# Patient Record
Sex: MALE | Race: WHITE | NOT HISPANIC OR LATINO | URBAN - METROPOLITAN AREA
[De-identification: names, ages, dates, MRNs, and addresses within clinical notes are randomized per-mention and may not be internally consistent; named-entity substitution may affect disease eponyms.]

---

## 2018-01-11 ENCOUNTER — INPATIENT (INPATIENT)
Facility: HOSPITAL | Age: 83
LOS: 0 days | Discharge: HOME | DRG: 247 | End: 2018-01-12
Attending: INTERNAL MEDICINE | Admitting: INTERNAL MEDICINE
Payer: MEDICARE

## 2018-01-11 ENCOUNTER — TRANSCRIPTION ENCOUNTER (OUTPATIENT)
Age: 83
End: 2018-01-11

## 2018-01-11 VITALS
TEMPERATURE: 98 F | OXYGEN SATURATION: 96 % | RESPIRATION RATE: 17 BRPM | SYSTOLIC BLOOD PRESSURE: 192 MMHG | HEART RATE: 88 BPM | DIASTOLIC BLOOD PRESSURE: 85 MMHG

## 2018-01-11 DIAGNOSIS — Z98.89 OTHER SPECIFIED POSTPROCEDURAL STATES: Chronic | ICD-10-CM

## 2018-01-11 DIAGNOSIS — R07.9 CHEST PAIN, UNSPECIFIED: ICD-10-CM

## 2018-01-11 LAB
ALBUMIN SERPL ELPH-MCNC: 3.8 G/DL — SIGNIFICANT CHANGE UP (ref 3.3–5)
ALBUMIN SERPL ELPH-MCNC: 4 G/DL — SIGNIFICANT CHANGE UP (ref 3.3–5)
ALP SERPL-CCNC: 66 U/L — SIGNIFICANT CHANGE UP (ref 40–120)
ALP SERPL-CCNC: 73 U/L — SIGNIFICANT CHANGE UP (ref 40–120)
ALT FLD-CCNC: 12 U/L RC — SIGNIFICANT CHANGE UP (ref 10–45)
ALT FLD-CCNC: 14 U/L RC — SIGNIFICANT CHANGE UP (ref 10–45)
ANION GAP SERPL CALC-SCNC: 10 MMOL/L — SIGNIFICANT CHANGE UP (ref 5–17)
ANION GAP SERPL CALC-SCNC: 10 MMOL/L — SIGNIFICANT CHANGE UP (ref 5–17)
APTT BLD: 19.8 SEC — LOW (ref 27.5–37.4)
AST SERPL-CCNC: 20 U/L — SIGNIFICANT CHANGE UP (ref 10–40)
AST SERPL-CCNC: 28 U/L — SIGNIFICANT CHANGE UP (ref 10–40)
BASOPHILS # BLD AUTO: 0 K/UL — SIGNIFICANT CHANGE UP (ref 0–0.2)
BASOPHILS # BLD AUTO: SIGNIFICANT CHANGE UP (ref 0–0.2)
BASOPHILS NFR BLD AUTO: 0.8 % — SIGNIFICANT CHANGE UP (ref 0–2)
BASOPHILS NFR BLD AUTO: SIGNIFICANT CHANGE UP % (ref 0–2)
BILIRUB SERPL-MCNC: 0.3 MG/DL — SIGNIFICANT CHANGE UP (ref 0.2–1.2)
BILIRUB SERPL-MCNC: 0.3 MG/DL — SIGNIFICANT CHANGE UP (ref 0.2–1.2)
BUN SERPL-MCNC: 23 MG/DL — SIGNIFICANT CHANGE UP (ref 7–23)
BUN SERPL-MCNC: 23 MG/DL — SIGNIFICANT CHANGE UP (ref 7–23)
CALCIUM SERPL-MCNC: 9.2 MG/DL — SIGNIFICANT CHANGE UP (ref 8.4–10.5)
CALCIUM SERPL-MCNC: 9.2 MG/DL — SIGNIFICANT CHANGE UP (ref 8.4–10.5)
CHLORIDE SERPL-SCNC: 105 MMOL/L — SIGNIFICANT CHANGE UP (ref 96–108)
CHLORIDE SERPL-SCNC: 106 MMOL/L — SIGNIFICANT CHANGE UP (ref 96–108)
CK MB BLD-MCNC: 5 % — HIGH (ref 0–3.5)
CK MB CFR SERPL CALC: 9.9 NG/ML — HIGH (ref 0–6.7)
CK SERPL-CCNC: 199 U/L — SIGNIFICANT CHANGE UP (ref 30–200)
CO2 SERPL-SCNC: 26 MMOL/L — SIGNIFICANT CHANGE UP (ref 22–31)
CO2 SERPL-SCNC: 26 MMOL/L — SIGNIFICANT CHANGE UP (ref 22–31)
CREAT SERPL-MCNC: 1.36 MG/DL — HIGH (ref 0.5–1.3)
CREAT SERPL-MCNC: 1.38 MG/DL — HIGH (ref 0.5–1.3)
EOSINOPHIL # BLD AUTO: 0.2 K/UL — SIGNIFICANT CHANGE UP (ref 0–0.5)
EOSINOPHIL # BLD AUTO: SIGNIFICANT CHANGE UP (ref 0–0.5)
EOSINOPHIL NFR BLD AUTO: 3.1 % — SIGNIFICANT CHANGE UP (ref 0–6)
EOSINOPHIL NFR BLD AUTO: SIGNIFICANT CHANGE UP % (ref 0–6)
GLUCOSE SERPL-MCNC: 88 MG/DL — SIGNIFICANT CHANGE UP (ref 70–99)
GLUCOSE SERPL-MCNC: 97 MG/DL — SIGNIFICANT CHANGE UP (ref 70–99)
HCT VFR BLD CALC: 39.8 % — SIGNIFICANT CHANGE UP (ref 39–50)
HCT VFR BLD CALC: 40.1 % — SIGNIFICANT CHANGE UP (ref 39–50)
HGB BLD-MCNC: 13.4 G/DL — SIGNIFICANT CHANGE UP (ref 13–17)
HGB BLD-MCNC: 13.8 G/DL — SIGNIFICANT CHANGE UP (ref 13–17)
INR BLD: 0.94 RATIO — SIGNIFICANT CHANGE UP (ref 0.88–1.16)
LYMPHOCYTES # BLD AUTO: 1.3 K/UL — SIGNIFICANT CHANGE UP (ref 1–3.3)
LYMPHOCYTES # BLD AUTO: 23.6 % — SIGNIFICANT CHANGE UP (ref 13–44)
LYMPHOCYTES # BLD AUTO: SIGNIFICANT CHANGE UP % (ref 13–44)
LYMPHOCYTES # BLD AUTO: SIGNIFICANT CHANGE UP (ref 1–3.3)
MCHC RBC-ENTMCNC: 31.2 PG — SIGNIFICANT CHANGE UP (ref 27–34)
MCHC RBC-ENTMCNC: 31.8 PG — SIGNIFICANT CHANGE UP (ref 27–34)
MCHC RBC-ENTMCNC: 33.8 GM/DL — SIGNIFICANT CHANGE UP (ref 32–36)
MCHC RBC-ENTMCNC: 34.5 GM/DL — SIGNIFICANT CHANGE UP (ref 32–36)
MCV RBC AUTO: 92.2 FL — SIGNIFICANT CHANGE UP (ref 80–100)
MCV RBC AUTO: 92.4 FL — SIGNIFICANT CHANGE UP (ref 80–100)
MONOCYTES # BLD AUTO: 0.5 K/UL — SIGNIFICANT CHANGE UP (ref 0–0.9)
MONOCYTES # BLD AUTO: SIGNIFICANT CHANGE UP (ref 0–0.9)
MONOCYTES NFR BLD AUTO: 8.2 % — SIGNIFICANT CHANGE UP (ref 2–14)
MONOCYTES NFR BLD AUTO: SIGNIFICANT CHANGE UP % (ref 2–14)
NEUTROPHILS # BLD AUTO: 3.6 K/UL — SIGNIFICANT CHANGE UP (ref 1.8–7.4)
NEUTROPHILS # BLD AUTO: SIGNIFICANT CHANGE UP (ref 1.8–7.4)
NEUTROPHILS NFR BLD AUTO: 64.3 % — SIGNIFICANT CHANGE UP (ref 43–77)
NEUTROPHILS NFR BLD AUTO: SIGNIFICANT CHANGE UP % (ref 43–77)
PLATELET # BLD AUTO: 154 K/UL — SIGNIFICANT CHANGE UP (ref 150–400)
PLATELET # BLD AUTO: 172 K/UL — SIGNIFICANT CHANGE UP (ref 150–400)
POTASSIUM SERPL-MCNC: 4.7 MMOL/L — SIGNIFICANT CHANGE UP (ref 3.5–5.3)
POTASSIUM SERPL-MCNC: 4.9 MMOL/L — SIGNIFICANT CHANGE UP (ref 3.5–5.3)
POTASSIUM SERPL-SCNC: 4.7 MMOL/L — SIGNIFICANT CHANGE UP (ref 3.5–5.3)
POTASSIUM SERPL-SCNC: 4.9 MMOL/L — SIGNIFICANT CHANGE UP (ref 3.5–5.3)
PROT SERPL-MCNC: 6.8 G/DL — SIGNIFICANT CHANGE UP (ref 6–8.3)
PROT SERPL-MCNC: 7.6 G/DL — SIGNIFICANT CHANGE UP (ref 6–8.3)
PROTHROM AB SERPL-ACNC: 10.2 SEC — SIGNIFICANT CHANGE UP (ref 9.8–12.7)
RBC # BLD: 4.3 M/UL — SIGNIFICANT CHANGE UP (ref 4.2–5.8)
RBC # BLD: 4.35 M/UL — SIGNIFICANT CHANGE UP (ref 4.2–5.8)
RBC # FLD: 13.7 % — SIGNIFICANT CHANGE UP (ref 10.3–14.5)
RBC # FLD: 13.8 % — SIGNIFICANT CHANGE UP (ref 10.3–14.5)
SODIUM SERPL-SCNC: 141 MMOL/L — SIGNIFICANT CHANGE UP (ref 135–145)
SODIUM SERPL-SCNC: 142 MMOL/L — SIGNIFICANT CHANGE UP (ref 135–145)
TROPONIN T SERPL-MCNC: 0.02 NG/ML — SIGNIFICANT CHANGE UP (ref 0–0.06)
WBC # BLD: 5.6 K/UL — SIGNIFICANT CHANGE UP (ref 3.8–10.5)
WBC # BLD: 6.6 K/UL — SIGNIFICANT CHANGE UP (ref 3.8–10.5)
WBC # FLD AUTO: 5.6 K/UL — SIGNIFICANT CHANGE UP (ref 3.8–10.5)
WBC # FLD AUTO: 6.6 K/UL — SIGNIFICANT CHANGE UP (ref 3.8–10.5)

## 2018-01-11 PROCEDURE — 93010 ELECTROCARDIOGRAM REPORT: CPT

## 2018-01-11 PROCEDURE — 93454 CORONARY ARTERY ANGIO S&I: CPT | Mod: 26,59

## 2018-01-11 PROCEDURE — 99152 MOD SED SAME PHYS/QHP 5/>YRS: CPT

## 2018-01-11 PROCEDURE — 92928 PRQ TCAT PLMT NTRAC ST 1 LES: CPT | Mod: LD

## 2018-01-11 PROCEDURE — 71046 X-RAY EXAM CHEST 2 VIEWS: CPT | Mod: 26

## 2018-01-11 PROCEDURE — 99285 EMERGENCY DEPT VISIT HI MDM: CPT | Mod: 25,GC

## 2018-01-11 RX ORDER — CLOPIDOGREL BISULFATE 75 MG/1
1 TABLET, FILM COATED ORAL
Qty: 30 | Refills: 11 | OUTPATIENT
Start: 2018-01-11 | End: 2019-01-05

## 2018-01-11 RX ORDER — ATORVASTATIN CALCIUM 80 MG/1
20 TABLET, FILM COATED ORAL AT BEDTIME
Qty: 0 | Refills: 0 | Status: DISCONTINUED | OUTPATIENT
Start: 2018-01-11 | End: 2018-01-12

## 2018-01-11 RX ORDER — ASPIRIN/CALCIUM CARB/MAGNESIUM 324 MG
324 TABLET ORAL DAILY
Qty: 0 | Refills: 0 | Status: DISCONTINUED | OUTPATIENT
Start: 2018-01-11 | End: 2018-01-11

## 2018-01-11 RX ORDER — CLOPIDOGREL BISULFATE 75 MG/1
75 TABLET, FILM COATED ORAL DAILY
Qty: 0 | Refills: 0 | Status: DISCONTINUED | OUTPATIENT
Start: 2018-01-11 | End: 2018-01-12

## 2018-01-11 RX ORDER — METOPROLOL TARTRATE 50 MG
50 TABLET ORAL DAILY
Qty: 0 | Refills: 0 | Status: DISCONTINUED | OUTPATIENT
Start: 2018-01-11 | End: 2018-01-12

## 2018-01-11 RX ORDER — ACETYLCYSTEINE 200 MG/ML
1200 VIAL (ML) MISCELLANEOUS EVERY 6 HOURS
Qty: 0 | Refills: 0 | Status: DISCONTINUED | OUTPATIENT
Start: 2018-01-11 | End: 2018-01-12

## 2018-01-11 RX ORDER — LOSARTAN POTASSIUM 100 MG/1
50 TABLET, FILM COATED ORAL DAILY
Qty: 0 | Refills: 0 | Status: DISCONTINUED | OUTPATIENT
Start: 2018-01-11 | End: 2018-01-12

## 2018-01-11 RX ORDER — SODIUM CHLORIDE 9 MG/ML
3 INJECTION INTRAMUSCULAR; INTRAVENOUS; SUBCUTANEOUS ONCE
Qty: 0 | Refills: 0 | Status: COMPLETED | OUTPATIENT
Start: 2018-01-11 | End: 2018-01-11

## 2018-01-11 RX ORDER — ATORVASTATIN CALCIUM 80 MG/1
1 TABLET, FILM COATED ORAL
Qty: 30 | Refills: 0 | OUTPATIENT
Start: 2018-01-11 | End: 2018-02-09

## 2018-01-11 RX ORDER — ASPIRIN/CALCIUM CARB/MAGNESIUM 324 MG
1 TABLET ORAL
Qty: 0 | Refills: 0 | COMMUNITY
Start: 2018-01-11

## 2018-01-11 RX ORDER — ASPIRIN/CALCIUM CARB/MAGNESIUM 324 MG
81 TABLET ORAL DAILY
Qty: 0 | Refills: 0 | Status: DISCONTINUED | OUTPATIENT
Start: 2018-01-12 | End: 2018-01-12

## 2018-01-11 RX ORDER — ASPIRIN/CALCIUM CARB/MAGNESIUM 324 MG
324 TABLET ORAL ONCE
Qty: 0 | Refills: 0 | Status: COMPLETED | OUTPATIENT
Start: 2018-01-11 | End: 2018-01-11

## 2018-01-11 RX ADMIN — ATORVASTATIN CALCIUM 20 MILLIGRAM(S): 80 TABLET, FILM COATED ORAL at 21:12

## 2018-01-11 RX ADMIN — Medication 50 MILLIGRAM(S): at 17:48

## 2018-01-11 RX ADMIN — Medication 324 MILLIGRAM(S): at 09:13

## 2018-01-11 RX ADMIN — LOSARTAN POTASSIUM 50 MILLIGRAM(S): 100 TABLET, FILM COATED ORAL at 17:48

## 2018-01-11 RX ADMIN — SODIUM CHLORIDE 3 MILLILITER(S): 9 INJECTION INTRAMUSCULAR; INTRAVENOUS; SUBCUTANEOUS at 08:09

## 2018-01-11 NOTE — DISCHARGE NOTE ADULT - PLAN OF CARE
Pt remains chest pain free and understands post cath discharge instructions Do not MISS ANY DOSEs or STOP you Aspirin/Plavix, because these drugs helps to keep your sten open, unless instructed to do so by your cardiologist.   No heavy lifting or pushing/pulling with procedure arm for 2 weeks. No driving for 2 days. You may shower 24 hours following the procedure but avoid baths/swimming for 1 week. Check your wrist site for bleeding and/or swelling daily following procedure and call your doctor immediately if it occurs or if you experience increased pain at the site. Follow up with your cardiologist in 1-2 weeks. You may call [facility] if you have any questions/concerns regarding your procedure [phone].   Lifestyle modification: include healthy habits to prevent stroke and future CAD  Low salt, low fat diet.   Weight management.   Take medications as prescribed.    No smoking.  Follow up with your cardiologist or primary doctor in 1- 2 weeks. Your LDL cholesterol will be less than 70mg/dL Continue with your cholesterol medications. Eat a heart healthy diet that is low in saturated fats and salt, and includes whole grains, fruits, vegetables and lean protein; exercise regularly (consult with your physician or cardiologist first); maintain a heart healthy weight; if you smoke - quit (A resource to help you stop smoking is the [facility] Tobacco Control – phone number [phone].). Continue to follow with your primary physician or cardiologist. Your blood pressure will be controlled. Continue with your blood pressure medications; eat a heart healthy diet with low salt diet; exercise regularly (consult with your physician or cardiologist first); maintain a heart healthy weight; if you smoke - quit (A resource to help you stop smoking is the [facility] for Tobacco Control – phone number [phone].); include healthy ways to manage stress. Continue to follow with your primary care physician or cardiologist.

## 2018-01-11 NOTE — ED ADULT NURSE REASSESSMENT NOTE - NS ED NURSE REASSESS COMMENT FT1
Patient received from [facility]. Patient is resting in bed, in no apparent distress. Patient has no complaints at this time. VSS.

## 2018-01-11 NOTE — ED PROVIDER NOTE - PROGRESS NOTE DETAILS
Danny PGY3: discussed case with max tubbs, patient will be cathed by Jeremiah mobley today and will be admitted to cardiology. I discussed the case with the cardiology fellow

## 2018-01-11 NOTE — ED PROVIDER NOTE - MEDICAL DECISION MAKING DETAILS
Attending MD Rios: 87M with months of exertional chest pressure, ekg rbbb nondiagnostic for ischemia, story is concerning for ACS, will obtain serial cardiac biomarkers, admit to tele for further work-up given high risk chest pain

## 2018-01-11 NOTE — H&P CARDIOLOGY - HISTORY OF PRESENT ILLNESS
87 year old, presenting to [facility] ER c/o chest pressure for ~3 months on left side of chest. Symptoms are associated with shortness of breath and dyspnea on exertion. Denies diaphoresis with no symptoms of near syncope. Recent echocardiogram ~5 weeks ago. Patient was seen and evaluated by his cardiologist yesterday (Dr. Zavaleta) and was told to come to ER for angiogram. Patient presents to cath lab today for angiogram, denies chest pain, palpitations, SOB, PND 87 year old PMH CKD Stage III ( baseline Cr 1.25-1.4) presents to [facility] ER c/o chest pressure for ~3 months on left side of chest. Symptoms are associated with shortness of breath and dyspnea on exertion. Denies diaphoresis with no symptoms of near syncope. Recent echocardiogram ~5 weeks ago. Patient was seen and evaluated by his cardiologist yesterday (Dr. Zavaleta) and was told to come to ER for angiogram. Patient presents to cath lab today for angiogram, denies chest pain, palpitations, SOB, PND 87 year old [place] CKD Stage III ( baseline Cr 1.25-1.4) presents to [facility] ER c/o chest discomfort/ "fluttering" for ~3 months on left side of chest. Symptoms are associated with shortness of breath and dyspnea on exertion. Denies diaphoresis with no symptoms of near syncope. Recent echocardiogram ~5 weeks ago. Patient was seen and evaluated by his cardiologist yesterday (Dr. Zavaleta) and was told to come to ER for angiogram. Patient states he is not a "edicine /hospital nina", denies use of home medication, does not like to use medication. Patient presents to cath lab today for angiogram, denies chest pain, palpitations, SOB, PND 87 year old poor historian [facility] CKD Stage III ( baseline Cr 1.25-1.4) presents to [facility] ER c/o chest discomfort/ "fluttering" for ~3 months on left side of chest. Symptoms are associated with shortness of breath and dyspnea on exertion. Denies diaphoresis with no symptoms of near syncope. Recent echocardiogram ~5 weeks ago. Patient was seen and evaluated by his cardiologist yesterday (Dr. Zavaleta) and was told to come to ER for angiogram. Patient states he is not a "medicine /hospital nina", denies use of home medication, does not like to use medication. Patient presents to cath lab today for angiogram, denies chest pain, palpitations, SOB, PND 87 year old poor historian [facility] CKD Stage III ( baseline Cr 1.25-1.4) presents to [facility] ER c/o chest discomfort/ "fluttering" for ~3 months on left side of chest. Symptoms are associated with shortness of breath and dyspnea on exertion. Denies diaphoresis with no symptoms of near syncope. Patient was seen and evaluated by his cardiologist yesterday (Dr. Zavaleta) and was told to come to ER for angiogram. Patient states he is not a "medicine /hospital nina", denies use of home medication, does not like to use medication. Patient presents to cath lab today for angiogram, denies chest pain, palpitations, SOB, PND

## 2018-01-11 NOTE — H&P CARDIOLOGY - PMH
Leg swelling  related to cellulitis of LLE CKD (chronic kidney disease) stage 3, GFR 30-59 ml/min    Leg swelling  related to cellulitis of LLE BPH (benign prostatic hyperplasia)    Cellulitis  BL  CKD (chronic kidney disease) stage 3, GFR 30-59 ml/min    Colon polyp    Leg swelling  related to cellulitis of LLE

## 2018-01-11 NOTE — DISCHARGE NOTE ADULT - MEDICATION SUMMARY - MEDICATIONS TO TAKE
I will START or STAY ON the medications listed below when I get home from the hospital:    aspirin 81 mg oral delayed release tablet  -- 1 tab(s) by mouth once a day  -- Indication: For heart disease    Benicar 20 mg oral tablet  -- 1 tab(s) by mouth once a day  -- Indication: For hypertension    atorvastatin 20 mg oral tablet  -- 1 tab(s) by mouth once a day (at bedtime) MDD:1  -- Indication: For high lipids    clopidogrel 75 mg oral tablet  -- 1 tab(s) by mouth once a day MDD:1  -- Indication: For heart disease    Metoprolol Succinate ER 50 mg oral tablet, extended release  -- 1 tab(s) by mouth once a day  -- Indication: For hypertension

## 2018-01-11 NOTE — ED PROVIDER NOTE - NS ED ROS FT
CONST: no fevers, no chills  EYES: no pain  ENT: no sore throat   CV: lefy chest pain  RESP: no shortness of breath  ABD: no abdominal pain   : no dysuria  MSK: no back pain  NEURO: no headache or additional neurologic complaints  HEME: no easy bleeding, not on blood thinners  SKIN:  no rash

## 2018-01-11 NOTE — ED PROVIDER NOTE - OBJECTIVE STATEMENT
87 year old, presenting after chest pressure for ~3 months on left side of chest. Associated with shortness of breath and dyspnea on exertion. Denies diaphoresis with no symptoms of near syncope. recent echocardiogram ~5 weeks ago. Patient presenting from cardiologist office yesterday when he told his MD the symptoms and was directed to come to ER for angiography. Hasn't taken any medications for symptoms so far. Denies max pain in chest, described as pressure and discomfort and is constant.     PMD: max Roe 87 year old, presenting after chest pressure for ~3 months on left side of chest. Associated with shortness of breath and dyspnea on exertion. Denies diaphoresis with no symptoms of near syncope. recent echocardiogram ~5 weeks ago. Patient presenting from cardiologist office yesterday when he told his MD the symptoms and was directed to come to ER for angiography. Hasn't taken any medications for symptoms so far. Denies max pain in chest, described as pressure and discomfort and is constant.       PMD: max Roe

## 2018-01-11 NOTE — PROGRESS NOTE ADULT - SUBJECTIVE AND OBJECTIVE BOX
Patient underwent a PCI to Mid LAD (90%), on ASA, Plavix and statinprocedure and is being admitted as they are at increased risk for major adverse cardiac and vascular events if discharged due to the following high risk characteristics:  New onset Angina, Age>65, HTN Patient underwent a PCI to Mid LAD (90%), on ASA, Plavix and statin, and is being admitted as they are at increased risk for major adverse cardiac and vascular events if discharged due to the following high risk characteristics:  New onset Angina, Age>65, HTN

## 2018-01-11 NOTE — ED ADULT NURSE NOTE - OBJECTIVE STATEMENT
[id number] pt states saw dr cam yesterday and wants and angiogram from last night but pt states unable to come/gc Received patient awake and alert x 4, presenting with left side chest discomfort x 2 months, pressure while walking, no SOB, fever/chills. Denies any nausea/vomiting/diarrhea. Sent here from PMD for angiogram. Breathing unlabored with no distress noted, safety maintained. MD to further evaluate, will continue to monitor.

## 2018-01-11 NOTE — ED PROVIDER NOTE - PHYSICAL EXAMINATION
Danny: A & O x 3, NAD, HEENT WNL and no facial asymmetry; lungs CTAB, heart with reg rhythm; abdomen soft NTND; extremities with bilateral nonpitting edema for at least 4 years; skin with no rashes, neuro exam non focal with no motor or sensory deficits

## 2018-01-11 NOTE — ED PROVIDER NOTE - ATTENDING CONTRIBUTION TO CARE
Attending MD Rios:  I personally have seen and examined this patient.  Resident note reviewed and agree on plan of care and except where noted.  See HPI, PE, and MDM for details.

## 2018-01-12 ENCOUNTER — TRANSCRIPTION ENCOUNTER (OUTPATIENT)
Age: 83
End: 2018-01-12

## 2018-01-12 VITALS — TEMPERATURE: 98 F | RESPIRATION RATE: 18 BRPM | HEART RATE: 60 BPM | OXYGEN SATURATION: 100 %

## 2018-01-12 LAB
ANION GAP SERPL CALC-SCNC: 7 MMOL/L — SIGNIFICANT CHANGE UP (ref 5–17)
ANION GAP SERPL CALC-SCNC: 8 MMOL/L — SIGNIFICANT CHANGE UP (ref 5–17)
BUN SERPL-MCNC: 17 MG/DL — SIGNIFICANT CHANGE UP (ref 7–23)
BUN SERPL-MCNC: 22 MG/DL — SIGNIFICANT CHANGE UP (ref 7–23)
CALCIUM SERPL-MCNC: 8.6 MG/DL — SIGNIFICANT CHANGE UP (ref 8.4–10.5)
CALCIUM SERPL-MCNC: 8.8 MG/DL — SIGNIFICANT CHANGE UP (ref 8.4–10.5)
CHLORIDE SERPL-SCNC: 104 MMOL/L — SIGNIFICANT CHANGE UP (ref 96–108)
CHLORIDE SERPL-SCNC: 104 MMOL/L — SIGNIFICANT CHANGE UP (ref 96–108)
CHOLEST SERPL-MCNC: 197 MG/DL — SIGNIFICANT CHANGE UP (ref 10–199)
CO2 SERPL-SCNC: 27 MMOL/L — SIGNIFICANT CHANGE UP (ref 22–31)
CO2 SERPL-SCNC: 28 MMOL/L — SIGNIFICANT CHANGE UP (ref 22–31)
CREAT SERPL-MCNC: 1.4 MG/DL — HIGH (ref 0.5–1.3)
CREAT SERPL-MCNC: 1.6 MG/DL — HIGH (ref 0.5–1.3)
GLUCOSE SERPL-MCNC: 101 MG/DL — HIGH (ref 70–99)
GLUCOSE SERPL-MCNC: 97 MG/DL — SIGNIFICANT CHANGE UP (ref 70–99)
HCT VFR BLD CALC: 37.7 % — LOW (ref 39–50)
HDLC SERPL-MCNC: 47 MG/DL — SIGNIFICANT CHANGE UP (ref 40–125)
HGB BLD-MCNC: 12.6 G/DL — LOW (ref 13–17)
LIPID PNL WITH DIRECT LDL SERPL: 122 MG/DL — SIGNIFICANT CHANGE UP
MCHC RBC-ENTMCNC: 30.8 PG — SIGNIFICANT CHANGE UP (ref 27–34)
MCHC RBC-ENTMCNC: 33.5 GM/DL — SIGNIFICANT CHANGE UP (ref 32–36)
MCV RBC AUTO: 91.9 FL — SIGNIFICANT CHANGE UP (ref 80–100)
PLATELET # BLD AUTO: 197 K/UL — SIGNIFICANT CHANGE UP (ref 150–400)
POTASSIUM SERPL-MCNC: 4.5 MMOL/L — SIGNIFICANT CHANGE UP (ref 3.5–5.3)
POTASSIUM SERPL-MCNC: 4.6 MMOL/L — SIGNIFICANT CHANGE UP (ref 3.5–5.3)
POTASSIUM SERPL-SCNC: 4.5 MMOL/L — SIGNIFICANT CHANGE UP (ref 3.5–5.3)
POTASSIUM SERPL-SCNC: 4.6 MMOL/L — SIGNIFICANT CHANGE UP (ref 3.5–5.3)
RBC # BLD: 4.1 M/UL — LOW (ref 4.2–5.8)
RBC # FLD: 13.6 % — SIGNIFICANT CHANGE UP (ref 10.3–14.5)
SODIUM SERPL-SCNC: 139 MMOL/L — SIGNIFICANT CHANGE UP (ref 135–145)
SODIUM SERPL-SCNC: 139 MMOL/L — SIGNIFICANT CHANGE UP (ref 135–145)
TOTAL CHOLESTEROL/HDL RATIO MEASUREMENT: 4.2 RATIO — SIGNIFICANT CHANGE UP (ref 3.4–9.6)
TRIGL SERPL-MCNC: 141 MG/DL — SIGNIFICANT CHANGE UP (ref 10–149)
WBC # BLD: 7.4 K/UL — SIGNIFICANT CHANGE UP (ref 3.8–10.5)
WBC # FLD AUTO: 7.4 K/UL — SIGNIFICANT CHANGE UP (ref 3.8–10.5)

## 2018-01-12 PROCEDURE — C1887: CPT

## 2018-01-12 PROCEDURE — 99153 MOD SED SAME PHYS/QHP EA: CPT

## 2018-01-12 PROCEDURE — 80061 LIPID PANEL: CPT

## 2018-01-12 PROCEDURE — 84484 ASSAY OF TROPONIN QUANT: CPT

## 2018-01-12 PROCEDURE — C9600: CPT | Mod: LD

## 2018-01-12 PROCEDURE — 82550 ASSAY OF CK (CPK): CPT

## 2018-01-12 PROCEDURE — C1894: CPT

## 2018-01-12 PROCEDURE — 80048 BASIC METABOLIC PNL TOTAL CA: CPT

## 2018-01-12 PROCEDURE — C1725: CPT

## 2018-01-12 PROCEDURE — C1769: CPT

## 2018-01-12 PROCEDURE — 99285 EMERGENCY DEPT VISIT HI MDM: CPT | Mod: 25

## 2018-01-12 PROCEDURE — 85610 PROTHROMBIN TIME: CPT

## 2018-01-12 PROCEDURE — 93005 ELECTROCARDIOGRAM TRACING: CPT

## 2018-01-12 PROCEDURE — C1876: CPT

## 2018-01-12 PROCEDURE — 71046 X-RAY EXAM CHEST 2 VIEWS: CPT

## 2018-01-12 PROCEDURE — 82553 CREATINE MB FRACTION: CPT

## 2018-01-12 PROCEDURE — 80053 COMPREHEN METABOLIC PANEL: CPT

## 2018-01-12 PROCEDURE — 99152 MOD SED SAME PHYS/QHP 5/>YRS: CPT

## 2018-01-12 PROCEDURE — 85730 THROMBOPLASTIN TIME PARTIAL: CPT

## 2018-01-12 PROCEDURE — 93454 CORONARY ARTERY ANGIO S&I: CPT | Mod: 59

## 2018-01-12 PROCEDURE — 85027 COMPLETE CBC AUTOMATED: CPT

## 2018-01-12 RX ORDER — CLOPIDOGREL BISULFATE 75 MG/1
1 TABLET, FILM COATED ORAL
Qty: 0 | Refills: 0 | COMMUNITY
Start: 2018-01-12

## 2018-01-12 RX ORDER — ATORVASTATIN CALCIUM 80 MG/1
1 TABLET, FILM COATED ORAL
Qty: 30 | Refills: 0 | OUTPATIENT
Start: 2018-01-12 | End: 2018-02-10

## 2018-01-12 RX ORDER — CLOPIDOGREL BISULFATE 75 MG/1
1 TABLET, FILM COATED ORAL
Qty: 90 | Refills: 3 | OUTPATIENT
Start: 2018-01-12 | End: 2019-01-06

## 2018-01-12 RX ORDER — SODIUM CHLORIDE 9 MG/ML
500 INJECTION INTRAMUSCULAR; INTRAVENOUS; SUBCUTANEOUS
Qty: 0 | Refills: 0 | Status: DISCONTINUED | OUTPATIENT
Start: 2018-01-12 | End: 2018-01-12

## 2018-01-12 RX ORDER — ATORVASTATIN CALCIUM 80 MG/1
1 TABLET, FILM COATED ORAL
Qty: 0 | Refills: 0 | COMMUNITY
Start: 2018-01-12

## 2018-01-12 RX ADMIN — SODIUM CHLORIDE 100 MILLILITER(S): 9 INJECTION INTRAMUSCULAR; INTRAVENOUS; SUBCUTANEOUS at 08:00

## 2018-01-12 RX ADMIN — Medication 81 MILLIGRAM(S): at 06:11

## 2018-01-12 RX ADMIN — CLOPIDOGREL BISULFATE 75 MILLIGRAM(S): 75 TABLET, FILM COATED ORAL at 06:11

## 2018-01-12 RX ADMIN — Medication 50 MILLIGRAM(S): at 06:11

## 2018-01-12 RX ADMIN — Medication 1200 MILLIGRAM(S): at 02:00

## 2018-01-12 RX ADMIN — LOSARTAN POTASSIUM 50 MILLIGRAM(S): 100 TABLET, FILM COATED ORAL at 06:11

## 2018-01-12 NOTE — DISCHARGE NOTE ADULT - MEDICATION SUMMARY - MEDICATIONS TO TAKE
I will START or STAY ON the medications listed below when I get home from the hospital:    aspirin 81 mg oral delayed release tablet  -- 1 tab(s) by mouth once a day  -- Indication: For keep stent open    Benicar 20 mg oral tablet  -- 1 tab(s) by mouth once a day  -- Indication: For high blood pressure    atorvastatin 20 mg oral tablet  -- 1 tab(s) by mouth once a day (at bedtime) MDD:1  -- Indication: For High cholesterol    atorvastatin 20 mg oral tablet  -- 1 tab(s) by mouth once a day (at bedtime)  -- Indication: For High cholesterol    clopidogrel 75 mg oral tablet  -- 1 tab(s) by mouth once a day  -- Indication: For keep stent open    clopidogrel 75 mg oral tablet  -- 1 tab(s) by mouth once a day MDD:1  -- Indication: For keep stent open    Metoprolol Succinate ER 50 mg oral tablet, extended release  -- 1 tab(s) by mouth once a day  -- Indication: For High blood pressure

## 2018-01-12 NOTE — DISCHARGE NOTE ADULT - NS MD DC FALL RISK RISK
For information on Fall & Injury Prevention, visit [email or web address]
For information on Fall & Injury Prevention, visit [email or web address]

## 2018-01-12 NOTE — PROVIDER CONTACT NOTE (OTHER) - ACTION/TREATMENT ORDERED:
notified NP christiane. NP at bedside evaluate. will cont to monitor. no further intervention is needed at this moment.

## 2018-01-12 NOTE — PROVIDER CONTACT NOTE (OTHER) - SITUATION
pt right radial site ecchymosis. no hematoma. positive radial pulses bilaterally. non-tender , soft to touch.

## 2018-01-12 NOTE — DISCHARGE NOTE ADULT - CARE PROVIDERS DIRECT ADDRESSES
,DirectAddress_Unknown,[email or web address]
[email or web address]

## 2018-01-12 NOTE — DISCHARGE NOTE ADULT - PATIENT PORTAL LINK FT
“You can access the FollowHealth Patient Portal, offered by [facility], by registering with the following website: [email or web address]”
“You can access the FollowHealth Patient Portal, offered by [facility], by registering with the following website: [email or web address]”

## 2018-01-12 NOTE — DISCHARGE NOTE ADULT - PLAN OF CARE
Low salt, low fat diet.   Weight management.   Take medications as prescribed.    No smoking.  Follow up appointments with your doctor(s)  as instructed. No heavy lifting for 2 weeks, no strenuous activity  ( pushing/ pulling) no driving for x 2 days,  you may shower 24 hours following procedure but no bathing or swimming for x1  week, no strenuous sex for x 1 week & follow up with your cardiologist in 1-2 week Your blood pressure will be controlled. Continue with your blood pressure medications; eat a heart healthy diet with low salt diet; exercise regularly (consult with your physician or cardiologist first); maintain a heart healthy weight; if you smoke - quit (A resource to help you stop smoking is the [facility] for Tobacco Control – phone number [phone].); include healthy ways to manage stress. Continue to follow with your primary care physician or cardiologist. Your LDL cholesterol will be less than 70mg/dL Continue with your cholesterol medications. Eat a heart healthy diet that is low in saturated fats and salt, and includes whole grains, fruits, vegetables and lean protein; exercise regularly (consult with your physician or cardiologist first); maintain a heart healthy weight; if you smoke - quit (A resource to help you stop smoking is the [facility] for Tobacco Control – phone number [phone].). Continue to follow with your primary physician or cardiologist.

## 2018-01-12 NOTE — DISCHARGE NOTE ADULT - CARE PROVIDER_API CALL
Waqar,   cardiologist  Phone: (   )    -  Fax: (   )    -    Iggy Snow), Cardiovascular Disease; Internal Medicine  [address] [place] [id number]  Phone: [phone]  Fax: [phone]
Iggy Snow), Cardiovascular Disease; Internal Medicine  [address] [place] [address]  Phone: [phone]  Fax: [phone]

## 2018-01-12 NOTE — DISCHARGE NOTE ADULT - HOSPITAL COURSE
87 year old poor historian [facility] CKD Stage III ( baseline Cr 1.25-1.4) presents to [facility] ER c/o chest discomfort/ "fluttering" for ~3 months on left side of chest. Symptoms are associated with shortness of breath and dyspnea on exertion. Denies diaphoresis with no symptoms of near syncope. Patient was seen and evaluated by his cardiologist yesterday (Dr. Zavaleta) and was told to come to ER for angiogram. Patient states he is not a "medicine /hospital nina", denies use of home medication, does not like to use medication. Patient presents to cath lab today for angiogram, denies chest pain, palpitations, SOB, PND  Pt s/p PCI: VINITA x1 to LAD via R radial. Pt tolerated procedure well and overnight remained uneventful. No c/o chest pain or SOB. Pt is hemodynamically stable, EKG and all lab results reviewed. Insertion/incision site benign, no bleeding or hematoma, and cath site dressing changed. Discharge teaching provided to Pt/caretaker and verbalized understanding the instruction. Pt is stable for discharge home as per attending.
87 year old poor historian [facility] CKD Stage III ( baseline Cr 1.25-1.4) presents to [facility] ER c/o chest discomfort/ "fluttering" for ~3 months on left side of chest. Symptoms are associated with shortness of breath and dyspnea on exertion. Denies diaphoresis with no symptoms of near syncope. Patient was seen and evaluated by his cardiologist yesterday (Dr. Zavaleta) and was told to come to ER for angiogram. Patient states he is not a "medicine /hospital nina", denies use of home medication, does not like to use medication. Patient presents to cath lab today for angiogram, denies chest pain, palpitations, SOB, PND.  Patient is now s/p PCI DESx1 mLAD 90% via RRA access.  Patient tolerated the procedure well, post PCI EKG w/o acute changes.  RRA site slightly ecchymotic without presence of bleeding/hematoma, patient w/o complaints, radial pulse palpable.  Patient remained hemodynamically stable throughout the course of hospitalization. Creatinine elevated in am lab, patient received IV hydration, repeat serum creatinine back to baseline, hospital course was otherwise uneventful. Case discussed with Dr Lanza.  Patient is now medically stable to be discharged home today.

## 2018-01-12 NOTE — DISCHARGE NOTE ADULT - CARE PLAN
Principal Discharge DX:	Coronary artery disease due to lipid rich plaque  Goal:	Pt remains chest pain free and understands post cath discharge instructions  Instructions for follow-up, activity and diet:	Do not MISS ANY DOSEs or STOP you Aspirin/Plavix, because these drugs helps to keep your sten open, unless instructed to do so by your cardiologist.   No heavy lifting or pushing/pulling with procedure arm for 2 weeks. No driving for 2 days. You may shower 24 hours following the procedure but avoid baths/swimming for 1 week. Check your wrist site for bleeding and/or swelling daily following procedure and call your doctor immediately if it occurs or if you experience increased pain at the site. Follow up with your cardiologist in 1-2 weeks. You may call [facility] if you have any questions/concerns regarding your procedure [phone].   Lifestyle modification: include healthy habits to prevent stroke and future CAD  Low salt, low fat diet.   Weight management.   Take medications as prescribed.    No smoking.  Follow up with your cardiologist or primary doctor in 1- 2 weeks.  Secondary Diagnosis:	Mixed hyperlipidemia  Goal:	Your LDL cholesterol will be less than 70mg/dL  Instructions for follow-up, activity and diet:	Continue with your cholesterol medications. Eat a heart healthy diet that is low in saturated fats and salt, and includes whole grains, fruits, vegetables and lean protein; exercise regularly (consult with your physician or cardiologist first); maintain a heart healthy weight; if you smoke - quit (A resource to help you stop smoking is the [facility] for Genticel Control – phone number [phone].). Continue to follow with your primary physician or cardiologist.  Secondary Diagnosis:	HTN (hypertension), benign  Goal:	Your blood pressure will be controlled.  Instructions for follow-up, activity and diet:	Continue with your blood pressure medications; eat a heart healthy diet with low salt diet; exercise regularly (consult with your physician or cardiologist first); maintain a heart healthy weight; if you smoke - quit (A resource to help you stop smoking is the [facility] for Tobacco Control – phone number [phone].); include healthy ways to manage stress. Continue to follow with your primary care physician or cardiologist.
Principal Discharge DX:	Coronary artery disease due to lipid rich plaque  Goal:	Low salt, low fat diet.   Weight management.   Take medications as prescribed.    No smoking.  Follow up appointments with your doctor(s)  as instructed.  Instructions for follow-up, activity and diet:	No heavy lifting for 2 weeks, no strenuous activity  ( pushing/ pulling) no driving for x 2 days,  you may shower 24 hours following procedure but no bathing or swimming for x1  week, no strenuous sex for x 1 week & follow up with your cardiologist in 1-2 week  Secondary Diagnosis:	HTN (hypertension), benign  Goal:	Your blood pressure will be controlled.  Instructions for follow-up, activity and diet:	Continue with your blood pressure medications; eat a heart healthy diet with low salt diet; exercise regularly (consult with your physician or cardiologist first); maintain a heart healthy weight; if you smoke - quit (A resource to help you stop smoking is the [facility] – phone number [phone].); include healthy ways to manage stress. Continue to follow with your primary care physician or cardiologist.  Secondary Diagnosis:	Mixed hyperlipidemia  Goal:	Your LDL cholesterol will be less than 70mg/dL  Instructions for follow-up, activity and diet:	Continue with your cholesterol medications. Eat a heart healthy diet that is low in saturated fats and salt, and includes whole grains, fruits, vegetables and lean protein; exercise regularly (consult with your physician or cardiologist first); maintain a heart healthy weight; if you smoke - quit (A resource to help you stop smoking is the [facility] – phone number [phone].). Continue to follow with your primary physician or cardiologist.

## 2018-01-12 NOTE — DISCHARGE NOTE ADULT - PROVIDER TOKENS
FREE:[LAST:[Waqar],PHONE:[(   )    -],FAX:[(   )    -],ADDRESS:[cardiologist]],TOKEN:'[id number]'
DRE:'[id number]'

## 2018-01-12 NOTE — PROGRESS NOTE ADULT - SUBJECTIVE AND OBJECTIVE BOX
Subjective/Objective:  Patient is a 87y old  Male who presents with a chief complaint of cardiac cath (2018 20:24)  S/P PCI: VINITA x1 to LAD via R radial.     Allergies    penicillin (Angioedema)    Intolerances      Medications:  acetylcysteine  Oral Solution 1200 milliGRAM(s) Oral every 6 hours  aspirin enteric coated 81 milliGRAM(s) Oral daily  atorvastatin 20 milliGRAM(s) Oral at bedtime  clopidogrel Tablet 75 milliGRAM(s) Oral daily  losartan 50 milliGRAM(s) Oral daily  metoprolol succinate ER 50 milliGRAM(s) Oral daily      Review of Systems:   No c/o chest pain or SOB, and all others negative.    Vitals:  T(C): 36.9 (18 @ 04:21), Max: 36.9 (18 @ 06:33)  HR: 68 (18 @ 04:21) (60 - 88)  BP: 148/71 (18 @ 04:21) (148/71 - 192/85)  BP(mean): 103 (18 @ 12:25) (103 - 103)  RR: 17 (18 @ 04:21) (14 - 20)  SpO2: 100% (18 @ 04:21) (89% - 100%)  Wt(kg): --  Daily Height in cm: 177.8 (2018 12:25)    Daily Weight in k.2 (2018 12:25)  I&O's Summary    2018 07:01  -  2018 06:07  --------------------------------------------------------  IN: 260 mL / OUT: 0 mL / NET: 260 mL      Physical Exam:  Appearance: Pt in NAD, non-toxic  Cardiovascular: S1 S2  Cath Site: No evidence of bleeding or hematoma, Non-tender to palpation, 2+ distal pulses  Respiratory: Clear to auscultation bilaterally  Gastrointestinal: Soft, NT/ND, Bowel Sounds +  Neurologic: Non-focal                          12.6   7.4   )-----------( 197      ( 2018 23:57 )             37.7         139  |  104  |  22  ----------------------------<  101<H>  4.6   |  27  |  1.60<H>    Ca    8.8      2018 23:57    TPro  6.8  /  Alb  3.8  /  TBili  0.3  /  DBili  x   /  AST  20  /  ALT  12  /  AlkPhos  66      PT/INR - ( 2018 07:14 )   PT: 10.2 sec;   INR: 0.94 ratio         PTT - ( 2018 07:14 )  PTT:19.8 sec  CARDIAC MARKERS ( 2018 07:14 )  x     / 0.02 ng/mL / 199 U/L / x     / 9.9 ng/mL        Lipid panel   Hgb A1c       Interpretation of Telemetry: SR at HR 60-80's.  No special event over night.    Assessment/Plan:   S/P PCI: VINITA x1 to LAD via R radial. Pt tolerated procedure well and overnight remained uneventful. No c/o chest pain or SOB. Pt is hemodynamically stable, EKG and all lab results reviewed. Noted Cr up to 1.6 from 1.4, start 1st dose Mucomyst po. Insertion/incision site benign, no bleeding or hematoma, and cath site dressing changed. Discharge teaching provided to Pt/caretaker and verbalized understanding the instruction. Pt is stable for discharge home as per attending.   F/U with cardiologist in 1-2 weeks.  Plan to finish 2nd dose of Mucomyst, then may d/c home in Am if stable.  cont assess and monitor.

## 2018-02-15 ENCOUNTER — INPATIENT (INPATIENT)
Facility: HOSPITAL | Age: 83
LOS: 0 days | Discharge: HOME | DRG: 247 | End: 2018-02-16
Attending: INTERNAL MEDICINE | Admitting: INTERNAL MEDICINE
Payer: MEDICARE

## 2018-02-15 ENCOUNTER — TRANSCRIPTION ENCOUNTER (OUTPATIENT)
Age: 83
End: 2018-02-15

## 2018-02-15 VITALS
SYSTOLIC BLOOD PRESSURE: 176 MMHG | WEIGHT: 199.96 LBS | TEMPERATURE: 98 F | OXYGEN SATURATION: 99 % | HEART RATE: 61 BPM | RESPIRATION RATE: 20 BRPM | DIASTOLIC BLOOD PRESSURE: 75 MMHG | HEIGHT: 70 IN

## 2018-02-15 DIAGNOSIS — Z98.89 OTHER SPECIFIED POSTPROCEDURAL STATES: Chronic | ICD-10-CM

## 2018-02-15 DIAGNOSIS — I25.10 ATHEROSCLEROTIC HEART DISEASE OF NATIVE CORONARY ARTERY WITHOUT ANGINA PECTORIS: ICD-10-CM

## 2018-02-15 LAB
ALBUMIN SERPL ELPH-MCNC: 3.9 G/DL — SIGNIFICANT CHANGE UP (ref 3.3–5)
ALP SERPL-CCNC: 73 U/L — SIGNIFICANT CHANGE UP (ref 40–120)
ALT FLD-CCNC: 14 U/L RC — SIGNIFICANT CHANGE UP (ref 10–45)
ANION GAP SERPL CALC-SCNC: 12 MMOL/L — SIGNIFICANT CHANGE UP (ref 5–17)
AST SERPL-CCNC: 27 U/L — SIGNIFICANT CHANGE UP (ref 10–40)
BILIRUB SERPL-MCNC: 0.4 MG/DL — SIGNIFICANT CHANGE UP (ref 0.2–1.2)
BUN SERPL-MCNC: 20 MG/DL — SIGNIFICANT CHANGE UP (ref 7–23)
CALCIUM SERPL-MCNC: 8.7 MG/DL — SIGNIFICANT CHANGE UP (ref 8.4–10.5)
CHLORIDE SERPL-SCNC: 106 MMOL/L — SIGNIFICANT CHANGE UP (ref 96–108)
CO2 SERPL-SCNC: 25 MMOL/L — SIGNIFICANT CHANGE UP (ref 22–31)
CREAT SERPL-MCNC: 1.35 MG/DL — HIGH (ref 0.5–1.3)
GLUCOSE SERPL-MCNC: 97 MG/DL — SIGNIFICANT CHANGE UP (ref 70–99)
HCT VFR BLD CALC: 34.9 % — LOW (ref 39–50)
HGB BLD-MCNC: 11.6 G/DL — LOW (ref 13–17)
MCHC RBC-ENTMCNC: 30.9 PG — SIGNIFICANT CHANGE UP (ref 27–34)
MCHC RBC-ENTMCNC: 33.2 GM/DL — SIGNIFICANT CHANGE UP (ref 32–36)
MCV RBC AUTO: 93.2 FL — SIGNIFICANT CHANGE UP (ref 80–100)
PLATELET # BLD AUTO: 191 K/UL — SIGNIFICANT CHANGE UP (ref 150–400)
POTASSIUM SERPL-MCNC: 4.9 MMOL/L — SIGNIFICANT CHANGE UP (ref 3.5–5.3)
POTASSIUM SERPL-SCNC: 4.9 MMOL/L — SIGNIFICANT CHANGE UP (ref 3.5–5.3)
PROT SERPL-MCNC: 7 G/DL — SIGNIFICANT CHANGE UP (ref 6–8.3)
RBC # BLD: 3.75 M/UL — LOW (ref 4.2–5.8)
RBC # FLD: 12.7 % — SIGNIFICANT CHANGE UP (ref 10.3–14.5)
SODIUM SERPL-SCNC: 143 MMOL/L — SIGNIFICANT CHANGE UP (ref 135–145)
WBC # BLD: 5.8 K/UL — SIGNIFICANT CHANGE UP (ref 3.8–10.5)
WBC # FLD AUTO: 5.8 K/UL — SIGNIFICANT CHANGE UP (ref 3.8–10.5)

## 2018-02-15 PROCEDURE — 99152 MOD SED SAME PHYS/QHP 5/>YRS: CPT

## 2018-02-15 PROCEDURE — 93571 IV DOP VEL&/PRESS C FLO 1ST: CPT | Mod: 26,LC

## 2018-02-15 PROCEDURE — 93458 L HRT ARTERY/VENTRICLE ANGIO: CPT | Mod: 26,59

## 2018-02-15 PROCEDURE — 93010 ELECTROCARDIOGRAM REPORT: CPT | Mod: 76

## 2018-02-15 PROCEDURE — 92928 PRQ TCAT PLMT NTRAC ST 1 LES: CPT | Mod: LC

## 2018-02-15 RX ORDER — METOPROLOL TARTRATE 50 MG
50 TABLET ORAL DAILY
Qty: 0 | Refills: 0 | Status: DISCONTINUED | OUTPATIENT
Start: 2018-02-15 | End: 2018-02-16

## 2018-02-15 RX ORDER — ASPIRIN/CALCIUM CARB/MAGNESIUM 324 MG
1 TABLET ORAL
Qty: 30 | Refills: 11
Start: 2018-02-15 | End: 2019-02-09

## 2018-02-15 RX ORDER — CLOPIDOGREL BISULFATE 75 MG/1
75 TABLET, FILM COATED ORAL DAILY
Qty: 0 | Refills: 0 | Status: DISCONTINUED | OUTPATIENT
Start: 2018-02-15 | End: 2018-02-16

## 2018-02-15 RX ORDER — ATORVASTATIN CALCIUM 80 MG/1
20 TABLET, FILM COATED ORAL AT BEDTIME
Qty: 0 | Refills: 0 | Status: DISCONTINUED | OUTPATIENT
Start: 2018-02-15 | End: 2018-02-16

## 2018-02-15 RX ORDER — CLOPIDOGREL BISULFATE 75 MG/1
1 TABLET, FILM COATED ORAL
Qty: 30 | Refills: 11
Start: 2018-02-15 | End: 2019-02-09

## 2018-02-15 RX ORDER — SODIUM CHLORIDE 9 MG/ML
3 INJECTION INTRAMUSCULAR; INTRAVENOUS; SUBCUTANEOUS EVERY 8 HOURS
Qty: 0 | Refills: 0 | Status: DISCONTINUED | OUTPATIENT
Start: 2018-02-15 | End: 2018-02-16

## 2018-02-15 RX ORDER — ASPIRIN/CALCIUM CARB/MAGNESIUM 324 MG
81 TABLET ORAL DAILY
Qty: 0 | Refills: 0 | Status: DISCONTINUED | OUTPATIENT
Start: 2018-02-15 | End: 2018-02-16

## 2018-02-15 RX ORDER — LOSARTAN POTASSIUM 100 MG/1
50 TABLET, FILM COATED ORAL DAILY
Qty: 0 | Refills: 0 | Status: DISCONTINUED | OUTPATIENT
Start: 2018-02-15 | End: 2018-02-16

## 2018-02-15 RX ADMIN — SODIUM CHLORIDE 3 MILLILITER(S): 9 INJECTION INTRAMUSCULAR; INTRAVENOUS; SUBCUTANEOUS at 13:42

## 2018-02-15 RX ADMIN — ATORVASTATIN CALCIUM 20 MILLIGRAM(S): 80 TABLET, FILM COATED ORAL at 21:17

## 2018-02-15 RX ADMIN — SODIUM CHLORIDE 3 MILLILITER(S): 9 INJECTION INTRAMUSCULAR; INTRAVENOUS; SUBCUTANEOUS at 21:16

## 2018-02-15 NOTE — DISCHARGE NOTE ADULT - PLAN OF CARE
Pt remains chest pain free and understands post cath discharge instructions No heavy lifting or pushing/pulling with procedure arm for 2 weeks. No driving for 2 days. You may shower 24 hours following the procedure but avoid baths/swimming for 1 week. Check your wrist site for bleeding and/or swelling daily following procedure and call your doctor immediately if it occurs or if you experience increased pain at the site. Follow up with your cardiologist in 1-2 weeks. You may call [facility] if you have any questions/concerns regarding your procedure [phone]. Your LDL cholesterol will be less than 70mg/dL Continue with your cholesterol medications. Eat a heart healthy diet that is low in saturated fats and salt, and includes whole grains, fruits, vegetables and lean protein; exercise regularly (consult with your physician or cardiologist first); maintain a heart healthy weight. Continue to follow with your primary physician or cardiologist for treatment goals, continue medication, have liver function testing every 3 months as anti lipid medications can cause liver irritation. If you smoke - quit (A resource to help you stop smoking is the [facility] – phone number [phone].).

## 2018-02-15 NOTE — DISCHARGE NOTE ADULT - CARE PROVIDERS DIRECT ADDRESSES
[email or web address]

## 2018-02-15 NOTE — DISCHARGE NOTE ADULT - CARE PROVIDER_API CALL
Deon Perez), Internal Medicine  [address] [place] [address]  Phone: [phone]  Fax: [phone] Iggy Snow), Cardiovascular Disease; Internal Medicine  [address] [place] [address]  Phone: [phone]  Fax: [phone]

## 2018-02-15 NOTE — DISCHARGE NOTE ADULT - MEDICATION SUMMARY - MEDICATIONS TO TAKE
I will START or STAY ON the medications listed below when I get home from the hospital:    aspirin 81 mg oral delayed release tablet  -- 1 tab(s) by mouth once a day  -- Indication: For To keep stent open     Benicar 20 mg oral tablet  -- 1 tab(s) by mouth once a day  -- Indication: For High blood pressure     atorvastatin 20 mg oral tablet  -- 1 tab(s) by mouth once a day (at bedtime)  -- Indication: For High cholesterol     clopidogrel 75 mg oral tablet  -- 1 tab(s) by mouth once a day  -- Indication: For To keep stent open     Metoprolol Succinate ER 50 mg oral tablet, extended release  -- 1 tab(s) by mouth once a day  -- Indication: For High blood pressure

## 2018-02-15 NOTE — H&P CARDIOLOGY - HISTORY OF PRESENT ILLNESS
87 year old poor historian PMH CKD Stage III ( baseline Cr 1.25-1.4), CAD - prior stents to LAD x 1 x 1 month ago, HLD, HTN, presents to c/o chest discomfort. Symptoms are associated with shortness of breath and dyspnea on exertion. Denies diaphoresis with no symptoms of near syncope. Patient was seen and evaluated by his cardiologist yesterday (Dr. Zavaleta) &  Patient presents to cath lab today for angiogram,

## 2018-02-15 NOTE — CHART NOTE - NSCHARTNOTEFT_GEN_A_CORE
Patient underwent a PCI procedure and is being admitted as they are at increased risk for major adverse cardiac and vascular events if discharged due to the following high risk characteristics:  unstable redd Peterson, ARAM  [phone]

## 2018-02-15 NOTE — DISCHARGE NOTE ADULT - ADDITIONAL INSTRUCTIONS
Follow-up with your cardiologist in 1-2 weeks  ***********Do not stop you Aspirin or Plavix unless instructed to do so by your cardiologist.********

## 2018-02-15 NOTE — DISCHARGE NOTE ADULT - CARE PLAN
Principal Discharge DX:	CAD (coronary artery disease)  Goal:	Pt remains chest pain free and understands post cath discharge instructions  Assessment and plan of treatment:	No heavy lifting or pushing/pulling with procedure arm for 2 weeks. No driving for 2 days. You may shower 24 hours following the procedure but avoid baths/swimming for 1 week. Check your wrist site for bleeding and/or swelling daily following procedure and call your doctor immediately if it occurs or if you experience increased pain at the site. Follow up with your cardiologist in 1-2 weeks. You may call [facility] if you have any questions/concerns regarding your procedure [phone]. Principal Discharge DX:	CAD (coronary artery disease)  Goal:	Pt remains chest pain free and understands post cath discharge instructions  Assessment and plan of treatment:	No heavy lifting or pushing/pulling with procedure arm for 2 weeks. No driving for 2 days. You may shower 24 hours following the procedure but avoid baths/swimming for 1 week. Check your wrist site for bleeding and/or swelling daily following procedure and call your doctor immediately if it occurs or if you experience increased pain at the site. Follow up with your cardiologist in 1-2 weeks. You may call [facility] if you have any questions/concerns regarding your procedure [phone].  Secondary Diagnosis:	HLD (hyperlipidemia)  Goal:	Your LDL cholesterol will be less than 70mg/dL  Assessment and plan of treatment:	Continue with your cholesterol medications. Eat a heart healthy diet that is low in saturated fats and salt, and includes whole grains, fruits, vegetables and lean protein; exercise regularly (consult with your physician or cardiologist first); maintain a heart healthy weight. Continue to follow with your primary physician or cardiologist for treatment goals, continue medication, have liver function testing every 3 months as anti lipid medications can cause liver irritation. If you smoke - quit (A resource to help you stop smoking is the [facility] – phone number [phone].).

## 2018-02-15 NOTE — DISCHARGE NOTE ADULT - HOSPITAL COURSE
87 year old poor historian PMH CKD Stage III ( baseline Cr 1.25-1.4), CAD - prior stents to LAD x 1 x 1 month ago, HLD, HTN, presents to c/o chest discomfort. Symptoms are associated with shortness of breath and dyspnea on exertion. Denies diaphoresis with no symptoms of near syncope. Patient was seen and evaluated by his cardiologist yesterday (Dr. Zavaleta) &  Patient presents to cath lab today for angiogram,  Pt is now s/p cardiac cath VINITA x 1 OM1 via right radial artery access.

## 2018-02-15 NOTE — DISCHARGE NOTE ADULT - PATIENT PORTAL LINK FT
You can access the [facility], offered by [facility], by registering with the following website: [email or web address]

## 2018-02-15 NOTE — H&P CARDIOLOGY - PMH
BPH (benign prostatic hyperplasia)    Cellulitis  BL  CKD (chronic kidney disease) stage 3, GFR 30-59 ml/min    Colon polyp    Leg swelling  related to cellulitis of LLE

## 2018-02-16 VITALS
SYSTOLIC BLOOD PRESSURE: 141 MMHG | DIASTOLIC BLOOD PRESSURE: 77 MMHG | RESPIRATION RATE: 16 BRPM | OXYGEN SATURATION: 98 % | TEMPERATURE: 98 F | HEART RATE: 62 BPM

## 2018-02-16 LAB
ANION GAP SERPL CALC-SCNC: 12 MMOL/L — SIGNIFICANT CHANGE UP (ref 5–17)
BUN SERPL-MCNC: 18 MG/DL — SIGNIFICANT CHANGE UP (ref 7–23)
CALCIUM SERPL-MCNC: 8.7 MG/DL — SIGNIFICANT CHANGE UP (ref 8.4–10.5)
CHLORIDE SERPL-SCNC: 104 MMOL/L — SIGNIFICANT CHANGE UP (ref 96–108)
CO2 SERPL-SCNC: 25 MMOL/L — SIGNIFICANT CHANGE UP (ref 22–31)
CREAT SERPL-MCNC: 1.39 MG/DL — HIGH (ref 0.5–1.3)
GLUCOSE SERPL-MCNC: 95 MG/DL — SIGNIFICANT CHANGE UP (ref 70–99)
HCT VFR BLD CALC: 34.1 % — LOW (ref 39–50)
HGB BLD-MCNC: 11.2 G/DL — LOW (ref 13–17)
MCHC RBC-ENTMCNC: 30.3 PG — SIGNIFICANT CHANGE UP (ref 27–34)
MCHC RBC-ENTMCNC: 32.8 GM/DL — SIGNIFICANT CHANGE UP (ref 32–36)
MCV RBC AUTO: 92.3 FL — SIGNIFICANT CHANGE UP (ref 80–100)
PLATELET # BLD AUTO: 191 K/UL — SIGNIFICANT CHANGE UP (ref 150–400)
POTASSIUM SERPL-MCNC: 4.3 MMOL/L — SIGNIFICANT CHANGE UP (ref 3.5–5.3)
POTASSIUM SERPL-SCNC: 4.3 MMOL/L — SIGNIFICANT CHANGE UP (ref 3.5–5.3)
RBC # BLD: 3.69 M/UL — LOW (ref 4.2–5.8)
RBC # FLD: 12.5 % — SIGNIFICANT CHANGE UP (ref 10.3–14.5)
SODIUM SERPL-SCNC: 141 MMOL/L — SIGNIFICANT CHANGE UP (ref 135–145)
WBC # BLD: 5.7 K/UL — SIGNIFICANT CHANGE UP (ref 3.8–10.5)
WBC # FLD AUTO: 5.7 K/UL — SIGNIFICANT CHANGE UP (ref 3.8–10.5)

## 2018-02-16 PROCEDURE — 99153 MOD SED SAME PHYS/QHP EA: CPT

## 2018-02-16 PROCEDURE — 85027 COMPLETE CBC AUTOMATED: CPT

## 2018-02-16 PROCEDURE — C1894: CPT

## 2018-02-16 PROCEDURE — 93005 ELECTROCARDIOGRAM TRACING: CPT

## 2018-02-16 PROCEDURE — 80048 BASIC METABOLIC PNL TOTAL CA: CPT

## 2018-02-16 PROCEDURE — C9600: CPT | Mod: LC

## 2018-02-16 PROCEDURE — C1887: CPT

## 2018-02-16 PROCEDURE — C1769: CPT

## 2018-02-16 PROCEDURE — 93571 IV DOP VEL&/PRESS C FLO 1ST: CPT | Mod: LC

## 2018-02-16 PROCEDURE — 93458 L HRT ARTERY/VENTRICLE ANGIO: CPT | Mod: 59

## 2018-02-16 PROCEDURE — 80053 COMPREHEN METABOLIC PANEL: CPT

## 2018-02-16 PROCEDURE — 99152 MOD SED SAME PHYS/QHP 5/>YRS: CPT

## 2018-02-16 PROCEDURE — C1874: CPT

## 2018-02-16 RX ADMIN — CLOPIDOGREL BISULFATE 75 MILLIGRAM(S): 75 TABLET, FILM COATED ORAL at 05:07

## 2018-02-16 RX ADMIN — SODIUM CHLORIDE 3 MILLILITER(S): 9 INJECTION INTRAMUSCULAR; INTRAVENOUS; SUBCUTANEOUS at 05:09

## 2018-02-16 RX ADMIN — ATORVASTATIN CALCIUM 20 MILLIGRAM(S): 80 TABLET, FILM COATED ORAL at 05:07

## 2018-02-16 RX ADMIN — LOSARTAN POTASSIUM 50 MILLIGRAM(S): 100 TABLET, FILM COATED ORAL at 05:07

## 2018-02-16 RX ADMIN — Medication 50 MILLIGRAM(S): at 05:07

## 2018-02-16 RX ADMIN — Medication 81 MILLIGRAM(S): at 05:07

## 2018-02-16 NOTE — PROGRESS NOTE ADULT - SUBJECTIVE AND OBJECTIVE BOX
Patient is a 88y old  Male who presents with a chief complaint of Chest pain (15 Feb 2018 18:44). Pt is now s/p cardiac cath VINITA 1 OM1 via right radial artery access            Allergies    penicillin (Angioedema)    Intolerances        Medications:  aspirin enteric coated 81 milliGRAM(s) Oral daily  atorvastatin 20 milliGRAM(s) Oral at bedtime  clopidogrel Tablet 75 milliGRAM(s) Oral daily  losartan 50 milliGRAM(s) Oral daily  metoprolol succinate ER 50 milliGRAM(s) Oral daily  sodium chloride 0.9% lock flush 3 milliLiter(s) IV Push every 8 hours      Vitals:  T(C): 37.3 (18 @ 05:00), Max: 37.3 (18 @ 05:00)  HR: 63 (18 @ 05:00) (53 - 68)  BP: 151/69 (18 @ 05:00) (124/57 - 176/75)  BP(mean): 108 (02-15-18 @ 07:52) (108 - 108)  RR: 17 (18 @ 05:00) (16 - 20)  SpO2: 98% (18 @ 05:00) (96% - 100%)  Wt(kg): --  Daily Height in cm: 177.8 (15 Feb 2018 11:10)    Daily Weight in k.7 (15 Feb 2018 07:52)  I&O's Summary    15 Feb 2018 07:01  -  2018 06:20  --------------------------------------------------------  IN: 920 mL / OUT: 600 mL / NET: 320 mL          Physical Exam:  Appearance: Normal  Eyes: PERRL, EOMI  Cardiovascular: S1S2, RRR, No M/R/G, no JVD, No Lower extremity edema  Procedural Access Site: Right radial artery. No hematoma, Non-tender to palpation, 2+ pulse, No bruit, No Ecchymosis  Respiratory: Clear to auscultation bilaterally  Gastrointestinal: Soft, Non tender, Normal Bowel Sounds  Musculoskeletal: No clubbing, No joint deformity   Neurologic: Non-focal  Psychiatry: AAOx3, Mood & affect appropriate  Skin: No rashes, No ecchymoses, No cyanosis        141  |  104  |  18  ----------------------------<  95  4.3   |  25  |  1.39<H>    Ca    8.7      2018 03:37    TPro  7.0  /  Alb  3.9  /  TBili  0.4  /  DBili  x   /  AST  27  /  ALT  14  /  AlkPhos  73  02-15            Lipid panel   Hgb A1c         ECG:    Echo:    Stress Testing:     Cath:    Imaging:    Interpretation of Telemetry:

## 2018-02-16 NOTE — PROGRESS NOTE ADULT - SUBJECTIVE AND OBJECTIVE BOX
Patient is a 88y old  Male who presents with a chief complaint of Chest pain (15 Feb 2018 18:44) now s/p cardiac cath C VINITA x1           Allergies    penicillin (Angioedema)    Intolerances        Medications:  aspirin enteric coated 81 milliGRAM(s) Oral daily  atorvastatin 20 milliGRAM(s) Oral at bedtime  clopidogrel Tablet 75 milliGRAM(s) Oral daily  losartan 50 milliGRAM(s) Oral daily  metoprolol succinate ER 50 milliGRAM(s) Oral daily  sodium chloride 0.9% lock flush 3 milliLiter(s) IV Push every 8 hours      Vitals:  T(C): 37.3 (18 @ 05:00), Max: 37.3 (18 @ 05:00)  HR: 63 (18 @ 05:00) (53 - 68)  BP: 151/69 (18 @ 05:00) (124/57 - 176/75)  BP(mean): 108 (02-15-18 @ 07:52) (108 - 108)  RR: 17 (18 @ 05:00) (16 - 20)  SpO2: 98% (18 @ 05:00) (96% - 100%)  Wt(kg): --  Daily Height in cm: 177.8 (15 Feb 2018 11:10)    Daily Weight in k.7 (15 Feb 2018 07:52)  I&O's Summary    15 Feb 2018 07:01  -  2018 06:11  --------------------------------------------------------  IN: 920 mL / OUT: 600 mL / NET: 320 mL          Physical Exam:  Appearance: Normal  Eyes: PERRL, EOMI  HENT: Normal oral mus[place], NC/AT  Cardiovascular: S1S2, RRR, No M/R/G, no JVD, No Lower extremity edema  Procedural Access Site: No hematoma, Non-tender to palpation, 2+ pulse, No bruit, No Ecchymosis  Respiratory: Clear to auscultation bilaterally  Gastrointestinal: Soft, Non tender, Normal Bowel Sounds  Musculoskeletal: No clubbing, No joint deformity   Neurologic: Non-focal  Lymphatic: No lymphadenopathy  Psychiatry: AAOx3, Mood & affect appropriate  Skin: No rashes, No ecchymoses, No cyanosis        141  |  104  |  18  ----------------------------<  95  4.3   |  25  |  1.39<H>    Ca    8.7      2018 03:37    TPro  7.0  /  Alb  3.9  /  TBili  0.4  /  DBili  x   /  AST  27  /  ALT  14  /  AlkPhos  73  02-15            Lipid panel   Hgb A1c         ECG:    Echo:    Stress Testing:     Cath:    Imaging:    Interpretation of Telemetry: Patient is a 88y old  Male who presents with a chief complaint of Chest pain (15 Feb 2018 18:44) now s/p cardiac cath C VINITA x1 right radial access.           Allergies    penicillin (Angioedema)    Intolerances        Medications:  aspirin enteric coated 81 milliGRAM(s) Oral daily  atorvastatin 20 milliGRAM(s) Oral at bedtime  clopidogrel Tablet 75 milliGRAM(s) Oral daily  losartan 50 milliGRAM(s) Oral daily  metoprolol succinate ER 50 milliGRAM(s) Oral daily  sodium chloride 0.9% lock flush 3 milliLiter(s) IV Push every 8 hours      Vitals:  T(C): 37.3 (18 @ 05:00), Max: 37.3 (18 @ 05:00)  HR: 63 (18 @ 05:00) (53 - 68)  BP: 151/69 (18 @ 05:00) (124/57 - 176/75)  BP(mean): 108 (02-15-18 @ 07:52) (108 - 108)  RR: 17 (18 @ 05:00) (16 - 20)  SpO2: 98% (18 @ 05:00) (96% - 100%)  Wt(kg): --  Daily Height in cm: 177.8 (15 Feb 2018 11:10)    Daily Weight in k.7 (15 Feb 2018 07:52)  I&O's Summary    15 Feb 2018 07:01  -  2018 06:11  --------------------------------------------------------  IN: 920 mL / OUT: 600 mL / NET: 320 mL          Physical Exam:  Appearance: Normal  Eyes: PERRL, EOMI  Cardiovascular: S1S2, RRR, No M/R/G, no JVD, No Lower extremity edema  Procedural Access Site: Right radial. No hematoma, Non-tender to palpation, 2+ pulse, No bruit, No Ecchymosis  Respiratory: Clear to auscultation bilaterally  Gastrointestinal: Soft, Non tender, Normal Bowel Sounds  Musculoskeletal: No clubbing, No joint deformity   Neurologic: Non-focal  Psychiatry: AAOx3, Mood & affect appropriate  Skin: No rashes, No ecchymoses, No cyanosis        141  |  104  |  18  ----------------------------<  95  4.3   |  25  |  1.39<H>    Ca    8.7      2018 03:37    TPro  7.0  /  Alb  3.9  /  TBili  0.4  /  DBili  x   /  AST  27  /  ALT  14  /  AlkPhos  73  02-15        Interpretation of Telemetry:  SB/SR 50-70bpm     ASSESSMENT/PLAN   Patient is a 88y old  Male who presents with a chief complaint of Chest pain (15 Feb 2018 18:44) now s/p cardiac cath [facility] VINITA x1 right radial access. Pt tolerated the procedure well. Cardiac cath site benign. Post-procedure discharge instructions discussed and questions addressed

## 2018-03-12 ENCOUNTER — APPOINTMENT (OUTPATIENT)
Dept: VASCULAR SURGERY | Facility: CLINIC | Age: 83
End: 2018-03-12
Payer: MEDICARE

## 2018-03-12 VITALS
HEIGHT: 69 IN | HEART RATE: 63 BPM | WEIGHT: 190 LBS | DIASTOLIC BLOOD PRESSURE: 81 MMHG | BODY MASS INDEX: 28.14 KG/M2 | SYSTOLIC BLOOD PRESSURE: 156 MMHG | TEMPERATURE: 97.3 F

## 2018-03-12 DIAGNOSIS — I70.90 UNSPECIFIED ATHEROSCLEROSIS: ICD-10-CM

## 2018-03-12 PROCEDURE — 93923 UPR/LXTR ART STDY 3+ LVLS: CPT

## 2018-03-12 PROCEDURE — 99203 OFFICE O/P NEW LOW 30 MIN: CPT

## 2018-03-16 PROBLEM — I70.90 ARTERIAL VASCULAR DISEASE: Status: ACTIVE | Noted: 2018-03-16

## 2018-09-04 ENCOUNTER — INPATIENT (INPATIENT)
Facility: HOSPITAL | Age: 83
LOS: 1 days | Discharge: HOME | DRG: 291 | End: 2018-09-06
Attending: INTERNAL MEDICINE | Admitting: INTERNAL MEDICINE
Payer: MEDICARE

## 2018-09-04 VITALS
RESPIRATION RATE: 18 BRPM | WEIGHT: 169.98 LBS | SYSTOLIC BLOOD PRESSURE: 111 MMHG | DIASTOLIC BLOOD PRESSURE: 48 MMHG | OXYGEN SATURATION: 96 % | HEART RATE: 72 BPM

## 2018-09-04 DIAGNOSIS — Z98.89 OTHER SPECIFIED POSTPROCEDURAL STATES: Chronic | ICD-10-CM

## 2018-09-04 DIAGNOSIS — R07.9 CHEST PAIN, UNSPECIFIED: ICD-10-CM

## 2018-09-04 PROBLEM — N18.3 CHRONIC KIDNEY DISEASE, STAGE 3 (MODERATE): Chronic | Status: ACTIVE | Noted: 2018-01-11

## 2018-09-04 PROBLEM — L03.90 CELLULITIS, UNSPECIFIED: Chronic | Status: ACTIVE | Noted: 2018-01-11

## 2018-09-04 PROBLEM — K63.5 POLYP OF COLON: Chronic | Status: ACTIVE | Noted: 2018-01-11

## 2018-09-04 PROBLEM — N40.0 BENIGN PROSTATIC HYPERPLASIA WITHOUT LOWER URINARY TRACT SYMPTOMS: Chronic | Status: ACTIVE | Noted: 2018-01-11

## 2018-09-04 LAB
ALBUMIN SERPL ELPH-MCNC: 3.9 G/DL — SIGNIFICANT CHANGE UP (ref 3.3–5)
ALP SERPL-CCNC: 88 U/L — SIGNIFICANT CHANGE UP (ref 40–120)
ALT FLD-CCNC: 9 U/L — LOW (ref 10–45)
ANION GAP SERPL CALC-SCNC: 10 MMOL/L — SIGNIFICANT CHANGE UP (ref 5–17)
ANION GAP SERPL CALC-SCNC: 14 MMOL/L — SIGNIFICANT CHANGE UP (ref 5–17)
APTT BLD: 27.5 SEC — SIGNIFICANT CHANGE UP (ref 27.5–37.4)
AST SERPL-CCNC: 20 U/L — SIGNIFICANT CHANGE UP (ref 10–40)
BASOPHILS # BLD AUTO: 0 K/UL — SIGNIFICANT CHANGE UP (ref 0–0.2)
BASOPHILS NFR BLD AUTO: 0.4 % — SIGNIFICANT CHANGE UP (ref 0–2)
BILIRUB SERPL-MCNC: 0.4 MG/DL — SIGNIFICANT CHANGE UP (ref 0.2–1.2)
BUN SERPL-MCNC: 41 MG/DL — HIGH (ref 7–23)
BUN SERPL-MCNC: 42 MG/DL — HIGH (ref 7–23)
CALCIUM SERPL-MCNC: 8.7 MG/DL — SIGNIFICANT CHANGE UP (ref 8.4–10.5)
CALCIUM SERPL-MCNC: 8.9 MG/DL — SIGNIFICANT CHANGE UP (ref 8.4–10.5)
CHLORIDE SERPL-SCNC: 103 MMOL/L — SIGNIFICANT CHANGE UP (ref 96–108)
CHLORIDE SERPL-SCNC: 106 MMOL/L — SIGNIFICANT CHANGE UP (ref 96–108)
CO2 SERPL-SCNC: 23 MMOL/L — SIGNIFICANT CHANGE UP (ref 22–31)
CO2 SERPL-SCNC: 23 MMOL/L — SIGNIFICANT CHANGE UP (ref 22–31)
CREAT SERPL-MCNC: 1.93 MG/DL — HIGH (ref 0.5–1.3)
CREAT SERPL-MCNC: 2.14 MG/DL — HIGH (ref 0.5–1.3)
EOSINOPHIL # BLD AUTO: 0.1 K/UL — SIGNIFICANT CHANGE UP (ref 0–0.5)
EOSINOPHIL NFR BLD AUTO: 2.3 % — SIGNIFICANT CHANGE UP (ref 0–6)
GLUCOSE SERPL-MCNC: 87 MG/DL — SIGNIFICANT CHANGE UP (ref 70–99)
GLUCOSE SERPL-MCNC: 96 MG/DL — SIGNIFICANT CHANGE UP (ref 70–99)
HCT VFR BLD CALC: 35 % — LOW (ref 39–50)
HGB BLD-MCNC: 11.2 G/DL — LOW (ref 13–17)
INR BLD: 1.08 RATIO — SIGNIFICANT CHANGE UP (ref 0.88–1.16)
LYMPHOCYTES # BLD AUTO: 1.2 K/UL — SIGNIFICANT CHANGE UP (ref 1–3.3)
LYMPHOCYTES # BLD AUTO: 19.9 % — SIGNIFICANT CHANGE UP (ref 13–44)
MCHC RBC-ENTMCNC: 27.9 PG — SIGNIFICANT CHANGE UP (ref 27–34)
MCHC RBC-ENTMCNC: 32.1 GM/DL — SIGNIFICANT CHANGE UP (ref 32–36)
MCV RBC AUTO: 86.9 FL — SIGNIFICANT CHANGE UP (ref 80–100)
MONOCYTES # BLD AUTO: 0.6 K/UL — SIGNIFICANT CHANGE UP (ref 0–0.9)
MONOCYTES NFR BLD AUTO: 10.4 % — SIGNIFICANT CHANGE UP (ref 2–14)
NEUTROPHILS # BLD AUTO: 4.2 K/UL — SIGNIFICANT CHANGE UP (ref 1.8–7.4)
NEUTROPHILS NFR BLD AUTO: 67 % — SIGNIFICANT CHANGE UP (ref 43–77)
PLATELET # BLD AUTO: 239 K/UL — SIGNIFICANT CHANGE UP (ref 150–400)
POTASSIUM SERPL-MCNC: 4.4 MMOL/L — SIGNIFICANT CHANGE UP (ref 3.5–5.3)
POTASSIUM SERPL-MCNC: 5.4 MMOL/L — HIGH (ref 3.5–5.3)
POTASSIUM SERPL-SCNC: 4.4 MMOL/L — SIGNIFICANT CHANGE UP (ref 3.5–5.3)
POTASSIUM SERPL-SCNC: 5.4 MMOL/L — HIGH (ref 3.5–5.3)
PROT SERPL-MCNC: 7.2 G/DL — SIGNIFICANT CHANGE UP (ref 6–8.3)
PROTHROM AB SERPL-ACNC: 11.8 SEC — SIGNIFICANT CHANGE UP (ref 9.8–12.7)
RBC # BLD: 4.03 M/UL — LOW (ref 4.2–5.8)
RBC # FLD: 14.9 % — HIGH (ref 10.3–14.5)
SODIUM SERPL-SCNC: 139 MMOL/L — SIGNIFICANT CHANGE UP (ref 135–145)
SODIUM SERPL-SCNC: 140 MMOL/L — SIGNIFICANT CHANGE UP (ref 135–145)
TROPONIN T, HIGH SENSITIVITY RESULT: 49 NG/L — SIGNIFICANT CHANGE UP (ref 0–51)
TROPONIN T, HIGH SENSITIVITY RESULT: 57 NG/L — HIGH (ref 0–51)
WBC # BLD: 6.3 K/UL — SIGNIFICANT CHANGE UP (ref 3.8–10.5)
WBC # FLD AUTO: 6.3 K/UL — SIGNIFICANT CHANGE UP (ref 3.8–10.5)

## 2018-09-04 PROCEDURE — 99223 1ST HOSP IP/OBS HIGH 75: CPT | Mod: GC

## 2018-09-04 PROCEDURE — 93010 ELECTROCARDIOGRAM REPORT: CPT

## 2018-09-04 PROCEDURE — 99285 EMERGENCY DEPT VISIT HI MDM: CPT | Mod: 25

## 2018-09-04 PROCEDURE — 71045 X-RAY EXAM CHEST 1 VIEW: CPT | Mod: 26

## 2018-09-04 RX ORDER — FUROSEMIDE 40 MG
40 TABLET ORAL DAILY
Qty: 0 | Refills: 0 | Status: DISCONTINUED | OUTPATIENT
Start: 2018-09-04 | End: 2018-09-04

## 2018-09-04 RX ORDER — ATORVASTATIN CALCIUM 80 MG/1
20 TABLET, FILM COATED ORAL AT BEDTIME
Qty: 0 | Refills: 0 | Status: DISCONTINUED | OUTPATIENT
Start: 2018-09-04 | End: 2018-09-04

## 2018-09-04 RX ORDER — ATORVASTATIN CALCIUM 80 MG/1
40 TABLET, FILM COATED ORAL AT BEDTIME
Qty: 0 | Refills: 0 | Status: DISCONTINUED | OUTPATIENT
Start: 2018-09-04 | End: 2018-09-06

## 2018-09-04 RX ORDER — HEPARIN SODIUM 5000 [USP'U]/ML
5000 INJECTION INTRAVENOUS; SUBCUTANEOUS EVERY 12 HOURS
Qty: 0 | Refills: 0 | Status: DISCONTINUED | OUTPATIENT
Start: 2018-09-04 | End: 2018-09-06

## 2018-09-04 RX ORDER — METOPROLOL TARTRATE 50 MG
50 TABLET ORAL DAILY
Qty: 0 | Refills: 0 | Status: DISCONTINUED | OUTPATIENT
Start: 2018-09-04 | End: 2018-09-06

## 2018-09-04 RX ORDER — ASPIRIN/CALCIUM CARB/MAGNESIUM 324 MG
81 TABLET ORAL DAILY
Qty: 0 | Refills: 0 | Status: DISCONTINUED | OUTPATIENT
Start: 2018-09-04 | End: 2018-09-06

## 2018-09-04 RX ORDER — SODIUM CHLORIDE 9 MG/ML
1000 INJECTION INTRAMUSCULAR; INTRAVENOUS; SUBCUTANEOUS
Qty: 0 | Refills: 0 | Status: DISCONTINUED | OUTPATIENT
Start: 2018-09-04 | End: 2018-09-05

## 2018-09-04 RX ORDER — CLOPIDOGREL BISULFATE 75 MG/1
75 TABLET, FILM COATED ORAL DAILY
Qty: 0 | Refills: 0 | Status: DISCONTINUED | OUTPATIENT
Start: 2018-09-04 | End: 2018-09-06

## 2018-09-04 RX ADMIN — SODIUM CHLORIDE 50 MILLILITER(S): 9 INJECTION INTRAMUSCULAR; INTRAVENOUS; SUBCUTANEOUS at 22:29

## 2018-09-04 RX ADMIN — ATORVASTATIN CALCIUM 40 MILLIGRAM(S): 80 TABLET, FILM COATED ORAL at 22:29

## 2018-09-04 NOTE — H&P ADULT - ASSESSMENT
pt  with cad/   cx  and  LAD / west  stents,  1/18  and  2.18,  htn,  obesity,  c.c pedal  edema, obesity,  bph, PAD   admitted with  chest pressure this  am,  since  resolved   labs pending   follow  troponin  tele   card  eval/ cath  consult, dR SHANE mobley   asa/ plavix/ statin/ BB/  hold  benicar for  now,  given sbp  of  106   Dvt ppx   defer  timing of  cardiac cath, to  card team      < from: Cardiac Cath Lab - Adult (02.15.18 @ 10:19) >   Intervention on mid circumflex: drug-eluting stent.  < end of copied text >    < from: Cardiac Cath Lab - Adult (01.11.18 @ 13:26) >  -  Intervention on mid LAD: percutaneous intervention.  < end of copied text > pt  with cad/   cx  and  LAD / west  stents,  1/18  and  2.18,  htn,  obesity, ckd 1.     c.c pedal  edema, obesity,  bph, PAD   admitted with  chest pressure this  am,  since  resolved   labs pending/  cxr  pending   follow  troponin  tele   card  eval/ cath  consult, dR SHANE mobley   asa/ plavix/ statin/ BB/lasix  hold  benicar for  now   Dvt ppx   defer  timing of  cardiac cath, to  card team      < from: Cardiac Cath Lab - Adult (02.15.18 @ 10:19) >   Intervention on mid circumflex: drug-eluting stent.  < end of copied text >    < from: Cardiac Cath Lab - Adult (01.11.18 @ 13:26) >  -  Intervention on mid LAD: percutaneous intervention.  < end of copied text >

## 2018-09-04 NOTE — ED PROVIDER NOTE - MEDICAL DECISION MAKING DETAILS
87 yo male CAD with multiple stents, cards Dr Lanza p/w CP and dyspnea. Labs with trop, CXR. EKG unchanged from baseline. Appears well. Admit to Dr Monique and cards consulted.

## 2018-09-04 NOTE — H&P ADULT - HISTORY OF PRESENT ILLNESS
pt  with  h/o  cad,    stents   to  Cx  and  lad,  1/18,    and  on  2/18,    htn,   obesity,    c/.c  venous  stasis,/ pedal  edema,   PAD,  bph,   schatzkis  ring,     admitted  with  chest  pressure this am,  while  getting ready to  go to  's  office, also had  sob, chest pressure  lasted   for  half  an  hour,   pt  had  no  symptoms  last  week,  while  working   admits  to taking lasix, whenever  he  feels  like it   pt  was  then  sent  to e r  via  ems,  ekg in  er, unchamged  seen in  er,  has  no  cp/sob/a bd pain now   awaiting cath consult

## 2018-09-04 NOTE — H&P ADULT - NSHPPHYSICALEXAM_GEN_ALL_CORE
PHYSICAL EXAMINATION:  Vital Signs Last 24 Hrs  T(C): --  T(F): --  HR: 72 (04 Sep 2018 14:42) (72 - 72)  BP: 111/48 (04 Sep 2018 14:42) (111/48 - 111/48)  BP(mean): --  RR: 18 (04 Sep 2018 14:42) (18 - 18)  SpO2: 96% (04 Sep 2018 14:42) (96% - 96%)  CAPILLARY BLOOD GLUCOSE            GENERAL: NAD, well-groomed,  HEAD:  atraumatic, normocephalic  EYES: sclera anicteric  ENMT: mucous membranes moist  NECK: supple, No JVD  CHEST/LUNG: clear to auscultation bilaterally;    no      rales   ,   no rhonchi,   HEART: normal S1, S2  ABDOMEN: BS+, soft, ND, NT   EXTREMITIES:     4  plus   edema    b/l LEs  NEURO: awake, ,     moves all extremities  SKIN: no     rash

## 2018-09-04 NOTE — CONSULT NOTE ADULT - ATTENDING COMMENTS
Patient seen and examined. Agree with assessment and plan as outlined above. 88 M CAD with recent LAD and LCX PCI with chronic LE edema and dislike of diuretics presents with several days of worsening LE edema, dyspnea and one day of chest discomfort. PAtient notes at rest he has no pain. He has Pain and SOB only with exertion. Patient notes he does not like the diuretics because he is peeing all the time. ECG is non-dynamic. hs-trop was positive but second trended down. Agree with fellow that this is likely HF decomp rather than ACS. IV diuretics. Will determine based on progress timing for ischemic evaluation if required.

## 2018-09-04 NOTE — CONSULT NOTE ADULT - ASSESSMENT
88 year old M with a PMH of CAD (w/ PCI to LAD and Cx last in 2/2018), CKDIII, and HTN that presents to the ED with 2 weeks of worsening LE edema and 1 day of chest pain.     #Chest Pain  - Check Cardiac enzymes, Trend x 3 including CK and CKMB  - Restart Pts DAPT (ASA and plavix)  - Hold heparin for now.   - Start atorvastatin 40 mg  - Hold ACEi in the setting of elevated Cr and low BP  - Once more euvolemic may need ischemic eval.     #Edema 2/2 HFpEF  - Check TTE (last EF was normal)  - Start lasix 40 IV BID  - Monitor ins and outs  - start metoprolol 25 BID    Aquilino Hendrix MD  Cardiology Fellow - PGY-4  [facility]: [id number]  NS: [phone]  [id number]

## 2018-09-04 NOTE — ED ADULT NURSE NOTE - OBJECTIVE STATEMENT
87 y/o male with PMH CKD, cellulitis presenting to ED for chest pain/ shortness of breath x this AM. Pt states "I was fine this morning but when I bent down to put my shoes on to go to the cardiologist, I felt like it was really hard to breathe. It feels like there was a pressure on my chest right over my heart. I took 3 baby aspirins at the doctors office and now I feel perfectly fine." Upon exam, pt A&Ox3 gross neuro intact, lungs cta bilaterally, no difficulty speaking in complete sentences, s1s2 heart sounds heard, pulses x 4, +2 pitting edema on lower extremities, pt on lasix at home, pt is unsure of dosage and not always compliant d/t "it keeps me up at night so I only take it under pressure." EKG done & given to MD, placed on cardiac monitor NSR 60s, labs drawn & sent. Pt denies chest pain, sob, ha, n/v/d, abdominal pain, f/c, urinary symptoms, hematuria. 89 y/o male with PMH CKD, cellulitis, RBBB s/p stents, presenting to ED for chest pain/ shortness of breath x this AM. Pt states "I was fine this morning but when I bent down to put my shoes on to go to the cardiologist, I felt like it was really hard to breathe. It feels like there was a pressure on my chest right over my heart. I took 3 baby aspirins at the doctors office and now I feel perfectly fine." Upon exam, pt A&Ox3 gross neuro intact, lungs cta bilaterally, no difficulty speaking in complete sentences, s1s2 heart sounds heard, pulses x 4, +2 pitting edema on lower extremities, pt on lasix at home, pt is unsure of dosage and not always compliant d/t "it keeps me up at night so I only take it under pressure." EKG done & given to MD, placed on cardiac monitor NSR 60s, labs drawn & sent. Pt denies chest pain, sob, ha, n/v/d, abdominal pain, f/c, urinary symptoms, hematuria. 89 y/o male with PMH CKD, cellulitis, RBBB s/p stents, presenting to ED for chest pain/ shortness of breath x this AM. Pt states "I was fine this morning but when I bent down to put my shoes on to go to the cardiologist, I felt like it was really hard to breathe. It feels like there was a pressure on my chest right over my heart. I took 3 baby aspirins at the doctors office and now I feel perfectly fine." Upon exam, pt A&Ox3 gross neuro intact, lungs cta bilaterally, no difficulty speaking in complete sentences, s1s2 heart sounds heard, pulses x 4, +2 pitting edema on lower extremities, pt on lasix at home, pt is unsure of dosage and not always compliant d/t "it keeps me up at night so I only take it under pressure." Pt bilateral lower extremities are red, not warm, right leg has skin flaking off, no drainage noted. EKG done & given to MD, placed on cardiac monitor NSR 60s, labs drawn & sent. Pt denies chest pain, sob, ha, n/v/d, abdominal pain, f/c, urinary symptoms, hematuria.

## 2018-09-04 NOTE — CONSULT NOTE ADULT - SUBJECTIVE AND OBJECTIVE BOX
Patient seen and evaluated at bedside    Chief Complaint: SOB and CP    HPI: Pt is a 88 year old M with a PMH of CAD (w/ PCI to LAD and Cx last in 2/2018), CKDIII, and HTN that presents to the ED with 2 weeks of worsening LE edema and 1 day of chest pain. He states that he was doing well till two weeks ago, but the diuretic his cardiologist put home on made him urinate too much, so he stopped taking it. During this time he noted worsening SOB and has been sleeping in a recliner. He states that normally he is able to ambulate freely, but has not been able to do so for the past few days because of SOB. He also states that he has chest pressure that started this morning. It is on and off, but he cannot think of what brings it on.     PMHx:   Colon polyp  BPH (benign prostatic hyperplasia)  Cellulitis  CKD (chronic kidney disease) stage 3, GFR 30-59 ml/min  Leg swelling  No pertinent past medical history  No significant past medical history      PSHx:   S/P hemorrhoidectomy  No significant past surgical history      Allergies:  penicillin (Angioedema)      Home Meds:  · 	clopidogrel 75 mg oral tablet: 1 tab(s) orally once a day  · 	aspirin 81 mg oral delayed release tablet: 1 tab(s) orally once a day  · 	atorvastatin 20 mg oral tablet: 1 tab(s) orally once a day (at bedtime)  · 	Benicar 20 mg oral tablet: 1 tab(s) orally once a day  · 	Metoprolol Succinate ER 50 mg oral tablet, extended release: 1 tab(s)   orally once a day      Current Medications:   aspirin enteric coated 81 milliGRAM(s) Oral daily  atorvastatin 40 milliGRAM(s) Oral at bedtime  clopidogrel Tablet 75 milliGRAM(s) Oral daily  heparin  Injectable 5000 Unit(s) SubCutaneous every 12 hours  metoprolol succinate ER 50 milliGRAM(s) Oral daily      FAMILY HISTORY:  No pertinent family history in first degree relatives      Social History: Worked as a [employer]   Smoking History: None   Alcohol Use: None  Drug Use: None    REVIEW OF SYSTEMS:  Constitutional:     [ ] negative [ ] fevers [ ] chills [ ] weight loss [ x] weight gain  HEENT:                  [x ] negative [ ] dry eyes [ ] eye irritation [ ] postnasal drip [ ] nasal congestion  CV:                         [ ] negative  [x ] chest pain [x ] orthopnea [ ] palpitations [ ] murmur  Resp:                     [ ] negative [ ] cough [x ] shortness of breath [x ] dyspnea [x ] wheezing [ ] sputum [ ]hemoptysis  GI:                          [ x] negative [ ] nausea [ ] vomiting [ ] diarrhea [ ] constipation [ ] abd pain [ ] dysphagia   :                        [x ] negative [ ] dysuria [ ] nocturia [ ] hematuria [ ] increased urinary frequency  Musculoskeletal: [x ] negative [ ] back pain [ ] myalgias [ ] arthralgias [ ] fracture  Skin:                       [x ] negative [ ] rash [ ] itch  Neurological:        [x ] negative [ ] headache [ ] dizziness [ ] syncope [ ] weakness [ ] numbness  Psychiatric:           [x ] negative [ ] anxiety [ ] depression  Endocrine:            [x ] negative [ ] diabetes [ ] thyroid problem  Heme/Lymph:      [x ] negative [ ] anemia [ ] bleeding problem  Allergic/Immune: [x ] negative [ ] itchy eyes [ ] nasal discharge [ ] hives [ ] angioedema    [ ] All other systems negative  [ ] Unable to assess ROS because sedated with anoxic brain injury.      Physical Exam:  T(F): --  HR: 69 (09-04) (64 - 72)  BP: 100/45 (09-04) (100/45 - 111/48)  RR: 18 (09-04)  SpO2: 100% (09-04)  GENERAL: No acute distress, well-developed  HEAD:  Atraumatic, Normocephalic  ENT: EOMI, PERRLA, conjunctiva and sclera clear, Neck supple, No JVD, moist mucosa  CHEST/LUNG: Clear to auscultation bilaterally; No wheeze, equal breath sounds bilaterally   BACK: No spinal tenderness  HEART: Regular rate and rhythm; No murmurs. S3 audible.   ABDOMEN: Soft, Nontender, Nondistended; Bowel sounds present  EXTREMITIES:  No clubbing, cyanosis. 3+ pitting edema.   PSYCH: Nl behavior, nl affect  NEUROLOGY: AAOx3, non-focal, cranial nerves intact  SKIN: Normal color, No rashes or lesions  LINES:    Cardiovascular Diagnostic Testing:    ECG: Personally reviewed:  RBBB  Echo: Personally reviewed:  < from: Transthoracic Echocardiogram (11.15.13 @ 09:08) >  Conclusions:  1. Mitral annular calcification, otherwise normal mitral  valve. Minimal mitral regurgitation.  2. Mild concentric left ventricular hypertrophy.  3. Endocardium not well visualized; grossly normal left  ventricular systolic function.  4. The right ventricle is not well visualized; grossly  normal right ventricular systolic function.    < end of copied text >    Stress Testing:    Cath:  < from: Cardiac Cath Lab - Adult (02.15.18 @ 10:19) >  CORONARY VESSELS: The coronary circulation is right dominant.  LM:   --  LM: Angiography showed minor luminal irregularities with no flow  limiting lesions.  LAD:   --  Ostial LAD: There was a 30 % stenosis.  --  Mid LAD: There was a 10 % stenosis at the site of a prior stent.  CX:   --  Mid circumflex: There was a 90 % stenosis.  RCA:   --  RCA: Angiography showed moderate atherosclerosis.  COMPLICATIONS: There were no complications.  DIAGNOSTIC RECOMMENDATIONS: IFR was 0.89  ASA and Plavix for 1 year  INTERVENTIONAL RECOMMENDATIONS: IFR was 0.89  ASA and Plavix for 1 year  Prepared and signed by  Jeremiah Lanza M.D.    < end of copied text >    Imaging:    CXR: Personally reviewed    Labs: Personally reviewed                        11.2   6.3   )-----------( 239      ( 04 Sep 2018 15:13 )             35.0     09-04    140  |  103  |  42<H>  ----------------------------<  87  4.4   |  23  |  2.14<H>    Ca    8.9      04 Sep 2018 15:13    TPro  7.2  /  Alb  3.9  /  TBili  0.4  /  DBili  x   /  AST  20  /  ALT  9<L>  /  AlkPhos  88  09-04    PT/INR - ( 04 Sep 2018 15:13 )   PT: 11.8 sec;   INR: 1.08 ratio         PTT - ( 04 Sep 2018 15:13 )  PTT:27.5 sec

## 2018-09-04 NOTE — ED PROVIDER NOTE - ATTENDING CONTRIBUTION TO CARE
Attending MD Hills.  Agree with above.  PT is a 88 yr old male with pmhx of HTN, HLD, CAD s/p stenting on ASA, plavix who now presents to Ed with LOPEZ and chest pressure since 11 am.  Pt states that he could not put his socks on this morning because he felt so SOB.  Denies assoc sxs including fevers/chills/palpitations/dizziness/recent cough/nausea/vomiting/diarrhea/abdominal pain/dysuria/urinary or fecal incontinence/BRBPR/melena/back pain/leg swelling/sick contacts.  Pt is alert and oriented x 3.  He has been seen by Dr. Monique who is accepting him for ACS admission.  Pending labs, CXR.

## 2018-09-04 NOTE — ED PROVIDER NOTE - OBJECTIVE STATEMENT
89yo male pmh HTN, HLD, CAD s/p stents on aspirin, plavix p/w dyspnea and chest pressure since 11am, states he could not put on his socks this morning due to excessive dyspnea. Denies fever, chills, palpitations, dizziness, recent cough, nausea, vomiting, diarrhea, abdominal pain, dysuria, urinary or fecal incontinence, BRBPR, melena, back pain, leg swelling, sick contact, recent long travel.    Significant past medical hx/surgical hx/social hx and review of systems can be found above in the history of present illness.

## 2018-09-04 NOTE — H&P ADULT - NSHPREVIEWOFSYSTEMS_GEN_ALL_CORE
REVIEW OF SYSTEMS:  GEN: no fever,    no chills  RESP:  SOB,   no cough  CVS:  chest pain,   no palpitations  GI: no abdominal pain,   no nausea,   no vomiting,   no constipation,   no diarrhea  : no dysuria,   no frequency  NEURO: no headache,   no dizziness  PSYCH: no depression,   not anxious  Derm : no rash

## 2018-09-05 LAB
ANION GAP SERPL CALC-SCNC: 11 MMOL/L — SIGNIFICANT CHANGE UP (ref 5–17)
BUN SERPL-MCNC: 35 MG/DL — HIGH (ref 7–23)
CALCIUM SERPL-MCNC: 8.8 MG/DL — SIGNIFICANT CHANGE UP (ref 8.4–10.5)
CHLORIDE SERPL-SCNC: 107 MMOL/L — SIGNIFICANT CHANGE UP (ref 96–108)
CO2 SERPL-SCNC: 24 MMOL/L — SIGNIFICANT CHANGE UP (ref 22–31)
CREAT SERPL-MCNC: 1.76 MG/DL — HIGH (ref 0.5–1.3)
GLUCOSE SERPL-MCNC: 95 MG/DL — SIGNIFICANT CHANGE UP (ref 70–99)
HCT VFR BLD CALC: 33.2 % — LOW (ref 39–50)
HGB BLD-MCNC: 10.6 G/DL — LOW (ref 13–17)
MCHC RBC-ENTMCNC: 27.7 PG — SIGNIFICANT CHANGE UP (ref 27–34)
MCHC RBC-ENTMCNC: 31.9 GM/DL — LOW (ref 32–36)
MCV RBC AUTO: 86.9 FL — SIGNIFICANT CHANGE UP (ref 80–100)
PLATELET # BLD AUTO: 212 K/UL — SIGNIFICANT CHANGE UP (ref 150–400)
POTASSIUM SERPL-MCNC: 4.2 MMOL/L — SIGNIFICANT CHANGE UP (ref 3.5–5.3)
POTASSIUM SERPL-SCNC: 4.2 MMOL/L — SIGNIFICANT CHANGE UP (ref 3.5–5.3)
RBC # BLD: 3.82 M/UL — LOW (ref 4.2–5.8)
RBC # FLD: 16 % — HIGH (ref 10.3–14.5)
SODIUM SERPL-SCNC: 142 MMOL/L — SIGNIFICANT CHANGE UP (ref 135–145)
WBC # BLD: 5.7 K/UL — SIGNIFICANT CHANGE UP (ref 3.8–10.5)
WBC # FLD AUTO: 5.7 K/UL — SIGNIFICANT CHANGE UP (ref 3.8–10.5)

## 2018-09-05 PROCEDURE — 99232 SBSQ HOSP IP/OBS MODERATE 35: CPT | Mod: GC

## 2018-09-05 RX ORDER — FUROSEMIDE 40 MG
40 TABLET ORAL
Qty: 0 | Refills: 0 | Status: DISCONTINUED | OUTPATIENT
Start: 2018-09-05 | End: 2018-09-06

## 2018-09-05 RX ADMIN — Medication 81 MILLIGRAM(S): at 11:31

## 2018-09-05 RX ADMIN — ATORVASTATIN CALCIUM 40 MILLIGRAM(S): 80 TABLET, FILM COATED ORAL at 21:02

## 2018-09-05 RX ADMIN — HEPARIN SODIUM 5000 UNIT(S): 5000 INJECTION INTRAVENOUS; SUBCUTANEOUS at 05:56

## 2018-09-05 RX ADMIN — HEPARIN SODIUM 5000 UNIT(S): 5000 INJECTION INTRAVENOUS; SUBCUTANEOUS at 17:28

## 2018-09-05 RX ADMIN — CLOPIDOGREL BISULFATE 75 MILLIGRAM(S): 75 TABLET, FILM COATED ORAL at 11:31

## 2018-09-05 RX ADMIN — Medication 50 MILLIGRAM(S): at 05:56

## 2018-09-05 RX ADMIN — Medication 40 MILLIGRAM(S): at 17:28

## 2018-09-05 NOTE — PROGRESS NOTE ADULT - SUBJECTIVE AND OBJECTIVE BOX
88M h/o CAD, EF 55%, stent to LAD/ LCx 1-2/2018.  Came to office with substernal pressure, worsening SOB, LOPEZ.  Tying shoes caused SOB.  worsening leg edema  Referred via EMS to [facility] ER  Appreciate cardiology f/u  Telemetry SR , no events  first Troponin elevated, second normal but with elevated serum creatinine.  feels better this AM- on lasix 40mg IV bid.  Had not been taking prescribed lasix at home due to excessive urination.  No SOB, no chest pain currently      	  Vital Signs Last 24 Hrs  T(C): 37.1 (05 Sep 2018 04:42), Max: 37.1 (05 Sep 2018 04:42)  T(F): 98.7 (05 Sep 2018 04:42), Max: 98.7 (05 Sep 2018 04:42)  HR: 77 (05 Sep 2018 04:42) (64 - 77)  BP: 132/75 (05 Sep 2018 04:42) (100/45 - 136/76)  BP(mean): --  RR: 18 (05 Sep 2018 04:42) (17 - 18)  SpO2: 99% (05 Sep 2018 04:42) (96% - 100%)  Daily     Daily     09-04 @ 07:01  -  09-05 @ 07:00  --------------------------------------------------------  IN: 800 mL / OUT: 200 mL / NET: 600 mL        PHYSICAL EXAM:      Constitutional: Resting comfortably    Eyes: anicteric    Neck: No increased JVP    Respiratory: lungs clear    Cardiovascular: RRR nl S1S2, II/VI systolic M    Gastrointestinal:  soft, NT    Extremities: Edema, stasis change    Neurological: No global or focal deficits    Skin:  warm and dry                                  11.2   6.3   )-----------( 239      ( 04 Sep 2018 15:13 )             35.0     09-05    142  |  107  |  35<H>  ----------------------------<  95  4.2   |  24  |  1.76<H>    Ca    8.8      05 Sep 2018 06:29    TPro  7.2  /  Alb  3.9  /  TBili  0.4  /  DBili  x   /  AST  20  /  ALT  9<L>  /  AlkPhos  88  09-04      PT/INR - ( 04 Sep 2018 15:13 )   PT: 11.8 sec;   INR: 1.08 ratio         PTT - ( 04 Sep 2018 15:13 )  PTT:27.5 sec    MEDICATIONS  (STANDING):  aspirin enteric coated 81 milliGRAM(s) Oral daily  atorvastatin 40 milliGRAM(s) Oral at bedtime  clopidogrel Tablet 75 milliGRAM(s) Oral daily  heparin  Injectable 5000 Unit(s) SubCutaneous every 12 hours  metoprolol succinate ER 50 milliGRAM(s) Oral daily  sodium chloride 0.9%. 1000 milliLiter(s) (50 mL/Hr) IV Continuous <Continuous>    MEDICATIONS  (PRN):      Impression  88M admitted with c/p, SOB. LOPEZ, worsening leg edema, s/p PCI/ stents LCx, LAD with other non obstructive CAD at cath.  currently comfortable  CKD, elevated creatinine    Plan  Continue ASA, plavix, beta blocker, lasix 40mg IV bid.  Hold ACE-I due to CKD, increased creatinine  Continue telemetry monitor  Complete CE's  Echocardiogram  Ischemic work up per cardiology.  VTE prophylaxis- heparin SQ    Deon Perez MD  [phone]

## 2018-09-05 NOTE — PROGRESS NOTE ADULT - SUBJECTIVE AND OBJECTIVE BOX
Patient seen and examined at bedside.    Overnight Events:       REVIEW OF SYSTEMS:  Constitutional:     [ ] negative [ ] fevers [ ] chills [ ] weight loss [ ] weight gain  HEENT:                  [ ] negative [ ] dry eyes [ ] eye irritation [ ] postnasal drip [ ] nasal congestion  CV:                         [ ] negative  [ ] chest pain [ ] orthopnea [ ] palpitations [ ] murmur  Resp:                     [ ] negative [ ] cough [ ] shortness of breath [ ] dyspnea [ ] wheezing [ ] sputum [ ]hemoptysis  GI:                          [ ] negative [ ] nausea [ ] vomiting [ ] diarrhea [ ] constipation [ ] abd pain [ ] dysphagia   :                        [ ] negative [ ] dysuria [ ] nocturia [ ] hematuria [ ] increased urinary frequency  Musculoskeletal: [ ] negative [ ] back pain [ ] myalgias [ ] arthralgias [ ] fracture  Skin:                       [ ] negative [ ] rash [ ] itch  Neurological:        [ ] negative [ ] headache [ ] dizziness [ ] syncope [ ] weakness [ ] numbness  Psychiatric:           [ ] negative [ ] anxiety [ ] depression  Endocrine:            [ ] negative [ ] diabetes [ ] thyroid problem  Heme/Lymph:      [ ] negative [ ] anemia [ ] bleeding problem  Allergic/Immune: [ ] negative [ ] itchy eyes [ ] nasal discharge [ ] hives [ ] angioedema    [ ] All other systems negative  [ ] Unable to assess ROS because sedated with anoxic brain injury.    Current Meds:  aspirin enteric coated 81 milliGRAM(s) Oral daily  atorvastatin 40 milliGRAM(s) Oral at bedtime  clopidogrel Tablet 75 milliGRAM(s) Oral daily  furosemide   Injectable 40 milliGRAM(s) IV Push two times a day  heparin  Injectable 5000 Unit(s) SubCutaneous every 12 hours  metoprolol succinate ER 50 milliGRAM(s) Oral daily      PAST MEDICAL & SURGICAL HISTORY:  Colon polyp  BPH (benign prostatic hyperplasia)  Cellulitis: BL  CKD (chronic kidney disease) stage 3, GFR 30-59 ml/min  Leg swelling: related to cellulitis of LLE  No significant past medical history  S/P hemorrhoidectomy      Vitals:  T(F): 98.7 (09-05), Max: 98.7 (09-05)  HR: 77 (09-05) (64 - 77)  BP: 132/75 (09-05) (100/45 - 136/76)  RR: 18 (09-05)  SpO2: 99% (09-05)  I&O's Summary    04 Sep 2018 07:01  -  05 Sep 2018 07:00  --------------------------------------------------------  IN: 800 mL / OUT: 200 mL / NET: 600 mL        Physical Exam:  Appearance: No acute distress; well appearing  Eyes: PERRL, EOMI, pink conjunctiva  HENT: Normal oral mucosa  Cardiovascular: RRR, S1, S2, no murmurs, rubs, or gallops; no edema; no JVD  Respiratory: Clear to auscultation bilaterally  Gastrointestinal: soft, non-tender, non-distended with normal bowel sounds  Musculoskeletal: No clubbing; no joint deformity   Neurologic: Non-focal  Lymphatic: No lymphadenopathy  Psychiatry: AAOx3, mood & affect appropriate  Skin: No rashes, ecchymoses, or cyanosis                          10.6   5.70  )-----------( 212      ( 05 Sep 2018 08:00 )             33.2     09-05    142  |  107  |  35<H>  ----------------------------<  95  4.2   |  24  |  1.76<H>    Ca    8.8      05 Sep 2018 06:29    TPro  7.2  /  Alb  3.9  /  TBili  0.4  /  DBili  x   /  AST  20  /  ALT  9<L>  /  AlkPhos  88  09-04    PT/INR - ( 04 Sep 2018 15:13 )   PT: 11.8 sec;   INR: 1.08 ratio         PTT - ( 04 Sep 2018 15:13 )  PTT:27.5 sec              New ECG(s): Personally reviewed    Echo:  < from: Transthoracic Echocardiogram (11.15.13 @ 09:08) >  Conclusions:  1. Mitral annular calcification, otherwise normal mitral  valve. Minimal mitral regurgitation.  2. Mild concentric left ventricular hypertrophy.  3. Endocardium not well visualized; grossly normal left  ventricular systolic function.  4. The right ventricle is not well visualized; grossly  normal right ventricular systolic function.    < end of copied text >    Stress Testing:     Cath:  < from: Cardiac Cath Lab - Adult (02.15.18 @ 10:19) >  CORONARY VESSELS: The coronary circulation is right dominant.  LM:   --  LM: Angiography showed minor luminal irregularities with no flow  limiting lesions.  LAD:   --  Ostial LAD: There was a 30 % stenosis.  --  Mid LAD: There was a 10 % stenosis at the site of a prior stent.  CX:   --  Mid circumflex: There was a 90 % stenosis.  RCA:   --  RCA: Angiography showed moderate atherosclerosis.  COMPLICATIONS: There were no complications.  DIAGNOSTIC RECOMMENDATIONS: IFR was 0.89  ASA and Plavix for 1 year  INTERVENTIONAL RECOMMENDATIONS: IFR was 0.89  ASA and Plavix for 1 year  Prepared and signed by  Jeremiah Lanza M.D.    < end of copied text >    Imaging:    Interpretation of Telemetry: Patient seen and examined at bedside.    Overnight Events: Pt feels well. Pt knows he has to be in the hospital but would like to leave my Friday for daughters wedding.       REVIEW OF SYSTEMS:  Constitutional:     [x ] negative [ ] fevers [ ] chills [ ] weight loss [ ] weight gain  HEENT:                  [x ] negative [ ] dry eyes [ ] eye irritation [ ] postnasal drip [ ] nasal congestion  CV:                         [x ] negative  [ ] chest pain [ ] orthopnea [ ] palpitations [ ] murmur  Resp:                     [ ] negative [ ] cough [ ] shortness of breath [ x] dyspnea [ ] wheezing [ ] sputum [ ]hemoptysis  GI:                          [x ] negative [ ] nausea [ ] vomiting [ ] diarrhea [ ] constipation [ ] abd pain [ ] dysphagia   :                        [x ] negative [ ] dysuria [ ] nocturia [ ] hematuria [ ] increased urinary frequency  Musculoskeletal: [x ] negative [ ] back pain [ ] myalgias [ ] arthralgias [ ] fracture  Skin:                       [x ] negative [ ] rash [ ] itch  Neurological:        [ x] negative [ ] headache [ ] dizziness [ ] syncope [ ] weakness [ ] numbness  Psychiatric:           [x ] negative [ ] anxiety [ ] depression  Endocrine:            [x ] negative [ ] diabetes [ ] thyroid problem  Heme/Lymph:      [x ] negative [ ] anemia [ ] bleeding problem  Allergic/Immune: [x ] negative [ ] itchy eyes [ ] nasal discharge [ ] hives [ ] angioedema    [ ] All other systems negative  [ ] Unable to assess ROS because sedated with anoxic brain injury.    Current Meds:  aspirin enteric coated 81 milliGRAM(s) Oral daily  atorvastatin 40 milliGRAM(s) Oral at bedtime  clopidogrel Tablet 75 milliGRAM(s) Oral daily  furosemide   Injectable 40 milliGRAM(s) IV Push two times a day  heparin  Injectable 5000 Unit(s) SubCutaneous every 12 hours  metoprolol succinate ER 50 milliGRAM(s) Oral daily      PAST MEDICAL & SURGICAL HISTORY:  Colon polyp  BPH (benign prostatic hyperplasia)  Cellulitis: BL  CKD (chronic kidney disease) stage 3, GFR 30-59 ml/min  Leg swelling: related to cellulitis of LLE  No significant past medical history  S/P hemorrhoidectomy      Vitals:  T(F): 98.7 (09-05), Max: 98.7 (09-05)  HR: 77 (09-05) (64 - 77)  BP: 132/75 (09-05) (100/45 - 136/76)  RR: 18 (09-05)  SpO2: 99% (09-05)  I&O's Summary    04 Sep 2018 07:01  -  05 Sep 2018 07:00  --------------------------------------------------------  IN: 800 mL / OUT: 200 mL / NET: 600 mL        Physical Exam:  Appearance: No acute distress; well appearing  Eyes: PERRL, EOMI, pink conjunctiva  HENT: Normal oral mucosa  Cardiovascular: RRR, S1, S2, no murmurs, rubs, or gallops; no edema; no JVD  Respiratory: Clear to auscultation bilaterally  Gastrointestinal: soft, non-tender, non-distended with normal bowel sounds  Musculoskeletal: No clubbing; no joint deformity   Neurologic: Non-focal  Lymphatic: No lymphadenopathy  Psychiatry: AAOx3, mood & affect appropriate  Skin: No rashes, ecchymoses, or cyanosis                          10.6   5.70  )-----------( 212      ( 05 Sep 2018 08:00 )             33.2     09-05    142  |  107  |  35<H>  ----------------------------<  95  4.2   |  24  |  1.76<H>    Ca    8.8      05 Sep 2018 06:29    TPro  7.2  /  Alb  3.9  /  TBili  0.4  /  DBili  x   /  AST  20  /  ALT  9<L>  /  AlkPhos  88  09-04    PT/INR - ( 04 Sep 2018 15:13 )   PT: 11.8 sec;   INR: 1.08 ratio         PTT - ( 04 Sep 2018 15:13 )  PTT:27.5 sec              New ECG(s): Personally reviewed    Echo:  < from: Transthoracic Echocardiogram (11.15.13 @ 09:08) >  Conclusions:  1. Mitral annular calcification, otherwise normal mitral  valve. Minimal mitral regurgitation.  2. Mild concentric left ventricular hypertrophy.  3. Endocardium not well visualized; grossly normal left  ventricular systolic function.  4. The right ventricle is not well visualized; grossly  normal right ventricular systolic function.    < end of copied text >    Stress Testing:     Cath:  < from: Cardiac Cath Lab - Adult (02.15.18 @ 10:19) >  CORONARY VESSELS: The coronary circulation is right dominant.  LM:   --  LM: Angiography showed minor luminal irregularities with no flow  limiting lesions.  LAD:   --  Ostial LAD: There was a 30 % stenosis.  --  Mid LAD: There was a 10 % stenosis at the site of a prior stent.  CX:   --  Mid circumflex: There was a 90 % stenosis.  RCA:   --  RCA: Angiography showed moderate atherosclerosis.  COMPLICATIONS: There were no complications.  DIAGNOSTIC RECOMMENDATIONS: IFR was 0.89  ASA and Plavix for 1 year  INTERVENTIONAL RECOMMENDATIONS: IFR was 0.89  ASA and Plavix for 1 year  Prepared and signed by  Jeremiah Lanza M.D.    < end of copied text >    Imaging:    Interpretation of Telemetry: Patient seen and examined at bedside.    Overnight Events: Pt feels well. Pt knows he has to be in the hospital but would like to leave my Friday for daughters wedding.       REVIEW OF SYSTEMS:  Constitutional:     [x ] negative [ ] fevers [ ] chills [ ] weight loss [ ] weight gain  HEENT:                  [x ] negative [ ] dry eyes [ ] eye irritation [ ] postnasal drip [ ] nasal congestion  CV:                         [x ] negative  [ ] chest pain [ ] orthopnea [ ] palpitations [ ] murmur  Resp:                     [ ] negative [ ] cough [ ] shortness of breath [ x] dyspnea [ ] wheezing [ ] sputum [ ]hemoptysis  GI:                          [x ] negative [ ] nausea [ ] vomiting [ ] diarrhea [ ] constipation [ ] abd pain [ ] dysphagia   :                        [x ] negative [ ] dysuria [ ] nocturia [ ] hematuria [ ] increased urinary frequency  Musculoskeletal: [x ] negative [ ] back pain [ ] myalgias [ ] arthralgias [ ] fracture  Skin:                       [x ] negative [ ] rash [ ] itch  Neurological:        [ x] negative [ ] headache [ ] dizziness [ ] syncope [ ] weakness [ ] numbness  Psychiatric:           [x ] negative [ ] anxiety [ ] depression  Endocrine:            [x ] negative [ ] diabetes [ ] thyroid problem  Heme/Lymph:      [x ] negative [ ] anemia [ ] bleeding problem  Allergic/Immune: [x ] negative [ ] itchy eyes [ ] nasal discharge [ ] hives [ ] angioedema    [ ] All other systems negative  [ ] Unable to assess ROS because sedated with anoxic brain injury.    Current Meds:  aspirin enteric coated 81 milliGRAM(s) Oral daily  atorvastatin 40 milliGRAM(s) Oral at bedtime  clopidogrel Tablet 75 milliGRAM(s) Oral daily  furosemide   Injectable 40 milliGRAM(s) IV Push two times a day  heparin  Injectable 5000 Unit(s) SubCutaneous every 12 hours  metoprolol succinate ER 50 milliGRAM(s) Oral daily      PAST MEDICAL & SURGICAL HISTORY:  Colon polyp  BPH (benign prostatic hyperplasia)  Cellulitis: BL  CKD (chronic kidney disease) stage 3, GFR 30-59 ml/min  Leg swelling: related to cellulitis of LLE  No significant past medical history  S/P hemorrhoidectomy      Vitals:  T(F): 98.7 (09-05), Max: 98.7 (09-05)  HR: 77 (09-05) (64 - 77)  BP: 132/75 (09-05) (100/45 - 136/76)  RR: 18 (09-05)  SpO2: 99% (09-05)  I&O's Summary    04 Sep 2018 07:01  -  05 Sep 2018 07:00  --------------------------------------------------------  IN: 800 mL / OUT: 200 mL / NET: 600 mL        Physical Exam:  Appearance: No acute distress; well appearing  Eyes: PERRL, EOMI, pink conjunctiva  HENT: Normal oral mucosa  Cardiovascular: RRR, S1, S2, no murmurs, rubs, or gallops; 2+ edema; + JVD  Respiratory: Clear to auscultation bilaterally  Gastrointestinal: soft, non-tender, non-distended with normal bowel sounds  Musculoskeletal: No clubbing; no joint deformity   Neurologic: Non-focal  Lymphatic: No lymphadenopathy  Psychiatry: AAOx3, mood & affect appropriate  Skin: No rashes, ecchymoses, or cyanosis                          10.6   5.70  )-----------( 212      ( 05 Sep 2018 08:00 )             33.2     09-05    142  |  107  |  35<H>  ----------------------------<  95  4.2   |  24  |  1.76<H>    Ca    8.8      05 Sep 2018 06:29    TPro  7.2  /  Alb  3.9  /  TBili  0.4  /  DBili  x   /  AST  20  /  ALT  9<L>  /  AlkPhos  88  09-04    PT/INR - ( 04 Sep 2018 15:13 )   PT: 11.8 sec;   INR: 1.08 ratio         PTT - ( 04 Sep 2018 15:13 )  PTT:27.5 sec              New ECG(s): Personally reviewed    Echo:  < from: Transthoracic Echocardiogram (11.15.13 @ 09:08) >  Conclusions:  1. Mitral annular calcification, otherwise normal mitral  valve. Minimal mitral regurgitation.  2. Mild concentric left ventricular hypertrophy.  3. Endocardium not well visualized; grossly normal left  ventricular systolic function.  4. The right ventricle is not well visualized; grossly  normal right ventricular systolic function.    < end of copied text >    Stress Testing:     Cath:  < from: Cardiac Cath Lab - Adult (02.15.18 @ 10:19) >  CORONARY VESSELS: The coronary circulation is right dominant.  LM:   --  LM: Angiography showed minor luminal irregularities with no flow  limiting lesions.  LAD:   --  Ostial LAD: There was a 30 % stenosis.  --  Mid LAD: There was a 10 % stenosis at the site of a prior stent.  CX:   --  Mid circumflex: There was a 90 % stenosis.  RCA:   --  RCA: Angiography showed moderate atherosclerosis.  COMPLICATIONS: There were no complications.  DIAGNOSTIC RECOMMENDATIONS: IFR was 0.89  ASA and Plavix for 1 year  INTERVENTIONAL RECOMMENDATIONS: IFR was 0.89  ASA and Plavix for 1 year  Prepared and signed by  Jeremiah Lanza M.D.    < end of copied text >    Imaging:    Interpretation of Telemetry:

## 2018-09-05 NOTE — PROGRESS NOTE ADULT - ASSESSMENT
88 year old M with a PMH of CAD (w/ PCI to LAD and Cx last in 2/2018), CKDIII, and HTN that presents to the ED with 2 weeks of worsening LE edema and 1 day of chest pain.     #Chest Pain  - Check Cardiac enzymes negative.   - Likely CP 2/2 ADHF  - C/w ASA and plavix  - increase atorvastatin to 80 mg   - Hold ACEi in the setting of elevated Cr, If Cr down trending tomorrow can restart ACEi  - Once more euvolemic may need ischemic eval.     #Edema 2/2 HFpEF  - Check TTE (last EF was normal)  - c/w lasix 40 IV BID  - Monitor ins and outs  - c/w metoprolol 25 BID    Aquilino Hendrix MD  Cardiology Fellow - PGY-4  [facility]: [id number]  NS: [phone]  [id number] 88 year old M with a PMH of CAD (w/ PCI to LAD and Cx last in 2/2018), CKDIII, and HTN that presents to the ED with 2 weeks of worsening LE edema and 1 day of chest pain.     #Chest Pain  - Check Cardiac enzymes negative.   - Likely CP 2/2 ADHF, and increased LV pressure causing decreased endocardial blood flow.   - C/w ASA and plavix  - increase atorvastatin to 80 mg   - Hold ACEi in the setting of elevated Cr, If Cr down trending tomorrow can restart ACEi  - Once more euvolemic may need ischemic eval.     #Edema 2/2 HFpEF  - Check TTE (last EF was normal)  - increased lasix to 60 BID  - Monitor ins and outs  - c/w metoprolol 25 BID    Aquilino Hendrix MD  Cardiology Fellow - PGY-4  [facility]: [id number]  NS: [phone]  [id number]

## 2018-09-06 ENCOUNTER — TRANSCRIPTION ENCOUNTER (OUTPATIENT)
Age: 83
End: 2018-09-06

## 2018-09-06 VITALS — WEIGHT: 177.03 LBS

## 2018-09-06 LAB
ANION GAP SERPL CALC-SCNC: 12 MMOL/L — SIGNIFICANT CHANGE UP (ref 5–17)
BUN SERPL-MCNC: 34 MG/DL — HIGH (ref 7–23)
CALCIUM SERPL-MCNC: 9.3 MG/DL — SIGNIFICANT CHANGE UP (ref 8.4–10.5)
CHLORIDE SERPL-SCNC: 101 MMOL/L — SIGNIFICANT CHANGE UP (ref 96–108)
CO2 SERPL-SCNC: 25 MMOL/L — SIGNIFICANT CHANGE UP (ref 22–31)
CREAT SERPL-MCNC: 1.76 MG/DL — HIGH (ref 0.5–1.3)
GLUCOSE SERPL-MCNC: 102 MG/DL — HIGH (ref 70–99)
HCT VFR BLD CALC: 34 % — LOW (ref 39–50)
HGB BLD-MCNC: 10.9 G/DL — LOW (ref 13–17)
MCHC RBC-ENTMCNC: 27.1 PG — SIGNIFICANT CHANGE UP (ref 27–34)
MCHC RBC-ENTMCNC: 32.1 GM/DL — SIGNIFICANT CHANGE UP (ref 32–36)
MCV RBC AUTO: 84.6 FL — SIGNIFICANT CHANGE UP (ref 80–100)
PLATELET # BLD AUTO: 212 K/UL — SIGNIFICANT CHANGE UP (ref 150–400)
POTASSIUM SERPL-MCNC: 4 MMOL/L — SIGNIFICANT CHANGE UP (ref 3.5–5.3)
POTASSIUM SERPL-SCNC: 4 MMOL/L — SIGNIFICANT CHANGE UP (ref 3.5–5.3)
RBC # BLD: 4.02 M/UL — LOW (ref 4.2–5.8)
RBC # FLD: 15.6 % — HIGH (ref 10.3–14.5)
SODIUM SERPL-SCNC: 138 MMOL/L — SIGNIFICANT CHANGE UP (ref 135–145)
WBC # BLD: 5.2 K/UL — SIGNIFICANT CHANGE UP (ref 3.8–10.5)
WBC # FLD AUTO: 5.2 K/UL — SIGNIFICANT CHANGE UP (ref 3.8–10.5)

## 2018-09-06 PROCEDURE — 99232 SBSQ HOSP IP/OBS MODERATE 35: CPT | Mod: GC

## 2018-09-06 RX ORDER — FUROSEMIDE 40 MG
3 TABLET ORAL
Qty: 180 | Refills: 0 | OUTPATIENT
Start: 2018-09-06 | End: 2018-10-05

## 2018-09-06 RX ORDER — OLMESARTAN MEDOXOMIL 5 MG/1
1 TABLET, FILM COATED ORAL
Qty: 0 | Refills: 0 | COMMUNITY

## 2018-09-06 RX ORDER — FUROSEMIDE 40 MG
1 TABLET ORAL
Qty: 60 | Refills: 0 | OUTPATIENT
Start: 2018-09-06 | End: 2018-10-05

## 2018-09-06 RX ORDER — ATORVASTATIN CALCIUM 80 MG/1
1 TABLET, FILM COATED ORAL
Qty: 30 | Refills: 0
Start: 2018-09-06 | End: 2018-10-05

## 2018-09-06 RX ADMIN — Medication 50 MILLIGRAM(S): at 05:40

## 2018-09-06 RX ADMIN — Medication 81 MILLIGRAM(S): at 08:00

## 2018-09-06 RX ADMIN — HEPARIN SODIUM 5000 UNIT(S): 5000 INJECTION INTRAVENOUS; SUBCUTANEOUS at 05:40

## 2018-09-06 RX ADMIN — CLOPIDOGREL BISULFATE 75 MILLIGRAM(S): 75 TABLET, FILM COATED ORAL at 08:00

## 2018-09-06 NOTE — PROGRESS NOTE ADULT - SUBJECTIVE AND OBJECTIVE BOX
Feels better.  Less edema  Plan cardiac cath but waiting improved renal function  Tele SR, no ectopy  No c/p, SOB        Vital Signs Last 24 Hrs  T(C): 36.6 (06 Sep 2018 05:02), Max: 36.9 (05 Sep 2018 12:41)  T(F): 97.8 (06 Sep 2018 05:02), Max: 98.4 (05 Sep 2018 12:41)  HR: 64 (06 Sep 2018 05:02) (62 - 71)  BP: 128/68 (06 Sep 2018 05:02) (128/68 - 152/82)  BP(mean): --  RR: 18 (06 Sep 2018 05:02) (18 - 18)  SpO2: 100% (06 Sep 2018 05:02) (97% - 100%)  Daily     Daily     09-05 @ 07:01  -  09-06 @ 07:00  --------------------------------------------------------  IN: 600 mL / OUT: 1600 mL / NET: -1000 mL      PHYSICAL EXAM:      Constitutional: Resting comfortably    Eyes: anicteric    Neck: No increased JVP    Respiratory: lungs clear    Cardiovascular: RRR nl S1S2, II/VI systolic M    Gastrointestinal:  soft, NT    Extremities: Edema, stasis change    Neurological: No global or focal deficits    Skin:  warm and dry                                  10.6   5.70  )-----------( 212      ( 05 Sep 2018 08:00 )             33.2     09-06    138  |  101  |  34<H>  ----------------------------<  102<H>  4.0   |  25  |  1.76<H>    Ca    9.3      06 Sep 2018 06:40    TPro  7.2  /  Alb  3.9  /  TBili  0.4  /  DBili  x   /  AST  20  /  ALT  9<L>  /  AlkPhos  88  09-04      PT/INR - ( 04 Sep 2018 15:13 )   PT: 11.8 sec;   INR: 1.08 ratio         PTT - ( 04 Sep 2018 15:13 )  PTT:27.5 sec    MEDICATIONS  (STANDING):  aspirin enteric coated 81 milliGRAM(s) Oral daily  atorvastatin 40 milliGRAM(s) Oral at bedtime  clopidogrel Tablet 75 milliGRAM(s) Oral daily  furosemide   Injectable 40 milliGRAM(s) IV Push two times a day  heparin  Injectable 5000 Unit(s) SubCutaneous every 12 hours  metoprolol succinate ER 50 milliGRAM(s) Oral daily    MEDICATIONS  (PRN):    Impression  88M admitted with c/p, SOB. LOPEZ, worsening leg edema, s/p PCI/ stents LCx, LAD with other non obstructive CAD at cath.  currently comfortable  CKD, elevated creatinine    Plan  Continue ASA, plavix, beta blocker, lasix 60mg IV bid.  Hold ACE-I due to CKD, increased creatinine- improving slowly  Continue telemetry monitor  Complete CE's  Echocardiogram  Ischemic work up per cardiology.  VTE prophylaxis- heparin SQ      Deon Perez MD  [phone]

## 2018-09-06 NOTE — DISCHARGE NOTE ADULT - MEDICATION SUMMARY - MEDICATIONS TO STOP TAKING
I will STOP taking the medications listed below when I get home from the hospital:    Benicar 20 mg oral tablet  -- 1 tab(s) by mouth once a day

## 2018-09-06 NOTE — DISCHARGE NOTE ADULT - HOSPITAL COURSE
88 year old Male with PMH of HTN, HLD, CAD/stents jan & Feb 2018 with CP for CATH in am                          DX: Chest pain, likely atypical  2/2 ADHF, and increased LV pressure causing decreased endocardial blood flow.                    CHPEF on IV lasix                    RASHAUN     Pt evaluated by house cardiology-  symptoms most likely related to CHF and not ACS, will diurese and 	see if symptoms improve and then decide on ischemic workup.  Pt refusing lasix as he can not tolerated increased need to urinate. Pt would like to return home and leave hospital against medical advice as he has a wedding to attend. Risks of leaving the hospital explained including worsened CHF/CP and death. Pt states full understanding . Lasix BID prescribed, follow up with Dr. Iggy Snow ASAP. 88 year old Male with PMH of HTN, HLD, CAD/stents jan & Feb 2018 with CP for CATH in am                          DX: Chest pain, likely atypical  2/2 ADHF, and increased LV pressure causing decreased endocardial blood flow.                    CHPEF on IV lasix                    RASHAUN     Pt evaluated by house cardiology-  symptoms most likely related to CHF and not ACS, patient diuresed. Symptoms improved. Requested to leave hospital and will obtain outpatient ischemic evaluation. Lasix 60 BID prescribed, follow up with Dr. Iggy Snow tomorrow 9/7/18

## 2018-09-06 NOTE — DISCHARGE NOTE ADULT - SECONDARY DIAGNOSIS.
Acute on chronic diastolic congestive heart failure CKD (chronic kidney disease) stage 3, GFR 30-59 ml/min

## 2018-09-06 NOTE — DISCHARGE NOTE ADULT - MEDICATION SUMMARY - MEDICATIONS TO TAKE
I will START or STAY ON the medications listed below when I get home from the hospital:    aspirin 81 mg oral delayed release tablet  -- 1 tab(s) by mouth once a day  -- Indication: For CAD    atorvastatin 40 mg oral tablet  -- 1 tab(s) by mouth once a day (at bedtime)  -- Indication: For HLD    clopidogrel 75 mg oral tablet  -- 1 tab(s) by mouth once a day  -- Indication: For CAD    Metoprolol Succinate ER 50 mg oral tablet, extended release  -- 1 tab(s) by mouth once a day  -- Indication: For HTN    furosemide 80 mg oral tablet  -- 1 tab(s) by mouth 2 times a day   -- Avoid prolonged or excessive exposure to direct and/or artificial sunlight while taking this medication.  It is very important that you take or use this exactly as directed.  Do not skip doses or discontinue unless directed by your doctor.  It may be advisable to drink a full glass orange juice or eat a banana daily while taking this medication.    -- Indication: For Congestive heart failure I will START or STAY ON the medications listed below when I get home from the hospital:    aspirin 81 mg oral delayed release tablet  -- 1 tab(s) by mouth once a day  -- Indication: For CAD    atorvastatin 40 mg oral tablet  -- 1 tab(s) by mouth once a day (at bedtime)  -- Indication: For HLD    clopidogrel 75 mg oral tablet  -- 1 tab(s) by mouth once a day  -- Indication: For CAD    Metoprolol Succinate ER 50 mg oral tablet, extended release  -- 1 tab(s) by mouth once a day  -- Indication: For HTN    furosemide 20 mg oral tablet  -- 3 tab(s) by mouth 2 times a day   -- Avoid prolonged or excessive exposure to direct and/or artificial sunlight while taking this medication.  It is very important that you take or use this exactly as directed.  Do not skip doses or discontinue unless directed by your doctor.  It may be advisable to drink a full glass orange juice or eat a banana daily while taking this medication.    -- Indication: For Acute on chronic diastolic congestive heart failure

## 2018-09-06 NOTE — DISCHARGE NOTE ADULT - PLAN OF CARE
You need an outpatient stress test. Follow up with Dr. Iggy Snow Immediately. You have left the hospital against medical advice Weigh yourself daily.  If you gain 3lbs in 3 days, or 5lbs in a week call your Health Care Provider.  Do not eat or drink foods containing more than 2000mg of salt (sodium) in your diet every day.  Call your Health Care Provider if you have any swelling or increased swelling in your feet, ankles, and/or stomach.  Take all of your medication as directed.  If you become dizzy call your Health Care Provider. Avoid taking (NSAIDs) - (ex: Ibuprofen, Advil, Celebrex, Naprosyn)  Avoid taking any nephrotoxic agents (can harm kidneys) - Intravenous contrast for diagnostic testing, combination cold medications.  Have all medications adjusted for your renal function by your Health Care Provider.  Blood pressure control is important.  Take all medication as prescribed.  Do not take benicar as it can further hurt your kidneys. Follow up with Dr. Snow to remain without chest pain You need an outpatient stress test. Follow up with Dr. Iggy Snow* , please call to be seen tomorrow 9/7/18 Avoid taking (NSAIDs) - (ex: Ibuprofen, Advil, Celebrex, Naprosyn)  Avoid taking any nephrotoxic agents (can harm kidneys) - Intravenous contrast for diagnostic testing, combination cold medications.  Have all medications adjusted for your renal function by your Health Care Provider.  Blood pressure control is important.  Take all medication as prescribed.  Do not take benicar as it can further hurt your kidneys. Follow up with Dr. Snow  BUN/Creatinine Level 34/1.76, at time of discharge 9/6/18

## 2018-09-06 NOTE — CHART NOTE - NSCHARTNOTEFT_GEN_A_CORE
Pt requesting to go home today. States symptoms improved, Denies chest pain, legs much " less swollen" . Discussed with Dr. Perez who agrees to discharge patient home on oral lasix and for outpatient stress testing. Lasix 60mg BID , continue current meds. No ARB. Pt understands importance of lasix and states he will take as prescribed. Pt instructed to follow up with Dr. Iggy Snow tomorrow. Discharge home.

## 2018-09-06 NOTE — DISCHARGE NOTE ADULT - CARE PROVIDER_API CALL
Deon Perez), Internal Medicine  [address] [place] [address]  Phone: [phone]  Fax: [phone]    Iggy Snow), Cardiovascular Disease; Internal Medicine  [address] [place] [address]  Phone: [phone]  Fax: [phone]

## 2018-09-06 NOTE — DISCHARGE NOTE ADULT - CARE PROVIDERS DIRECT ADDRESSES
,[email or web address]

## 2018-09-06 NOTE — DISCHARGE NOTE ADULT - CARE PLAN
Principal Discharge DX:	Atypical chest pain  Assessment and plan of treatment:	You need an outpatient stress test. Follow up with Dr. Iggy Snow Immediately. You have left the hospital against medical advice  Secondary Diagnosis:	Acute on chronic diastolic congestive heart failure  Assessment and plan of treatment:	Weigh yourself daily.  If you gain 3lbs in 3 days, or 5lbs in a week call your Health Care Provider.  Do not eat or drink foods containing more than 2000mg of salt (sodium) in your diet every day.  Call your Health Care Provider if you have any swelling or increased swelling in your feet, ankles, and/or stomach.  Take all of your medication as directed.  If you become dizzy call your Health Care Provider.  Secondary Diagnosis:	CKD (chronic kidney disease) stage 3, GFR 30-59 ml/min  Assessment and plan of treatment:	Avoid taking (NSAIDs) - (ex: Ibuprofen, Advil, Celebrex, Naprosyn)  Avoid taking any nephrotoxic agents (can harm kidneys) - Intravenous contrast for diagnostic testing, combination cold medications.  Have all medications adjusted for your renal function by your Health Care Provider.  Blood pressure control is important.  Take all medication as prescribed.  Do not take benicar as it can further hurt your kidneys. Follow up with Dr. Snow Principal Discharge DX:	Atypical chest pain  Goal:	to remain without chest pain  Assessment and plan of treatment:	You need an outpatient stress test. Follow up with Dr. Iggy Snow* , please call to be seen tomorrow 9/7/18  Secondary Diagnosis:	Acute on chronic diastolic congestive heart failure  Assessment and plan of treatment:	Weigh yourself daily.  If you gain 3lbs in 3 days, or 5lbs in a week call your Health Care Provider.  Do not eat or drink foods containing more than 2000mg of salt (sodium) in your diet every day.  Call your Health Care Provider if you have any swelling or increased swelling in your feet, ankles, and/or stomach.  Take all of your medication as directed.  If you become dizzy call your Health Care Provider.  Secondary Diagnosis:	CKD (chronic kidney disease) stage 3, GFR 30-59 ml/min  Assessment and plan of treatment:	Avoid taking (NSAIDs) - (ex: Ibuprofen, Advil, Celebrex, Naprosyn)  Avoid taking any nephrotoxic agents (can harm kidneys) - Intravenous contrast for diagnostic testing, combination cold medications.  Have all medications adjusted for your renal function by your Health Care Provider.  Blood pressure control is important.  Take all medication as prescribed.  Do not take benicar as it can further hurt your kidneys. Follow up with Dr. Snow  BUN/Creatinine Level 34/1.76, at time of discharge 9/6/18

## 2018-09-06 NOTE — DISCHARGE NOTE ADULT - PATIENT PORTAL LINK FT
You can access the [facility], offered by [facility], by registering with the following website: [email or web address]

## 2018-09-06 NOTE — PROGRESS NOTE ADULT - SUBJECTIVE AND OBJECTIVE BOX
Patient seen and examined at bedside.    Overnight Events:       REVIEW OF SYSTEMS:  Constitutional:     [ ] negative [ ] fevers [ ] chills [ ] weight loss [ ] weight gain  HEENT:                  [ ] negative [ ] dry eyes [ ] eye irritation [ ] postnasal drip [ ] nasal congestion  CV:                         [ ] negative  [ ] chest pain [ ] orthopnea [ ] palpitations [ ] murmur  Resp:                     [ ] negative [ ] cough [ ] shortness of breath [ ] dyspnea [ ] wheezing [ ] sputum [ ]hemoptysis  GI:                          [ ] negative [ ] nausea [ ] vomiting [ ] diarrhea [ ] constipation [ ] abd pain [ ] dysphagia   :                        [ ] negative [ ] dysuria [ ] nocturia [ ] hematuria [ ] increased urinary frequency  Musculoskeletal: [ ] negative [ ] back pain [ ] myalgias [ ] arthralgias [ ] fracture  Skin:                       [ ] negative [ ] rash [ ] itch  Neurological:        [ ] negative [ ] headache [ ] dizziness [ ] syncope [ ] weakness [ ] numbness  Psychiatric:           [ ] negative [ ] anxiety [ ] depression  Endocrine:            [ ] negative [ ] diabetes [ ] thyroid problem  Heme/Lymph:      [ ] negative [ ] anemia [ ] bleeding problem  Allergic/Immune: [ ] negative [ ] itchy eyes [ ] nasal discharge [ ] hives [ ] angioedema    [ ] All other systems negative  [ ] Unable to assess ROS because sedated with anoxic brain injury.    Current Meds:  aspirin enteric coated 81 milliGRAM(s) Oral daily  atorvastatin 40 milliGRAM(s) Oral at bedtime  clopidogrel Tablet 75 milliGRAM(s) Oral daily  furosemide   Injectable 40 milliGRAM(s) IV Push two times a day  heparin  Injectable 5000 Unit(s) SubCutaneous every 12 hours  metoprolol succinate ER 50 milliGRAM(s) Oral daily      PAST MEDICAL & SURGICAL HISTORY:  Colon polyp  BPH (benign prostatic hyperplasia)  Cellulitis: BL  CKD (chronic kidney disease) stage 3, GFR 30-59 ml/min  Leg swelling: related to cellulitis of LLE  No significant past medical history  S/P hemorrhoidectomy      Vitals:  T(F): 97.9 (09-06), Max: 98.4 (09-05)  HR: 65 (09-06) (62 - 71)  BP: 149/84 (09-06) (128/68 - 152/82)  RR: 18 (09-06)  SpO2: 100% (09-06)  I&O's Summary    05 Sep 2018 07:01  -  06 Sep 2018 07:00  --------------------------------------------------------  IN: 600 mL / OUT: 1600 mL / NET: -1000 mL    06 Sep 2018 07:01  -  06 Sep 2018 12:10  --------------------------------------------------------  IN: 300 mL / OUT: 200 mL / NET: 100 mL        Physical Exam:  Appearance: No acute distress; well appearing  Eyes: PERRL, EOMI, pink conjunctiva  HENT: Normal oral mucosa  Cardiovascular: RRR, S1, S2, no murmurs, rubs, or gallops; no edema; no JVD  Respiratory: Clear to auscultation bilaterally  Gastrointestinal: soft, non-tender, non-distended with normal bowel sounds  Musculoskeletal: No clubbing; no joint deformity   Neurologic: Non-focal  Lymphatic: No lymphadenopathy  Psychiatry: AAOx3, mood & affect appropriate  Skin: No rashes, ecchymoses, or cyanosis                          10.9   5.20  )-----------( 212      ( 06 Sep 2018 07:43 )             34.0     09-06    138  |  101  |  34<H>  ----------------------------<  102<H>  4.0   |  25  |  1.76<H>    Ca    9.3      06 Sep 2018 06:40    TPro  7.2  /  Alb  3.9  /  TBili  0.4  /  DBili  x   /  AST  20  /  ALT  9<L>  /  AlkPhos  88  09-04    PT/INR - ( 04 Sep 2018 15:13 )   PT: 11.8 sec;   INR: 1.08 ratio         PTT - ( 04 Sep 2018 15:13 )  PTT:27.5 sec              New ECG(s): Personally reviewed    Echo:  < from: Transthoracic Echocardiogram (11.15.13 @ 09:08) >  ------------------------------------------------------------------------  Conclusions:  1. Mitral annular calcification, otherwise normal mitral  valve. Minimal mitral regurgitation.  2. Mild concentric left ventricular hypertrophy.  3. Endocardium not well visualized; grossly normal left  ventricular systolic function.  4. The right ventricle is not well visualized; grossly  normal right ventricular systolic function.  ------------------------------------------------------------------------  Confirmed on  11/15/2013 - 11:18:45 by Kenan Smith M.D.    < end of copied text >    Stress Testing:     Cath:  < from: Cardiac Cath Lab - Adult (02.15.18 @ 10:19) >  CONTRAST GIVEN: Omnipaque 66 ml.  MEDICATIONS GIVEN: Midazolam, 1 mg, IV. Fentanyl, 25 mcg, IV. Fentanyl, 25  mcg, IV. Verapamil (Isoptin, Calan, Covera), 2.4 mg, IA. Nitroglycerin,  200 mcg, intracoronary. Heparin, 2000 units, IA. Heparin, 6000 units, IV.  Normal Saline .9%, infusion rate of 50, IV.  CORONARY VESSELS: The coronary circulation is right dominant.  LM:   --  LM: Angiography showed minor luminal irregularities with no flow  limiting lesions.  LAD:   --  Ostial LAD: There was a 30 % stenosis.  --  Mid LAD: There was a 10 % stenosis at the site of a prior stent.  CX:   --  Mid circumflex: There was a 90 % stenosis.  RCA:   --  RCA: Angiography showed moderate atherosclerosis.  COMPLICATIONS: There were no complications.  DIAGNOSTIC RECOMMENDATIONS: IFR was 0.89  ASA and Plavix for 1 year  INTERVENTIONAL RECOMMENDATIONS: IFR was 0.89  ASA and Plavix for 1 year  Prepared and signed by  Jeremiah Lanza M.D.    < end of copied text >    Imaging:    Interpretation of Telemetry: Patient seen and examined at bedside.    Overnight Events: Pt states that he feels better and would like to go home.       REVIEW OF SYSTEMS:  Constitutional:     [ ] negative [ ] fevers [ ] chills [ ] weight loss [ ] weight gain  HEENT:                  [ ] negative [ ] dry eyes [ ] eye irritation [ ] postnasal drip [ ] nasal congestion  CV:                         [ x] negative  [ ] chest pain [ ] orthopnea [ ] palpitations [ ] murmur  Resp:                     [x ] negative [ ] cough [ ] shortness of breath [ ] dyspnea [ ] wheezing [ ] sputum [ ]hemoptysis  GI:                          [ ] negative [ ] nausea [ ] vomiting [ ] diarrhea [ ] constipation [ ] abd pain [ ] dysphagia   :                        [ ] negative [ ] dysuria [ ] nocturia [ ] hematuria [ ] increased urinary frequency  Musculoskeletal: [ ] negative [ ] back pain [ ] myalgias [ ] arthralgias [ ] fracture  Skin:                       [ ] negative [ ] rash [ ] itch  Neurological:        [ ] negative [ ] headache [ ] dizziness [ ] syncope [ ] weakness [ ] numbness  Psychiatric:           [ ] negative [ ] anxiety [ ] depression  Endocrine:            [ ] negative [ ] diabetes [ ] thyroid problem  Heme/Lymph:      [ ] negative [ ] anemia [ ] bleeding problem  Allergic/Immune: [ ] negative [ ] itchy eyes [ ] nasal discharge [ ] hives [ ] angioedema    [ x] All other systems negative  [ ] Unable to assess ROS because sedated with anoxic brain injury.    Current Meds:  aspirin enteric coated 81 milliGRAM(s) Oral daily  atorvastatin 40 milliGRAM(s) Oral at bedtime  clopidogrel Tablet 75 milliGRAM(s) Oral daily  furosemide   Injectable 40 milliGRAM(s) IV Push two times a day  heparin  Injectable 5000 Unit(s) SubCutaneous every 12 hours  metoprolol succinate ER 50 milliGRAM(s) Oral daily      PAST MEDICAL & SURGICAL HISTORY:  Colon polyp  BPH (benign prostatic hyperplasia)  Cellulitis: BL  CKD (chronic kidney disease) stage 3, GFR 30-59 ml/min  Leg swelling: related to cellulitis of LLE  No significant past medical history  S/P hemorrhoidectomy      Vitals:  T(F): 97.9 (09-06), Max: 98.4 (09-05)  HR: 65 (09-06) (62 - 71)  BP: 149/84 (09-06) (128/68 - 152/82)  RR: 18 (09-06)  SpO2: 100% (09-06)  I&O's Summary    05 Sep 2018 07:01  -  06 Sep 2018 07:00  --------------------------------------------------------  IN: 600 mL / OUT: 1600 mL / NET: -1000 mL    06 Sep 2018 07:01  -  06 Sep 2018 12:10  --------------------------------------------------------  IN: 300 mL / OUT: 200 mL / NET: 100 mL        Physical Exam:  Appearance: No acute distress; well appearing  Eyes: PERRL, EOMI, pink conjunctiva  HENT: Normal oral mucosa  Cardiovascular: RRR, S1, S2, no murmurs, rubs, or gallops; 2+ edema; no JVD  Respiratory: bibasilar rales.   Gastrointestinal: soft, non-tender, non-distended with normal bowel sounds  Musculoskeletal: No clubbing; no joint deformity   Neurologic: Non-focal  Lymphatic: No lymphadenopathy  Psychiatry: AAOx3, mood & affect appropriate  Skin: No rashes, ecchymoses, or cyanosis                          10.9   5.20  )-----------( 212      ( 06 Sep 2018 07:43 )             34.0     09-06    138  |  101  |  34<H>  ----------------------------<  102<H>  4.0   |  25  |  1.76<H>    Ca    9.3      06 Sep 2018 06:40    TPro  7.2  /  Alb  3.9  /  TBili  0.4  /  DBili  x   /  AST  20  /  ALT  9<L>  /  AlkPhos  88  09-04    PT/INR - ( 04 Sep 2018 15:13 )   PT: 11.8 sec;   INR: 1.08 ratio         PTT - ( 04 Sep 2018 15:13 )  PTT:27.5 sec              New ECG(s): Personally reviewed    Echo:  < from: Transthoracic Echocardiogram (11.15.13 @ 09:08) >  ------------------------------------------------------------------------  Conclusions:  1. Mitral annular calcification, otherwise normal mitral  valve. Minimal mitral regurgitation.  2. Mild concentric left ventricular hypertrophy.  3. Endocardium not well visualized; grossly normal left  ventricular systolic function.  4. The right ventricle is not well visualized; grossly  normal right ventricular systolic function.  ------------------------------------------------------------------------  Confirmed on  11/15/2013 - 11:18:45 by Kenan Smith M.D.    < end of copied text >    Stress Testing:     Cath:  < from: Cardiac Cath Lab - Adult (02.15.18 @ 10:19) >  CONTRAST GIVEN: Omnipaque 66 ml.  MEDICATIONS GIVEN: Midazolam, 1 mg, IV. Fentanyl, 25 mcg, IV. Fentanyl, 25  mcg, IV. Verapamil (Isoptin, Calan, Covera), 2.4 mg, IA. Nitroglycerin,  200 mcg, intracoronary. Heparin, 2000 units, IA. Heparin, 6000 units, IV.  Normal Saline .9%, infusion rate of 50, IV.  CORONARY VESSELS: The coronary circulation is right dominant.  LM:   --  LM: Angiography showed minor luminal irregularities with no flow  limiting lesions.  LAD:   --  Ostial LAD: There was a 30 % stenosis.  --  Mid LAD: There was a 10 % stenosis at the site of a prior stent.  CX:   --  Mid circumflex: There was a 90 % stenosis.  RCA:   --  RCA: Angiography showed moderate atherosclerosis.  COMPLICATIONS: There were no complications.  DIAGNOSTIC RECOMMENDATIONS: IFR was 0.89  ASA and Plavix for 1 year  INTERVENTIONAL RECOMMENDATIONS: IFR was 0.89  ASA and Plavix for 1 year  Prepared and signed by  Jeremiah Lanza M.D.    < end of copied text >    Imaging:    Interpretation of Telemetry: Sinus 60 - 70

## 2018-09-06 NOTE — PROGRESS NOTE ADULT - ASSESSMENT
88 year old M with a PMH of CAD (w/ PCI to LAD and Cx last in 2/2018), CKDIII, and HTN that presents to the ED with 2 weeks of worsening LE edema and 1 day of chest pain.     #Chest Pain  - Likely CP 2/2 ADHF, and increased LV pressure causing decreased endocardial blood flow.   - Likely also the reason for pts + trop  - C/w ASA and plavix  - increase atorvastatin to 80 mg   - Hold ACEi in the setting of elevated Cr.  - Pt does not need cardiac cath at this time.   - He can get a ischemic eval w/ NST after he is more euvolemic, and given that he would like to leave it can be done as out pt.     #Edema 2/2 HFpEF  - Check TTE (last EF was normal)  - c/w lasix 40 IV BID  - Monitor ins and outs  - c/w metoprolol 25 BID    Aquilino Hendrix MD  Cardiology Fellow - PGY-4  LINAKUL: [id number]  NS: [phone]  [id number] 88 year old M with a PMH of CAD (w/ PCI to LAD and Cx last in 2/2018), CKDIII, and HTN that presents to the ED with 2 weeks of worsening LE edema and 1 day of chest pain.     #Chest Pain 2/2 volume overload.   - Likely CP 2/2 ADHF, and increased LV pressure causing decreased endocardial blood flow.   - Likely also the reason for pts + trop  - C/w ASA and plavix  - increase atorvastatin to 80 mg   - Hold ACEi in the setting of elevated Cr.  - Pt does not need cardiac cath at this time.   - He can get a ischemic eval w/ NST after he is more euvolemic, and given that he would like to leave it can be done as out pt.     #Edema 2/2 HFpEF  - Check TTE (last EF was normal)  - c/w lasix 40 IV BID  - Monitor ins and outs  - c/w metoprolol 25 BID    If pt would like to leave AMA, he should be given 80 of PO lasix 2x a day for 5 days. He also needs to see his cardiologist ASAP.     Aquilino Hendrix MD  Cardiology Fellow - PGY-4  MARIANGEL: [id number]  NS: [phone]  [id number]

## 2018-10-25 PROCEDURE — 85730 THROMBOPLASTIN TIME PARTIAL: CPT

## 2018-10-25 PROCEDURE — 80048 BASIC METABOLIC PNL TOTAL CA: CPT

## 2018-10-25 PROCEDURE — 80053 COMPREHEN METABOLIC PANEL: CPT

## 2018-10-25 PROCEDURE — 85027 COMPLETE CBC AUTOMATED: CPT

## 2018-10-25 PROCEDURE — 93005 ELECTROCARDIOGRAM TRACING: CPT

## 2018-10-25 PROCEDURE — 84484 ASSAY OF TROPONIN QUANT: CPT

## 2018-10-25 PROCEDURE — 85610 PROTHROMBIN TIME: CPT

## 2018-10-25 PROCEDURE — 99285 EMERGENCY DEPT VISIT HI MDM: CPT | Mod: 25

## 2018-10-25 PROCEDURE — 71045 X-RAY EXAM CHEST 1 VIEW: CPT

## 2018-11-28 ENCOUNTER — INPATIENT (INPATIENT)
Facility: HOSPITAL | Age: 83
LOS: 4 days | Discharge: HOME | DRG: 292 | End: 2018-12-03
Attending: INTERNAL MEDICINE | Admitting: INTERNAL MEDICINE
Payer: MEDICARE

## 2018-11-28 VITALS
HEART RATE: 68 BPM | DIASTOLIC BLOOD PRESSURE: 77 MMHG | RESPIRATION RATE: 18 BRPM | OXYGEN SATURATION: 98 % | HEIGHT: 69 IN | TEMPERATURE: 98 F | WEIGHT: 195.99 LBS | SYSTOLIC BLOOD PRESSURE: 158 MMHG

## 2018-11-28 DIAGNOSIS — Z98.89 OTHER SPECIFIED POSTPROCEDURAL STATES: Chronic | ICD-10-CM

## 2018-11-28 DIAGNOSIS — M54.5 LOW BACK PAIN: ICD-10-CM

## 2018-11-28 LAB
ALBUMIN SERPL ELPH-MCNC: 4 G/DL — SIGNIFICANT CHANGE UP (ref 3.3–5)
ALP SERPL-CCNC: 88 U/L — SIGNIFICANT CHANGE UP (ref 40–120)
ALT FLD-CCNC: 12 U/L — SIGNIFICANT CHANGE UP (ref 10–45)
ANION GAP SERPL CALC-SCNC: 13 MMOL/L — SIGNIFICANT CHANGE UP (ref 5–17)
AST SERPL-CCNC: 21 U/L — SIGNIFICANT CHANGE UP (ref 10–40)
BASOPHILS # BLD AUTO: 0 K/UL — SIGNIFICANT CHANGE UP (ref 0–0.2)
BASOPHILS NFR BLD AUTO: 0.7 % — SIGNIFICANT CHANGE UP (ref 0–2)
BILIRUB SERPL-MCNC: 0.3 MG/DL — SIGNIFICANT CHANGE UP (ref 0.2–1.2)
BUN SERPL-MCNC: 34 MG/DL — HIGH (ref 7–23)
CALCIUM SERPL-MCNC: 9.3 MG/DL — SIGNIFICANT CHANGE UP (ref 8.4–10.5)
CHLORIDE SERPL-SCNC: 106 MMOL/L — SIGNIFICANT CHANGE UP (ref 96–108)
CO2 SERPL-SCNC: 25 MMOL/L — SIGNIFICANT CHANGE UP (ref 22–31)
CREAT SERPL-MCNC: 1.86 MG/DL — HIGH (ref 0.5–1.3)
EOSINOPHIL # BLD AUTO: 0.2 K/UL — SIGNIFICANT CHANGE UP (ref 0–0.5)
EOSINOPHIL NFR BLD AUTO: 3 % — SIGNIFICANT CHANGE UP (ref 0–6)
GAS PNL BLDV: SIGNIFICANT CHANGE UP
GLUCOSE SERPL-MCNC: 102 MG/DL — HIGH (ref 70–99)
HCT VFR BLD CALC: 29 % — LOW (ref 39–50)
HGB BLD-MCNC: 9.9 G/DL — LOW (ref 13–17)
LYMPHOCYTES # BLD AUTO: 1.1 K/UL — SIGNIFICANT CHANGE UP (ref 1–3.3)
LYMPHOCYTES # BLD AUTO: 18.4 % — SIGNIFICANT CHANGE UP (ref 13–44)
MCHC RBC-ENTMCNC: 29 PG — SIGNIFICANT CHANGE UP (ref 27–34)
MCHC RBC-ENTMCNC: 34 GM/DL — SIGNIFICANT CHANGE UP (ref 32–36)
MCV RBC AUTO: 85.1 FL — SIGNIFICANT CHANGE UP (ref 80–100)
MONOCYTES # BLD AUTO: 0.5 K/UL — SIGNIFICANT CHANGE UP (ref 0–0.9)
MONOCYTES NFR BLD AUTO: 7.8 % — SIGNIFICANT CHANGE UP (ref 2–14)
NEUTROPHILS # BLD AUTO: 4.3 K/UL — SIGNIFICANT CHANGE UP (ref 1.8–7.4)
NEUTROPHILS NFR BLD AUTO: 70.1 % — SIGNIFICANT CHANGE UP (ref 43–77)
NT-PROBNP SERPL-SCNC: 745 PG/ML — HIGH (ref 0–300)
OB PNL STL: NEGATIVE — SIGNIFICANT CHANGE UP
PLATELET # BLD AUTO: 221 K/UL — SIGNIFICANT CHANGE UP (ref 150–400)
POTASSIUM SERPL-MCNC: 4.8 MMOL/L — SIGNIFICANT CHANGE UP (ref 3.5–5.3)
POTASSIUM SERPL-SCNC: 4.8 MMOL/L — SIGNIFICANT CHANGE UP (ref 3.5–5.3)
PROT SERPL-MCNC: 6.8 G/DL — SIGNIFICANT CHANGE UP (ref 6–8.3)
RBC # BLD: 3.4 M/UL — LOW (ref 4.2–5.8)
RBC # FLD: 15.2 % — HIGH (ref 10.3–14.5)
SODIUM SERPL-SCNC: 144 MMOL/L — SIGNIFICANT CHANGE UP (ref 135–145)
WBC # BLD: 6.1 K/UL — SIGNIFICANT CHANGE UP (ref 3.8–10.5)
WBC # FLD AUTO: 6.1 K/UL — SIGNIFICANT CHANGE UP (ref 3.8–10.5)

## 2018-11-28 PROCEDURE — 72100 X-RAY EXAM L-S SPINE 2/3 VWS: CPT | Mod: 26

## 2018-11-28 PROCEDURE — 99284 EMERGENCY DEPT VISIT MOD MDM: CPT

## 2018-11-28 RX ORDER — ATORVASTATIN CALCIUM 80 MG/1
40 TABLET, FILM COATED ORAL AT BEDTIME
Qty: 0 | Refills: 0 | Status: DISCONTINUED | OUTPATIENT
Start: 2018-11-28 | End: 2018-12-03

## 2018-11-28 RX ORDER — CYCLOBENZAPRINE HYDROCHLORIDE 10 MG/1
5 TABLET, FILM COATED ORAL ONCE
Qty: 0 | Refills: 0 | Status: COMPLETED | OUTPATIENT
Start: 2018-11-28 | End: 2018-11-28

## 2018-11-28 RX ORDER — CLOPIDOGREL BISULFATE 75 MG/1
75 TABLET, FILM COATED ORAL DAILY
Qty: 0 | Refills: 0 | Status: DISCONTINUED | OUTPATIENT
Start: 2018-11-28 | End: 2018-12-03

## 2018-11-28 RX ORDER — HEPARIN SODIUM 5000 [USP'U]/ML
5000 INJECTION INTRAVENOUS; SUBCUTANEOUS EVERY 12 HOURS
Qty: 0 | Refills: 0 | Status: DISCONTINUED | OUTPATIENT
Start: 2018-11-28 | End: 2018-12-03

## 2018-11-28 RX ORDER — METOPROLOL TARTRATE 50 MG
50 TABLET ORAL DAILY
Qty: 0 | Refills: 0 | Status: DISCONTINUED | OUTPATIENT
Start: 2018-11-28 | End: 2018-12-03

## 2018-11-28 RX ORDER — POTASSIUM CHLORIDE 20 MEQ
20 PACKET (EA) ORAL DAILY
Qty: 0 | Refills: 0 | Status: DISCONTINUED | OUTPATIENT
Start: 2018-11-28 | End: 2018-12-03

## 2018-11-28 RX ORDER — FUROSEMIDE 40 MG
60 TABLET ORAL DAILY
Qty: 0 | Refills: 0 | Status: DISCONTINUED | OUTPATIENT
Start: 2018-11-28 | End: 2018-12-01

## 2018-11-28 RX ORDER — ACETAMINOPHEN 500 MG
650 TABLET ORAL ONCE
Qty: 0 | Refills: 0 | Status: COMPLETED | OUTPATIENT
Start: 2018-11-28 | End: 2018-11-28

## 2018-11-28 RX ORDER — KETOROLAC TROMETHAMINE 30 MG/ML
15 SYRINGE (ML) INJECTION ONCE
Qty: 0 | Refills: 0 | Status: DISCONTINUED | OUTPATIENT
Start: 2018-11-28 | End: 2018-11-28

## 2018-11-28 RX ORDER — MORPHINE SULFATE 50 MG/1
2 CAPSULE, EXTENDED RELEASE ORAL ONCE
Qty: 0 | Refills: 0 | Status: DISCONTINUED | OUTPATIENT
Start: 2018-11-28 | End: 2018-11-28

## 2018-11-28 RX ORDER — TAMSULOSIN HYDROCHLORIDE 0.4 MG/1
0.4 CAPSULE ORAL AT BEDTIME
Qty: 0 | Refills: 0 | Status: DISCONTINUED | OUTPATIENT
Start: 2018-11-28 | End: 2018-12-03

## 2018-11-28 RX ORDER — ISOSORBIDE MONONITRATE 60 MG/1
30 TABLET, EXTENDED RELEASE ORAL DAILY
Qty: 0 | Refills: 0 | Status: DISCONTINUED | OUTPATIENT
Start: 2018-11-28 | End: 2018-11-29

## 2018-11-28 RX ORDER — ASPIRIN/CALCIUM CARB/MAGNESIUM 324 MG
81 TABLET ORAL DAILY
Qty: 0 | Refills: 0 | Status: DISCONTINUED | OUTPATIENT
Start: 2018-11-28 | End: 2018-12-03

## 2018-11-28 RX ADMIN — MORPHINE SULFATE 2 MILLIGRAM(S): 50 CAPSULE, EXTENDED RELEASE ORAL at 18:43

## 2018-11-28 RX ADMIN — CYCLOBENZAPRINE HYDROCHLORIDE 5 MILLIGRAM(S): 10 TABLET, FILM COATED ORAL at 18:07

## 2018-11-28 RX ADMIN — Medication 650 MILLIGRAM(S): at 18:07

## 2018-11-28 RX ADMIN — TAMSULOSIN HYDROCHLORIDE 0.4 MILLIGRAM(S): 0.4 CAPSULE ORAL at 21:22

## 2018-11-28 RX ADMIN — Medication 650 MILLIGRAM(S): at 18:41

## 2018-11-28 RX ADMIN — ATORVASTATIN CALCIUM 40 MILLIGRAM(S): 80 TABLET, FILM COATED ORAL at 21:22

## 2018-11-28 NOTE — ED PROVIDER NOTE - ATTENDING CONTRIBUTION TO CARE
Attending MD Benoit:  I personally have seen and examined this patient.  Resident note reviewed and agree on plan of care and except where noted.  See MDM for details.

## 2018-11-28 NOTE — H&P ADULT - NSHPLABSRESULTS_GEN_ALL_CORE
LABS:                        9.9    6.1   )-----------( 221      ( 28 Nov 2018 17:40 )             29.0     11-28    144  |  106  |  34<H>  ----------------------------<  102<H>  4.8   |  25  |  1.86<H>    Ca    9.3      28 Nov 2018 17:40    TPro  6.8  /  Alb  4.0  /  TBili  0.3  /  DBili  x   /  AST  21  /  ALT  12  /  AlkPhos  88  11-28 11-28 @ 17:40  4.7  21

## 2018-11-28 NOTE — H&P ADULT - NSHPREVIEWOFSYSTEMS_GEN_ALL_CORE
REVIEW OF SYSTEMS:  GEN: no fever,    no chills  RESP: no SOB,   no cough  CVS: no chest pain,   no palpitations  GI: no abdominal pain,   no nausea,   no vomiting,   no constipation,   no diarrhea  : no dysuria,   no frequency  NEURO: no headache,   no dizziness  PSYCH: no depression,   not anxious  Derm : no rash REVIEW OF SYSTEMS:  GEN: no fever,    no chills  RESP: SOB,   no cough  CVS: no chest pain,   no palpitations  GI: no abdominal pain,   no nausea,   no vomiting,   no constipation,   no diarrhea  : no dysuria,   no frequency  NEURO: no headache,   no dizziness  PSYCH: no depression,   not anxious  Derm : no rash

## 2018-11-28 NOTE — H&P ADULT - HISTORY OF PRESENT ILLNESS
High Risk Travel:  International Travel? No(1)     88y Male complaining of back pain general.	    88M PMH  CKD,  CHF  , CAD s/p stents presents with 2 day history of back pain.    Patient reports he went to a store to purchase a reclining chair on Saturday and was flexing and extending his back quite often testing each recliner.     Patient reports pain is located to the left lower back and radiates to his anterior thigh.   Patient denies trauma to his back.  he stopped taking his furosemide medication as he could not get up to urinate.   Patient reports lower extremity edema bilaterally, but patient denies dyspnea, chest pain, fevers, chills, numbness, tingling. High Risk Travel:  International Travel? No(1)     88y Male complaining of back pain general.	    88M PMH  CKD,  CHF  , CAD s/p stents presents with 2 day history of back pain. a nd sob on exertion/  pt  stoppe d his  lasix    Patient reports he went to a store to purchase a reclining chair on Saturday and was flexing and extending his back quite often testing each recliner.     Patient reports pain is located to the left lower back and radiates to his anterior thigh.   Patient denies trauma to his back.  he stopped taking his furosemide medication as he could not get up to urinate.   Patient reports lower extremity edema bilaterally, but patient denies  chest pain, fevers, chills, numbness, tingling.

## 2018-11-28 NOTE — ED PROVIDER NOTE - OBJECTIVE STATEMENT
88M PMH CKD, CHF, CAD s/p stents presents with 2 day history of back pain.  Patient reports he went to a store to purchase a reclining chair on Saturday and was flexing and extending his back quite often testing each recliner.  Patient reports pain in his low back presented 2 days later and has progressively been worse throughout the past couple of days.  Patient reports pain is located to the left lower back and radiates to his anterior thigh.  Patient denies trauma to his back.  Patient reports he has been unable to walk over the past two days secondary to pain.  Patient states he stopped taking his furosemide medication as he could not get up to urinate.  Patient reports lower extremity edema bilaterally, but patient denies dyspnea, chest pain, fevers, chills, numbness, tingling.

## 2018-11-28 NOTE — H&P ADULT - ASSESSMENT
88 year old M with a PMH of CAD  ,   (w/ PCI to LAD and Cx    in 2/2018),  on asa/ plavix/ statin    CKDIII, and HTN ,  hld.  c/c  anemia     worsening LE edema .  pt  stopped   his  lasix  acute  diastolic  chf  iv lasix    daily weights   son at bedside   dvt ppx    PT eval

## 2018-11-28 NOTE — CONSULT NOTE ADULT - SUBJECTIVE AND OBJECTIVE BOX
CHIEF COMPLAINT:Patient is a 88y old  Male who presents with a chief complaint of sob/ back pain.      HPI:	    88M PMH  CKD,  CHF  , CAD s/p stents presents with 2 day history of back pain.    Patient reports he went to a store to purchase a reclining chair on Saturday and was flexing and extending his back quite often testing each recliner.     Patient reports pain is located to the left lower back and radiates to his anterior thigh.   Patient denies trauma to his back.  he stopped taking his furosemide medication as he could not get up to urinate.   Patient reports lower extremity edema bilaterally, but patient denies dyspnea, chest pain, fevers, chills, numbness, tingling. (28 Nov 2018 20:09)      PAST MEDICAL & SURGICAL HISTORY:  Colon polyp  BPH (benign prostatic hyperplasia)  Cellulitis: BL  CKD (chronic kidney disease) stage 3, GFR 30-59 ml/min  Leg swelling: related to cellulitis of LLE  No significant past medical history  S/P hemorrhoidectomy      MEDICATIONS  (STANDING):  aspirin enteric coated 81 milliGRAM(s) Oral daily  atorvastatin 40 milliGRAM(s) Oral at bedtime  clopidogrel Tablet 75 milliGRAM(s) Oral daily  furosemide   Injectable 60 milliGRAM(s) IV Push daily  heparin  Injectable 5000 Unit(s) SubCutaneous every 12 hours  metoprolol succinate ER 50 milliGRAM(s) Oral daily    MEDICATIONS  (PRN):      FAMILY HISTORY:  No pertinent family history in first degree relatives      SOCIAL HISTORY:    [ ] Non-smoker  [ ] Smoker  [ ] Alcohol    Allergies    penicillin (Angioedema)    Intolerances    	    REVIEW OF SYSTEMS:  CONSTITUTIONAL: No fever, weight loss, or fatigue  EYES: No eye pain, visual disturbances, or discharge  ENT:  No difficulty hearing, tinnitus, vertigo; No sinus or throat pain  NECK: No pain or stiffness  RESPIRATORY: No cough, wheezing, chills or hemoptysis; + Shortness of Breath  CARDIOVASCULAR: No chest pain, palpitations, passing out, dizziness, or leg swelling  GASTROINTESTINAL: No abdominal or epigastric pain. No nausea, vomiting, or hematemesis; No diarrhea or constipation. No melena or hematochezia.  GENITOURINARY: No dysuria, frequency, hematuria, or incontinence  NEUROLOGICAL: No headaches, memory loss, loss of strength, numbness, or tremors  SKIN: No itching, burning, rashes, or lesions   LYMPH Nodes: No enlarged glands  ENDOCRINE: No heat or cold intolerance; No hair loss  MUSCULOSKELETAL: No joint pain or swelling; No muscle, back, or extremity pain  PSYCHIATRIC: No depression, anxiety, mood swings, or difficulty sleeping  HEME/LYMPH: No easy bruising, or bleeding gums  ALLERGY AND IMMUNOLOGIC: No hives or eczema	    [ ] All others negative	  [ ] Unable to obtain    PHYSICAL EXAM:  T(C): 36.6 (11-28-18 @ 17:25), Max: 36.7 (11-28-18 @ 17:17)  HR: 70 (11-28-18 @ 18:10) (68 - 70)  BP: 150/69 (11-28-18 @ 18:10) (150/66 - 163/84)  RR: 18 (11-28-18 @ 17:25) (18 - 18)  SpO2: 98% (11-28-18 @ 17:25) (98% - 98%)  Wt(kg): --  I&O's Summary      Appearance: Normal	  HEENT:   Normal oral mucosa, PERRL, EOMI	  Lymphatic: No lymphadenopathy  Cardiovascular: Normal S1 S2, No JVD, No murmurs, + edema  Respiratory: Lungs clear to auscultation	  Psychiatry: A & O x 3, Mood & affect appropriate  Gastrointestinal:  Soft, Non-tender, + BS	  Skin: No rashes, No ecchymoses, No cyanosis	  Neurologic: Non-focal  Extremities: Normal range of motion, No clubbing, cyanosis or edema  Vascular: Peripheral pulses palpable 2+ bilaterally    TELEMETRY: 	    ECG:  	  RADIOLOGY:  OTHER: 	  	  LABS:	 	    CARDIAC MARKERS:                              9.9    6.1   )-----------( 221      ( 28 Nov 2018 17:40 )             29.0     11-28    144  |  106  |  34<H>  ----------------------------<  102<H>  4.8   |  25  |  1.86<H>    Ca    9.3      28 Nov 2018 17:40    TPro  6.8  /  Alb  4.0  /  TBili  0.3  /  DBili  x   /  AST  21  /  ALT  12  /  AlkPhos  88  11-28    proBNP: Serum Pro-Brain Natriuretic Peptide: 745 pg/mL (11-28 @ 17:40)    Lipid Profile:   HgA1c:   TSH:       PREVIOUS DIAGNOSTIC TESTING:    < from: Transthoracic Echocardiogram (11.15.13 @ 09:08) >  1. Mitral annular calcification, otherwise normal mitral  valve. Minimal mitral regurgitation.  2. Mild concentric left ventricular hypertrophy.  3. Endocardium not well visualized; grossly normal left  ventricular systolic function.  4. The right ventricle is not well visualized; grossly  normal right ventricular systolic function.    < from: Cardiac Cath Lab - Adult (02.15.18 @ 10:19) >  CORONARY VESSELS: The coronary circulation is right dominant.  LM:   --  LM: Angiography showed minor luminal irregularities with no flow  limiting lesions.  LAD:   --  Ostial LAD: There was a 30 % stenosis.  --  Mid LAD: There was a 10 % stenosis at the site of a prior stent.  CX:   --  Mid circumflex: There was a 90 % stenosis.  RCA:   --  RCA: Angiography showed moderate atherosclerosis.  COMPLICATIONS: There were no complications.    < from: Xray Chest 1 View- PORTABLE-Urgent (09.04.18 @ 15:32) >  A single portable view of the chest was obtained.     Comparison: 1/11/2018.     The mediastinal cardiac silhouette is unremarkable.    The lungs are clear.     No acute osseous finding.     IMPRESSION:    No acute process.

## 2018-11-28 NOTE — H&P ADULT - NSHPPHYSICALEXAM_GEN_ALL_CORE
PHYSICAL EXAMINATION:  Vital Signs Last 24 Hrs  T(C): 36.6 (28 Nov 2018 17:25), Max: 36.7 (28 Nov 2018 17:17)  T(F): 97.9 (28 Nov 2018 17:25), Max: 98.1 (28 Nov 2018 17:17)  HR: 70 (28 Nov 2018 18:10) (68 - 70)  BP: 150/69 (28 Nov 2018 18:10) (150/66 - 163/84)  BP(mean): --  RR: 18 (28 Nov 2018 17:25) (18 - 18)  SpO2: 98% (28 Nov 2018 17:25) (98% - 98%)  CAPILLARY BLOOD GLUCOSE            GENERAL: NAD, well-groomed,  HEAD:  atraumatic, normocephalic  EYES: sclera anicteric  ENMT: mucous membranes moist  NECK: supple, No JVD  CHEST/LUNG: clear to auscultation bilaterally;    no      rales   ,   no rhonchi,   HEART: normal S1, S2  ABDOMEN: BS+, soft, ND, NT   EXTREMITIES:     3 +  edema    b/l LEs  NEURO: awake, ,     moves all extremities  SKIN: no     rash

## 2018-11-28 NOTE — ED ADULT NURSE REASSESSMENT NOTE - NS ED NURSE REASSESS COMMENT FT1
Pt a&o x3, resting on stretcher, in no acute distress, given nighttime meds as per order. Pt admitted, received bed assignment, awaiting transport upstairs. Pt reports feeling chilly, provided with blanket. Stretcher locked in low position. Aware of plan of care.

## 2018-11-28 NOTE — ED ADULT NURSE REASSESSMENT NOTE - NS ED NURSE REASSESS COMMENT FT1
pt states no relief with tylenol and flexaril pt given morphine as ordered pending reevaluation of pain

## 2018-11-28 NOTE — ED PROVIDER NOTE - MEDICAL DECISION MAKING DETAILS
Attending MD Benoit: 89 yo male CKD, CHF, CAD, PTCA with Stents.  Pain started after testing recliners.  No other trauma.  Pain severe and now with difficulty walking due to pain.  No bowel or bladder symptoms.  No lower extremity weakness or numbness or saddle anesthesia.  On exam there is no midline TTP, abd is soft NTND, pain left para-lumbar, neuro exam of lower and upper extremities normal.  Plan;  labs, UA, pain control, bilateral BPs but doubt dissection.

## 2018-11-28 NOTE — ED ADULT NURSE NOTE - OBJECTIVE STATEMENT
pt 89yo male via ems for back pain onset few days ago after trying out recliners pt cardiac hx denies any trauma has not taken any pain meds at home sensory intact to lower extremities skin warm dry pt denies any chest pain or sob pt states he is supposed to have hip replacemnt but has recemntly had cardiac stents placed no cough no fever skin warm dry pt with b/l pedal edema which pt states is normal for him

## 2018-11-28 NOTE — CONSULT NOTE ADULT - ASSESSMENT
pt with hx of cad/chf /s/p multiple stent with chf, ckd 2 with increasing sob and le edema and ?non compliant meds.  agree with iv Lasix  will add hydralazine and nitrate if increase bp  f/u renal function  back xray  u/a  asa daily  tsh  lipid panel pt with hx of cad/chf /s/p multiple stent with chf, ckd 2 with increasing sob and le edema and ?non compliant meds.  agree with iv Lasix  will add hydralazine and nitrate if increase bp  f/u renal function   ls xray noted  u/a  asa daily  tsh  lipid panel  sig abnormal ecg will ask dr monroe about prior cardiac work up ?echo

## 2018-11-29 RX ORDER — DOCUSATE SODIUM 100 MG
100 CAPSULE ORAL
Qty: 0 | Refills: 0 | Status: DISCONTINUED | OUTPATIENT
Start: 2018-11-29 | End: 2018-12-01

## 2018-11-29 RX ORDER — ISOSORBIDE MONONITRATE 60 MG/1
60 TABLET, EXTENDED RELEASE ORAL DAILY
Qty: 0 | Refills: 0 | Status: DISCONTINUED | OUTPATIENT
Start: 2018-11-29 | End: 2018-12-01

## 2018-11-29 RX ORDER — FAMOTIDINE 10 MG/ML
20 INJECTION INTRAVENOUS DAILY
Qty: 0 | Refills: 0 | Status: DISCONTINUED | OUTPATIENT
Start: 2018-11-29 | End: 2018-12-03

## 2018-11-29 RX ORDER — POLYETHYLENE GLYCOL 3350 17 G/17G
17 POWDER, FOR SOLUTION ORAL DAILY
Qty: 0 | Refills: 0 | Status: DISCONTINUED | OUTPATIENT
Start: 2018-11-29 | End: 2018-12-01

## 2018-11-29 RX ORDER — LIDOCAINE 4 G/100G
1 CREAM TOPICAL EVERY 24 HOURS
Qty: 0 | Refills: 0 | Status: DISCONTINUED | OUTPATIENT
Start: 2018-11-29 | End: 2018-12-03

## 2018-11-29 RX ORDER — SENNA PLUS 8.6 MG/1
2 TABLET ORAL AT BEDTIME
Qty: 0 | Refills: 0 | Status: DISCONTINUED | OUTPATIENT
Start: 2018-11-29 | End: 2018-12-03

## 2018-11-29 RX ORDER — ACETAMINOPHEN 500 MG
650 TABLET ORAL ONCE
Qty: 0 | Refills: 0 | Status: COMPLETED | OUTPATIENT
Start: 2018-11-29 | End: 2018-11-29

## 2018-11-29 RX ADMIN — ISOSORBIDE MONONITRATE 60 MILLIGRAM(S): 60 TABLET, EXTENDED RELEASE ORAL at 12:50

## 2018-11-29 RX ADMIN — Medication 650 MILLIGRAM(S): at 19:37

## 2018-11-29 RX ADMIN — SENNA PLUS 2 TABLET(S): 8.6 TABLET ORAL at 21:24

## 2018-11-29 RX ADMIN — POLYETHYLENE GLYCOL 3350 17 GRAM(S): 17 POWDER, FOR SOLUTION ORAL at 12:45

## 2018-11-29 RX ADMIN — Medication 650 MILLIGRAM(S): at 18:48

## 2018-11-29 RX ADMIN — TAMSULOSIN HYDROCHLORIDE 0.4 MILLIGRAM(S): 0.4 CAPSULE ORAL at 21:24

## 2018-11-29 RX ADMIN — HEPARIN SODIUM 5000 UNIT(S): 5000 INJECTION INTRAVENOUS; SUBCUTANEOUS at 05:50

## 2018-11-29 RX ADMIN — Medication 81 MILLIGRAM(S): at 12:45

## 2018-11-29 RX ADMIN — CLOPIDOGREL BISULFATE 75 MILLIGRAM(S): 75 TABLET, FILM COATED ORAL at 12:45

## 2018-11-29 RX ADMIN — HEPARIN SODIUM 5000 UNIT(S): 5000 INJECTION INTRAVENOUS; SUBCUTANEOUS at 18:36

## 2018-11-29 RX ADMIN — FAMOTIDINE 20 MILLIGRAM(S): 10 INJECTION INTRAVENOUS at 12:45

## 2018-11-29 RX ADMIN — Medication 100 MILLIGRAM(S): at 18:36

## 2018-11-29 RX ADMIN — ATORVASTATIN CALCIUM 40 MILLIGRAM(S): 80 TABLET, FILM COATED ORAL at 21:24

## 2018-11-29 RX ADMIN — Medication 60 MILLIGRAM(S): at 05:49

## 2018-11-29 RX ADMIN — Medication 20 MILLIEQUIVALENT(S): at 12:52

## 2018-11-29 RX ADMIN — LIDOCAINE 1 PATCH: 4 CREAM TOPICAL at 19:50

## 2018-11-29 RX ADMIN — Medication 50 MILLIGRAM(S): at 05:50

## 2018-11-29 NOTE — CONSULT NOTE ADULT - SUBJECTIVE AND OBJECTIVE BOX
Admitting Diagnosis:  Low back pain (M54.5): LOW BACK PAIN      HPI:  This is a 88y year old Male with the below past medical history who presents with the chief complaint of    ****CHART HPI:  HPI:  High Risk Travel:  International Travel? No(1)     88y Male complaining of back pain general.	    88M PMH  CKD,  CHF  , CAD s/p stents presents with 2 day history of back pain. a nd sob on exertion/  pt  stoppe d his  lasix    Patient reports he went to a store to purchase a reclining chair on Saturday and was flexing and extending his back quite often testing each recliner.     Patient reports pain is located to the left lower back and radiates to his anterior thigh.   Patient denies trauma to his back.  he stopped taking his furosemide medication as he could not get up to urinate.   Patient reports lower extremity edema bilaterally, but patient denies  chest pain, fevers, chills, numbness, tingling. (28 Nov 2018 20:09)  ******    Past Medical History:  Colon polyp (K63.5)  BPH (benign prostatic hyperplasia) (N40.0)  Cellulitis (L03.90): BL  CKD (chronic kidney disease) stage 3, GFR 30-59 ml/min (N18.3)  Leg swelling (M79.89): related to cellulitis of LLE  No pertinent past medical history ([id number])  No significant past medical history ([id number])      Past Surgical History:  S/P hemorrhoidectomy (V45.89)  No significant past surgical history (784.99)      Social History:  No toxic habits    Family History:  FAMILY HISTORY:  No pertinent family history in first degree relatives      Allergies:  penicillin (Angioedema)      ROS:  Constitutional: Patient offers no complaints of fevers or significant weight loss  Ears, Nose, Mouth and Throat: The patient presents with no abnormalities of the head, ears, eyes, nose or throat  Skin: Patient offers no concerns of new rashes or lesions  Respiratory: The patient presents with no abnormalities of the respiratory tract  Cardiovascular: The patient presents with no cardiac abnormalities  Gastrointestinal: The patient presents with no abnormalities of the GI system  Genitourinary: The patient presents with no dysuria, hematuria or frequent urination  Neurological: See HPI  Endocrine: Patient offers no complaints of excessive thirst, urination, or heat/cold intolerance    Advanced care planning reviewed and noted in the chart.    Medications:  aspirin enteric coated 81 milliGRAM(s) Oral daily  atorvastatin 40 milliGRAM(s) Oral at bedtime  clopidogrel Tablet 75 milliGRAM(s) Oral daily  docusate sodium 100 milliGRAM(s) Oral two times a day  famotidine    Tablet 20 milliGRAM(s) Oral daily  furosemide   Injectable 60 milliGRAM(s) IV Push daily  heparin  Injectable 5000 Unit(s) SubCutaneous every 12 hours  isosorbide   mononitrate ER Tablet (IMDUR) 60 milliGRAM(s) Oral daily  metoprolol succinate ER 50 milliGRAM(s) Oral daily  polyethylene glycol 3350 17 Gram(s) Oral daily  potassium chloride    Tablet ER 20 milliEquivalent(s) Oral daily  senna 2 Tablet(s) Oral at bedtime  tamsulosin 0.4 milliGRAM(s) Oral at bedtime      Labs:  CBC Full  -  ( 28 Nov 2018 17:40 )  WBC Count : 6.1 K/uL  Hemoglobin : 9.9 g/dL  Hematocrit : 29.0 %  Platelet Count - Automated : 221 K/uL  Mean Cell Volume : 85.1 fl  Mean Cell Hemoglobin : 29.0 pg  Mean Cell Hemoglobin Concentration : 34.0 gm/dL  Auto Neutrophil # : 4.3 K/uL  Auto Lymphocyte # : 1.1 K/uL  Auto Monocyte # : 0.5 K/uL  Auto Eosinophil # : 0.2 K/uL  Auto Basophil # : 0.0 K/uL  Auto Neutrophil % : 70.1 %  Auto Lymphocyte % : 18.4 %  Auto Monocyte % : 7.8 %  Auto Eosinophil % : 3.0 %  Auto Basophil % : 0.7 %    11-28    144  |  106  |  34<H>  ----------------------------<  102<H>  4.8   |  25  |  1.86<H>    Ca    9.3      28 Nov 2018 17:40    TPro  6.8  /  Alb  4.0  /  TBili  0.3  /  DBili  x   /  AST  21  /  ALT  12  /  AlkPhos  88  11-28    CAPILLARY BLOOD GLUCOSE        LIVER FUNCTIONS - ( 28 Nov 2018 17:40 )  Alb: 4.0 g/dL / Pro: 6.8 g/dL / ALK PHOS: 88 U/L / ALT: 12 U/L / AST: 21 U/L / GGT: x                 Vitals:  Vital Signs Last 24 Hrs  T(C): 36.4 (29 Nov 2018 12:16), Max: 36.9 (28 Nov 2018 21:30)  T(F): 97.5 (29 Nov 2018 12:16), Max: 98.4 (28 Nov 2018 21:30)  HR: 73 (29 Nov 2018 12:16) (62 - 73)  BP: 137/80 (29 Nov 2018 12:16) (137/80 - 163/84)  BP(mean): --  RR: 18 (29 Nov 2018 12:16) (18 - 18)  SpO2: 100% (29 Nov 2018 12:16) (97% - 100%)    NEUROLOGICAL EXAM:    Mental status: Awake, alert, and in no apparent distress. Oriented to person, place and time. Language function is normal. Recent memory, digit span and concentration were normal.     Cranial Nerves: Pupils were equal, round, reactive to light. Extraocular movements were intact. Visual field were full. Fundoscopic exam was deferred. Facial sensation was intact to light touch. There was no facial asymmetry. The palate was upgoing symmetrically and tongue was midline. Hearing acuity was intact to finger rub AU. Shoulder shrug was full bilaterally    Motor exam: Bulk and tone were normal. Strength was 5/5 in all four extremities. Fine finger movements were symmetric and normal. There was no pronator drift    Reflexes: 2+ in the bilateral upper extremities. 2+ in the bilateral lower extremities. Toes were downgoing bilaterally.     Sensation: Intact to light touch, temperature, vibration and proprioception.     Coordination: Finger-nose-finger and heel-to-shin was without dysmetria.     Gait: Narrow base and steady. Romberg was negative.     Imaging: Admitting Diagnosis:  Low back pain (M54.5): LOW BACK PAIN      HPI:  This is a 88y year old Male with the below past medical history who presents with the chief complaint of    ****CHART HPI:  HPI:  High Risk Travel:  International Travel? No(1)     88y Male complaining of back pain general.	    88M PMH  CKD,  CHF  , CAD s/p stents presents with 2 day history of back pain. a nd sob on exertion/  pt  stoppe d his  lasix    Patient reports he went to a store to purchase a reclining chair on Saturday and was flexing and extending his back quite often testing each recliner.     Patient reports pain is located to the left lower back and radiates to his anterior thigh.   Patient denies trauma to his back.  he stopped taking his furosemide medication as he could not get up to urinate.   Patient reports lower extremity edema bilaterally, but patient denies  chest pain, fevers, chills, numbness, tingling. (28 Nov 2018 20:09)  ******      The patient states the pain started on Saturday, while shopping for chairs. The pain became progressively worse over the weekend, with pain and difficulty on ambulation. The pain is located at the low back above the hips, and is band like strip across the back with radiation down both legs. Denies bowel/bladder incontinence and saddle anesthesia at this time. Worse with movement, relieved with rest, and the patient took a medication at home similar to Tylenol. Planned for left hip replacement next year February as well     Past Medical History:  Colon polyp (K63.5)  BPH (benign prostatic hyperplasia) (N40.0)  Cellulitis (L03.90): BL  CKD (chronic kidney disease) stage 3, GFR 30-59 ml/min (N18.3)  Leg swelling (M79.89): related to cellulitis of LLE  No pertinent past medical history ([id number])  No significant past medical history ([id number])      Past Surgical History:  S/P hemorrhoidectomy (V45.89)  No significant past surgical history (784.99)      Social History:  No toxic habits    Family History:  FAMILY HISTORY:  No pertinent family history in first degree relatives      Allergies:  penicillin (Angioedema)      ROS:  Constitutional: Patient offers no complaints of fevers or significant weight loss  Ears, Nose, Mouth and Throat: The patient presents with no abnormalities of the head, ears, eyes, nose or throat  Skin: Patient offers no concerns of new rashes or lesions  Respiratory: The patient presents with no abnormalities of the respiratory tract  Cardiovascular: The patient presents with no cardiac abnormalities  Gastrointestinal: The patient presents with no abnormalities of the GI system  Genitourinary: The patient presents with no dysuria, hematuria or frequent urination  Neurological: See HPI  Endocrine: Patient offers no complaints of excessive thirst, urination, or heat/cold intolerance    Advanced care planning reviewed and noted in the chart.    Medications:  aspirin enteric coated 81 milliGRAM(s) Oral daily  atorvastatin 40 milliGRAM(s) Oral at bedtime  clopidogrel Tablet 75 milliGRAM(s) Oral daily  docusate sodium 100 milliGRAM(s) Oral two times a day  famotidine    Tablet 20 milliGRAM(s) Oral daily  furosemide   Injectable 60 milliGRAM(s) IV Push daily  heparin  Injectable 5000 Unit(s) SubCutaneous every 12 hours  isosorbide   mononitrate ER Tablet (IMDUR) 60 milliGRAM(s) Oral daily  metoprolol succinate ER 50 milliGRAM(s) Oral daily  polyethylene glycol 3350 17 Gram(s) Oral daily  potassium chloride    Tablet ER 20 milliEquivalent(s) Oral daily  senna 2 Tablet(s) Oral at bedtime  tamsulosin 0.4 milliGRAM(s) Oral at bedtime      Labs:  CBC Full  -  ( 28 Nov 2018 17:40 )  WBC Count : 6.1 K/uL  Hemoglobin : 9.9 g/dL  Hematocrit : 29.0 %  Platelet Count - Automated : 221 K/uL  Mean Cell Volume : 85.1 fl  Mean Cell Hemoglobin : 29.0 pg  Mean Cell Hemoglobin Concentration : 34.0 gm/dL  Auto Neutrophil # : 4.3 K/uL  Auto Lymphocyte # : 1.1 K/uL  Auto Monocyte # : 0.5 K/uL  Auto Eosinophil # : 0.2 K/uL  Auto Basophil # : 0.0 K/uL  Auto Neutrophil % : 70.1 %  Auto Lymphocyte % : 18.4 %  Auto Monocyte % : 7.8 %  Auto Eosinophil % : 3.0 %  Auto Basophil % : 0.7 %    11-28    144  |  106  |  34<H>  ----------------------------<  102<H>  4.8   |  25  |  1.86<H>    Ca    9.3      28 Nov 2018 17:40    TPro  6.8  /  Alb  4.0  /  TBili  0.3  /  DBili  x   /  AST  21  /  ALT  12  /  AlkPhos  88  11-28    CAPILLARY BLOOD GLUCOSE        LIVER FUNCTIONS - ( 28 Nov 2018 17:40 )  Alb: 4.0 g/dL / Pro: 6.8 g/dL / ALK PHOS: 88 U/L / ALT: 12 U/L / AST: 21 U/L / GGT: x                 Vitals:  Vital Signs Last 24 Hrs  T(C): 36.4 (29 Nov 2018 12:16), Max: 36.9 (28 Nov 2018 21:30)  T(F): 97.5 (29 Nov 2018 12:16), Max: 98.4 (28 Nov 2018 21:30)  HR: 73 (29 Nov 2018 12:16) (62 - 73)  BP: 137/80 (29 Nov 2018 12:16) (137/80 - 163/84)  BP(mean): --  RR: 18 (29 Nov 2018 12:16) (18 - 18)  SpO2: 100% (29 Nov 2018 12:16) (97% - 100%)    NEUROLOGICAL EXAM:    Mental status: Awake, alert, and in no apparent distress. Oriented to person, place and time. Language function is normal. Recent memory, digit span and concentration were normal.     Cranial Nerves: Pupils were equal, round, reactive to light. Extraocular movements were intact. Visual field were full. Fundoscopic exam was deferred. Facial sensation was intact to light touch. There was no facial asymmetry. The palate was upgoing symmetrically and tongue was midline. Hearing acuity was intact to finger rub AU. Shoulder shrug was full bilaterally    Motor exam: Bulk and tone were normal. Strength was 5/5 in upper extremities. 4/5 in the lower extremities but effected by pain. Fine finger movements were symmetric and normal. There was no pronator drift. Positive Straight leg raise on right side     Reflexes: 2+ in the bilateral upper extremities. 2+ in the bilateral lower extremities. Toes were downgoing bilaterally.     Sensation: Intact to light touch, temperature, vibration and proprioception.     Coordination: Finger-nose-finger and heel-to-shin was without dysmetria.       Imaging:    < from: Xray Lumbar Spine AP + Lateral (11.28.18 @ 18:08) >  FINDINGS/  IMPRESSION: No acute fracture or traumatic subluxation. Vertebral body   heights are maintained. No acute malalignments of vertebral bodies.   Multilevel degenerative changes. Partially imaged severe left hip   arthrosis. Narrowing of the right hip is partially imaged. Narrowing of   both sacroiliac joints appreciated.    < end of copied text >

## 2018-11-29 NOTE — CONSULT NOTE ADULT - ASSESSMENT
88 y.o. male with CHF, CKD, CAD s/p stents (last stent in Feb 2018) admitted for CHF exacerbation. Neurology consulted for low back pain most likely due to sciatica vs spinal stenosis vs lumbar radiculopathy     Plan   - 88 y.o. male with CHF, CKD, CAD s/p stents (last stent in Feb 2018) admitted for CHF exacerbation. Neurology consulted for low back pain most likely due to sciatica vs spinal stenosis vs lumbar radiculopathy     Plan   - concern for low back pain most likely due to sciatica vs spinal stenosis vs lumbar radiculopathy   - the patient had positive straight leg raise on the right side   - Recommend CT spine vs MR spine if patient can tolerated being on his back for prolonged period of time and no the contraindications   - can use lidocaine patch for pain control at this time    ALL RECOMMENDATIONS ONLY FINAL UNTIL ATTENDING CO-SIGNS THE NOTE.

## 2018-11-29 NOTE — CONSULT NOTE ADULT - ASSESSMENT
88 Year old male with PMH CAD s/p PCI (last 2/2018), CKD, CHF, anemia admitted with LOPEZ, low back pain and increased LE edema    Known history of colon polyps, currently guiac negative    PLAN  -diurese as per Cardiology  -Monitor CBC and BMs  -likely outpt colonoscopy (given history of colon polyps in situ, if pt agrees) once cardiac status optimized  -PO diet as tolerated  -Pepcid for GI prophylaxis  -bowel regimen   -iron studies    Thank you for the courtesy of this consult.  Discussed with Medicine attending    Judson Gilmore PA-C    [facility]  [phone]

## 2018-11-29 NOTE — PROGRESS NOTE ADULT - SUBJECTIVE AND OBJECTIVE BOX
CARDIOLOGY     PROGRESS  NOTE   ________________________________________________    CHIEF COMPLAINT:Patient is a 88y old  Male who presents with a chief complaint of back pain/ sob (29 Nov 2018 08:22)    	  REVIEW OF SYSTEMS:  CONSTITUTIONAL: No fever, weight loss, or fatigue  EYES: No eye pain, visual disturbances, or discharge  ENT:  No difficulty hearing, tinnitus, vertigo; No sinus or throat pain  NECK: No pain or stiffness  RESPIRATORY: No cough, wheezing, chills or hemoptysis; + decrease  Shortness of Breath  CARDIOVASCULAR: No chest pain, palpitations, passing out, dizziness, or leg swelling  GASTROINTESTINAL: No abdominal or epigastric pain. No nausea, vomiting, or hematemesis; No diarrhea or constipation. No melena or hematochezia.  GENITOURINARY: No dysuria, frequency, hematuria, or incontinence  NEUROLOGICAL: No headaches, memory loss, loss of strength, numbness, or tremors  SKIN: No itching, burning, rashes, or lesions   LYMPH Nodes: No enlarged glands  ENDOCRINE: No heat or cold intolerance; No hair loss  MUSCULOSKELETAL: No joint pain or swelling; No muscle, back, or extremity pain  PSYCHIATRIC: No depression, anxiety, mood swings, or difficulty sleeping  HEME/LYMPH: No easy bruising, or bleeding gums  ALLERGY AND IMMUNOLOGIC: No hives or eczema	    [ ] All others negative	  [ ] Unable to obtain    PHYSICAL EXAM:  T(C): 36.5 (11-29-18 @ 04:56), Max: 36.9 (11-28-18 @ 21:30)  HR: 66 (11-29-18 @ 04:56) (62 - 70)  BP: 150/69 (11-29-18 @ 04:56) (150/66 - 163/84)  RR: 18 (11-29-18 @ 04:56) (18 - 18)  SpO2: 97% (11-29-18 @ 04:56) (97% - 98%)  Wt(kg): --  I&O's Summary      Appearance: Normal	  HEENT:   Normal oral mucosa, PERRL, EOMI	  Lymphatic: No lymphadenopathy  Cardiovascular: Normal S1 S2, No JVD, + murmurs,+  decrease  edema  Respiratory: Lungs clear to auscultation	  Psychiatry: A & O x 3, Mood & affect appropriate  Gastrointestinal:  Soft, Non-tender, + BS	  Skin: No rashes, No ecchymoses, No cyanosis	  Neurologic: Non-focal  Extremities: Normal range of motion, No clubbing, cyanosis or edema  Vascular: Peripheral pulses palpable 2+ bilaterally    MEDICATIONS  (STANDING):  aspirin enteric coated 81 milliGRAM(s) Oral daily  atorvastatin 40 milliGRAM(s) Oral at bedtime  clopidogrel Tablet 75 milliGRAM(s) Oral daily  furosemide   Injectable 60 milliGRAM(s) IV Push daily  heparin  Injectable 5000 Unit(s) SubCutaneous every 12 hours  isosorbide   mononitrate ER Tablet (IMDUR) 30 milliGRAM(s) Oral daily  metoprolol succinate ER 50 milliGRAM(s) Oral daily  potassium chloride    Tablet ER 20 milliEquivalent(s) Oral daily  tamsulosin 0.4 milliGRAM(s) Oral at bedtime      TELEMETRY: 	    ECG:  	  RADIOLOGY:  OTHER: 	  	  LABS:	 	    CARDIAC MARKERS:                                9.9    6.1   )-----------( 221      ( 28 Nov 2018 17:40 )             29.0     11-28    144  |  106  |  34<H>  ----------------------------<  102<H>  4.8   |  25  |  1.86<H>    Ca    9.3      28 Nov 2018 17:40    TPro  6.8  /  Alb  4.0  /  TBili  0.3  /  DBili  x   /  AST  21  /  ALT  12  /  AlkPhos  88  11-28    proBNP: Serum Pro-Brain Natriuretic Peptide: 745 pg/mL (11-28 @ 17:40)    Lipid Profile:   HgA1c:   TSH:         Assessment and plan  ---------------------------  chf diastolic acute on chronic  continue asa daily  continue beta blocker  azul Lasix to 40 mg daily  neuro eval for back pain  dvt proophylaxis  ckd chronic

## 2018-11-29 NOTE — CONSULT NOTE ADULT - ATTENDING COMMENTS
Patient is currently refusing colonoscopy.  He has an indication for colonoscopy.  OK for outpatient follow-up and outpatient colonoscopy.    Kenan Pond MD, FACG, [facility]  [phone]
Pt seen and examined, agree with above assessment and plan except where below. 88 year old gentleman pmhx CHF, CKD, CAD s/p stents (last Feb 2018) admitted with CHF exacerbation and worsening lower back pain for past few weeks (since last monday), reports began after trying out reclining chairs and last monday became more severe, left > right across lower thoracic, upper lumbar region with radiation to anterior thighs and weakness in legs trouble walking due to pain, denies numbness or tingling. Has chronic LE swelling and prior remote hx of cellulitis, now has left lateral calf open excoriated area, had about of urinary leakage this evening. Denies any fevers. On exam UE full power, LE 4+-5-/5 prox hip flexion, knee flexion, ADF, some pain limited overlay, DTRs brisk at knees, depressed ankles and toes flexor.     A/P: Radicular lower thoracic/upper lumbar back pain, no max myelopathic features but patellae are brisk. Suspect secondary to degenerative disc disease, spinal stenosis but with LE wounds, osteo in differential  Plan  1. MRI TLS spine (non contrast with CKD)  2. Send Bcx, Medicine team reeval if ? left calf cellulitis, Wound care consult  3. Neurochecks  4. Pain control- lidocaine patch, tylenol as needed  5. Fall precautions  6. Continue supportive care, management of CHF as per medicine/cards.  Reviewed plan with pt and medicine NP. Will follow

## 2018-11-29 NOTE — PROGRESS NOTE ADULT - ASSESSMENT
88 year old M with a PMH of CAD  ,   (w/ PCI to LAD and Cx    in 2/2018),  on asa/ plavix/ statin    CKDIII, and HTN ,  hld.  c/c  anemia     worsening LE edema .  pt  stopped   his  lasix  acute  diastolic  chf  iv lasix/ pedal edema   anemia. gi eval/ guaiac negative    daily weights   son at bedside   dvt ppx    PT eval

## 2018-11-29 NOTE — CONSULT NOTE ADULT - SUBJECTIVE AND OBJECTIVE BOX
Patient is a 88y old  Male who presented with a chief complaint of back pain/ sob (29 Nov 2018 09:35)      HPI:  High Risk Travel:  International Travel? No(1)     88y Male complaining of back pain general.	    88M PMH  CKD,  CHF  , CAD s/p stents presents with 2 day history of back pain and sob on exertion/  pt  stopped his  lasix and admits poor medication compliance with Lasix Rx.   Patient reports he went to a store to purchase a reclining chair on Saturday and was flexing and extending his back quite often testing each recliner.     Patient reports pain is located to the left lower back and radiates to his anterior thigh.   Patient denies trauma to his back.  he stopped taking his furosemide medication as he could not get up to urinate.   Patient reports lower extremity edema bilaterally, but patient denies  chest pain, fevers, chills, numbness, tingling.   +orthopnea, no palpitations, LOC or syncope +PCI - last stent in 2/2018 as per pt    Pt recently  2 weeks ago, lives alone; family nearby    We are asked to evaluate for anemia - has known history of colon polyps - admits poor follow up. Approximately 2 years ago (late 2015 or 1026 as per pt) he reports a colonoscopy with removal of 2 polyps and 2 were left in situ with recs. of following up after a 2 day prep for repeat colonoscopy.  he did not follow up and states he does not want any invasive tests. no melena or BRBPR +constipation - BM q4 days firm and requires straining.      Follows up with hematologist as outpt (Dr Mendoza)    PAST MEDICAL & SURGICAL HISTORY:  Colon polyps  CAD s/p PCI  BPH (benign prostatic hyperplasia)  Cellulitis: BL  CKD (chronic kidney disease) stage 3, GFR 30-59 ml/min  Leg swelling: related to cellulitis of LLE  No significant past medical history  S/P hemorrhoidectomy      Allergies  penicillin (Angioedema)    MEDICATIONS  (STANDING):  aspirin enteric coated 81 milliGRAM(s) Oral daily  atorvastatin 40 milliGRAM(s) Oral at bedtime  clopidogrel Tablet 75 milliGRAM(s) Oral daily  furosemide   Injectable 60 milliGRAM(s) IV Push daily  heparin  Injectable 5000 Unit(s) SubCutaneous every 12 hours  isosorbide   mononitrate ER Tablet (IMDUR) 60 milliGRAM(s) Oral daily  metoprolol succinate ER 50 milliGRAM(s) Oral daily  potassium chloride    Tablet ER 20 milliEquivalent(s) Oral daily  tamsulosin 0.4 milliGRAM(s) Oral at bedtime      Social History:  Marital Status:  (   )    (   ) Single    (   )    ( X )  - 2 weeks ago  Lives with: ( X ) alone - apartment building (  ) children   (  ) spouse   (  ) parents  (  ) other    Substance Use (street drugs): (X  ) never used  (  ) other:  Tobacco Usage:  (   ) never smoked   (   ) former smoker   (   ) current smoker  (     ) pack year  (        ) last cigarette date  Alcohol Usage: rare    Family History   IBD (  ) Yes   (X  ) No  GI Malignancy (  )  Yes    ( X ) No    Health Management  Last Colonoscopy - 2015/2016 (see HPI)      Advanced Directives: (   X  ) None    (      ) DNR    (     ) DNI    (     ) Health Care Proxy:     Review of Systems:    General:  No wt loss, fevers, chills, night sweats  CV:  see HPI  Resp:  No  cough, tachypnea, wheezing +SOB +LOPEZ  GI:  see HPI  :  No pain, bleeding, incontinence +CKD  Muscle:  +Hip pain +arthritis +low back pain  Neuro:  No focal weakness, tingling, memory problems  Psych:  No fatigue, insomnia, mood problems, depression  Endocrine:  No polyuria, polydypsia, cold/heat intolerance  Heme:  No petechiae, ecchymosis, easy bruisability  Skin:  No tattoos, scars,+LE edema      Vital Signs Last 24 Hrs  T(C): 36.5 (29 Nov 2018 04:56), Max: 36.9 (28 Nov 2018 21:30)  T(F): 97.7 (29 Nov 2018 04:56), Max: 98.4 (28 Nov 2018 21:30)  HR: 66 (29 Nov 2018 04:56) (62 - 70)  BP: 150/69 (29 Nov 2018 04:56) (150/66 - 163/84)  BP(mean): --  RR: 18 (29 Nov 2018 04:56) (18 - 18)  SpO2: 97% (29 Nov 2018 04:56) (97% - 98%)    PHYSICAL EXAM:    Constitutional: NAD, well-developed elderly male OOB to chair. Has cane  Neck: No LAD, supple  Respiratory: decreased BS bases, no wheeze  Cardiovascular: S1 and S2, RRR  Gastrointestinal: BS+, obese soft, NT/ND, neg HSM,  Extremities: ++edema +erythema  Vascular: 2+ peripheral pulses  Neurological: A/O x 3, no focal deficits  Psychiatric: Normal mood, normal affect  Skin: +LE erythema b/l        LABS:                        9.9    6.1   )-----------( 221      ( 28 Nov 2018 17:40 )             29.0     Mean Cell Volume: 85.1 fl (11.28.18 @ 17:40)    Occult Blood, Feces (11.28.18 @ 20:17)    Occult Blood, Feces: Negative      11-28    144  |  106  |  34<H>  ----------------------------<  102<H>  4.8   |  25  |  1.86<H>    Ca    9.3      28 Nov 2018 17:40    TPro  6.8  /  Alb  4.0  /  TBili  0.3  /  DBili  x   /  AST  21  /  ALT  12  /  AlkPhos  88  11-28        RADIOLOGY & ADDITIONAL TESTS:    Surgical Pathology Report (12.24.15 @ 12:00)    Surgical Pathology Report:   ACCESSION No:  [id number]    KHUSHI SNOWDEN                            1  Surgical Final Report  Final Diagnosis    1. Colon, transverse, polyp, biopsy:  - Fragments of tubular adenoma.    Herve Plaza M.D.  (Electronic Signature)  Reported on: 12/28/15    Clinical History  lower GI bleed    Specimen(s) Submitted  1- Transverse colon polyp

## 2018-11-29 NOTE — PROGRESS NOTE ADULT - SUBJECTIVE AND OBJECTIVE BOX
less sob    REVIEW OF SYSTEMS:  GEN: no fever,    no chills  RESP: no SOB,   no cough  CVS: no chest pain,   no palpitations  GI: no abdominal pain,   no nausea,   no vomiting,   no constipation,   no diarrhea  : no dysuria,   no frequency  NEURO: no headache,   no dizziness  PSYCH: no depression,   not anxious  Derm : no rash    MEDICATIONS  (STANDING):  aspirin enteric coated 81 milliGRAM(s) Oral daily  atorvastatin 40 milliGRAM(s) Oral at bedtime  clopidogrel Tablet 75 milliGRAM(s) Oral daily  furosemide   Injectable 60 milliGRAM(s) IV Push daily  heparin  Injectable 5000 Unit(s) SubCutaneous every 12 hours  isosorbide   mononitrate ER Tablet (IMDUR) 30 milliGRAM(s) Oral daily  metoprolol succinate ER 50 milliGRAM(s) Oral daily  potassium chloride    Tablet ER 20 milliEquivalent(s) Oral daily  tamsulosin 0.4 milliGRAM(s) Oral at bedtime    MEDICATIONS  (PRN):      Vital Signs Last 24 Hrs  T(C): 36.5 (29 Nov 2018 04:56), Max: 36.9 (28 Nov 2018 21:30)  T(F): 97.7 (29 Nov 2018 04:56), Max: 98.4 (28 Nov 2018 21:30)  HR: 66 (29 Nov 2018 04:56) (62 - 70)  BP: 150/69 (29 Nov 2018 04:56) (150/66 - 163/84)  BP(mean): --  RR: 18 (29 Nov 2018 04:56) (18 - 18)  SpO2: 97% (29 Nov 2018 04:56) (97% - 98%)  CAPILLARY BLOOD GLUCOSE        I&O's Summary      PHYSICAL EXAM:  HEAD:  Atraumatic, Normocephalic  NECK: Supple, No   JVD  CHEST/LUNG:   no     rales,     no,    rhonchi  HEART: Regular rate and rhythm;         murmur  ABDOMEN: Soft, Nontender, ;   EXTREMITIES:   b/l      edema  NEUROLOGY:  alert    LABS:                        9.9    6.1   )-----------( 221      ( 28 Nov 2018 17:40 )             29.0     11-28    144  |  106  |  34<H>  ----------------------------<  102<H>  4.8   |  25  |  1.86<H>    Ca    9.3      28 Nov 2018 17:40    TPro  6.8  /  Alb  4.0  /  TBili  0.3  /  DBili  x   /  AST  21  /  ALT  12  /  AlkPhos  88  11-28 11-28 @ 17:40  4.7  21              Consultant(s) Notes Reviewed:      Care Discussed with Consultants/Other Providers:

## 2018-11-30 ENCOUNTER — TRANSCRIPTION ENCOUNTER (OUTPATIENT)
Age: 83
End: 2018-11-30

## 2018-11-30 LAB
ANION GAP SERPL CALC-SCNC: 13 MMOL/L — SIGNIFICANT CHANGE UP (ref 5–17)
BUN SERPL-MCNC: 32 MG/DL — HIGH (ref 7–23)
CALCIUM SERPL-MCNC: 9 MG/DL — SIGNIFICANT CHANGE UP (ref 8.4–10.5)
CHLORIDE SERPL-SCNC: 105 MMOL/L — SIGNIFICANT CHANGE UP (ref 96–108)
CO2 SERPL-SCNC: 23 MMOL/L — SIGNIFICANT CHANGE UP (ref 22–31)
CREAT SERPL-MCNC: 1.95 MG/DL — HIGH (ref 0.5–1.3)
FERRITIN SERPL-MCNC: 32 NG/ML — SIGNIFICANT CHANGE UP (ref 30–400)
GLUCOSE SERPL-MCNC: 90 MG/DL — SIGNIFICANT CHANGE UP (ref 70–99)
HCT VFR BLD CALC: 29.7 % — LOW (ref 39–50)
HGB BLD-MCNC: 9.6 G/DL — LOW (ref 13–17)
IRON SATN MFR SERPL: 35 UG/DL — LOW (ref 45–165)
IRON SATN MFR SERPL: 9 % — LOW (ref 16–55)
MCHC RBC-ENTMCNC: 27.7 PG — SIGNIFICANT CHANGE UP (ref 27–34)
MCHC RBC-ENTMCNC: 32.3 GM/DL — SIGNIFICANT CHANGE UP (ref 32–36)
MCV RBC AUTO: 85.6 FL — SIGNIFICANT CHANGE UP (ref 80–100)
PLATELET # BLD AUTO: 216 K/UL — SIGNIFICANT CHANGE UP (ref 150–400)
POTASSIUM SERPL-MCNC: 4.3 MMOL/L — SIGNIFICANT CHANGE UP (ref 3.5–5.3)
POTASSIUM SERPL-SCNC: 4.3 MMOL/L — SIGNIFICANT CHANGE UP (ref 3.5–5.3)
RBC # BLD: 3.47 M/UL — LOW (ref 4.2–5.8)
RBC # FLD: 16.4 % — HIGH (ref 10.3–14.5)
SODIUM SERPL-SCNC: 141 MMOL/L — SIGNIFICANT CHANGE UP (ref 135–145)
TIBC SERPL-MCNC: 388 UG/DL — SIGNIFICANT CHANGE UP (ref 220–430)
UIBC SERPL-MCNC: 353 UG/DL — SIGNIFICANT CHANGE UP (ref 110–370)
WBC # BLD: 5.41 K/UL — SIGNIFICANT CHANGE UP (ref 3.8–10.5)
WBC # FLD AUTO: 5.41 K/UL — SIGNIFICANT CHANGE UP (ref 3.8–10.5)

## 2018-11-30 RX ORDER — ACETAMINOPHEN 500 MG
650 TABLET ORAL ONCE
Qty: 0 | Refills: 0 | Status: COMPLETED | OUTPATIENT
Start: 2018-11-30 | End: 2018-11-30

## 2018-11-30 RX ORDER — ACETAMINOPHEN 500 MG
1000 TABLET ORAL ONCE
Qty: 0 | Refills: 0 | Status: COMPLETED | OUTPATIENT
Start: 2018-11-30 | End: 2018-11-30

## 2018-11-30 RX ADMIN — Medication 650 MILLIGRAM(S): at 17:20

## 2018-11-30 RX ADMIN — LIDOCAINE 1 PATCH: 4 CREAM TOPICAL at 07:00

## 2018-11-30 RX ADMIN — Medication 100 MILLIGRAM(S): at 05:30

## 2018-11-30 RX ADMIN — HEPARIN SODIUM 5000 UNIT(S): 5000 INJECTION INTRAVENOUS; SUBCUTANEOUS at 05:29

## 2018-11-30 RX ADMIN — CLOPIDOGREL BISULFATE 75 MILLIGRAM(S): 75 TABLET, FILM COATED ORAL at 12:12

## 2018-11-30 RX ADMIN — Medication 400 MILLIGRAM(S): at 21:03

## 2018-11-30 RX ADMIN — Medication 81 MILLIGRAM(S): at 12:11

## 2018-11-30 RX ADMIN — Medication 50 MILLIGRAM(S): at 05:30

## 2018-11-30 RX ADMIN — Medication 650 MILLIGRAM(S): at 16:37

## 2018-11-30 RX ADMIN — HEPARIN SODIUM 5000 UNIT(S): 5000 INJECTION INTRAVENOUS; SUBCUTANEOUS at 17:14

## 2018-11-30 RX ADMIN — TAMSULOSIN HYDROCHLORIDE 0.4 MILLIGRAM(S): 0.4 CAPSULE ORAL at 21:03

## 2018-11-30 RX ADMIN — Medication 400 MILLIGRAM(S): at 04:54

## 2018-11-30 RX ADMIN — LIDOCAINE 1 PATCH: 4 CREAM TOPICAL at 19:30

## 2018-11-30 RX ADMIN — POLYETHYLENE GLYCOL 3350 17 GRAM(S): 17 POWDER, FOR SOLUTION ORAL at 12:10

## 2018-11-30 RX ADMIN — Medication 100 MILLIGRAM(S): at 17:14

## 2018-11-30 RX ADMIN — Medication 60 MILLIGRAM(S): at 05:29

## 2018-11-30 RX ADMIN — ISOSORBIDE MONONITRATE 60 MILLIGRAM(S): 60 TABLET, EXTENDED RELEASE ORAL at 12:12

## 2018-11-30 RX ADMIN — Medication 1000 MILLIGRAM(S): at 21:30

## 2018-11-30 RX ADMIN — ATORVASTATIN CALCIUM 40 MILLIGRAM(S): 80 TABLET, FILM COATED ORAL at 21:03

## 2018-11-30 RX ADMIN — Medication 20 MILLIEQUIVALENT(S): at 12:12

## 2018-11-30 RX ADMIN — LIDOCAINE 1 PATCH: 4 CREAM TOPICAL at 17:17

## 2018-11-30 RX ADMIN — SENNA PLUS 2 TABLET(S): 8.6 TABLET ORAL at 21:03

## 2018-11-30 RX ADMIN — Medication 1000 MILLIGRAM(S): at 05:23

## 2018-11-30 RX ADMIN — FAMOTIDINE 20 MILLIGRAM(S): 10 INJECTION INTRAVENOUS at 12:11

## 2018-11-30 NOTE — PROGRESS NOTE ADULT - SUBJECTIVE AND OBJECTIVE BOX
SUBJECTIVE: No new neurologic events overnight.  No new neurologic complaints.      Medications:  MEDICATIONS  (STANDING):  aspirin enteric coated 81 milliGRAM(s) Oral daily  atorvastatin 40 milliGRAM(s) Oral at bedtime  clopidogrel Tablet 75 milliGRAM(s) Oral daily  docusate sodium 100 milliGRAM(s) Oral two times a day  famotidine    Tablet 20 milliGRAM(s) Oral daily  furosemide   Injectable 60 milliGRAM(s) IV Push daily  heparin  Injectable 5000 Unit(s) SubCutaneous every 12 hours  isosorbide   mononitrate ER Tablet (IMDUR) 60 milliGRAM(s) Oral daily  lidocaine   Patch 1 Patch Transdermal every 24 hours  metoprolol succinate ER 50 milliGRAM(s) Oral daily  polyethylene glycol 3350 17 Gram(s) Oral daily  potassium chloride    Tablet ER 20 milliEquivalent(s) Oral daily  senna 2 Tablet(s) Oral at bedtime  tamsulosin 0.4 milliGRAM(s) Oral at bedtime    MEDICATIONS  (PRN):      Labs:  CBC Full  -  ( 30 Nov 2018 08:07 )  WBC Count : 5.41 K/uL  Hemoglobin : 9.6 g/dL  Hematocrit : 29.7 %  Platelet Count - Automated : 216 K/uL  Mean Cell Volume : 85.6 fl  Mean Cell Hemoglobin : 27.7 pg  Mean Cell Hemoglobin Concentration : 32.3 gm/dL  Auto Neutrophil # : x  Auto Lymphocyte # : x  Auto Monocyte # : x  Auto Eosinophil # : x  Auto Basophil # : x  Auto Neutrophil % : x  Auto Lymphocyte % : x  Auto Monocyte % : x  Auto Eosinophil % : x  Auto Basophil % : x    11-30    141  |  105  |  32<H>  ----------------------------<  90  4.3   |  23  |  1.95<H>    Ca    9.0      30 Nov 2018 06:26    TPro  6.8  /  Alb  4.0  /  TBili  0.3  /  DBili  x   /  AST  21  /  ALT  12  /  AlkPhos  88  11-28    CAPILLARY BLOOD GLUCOSE        LIVER FUNCTIONS - ( 28 Nov 2018 17:40 )  Alb: 4.0 g/dL / Pro: 6.8 g/dL / ALK PHOS: 88 U/L / ALT: 12 U/L / AST: 21 U/L / GGT: x                 Vitals:  Vital Signs Last 24 Hrs  T(C): 36.8 (30 Nov 2018 05:11), Max: 36.8 (30 Nov 2018 05:11)  T(F): 98.2 (30 Nov 2018 05:11), Max: 98.2 (30 Nov 2018 05:11)  HR: 64 (30 Nov 2018 05:11) (59 - 73)  BP: 110/54 (30 Nov 2018 05:11) (106/52 - 137/80)  BP(mean): --  RR: 18 (30 Nov 2018 05:11) (18 - 18)  SpO2: 96% (30 Nov 2018 05:11) (96% - 100%)        NEUROLOGICAL EXAM:    Mental status: Awake, alert, and in no apparent distress. Oriented to person, place and time. Language function is normal. Recent memory, digit span and concentration were normal.     Cranial Nerves: Pupils were equal, round, reactive to light. Extraocular movements were intact. Visual field were full. Fundoscopic exam was deferred. Facial sensation was intact to light touch. There was no facial asymmetry. The palate was upgoing symmetrically and tongue was midline. Hearing acuity was intact to finger rub AU. Shoulder shrug was full bilaterally    Motor exam: Bulk and tone were normal. Strength was 5/5 in upper extremities. 4/5 in the lower extremities but effected by pain. Fine finger movements were symmetric and normal. There was no pronator drift. Positive Straight leg raise on right side     Reflexes: 2+ in the bilateral upper extremities. 2+ in the bilateral lower extremities. Toes were downgoing bilaterally.     Sensation: Intact to light touch, temperature, vibration and proprioception.     Coordination: Finger-nose-finger and heel-to-shin was without dysmetria.       Imaging:    < from: Xray Lumbar Spine AP + Lateral (11.28.18 @ 18:08) >  FINDINGS/  IMPRESSION: No acute fracture or traumatic subluxation. Vertebral body   heights are maintained. No acute malalignments of vertebral bodies.   Multilevel degenerative changes. Partially imaged severe left hip   arthrosis. Narrowing of the right hip is partially imaged. Narrowing of   both sacroiliac joints appreciated.    < end of copied text >

## 2018-11-30 NOTE — DISCHARGE NOTE ADULT - CARE PROVIDERS DIRECT ADDRESSES
,[email or web address],DirectAddress_Unknown ,[email or web address],[email or web address]_Unknown,[email or web address]

## 2018-11-30 NOTE — PHYSICAL THERAPY INITIAL EVALUATION ADULT - GAIT DISTANCE, PT EVAL
50 ft. Pt sat and rested for ~4 minutes and then ambulated an additional 50 ft with std cane and close supervision.

## 2018-11-30 NOTE — PHYSICAL THERAPY INITIAL EVALUATION ADULT - DISCHARGE DISPOSITION, PT EVAL
home w/ home PT/Recommend assist with all functional activities upon d/c home. Spoke with pt's son Yasir via telephone who reported he would assist pt as recommended by PT.

## 2018-11-30 NOTE — DISCHARGE NOTE ADULT - SECONDARY DIAGNOSIS.
HF (heart failure), diastolic Anemia CKD (chronic kidney disease) stage 3, GFR 30-59 ml/min Acute left-sided low back pain without sciatica Wound

## 2018-11-30 NOTE — DISCHARGE NOTE ADULT - HOSPITAL COURSE
This is a 88 year old M with a PMH of CAD  ,   (w/ PCI to LAD and Cx    in 2/2018),  on asa/ plavix/ statin    CKDIII, and HTN ,  hld.  c/c  anemia     worsening LE edema .  pt  stopped   his  lasix  acute  diastolic  chf  iv lasix/ pedal edema has  lessened   anemia. gi eval/ guaiac negative    daily weights  ckd  stable  spoke  with  neuro  de  maury now, pt  doing well.  ambulating by  self/  ha s no   back  pain  no  indication  for  acute  imaging   po lasix  h/o non compliance  with meds   can  f/p with  dr alka monroe as  an op This is an 88 year old M with a PMH of CAD, (w/ PCI to LAD and Cx in 2/2018), on asa/ plavix/ statin, CKDIII, and HTN , hld, c/c  anemia  worsening LE edema.  pt stopped his lasix, acute diastolic chf  Pt. treated with IV lasix/ pedal edema has lessened, anemia. gi eval/ guaiac negative, daily weights  ckd  stable with labs trended   spoke  with  neuro dr. smith now, pt  doing well. Ambulating by self/ has no back pain  no indication for acute imaging  po lasix  h/o non compliance  with meds  can f/u with dr alka monroe as an op This is an 88 year old M with a PMH of CAD, (w/ PCI to LAD and Cx in 2/2018), on asa/ plavix/ statin, CKDIII, and HTN , hld, c/c  anemia  worsening LE edema.  pt stopped his lasix, acute diastolic chf  Pt. treated with IV lasix/ pedal edema has lessened, anemia. gi eval/ guaiac negative, daily weights  ckd  stable with labs trended   spoke  with  neuro dr. smith now, pt  doing well. Ambulating by self/ has no back pain  no indication for acute imaging  po lasix  h/o non compliance  with meds  Episode of SOB with hypotension, Imdur and Lasix d/c'ed, monitored one more day, hemodynamically stable, back on Lasix 40 mg daily as per cardiology, pt to wear TEDs stocking when OOB  pt to follow up with Dr. Snow this week  Med rec and DCP discussed with Dr. Monique

## 2018-11-30 NOTE — PROGRESS NOTE ADULT - ASSESSMENT
88 year old gentleman pmhx CHF, CKD, CAD s/p stents (last Feb 2018) admitted with CHF exacerbation and worsening lower back pain for past few weeks (since last monday), reports began after trying out reclining chairs and last monday became more severe, left > right across lower thoracic, upper lumbar region with radiation to anterior thighs and weakness in legs trouble walking due to pain, denies numbness or tingling. Has chronic LE swelling and prior remote hx of cellulitis, now has left lateral calf open excoriated area, had about of urinary leakage this evening. Denies any fevers. On exam UE full power, LE 4+-5-/5 prox hip flexion, knee flexion, ADF, some pain limited overlay, DTRs brisk at knees, depressed ankles and toes flexor.     A/P: Radicular lower thoracic/upper lumbar back pain, no max myelopathic features but patellae are brisk. Suspect secondary to degenerative disc disease, spinal stenosis but with LE wounds, osteo in differential  Plan  1. MRI TLS spine (non contrast with CKD) ordered and pending  2. Send Bcx, Medicine team reeval if ? left calf cellulitis, Wound care consult  3. Neurochecks  4. Pain control- lidocaine patch, tylenol as needed  5. Fall precautions  6. Continue supportive care, management of CHF as per medicine/cards.  Reviewed plan with pt

## 2018-11-30 NOTE — PHYSICAL THERAPY INITIAL EVALUATION ADULT - DIAGNOSIS, PT EVAL
Impaired mobility/function secondary to generalized weakness, decreased balance, low back/mitchell hip discomfort.

## 2018-11-30 NOTE — PHYSICAL THERAPY INITIAL EVALUATION ADULT - STRENGTHENING, PT EVAL
Stair Negotiation Training: GOAL- Patient will be able to negotiate up & down 1 flight of stairs independently with single railing, step to gait pattern, in 2 weeks.

## 2018-11-30 NOTE — PROGRESS NOTE ADULT - SUBJECTIVE AND OBJECTIVE BOX
INTERVAL HPI/OVERNIGHT EVENTS:  Patient sitting up in bed, he reports he still has some back pain but overall better, LE edema persists, but now he is able to walk he said. He had BM yesterday, denies any blood with BM or dark stools. He reports he is followed by Dr Mendoza and takes supplemental Iron for anemia      MEDICATIONS  (STANDING):  aspirin enteric coated 81 milliGRAM(s) Oral daily  atorvastatin 40 milliGRAM(s) Oral at bedtime  clopidogrel Tablet 75 milliGRAM(s) Oral daily  docusate sodium 100 milliGRAM(s) Oral two times a day  famotidine    Tablet 20 milliGRAM(s) Oral daily  furosemide   Injectable 60 milliGRAM(s) IV Push daily  heparin  Injectable 5000 Unit(s) SubCutaneous every 12 hours  isosorbide   mononitrate ER Tablet (IMDUR) 60 milliGRAM(s) Oral daily  lidocaine   Patch 1 Patch Transdermal every 24 hours  metoprolol succinate ER 50 milliGRAM(s) Oral daily  polyethylene glycol 3350 17 Gram(s) Oral daily  potassium chloride    Tablet ER 20 milliEquivalent(s) Oral daily  senna 2 Tablet(s) Oral at bedtime  tamsulosin 0.4 milliGRAM(s) Oral at bedtime    MEDICATIONS  (PRN):      Allergies    penicillin (Angioedema)    Intolerances        Review of Systems:    General:  No fevers or chills    ENT:  No sore throat, or runny nose, no dysphagia  CV:  No chest pain  Resp:  No dyspnea, cough, or wheezing  GI: denies nausea or vomiting, no abdominal discomfort         Vital Signs Last 24 Hrs  T(C): 36.8 (30 Nov 2018 05:11), Max: 36.8 (30 Nov 2018 05:11)  T(F): 98.2 (30 Nov 2018 05:11), Max: 98.2 (30 Nov 2018 05:11)  HR: 64 (30 Nov 2018 05:11) (59 - 73)  BP: 110/54 (30 Nov 2018 05:11) (106/52 - 137/80)  BP(mean): --  RR: 18 (30 Nov 2018 05:11) (18 - 18)  SpO2: 96% (30 Nov 2018 05:11) (96% - 100%)    PHYSICAL EXAM:    Constitutional: Non toxic , in NAD, well-developed  Neck: supple  Respiratory: anterior chest wall clear to auscultation,no wheezing  Cardiovascular: S1 and S2, RRR  Gastrointestinal: soft, non tender with +BS   Extremities: + LE edema , no  cyanosis  Neurological: A/O x 3, no focal deficits  Psychiatric: Normal mood, normal affect  Skin: No jaundice       LABS:                        9.6    5.41  )-----------( 216      ( 30 Nov 2018 08:07 )             29.7     11-30    141  |  105  |  32<H>  ----------------------------<  90  4.3   |  23  |  1.95<H>    Ca    9.0      30 Nov 2018 06:26    TPro  6.8  /  Alb  4.0  /  TBili  0.3  /  DBili  x   /  AST  21  /  ALT  12  /  AlkPhos  88  11-28    Occult Blood, Feces (11.28.18 @ 20:17)    Occult Blood, Feces: Negative        LIVER FUNCTIONS - ( 28 Nov 2018 17:40 )  Alb: 4.0 g/dL / Pro: 6.8 g/dL / ALK PHOS: 88 U/L / ALT: 12 U/L / AST: 21 U/L / GGT: x             RADIOLOGY & ADDITIONAL TESTS:

## 2018-11-30 NOTE — DISCHARGE NOTE ADULT - PATIENT PORTAL LINK FT
You can access the [email or web address] Patient Portal, offered by [facility], by registering with the following website: [email or web address]

## 2018-11-30 NOTE — PROGRESS NOTE ADULT - SUBJECTIVE AND OBJECTIVE BOX
CARDIOLOGY     PROGRESS  NOTE   ________________________________________________    CHIEF COMPLAINT:Patient is a 88y old  Male who presents with a chief complaint of back pain/ sob (30 Nov 2018 09:54)    	  REVIEW OF SYSTEMS:  CONSTITUTIONAL: No fever, weight loss, or fatigue  EYES: No eye pain, visual disturbances, or discharge  ENT:  No difficulty hearing, tinnitus, vertigo; No sinus or throat pain  NECK: No pain or stiffness  RESPIRATORY: No cough, wheezing, chills or hemoptysis; No Shortness of Breath  CARDIOVASCULAR: No chest pain, palpitations, passing out, dizziness, or leg swelling  GASTROINTESTINAL: No abdominal or epigastric pain. No nausea, vomiting, or hematemesis; No diarrhea or constipation. No melena or hematochezia.  GENITOURINARY: No dysuria, frequency, hematuria, or incontinence  NEUROLOGICAL: No headaches, memory loss, loss of strength, numbness, or tremors  SKIN: No itching, burning, rashes, or lesions   LYMPH Nodes: No enlarged glands  ENDOCRINE: No heat or cold intolerance; No hair loss  MUSCULOSKELETAL: No joint pain or swelling; No muscle, back, or extremity pain  PSYCHIATRIC: No depression, anxiety, mood swings, or difficulty sleeping  HEME/LYMPH: No easy bruising, or bleeding gums  ALLERGY AND IMMUNOLOGIC: No hives or eczema	    [ ] All others negative	  [ ] Unable to obtain    PHYSICAL EXAM:  T(C): 36.8 (11-30-18 @ 05:11), Max: 36.8 (11-30-18 @ 05:11)  HR: 64 (11-30-18 @ 05:11) (59 - 73)  BP: 110/54 (11-30-18 @ 05:11) (106/52 - 137/80)  RR: 18 (11-30-18 @ 05:11) (18 - 18)  SpO2: 96% (11-30-18 @ 05:11) (96% - 100%)  Wt(kg): --  I&O's Summary    29 Nov 2018 07:01  -  30 Nov 2018 07:00  --------------------------------------------------------  IN: 1060 mL / OUT: 0 mL / NET: 1060 mL        Appearance: Normal	  HEENT:   Normal oral mucosa, PERRL, EOMI	  Lymphatic: No lymphadenopathy  Cardiovascular: Normal S1 S2, No JVD, No murmurs, No edema  Respiratory: Lungs clear to auscultation	  Psychiatry: A & O x 3, Mood & affect appropriate  Gastrointestinal:  Soft, Non-tender, + BS	  Skin: No rashes, No ecchymoses, No cyanosis	  Neurologic: Non-focal  Extremities: Normal range of motion, No clubbing, cyanosis . decrease  edema  Vascular: Peripheral pulses palpable 2+ bilaterally    MEDICATIONS  (STANDING):  aspirin enteric coated 81 milliGRAM(s) Oral daily  atorvastatin 40 milliGRAM(s) Oral at bedtime  clopidogrel Tablet 75 milliGRAM(s) Oral daily  docusate sodium 100 milliGRAM(s) Oral two times a day  famotidine    Tablet 20 milliGRAM(s) Oral daily  furosemide   Injectable 60 milliGRAM(s) IV Push daily  heparin  Injectable 5000 Unit(s) SubCutaneous every 12 hours  isosorbide   mononitrate ER Tablet (IMDUR) 60 milliGRAM(s) Oral daily  lidocaine   Patch 1 Patch Transdermal every 24 hours  metoprolol succinate ER 50 milliGRAM(s) Oral daily  polyethylene glycol 3350 17 Gram(s) Oral daily  potassium chloride    Tablet ER 20 milliEquivalent(s) Oral daily  senna 2 Tablet(s) Oral at bedtime  tamsulosin 0.4 milliGRAM(s) Oral at bedtime      TELEMETRY: 	    ECG:  	  RADIOLOGY:  OTHER: 	  	  LABS:	 	    CARDIAC MARKERS:                                9.6    5.41  )-----------( 216      ( 30 Nov 2018 08:07 )             29.7     11-30    141  |  105  |  32<H>  ----------------------------<  90  4.3   |  23  |  1.95<H>    Ca    9.0      30 Nov 2018 06:26    TPro  6.8  /  Alb  4.0  /  TBili  0.3  /  DBili  x   /  AST  21  /  ALT  12  /  AlkPhos  88  11-28    proBNP: Serum Pro-Brain Natriuretic Peptide: 745 pg/mL (11-28 @ 17:40)    Lipid Profile:   HgA1c:   TSH:         Assessment and plan  ---------------------------  doing better  chf diastolic acute on chronic  change lasix to 40 mg daily  ? dc planning  back pain improved

## 2018-11-30 NOTE — PHYSICAL THERAPY INITIAL EVALUATION ADULT - PERTINENT HX OF CURRENT PROBLEM, REHAB EVAL
Pt is an 87 y/o male- presents with 2 day history of back pain.  Patient reports he went to a store to purchase a reclining chair on Saturday and was flexing and extending his back quite often testing each recliner.   Patient reports pain is located to the left lower back and radiates to his anterior thigh.   Patient denies trauma to his back.  He stopped taking his furosemide medication as he could not get up to urinate.

## 2018-11-30 NOTE — DISCHARGE NOTE ADULT - CARE PROVIDER_API CALL
Deon Perez), Internal Medicine  [address] [place] [address]  Phone: [phone]  Fax: [phone]    Kenan Menjivar), Electrodiagnostic Medicine; Neurology  [address] [facility] [address]  [place] [address]  Phone: [phone]  Fax: [phone] Deon Perez), Internal Medicine  [address] [place] [address]  Phone: [phone]  Fax: [phone]    Kenan Menjivar), Electrodiagnostic Medicine; Neurology  [address] [place] [address]  [address]  [place] [address]  Phone: [phone]  Fax: [phone]    Iggy Snow), Cardiovascular Disease; Internal Medicine  [address] [place] [address]  Phone: [phone]  Fax: [phone]

## 2018-11-30 NOTE — PROGRESS NOTE ADULT - SUBJECTIVE AND OBJECTIVE BOX
no cp/sobno    REVIEW OF SYSTEMS:  GEN: no fever,    no chills  RESP: no SOB,   no cough  CVS: no chest pain,   no palpitations  GI: no abdominal pain,   no nausea,   no vomiting,   no constipation,   no diarrhea  : no dysuria,   no frequency  NEURO: no headache,   no dizziness  PSYCH: no depression,   not anxious  Derm : no rash    MEDICATIONS  (STANDING):  aspirin enteric coated 81 milliGRAM(s) Oral daily  atorvastatin 40 milliGRAM(s) Oral at bedtime  clopidogrel Tablet 75 milliGRAM(s) Oral daily  docusate sodium 100 milliGRAM(s) Oral two times a day  famotidine    Tablet 20 milliGRAM(s) Oral daily  furosemide   Injectable 60 milliGRAM(s) IV Push daily  heparin  Injectable 5000 Unit(s) SubCutaneous every 12 hours  isosorbide   mononitrate ER Tablet (IMDUR) 60 milliGRAM(s) Oral daily  lidocaine   Patch 1 Patch Transdermal every 24 hours  metoprolol succinate ER 50 milliGRAM(s) Oral daily  polyethylene glycol 3350 17 Gram(s) Oral daily  potassium chloride    Tablet ER 20 milliEquivalent(s) Oral daily  senna 2 Tablet(s) Oral at bedtime  tamsulosin 0.4 milliGRAM(s) Oral at bedtime    MEDICATIONS  (PRN):      Vital Signs Last 24 Hrs  T(C): 36.8 (30 Nov 2018 05:11), Max: 36.8 (30 Nov 2018 05:11)  T(F): 98.2 (30 Nov 2018 05:11), Max: 98.2 (30 Nov 2018 05:11)  HR: 64 (30 Nov 2018 05:11) (59 - 73)  BP: 110/54 (30 Nov 2018 05:11) (106/52 - 137/80)  BP(mean): --  RR: 18 (30 Nov 2018 05:11) (18 - 18)  SpO2: 96% (30 Nov 2018 05:11) (96% - 100%)  CAPILLARY BLOOD GLUCOSE        I&O's Summary    29 Nov 2018 07:01  -  30 Nov 2018 07:00  --------------------------------------------------------  IN: 1060 mL / OUT: 0 mL / NET: 1060 mL        PHYSICAL EXAM:  HEAD:  Atraumatic, Normocephalic  NECK: Supple, No   JVD  CHEST/LUNG:   no     rales,     no,    rhonchi  HEART: Regular rate and rhythm;         murmur  ABDOMEN: Soft, Nontender, ;   EXTREMITIES:        edema  NEUROLOGY:  alert    LABS:                        9.6    5.41  )-----------( 216      ( 30 Nov 2018 08:07 )             29.7     11-30    141  |  105  |  32<H>  ----------------------------<  90  4.3   |  23  |  1.95<H>    Ca    9.0      30 Nov 2018 06:26    TPro  6.8  /  Alb  4.0  /  TBili  0.3  /  DBili  x   /  AST  21  /  ALT  12  /  AlkPhos  88  11-28 11-28 @ 17:40  4.7  21              Consultant(s) Notes Reviewed:      Care Discussed with Consultants/Other Providers:    no  cp/sob

## 2018-11-30 NOTE — PHYSICAL THERAPY INITIAL EVALUATION ADULT - LIVES WITH, PROFILE
Pt lives alone in an apartment. Pt reports 3 stairs to enter from garage entrance, (+)elevator within.

## 2018-11-30 NOTE — DISCHARGE NOTE ADULT - MEDICATION SUMMARY - MEDICATIONS TO CHANGE
I will SWITCH the dose or number of times a day I take the medications listed below when I get home from the hospital:    furosemide 20 mg oral tablet  -- 3 tab(s) by mouth 2 times a day   -- Avoid prolonged or excessive exposure to direct and/or artificial sunlight while taking this medication.  It is very important that you take or use this exactly as directed.  Do not skip doses or discontinue unless directed by your doctor.  It may be advisable to drink a full glass orange juice or eat a banana daily while taking this medication.

## 2018-11-30 NOTE — DISCHARGE NOTE ADULT - ADDITIONAL INSTRUCTIONS
Take your medications as directed   Followup with Dr. Perez  Call day after discharge to schedule an appointment   Bring your discharge instructions and your medication list to all follow up appointments Take your medications as directed   Followup with Dr. Perez in 5-7 days of discharge  Follow up with Dr. Snow this week  Call day after discharge to schedule an appointment   Bring your discharge instructions and your medication list to all follow up appointments

## 2018-11-30 NOTE — PROGRESS NOTE ADULT - ASSESSMENT
88 Year old male with hx of CAD s/p PCI Feb, 2018 ,on ASA/ Plavix  CKD, CHF, admitted 11/28/18 with pack pain, (MRI pending)  LE edema.  Anemia with no sign of active or occult GI blood loss, OB neg 11/28/18 . He has hx of colon polyps, colonoscopy 12/24/15 noted for transverse colon polyp : tubular adenoma, no followup colonoscopy to date .    Recommendations :  Monitor labs and trends as ordered   Eventual out patient colonoscopy advised and discussed with patient   Continue Miralax, colace and senna   Follow up with Dr. Mendoza/ hematology  as advised     Mara Hull, ANP-[facility]  [address] [place] [address]  [address] [phone] 88 Year old male with hx of CAD s/p PCI Feb, 2018 ,on ASA/ Plavix  CKD, CHF, admitted 11/28/18 with pack pain, (MRI pending)  LE edema.  Anemia with no sign of active or occult GI blood loss, OB neg 11/28/18 . He has hx of colon polyps, colonoscopy 12/24/15 noted for transverse colon polyp : tubular adenoma, no followup colonoscopy to date.    Recommendations :  Monitor labs and trends as ordered   Eventual out patient colonoscopy advised and discussed with patient   Continue Miralax, colace and senna   Follow up with Dr. Mendoza/ hematology  as advised     Mara Hull, ANP-[facility]  [address] [place] [address]  [address] [phone]

## 2018-11-30 NOTE — DISCHARGE NOTE ADULT - MEDICATION SUMMARY - MEDICATIONS TO TAKE
I will START or STAY ON the medications listed below when I get home from the hospital:    aspirin 81 mg oral delayed release tablet  -- 1 tab(s) by mouth once a day  -- Indication: For heart     tamsulosin 0.4 mg oral capsule  -- 1 cap(s) by mouth once a day (at bedtime)  -- Indication: For bph    isosorbide mononitrate 60 mg oral tablet, extended release  -- 1 tab(s) by mouth once a day  -- Indication: For heart     atorvastatin 40 mg oral tablet  -- 1 tab(s) by mouth once a day (at bedtime)  -- Indication: For cholesterol     clopidogrel 75 mg oral tablet  -- 1 tab(s) by mouth once a day  -- Indication: For heart     Metoprolol Succinate ER 50 mg oral tablet, extended release  -- 1 tab(s) by mouth once a day  -- Indication: For heart     lidocaine 5% topical film  -- Apply on skin to affected area once a day   -- Indication: For pain     furosemide 40 mg oral tablet  -- 1 tab(s) by mouth once a day  -- Indication: For diuretic / HF     famotidine 20 mg oral tablet  -- 1 tab(s) by mouth once a day  -- Indication: For GI     senna oral tablet  -- 2 tab(s) by mouth once a day (at bedtime)  -- Indication: For constipation     potassium chloride 20 mEq oral tablet, extended release  -- 1 tab(s) by mouth once a day  -- Indication: For supplement I will START or STAY ON the medications listed below when I get home from the hospital:    aspirin 81 mg oral delayed release tablet  -- 1 tab(s) by mouth once a day  -- Indication: For heart     tamsulosin 0.4 mg oral capsule  -- 1 cap(s) by mouth once a day (at bedtime)  -- Indication: For bph    isosorbide mononitrate 60 mg oral tablet, extended release  -- 1 tab(s) by mouth once a day  -- Indication: For heart     atorvastatin 40 mg oral tablet  -- 1 tab(s) by mouth once a day (at bedtime)  -- Indication: For cholesterol     clopidogrel 75 mg oral tablet  -- 1 tab(s) by mouth once a day  -- Indication: For heart     Metoprolol Succinate ER 50 mg oral tablet, extended release  -- 1 tab(s) by mouth once a day  -- Indication: For heart     Salonpas 0.025%-1.25% topical film  -- Apply on skin to affected area once a day   -- For external use only.    -- Indication: For pain    furosemide 40 mg oral tablet  -- 1 tab(s) by mouth once a day  -- Indication: For diuretic / HF     famotidine 20 mg oral tablet  -- 1 tab(s) by mouth once a day  -- Indication: For GI     senna oral tablet  -- 2 tab(s) by mouth once a day (at bedtime)  -- Indication: For constipation     potassium chloride 20 mEq oral tablet, extended release  -- 1 tab(s) by mouth once a day  -- Indication: For supplement I will START or STAY ON the medications listed below when I get home from the hospital:    aspirin 81 mg oral delayed release tablet  -- 1 tab(s) by mouth once a day  -- Indication: For heart     tamsulosin 0.4 mg oral capsule  -- 1 cap(s) by mouth once a day (at bedtime)  -- Indication: For bph    atorvastatin 40 mg oral tablet  -- 1 tab(s) by mouth once a day (at bedtime)  -- Indication: For cholesterol     clopidogrel 75 mg oral tablet  -- 1 tab(s) by mouth once a day  -- Indication: For heart     Metoprolol Succinate ER 50 mg oral tablet, extended release  -- 1 tab(s) by mouth once a day  -- Indication: For heart     Salonpas 0.025%-1.25% topical film  -- Apply on skin to affected area once a day   -- For external use only.    -- Indication: For pain    furosemide 40 mg oral tablet  -- 1 tab(s) by mouth once a day  -- Indication: For HF (heart failure), diastolic    famotidine 20 mg oral tablet  -- 1 tab(s) by mouth once a day  -- Indication: For GI     senna oral tablet  -- 2 tab(s) by mouth once a day (at bedtime)  -- Indication: For constipation     potassium chloride 20 mEq oral tablet, extended release  -- 1 tab(s) by mouth once a day  -- Indication: For supplement

## 2018-11-30 NOTE — DISCHARGE NOTE ADULT - CARE PLAN
Principal Discharge DX:	Leg swelling  Goal:	s/p seen and followed by cards / restarted Lasix  Assessment and plan of treatment:	Take your medications as directed   Followup with Dr. Perez / Dr. Snow  Call day after discharge to schedule an appointment to f/u  Bring your discharge instruction and your medication list to al follow up appointments  Secondary Diagnosis:	HF (heart failure), diastolic  Goal:	resolving  / restarted Lasix  Assessment and plan of treatment:	As above  Secondary Diagnosis:	Anemia  Goal:	s/p labs trended and seen by GI  - stable  Assessment and plan of treatment:	Follow up w/ Dr. Snow  Secondary Diagnosis:	CKD (chronic kidney disease) stage 3, GFR 30-59 ml/min  Goal:	stable  Secondary Diagnosis:	Acute left-sided low back pain without sciatica  Goal:	seen and followed by neurology / lopez with Lidocaine patch  Assessment and plan of treatment:	Followup with neurology as needed Principal Discharge DX:	Leg swelling  Goal:	s/p seen and followed by cards / restarted Lasix  Assessment and plan of treatment:	Take your medications as directed   Followup with Dr. Perez / Dr. Snow  Call day after discharge to schedule an appointment to f/u  Bring your discharge instruction and your medication list to al follow up appointments  Secondary Diagnosis:	HF (heart failure), diastolic  Goal:	resolving  / restarted Lasix  Assessment and plan of treatment:	Weigh yourself daily.  If you gain 3lbs in 3 days, or 5lbs in a week call your Health Care Provider.  Do not eat or drink foods containing more than 2000mg of salt (sodium) in your diet every day.  Call your Health Care Provider if you have any swelling or increased swelling in your feet, ankles, and/or stomach.  The Pt was provided with CHF diet instruction (low sodium diet, daily weights, label reading, Heart Healthy Cooking Tips & Heart Healthy shopping Tips).  Take all of your medication as directed.  If you become dizzy call your Health Care Provider.  Secondary Diagnosis:	Anemia  Goal:	s/p labs trended and seen by GI  - lopez  Assessment and plan of treatment:	Follow up w/ Dr. Snow  Secondary Diagnosis:	CKD (chronic kidney disease) stage 3, GFR 30-59 ml/min  Goal:	stable  Assessment and plan of treatment:	Monitor renal function with your primary care physician  Avoid renally toxic medications  Secondary Diagnosis:	Acute left-sided low back pain without sciatica  Goal:	seen and followed by neurology / lopez with Lidocaine patch  Assessment and plan of treatment:	Followup with neurology as needed  Secondary Diagnosis:	Wound  Assessment and plan of treatment:	Skin abrasion on left leg  Continue wound care with visiting home care  Daily wound care instruction  - Cleanse with Normal saline/ pat dry  - Apply adaptic   - Cover with dry and sterile gauze and Kerlix

## 2018-11-30 NOTE — DISCHARGE NOTE ADULT - PLAN OF CARE
s/p seen and followed by cards / restarted Lasix Take your medications as directed   Followup with Dr. Perez / Dr. Snow  Call day after discharge to schedule an appointment to f/u  Bring your discharge instruction and your medication list to al follow up appointments resolving  / restarted Lasix As above s/p labs trended and seen by GI  - stable Follow up w/ Dr. Snow stable seen and followed by neurology / stable with Lidocaine patch Followup with neurology as needed Weigh yourself daily.  If you gain 3lbs in 3 days, or 5lbs in a week call your Health Care Provider.  Do not eat or drink foods containing more than 2000mg of salt (sodium) in your diet every day.  Call your Health Care Provider if you have any swelling or increased swelling in your feet, ankles, and/or stomach.  The Pt was provided with CHF diet instruction (low sodium diet, daily weights, label reading, Heart Healthy Cooking Tips & Heart Healthy shopping Tips).  Take all of your medication as directed.  If you become dizzy call your Health Care Provider. Monitor renal function with your primary care physician  Avoid renally toxic medications Skin abrasion on left leg  Continue wound care with visiting home care  Daily wound care instruction  - Cleanse with Normal saline/ pat dry  - Apply adaptic   - Cover with dry and sterile gauze and Kerlix

## 2018-11-30 NOTE — DISCHARGE NOTE ADULT - PROVIDER TOKENS
DRE:'[id number]',DRE:'[id number]' TOKEN:'[id number]',TOKEN:'[id number]',DRE:'[id number]'

## 2018-12-01 LAB
ANION GAP SERPL CALC-SCNC: 14 MMOL/L — SIGNIFICANT CHANGE UP (ref 5–17)
BUN SERPL-MCNC: 40 MG/DL — HIGH (ref 7–23)
CALCIUM SERPL-MCNC: 8.8 MG/DL — SIGNIFICANT CHANGE UP (ref 8.4–10.5)
CHLORIDE SERPL-SCNC: 101 MMOL/L — SIGNIFICANT CHANGE UP (ref 96–108)
CK MB BLD-MCNC: 3.2 % — SIGNIFICANT CHANGE UP (ref 0–3.5)
CK MB CFR SERPL CALC: 5.6 NG/ML — SIGNIFICANT CHANGE UP (ref 0–6.7)
CK SERPL-CCNC: 176 U/L — SIGNIFICANT CHANGE UP (ref 30–200)
CO2 SERPL-SCNC: 24 MMOL/L — SIGNIFICANT CHANGE UP (ref 22–31)
CREAT SERPL-MCNC: 2.36 MG/DL — HIGH (ref 0.5–1.3)
GLUCOSE SERPL-MCNC: 104 MG/DL — HIGH (ref 70–99)
HCT VFR BLD CALC: 30.8 % — LOW (ref 39–50)
HGB BLD-MCNC: 10.2 G/DL — LOW (ref 13–17)
MCHC RBC-ENTMCNC: 28.3 PG — SIGNIFICANT CHANGE UP (ref 27–34)
MCHC RBC-ENTMCNC: 33.2 GM/DL — SIGNIFICANT CHANGE UP (ref 32–36)
MCV RBC AUTO: 85.4 FL — SIGNIFICANT CHANGE UP (ref 80–100)
PLATELET # BLD AUTO: 227 K/UL — SIGNIFICANT CHANGE UP (ref 150–400)
POTASSIUM SERPL-MCNC: 4.1 MMOL/L — SIGNIFICANT CHANGE UP (ref 3.5–5.3)
POTASSIUM SERPL-SCNC: 4.1 MMOL/L — SIGNIFICANT CHANGE UP (ref 3.5–5.3)
RBC # BLD: 3.61 M/UL — LOW (ref 4.2–5.8)
RBC # FLD: 15.9 % — HIGH (ref 10.3–14.5)
SODIUM SERPL-SCNC: 139 MMOL/L — SIGNIFICANT CHANGE UP (ref 135–145)
TROPONIN T, HIGH SENSITIVITY RESULT: 55 NG/L — HIGH (ref 0–51)
WBC # BLD: 6.1 K/UL — SIGNIFICANT CHANGE UP (ref 3.8–10.5)
WBC # FLD AUTO: 6.1 K/UL — SIGNIFICANT CHANGE UP (ref 3.8–10.5)

## 2018-12-01 PROCEDURE — 93010 ELECTROCARDIOGRAM REPORT: CPT

## 2018-12-01 RX ORDER — TAMSULOSIN HYDROCHLORIDE 0.4 MG/1
1 CAPSULE ORAL
Qty: 30 | Refills: 0 | OUTPATIENT
Start: 2018-12-01 | End: 2018-12-30

## 2018-12-01 RX ORDER — ACETAMINOPHEN 500 MG
1000 TABLET ORAL ONCE
Qty: 0 | Refills: 0 | Status: COMPLETED | OUTPATIENT
Start: 2018-12-01 | End: 2018-12-01

## 2018-12-01 RX ORDER — MENTHOL AND CAPSAICIN .0375; 5 G/100G; G/100G
1 PATCH TOPICAL
Qty: 30 | Refills: 0 | OUTPATIENT
Start: 2018-12-01 | End: 2018-12-30

## 2018-12-01 RX ORDER — LIDOCAINE 4 G/100G
1 CREAM TOPICAL
Qty: 30 | Refills: 0 | OUTPATIENT
Start: 2018-12-01 | End: 2018-12-30

## 2018-12-01 RX ORDER — FUROSEMIDE 40 MG
40 TABLET ORAL DAILY
Qty: 0 | Refills: 0 | Status: DISCONTINUED | OUTPATIENT
Start: 2018-12-01 | End: 2018-12-02

## 2018-12-01 RX ORDER — SENNA PLUS 8.6 MG/1
2 TABLET ORAL
Qty: 0 | Refills: 0 | DISCHARGE
Start: 2018-12-01

## 2018-12-01 RX ORDER — FUROSEMIDE 40 MG
1 TABLET ORAL
Qty: 0 | Refills: 0 | COMMUNITY
Start: 2018-12-01

## 2018-12-01 RX ORDER — ACETAMINOPHEN 500 MG
650 TABLET ORAL ONCE
Qty: 0 | Refills: 0 | Status: COMPLETED | OUTPATIENT
Start: 2018-12-01 | End: 2018-12-01

## 2018-12-01 RX ORDER — FAMOTIDINE 10 MG/ML
1 INJECTION INTRAVENOUS
Qty: 30 | Refills: 0
Start: 2018-12-01 | End: 2018-12-30

## 2018-12-01 RX ORDER — ISOSORBIDE MONONITRATE 60 MG/1
1 TABLET, EXTENDED RELEASE ORAL
Qty: 30 | Refills: 0 | OUTPATIENT
Start: 2018-12-01 | End: 2018-12-30

## 2018-12-01 RX ORDER — POTASSIUM CHLORIDE 20 MEQ
1 PACKET (EA) ORAL
Qty: 30 | Refills: 0 | OUTPATIENT
Start: 2018-12-01 | End: 2018-12-30

## 2018-12-01 RX ADMIN — Medication 1000 MILLIGRAM(S): at 18:40

## 2018-12-01 RX ADMIN — LIDOCAINE 1 PATCH: 4 CREAM TOPICAL at 19:50

## 2018-12-01 RX ADMIN — FAMOTIDINE 20 MILLIGRAM(S): 10 INJECTION INTRAVENOUS at 12:01

## 2018-12-01 RX ADMIN — ISOSORBIDE MONONITRATE 60 MILLIGRAM(S): 60 TABLET, EXTENDED RELEASE ORAL at 12:00

## 2018-12-01 RX ADMIN — HEPARIN SODIUM 5000 UNIT(S): 5000 INJECTION INTRAVENOUS; SUBCUTANEOUS at 17:20

## 2018-12-01 RX ADMIN — HEPARIN SODIUM 5000 UNIT(S): 5000 INJECTION INTRAVENOUS; SUBCUTANEOUS at 05:37

## 2018-12-01 RX ADMIN — Medication 650 MILLIGRAM(S): at 14:20

## 2018-12-01 RX ADMIN — LIDOCAINE 1 PATCH: 4 CREAM TOPICAL at 17:18

## 2018-12-01 RX ADMIN — SENNA PLUS 2 TABLET(S): 8.6 TABLET ORAL at 21:54

## 2018-12-01 RX ADMIN — Medication 100 MILLIGRAM(S): at 05:37

## 2018-12-01 RX ADMIN — Medication 50 MILLIGRAM(S): at 05:37

## 2018-12-01 RX ADMIN — Medication 60 MILLIGRAM(S): at 05:37

## 2018-12-01 RX ADMIN — TAMSULOSIN HYDROCHLORIDE 0.4 MILLIGRAM(S): 0.4 CAPSULE ORAL at 21:54

## 2018-12-01 RX ADMIN — ATORVASTATIN CALCIUM 40 MILLIGRAM(S): 80 TABLET, FILM COATED ORAL at 21:54

## 2018-12-01 RX ADMIN — CLOPIDOGREL BISULFATE 75 MILLIGRAM(S): 75 TABLET, FILM COATED ORAL at 12:00

## 2018-12-01 RX ADMIN — LIDOCAINE 1 PATCH: 4 CREAM TOPICAL at 05:38

## 2018-12-01 RX ADMIN — Medication 40 MILLIGRAM(S): at 12:00

## 2018-12-01 RX ADMIN — Medication 20 MILLIEQUIVALENT(S): at 12:01

## 2018-12-01 RX ADMIN — Medication 650 MILLIGRAM(S): at 13:06

## 2018-12-01 RX ADMIN — Medication 81 MILLIGRAM(S): at 12:02

## 2018-12-01 RX ADMIN — Medication 400 MILLIGRAM(S): at 18:23

## 2018-12-01 NOTE — PROVIDER CONTACT NOTE (CRITICAL VALUE NOTIFICATION) - ACTION/TREATMENT ORDERED:
ARAM Burt made aware orders received for EKG stat, vital signs, and call placed to primary doctor for further orders.

## 2018-12-01 NOTE — PROGRESS NOTE ADULT - ASSESSMENT
88 year old gentleman pmhx CHF, CKD, CAD s/p stents (last Feb 2018) admitted with CHF exacerbation and worsening lower back pain for past few weeks (since last monday), reports began after trying out reclining chairs and last monday became more severe, left > right across lower thoracic, upper lumbar region with radiation to anterior thighs and weakness in legs trouble walking due to pain, denies numbness or tingling. Has chronic LE swelling and prior remote hx of cellulitis, now has left lateral calf open excoriated area, had about of urinary leakage this evening. Denies any fevers. On exam UE full power, LE 4+-5-/5 prox hip flexion, knee flexion, ADF, some pain limited overlay, DTRs brisk at knees, depressed ankles and toes flexor.     A/P: Radicular lower thoracic/upper lumbar back pain, no max myelopathic features but patellae are brisk. Suspect secondary to degenerative disc disease, spinal stenosis but with LE wounds, osteo in differential  Plan  1. MRI TLS spine   was cancelled by primary team as pt sx are better  2. Pt does not wish further inpt neuro workup  3. If sx recur, pt understands to call our office or comne to the ER

## 2018-12-01 NOTE — CHART NOTE - NSCHARTNOTEFT_GEN_A_CORE
Medicine NP note:  88M PMH  CKD,  CHF  , CAD s/p stents presents with 2 day history of back pain.    Patient reports he went to a store to purchase a reclining chair on Saturday and was flexing and extending his back quite often testing each recliner.     Patient reports pain is located to the left lower back and radiates to his anterior thigh.   Patient denies trauma to his back.  he stopped taking his furosemide medication as he could not get up to urinate.   Patient reports lower extremity edema bilaterally, but patient denies dyspnea, chest pain, fevers, chills, numbness, tingling.   dx: CHF, back pain, anemia    ICU Vital Signs Last 24 Hrs  T(C): 36.3 (01 Dec 2018 13:58), Max: 36.6 (30 Nov 2018 21:23)  T(F): 97.3 (01 Dec 2018 13:58), Max: 97.9 (30 Nov 2018 21:23)  HR: 77 (01 Dec 2018 15:14) (63 - 88)  BP: 98/50 (01 Dec 2018 15:14) (90/50 - 126/61)  BP(mean): --  ABP: --  ABP(mean): --  RR: 18 (01 Dec 2018 13:58) (18 - 18)  SpO2: 98% (01 Dec 2018 15:14) (96% - 98%) Medicine NP note:  88M PMH  CKD,  CHF  , CAD s/p stents presents with 2 day history of back pain.    Patient reports he went to a store to purchase a reclining chair on Saturday and was flexing and extending his back quite often testing each recliner.     Patient reports pain is located to the left lower back and radiates to his anterior thigh.   Patient denies trauma to his back.  he stopped taking his furosemide medication as he could not get up to urinate.   Patient reports lower extremity edema bilaterally, but patient denies dyspnea, chest pain, fevers, chills, numbness, tingling.   dx: CHF, back pain, anemia    ICU Vital Signs Last 24 Hrs  T(C): 36.3 (01 Dec 2018 13:58), Max: 36.6 (30 Nov 2018 21:23)  T(F): 97.3 (01 Dec 2018 13:58), Max: 97.9 (30 Nov 2018 21:23)  HR: 77 (01 Dec 2018 15:14) (63 - 88)  BP: 98/50 (01 Dec 2018 15:14) (90/50 - 126/61)  BP(mean): --  ABP: --  ABP(mean): --  RR: 18 (01 Dec 2018 13:58) (18 - 18)  SpO2: 98% (01 Dec 2018 15:14) (96% - 98%)    Today at @ approx. 14:00 pt. had c/o of SOB after dressing to go home   pt. was scheduled for d/c with no c/o this am  now w/ c/o SOB after and denies any CP, back pain, weakness or numbness in face or extremities     v/s BP @13:58 - 90/50 pt. resting and no no new c/o's. BP repeated 98/50   Contacting Dr. Monique pending return call and now will:   12 lead EKG  / caridac enzymes, BMP & CBC stat / placed on tele - d/w Dr. Monique   Pt. continues to denies any CP, no SOB currently son is at bedside  Currently IMDUR is dc'd, and will re-evaluate in am  / v/s q 4hours x 24 hours    Will monitor. Medicine NP note:  88M PMH  CKD,  CHF  , CAD s/p stents presents with 2 day history of back pain.    Patient reports he went to a store to purchase a reclining chair on Saturday and was flexing and extending his back quite often testing each recliner.     Patient reports pain is located to the left lower back and radiates to his anterior thigh.   Patient denies trauma to his back.  he stopped taking his furosemide medication as he could not get up to urinate.   Patient reports lower extremity edema bilaterally, but patient denies dyspnea, chest pain, fevers, chills, numbness, tingling.   dx: CHF, back pain, anemia    ICU Vital Signs Last 24 Hrs  T(C): 36.3 (01 Dec 2018 13:58), Max: 36.6 (30 Nov 2018 21:23)  T(F): 97.3 (01 Dec 2018 13:58), Max: 97.9 (30 Nov 2018 21:23)  HR: 77 (01 Dec 2018 15:14) (63 - 88)  BP: 98/50 (01 Dec 2018 15:14) (90/50 - 126/61)  BP(mean): --  ABP: --  ABP(mean): --  RR: 18 (01 Dec 2018 13:58) (18 - 18)  SpO2: 98% (01 Dec 2018 15:14) (96% - 98%)    Today at @ approx. 14:00 pt. had c/o of SOB after dressing to go home   pt. was scheduled for d/c with no c/o this am  now w/ c/o SOB after and denies any CP, back pain, weakness or numbness in face or extremities     v/s BP @13:58 - 90/50 pt. resting and no no new c/o's. BP repeated 98/50   Contacting Dr. Monique pending return call and now will:   12 lead EKG  / caridac enzymes, BMP & CBC stat / placed on tele - d/w Dr. Monique   Pt. continues to denies any CP, no SOB currently son is at bedside  Currently IMDUR is dc'd, and will re-evaluate in am  / v/s q 4hours x 24 hours    Will monitor.    @18:11 addendum - 12 lead EKG with no new changes  / troponin - 55  discussed with Dr. Monique  - will monitor vital signs

## 2018-12-01 NOTE — PROGRESS NOTE ADULT - SUBJECTIVE AND OBJECTIVE BOX
CARDIOLOGY     PROGRESS  NOTE   ________________________________________________    CHIEF COMPLAINT:Patient is a 88y old  Male who presents with a chief complaint of back pain/ sob (01 Dec 2018 06:41)    	  REVIEW OF SYSTEMS:  CONSTITUTIONAL: No fever, weight loss, or fatigue  EYES: No eye pain, visual disturbances, or discharge  ENT:  No difficulty hearing, tinnitus, vertigo; No sinus or throat pain  NECK: No pain or stiffness  RESPIRATORY: No cough, wheezing, chills or hemoptysis; No Shortness of Breath  CARDIOVASCULAR: No chest pain, palpitations, passing out, dizziness, or leg swelling  GASTROINTESTINAL: No abdominal or epigastric pain. No nausea, vomiting, or hematemesis; No diarrhea or constipation. No melena or hematochezia.  GENITOURINARY: No dysuria, frequency, hematuria, or incontinence  NEUROLOGICAL: No headaches, memory loss, loss of strength, numbness, or tremors  SKIN: No itching, burning, rashes, or lesions   LYMPH Nodes: No enlarged glands  ENDOCRINE: No heat or cold intolerance; No hair loss  MUSCULOSKELETAL: No joint pain or swelling; No muscle, back, or extremity pain  PSYCHIATRIC: No depression, anxiety, mood swings, or difficulty sleeping  HEME/LYMPH: No easy bruising, or bleeding gums  ALLERGY AND IMMUNOLOGIC: No hives or eczema	    [ ] All others negative	  [ ] Unable to obtain    PHYSICAL EXAM:  T(C): 36.4 (12-01-18 @ 05:31), Max: 36.6 (11-30-18 @ 13:06)  HR: 76 (12-01-18 @ 05:31) (63 - 76)  BP: 122/65 (12-01-18 @ 05:31) (111/64 - 126/61)  RR: 18 (12-01-18 @ 05:31) (18 - 18)  SpO2: 97% (12-01-18 @ 05:31) (97% - 99%)  Wt(kg): --  I&O's Summary    30 Nov 2018 07:01  -  01 Dec 2018 07:00  --------------------------------------------------------  IN: 1320 mL / OUT: 150 mL / NET: 1170 mL        Appearance: Normal	  HEENT:   Normal oral mucosa, PERRL, EOMI	  Lymphatic: No lymphadenopathy  Cardiovascular: Normal S1 S2, No JVD, No murmurs, No edema  Respiratory: Lungs clear to auscultation	  Psychiatry: A & O x 3, Mood & affect appropriate  Gastrointestinal:  Soft, Non-tender, + BS	  Skin: No rashes, No ecchymoses, No cyanosis	  Neurologic: Non-focal  Extremities: Normal range of motion, No clubbing, cyanosis or edema  Vascular: Peripheral pulses palpable 2+ bilaterally    MEDICATIONS  (STANDING):  aspirin enteric coated 81 milliGRAM(s) Oral daily  atorvastatin 40 milliGRAM(s) Oral at bedtime  clopidogrel Tablet 75 milliGRAM(s) Oral daily  docusate sodium 100 milliGRAM(s) Oral two times a day  famotidine    Tablet 20 milliGRAM(s) Oral daily  furosemide   Injectable 60 milliGRAM(s) IV Push daily  heparin  Injectable 5000 Unit(s) SubCutaneous every 12 hours  isosorbide   mononitrate ER Tablet (IMDUR) 60 milliGRAM(s) Oral daily  lidocaine   Patch 1 Patch Transdermal every 24 hours  metoprolol succinate ER 50 milliGRAM(s) Oral daily  polyethylene glycol 3350 17 Gram(s) Oral daily  potassium chloride    Tablet ER 20 milliEquivalent(s) Oral daily  senna 2 Tablet(s) Oral at bedtime  tamsulosin 0.4 milliGRAM(s) Oral at bedtime      TELEMETRY: 	    ECG:  	  RADIOLOGY:  OTHER: 	  	  LABS:	 	    CARDIAC MARKERS:                                9.6    5.41  )-----------( 216      ( 30 Nov 2018 08:07 )             29.7     11-30    141  |  105  |  32<H>  ----------------------------<  90  4.3   |  23  |  1.95<H>    Ca    9.0      30 Nov 2018 06:26      proBNP: Serum Pro-Brain Natriuretic Peptide: 745 pg/mL (11-28 @ 17:40)    Lipid Profile:   HgA1c:   TSH:         Assessment and plan  ---------------------------  diastolic chf acute on chronic  change lasix to 40 mg daily  cad stable continue asa/plavix/ beta blocker  increase ambulation

## 2018-12-01 NOTE — PROVIDER CONTACT NOTE (CRITICAL VALUE NOTIFICATION) - BACKGROUND
88M PMH  CKD,  CHF  , CAD s/p stents presents with 2 day history of back pain.    Patient reports he went to a store to purchase a reclining chair on Saturday and was flexing and extending his back quite often testing each recliner.

## 2018-12-01 NOTE — PROGRESS NOTE ADULT - SUBJECTIVE AND OBJECTIVE BOX
no  cp/sob  REVIEW OF SYSTEMS:  GEN: no fever,    no chills  RESP: no SOB,   no cough  CVS: no chest pain,   no palpitations  GI: no abdominal pain,   no nausea,   no vomiting,   no constipation,   no diarrhea  : no dysuria,   no frequency  NEURO: no headache,   no dizziness  PSYCH: no depression,   not anxious  Derm : no rash    MEDICATIONS  (STANDING):  aspirin enteric coated 81 milliGRAM(s) Oral daily  atorvastatin 40 milliGRAM(s) Oral at bedtime  clopidogrel Tablet 75 milliGRAM(s) Oral daily  docusate sodium 100 milliGRAM(s) Oral two times a day  famotidine    Tablet 20 milliGRAM(s) Oral daily  furosemide    Tablet 40 milliGRAM(s) Oral daily  heparin  Injectable 5000 Unit(s) SubCutaneous every 12 hours  isosorbide   mononitrate ER Tablet (IMDUR) 60 milliGRAM(s) Oral daily  lidocaine   Patch 1 Patch Transdermal every 24 hours  metoprolol succinate ER 50 milliGRAM(s) Oral daily  polyethylene glycol 3350 17 Gram(s) Oral daily  potassium chloride    Tablet ER 20 milliEquivalent(s) Oral daily  senna 2 Tablet(s) Oral at bedtime  tamsulosin 0.4 milliGRAM(s) Oral at bedtime    MEDICATIONS  (PRN):      Vital Signs Last 24 Hrs  T(C): 36.4 (01 Dec 2018 05:31), Max: 36.6 (30 Nov 2018 13:06)  T(F): 97.5 (01 Dec 2018 05:31), Max: 97.9 (30 Nov 2018 13:06)  HR: 76 (01 Dec 2018 05:31) (63 - 76)  BP: 122/65 (01 Dec 2018 05:31) (111/64 - 126/61)  BP(mean): --  RR: 18 (01 Dec 2018 05:31) (18 - 18)  SpO2: 97% (01 Dec 2018 05:31) (97% - 99%)  CAPILLARY BLOOD GLUCOSE        I&O's Summary    30 Nov 2018 07:01  -  01 Dec 2018 07:00  --------------------------------------------------------  IN: 1320 mL / OUT: 150 mL / NET: 1170 mL        PHYSICAL EXAM:  HEAD:  Atraumatic, Normocephalic  NECK: Supple, No   JVD  CHEST/LUNG:   no     rales,     no,    rhonchi  HEART: Regular rate and rhythm;         murmur  ABDOMEN: Soft, Nontender, ;   EXTREMITIES:    less    edema  NEUROLOGY:  alert    LABS:                        9.6    5.41  )-----------( 216      ( 30 Nov 2018 08:07 )             29.7     11-30    141  |  105  |  32<H>  ----------------------------<  90  4.3   |  23  |  1.95<H>    Ca    9.0      30 Nov 2018 06:26                              Consultant(s) Notes Reviewed:      Care Discussed with Consultants/Other Providers:

## 2018-12-01 NOTE — PROGRESS NOTE ADULT - SUBJECTIVE AND OBJECTIVE BOX
SUBJECTIVE: pt says lbp is better  legs feel stronger    Medications:  aspirin enteric coated 81 milliGRAM(s) Oral daily  atorvastatin 40 milliGRAM(s) Oral at bedtime  clopidogrel Tablet 75 milliGRAM(s) Oral daily  docusate sodium 100 milliGRAM(s) Oral two times a day  famotidine    Tablet 20 milliGRAM(s) Oral daily  furosemide   Injectable 60 milliGRAM(s) IV Push daily  heparin  Injectable 5000 Unit(s) SubCutaneous every 12 hours  isosorbide   mononitrate ER Tablet (IMDUR) 60 milliGRAM(s) Oral daily  lidocaine   Patch 1 Patch Transdermal every 24 hours  metoprolol succinate ER 50 milliGRAM(s) Oral daily  polyethylene glycol 3350 17 Gram(s) Oral daily  potassium chloride    Tablet ER 20 milliEquivalent(s) Oral daily  senna 2 Tablet(s) Oral at bedtime  tamsulosin 0.4 milliGRAM(s) Oral at bedtime      Labs:  CBC Full  -  ( 30 Nov 2018 08:07 )  WBC Count : 5.41 K/uL  Hemoglobin : 9.6 g/dL  Hematocrit : 29.7 %  Platelet Count - Automated : 216 K/uL  Mean Cell Volume : 85.6 fl  Mean Cell Hemoglobin : 27.7 pg  Mean Cell Hemoglobin Concentration : 32.3 gm/dL  Auto Neutrophil # : x  Auto Lymphocyte # : x  Auto Monocyte # : x  Auto Eosinophil # : x  Auto Basophil # : x  Auto Neutrophil % : x  Auto Lymphocyte % : x  Auto Monocyte % : x  Auto Eosinophil % : x  Auto Basophil % : x    11-30    141  |  105  |  32<H>  ----------------------------<  90  4.3   |  23  |  1.95<H>    Ca    9.0      30 Nov 2018 06:26      CAPILLARY BLOOD GLUCOSE                  Vitals:  Vital Signs Last 24 Hrs  T(C): 36.4 (01 Dec 2018 05:31), Max: 36.6 (30 Nov 2018 13:06)  T(F): 97.5 (01 Dec 2018 05:31), Max: 97.9 (30 Nov 2018 13:06)  HR: 76 (01 Dec 2018 05:31) (63 - 76)  BP: 122/65 (01 Dec 2018 05:31) (111/64 - 126/61)  BP(mean): --  RR: 18 (01 Dec 2018 05:31) (18 - 18)  SpO2: 97% (01 Dec 2018 05:31) (97% - 99%)  NEUROLOGICAL EXAM:    Mental status: Awake, alert, and in no apparent distress. Oriented to person, place and time. Language function is normal. Recent memory, digit span and concentration were normal.     Cranial Nerves: Pupils were equal, round, reactive to light. Extraocular movements were intact. Visual field were full. Fundoscopic exam was deferred. Facial sensation was intact to light touch. There was no facial asymmetry. The palate was upgoing symmetrically and tongue was midline. Hearing acuity was intact to finger rub AU. Shoulder shrug was full bilaterally    Motor exam: Bulk and tone were normal. Strength was 5/5 in upper extremities. 5-/5 in the lower extremitiesFine finger movements were symmetric and normal. There was no pronator drift. Positive Straight leg raise on right side     Reflexes: 2+ in the bilateral upper extremities. 2+ in the bilateral lower extremities. Toes were downgoing bilaterally.     Sensation: Intact to light touch, temperature, vibration and proprioception.     Coordination: Finger-nose-finger and heel-to-shin was without dysmetria.       Imaging:    < from: Xray Lumbar Spine AP + Lateral (11.28.18 @ 18:08) >  FINDINGS/  IMPRESSION: No acute fracture or traumatic subluxation. Vertebral body   heights are maintained. No acute malalignments of vertebral bodies.   Multilevel degenerative changes. Partially imaged severe left hip   arthrosis. Narrowing of the right hip is partially imaged. Narrowing of   both sacroiliac joints appreciated.    < end of copied text >

## 2018-12-01 NOTE — PROGRESS NOTE ADULT - ASSESSMENT
88 year old M with a PMH of CAD  ,   (w/ PCI to LAD and Cx    in 2/2018),  on asa/ plavix/ statin    CKDIII, and HTN ,  hld.  c/c  anemia     worsening LE edema .  pt  stopped   his  lasix  acute  diastolic  chf  iv lasix/ pedal edema has  lessened   anemia. gi eval/ guaiac negative    daily weights  ckd  stable  spoke  with  neuro  de  [place] now, pt  doing well.  ambulating by  self/  ha s no   back  pain  no  indication  for  acute  imaging   po lasix  h/o non compliance  with meds   can  f/p with  dr alka monroe as  an op

## 2018-12-02 LAB
ANION GAP SERPL CALC-SCNC: 14 MMOL/L — SIGNIFICANT CHANGE UP (ref 5–17)
BUN SERPL-MCNC: 35 MG/DL — HIGH (ref 7–23)
CALCIUM SERPL-MCNC: 9 MG/DL — SIGNIFICANT CHANGE UP (ref 8.4–10.5)
CHLORIDE SERPL-SCNC: 104 MMOL/L — SIGNIFICANT CHANGE UP (ref 96–108)
CO2 SERPL-SCNC: 24 MMOL/L — SIGNIFICANT CHANGE UP (ref 22–31)
CREAT SERPL-MCNC: 1.96 MG/DL — HIGH (ref 0.5–1.3)
GLUCOSE SERPL-MCNC: 90 MG/DL — SIGNIFICANT CHANGE UP (ref 70–99)
HCT VFR BLD CALC: 28.3 % — LOW (ref 39–50)
HGB BLD-MCNC: 9.2 G/DL — LOW (ref 13–17)
MCHC RBC-ENTMCNC: 27.3 PG — SIGNIFICANT CHANGE UP (ref 27–34)
MCHC RBC-ENTMCNC: 32.5 GM/DL — SIGNIFICANT CHANGE UP (ref 32–36)
MCV RBC AUTO: 84 FL — SIGNIFICANT CHANGE UP (ref 80–100)
PLATELET # BLD AUTO: 214 K/UL — SIGNIFICANT CHANGE UP (ref 150–400)
POTASSIUM SERPL-MCNC: 4 MMOL/L — SIGNIFICANT CHANGE UP (ref 3.5–5.3)
POTASSIUM SERPL-SCNC: 4 MMOL/L — SIGNIFICANT CHANGE UP (ref 3.5–5.3)
RBC # BLD: 3.37 M/UL — LOW (ref 4.2–5.8)
RBC # FLD: 16.5 % — HIGH (ref 10.3–14.5)
SODIUM SERPL-SCNC: 142 MMOL/L — SIGNIFICANT CHANGE UP (ref 135–145)
WBC # BLD: 5.13 K/UL — SIGNIFICANT CHANGE UP (ref 3.8–10.5)
WBC # FLD AUTO: 5.13 K/UL — SIGNIFICANT CHANGE UP (ref 3.8–10.5)

## 2018-12-02 RX ADMIN — Medication 20 MILLIEQUIVALENT(S): at 13:13

## 2018-12-02 RX ADMIN — SENNA PLUS 2 TABLET(S): 8.6 TABLET ORAL at 21:42

## 2018-12-02 RX ADMIN — LIDOCAINE 1 PATCH: 4 CREAM TOPICAL at 19:47

## 2018-12-02 RX ADMIN — HEPARIN SODIUM 5000 UNIT(S): 5000 INJECTION INTRAVENOUS; SUBCUTANEOUS at 06:22

## 2018-12-02 RX ADMIN — CLOPIDOGREL BISULFATE 75 MILLIGRAM(S): 75 TABLET, FILM COATED ORAL at 13:13

## 2018-12-02 RX ADMIN — Medication 81 MILLIGRAM(S): at 13:13

## 2018-12-02 RX ADMIN — Medication 40 MILLIGRAM(S): at 06:22

## 2018-12-02 RX ADMIN — TAMSULOSIN HYDROCHLORIDE 0.4 MILLIGRAM(S): 0.4 CAPSULE ORAL at 21:42

## 2018-12-02 RX ADMIN — ATORVASTATIN CALCIUM 40 MILLIGRAM(S): 80 TABLET, FILM COATED ORAL at 21:42

## 2018-12-02 RX ADMIN — FAMOTIDINE 20 MILLIGRAM(S): 10 INJECTION INTRAVENOUS at 13:13

## 2018-12-02 RX ADMIN — LIDOCAINE 1 PATCH: 4 CREAM TOPICAL at 17:35

## 2018-12-02 RX ADMIN — Medication 50 MILLIGRAM(S): at 06:22

## 2018-12-02 RX ADMIN — HEPARIN SODIUM 5000 UNIT(S): 5000 INJECTION INTRAVENOUS; SUBCUTANEOUS at 17:35

## 2018-12-02 RX ADMIN — LIDOCAINE 1 PATCH: 4 CREAM TOPICAL at 06:17

## 2018-12-02 NOTE — PROGRESS NOTE ADULT - SUBJECTIVE AND OBJECTIVE BOX
CARDIOLOGY     PROGRESS  NOTE   ________________________________________________    CHIEF COMPLAINT:Patient is a 88y old  Male who presents with a chief complaint of back pain/ sob (01 Dec 2018 08:48)    	  REVIEW OF SYSTEMS:  CONSTITUTIONAL: No fever, weight loss, or fatigue  EYES: No eye pain, visual disturbances, or discharge  ENT:  No difficulty hearing, tinnitus, vertigo; No sinus or throat pain  NECK: No pain or stiffness  RESPIRATORY: No cough, wheezing, chills or hemoptysis; No Shortness of Breath  CARDIOVASCULAR: No chest pain, palpitations, passing out, dizziness, or leg swelling  GASTROINTESTINAL: No abdominal or epigastric pain. No nausea, vomiting, or hematemesis; No diarrhea or constipation. No melena or hematochezia.  GENITOURINARY: No dysuria, frequency, hematuria, or incontinence  NEUROLOGICAL: No headaches, memory loss, loss of strength, numbness, or tremors  SKIN: No itching, burning, rashes, or lesions   LYMPH Nodes: No enlarged glands  ENDOCRINE: No heat or cold intolerance; No hair loss  MUSCULOSKELETAL: No joint pain or swelling; No muscle, back, or extremity pain  PSYCHIATRIC: No depression, anxiety, mood swings, or difficulty sleeping  HEME/LYMPH: No easy bruising, or bleeding gums  ALLERGY AND IMMUNOLOGIC: No hives or eczema	    [ ] All others negative	  [ ] Unable to obtain    PHYSICAL EXAM:  T(C): 36.9 (12-02-18 @ 06:13), Max: 37.1 (12-01-18 @ 20:29)  HR: 78 (12-02-18 @ 06:13) (72 - 88)  BP: 135/67 (12-02-18 @ 06:13) (90/50 - 135/67)  RR: 18 (12-02-18 @ 06:13) (18 - 18)  SpO2: 97% (12-02-18 @ 06:13) (96% - 99%)  Wt(kg): --  I&O's Summary    01 Dec 2018 07:01  -  02 Dec 2018 07:00  --------------------------------------------------------  IN: 600 mL / OUT: 1075 mL / NET: -475 mL        Appearance: Normal	  HEENT:   Normal oral mucosa, PERRL, EOMI	  Lymphatic: No lymphadenopathy  Cardiovascular: Normal S1 S2, No JVD, No murmurs, No edema  Respiratory: Lungs clear to auscultation	  Psychiatry: A & O x 3, Mood & affect appropriate  Gastrointestinal:  Soft, Non-tender, + BS	  Skin: No rashes, No ecchymoses, No cyanosis	  Neurologic: Non-focal  Extremities: Normal range of motion, No clubbing, cyanosis or edema  Vascular: Peripheral pulses palpable 2+ bilaterally    MEDICATIONS  (STANDING):  aspirin enteric coated 81 milliGRAM(s) Oral daily  atorvastatin 40 milliGRAM(s) Oral at bedtime  clopidogrel Tablet 75 milliGRAM(s) Oral daily  famotidine    Tablet 20 milliGRAM(s) Oral daily  furosemide    Tablet 40 milliGRAM(s) Oral daily  heparin  Injectable 5000 Unit(s) SubCutaneous every 12 hours  lidocaine   Patch 1 Patch Transdermal every 24 hours  metoprolol succinate ER 50 milliGRAM(s) Oral daily  potassium chloride    Tablet ER 20 milliEquivalent(s) Oral daily  senna 2 Tablet(s) Oral at bedtime  tamsulosin 0.4 milliGRAM(s) Oral at bedtime      TELEMETRY: 	    ECG:  	  RADIOLOGY:  OTHER: 	  	  LABS:	 	    CARDIAC MARKERS:  CARDIAC MARKERS ( 01 Dec 2018 16:33 )  x     / x     / 176 U/L / x     / 5.6 ng/mL                                10.2   6.1   )-----------( 227      ( 01 Dec 2018 16:33 )             30.8     12-01    139  |  101  |  40<H>  ----------------------------<  104<H>  4.1   |  24  |  2.36<H>    Ca    8.8      01 Dec 2018 16:33      proBNP: Serum Pro-Brain Natriuretic Peptide: 745 pg/mL (11-28 @ 17:40)    Lipid Profile:   HgA1c:   TSH:         Assessment and plan  ---------------------------  events noted  hypotension on standing ?orthostatic sec to overdiuresis  Aguilar stocking  decrease  diuretics  physical therapy

## 2018-12-02 NOTE — PROGRESS NOTE ADULT - SUBJECTIVE AND OBJECTIVE BOX
had  sob/   s/p  hypotenssion    REVIEW OF SYSTEMS:  GEN: no fever,    no chills  RESP: no SOB,   no cough  CVS: no chest pain,   no palpitations  GI: no abdominal pain,   no nausea,   no vomiting,   no constipation,   no diarrhea  : no dysuria,   no frequency  NEURO: no headache,   no dizziness  PSYCH: no depression,   not anxious  Derm : no rash    MEDICATIONS  (STANDING):  aspirin enteric coated 81 milliGRAM(s) Oral daily  atorvastatin 40 milliGRAM(s) Oral at bedtime  clopidogrel Tablet 75 milliGRAM(s) Oral daily  famotidine    Tablet 20 milliGRAM(s) Oral daily  heparin  Injectable 5000 Unit(s) SubCutaneous every 12 hours  lidocaine   Patch 1 Patch Transdermal every 24 hours  metoprolol succinate ER 50 milliGRAM(s) Oral daily  potassium chloride    Tablet ER 20 milliEquivalent(s) Oral daily  senna 2 Tablet(s) Oral at bedtime  tamsulosin 0.4 milliGRAM(s) Oral at bedtime    MEDICATIONS  (PRN):      Vital Signs Last 24 Hrs  T(C): 36.9 (02 Dec 2018 06:13), Max: 37.1 (01 Dec 2018 20:29)  T(F): 98.4 (02 Dec 2018 06:13), Max: 98.8 (01 Dec 2018 20:29)  HR: 78 (02 Dec 2018 06:13) (72 - 88)  BP: 135/67 (02 Dec 2018 06:13) (90/50 - 135/67)  BP(mean): --  RR: 18 (02 Dec 2018 06:13) (18 - 18)  SpO2: 97% (02 Dec 2018 06:13) (96% - 99%)  CAPILLARY BLOOD GLUCOSE        I&O's Summary    01 Dec 2018 07:01  -  02 Dec 2018 07:00  --------------------------------------------------------  IN: 600 mL / OUT: 1075 mL / NET: -475 mL    02 Dec 2018 07:01  -  02 Dec 2018 09:12  --------------------------------------------------------  IN: 240 mL / OUT: 0 mL / NET: 240 mL        PHYSICAL EXAM:  HEAD:  Atraumatic, Normocephalic  NECK: Supple, No   JVD  CHEST/LUNG:   no     rales,     no,    rhonchi  HEART: Regular rate and rhythm;         murmur  ABDOMEN: Soft, Nontender, ;   EXTREMITIES:    less    edema  NEUROLOGY:  alert    LABS:                        10.2   6.1   )-----------( 227      ( 01 Dec 2018 16:33 )             30.8     12-01    139  |  101  |  40<H>  ----------------------------<  104<H>  4.1   |  24  |  2.36<H>    Ca    8.8      01 Dec 2018 16:33        CARDIAC MARKERS ( 01 Dec 2018 16:33 )  x     / x     / 176 U/L / x     / 5.6 ng/mL                        Consultant(s) Notes Reviewed:      Care Discussed with Consultants/Other Providers:

## 2018-12-02 NOTE — PROGRESS NOTE ADULT - ASSESSMENT
88 year old M with a PMH of CAD  ,   (w/ PCI to LAD and Cx    in 2/2018),  on asa/ plavix/ statin    CKDIII, and HTN ,  hld.  c/c  anemia     worsening LE edema .  pt  stopped   his  lasix  acute  diastolic  chf  iv lasix/ pedal edema has  lessened   anemia. gi eval/ guaiac negative    daily weights  ckd  stable  spoke  with  neuro  de  ma[place] now, pt  doing well.  ambulating by  self/  ha s no   back  pain  no  indication  for  acute  imaging   po lasix on hold  for  low  sbp   althea, from lasix

## 2018-12-03 VITALS
HEART RATE: 68 BPM | OXYGEN SATURATION: 97 % | TEMPERATURE: 98 F | DIASTOLIC BLOOD PRESSURE: 55 MMHG | RESPIRATION RATE: 18 BRPM | SYSTOLIC BLOOD PRESSURE: 109 MMHG

## 2018-12-03 LAB
ANION GAP SERPL CALC-SCNC: 12 MMOL/L — SIGNIFICANT CHANGE UP (ref 5–17)
BUN SERPL-MCNC: 30 MG/DL — HIGH (ref 7–23)
CALCIUM SERPL-MCNC: 8.9 MG/DL — SIGNIFICANT CHANGE UP (ref 8.4–10.5)
CHLORIDE SERPL-SCNC: 106 MMOL/L — SIGNIFICANT CHANGE UP (ref 96–108)
CO2 SERPL-SCNC: 24 MMOL/L — SIGNIFICANT CHANGE UP (ref 22–31)
CREAT SERPL-MCNC: 1.72 MG/DL — HIGH (ref 0.5–1.3)
GLUCOSE SERPL-MCNC: 97 MG/DL — SIGNIFICANT CHANGE UP (ref 70–99)
HCT VFR BLD CALC: 30.2 % — LOW (ref 39–50)
HGB BLD-MCNC: 9.7 G/DL — LOW (ref 13–17)
MCHC RBC-ENTMCNC: 27.7 PG — SIGNIFICANT CHANGE UP (ref 27–34)
MCHC RBC-ENTMCNC: 32.1 GM/DL — SIGNIFICANT CHANGE UP (ref 32–36)
MCV RBC AUTO: 86.3 FL — SIGNIFICANT CHANGE UP (ref 80–100)
PLATELET # BLD AUTO: 236 K/UL — SIGNIFICANT CHANGE UP (ref 150–400)
POTASSIUM SERPL-MCNC: 4.2 MMOL/L — SIGNIFICANT CHANGE UP (ref 3.5–5.3)
POTASSIUM SERPL-SCNC: 4.2 MMOL/L — SIGNIFICANT CHANGE UP (ref 3.5–5.3)
RBC # BLD: 3.5 M/UL — LOW (ref 4.2–5.8)
RBC # FLD: 16.7 % — HIGH (ref 10.3–14.5)
SODIUM SERPL-SCNC: 142 MMOL/L — SIGNIFICANT CHANGE UP (ref 135–145)
WBC # BLD: 4.99 K/UL — SIGNIFICANT CHANGE UP (ref 3.8–10.5)
WBC # FLD AUTO: 4.99 K/UL — SIGNIFICANT CHANGE UP (ref 3.8–10.5)

## 2018-12-03 PROCEDURE — 82553 CREATINE MB FRACTION: CPT

## 2018-12-03 PROCEDURE — 99285 EMERGENCY DEPT VISIT HI MDM: CPT | Mod: 25

## 2018-12-03 PROCEDURE — 80053 COMPREHEN METABOLIC PANEL: CPT

## 2018-12-03 PROCEDURE — 82947 ASSAY GLUCOSE BLOOD QUANT: CPT

## 2018-12-03 PROCEDURE — 82728 ASSAY OF FERRITIN: CPT

## 2018-12-03 PROCEDURE — 83540 ASSAY OF IRON: CPT

## 2018-12-03 PROCEDURE — 85027 COMPLETE CBC AUTOMATED: CPT

## 2018-12-03 PROCEDURE — 84132 ASSAY OF SERUM POTASSIUM: CPT

## 2018-12-03 PROCEDURE — 83605 ASSAY OF LACTIC ACID: CPT

## 2018-12-03 PROCEDURE — 97162 PT EVAL MOD COMPLEX 30 MIN: CPT

## 2018-12-03 PROCEDURE — 96374 THER/PROPH/DIAG INJ IV PUSH: CPT

## 2018-12-03 PROCEDURE — 80048 BASIC METABOLIC PNL TOTAL CA: CPT

## 2018-12-03 PROCEDURE — 82272 OCCULT BLD FECES 1-3 TESTS: CPT

## 2018-12-03 PROCEDURE — 85014 HEMATOCRIT: CPT

## 2018-12-03 PROCEDURE — 72100 X-RAY EXAM L-S SPINE 2/3 VWS: CPT

## 2018-12-03 PROCEDURE — 97116 GAIT TRAINING THERAPY: CPT

## 2018-12-03 PROCEDURE — 83550 IRON BINDING TEST: CPT

## 2018-12-03 PROCEDURE — 82435 ASSAY OF BLOOD CHLORIDE: CPT

## 2018-12-03 PROCEDURE — 83880 ASSAY OF NATRIURETIC PEPTIDE: CPT

## 2018-12-03 PROCEDURE — 93005 ELECTROCARDIOGRAM TRACING: CPT

## 2018-12-03 PROCEDURE — 84484 ASSAY OF TROPONIN QUANT: CPT

## 2018-12-03 PROCEDURE — 82330 ASSAY OF CALCIUM: CPT

## 2018-12-03 PROCEDURE — 84295 ASSAY OF SERUM SODIUM: CPT

## 2018-12-03 PROCEDURE — 82550 ASSAY OF CK (CPK): CPT

## 2018-12-03 PROCEDURE — 82803 BLOOD GASES ANY COMBINATION: CPT

## 2018-12-03 RX ORDER — FUROSEMIDE 40 MG
40 TABLET ORAL DAILY
Qty: 0 | Refills: 0 | Status: DISCONTINUED | OUTPATIENT
Start: 2018-12-03 | End: 2018-12-03

## 2018-12-03 RX ORDER — FUROSEMIDE 40 MG
1 TABLET ORAL
Qty: 30 | Refills: 0 | OUTPATIENT
Start: 2018-12-03 | End: 2019-01-01

## 2018-12-03 RX ADMIN — Medication 20 MILLIEQUIVALENT(S): at 12:49

## 2018-12-03 RX ADMIN — LIDOCAINE 1 PATCH: 4 CREAM TOPICAL at 06:14

## 2018-12-03 RX ADMIN — CLOPIDOGREL BISULFATE 75 MILLIGRAM(S): 75 TABLET, FILM COATED ORAL at 12:49

## 2018-12-03 RX ADMIN — HEPARIN SODIUM 5000 UNIT(S): 5000 INJECTION INTRAVENOUS; SUBCUTANEOUS at 06:13

## 2018-12-03 RX ADMIN — Medication 81 MILLIGRAM(S): at 12:49

## 2018-12-03 RX ADMIN — FAMOTIDINE 20 MILLIGRAM(S): 10 INJECTION INTRAVENOUS at 12:49

## 2018-12-03 RX ADMIN — Medication 50 MILLIGRAM(S): at 06:13

## 2018-12-03 NOTE — PROGRESS NOTE ADULT - SUBJECTIVE AND OBJECTIVE BOX
CARDIOLOGY     PROGRESS  NOTE   ________________________________________________    CHIEF COMPLAINT:Patient is a 88y old  Male who presents with a chief complaint of back pain/ sob (02 Dec 2018 09:12)    	  REVIEW OF SYSTEMS:  CONSTITUTIONAL: No fever, weight loss, or fatigue  EYES: No eye pain, visual disturbances, or discharge  ENT:  No difficulty hearing, tinnitus, vertigo; No sinus or throat pain  NECK: No pain or stiffness  RESPIRATORY: No cough, wheezing, chills or hemoptysis; No Shortness of Breath  CARDIOVASCULAR: No chest pain, palpitations, passing out, dizziness, or leg swelling  GASTROINTESTINAL: No abdominal or epigastric pain. No nausea, vomiting, or hematemesis; No diarrhea or constipation. No melena or hematochezia.  GENITOURINARY: No dysuria, frequency, hematuria, or incontinence  NEUROLOGICAL: No headaches, memory loss, loss of strength, numbness, or tremors  SKIN: No itching, burning, rashes, or lesions   LYMPH Nodes: No enlarged glands  ENDOCRINE: No heat or cold intolerance; No hair loss  MUSCULOSKELETAL: No joint pain or swelling; No muscle, back, or extremity pain  PSYCHIATRIC: No depression, anxiety, mood swings, or difficulty sleeping  HEME/LYMPH: No easy bruising, or bleeding gums  ALLERGY AND IMMUNOLOGIC: No hives or eczema	    [ ] All others negative	  [ ] Unable to obtain    PHYSICAL EXAM:  T(C): 36.6 (12-03-18 @ 06:08), Max: 37.1 (12-02-18 @ 14:12)  HR: 73 (12-03-18 @ 06:08) (73 - 81)  BP: 154/75 (12-03-18 @ 06:08) (117/63 - 154/75)  RR: 18 (12-03-18 @ 06:08) (18 - 18)  SpO2: 97% (12-03-18 @ 06:08) (97% - 98%)  Wt(kg): --  I&O's Summary    02 Dec 2018 07:01  -  03 Dec 2018 07:00  --------------------------------------------------------  IN: 1320 mL / OUT: 900 mL / NET: 420 mL        Appearance: Normal	  HEENT:   Normal oral mucosa, PERRL, EOMI	  Lymphatic: No lymphadenopathy  Cardiovascular: Normal S1 S2, No JVD, No murmurs, No edema  Respiratory: Lungs clear to auscultation	  Psychiatry: A & O x 3, Mood & affect appropriate  Gastrointestinal:  Soft, Non-tender, + BS	  Skin: No rashes, No ecchymoses, No cyanosis	  Neurologic: Non-focal  Extremities: Normal range of motion, No clubbing, cyanosis or edema  Vascular: Peripheral pulses palpable 2+ bilaterally    MEDICATIONS  (STANDING):  aspirin enteric coated 81 milliGRAM(s) Oral daily  atorvastatin 40 milliGRAM(s) Oral at bedtime  clopidogrel Tablet 75 milliGRAM(s) Oral daily  famotidine    Tablet 20 milliGRAM(s) Oral daily  heparin  Injectable 5000 Unit(s) SubCutaneous every 12 hours  lidocaine   Patch 1 Patch Transdermal every 24 hours  metoprolol succinate ER 50 milliGRAM(s) Oral daily  potassium chloride    Tablet ER 20 milliEquivalent(s) Oral daily  senna 2 Tablet(s) Oral at bedtime  tamsulosin 0.4 milliGRAM(s) Oral at bedtime      TELEMETRY: 	    ECG:  	  RADIOLOGY:  OTHER: 	  	  LABS:	 	    CARDIAC MARKERS:  CARDIAC MARKERS ( 01 Dec 2018 16:33 )  x     / x     / 176 U/L / x     / 5.6 ng/mL                                9.2    5.13  )-----------( 214      ( 02 Dec 2018 09:23 )             28.3     12-03    142  |  106  |  30<H>  ----------------------------<  97  4.2   |  24  |  1.72<H>    Ca    8.9      03 Dec 2018 06:26      proBNP: Serum Pro-Brain Natriuretic Peptide: 745 pg/mL (11-28 @ 17:40)    Lipid Profile:   HgA1c:   TSH:         Assessment and plan  ---------------------------  doing well  restart on lasix 40 mg daily  sig venous insuffiencey  b/l ARIA stockings

## 2018-12-03 NOTE — PROGRESS NOTE ADULT - ASSESSMENT
88 year old gentleman pmhx CHF, CKD, CAD s/p stents (last Feb 2018) admitted with CHF exacerbation and worsening lower back pain for past few weeks (since last monday), reports began after trying out reclining chairs and last monday became more severe, left > right across lower thoracic, upper lumbar region with radiation to anterior thighs and weakness in legs trouble walking due to pain, denies numbness or tingling. Has chronic LE swelling and prior remote hx of cellulitis, now has left lateral calf open excoriated area, had about of urinary leakage this evening. Denies any fevers. On exam UE full power, LE 4+-5-/5 prox hip flexion, knee flexion, ADF, some pain limited overlay, DTRs brisk at knees, depressed ankles and toes flexor.     A/P: Radicular lower thoracic/upper lumbar back pain, no max myelopathic features but patellae are brisk. Suspect secondary to degenerative disc disease, spinal stenosis but with LE wounds, osteo in differential  Plan  1. MRI TLS spine   was cancelled by primary team as pt sx are better  2. Pt does not wish further inpt neuro workup  3. If sx recur, pt understands to call our office or comne to the ER  reconsult prn

## 2018-12-03 NOTE — PROGRESS NOTE ADULT - SUBJECTIVE AND OBJECTIVE BOX
no cp/sob    REVIEW OF SYSTEMS:  GEN: no fever,    no chills  RESP: no SOB,   no cough  CVS: no chest pain,   no palpitations  GI: no abdominal pain,   no nausea,   no vomiting,   no constipation,   no diarrhea  : no dysuria,   no frequency  NEURO: no headache,   no dizziness  PSYCH: no depression,   not anxious  Derm : no rash    MEDICATIONS  (STANDING):  aspirin enteric coated 81 milliGRAM(s) Oral daily  atorvastatin 40 milliGRAM(s) Oral at bedtime  clopidogrel Tablet 75 milliGRAM(s) Oral daily  famotidine    Tablet 20 milliGRAM(s) Oral daily  furosemide    Tablet 40 milliGRAM(s) Oral daily  heparin  Injectable 5000 Unit(s) SubCutaneous every 12 hours  lidocaine   Patch 1 Patch Transdermal every 24 hours  metoprolol succinate ER 50 milliGRAM(s) Oral daily  potassium chloride    Tablet ER 20 milliEquivalent(s) Oral daily  senna 2 Tablet(s) Oral at bedtime  tamsulosin 0.4 milliGRAM(s) Oral at bedtime    MEDICATIONS  (PRN):      Vital Signs Last 24 Hrs  T(C): 36.6 (03 Dec 2018 06:08), Max: 37.1 (02 Dec 2018 14:12)  T(F): 97.8 (03 Dec 2018 06:08), Max: 98.7 (02 Dec 2018 14:12)  HR: 73 (03 Dec 2018 06:08) (73 - 81)  BP: 154/75 (03 Dec 2018 06:08) (117/63 - 154/75)  BP(mean): --  RR: 18 (03 Dec 2018 06:08) (18 - 18)  SpO2: 97% (03 Dec 2018 06:08) (97% - 98%)  CAPILLARY BLOOD GLUCOSE        I&O's Summary    02 Dec 2018 07:01  -  03 Dec 2018 07:00  --------------------------------------------------------  IN: 1320 mL / OUT: 900 mL / NET: 420 mL    03 Dec 2018 07:01  -  03 Dec 2018 08:51  --------------------------------------------------------  IN: 0 mL / OUT: 100 mL / NET: -100 mL        PHYSICAL EXAM:  HEAD:  Atraumatic, Normocephalic  NECK: Supple, No   JVD  CHEST/LUNG:   no     rales,     no,    rhonchi  HEART: Regular rate and rhythm;         murmur  ABDOMEN: Soft, Nontender, ;   EXTREMITIES:    less    edema  NEUROLOGY:  alert    LABS:                        9.7    4.99  )-----------( 236      ( 03 Dec 2018 07:55 )             30.2     12-03    142  |  106  |  30<H>  ----------------------------<  97  4.2   |  24  |  1.72<H>    Ca    8.9      03 Dec 2018 06:26        CARDIAC MARKERS ( 01 Dec 2018 16:33 )  x     / x     / 176 U/L / x     / 5.6 ng/mL                        Consultant(s) Notes Reviewed:      Care Discussed with Consultants/Other Providers:

## 2018-12-03 NOTE — PROGRESS NOTE ADULT - SUBJECTIVE AND OBJECTIVE BOX
SUBJECTIVE: pt says lbp is better  legs feel stronger  in chair    Medications:  aspirin enteric coated 81 milliGRAM(s) Oral daily  atorvastatin 40 milliGRAM(s) Oral at bedtime  clopidogrel Tablet 75 milliGRAM(s) Oral daily  famotidine    Tablet 20 milliGRAM(s) Oral daily  heparin  Injectable 5000 Unit(s) SubCutaneous every 12 hours  lidocaine   Patch 1 Patch Transdermal every 24 hours  metoprolol succinate ER 50 milliGRAM(s) Oral daily  potassium chloride    Tablet ER 20 milliEquivalent(s) Oral daily  senna 2 Tablet(s) Oral at bedtime  tamsulosin 0.4 milliGRAM(s) Oral at bedtime      Labs:  CBC Full  -  ( 03 Dec 2018 07:55 )  WBC Count : 4.99 K/uL  Hemoglobin : 9.7 g/dL  Hematocrit : 30.2 %  Platelet Count - Automated : 236 K/uL  Mean Cell Volume : 86.3 fl  Mean Cell Hemoglobin : 27.7 pg  Mean Cell Hemoglobin Concentration : 32.1 gm/dL  Auto Neutrophil # : x  Auto Lymphocyte # : x  Auto Monocyte # : x  Auto Eosinophil # : x  Auto Basophil # : x  Auto Neutrophil % : x  Auto Lymphocyte % : x  Auto Monocyte % : x  Auto Eosinophil % : x  Auto Basophil % : x    12-03    142  |  106  |  30<H>  ----------------------------<  97  4.2   |  24  |  1.72<H>    Ca    8.9      03 Dec 2018 06:26      CAPILLARY BLOOD GLUCOSE                  Vitals:  Vital Signs Last 24 Hrs  T(C): 36.6 (03 Dec 2018 06:08), Max: 37.1 (02 Dec 2018 14:12)  T(F): 97.8 (03 Dec 2018 06:08), Max: 98.7 (02 Dec 2018 14:12)  HR: 73 (03 Dec 2018 06:08) (73 - 81)  BP: 154/75 (03 Dec 2018 06:08) (117/63 - 154/75)  BP(mean): --  RR: 18 (03 Dec 2018 06:08) (18 - 18)  SpO2: 97% (03 Dec 2018 06:08) (97% - 98%)  NEUROLOGICAL EXAM:    Mental status: Awake, alert, and in no apparent distress. Oriented to person, place and time. Language function is normal. Recent memory, digit span and concentration were normal.     Cranial Nerves: Pupils were equal, round, reactive to light. Extraocular movements were intact. Visual field were full. Fundoscopic exam was deferred. Facial sensation was intact to light touch. There was no facial asymmetry. The palate was upgoing symmetrically and tongue was midline. Hearing acuity was intact to finger rub AU. Shoulder shrug was full bilaterally    Motor exam: Bulk and tone were normal. Strength was 5/5 in upper extremities. 5/5 in the lower extremities     Reflexes: 2+ in the bilateral upper extremities. 2+ in the bilateral lower extremities. Toes were downgoing bilaterally.     Sensation: Intact to light touch, temperature, vibration and proprioception.     Coordination: Finger-nose-finger and heel-to-shin was without dysmetria.       Imaging:    < from: Xray Lumbar Spine AP + Lateral (11.28.18 @ 18:08) >  FINDINGS/  IMPRESSION: No acute fracture or traumatic subluxation. Vertebral body   heights are maintained. No acute malalignments of vertebral bodies.   Multilevel degenerative changes. Partially imaged severe left hip   arthrosis. Narrowing of the right hip is partially imaged. Narrowing of   both sacroiliac joints appreciated.    < end of copied text >

## 2018-12-03 NOTE — PROGRESS NOTE ADULT - SUBJECTIVE AND OBJECTIVE BOX
Patient is a 88y old  Male who presented with a chief complaint of back pain/ sob (03 Dec 2018 08:51)      INTERVAL HPI/OVERNIGHT EVENTS:  no rectal bleeding or melena +BMs (daily over weekend)  tolerating PO diet  appetite good  eager to go home    MEDICATIONS  (STANDING):  aspirin enteric coated 81 milliGRAM(s) Oral daily  atorvastatin 40 milliGRAM(s) Oral at bedtime  clopidogrel Tablet 75 milliGRAM(s) Oral daily  famotidine    Tablet 20 milliGRAM(s) Oral daily  furosemide    Tablet 40 milliGRAM(s) Oral daily  heparin  Injectable 5000 Unit(s) SubCutaneous every 12 hours  lidocaine   Patch 1 Patch Transdermal every 24 hours  metoprolol succinate ER 50 milliGRAM(s) Oral daily  potassium chloride    Tablet ER 20 milliEquivalent(s) Oral daily  senna 2 Tablet(s) Oral at bedtime  tamsulosin 0.4 milliGRAM(s) Oral at bedtime      Allergies  penicillin (Angioedema)      Review of Systems:  General:  No fevers or chills    ENT:  No sore throat, or runny nose, no dysphagia  CV:  No chest pain  Resp:  No dyspnea, cough, or wheezing  Vasc: decreased leg swelling and discomfort    Vital Signs Last 24 Hrs  T(C): 36.6 (03 Dec 2018 06:08), Max: 37.1 (02 Dec 2018 14:12)  T(F): 97.8 (03 Dec 2018 06:08), Max: 98.7 (02 Dec 2018 14:12)  HR: 73 (03 Dec 2018 06:08) (73 - 81)  BP: 154/75 (03 Dec 2018 06:08) (117/63 - 154/75)  BP(mean): --  RR: 18 (03 Dec 2018 06:08) (18 - 18)  SpO2: 97% (03 Dec 2018 06:08) (97% - 98%)    PHYSICAL EXAM:  Constitutional: Non toxic , in NAD, well-developed OOB to chair  Neck: supple  Respiratory: clear b/l  Cardiovascular: S1 and S2, RRR  Gastrointestinal: soft, non tender with +BS   Extremities: decreased LE edema +stasis changes +dressing over LLE  Neurological: A/O x 3, no focal deficits  Psychiatric: Normal mood, normal affect  Skin: No jaundice     LABS:                        9.7    4.99  )-----------( 236      ( 03 Dec 2018 07:55 )             30.2     12-03    142  |  106  |  30<H>  ----------------------------<  97  4.2   |  24  |  1.72<H>    Ca    8.9      03 Dec 2018 06:26        RADIOLOGY & ADDITIONAL TESTS:

## 2018-12-03 NOTE — PROGRESS NOTE ADULT - ASSESSMENT
88 year old M with a PMH of CAD  ,   (w/ PCI to LAD and Cx    in 2/2018),  on asa/ plavix/ statin    CKDIII, and HTN ,  hld.  c/c  anemia     worsening LE edema .  pt  stopped   his  lasix  acute  diastolic  chf  iv lasix/ pedal edema has  lessened   anemia. gi eval/ guaiac negative    daily weights  ckd  stable  spoke  with  neuro  de  maury now, pt  doing well.  ambulating by  self/  has  no   back  pain  no  indication  for  acute  imaging   po lasix  restarted   althea, improving    d/c today    f/p  withd  ade monroe, this  week

## 2018-12-03 NOTE — PROGRESS NOTE ADULT - REASON FOR ADMISSION
back pain/ sob

## 2018-12-03 NOTE — PROGRESS NOTE ADULT - ASSESSMENT
88 Year old male with PMH CAD s/p PCI (last 2/2018), CKD, CHF, anemia admitted with LOPEZ, low back pain and increased LE edema    Known history of colon polyps, currently guiac negative. Hgb stable    PLAN  -diurese as per Cardiology  -Monitor CBC and BMs  -likely outpt colonoscopy (given history of colon polyps in situ, if pt agrees) once cardiac status optimized  -PO diet as tolerated  -Pepcid for GI prophylaxis  -bowel regimen     Discharge plans per primary team, outpt. follow up in 2 weeks    Judson Gilmore PA-C    [facility]  [phone]

## 2018-12-10 ENCOUNTER — INPATIENT (INPATIENT)
Facility: HOSPITAL | Age: 83
LOS: 7 days | Discharge: NURSING, REHAB OR LONG-TERM CARE | DRG: 552 | End: 2018-12-18
Attending: INTERNAL MEDICINE | Admitting: INTERNAL MEDICINE
Payer: MEDICARE

## 2018-12-10 VITALS
DIASTOLIC BLOOD PRESSURE: 60 MMHG | WEIGHT: 194.01 LBS | HEART RATE: 75 BPM | OXYGEN SATURATION: 100 % | HEIGHT: 69 IN | RESPIRATION RATE: 18 BRPM | SYSTOLIC BLOOD PRESSURE: 122 MMHG

## 2018-12-10 DIAGNOSIS — M54.5 LOW BACK PAIN: ICD-10-CM

## 2018-12-10 DIAGNOSIS — Z98.89 OTHER SPECIFIED POSTPROCEDURAL STATES: Chronic | ICD-10-CM

## 2018-12-10 LAB
ALBUMIN SERPL ELPH-MCNC: 4.1 G/DL — SIGNIFICANT CHANGE UP (ref 3.3–5)
ALP SERPL-CCNC: 86 U/L — SIGNIFICANT CHANGE UP (ref 40–120)
ALT FLD-CCNC: 17 U/L — SIGNIFICANT CHANGE UP (ref 10–45)
ANION GAP SERPL CALC-SCNC: 15 MMOL/L — SIGNIFICANT CHANGE UP (ref 5–17)
APTT BLD: 27.2 SEC — LOW (ref 27.5–36.3)
AST SERPL-CCNC: 18 U/L — SIGNIFICANT CHANGE UP (ref 10–40)
BASOPHILS # BLD AUTO: 0 K/UL — SIGNIFICANT CHANGE UP (ref 0–0.2)
BASOPHILS NFR BLD AUTO: 0.6 % — SIGNIFICANT CHANGE UP (ref 0–2)
BILIRUB SERPL-MCNC: 0.5 MG/DL — SIGNIFICANT CHANGE UP (ref 0.2–1.2)
BUN SERPL-MCNC: 47 MG/DL — HIGH (ref 7–23)
CALCIUM SERPL-MCNC: 9 MG/DL — SIGNIFICANT CHANGE UP (ref 8.4–10.5)
CHLORIDE SERPL-SCNC: 99 MMOL/L — SIGNIFICANT CHANGE UP (ref 96–108)
CO2 SERPL-SCNC: 26 MMOL/L — SIGNIFICANT CHANGE UP (ref 22–31)
CREAT SERPL-MCNC: 2.6 MG/DL — HIGH (ref 0.5–1.3)
EOSINOPHIL # BLD AUTO: 0.1 K/UL — SIGNIFICANT CHANGE UP (ref 0–0.5)
EOSINOPHIL NFR BLD AUTO: 1.1 % — SIGNIFICANT CHANGE UP (ref 0–6)
GLUCOSE SERPL-MCNC: 105 MG/DL — HIGH (ref 70–99)
HCT VFR BLD CALC: 31.3 % — LOW (ref 39–50)
HGB BLD-MCNC: 10.6 G/DL — LOW (ref 13–17)
INR BLD: 1.04 RATIO — SIGNIFICANT CHANGE UP (ref 0.88–1.16)
LYMPHOCYTES # BLD AUTO: 0.7 K/UL — LOW (ref 1–3.3)
LYMPHOCYTES # BLD AUTO: 13.6 % — SIGNIFICANT CHANGE UP (ref 13–44)
MCHC RBC-ENTMCNC: 29.1 PG — SIGNIFICANT CHANGE UP (ref 27–34)
MCHC RBC-ENTMCNC: 33.8 GM/DL — SIGNIFICANT CHANGE UP (ref 32–36)
MCV RBC AUTO: 85.9 FL — SIGNIFICANT CHANGE UP (ref 80–100)
MONOCYTES # BLD AUTO: 0.4 K/UL — SIGNIFICANT CHANGE UP (ref 0–0.9)
MONOCYTES NFR BLD AUTO: 7.3 % — SIGNIFICANT CHANGE UP (ref 2–14)
NEUTROPHILS # BLD AUTO: 4.1 K/UL — SIGNIFICANT CHANGE UP (ref 1.8–7.4)
NEUTROPHILS NFR BLD AUTO: 77.4 % — HIGH (ref 43–77)
PLATELET # BLD AUTO: 248 K/UL — SIGNIFICANT CHANGE UP (ref 150–400)
POTASSIUM SERPL-MCNC: 4.2 MMOL/L — SIGNIFICANT CHANGE UP (ref 3.5–5.3)
POTASSIUM SERPL-SCNC: 4.2 MMOL/L — SIGNIFICANT CHANGE UP (ref 3.5–5.3)
PROT SERPL-MCNC: 7.3 G/DL — SIGNIFICANT CHANGE UP (ref 6–8.3)
PROTHROM AB SERPL-ACNC: 12 SEC — SIGNIFICANT CHANGE UP (ref 10–12.9)
RBC # BLD: 3.65 M/UL — LOW (ref 4.2–5.8)
RBC # FLD: 15.5 % — HIGH (ref 10.3–14.5)
SODIUM SERPL-SCNC: 140 MMOL/L — SIGNIFICANT CHANGE UP (ref 135–145)
WBC # BLD: 5.4 K/UL — SIGNIFICANT CHANGE UP (ref 3.8–10.5)
WBC # FLD AUTO: 5.4 K/UL — SIGNIFICANT CHANGE UP (ref 3.8–10.5)

## 2018-12-10 PROCEDURE — 99221 1ST HOSP IP/OBS SF/LOW 40: CPT

## 2018-12-10 PROCEDURE — 72148 MRI LUMBAR SPINE W/O DYE: CPT | Mod: 26

## 2018-12-10 PROCEDURE — 76775 US EXAM ABDO BACK WALL LIM: CPT | Mod: 26

## 2018-12-10 PROCEDURE — 74176 CT ABD & PELVIS W/O CONTRAST: CPT | Mod: 26

## 2018-12-10 PROCEDURE — 99285 EMERGENCY DEPT VISIT HI MDM: CPT

## 2018-12-10 RX ORDER — METOPROLOL TARTRATE 50 MG
25 TABLET ORAL DAILY
Refills: 0 | Status: DISCONTINUED | OUTPATIENT
Start: 2018-12-10 | End: 2018-12-11

## 2018-12-10 RX ORDER — OXYCODONE AND ACETAMINOPHEN 5; 325 MG/1; MG/1
1 TABLET ORAL EVERY 6 HOURS
Refills: 0 | Status: DISCONTINUED | OUTPATIENT
Start: 2018-12-10 | End: 2018-12-11

## 2018-12-10 RX ORDER — CLOPIDOGREL BISULFATE 75 MG/1
75 TABLET, FILM COATED ORAL DAILY
Refills: 0 | Status: DISCONTINUED | OUTPATIENT
Start: 2018-12-10 | End: 2018-12-18

## 2018-12-10 RX ORDER — INFLUENZA VIRUS VACCINE 15; 15; 15; 15 UG/.5ML; UG/.5ML; UG/.5ML; UG/.5ML
0.5 SUSPENSION INTRAMUSCULAR ONCE
Refills: 0 | Status: DISCONTINUED | OUTPATIENT
Start: 2018-12-10 | End: 2018-12-18

## 2018-12-10 RX ORDER — OXYCODONE AND ACETAMINOPHEN 5; 325 MG/1; MG/1
2 TABLET ORAL EVERY 12 HOURS
Refills: 0 | Status: DISCONTINUED | OUTPATIENT
Start: 2018-12-10 | End: 2018-12-11

## 2018-12-10 RX ORDER — ACETAMINOPHEN 500 MG
650 TABLET ORAL ONCE
Refills: 0 | Status: DISCONTINUED | OUTPATIENT
Start: 2018-12-10 | End: 2018-12-10

## 2018-12-10 RX ORDER — LIDOCAINE 4 G/100G
1 CREAM TOPICAL ONCE
Refills: 0 | Status: COMPLETED | OUTPATIENT
Start: 2018-12-10 | End: 2018-12-10

## 2018-12-10 RX ORDER — HEPARIN SODIUM 5000 [USP'U]/ML
5000 INJECTION INTRAVENOUS; SUBCUTANEOUS EVERY 12 HOURS
Refills: 0 | Status: DISCONTINUED | OUTPATIENT
Start: 2018-12-10 | End: 2018-12-18

## 2018-12-10 RX ORDER — ATORVASTATIN CALCIUM 80 MG/1
20 TABLET, FILM COATED ORAL AT BEDTIME
Refills: 0 | Status: DISCONTINUED | OUTPATIENT
Start: 2018-12-10 | End: 2018-12-11

## 2018-12-10 RX ORDER — ACETAMINOPHEN 500 MG
1000 TABLET ORAL ONCE
Refills: 0 | Status: COMPLETED | OUTPATIENT
Start: 2018-12-10 | End: 2018-12-10

## 2018-12-10 RX ORDER — ASPIRIN/CALCIUM CARB/MAGNESIUM 324 MG
81 TABLET ORAL DAILY
Refills: 0 | Status: DISCONTINUED | OUTPATIENT
Start: 2018-12-10 | End: 2018-12-18

## 2018-12-10 RX ADMIN — ATORVASTATIN CALCIUM 20 MILLIGRAM(S): 80 TABLET, FILM COATED ORAL at 21:28

## 2018-12-10 RX ADMIN — LIDOCAINE 1 PATCH: 4 CREAM TOPICAL at 23:45

## 2018-12-10 RX ADMIN — CLOPIDOGREL BISULFATE 75 MILLIGRAM(S): 75 TABLET, FILM COATED ORAL at 19:16

## 2018-12-10 RX ADMIN — OXYCODONE AND ACETAMINOPHEN 1 TABLET(S): 5; 325 TABLET ORAL at 23:50

## 2018-12-10 RX ADMIN — HEPARIN SODIUM 5000 UNIT(S): 5000 INJECTION INTRAVENOUS; SUBCUTANEOUS at 21:23

## 2018-12-10 RX ADMIN — Medication 1 MILLIGRAM(S): at 22:02

## 2018-12-10 RX ADMIN — Medication 81 MILLIGRAM(S): at 19:16

## 2018-12-10 RX ADMIN — Medication 400 MILLIGRAM(S): at 16:22

## 2018-12-10 RX ADMIN — LIDOCAINE 1 PATCH: 4 CREAM TOPICAL at 16:22

## 2018-12-10 RX ADMIN — Medication 1000 MILLIGRAM(S): at 16:20

## 2018-12-10 RX ADMIN — Medication 25 MILLIGRAM(S): at 19:16

## 2018-12-10 NOTE — ED PROVIDER NOTE - SHIFT CHANGE DETAILS
pending ct and vasc surg - if non rupture and vasc doesn't want to admit - would admit to [facility]

## 2018-12-10 NOTE — H&P ADULT - HISTORY OF PRESENT ILLNESS
88y Male complaining of back pain general.	    88M PMH  CKD,  CHF  , CAD s/p stents presents with 2 day history of back pain. sent  to  e r by  martin monroe,  for  reported  AAA . 6.5  CM        Patient reports pain is located to the left lower back, intermittent/  comfortable  now   Patient denies trauma to his back.     Patient reports lower extremity edema bilaterally, but patient denies  chest pain, fevers, chills, numbness, tingling. 88y Male complaining of back pain,  resolved,  now  with  leg  pain	    88M PMH  CKD,  CHF  , CAD s/p stents presents with 2 day history of back pain.,  since  resolved sent  to  e r by  martin monroe,  for  reported  AAA . 6.5  CM, noted  on  axr  done  by  ortho  for  c/c  back  pain        Patient reports pain is located to the left lower back, intermittent/  comfortable  now   Patient denies trauma to his back. denies  cp/sob/ fevers has  trouble  ambulating  today,  due  to  leg  pain also,  had  a fall at  home  about  2  weeks  ago,  and  did  not  seek medical  help, now  has  a traumatic  wound  to  distal  right  leg     Patient reports lower extremity edema bilaterally, but patient denies  chest pain, fevers, chills, numbness, tingling.

## 2018-12-10 NOTE — H&P ADULT - NSHPREVIEWOFSYSTEMS_GEN_ALL_CORE
REVIEW OF SYSTEMS:  GEN: no fever,    no chills  RESP: no SOB,   no cough  CVS: no chest pain,   no palpitations  GI: no abdominal pain,   no nausea,   no vomiting,   no constipation,   no diarrhea  C/C  BACK  APIN  : no dysuria,   no frequency  NEURO: no headache,   no dizziness  PSYCH: no depression,   not anxious  Derm : no rash

## 2018-12-10 NOTE — CONSULT NOTE ADULT - SUBJECTIVE AND OBJECTIVE BOX
CHIEF COMPLAINT:Patient is a 89y old  Male who presents with a chief complaint of leg  pain (10 Dec 2018 16:28)      HPI: pt is well known to me.  88y Male complaining of back pain,  resolved,  now  with  leg  pain	    88M PMH  CKD,  CHF  , CAD s/p stents presents with 2 day history of back pain.,  since  resolved sent  to  e r by  martin monroe,  for  reported  AAA . 6.5  CM, noted  on  axr  done  by  ortho  for  c/c  back  pain        Patient reports pain is located to the left lower back, intermittent/  comfortable  now   Patient denies trauma to his back. denies  cp/sob/ fevers has  trouble  ambulating  today,  due  to  leg  pain also,  had  a fall at  home  about  2  weeks  ago,  and  did  not  seek medical  help, now  has  a traumatic  wound  to  distal  right  leg     Patient reports lower extremity edema bilaterally, but patient denies  chest pain, fevers, chills, numbness, tingling. (10 Dec 2018 13:54)      PAST MEDICAL & SURGICAL HISTORY:  Stented coronary artery      MEDICATIONS  (STANDING):  aspirin enteric coated 81 milliGRAM(s) Oral daily  atorvastatin 20 milliGRAM(s) Oral at bedtime  clopidogrel Tablet 75 milliGRAM(s) Oral daily  heparin  Injectable 5000 Unit(s) SubCutaneous every 12 hours  LORazepam     Tablet 1 milliGRAM(s) Oral Once  metoprolol succinate ER 25 milliGRAM(s) Oral daily    MEDICATIONS  (PRN):  oxyCODONE    5 mG/acetaminophen 325 mG 1 Tablet(s) Oral every 6 hours PRN Mild Pain (1 - 3)  oxyCODONE    5 mG/acetaminophen 325 mG 2 Tablet(s) Oral every 12 hours PRN Severe Pain (7 - 10)      FAMILY HISTORY:      SOCIAL HISTORY:    [ ] Non-smoker  [ ] Smoker  [ ] Alcohol    Allergies    penicillin (Angioedema)    Intolerances    	    REVIEW OF SYSTEMS:  CONSTITUTIONAL: No fever, weight loss, or fatigue  EYES: No eye pain, visual disturbances, or discharge  ENT:  No difficulty hearing, tinnitus, vertigo; No sinus or throat pain  NECK: No pain or stiffness  RESPIRATORY: No cough, wheezing, chills or hemoptysis; + mild Shortness of Breath  CARDIOVASCULAR: No chest pain, palpitations, passing out, dizziness, or leg swelling  GASTROINTESTINAL: No abdominal or epigastric pain. No nausea, vomiting, or hematemesis; No diarrhea or constipation. No melena or hematochezia.  GENITOURINARY: No dysuria, frequency, hematuria, or incontinence  NEUROLOGICAL: No headaches, memory loss, loss of strength, numbness, or tremors  SKIN: No itching, burning, rashes, or lesions   LYMPH Nodes: No enlarged glands  ENDOCRINE: No heat or cold intolerance; No hair loss  MUSCULOSKELETAL: No joint pain or swelling; No muscle, back, + extremity pain  PSYCHIATRIC: No depression, anxiety, mood swings, or difficulty sleeping  HEME/LYMPH: No easy bruising, or bleeding gums  ALLERGY AND IMMUNOLOGIC: No hives or eczema	    [ ] All others negative	  [ ] Unable to obtain    PHYSICAL EXAM:  T(C): 36 (12-10-18 @ 15:03), Max: 36 (12-10-18 @ 15:03)  HR: 74 (12-10-18 @ 15:03) (74 - 75)  BP: 137/64 (12-10-18 @ 15:03) (122/60 - 137/64)  RR: 18 (12-10-18 @ 15:03) (18 - 18)  SpO2: 100% (12-10-18 @ 15:03) (100% - 100%)  Wt(kg): --  I&O's Summary      Appearance: Normal	  HEENT:   Normal oral mucosa, PERRL, EOMI	  Lymphatic: No lymphadenopathy  Cardiovascular: Normal S1 S2, No JVD, + murmurs, No edema  Respiratory: Lungs clear to auscultation	  Psychiatry: A & O x 3, Mood & affect appropriate  Gastrointestinal:  Soft, Non-tender, + BS	  Skin: No rashes, No ecchymoses, No cyanosis	  Neurologic: Non-focal  Extremities: Normal range of motion, No clubbing, cyanosis or edema  Vascular: Peripheral pulses palpable 2+ bilaterally    TELEMETRY: 	    ECG:  	  RADIOLOGY:  OTHER: 	  	  LABS:	 	    CARDIAC MARKERS:                              10.6   5.4   )-----------( 248      ( 10 Dec 2018 15:47 )             31.3     12-10    140  |  99  |  47<H>  ----------------------------<  105<H>  4.2   |  26  |  2.60<H>    Ca    9.0      10 Dec 2018 15:47    TPro  7.3  /  Alb  4.1  /  TBili  0.5  /  DBili  x   /  AST  18  /  ALT  17  /  AlkPhos  86  12-10    proBNP:   Lipid Profile:   HgA1c:   TSH:   PT/INR - ( 10 Dec 2018 15:47 )   PT: 12.0 sec;   INR: 1.04 ratio         PTT - ( 10 Dec 2018 15:47 )  PTT:27.2 sec    PREVIOUS DIAGNOSTIC TESTING:    < from: CT Abdomen and Pelvis No Cont (12.10.18 @ 17:36) >    INTERPRETATION:  Infrarenal 5.8 cm abdominal aortic aneurysm. No   intramural hematoma or periaortic fat stranding.

## 2018-12-10 NOTE — CONSULT NOTE ADULT - ASSESSMENT
pt is well known to me with hx of htn, ashd, diastolic chf, ckd 2/3 who presented with leg pain with evidence of AAA 5.8 cm.  vascular surgery consult stat  beta blocker  will discuss with dr monroe about cardiac work up before  dvt prophylaxis

## 2018-12-10 NOTE — CONSULT NOTE ADULT - ASSESSMENT
ASSESSMENT: Patient is a 89M with b/l lower extremity pain x2 weeks following a fall to his backside, who was incidentally noted to have a 5.8cm AAA.    PLAN:  - No urgent surgical interventions indicated  - CTA ordered  - Please have patient follow-up with Dr. Disla as an outpatient  - Please re-consult with further questions    Discussed with vascular surgery fellow    Nehemiah Hale, PGY-2  Vascular Surgery [phone] ASSESSMENT: Patient is a 89M with b/l lower extremity pain x2 weeks following a fall to his backside, who was incidentally noted to have a 5.8cm AAA.    PLAN:  - No urgent surgical interventions indicated  - Please order non-contrast CT a/p (elevated Cr)  - Will follow patient while in hospital    Discussed with vascular surgery fellow    Nehemiah Hale, PGY-2  Vascular Surgery [phone]

## 2018-12-10 NOTE — ED ADULT NURSE NOTE - OBJECTIVE STATEMENT
89 y.o male pmh CKD, CHF, CAD with cardiac stents, recent new finiding of AAA on imaging done sent to ED by his pcp for worsening left lower back pain that radiates down his leg associated with difficulty ambulating and LLE lateral outer wound that is not healing. pt states he had a visit by his home health aid today where she was concerned about pts LLE wound to his outer calf, upon her visit pt expressed the worsening left lower back pain and left leg pain along with wound pain described as burning. states the pain has been causing him to be unable to ambulate. pt was directed to come to ED upon notifying his pcp of his complaints. recently had imaging done by ortho for the persistent back pain where an incidental finding of a AAA was noted. pts VS stable upon arrival./ denies any cp, sob, dizziness, nausea, vomiting, abdominal pain, difficulty breathing, numbness or tingling. labs and line obtained. pending admission. safety and fall precaution s amintained.c all bell at the [facility] and within reach.

## 2018-12-10 NOTE — ED PROVIDER NOTE - PROGRESS NOTE DETAILS
Edith: Seen by vascular surgery and no emergent recommendation was recommended. Will get CT abd/pelvis w/o contrast 2/2 to CKD. Admit to medicine (Dr. Monique)

## 2018-12-10 NOTE — H&P ADULT - ASSESSMENT
88 year old M with    PMH of CAD  ,   (w/ PCI to LAD and Cx    in 2/2018),   on asa/ plavix/ statin    CKDIII, and HTN ,  hld.  c/c  anemia     admitted  with  b/l  leg  posterior  leg  pain,   pt   had  back pain yesterday, which  has   resolved/  also  has  a h/o  of  c/c  back  pain  for  past  year  denies  abd pain/ fevers/  cp/sob  emergent  er  u/s, AAA of  5.3  by  5.8  awaiting  ct abdomen and  vascular  eval  per  card/ dr alka monroe, , to  continue  asa/ plavix  traumatic  distal  leg  wound/  wound  care 88 year old M with    PMH of CAD  ,   (w/ PCI to LAD and Cx    in 2/2018),   on asa/ plavix/ statin    CKDIII, and HTN ,  hld.  c/c  anemia     admitted  with  b/l  leg  posterior  leg  pain,   pt   had  back pain yesterday, which  has   resolved/  also  has  a h/o  of  c/c  back  pain  for  past  year  denies  abd pain/ fevers/  cp/sob  emergent  er  u/s, AAA of  5.3  by  5.8  awaiting  ct abdomen and  vascular  eval  per  card/ dr alka monroe, , to  continue  asa/ plavix  traumatic  distal  leg  wound/  wound  care  RASHAUN  on  ckd.  therefore  non  contrast  ct  abdomen, pending.  per  vascular

## 2018-12-10 NOTE — ED PROVIDER NOTE - NS ED ROS FT
GENERAL: No fever or chills, //             EYES: no change in vision, //             HEENT: no trouble swallowing or speaking, //             CARDIAC: no chest pain, //              PULMONARY: no cough or SOB, //             GI: no abdominal pain, no nausea or no vomiting, no diarrhea or constipation, //             : No changes in urination,  //            SKIN: no rashes,  //            NEURO: no headache,  //             MSK: per HPI otherwise as HPI or negative.

## 2018-12-10 NOTE — ED PROVIDER NOTE - MEDICAL DECISION MAKING DETAILS
omar - back pain found to have ? aaoutpt - no abd pain pt back pain radiating down both legs -- unable to ambulate- no bowel or bladder no numbnmess or weakness - POCUS 6 cm AAA w thrombus - vascular paged and aware - will cta - and eval for rupture

## 2018-12-10 NOTE — H&P ADULT - NSHPPHYSICALEXAM_GEN_ALL_CORE
PHYSICAL EXAMINATION:  Vital Signs Last 24 Hrs  T(C): --  T(F): --  HR: 75 (10 Dec 2018 13:42) (75 - 75)  BP: 122/60 (10 Dec 2018 13:42) (122/60 - 122/60)  BP(mean): --  RR: 18 (10 Dec 2018 13:42) (18 - 18)  SpO2: 100% (10 Dec 2018 13:42) (100% - 100%)  CAPILLARY BLOOD GLUCOSE            GENERAL: NAD, well-groomed,  HEAD:  atraumatic, normocephalic  EYES: sclera anicteric  ENMT: mucous membranes moist  NECK: supple, No JVD  CHEST/LUNG: clear to auscultation bilaterally;    no      rales   ,   no rhonchi,   HEART: normal S1, S2  ABDOMEN: BS+, soft, ND, NT   EXTREMITIES:     edema    b/l LEs  NEURO: awake, ,     moves all extremities  SKIN: no     rash PHYSICAL EXAMINATION:  Vital Signs Last 24 Hrs  T(C): --  T(F): --  HR: 75 (10 Dec 2018 13:42) (75 - 75)  BP: 122/60 (10 Dec 2018 13:42) (122/60 - 122/60)  BP(mean): --  RR: 18 (10 Dec 2018 13:42) (18 - 18)  SpO2: 100% (10 Dec 2018 13:42) (100% - 100%)  CAPILLARY BLOOD GLUCOSE            GENERAL: NAD, well-groomed,  HEAD:  atraumatic, normocephalic  EYES: sclera anicteric  ENMT: mucous membranes moist  NECK: supple, No JVD  CHEST/LUNG: clear to auscultation bilaterally;    no      rales   ,   no rhonchi,   HEART: normal S1, S2  ABDOMEN: BS+, soft, ND, NT ,  no  flank  discomfort  EXTREMITIES:   less  edema    b/l LEs  NEURO: awake, ,     moves all extremities/  able  to  raise  b/l  legs, off the  bed  healing, traumatic  ulcer. r  left  leg  SKIN: no     rash

## 2018-12-10 NOTE — CONSULT NOTE ADULT - ATTENDING COMMENTS
CT reviewed.  spoke with pt.  He has a 5.8 cm aaa.  He has overall good functional status, although he has recently been limited by hip pain.  Obtain medical clearance, as he may be a candidate for endovascular aneurysm repair.  He had recent PCI in feb, and would not need to hold DAPT.

## 2018-12-10 NOTE — ED ADULT TRIAGE NOTE - HEART RATE (BEATS/MIN)
Dr. Cloud here at bedside reviewing case. Increased oxygen to 100% due to xray report. Assisting with drawing labs etc. Comfort given with pacifier.   75

## 2018-12-10 NOTE — CONSULT NOTE ADULT - SUBJECTIVE AND OBJECTIVE BOX
VASCULAR SURGERY CONSULT NOTE  --------------------------------------------------------------------------------------------    HPI:  88y Male complaining of back pain,  resolved,  now  with  leg  pain	    88M PMH  CKD,  CHF  , CAD s/p stents presents with 2 day history of back pain.,  since  resolved sent  to  e r by  martin monroe,  for  reported  AAA . 6.5  CM, noted  on  axr  done  by  ortho  for  c/c  back  pain        Patient reports pain is located to the left lower back, intermittent/  comfortable  now   Patient denies trauma to his back. denies  cp/sob/ fevers has  trouble  ambulating  today,  due  to  leg  pain also,  had  a fall at  home  about  2  weeks  ago,  and  did  not  seek medical  help, now  has  a traumatic  wound  to  distal  right  leg     Patient reports lower extremity edema bilaterally, but patient denies  chest pain, fevers, chills, numbness, tingling.     ROS: 10-system review is otherwise negative except HPI above.        PAST MEDICAL & SURGICAL HISTORY:  Stented coronary artery      FAMILY HISTORY:  Family history not pertinent as reviewed with the patient and family      ALLERGIES: penicillin (Angioedema)      CURRENT MEDICATIONS  MEDICATIONS (STANDING): aspirin enteric coated 81 milliGRAM(s) Oral daily  atorvastatin 20 milliGRAM(s) Oral at bedtime  clopidogrel Tablet 75 milliGRAM(s) Oral daily  heparin  Injectable 5000 Unit(s) SubCutaneous every 12 hours  metoprolol succinate ER 25 milliGRAM(s) Oral daily    MEDICATIONS (PRN):oxyCODONE    5 mG/acetaminophen 325 mG 1 Tablet(s) Oral every 6 hours PRN Mild Pain (1 - 3)  oxyCODONE    5 mG/acetaminophen 325 mG 2 Tablet(s) Oral every 12 hours PRN Severe Pain (7 - 10)    --------------------------------------------------------------------------------------------    Vitals:   T(C): 36 (12-10-18 @ 15:03), Max: 36 (12-10-18 @ 15:03)  HR: 74 (12-10-18 @ 15:03) (74 - 75)  BP: 137/64 (12-10-18 @ 15:03) (122/60 - 137/64)  RR: 18 (12-10-18 @ 15:03) (18 - 18)  SpO2: 100% (12-10-18 @ 15:03) (100% - 100%)  CAPILLARY BLOOD GLUCOSE    Height (cm): 175.26 (12-10 @ 13:42)  Weight (kg): 88 (12-10 @ 13:42)  BMI (kg/m2): 28.6 (12-10 @ 13:42)  BSA (m2): 2.04 (12-10 @ 13:42)      PHYSICAL EXAM:   General: NAD, Lying in bed comfortably  Neuro: A+Ox3  HEENT: NC/AT, EOMI  Neck: Soft, supple  Cardio: RRR, nml S1/S2  Resp: Good effort, CTA b/l  GI/Abd: Soft, NT/ND, no rebound/guarding, no masses palpated  Vascular: All 4 extremities warm. DP signals b/l  Ext: Wounds to lateral aspect of LLE  Musculoskeletal: All 4 extremities moving spontaneously, no limitations  --------------------------------------------------------------------------------------------    LABS  CBC (12-10 @ 15:47)                              10.6<L>                         5.4     )----------------(  248        77.4<H>% Neutrophils, 13.6  % Lymphocytes, ANC: 4.1                                 31.3<L>    BMP (12-10 @ 15:47)             140     |  99      |  47<H> 		Ca++ --      Ca 9.0                ---------------------------------( 105<H>		Mg --                 4.2     |  26      |  2.60<H>			Ph --        LFTs (12-10 @ 15:47)      TPro 7.3 / Alb 4.1 / TBili 0.5 / DBili -- / AST 18 / ALT 17 / AlkPhos 86    Coags (12-10 @ 15:47)  aPTT 27.2<L> / INR 1.04 / PT 12.0          --------------------------------------------------------------------------------------------    MICROBIOLOGY      --------------------------------------------------------------------------------------------    IMAGING  < from:  Retroperitional Limited (ED) (12.10.18 @ 15:30) >  The proximal aorta measured 5.3 cm in its maximal diameter.   The mid aorta measured 5.8 cm in its maximal diameter.  Intramural thrombus noted   no para-aortic free fluid visualized     IMPRESSION:     Abdominal aortic aneurysm with a maximum measurement of 5.8 cm.    < end of copied text >

## 2018-12-10 NOTE — H&P ADULT - NSHPLABSRESULTS_GEN_ALL_CORE
LABS:                        10.6   5.4   )-----------( 248      ( 10 Dec 2018 15:47 )             31.3           PT/INR - ( 10 Dec 2018 15:47 )   PT: 12.0 sec;   INR: 1.04 ratio         PTT - ( 10 Dec 2018 15:47 )  PTT:27.2 sec

## 2018-12-10 NOTE — ED PROVIDER NOTE - CHIEF COMPLAINT
The patient is a 89y Male complaining of The patient is a 89y Male complaining of leg pain and back pain.

## 2018-12-10 NOTE — ED PROVIDER NOTE - OBJECTIVE STATEMENT
89 y/o M w/ PMH of 88 y/o M w/ PMH of HTN, HLD, CKD, CAD s/p stents presenting w/ leg and back pain. Symptoms have been present for the past 5 days and worsening. Previously had been able to walk w/ a walker or cane but he has been unable to for 5 days and has been mainly bed bound. Reports last week had imaging done by cardiologist (unknown imaging type) who revealed an aortic aneurysm and pt was told by his cardiologist today to come to the ED for further evaluation. Pt's only complaint is pain, no numbness or tingling. Recently admitted and is being treated for LLE wound.

## 2018-12-10 NOTE — ED PROVIDER NOTE - PMH
Stented coronary artery CAD (coronary artery disease)    CKD (chronic kidney disease)    HLD (hyperlipidemia)    HTN (hypertension)    Stented coronary artery

## 2018-12-11 LAB
ANION GAP SERPL CALC-SCNC: 14 MMOL/L — SIGNIFICANT CHANGE UP (ref 5–17)
BUN SERPL-MCNC: 38 MG/DL — HIGH (ref 7–23)
CALCIUM SERPL-MCNC: 9 MG/DL — SIGNIFICANT CHANGE UP (ref 8.4–10.5)
CHLORIDE SERPL-SCNC: 103 MMOL/L — SIGNIFICANT CHANGE UP (ref 96–108)
CO2 SERPL-SCNC: 26 MMOL/L — SIGNIFICANT CHANGE UP (ref 22–31)
CREAT SERPL-MCNC: 2.15 MG/DL — HIGH (ref 0.5–1.3)
GLUCOSE SERPL-MCNC: 97 MG/DL — SIGNIFICANT CHANGE UP (ref 70–99)
HCT VFR BLD CALC: 31.3 % — LOW (ref 39–50)
HGB BLD-MCNC: 9.9 G/DL — LOW (ref 13–17)
MCHC RBC-ENTMCNC: 27.1 PG — SIGNIFICANT CHANGE UP (ref 27–34)
MCHC RBC-ENTMCNC: 31.6 GM/DL — LOW (ref 32–36)
MCV RBC AUTO: 85.8 FL — SIGNIFICANT CHANGE UP (ref 80–100)
PLATELET # BLD AUTO: 262 K/UL — SIGNIFICANT CHANGE UP (ref 150–400)
POTASSIUM SERPL-MCNC: 4.2 MMOL/L — SIGNIFICANT CHANGE UP (ref 3.5–5.3)
POTASSIUM SERPL-SCNC: 4.2 MMOL/L — SIGNIFICANT CHANGE UP (ref 3.5–5.3)
RBC # BLD: 3.65 M/UL — LOW (ref 4.2–5.8)
RBC # FLD: 17.1 % — HIGH (ref 10.3–14.5)
SODIUM SERPL-SCNC: 143 MMOL/L — SIGNIFICANT CHANGE UP (ref 135–145)
WBC # BLD: 3.96 K/UL — SIGNIFICANT CHANGE UP (ref 3.8–10.5)
WBC # FLD AUTO: 3.96 K/UL — SIGNIFICANT CHANGE UP (ref 3.8–10.5)

## 2018-12-11 PROCEDURE — 93306 TTE W/DOPPLER COMPLETE: CPT | Mod: 26

## 2018-12-11 RX ORDER — DOCUSATE SODIUM 100 MG
100 CAPSULE ORAL THREE TIMES A DAY
Refills: 0 | Status: DISCONTINUED | OUTPATIENT
Start: 2018-12-11 | End: 2018-12-18

## 2018-12-11 RX ORDER — NYSTATIN CREAM 100000 [USP'U]/G
1 CREAM TOPICAL THREE TIMES A DAY
Refills: 0 | Status: DISCONTINUED | OUTPATIENT
Start: 2018-12-11 | End: 2018-12-18

## 2018-12-11 RX ORDER — OXYCODONE AND ACETAMINOPHEN 5; 325 MG/1; MG/1
2 TABLET ORAL EVERY 12 HOURS
Refills: 0 | Status: DISCONTINUED | OUTPATIENT
Start: 2018-12-11 | End: 2018-12-12

## 2018-12-11 RX ORDER — OXYCODONE AND ACETAMINOPHEN 5; 325 MG/1; MG/1
2 TABLET ORAL EVERY 12 HOURS
Refills: 0 | Status: DISCONTINUED | OUTPATIENT
Start: 2018-12-11 | End: 2018-12-11

## 2018-12-11 RX ORDER — METOPROLOL TARTRATE 50 MG
50 TABLET ORAL DAILY
Refills: 0 | Status: DISCONTINUED | OUTPATIENT
Start: 2018-12-11 | End: 2018-12-18

## 2018-12-11 RX ORDER — ACETAMINOPHEN 500 MG
650 TABLET ORAL EVERY 6 HOURS
Refills: 0 | Status: DISCONTINUED | OUTPATIENT
Start: 2018-12-11 | End: 2018-12-11

## 2018-12-11 RX ORDER — OXYCODONE AND ACETAMINOPHEN 5; 325 MG/1; MG/1
1 TABLET ORAL EVERY 6 HOURS
Refills: 0 | Status: DISCONTINUED | OUTPATIENT
Start: 2018-12-11 | End: 2018-12-11

## 2018-12-11 RX ORDER — FAMOTIDINE 10 MG/ML
20 INJECTION INTRAVENOUS DAILY
Refills: 0 | Status: DISCONTINUED | OUTPATIENT
Start: 2018-12-11 | End: 2018-12-18

## 2018-12-11 RX ORDER — OXYCODONE HYDROCHLORIDE 5 MG/1
5 TABLET ORAL
Refills: 0 | Status: DISCONTINUED | OUTPATIENT
Start: 2018-12-11 | End: 2018-12-11

## 2018-12-11 RX ORDER — LACTULOSE 10 G/15ML
20 SOLUTION ORAL EVERY 4 HOURS
Refills: 0 | Status: DISCONTINUED | OUTPATIENT
Start: 2018-12-11 | End: 2018-12-11

## 2018-12-11 RX ORDER — ATORVASTATIN CALCIUM 80 MG/1
40 TABLET, FILM COATED ORAL AT BEDTIME
Refills: 0 | Status: DISCONTINUED | OUTPATIENT
Start: 2018-12-11 | End: 2018-12-18

## 2018-12-11 RX ORDER — AMLODIPINE BESYLATE 2.5 MG/1
2.5 TABLET ORAL DAILY
Refills: 0 | Status: DISCONTINUED | OUTPATIENT
Start: 2018-12-11 | End: 2018-12-16

## 2018-12-11 RX ORDER — OXYCODONE AND ACETAMINOPHEN 5; 325 MG/1; MG/1
1 TABLET ORAL EVERY 6 HOURS
Refills: 0 | Status: DISCONTINUED | OUTPATIENT
Start: 2018-12-11 | End: 2018-12-13

## 2018-12-11 RX ORDER — OXYCODONE HYDROCHLORIDE 5 MG/1
10 TABLET ORAL
Refills: 0 | Status: DISCONTINUED | OUTPATIENT
Start: 2018-12-11 | End: 2018-12-11

## 2018-12-11 RX ORDER — TAMSULOSIN HYDROCHLORIDE 0.4 MG/1
0.4 CAPSULE ORAL AT BEDTIME
Refills: 0 | Status: DISCONTINUED | OUTPATIENT
Start: 2018-12-11 | End: 2018-12-18

## 2018-12-11 RX ORDER — SENNA PLUS 8.6 MG/1
2 TABLET ORAL AT BEDTIME
Refills: 0 | Status: DISCONTINUED | OUTPATIENT
Start: 2018-12-11 | End: 2018-12-18

## 2018-12-11 RX ADMIN — OXYCODONE AND ACETAMINOPHEN 2 TABLET(S): 5; 325 TABLET ORAL at 15:57

## 2018-12-11 RX ADMIN — Medication 25 MILLIGRAM(S): at 05:53

## 2018-12-11 RX ADMIN — LIDOCAINE 1 PATCH: 4 CREAM TOPICAL at 05:00

## 2018-12-11 RX ADMIN — LACTULOSE 20 GRAM(S): 10 SOLUTION ORAL at 11:41

## 2018-12-11 RX ADMIN — HEPARIN SODIUM 5000 UNIT(S): 5000 INJECTION INTRAVENOUS; SUBCUTANEOUS at 05:53

## 2018-12-11 RX ADMIN — LACTULOSE 20 GRAM(S): 10 SOLUTION ORAL at 14:34

## 2018-12-11 RX ADMIN — LACTULOSE 20 GRAM(S): 10 SOLUTION ORAL at 11:43

## 2018-12-11 RX ADMIN — OXYCODONE AND ACETAMINOPHEN 1 TABLET(S): 5; 325 TABLET ORAL at 18:14

## 2018-12-11 RX ADMIN — AMLODIPINE BESYLATE 2.5 MILLIGRAM(S): 2.5 TABLET ORAL at 11:39

## 2018-12-11 RX ADMIN — NYSTATIN CREAM 1 APPLICATION(S): 100000 CREAM TOPICAL at 22:03

## 2018-12-11 RX ADMIN — ATORVASTATIN CALCIUM 40 MILLIGRAM(S): 80 TABLET, FILM COATED ORAL at 22:03

## 2018-12-11 RX ADMIN — HEPARIN SODIUM 5000 UNIT(S): 5000 INJECTION INTRAVENOUS; SUBCUTANEOUS at 18:14

## 2018-12-11 RX ADMIN — FAMOTIDINE 20 MILLIGRAM(S): 10 INJECTION INTRAVENOUS at 20:24

## 2018-12-11 RX ADMIN — TAMSULOSIN HYDROCHLORIDE 0.4 MILLIGRAM(S): 0.4 CAPSULE ORAL at 14:57

## 2018-12-11 RX ADMIN — Medication 100 MILLIGRAM(S): at 22:03

## 2018-12-11 RX ADMIN — Medication 100 MILLIGRAM(S): at 14:34

## 2018-12-11 RX ADMIN — OXYCODONE AND ACETAMINOPHEN 1 TABLET(S): 5; 325 TABLET ORAL at 00:23

## 2018-12-11 RX ADMIN — NYSTATIN CREAM 1 APPLICATION(S): 100000 CREAM TOPICAL at 14:34

## 2018-12-11 RX ADMIN — CLOPIDOGREL BISULFATE 75 MILLIGRAM(S): 75 TABLET, FILM COATED ORAL at 11:39

## 2018-12-11 RX ADMIN — OXYCODONE AND ACETAMINOPHEN 2 TABLET(S): 5; 325 TABLET ORAL at 16:20

## 2018-12-11 RX ADMIN — Medication 50 MILLIGRAM(S): at 11:39

## 2018-12-11 RX ADMIN — Medication 81 MILLIGRAM(S): at 11:39

## 2018-12-11 NOTE — PROGRESS NOTE ADULT - ASSESSMENT
ASSESSMENT: Patient is a 89M with b/l lower extremity pain x2 weeks following a fall to his backside, who was incidentally noted to have a 5.8cm AAA.    PLAN:  - No urgent surgical interventions indicated  - Please order non-contrast CT a/p (elevated Cr)  - Will follow patient while in hospital      Vascular Surgery [id number] ASSESSMENT: Patient is a 89M with b/l lower extremity pain x2 weeks following a fall to his backside, who was incidentally noted to have a 5.8cm AAA.    PLAN:  - CT A/P results noted  - Patient should follow up with Dr. Joaquin Disla outpatient for elective repair  - Med and cards optimization while inpatient would be convenient    SURAJ Santiago MD  Vascular Surgery [phone]

## 2018-12-11 NOTE — CONSULT NOTE ADULT - ASSESSMENT
89M s/p fall 2-3 weeks ago since that time has had difficulty walking 2/2 pain, no focal neuro deficit on exam but for pain limited leg weakness found to have severe stenosis L4/5, both central and foraminal in nature  - no acute neurosurgical intervention  - given complaints of b/l hand numbness would recommend MRI C spine  - Patient may benefit from focal decompression will discuss with Dr. Yan, please document operative risk stratificaiotn and optimization prior to consideration of intervention  - pain control 89M s/p fall 2-3 weeks ago since that time has had difficulty walking 2/2 pain, no focal neuro deficit on exam but for pain limited leg weakness found to have severe stenosis L4/5, both central and foraminal in nature  - no acute neurosurgical intervention  - given complaints of b/l hand numbness would recommend MRI C spine  - Patient may benefit from focal decompression will discuss with Dr. Yan, please document operative risk stratificaiotn and optimization prior to consideration of intervention, marci would recommend continue conservative management at this time with PT and pain control  - pain control

## 2018-12-11 NOTE — CONSULT NOTE ADULT - SUBJECTIVE AND OBJECTIVE BOX
p ([id number])     HPI:  88y Male complaining of back pain,  resolved,  now  with  leg  pain	    88M PMH  CKD,  CHF  , CAD s/p stents presents with 2 day history of back pain.,  since  resolved sent  to  e r by  martin monroe,  for  reported  AAA . 6.5  CM, noted  on  axr  done  by  ortho  for  c/c  back  pain        Patient reports pain is located to the left lower back, intermittent/  comfortable  now   Patient denies trauma to his back. denies  cp/sob/ fevers has  trouble  ambulating  today,  due  to  leg  pain also,  had  a fall at  home  about  2  weeks  ago,  and  did  not  seek medical  help, now  has  a traumatic  wound  to  distal  right  leg     Patient reports lower extremity edema bilaterally, but patient denies  chest pain, fevers, chills, numbness, tingling. (10 Dec 2018 13:54)    PAST MEDICAL HISTORY   CAD (coronary artery disease)  HLD (hyperlipidemia)  CKD (chronic kidney disease)  HTN (hypertension)  Stented coronary artery    PAST SURGICAL HISTORY     penicillin (Angioedema)      MEDICATIONS:  Antibiotics:    Neuro:  oxyCODONE    5 mG/acetaminophen 325 mG 1 Tablet(s) Oral every 6 hours PRN  oxyCODONE    5 mG/acetaminophen 325 mG 2 Tablet(s) Oral every 12 hours PRN    Anticoagulation:  aspirin enteric coated 81 milliGRAM(s) Oral daily  clopidogrel Tablet 75 milliGRAM(s) Oral daily  heparin  Injectable 5000 Unit(s) SubCutaneous every 12 hours    Other:  amLODIPine   Tablet 2.5 milliGRAM(s) Oral daily  atorvastatin 40 milliGRAM(s) Oral at bedtime  docusate sodium 100 milliGRAM(s) Oral three times a day  famotidine    Tablet 20 milliGRAM(s) Oral daily  influenza   Vaccine 0.5 milliLiter(s) IntraMuscular once  metoprolol succinate ER 50 milliGRAM(s) Oral daily  senna 2 Tablet(s) Oral at bedtime  tamsulosin 0.4 milliGRAM(s) Oral at bedtime      SOCIAL HISTORY:   Occupation:   Marital Status:     FAMILY HISTORY:      REVIEW OF SYSTEMS:  Check here if all are normal other than Neurological []  General:  Eyes:  ENT:  Cardiac:  Respiratory:  GI:  Musculoskeletal:   Skin:  Neurologic:   Psychiatric:     PHYSICAL EXAMINATION:   T(C): 36.7 (12-11-18 @ 20:32), Max: 36.7 (12-11-18 @ 05:25)  HR: 69 (12-11-18 @ 20:32) (65 - 76)  BP: 118/67 (12-11-18 @ 20:32) (118/67 - 185/84)  RR: 18 (12-11-18 @ 20:32) (18 - 18)  SpO2: 97% (12-11-18 @ 20:32) (97% - 100%)  Wt(kg): --  Weight (kg): 81.9 (12-11 @ 08:09)    General Examination:     Neurologic Examination:           PHYSICAL EXAM:    Constitutional: No Acute Distress     Neurological: AOx3, Following Commands    Motor exam:          Upper extremity                         Delt     Bicep     Tricep    HG                                                 R         5/5        5/5        5/5       5/5                                               L          5/5        5/5        5/5       5/5          Lower extremity                        HF         KF        KE       DF         PF                                                  R        4/5        5/5        5/5       5/5         5/5                                               L         4/5        5/5       5/5       5/5          5/5                                               HF pain limited weakness  Sensation: [x] intact to light touch  [] decreased:     No clonus, b/l Hoffmans      LABS:                        9.9    3.96  )-----------( 262      ( 11 Dec 2018 07:16 )             31.3     12-11    143  |  103  |  38<H>  ----------------------------<  97  4.2   |  26  |  2.15<H>    Ca    9.0      11 Dec 2018 05:44    TPro  7.3  /  Alb  4.1  /  TBili  0.5  /  DBili  x   /  AST  18  /  ALT  17  /  AlkPhos  86  12-10    PT/INR - ( 10 Dec 2018 15:47 )   PT: 12.0 sec;   INR: 1.04 ratio         PTT - ( 10 Dec 2018 15:47 )  PTT:27.2 sec      RADIOLOGY & ADDITIONAL STUDIES:  IMPRESSION: Degenerative changes of the lumbar spine with severe spinal   stenosis at L4-5 due to a combination of degenerative facet changes and   grade 1anterolisthesis L4 on L5. Large abdominal aortic aneurysm

## 2018-12-11 NOTE — PROGRESS NOTE ADULT - ASSESSMENT
88 year old M with    PMH of CAD  ,   (w/ PCI to LAD and Cx    in 2/2018),   on asa/ plavix/ statin    CKDIII, and HTN ,  hld.  c/c  anemia     admitted  with  b/l  leg  posterior  leg  pain,   pt   had  back pain yesterday, which  has   resolved/  also  has  a h/o  of  c/c  back  pain  for  past  year    denies  abd pain/ fevers/  cp/sob,  now  with  an incidental  finding  of AAA  on out pt  axr  emergent  er  u/s, AAA of  5.3  by  5.8    vascular  eval noted  ct  abd,  AAA 5.8 cm  per  card/ dr alka monroe, , to  continue  asa/ plavix  traumatic  distal  leg  wound/  wound  care  RASHAUN , resolving  will need  to restart  lasix  in  a  few days     < from: CT Abdomen and Pelvis No Cont (12.10.18 @ 17:36) >  INTERPRETATION:  Infrarenal 5.8 cm abdominal aortic aneurysm. No   intramural hematoma or periaortic fat stranding.  < end of copied text >

## 2018-12-11 NOTE — PROVIDER CONTACT NOTE (OTHER) - RECOMMENDATIONS
Np notified. Offered pain medication, patient refused at this time, with no complaints of pain. Will continue to monitor

## 2018-12-11 NOTE — PROVIDER CONTACT NOTE (OTHER) - SITUATION
Pt /73 electronically at 5:25am gave metoprolol 25mg. After one hr BP is 185/84 electronically. Manually Left 180/85 right 175/82

## 2018-12-12 LAB
24R-OH-CALCIDIOL SERPL-MCNC: 23.5 NG/ML — LOW (ref 30–80)
ANION GAP SERPL CALC-SCNC: 11 MMOL/L — SIGNIFICANT CHANGE UP (ref 5–17)
APPEARANCE UR: ABNORMAL
BILIRUB UR-MCNC: NEGATIVE — SIGNIFICANT CHANGE UP
BUN SERPL-MCNC: 31 MG/DL — HIGH (ref 7–23)
CALCIUM SERPL-MCNC: 9.5 MG/DL — SIGNIFICANT CHANGE UP (ref 8.4–10.5)
CHLORIDE SERPL-SCNC: 104 MMOL/L — SIGNIFICANT CHANGE UP (ref 96–108)
CO2 SERPL-SCNC: 27 MMOL/L — SIGNIFICANT CHANGE UP (ref 22–31)
COLOR SPEC: YELLOW — SIGNIFICANT CHANGE UP
CREAT SERPL-MCNC: 1.87 MG/DL — HIGH (ref 0.5–1.3)
DIFF PNL FLD: ABNORMAL
FOLATE SERPL-MCNC: 9.9 NG/ML — SIGNIFICANT CHANGE UP
GLUCOSE SERPL-MCNC: 105 MG/DL — HIGH (ref 70–99)
GLUCOSE UR QL: NEGATIVE MG/DL — SIGNIFICANT CHANGE UP
HCT VFR BLD CALC: 32.2 % — LOW (ref 39–50)
HGB BLD-MCNC: 10.4 G/DL — LOW (ref 13–17)
KETONES UR-MCNC: NEGATIVE — SIGNIFICANT CHANGE UP
LEUKOCYTE ESTERASE UR-ACNC: ABNORMAL
MAGNESIUM SERPL-MCNC: 2.3 MG/DL — SIGNIFICANT CHANGE UP (ref 1.6–2.6)
MCHC RBC-ENTMCNC: 27.6 PG — SIGNIFICANT CHANGE UP (ref 27–34)
MCHC RBC-ENTMCNC: 32.3 GM/DL — SIGNIFICANT CHANGE UP (ref 32–36)
MCV RBC AUTO: 85.4 FL — SIGNIFICANT CHANGE UP (ref 80–100)
NITRITE UR-MCNC: NEGATIVE — SIGNIFICANT CHANGE UP
PH UR: 5.5 — SIGNIFICANT CHANGE UP (ref 5–8)
PHOSPHATE SERPL-MCNC: 3.4 MG/DL — SIGNIFICANT CHANGE UP (ref 2.5–4.5)
PLATELET # BLD AUTO: 256 K/UL — SIGNIFICANT CHANGE UP (ref 150–400)
POTASSIUM SERPL-MCNC: 4.8 MMOL/L — SIGNIFICANT CHANGE UP (ref 3.5–5.3)
POTASSIUM SERPL-SCNC: 4.8 MMOL/L — SIGNIFICANT CHANGE UP (ref 3.5–5.3)
PROT UR-MCNC: ABNORMAL MG/DL
RBC # BLD: 3.77 M/UL — LOW (ref 4.2–5.8)
RBC # FLD: 17.1 % — HIGH (ref 10.3–14.5)
SODIUM SERPL-SCNC: 142 MMOL/L — SIGNIFICANT CHANGE UP (ref 135–145)
SP GR SPEC: 1.02 — SIGNIFICANT CHANGE UP (ref 1.01–1.02)
UROBILINOGEN FLD QL: NEGATIVE MG/DL — SIGNIFICANT CHANGE UP
VIT B12 SERPL-MCNC: 902 PG/ML — SIGNIFICANT CHANGE UP (ref 232–1245)
WBC # BLD: 8.21 K/UL — SIGNIFICANT CHANGE UP (ref 3.8–10.5)
WBC # FLD AUTO: 8.21 K/UL — SIGNIFICANT CHANGE UP (ref 3.8–10.5)

## 2018-12-12 RX ADMIN — FAMOTIDINE 20 MILLIGRAM(S): 10 INJECTION INTRAVENOUS at 11:52

## 2018-12-12 RX ADMIN — HEPARIN SODIUM 5000 UNIT(S): 5000 INJECTION INTRAVENOUS; SUBCUTANEOUS at 17:36

## 2018-12-12 RX ADMIN — OXYCODONE AND ACETAMINOPHEN 1 TABLET(S): 5; 325 TABLET ORAL at 06:34

## 2018-12-12 RX ADMIN — OXYCODONE AND ACETAMINOPHEN 2 TABLET(S): 5; 325 TABLET ORAL at 12:30

## 2018-12-12 RX ADMIN — NYSTATIN CREAM 1 APPLICATION(S): 100000 CREAM TOPICAL at 06:06

## 2018-12-12 RX ADMIN — TAMSULOSIN HYDROCHLORIDE 0.4 MILLIGRAM(S): 0.4 CAPSULE ORAL at 21:30

## 2018-12-12 RX ADMIN — NYSTATIN CREAM 1 APPLICATION(S): 100000 CREAM TOPICAL at 13:28

## 2018-12-12 RX ADMIN — CLOPIDOGREL BISULFATE 75 MILLIGRAM(S): 75 TABLET, FILM COATED ORAL at 11:52

## 2018-12-12 RX ADMIN — Medication 50 MILLIGRAM(S): at 06:08

## 2018-12-12 RX ADMIN — Medication 81 MILLIGRAM(S): at 11:52

## 2018-12-12 RX ADMIN — NYSTATIN CREAM 1 APPLICATION(S): 100000 CREAM TOPICAL at 21:31

## 2018-12-12 RX ADMIN — OXYCODONE AND ACETAMINOPHEN 1 TABLET(S): 5; 325 TABLET ORAL at 06:04

## 2018-12-12 RX ADMIN — Medication 100 MILLIGRAM(S): at 06:06

## 2018-12-12 RX ADMIN — HEPARIN SODIUM 5000 UNIT(S): 5000 INJECTION INTRAVENOUS; SUBCUTANEOUS at 06:05

## 2018-12-12 RX ADMIN — OXYCODONE AND ACETAMINOPHEN 2 TABLET(S): 5; 325 TABLET ORAL at 11:39

## 2018-12-12 RX ADMIN — AMLODIPINE BESYLATE 2.5 MILLIGRAM(S): 2.5 TABLET ORAL at 06:06

## 2018-12-12 RX ADMIN — ATORVASTATIN CALCIUM 40 MILLIGRAM(S): 80 TABLET, FILM COATED ORAL at 21:30

## 2018-12-12 NOTE — ADVANCED PRACTICE NURSE CONSULT - REASON FOR CONSULT
Requested by staff to assess skin status of pt a/w a LLE wound. The pt was seen by the Wound Care Team: PA and ANDRES.  PMH is noted:   88y Male complaining of back pain,  resolved,  now  with  leg  pain	   PMH includes:   CKD,  CHF  , CAD s/p stents presents with 2 day history of back pain.,  since  resolved sent  to  er by  dr alka monroe,  for  reported  AAA . 6.5  CM, noted  on  axr  done  by  ortho  for  c/c  back  pain        Patient reports pain is located to the left lower back, intermittent/  comfortable  now   Patient denies trauma to his back. denies  cp/sob/ fevers has  trouble  ambulating  today,  due  to  leg  pain also,  had  a fall at  home  about  2  weeks  ago,  and  did  not  seek medical  help, now  has  a traumatic  wound      Patient reports lower extremity edema bilaterally, but patient denies  chest pain, fevers, chills, numbness, tingling.

## 2018-12-12 NOTE — ADVANCED PRACTICE NURSE CONSULT - RECOMMEDATIONS
Will recommend the followin. BLE: daily cleansing and apply Sween 24 cream to dry skin  2. LLE: Aquacel dressing to wick the exudate, cover with gauze and secure with julia, change daily and prn for drainage.  3. continue to encourage leg elevation  4. Podiatry consult for toenail hygiene  5. consider SHAHRZAD/PVR for further evaluation  6. Pt may f/u at the wound center upon d/c  Tx plan discussed with RN

## 2018-12-12 NOTE — PROGRESS NOTE ADULT - ASSESSMENT
ASSESSMENT: Patient is a 89M with b/l lower extremity pain x2 weeks following a fall to his backside, who was incidentally noted to have a 5.8cm AAA.    PLAN:  - CT A/P results noted  - Patient should follow up with Dr. Joaquin Disla outpatient for elective repair  - Med and cards optimization while inpatient would be convenient  - Patient needs medical optimization prior to operative intervention    Nehemiah Hale, PGY-2  Vascular Surgery [phone]

## 2018-12-12 NOTE — PHYSICAL THERAPY INITIAL EVALUATION ADULT - ACTIVE RANGE OF MOTION EXAMINATION, REHAB EVAL
bilateral upper extremity Active ROM was WFL (within functional limits)/bilateral  lower extremity Active ROM was WFL (within functional limits)/B hips limited by pain

## 2018-12-12 NOTE — PHYSICAL THERAPY INITIAL EVALUATION ADULT - PERTINENT HX OF CURRENT PROBLEM, REHAB EVAL
88M PMH  CKD,  CHF  , CAD s/p stents presents with 2 day history of back pain.,  since  resolved sent  to  e r by  martin monroe,  for  reported  AAA . 6.5  CM, noted  on  axr  done  by  ortho  for  c/c  back  pain        Patient reports pain is located to the left lower back, intermittent/  comfortable  now   Patient denies trauma to his back. contd below:

## 2018-12-12 NOTE — PHYSICAL THERAPY INITIAL EVALUATION ADULT - CRITERIA FOR SKILLED THERAPEUTIC INTERVENTIONS
therapy frequency/anticipated equipment needs at discharge/anticipated discharge recommendation/functional limitations in following categories/impairments found/predicted duration of therapy intervention

## 2018-12-12 NOTE — PROGRESS NOTE ADULT - ASSESSMENT
89M s/p fall 2-3 weeks ago since that time has had difficulty walking 2/2 pain, no focal neuro deficit on exam but for pain limited leg weakness found to have severe stenosis L4/5, both central and foraminal in nature    Plan:  - no acute neurosurgical intervention  - given complaints of b/l hand numbness would recommend MRI C spine  - Patient may benefit from focal decompression will discuss with Dr. Yan, please document operative risk stratificaiotn and optimization prior to consideration of intervention, marci would recommend continue conservative management at this time with PT and pain control  - pain control

## 2018-12-12 NOTE — PROGRESS NOTE ADULT - ASSESSMENT
88 year old M with    PMH of CAD  ,   (w/ PCI to LAD and Cx    in 2/2018),   on asa/ plavix/ statin    CKDIII, and HTN ,  hld.  c/c  anemia     admitted  with  b/l  leg  posterior  leg  pain,   pt   had  back pain yesterday, which  has   resolved/  also  has  a h/o  of  c/c  back  pain  for  past  year    denies  abd pain/ fevers/  cp/sob,  now  with  an incidental  finding  of AAA  on out pt  axr  emergent  er  u/s, AAA of  5.3  by  5.8    vascular  eval noted  ct  abd,  AAA 5.8 cm  per  card/ dr alka monroe, , to  continue  asa/ plavix  traumatic  distal  leg  wound/  wound  care  RASHAUN , resolving  will need  to restart  lasix  in  a  few days  mri t spine, severe  spinal  stenosi. L4/5,  / seen by N/S,  awaiting  mri  c  spine      < from: MR Lumbar Spine No Cont (12.10.18 @ 22:57) >  IMPRESSION: Degenerative changes of the lumbar spine with severe spinal   stenosis at L4-5 due to a combination of degenerative facet changes and   grade 1anterolisthesis L4 on L5. Large abdominal aortic aneurys  < end of copied text >     < from: CT Abdomen and Pelvis No Cont (12.10.18 @ 17:36) >  INTERPRETATION:  Infrarenal 5.8 cm abdominal aortic aneurysm. No   intramural hematoma or periaortic fat stranding.  < end of copied text >

## 2018-12-12 NOTE — CHART NOTE - NSCHARTNOTEFT_GEN_A_CORE
Pt was seen and examined.  Briefly this is a elderly male c hx HTN inc cholesterol CAD with spinal stenosis  CKD stage 4    Suggest  -IF SBP remains high then increase Norvasc  -Check UA  -No need for renal sono        Sayclaudine Hendrix  [facility]  [phone]

## 2018-12-12 NOTE — PHYSICAL THERAPY INITIAL EVALUATION ADULT - ADDITIONAL COMMENTS
contd from above: denies  cp/sob/ fevers has  trouble  ambulating  today,  due  to  leg  pain also,  had  a fall at  home  about  2  weeks  ago,  and  did  not  seek medical  help, now  has  a traumatic  wound  to  distal  right  leg     Patient reports lower extremity edema bilaterally, but patient denies  chest pain, fevers, chills, numbness, tingling. MRI L/S: spinal stenosis L4-5, grade 1 anterolisthesis L4-5    social history: pt lives alone in private house, (+)stairs. pt independent with mobility, pt ambulates with rolling walker, owns straight cane. wife recently passed suddenly in home 2 weeks ago, pt emotional t/o providing history

## 2018-12-12 NOTE — CHART NOTE - NSCHARTNOTEFT_GEN_A_CORE
Pt currently awaiting medical clearance / risk stratification  - Pt would have to be off anti platelets for at least 5 days prior to any surgery

## 2018-12-12 NOTE — ADVANCED PRACTICE NURSE CONSULT - ASSESSMENT
Upon assessment, the pt presents with partial-thickness wounds on the lateral aspect of the LLE. The larger wound measures approximately 5cm x 2.5cm x 0cm. the wounds are moist, granular with moderate serosanguinous drainage. There was no odor . The periwound skin was dry and flaking. There was no erythema, induration , or fluctuance.  There was b/l pedal edema, difficult to palpate the pedal pulses. Toenails were elongated- pt would benefit from a Podiatry consult for toenail hygiene.

## 2018-12-13 LAB
ANION GAP SERPL CALC-SCNC: 11 MMOL/L — SIGNIFICANT CHANGE UP (ref 5–17)
BUN SERPL-MCNC: 34 MG/DL — HIGH (ref 7–23)
CALCIUM SERPL-MCNC: 8.9 MG/DL — SIGNIFICANT CHANGE UP (ref 8.4–10.5)
CHLORIDE SERPL-SCNC: 105 MMOL/L — SIGNIFICANT CHANGE UP (ref 96–108)
CO2 SERPL-SCNC: 25 MMOL/L — SIGNIFICANT CHANGE UP (ref 22–31)
CREAT SERPL-MCNC: 2.13 MG/DL — HIGH (ref 0.5–1.3)
GLUCOSE SERPL-MCNC: 102 MG/DL — HIGH (ref 70–99)
HCT VFR BLD CALC: 30.8 % — LOW (ref 39–50)
HGB BLD-MCNC: 9.8 G/DL — LOW (ref 13–17)
MAGNESIUM SERPL-MCNC: 2.1 MG/DL — SIGNIFICANT CHANGE UP (ref 1.6–2.6)
MCHC RBC-ENTMCNC: 27.2 PG — SIGNIFICANT CHANGE UP (ref 27–34)
MCHC RBC-ENTMCNC: 31.8 GM/DL — LOW (ref 32–36)
MCV RBC AUTO: 85.6 FL — SIGNIFICANT CHANGE UP (ref 80–100)
PLATELET # BLD AUTO: 256 K/UL — SIGNIFICANT CHANGE UP (ref 150–400)
POTASSIUM SERPL-MCNC: 4.1 MMOL/L — SIGNIFICANT CHANGE UP (ref 3.5–5.3)
POTASSIUM SERPL-SCNC: 4.1 MMOL/L — SIGNIFICANT CHANGE UP (ref 3.5–5.3)
RBC # BLD: 3.6 M/UL — LOW (ref 4.2–5.8)
RBC # FLD: 16.9 % — HIGH (ref 10.3–14.5)
SODIUM SERPL-SCNC: 141 MMOL/L — SIGNIFICANT CHANGE UP (ref 135–145)
WBC # BLD: 10.06 K/UL — SIGNIFICANT CHANGE UP (ref 3.8–10.5)
WBC # FLD AUTO: 10.06 K/UL — SIGNIFICANT CHANGE UP (ref 3.8–10.5)

## 2018-12-13 PROCEDURE — 72141 MRI NECK SPINE W/O DYE: CPT | Mod: 26

## 2018-12-13 RX ADMIN — CLOPIDOGREL BISULFATE 75 MILLIGRAM(S): 75 TABLET, FILM COATED ORAL at 11:06

## 2018-12-13 RX ADMIN — OXYCODONE AND ACETAMINOPHEN 1 TABLET(S): 5; 325 TABLET ORAL at 14:30

## 2018-12-13 RX ADMIN — HEPARIN SODIUM 5000 UNIT(S): 5000 INJECTION INTRAVENOUS; SUBCUTANEOUS at 17:33

## 2018-12-13 RX ADMIN — Medication 100 MILLIGRAM(S): at 21:45

## 2018-12-13 RX ADMIN — NYSTATIN CREAM 1 APPLICATION(S): 100000 CREAM TOPICAL at 05:23

## 2018-12-13 RX ADMIN — Medication 100 MILLIGRAM(S): at 05:22

## 2018-12-13 RX ADMIN — TAMSULOSIN HYDROCHLORIDE 0.4 MILLIGRAM(S): 0.4 CAPSULE ORAL at 21:46

## 2018-12-13 RX ADMIN — ATORVASTATIN CALCIUM 40 MILLIGRAM(S): 80 TABLET, FILM COATED ORAL at 21:46

## 2018-12-13 RX ADMIN — Medication 81 MILLIGRAM(S): at 11:06

## 2018-12-13 RX ADMIN — NYSTATIN CREAM 1 APPLICATION(S): 100000 CREAM TOPICAL at 21:46

## 2018-12-13 RX ADMIN — Medication 100 MILLIGRAM(S): at 13:34

## 2018-12-13 RX ADMIN — OXYCODONE AND ACETAMINOPHEN 1 TABLET(S): 5; 325 TABLET ORAL at 13:35

## 2018-12-13 RX ADMIN — AMLODIPINE BESYLATE 2.5 MILLIGRAM(S): 2.5 TABLET ORAL at 05:22

## 2018-12-13 RX ADMIN — HEPARIN SODIUM 5000 UNIT(S): 5000 INJECTION INTRAVENOUS; SUBCUTANEOUS at 05:22

## 2018-12-13 RX ADMIN — SENNA PLUS 2 TABLET(S): 8.6 TABLET ORAL at 21:46

## 2018-12-13 RX ADMIN — FAMOTIDINE 20 MILLIGRAM(S): 10 INJECTION INTRAVENOUS at 11:06

## 2018-12-13 RX ADMIN — NYSTATIN CREAM 1 APPLICATION(S): 100000 CREAM TOPICAL at 13:34

## 2018-12-13 RX ADMIN — Medication 50 MILLIGRAM(S): at 05:22

## 2018-12-13 NOTE — PROGRESS NOTE ADULT - ASSESSMENT
89M s/p fall 2-3 weeks ago since that time has had difficulty walking 2/2 pain, no focal neuro deficit on exam but for pain limited leg weakness found to have severe stenosis L4/5, both central and foraminal in nature    Plan:  - May benefit from focal minimally invasive decompression of the lumbar spine assuming MRI c-spine shows no compressive pathology  - f/u medical and cardiology clearance  - Please discuss with cardiology holding antiplatelets prior to surgery

## 2018-12-13 NOTE — PROGRESS NOTE ADULT - ASSESSMENT
88 year old M with    PMH of CAD  ,   (w/ PCI to LAD and Cx    in 2/2018),   on asa/ plavix/ statin    CKDIII, and HTN ,  hld.  c/c  anemia     admitted  with  b/l  leg  posterior  leg  pain,   pt   had  back pain yesterday, which  has   resolved/  also  has  a h/o  of  c/c  back  pain  for  past  year    denies  abd pain/ fevers/  cp/sob,  now  with  an incidental  finding  of AAA  on out pt  axr  emergent  er  u/s, AAA of  5.3  by  5.8    vascular  eval noted  ct  abd,  AAA 5.8 cm  per  card/ dr alka monroe, , to  continue  asa/ plavix  traumatic  distal  leg  wound/  wound  care  RASHAUN , resolving  will need  to restart  lasix  in  a  few days  mri t spine, severe  spinal  stenosi. L4/5,  / seen by N/S,  awaiting  mri  c  spine  daughter requesting  second  opininon,/ dr mondragon       < from: MR Lumbar Spine No Cont (12.10.18 @ 22:57) >  IMPRESSION: Degenerative changes of the lumbar spine with severe spinal   stenosis at L4-5 due to a combination of degenerative facet changes and   grade 1anterolisthesis L4 on L5. Large abdominal aortic aneurys  < end of copied text >     < from: CT Abdomen and Pelvis No Cont (12.10.18 @ 17:36) >  INTERPRETATION:  Infrarenal 5.8 cm abdominal aortic aneurysm. No   intramural hematoma or periaortic fat stranding.  < end of copied text >

## 2018-12-13 NOTE — PROGRESS NOTE ADULT - ASSESSMENT
ASSESSMENT: Patient is a 89M with b/l lower extremity pain x2 weeks following a fall to his backside, who was incidentally noted to have a 5.8cm AAA.    PLAN:  - CT A/P results noted  - Med and cards optimization while inpatient would be convenient  - Patient needs medical optimization prior to operative intervention    Nehemiah Hale, PGY-2  Vascular Surgery [phone]

## 2018-12-14 LAB
ANION GAP SERPL CALC-SCNC: 9 MMOL/L — SIGNIFICANT CHANGE UP (ref 5–17)
BUN SERPL-MCNC: 30 MG/DL — HIGH (ref 7–23)
CALCIUM SERPL-MCNC: 8.8 MG/DL — SIGNIFICANT CHANGE UP (ref 8.4–10.5)
CHLORIDE SERPL-SCNC: 105 MMOL/L — SIGNIFICANT CHANGE UP (ref 96–108)
CO2 SERPL-SCNC: 26 MMOL/L — SIGNIFICANT CHANGE UP (ref 22–31)
CREAT SERPL-MCNC: 1.7 MG/DL — HIGH (ref 0.5–1.3)
GLUCOSE SERPL-MCNC: 103 MG/DL — HIGH (ref 70–99)
HCT VFR BLD CALC: 29.4 % — LOW (ref 39–50)
HGB BLD-MCNC: 9.8 G/DL — LOW (ref 13–17)
MCHC RBC-ENTMCNC: 29.4 PG — SIGNIFICANT CHANGE UP (ref 27–34)
MCHC RBC-ENTMCNC: 33.3 GM/DL — SIGNIFICANT CHANGE UP (ref 32–36)
MCV RBC AUTO: 88.3 FL — SIGNIFICANT CHANGE UP (ref 80–100)
PLATELET # BLD AUTO: 231 K/UL — SIGNIFICANT CHANGE UP (ref 150–400)
POTASSIUM SERPL-MCNC: 4.1 MMOL/L — SIGNIFICANT CHANGE UP (ref 3.5–5.3)
POTASSIUM SERPL-SCNC: 4.1 MMOL/L — SIGNIFICANT CHANGE UP (ref 3.5–5.3)
RBC # BLD: 3.33 M/UL — LOW (ref 4.2–5.8)
RBC # FLD: 16.6 % — HIGH (ref 10.3–14.5)
SODIUM SERPL-SCNC: 140 MMOL/L — SIGNIFICANT CHANGE UP (ref 135–145)
WBC # BLD: 8.02 K/UL — SIGNIFICANT CHANGE UP (ref 3.8–10.5)
WBC # FLD AUTO: 8.02 K/UL — SIGNIFICANT CHANGE UP (ref 3.8–10.5)

## 2018-12-14 RX ADMIN — Medication 50 MILLIGRAM(S): at 06:45

## 2018-12-14 RX ADMIN — HEPARIN SODIUM 5000 UNIT(S): 5000 INJECTION INTRAVENOUS; SUBCUTANEOUS at 17:35

## 2018-12-14 RX ADMIN — Medication 81 MILLIGRAM(S): at 12:04

## 2018-12-14 RX ADMIN — AMLODIPINE BESYLATE 2.5 MILLIGRAM(S): 2.5 TABLET ORAL at 06:45

## 2018-12-14 RX ADMIN — Medication 100 MILLIGRAM(S): at 06:45

## 2018-12-14 RX ADMIN — ATORVASTATIN CALCIUM 40 MILLIGRAM(S): 80 TABLET, FILM COATED ORAL at 22:03

## 2018-12-14 RX ADMIN — FAMOTIDINE 20 MILLIGRAM(S): 10 INJECTION INTRAVENOUS at 12:04

## 2018-12-14 RX ADMIN — HEPARIN SODIUM 5000 UNIT(S): 5000 INJECTION INTRAVENOUS; SUBCUTANEOUS at 06:45

## 2018-12-14 RX ADMIN — TAMSULOSIN HYDROCHLORIDE 0.4 MILLIGRAM(S): 0.4 CAPSULE ORAL at 22:03

## 2018-12-14 RX ADMIN — Medication 100 MILLIGRAM(S): at 22:03

## 2018-12-14 RX ADMIN — SENNA PLUS 2 TABLET(S): 8.6 TABLET ORAL at 22:03

## 2018-12-14 RX ADMIN — NYSTATIN CREAM 1 APPLICATION(S): 100000 CREAM TOPICAL at 06:45

## 2018-12-14 RX ADMIN — NYSTATIN CREAM 1 APPLICATION(S): 100000 CREAM TOPICAL at 14:52

## 2018-12-14 RX ADMIN — NYSTATIN CREAM 1 APPLICATION(S): 100000 CREAM TOPICAL at 22:04

## 2018-12-14 RX ADMIN — CLOPIDOGREL BISULFATE 75 MILLIGRAM(S): 75 TABLET, FILM COATED ORAL at 12:04

## 2018-12-14 NOTE — PROGRESS NOTE ADULT - ASSESSMENT
88 year old M with    PMH of CAD  ,   (w/ PCI to LAD and Cx    in 2/2018),   on asa/ plavix/ statin    CKDIII, and HTN ,  hld.  c/c  anemia     admitted  with  b/l  leg  posterior  leg  pain,   pt   had  back pain yesterday, which  has   resolved/  also  has  a h/o  of  c/c  back  pain  for  past  year    denies  abd pain/ fevers/  cp/sob,  now  with  an incidental  finding  of AAA  on out pt  axr  emergent  er  u/s, AAA of  5.3  by  5.8    vascular  eval noted  ct  abd,  AAA 5.8 cm  per  card/ dr alka monroe, , to  continue  asa/ plavix  traumatic  distal  leg  wound/  wound  care  RASHAUN , resolving  will need  to restart  lasix  in  a  few days  mri t spine, severe  spinal  stenosi. L4/5,  / seen by N/S,   mri  c  spine, noted  per  dr mondragon, pt  will need  AAA repair, at a later  date  spoke with  N/S  resident,  stated  that  spine  surgery  is  nn emergent  daughter  aware  start d/c  planning  to  rehab      < from: MR Lumbar Spine No Cont (12.10.18 @ 22:57) >  IMPRESSION: Degenerative changes of the lumbar spine with severe spinal   stenosis at L4-5 due to a combination of degenerative facet changes and   grade 1anterolisthesis L4 on L5. Large abdominal aortic aneurys  < end of copied text >     < from: CT Abdomen and Pelvis No Cont (12.10.18 @ 17:36) >  INTERPRETATION:  Infrarenal 5.8 cm abdominal aortic aneurysm. No   intramural hematoma or periaortic fat stranding.  < end of copied text >

## 2018-12-14 NOTE — PROGRESS NOTE ADULT - ASSESSMENT
ASSESSMENT: Patient is a 89M with b/l lower extremity pain x2 weeks following a fall to his backside, who was incidentally noted to have a 5.8cm AAA.    PLAN:  - CT A/P results noted  - Med and cards optimization while inpatient would be convenient    Nehemiah Hale, PGY-2  Vascular Surgery [phone]

## 2018-12-14 NOTE — PROGRESS NOTE ADULT - ASSESSMENT
The patient is an 89-year-old gentleman with the past medical history of hypertension, chronic kidney disease stage IV, anemia, presents with inability to ambulate and found to have severe spinal stenosis.  The patient also has abdominal aortic aneurysm.    Abnormal renal function is relatively nonproteinuric likely related to years of hypertension.  Again, his hypertension was likely undiagnosed as he did not seek medical help.    1.	Neurosurgery.  Neurosurgery is following.  The patient will need to be off aspirin and Plavix.  per neurosurgery the surgery is not imminent/emergent   2.	Cardiology.  The patient's blood pressure is on the lower side given the abdominal aortic aneurysm.    Cardiology to decide on DAPT .  Again, he has had the last stent was in 02/2018.  3.	Renal.  No need for a renal sonogram.  Check urinalysis.  Quantify proteinuria.

## 2018-12-15 LAB
ANION GAP SERPL CALC-SCNC: 10 MMOL/L — SIGNIFICANT CHANGE UP (ref 5–17)
APPEARANCE UR: CLEAR — SIGNIFICANT CHANGE UP
BILIRUB UR-MCNC: NEGATIVE — SIGNIFICANT CHANGE UP
BUN SERPL-MCNC: 28 MG/DL — HIGH (ref 7–23)
CALCIUM SERPL-MCNC: 8.9 MG/DL — SIGNIFICANT CHANGE UP (ref 8.4–10.5)
CHLORIDE SERPL-SCNC: 106 MMOL/L — SIGNIFICANT CHANGE UP (ref 96–108)
CO2 SERPL-SCNC: 24 MMOL/L — SIGNIFICANT CHANGE UP (ref 22–31)
COLOR SPEC: YELLOW — SIGNIFICANT CHANGE UP
CREAT SERPL-MCNC: 1.57 MG/DL — HIGH (ref 0.5–1.3)
DIFF PNL FLD: NEGATIVE — SIGNIFICANT CHANGE UP
GLUCOSE SERPL-MCNC: 96 MG/DL — SIGNIFICANT CHANGE UP (ref 70–99)
GLUCOSE UR QL: NEGATIVE MG/DL — SIGNIFICANT CHANGE UP
HCT VFR BLD CALC: 31.4 % — LOW (ref 39–50)
HGB BLD-MCNC: 10 G/DL — LOW (ref 13–17)
KETONES UR-MCNC: NEGATIVE — SIGNIFICANT CHANGE UP
LEUKOCYTE ESTERASE UR-ACNC: ABNORMAL
MCHC RBC-ENTMCNC: 28 PG — SIGNIFICANT CHANGE UP (ref 27–34)
MCHC RBC-ENTMCNC: 31.8 GM/DL — LOW (ref 32–36)
MCV RBC AUTO: 88 FL — SIGNIFICANT CHANGE UP (ref 80–100)
NITRITE UR-MCNC: NEGATIVE — SIGNIFICANT CHANGE UP
PH UR: 6.5 — SIGNIFICANT CHANGE UP (ref 5–8)
PLATELET # BLD AUTO: 268 K/UL — SIGNIFICANT CHANGE UP (ref 150–400)
POTASSIUM SERPL-MCNC: 4.4 MMOL/L — SIGNIFICANT CHANGE UP (ref 3.5–5.3)
POTASSIUM SERPL-SCNC: 4.4 MMOL/L — SIGNIFICANT CHANGE UP (ref 3.5–5.3)
PROT UR-MCNC: NEGATIVE MG/DL — SIGNIFICANT CHANGE UP
RBC # BLD: 3.57 M/UL — LOW (ref 4.2–5.8)
RBC # FLD: 16.3 % — HIGH (ref 10.3–14.5)
SODIUM SERPL-SCNC: 141 MMOL/L — SIGNIFICANT CHANGE UP (ref 135–145)
SP GR SPEC: 1.02 — SIGNIFICANT CHANGE UP (ref 1.01–1.02)
UROBILINOGEN FLD QL: NEGATIVE MG/DL — SIGNIFICANT CHANGE UP
WBC # BLD: 5.67 K/UL — SIGNIFICANT CHANGE UP (ref 3.8–10.5)
WBC # FLD AUTO: 5.67 K/UL — SIGNIFICANT CHANGE UP (ref 3.8–10.5)

## 2018-12-15 RX ADMIN — HEPARIN SODIUM 5000 UNIT(S): 5000 INJECTION INTRAVENOUS; SUBCUTANEOUS at 17:28

## 2018-12-15 RX ADMIN — Medication 81 MILLIGRAM(S): at 12:04

## 2018-12-15 RX ADMIN — NYSTATIN CREAM 1 APPLICATION(S): 100000 CREAM TOPICAL at 13:08

## 2018-12-15 RX ADMIN — ATORVASTATIN CALCIUM 40 MILLIGRAM(S): 80 TABLET, FILM COATED ORAL at 21:19

## 2018-12-15 RX ADMIN — HEPARIN SODIUM 5000 UNIT(S): 5000 INJECTION INTRAVENOUS; SUBCUTANEOUS at 05:55

## 2018-12-15 RX ADMIN — TAMSULOSIN HYDROCHLORIDE 0.4 MILLIGRAM(S): 0.4 CAPSULE ORAL at 21:20

## 2018-12-15 RX ADMIN — AMLODIPINE BESYLATE 2.5 MILLIGRAM(S): 2.5 TABLET ORAL at 05:55

## 2018-12-15 RX ADMIN — Medication 100 MILLIGRAM(S): at 05:55

## 2018-12-15 RX ADMIN — CLOPIDOGREL BISULFATE 75 MILLIGRAM(S): 75 TABLET, FILM COATED ORAL at 12:04

## 2018-12-15 RX ADMIN — SENNA PLUS 2 TABLET(S): 8.6 TABLET ORAL at 21:19

## 2018-12-15 RX ADMIN — FAMOTIDINE 20 MILLIGRAM(S): 10 INJECTION INTRAVENOUS at 12:04

## 2018-12-15 RX ADMIN — Medication 100 MILLIGRAM(S): at 21:19

## 2018-12-15 RX ADMIN — NYSTATIN CREAM 1 APPLICATION(S): 100000 CREAM TOPICAL at 05:55

## 2018-12-15 RX ADMIN — Medication 50 MILLIGRAM(S): at 05:55

## 2018-12-15 RX ADMIN — NYSTATIN CREAM 1 APPLICATION(S): 100000 CREAM TOPICAL at 21:20

## 2018-12-15 NOTE — PROGRESS NOTE ADULT - ASSESSMENT
ASSESSMENT: Patient is a 89M with b/l lower extremity pain x2 weeks following a fall to his backside, who was incidentally noted to have a 5.8cm AAA.    PLAN:  - CT A/P results noted  - Operative planning  - Med and cards optimization while inpatient would be convenient    Nehemiah Hale, PGY-2  Vascular Surgery [phone]

## 2018-12-15 NOTE — PROGRESS NOTE ADULT - ASSESSMENT
88 year old M with    PMH of CAD  ,   (w/ PCI to LAD and Cx    in 2/2018),   on asa/ plavix/ statin    CKDIII, and HTN ,  hld.  c/c  anemia     admitted  with  b/l  leg  posterior  leg  pain,   pt   had  back pain yesterday, which  has   resolved/  also  has  a h/o  of  c/c  back  pain  for  past  year    denies  abd pain/ fevers/  cp/sob,  now  with  an incidental  finding  of AAA  on out pt  axr  emergent  er  u/s, AAA of  5.3  by  5.8    vascular  eval noted  ct  abd,  AAA 5.8 cm  per  card/ dr alka monroe, , to  continue  asa/ plavix  traumatic  distal  leg  wound/  wound  care  RASHAUN , resolving  will need  to restart  lasix  in  a  few days  mri t spine, severe  spinal  stenosi. L4/5,  / seen by N/S,   mri  c  spine, noted  per  dr mondragon, pt  will need  AAA repair, at a later  date  spoke with  N/S  resident,  stated  that  spine  surgery  is  nn emergent  daughter  aware  start d/c  planning  to  rehab  d c on monday      < from: MR Lumbar Spine No Cont (12.10.18 @ 22:57) >  IMPRESSION: Degenerative changes of the lumbar spine with severe spinal   stenosis at L4-5 due to a combination of degenerative facet changes and   grade 1anterolisthesis L4 on L5. Large abdominal aortic aneurys  < end of copied text >     < from: CT Abdomen and Pelvis No Cont (12.10.18 @ 17:36) >  INTERPRETATION:  Infrarenal 5.8 cm abdominal aortic aneurysm. No   intramural hematoma or periaortic fat stranding.  < end of copied text >

## 2018-12-16 LAB
ANION GAP SERPL CALC-SCNC: 10 MMOL/L — SIGNIFICANT CHANGE UP (ref 5–17)
BUN SERPL-MCNC: 26 MG/DL — HIGH (ref 7–23)
CALCIUM SERPL-MCNC: 8.9 MG/DL — SIGNIFICANT CHANGE UP (ref 8.4–10.5)
CHLORIDE SERPL-SCNC: 106 MMOL/L — SIGNIFICANT CHANGE UP (ref 96–108)
CO2 SERPL-SCNC: 26 MMOL/L — SIGNIFICANT CHANGE UP (ref 22–31)
CREAT SERPL-MCNC: 1.56 MG/DL — HIGH (ref 0.5–1.3)
GLUCOSE SERPL-MCNC: 101 MG/DL — HIGH (ref 70–99)
MAGNESIUM SERPL-MCNC: 2 MG/DL — SIGNIFICANT CHANGE UP (ref 1.6–2.6)
PHOSPHATE SERPL-MCNC: 2.7 MG/DL — SIGNIFICANT CHANGE UP (ref 2.5–4.5)
POTASSIUM SERPL-MCNC: 4.3 MMOL/L — SIGNIFICANT CHANGE UP (ref 3.5–5.3)
POTASSIUM SERPL-SCNC: 4.3 MMOL/L — SIGNIFICANT CHANGE UP (ref 3.5–5.3)
SODIUM SERPL-SCNC: 142 MMOL/L — SIGNIFICANT CHANGE UP (ref 135–145)

## 2018-12-16 RX ORDER — FUROSEMIDE 40 MG
20 TABLET ORAL DAILY
Refills: 0 | Status: DISCONTINUED | OUTPATIENT
Start: 2018-12-16 | End: 2018-12-18

## 2018-12-16 RX ADMIN — NYSTATIN CREAM 1 APPLICATION(S): 100000 CREAM TOPICAL at 15:11

## 2018-12-16 RX ADMIN — HEPARIN SODIUM 5000 UNIT(S): 5000 INJECTION INTRAVENOUS; SUBCUTANEOUS at 06:10

## 2018-12-16 RX ADMIN — Medication 20 MILLIGRAM(S): at 15:11

## 2018-12-16 RX ADMIN — NYSTATIN CREAM 1 APPLICATION(S): 100000 CREAM TOPICAL at 21:39

## 2018-12-16 RX ADMIN — CLOPIDOGREL BISULFATE 75 MILLIGRAM(S): 75 TABLET, FILM COATED ORAL at 12:01

## 2018-12-16 RX ADMIN — ATORVASTATIN CALCIUM 40 MILLIGRAM(S): 80 TABLET, FILM COATED ORAL at 21:38

## 2018-12-16 RX ADMIN — Medication 81 MILLIGRAM(S): at 12:01

## 2018-12-16 RX ADMIN — TAMSULOSIN HYDROCHLORIDE 0.4 MILLIGRAM(S): 0.4 CAPSULE ORAL at 21:38

## 2018-12-16 RX ADMIN — NYSTATIN CREAM 1 APPLICATION(S): 100000 CREAM TOPICAL at 06:11

## 2018-12-16 RX ADMIN — Medication 100 MILLIGRAM(S): at 06:10

## 2018-12-16 RX ADMIN — Medication 50 MILLIGRAM(S): at 06:10

## 2018-12-16 RX ADMIN — HEPARIN SODIUM 5000 UNIT(S): 5000 INJECTION INTRAVENOUS; SUBCUTANEOUS at 17:31

## 2018-12-16 RX ADMIN — AMLODIPINE BESYLATE 2.5 MILLIGRAM(S): 2.5 TABLET ORAL at 06:10

## 2018-12-16 RX ADMIN — FAMOTIDINE 20 MILLIGRAM(S): 10 INJECTION INTRAVENOUS at 12:01

## 2018-12-16 NOTE — PROVIDER CONTACT NOTE (OTHER) - ASSESSMENT
Pt denies chest pain/palpitations. Pt was asleep during event. VSS. 98.3F HR 58 /62 RR 20 97% on room air

## 2018-12-16 NOTE — PROGRESS NOTE ADULT - ASSESSMENT
88 year old M with    PMH of CAD  ,   (w/ PCI to LAD and Cx    in 2/2018),   on asa/ plavix/ statin    CKDIII, and HTN ,  hld.  c/c  anemia     admitted  with  b/l  leg  posterior  leg  pain,   pt   had  back pain yesterday, which  has   resolved/  also  has  a h/o  of  c/c  back  pain  for  past  year    denies  abd pain/ fevers/  cp/sob,  now  with  an incidental  finding  of AAA  on out pt  axr  emergent  er  u/s, AAA of  5.3  by  5.8    vascular  eval noted  ct  abd,  AAA 5.8 cm  per  card/ dr alka monroe, , to  continue  asa/ plavix  traumatic  distal  leg  wound/  wound  care  RASHAUN , resolving  will need  to restart  lasix    mri t spine, severe  spinal  stenosi. L4/5,  / seen by N/S,   mri  c  spine, noted  per  dr mondragon, pt  will need  AAA repair, at a later  date  spoke with  N/S  resident,  stated  that  spine  surgery  is  nn emergent  daughter  aware  start d/c  planning  to  rehab  d c on monday to rehab/  daughter  aware      < from: MR Lumbar Spine No Cont (12.10.18 @ 22:57) >  IMPRESSION: Degenerative changes of the lumbar spine with severe spinal   stenosis at L4-5 due to a combination of degenerative facet changes and   grade 1anterolisthesis L4 on L5. Large abdominal aortic aneurys  < end of copied text >     < from: CT Abdomen and Pelvis No Cont (12.10.18 @ 17:36) >  INTERPRETATION:  Infrarenal 5.8 cm abdominal aortic aneurysm. No   intramural hematoma or periaortic fat stranding.  < end of copied text >

## 2018-12-16 NOTE — CHART NOTE - NSCHARTNOTEFT_GEN_A_CORE
Episode of wide complex tachycardia at 07: 30 AM; 7 beats ; HR around 110. asymptomatic; /62; electrolytes checked and wnl; will follow on telemetry; Pt on metoprolol xl 50 daily.

## 2018-12-17 RX ADMIN — CLOPIDOGREL BISULFATE 75 MILLIGRAM(S): 75 TABLET, FILM COATED ORAL at 14:29

## 2018-12-17 RX ADMIN — HEPARIN SODIUM 5000 UNIT(S): 5000 INJECTION INTRAVENOUS; SUBCUTANEOUS at 05:18

## 2018-12-17 RX ADMIN — ATORVASTATIN CALCIUM 40 MILLIGRAM(S): 80 TABLET, FILM COATED ORAL at 21:27

## 2018-12-17 RX ADMIN — NYSTATIN CREAM 1 APPLICATION(S): 100000 CREAM TOPICAL at 14:29

## 2018-12-17 RX ADMIN — HEPARIN SODIUM 5000 UNIT(S): 5000 INJECTION INTRAVENOUS; SUBCUTANEOUS at 17:00

## 2018-12-17 RX ADMIN — TAMSULOSIN HYDROCHLORIDE 0.4 MILLIGRAM(S): 0.4 CAPSULE ORAL at 21:27

## 2018-12-17 RX ADMIN — NYSTATIN CREAM 1 APPLICATION(S): 100000 CREAM TOPICAL at 21:27

## 2018-12-17 RX ADMIN — Medication 20 MILLIGRAM(S): at 05:18

## 2018-12-17 RX ADMIN — Medication 50 MILLIGRAM(S): at 05:18

## 2018-12-17 RX ADMIN — NYSTATIN CREAM 1 APPLICATION(S): 100000 CREAM TOPICAL at 05:22

## 2018-12-17 RX ADMIN — Medication 81 MILLIGRAM(S): at 14:29

## 2018-12-17 RX ADMIN — FAMOTIDINE 20 MILLIGRAM(S): 10 INJECTION INTRAVENOUS at 14:29

## 2018-12-17 NOTE — PROGRESS NOTE ADULT - ASSESSMENT
88 year old M with    PMH of CAD  ,   (w/ PCI to LAD and Cx    in 2/2018),   on asa/ plavix/ statin    CKDIII, and HTN ,  hld.  c/c  anemia     admitted  with  b/l  leg  posterior  leg  pain,   pt   had  back pain yesterday, which  has   resolved/  also  has  a h/o  of  c/c  back  pain  for  past  year    denies  abd pain/ fevers/  cp/sob,  now  with  an incidental  finding  of AAA  on out pt  axr  emergent  er  u/s, AAA of  5.3  by  5.8    vascular  eval noted  ct  abd,  AAA 5.8 cm  per  card/ dr alka monroe, , to  continue  asa/ plavix  traumatic  distal  leg  wound/  wound  care  RASHAUN , resolving  will need  to restart  lasix    mri t spine, severe  spinal  stenosi. L4/5,  / seen by N/S,   mri  c  spine, noted  per  dr mondragon, pt  will need  AAA repair, at a later  date  spoke with  N/S  resident,  stated  that  spine  surgery  is  nn emergent  start d/c  planning  to  rehab  d c on monday to rehab/  daughter  aware  pt  will  f/p  with  vascular      < from: MR Lumbar Spine No Cont (12.10.18 @ 22:57) >  IMPRESSION: Degenerative changes of the lumbar spine with severe spinal   stenosis at L4-5 due to a combination of degenerative facet changes and   grade 1anterolisthesis L4 on L5. Large abdominal aortic aneurys  < end of copied text >     < from: CT Abdomen and Pelvis No Cont (12.10.18 @ 17:36) >  INTERPRETATION:  Infrarenal 5.8 cm abdominal aortic aneurysm. No   intramural hematoma or periaortic fat stranding.  < end of copied text >

## 2018-12-17 NOTE — PROGRESS NOTE ADULT - ASSESSMENT
89-yearold gentleman with the past medical history of hypertension, chronic kidney disease stage IV, anemia, presents with inability to ambulate and found to have severe spinal stenosis.  The patient also has abdominal aortic aneurysm.    Abnormal renal function is relatively nonproteinuric likely related to years of hypertension.  Again, his hypertension was likely undiagnosed as he did not seek medical help.    1. Neurosurgery.  Neurosurgery is following.  The patient will need to be off aspirin and Plavix.  per neurosurgery the surgery is not imminent/emergent.   2. Cardiology.  The patient's blood pressure is on the lower side given the abdominal aortic aneurysm. Cardiology to decide on DAPT. Again, he has had the last stent was in 02/2018.  3. Renal.  No need for a renal sonogram.  trace proteinuria noted on UA.  Cont the FLomax  4. Vascular - plan for EVAR procedure as an outpatient    SHERWIN in progress

## 2018-12-18 ENCOUNTER — TRANSCRIPTION ENCOUNTER (OUTPATIENT)
Age: 83
End: 2018-12-18

## 2018-12-18 VITALS
SYSTOLIC BLOOD PRESSURE: 119 MMHG | OXYGEN SATURATION: 99 % | TEMPERATURE: 98 F | RESPIRATION RATE: 17 BRPM | HEART RATE: 63 BPM | DIASTOLIC BLOOD PRESSURE: 76 MMHG

## 2018-12-18 RX ORDER — ASPIRIN/CALCIUM CARB/MAGNESIUM 324 MG
1 TABLET ORAL
Qty: 0 | Refills: 0 | DISCHARGE
Start: 2018-12-18

## 2018-12-18 RX ORDER — NYSTATIN CREAM 100000 [USP'U]/G
1 CREAM TOPICAL
Qty: 0 | Refills: 0 | DISCHARGE
Start: 2018-12-18

## 2018-12-18 RX ORDER — FAMOTIDINE 10 MG/ML
1 INJECTION INTRAVENOUS
Qty: 0 | Refills: 0 | DISCHARGE
Start: 2018-12-18

## 2018-12-18 RX ORDER — METOPROLOL TARTRATE 50 MG
1 TABLET ORAL
Qty: 0 | Refills: 0 | DISCHARGE
Start: 2018-12-18

## 2018-12-18 RX ORDER — DOCUSATE SODIUM 100 MG
1 CAPSULE ORAL
Qty: 0 | Refills: 0 | DISCHARGE
Start: 2018-12-18

## 2018-12-18 RX ORDER — TAMSULOSIN HYDROCHLORIDE 0.4 MG/1
1 CAPSULE ORAL
Qty: 0 | Refills: 0 | DISCHARGE
Start: 2018-12-18

## 2018-12-18 RX ORDER — ATORVASTATIN CALCIUM 80 MG/1
1 TABLET, FILM COATED ORAL
Qty: 0 | Refills: 0 | DISCHARGE
Start: 2018-12-18

## 2018-12-18 RX ORDER — FUROSEMIDE 40 MG
1 TABLET ORAL
Qty: 0 | Refills: 0 | DISCHARGE
Start: 2018-12-18

## 2018-12-18 RX ADMIN — FAMOTIDINE 20 MILLIGRAM(S): 10 INJECTION INTRAVENOUS at 12:08

## 2018-12-18 RX ADMIN — NYSTATIN CREAM 1 APPLICATION(S): 100000 CREAM TOPICAL at 05:54

## 2018-12-18 RX ADMIN — Medication 81 MILLIGRAM(S): at 12:08

## 2018-12-18 RX ADMIN — Medication 50 MILLIGRAM(S): at 05:53

## 2018-12-18 RX ADMIN — Medication 20 MILLIGRAM(S): at 05:53

## 2018-12-18 RX ADMIN — HEPARIN SODIUM 5000 UNIT(S): 5000 INJECTION INTRAVENOUS; SUBCUTANEOUS at 05:53

## 2018-12-18 RX ADMIN — CLOPIDOGREL BISULFATE 75 MILLIGRAM(S): 75 TABLET, FILM COATED ORAL at 12:08

## 2018-12-18 NOTE — DISCHARGE NOTE ADULT - PATIENT PORTAL LINK FT
You can access the [facility] Patient Portal, offered by [facility], by registering with the following website: [email or web address]

## 2018-12-18 NOTE — DISCHARGE NOTE ADULT - HOSPITAL COURSE
88 year old M with PMH of CAD  ,   (w/ PCI to LAD and Cx    in 2/2018),   on asa/ plavix/ statin,. CKDIII, and HTN ,  hld.  c/c  anemia  admitted  with  b/l  leg  posterior  leg  pain,   pt   had  back pain  which  has   resolved/  also  has  a h/o  of  c/c  back  pain  for  past  year, denies  abd pain/ fevers/  cp/sob,  now  with  an incidental  finding  of AAA  on out pt  axr  emergent  er  u/s, AAA of  5.3  by  5.8.  vascular  eval noted  ct  abd,  AAA 5.8 cm  per  card/ dr alka monroe, , to  continue  asa/ plavix  traumatic  distal  leg  wound/  wound  care  RASHAUN , resolving  will need  to restart  lasix    mri t spine, severe  spinal  stenosi. L4/5,  / seen by N/S,   mri  c  spine, noted  per  dr mondragon, pt  will need  AAA repair, at a later  date  spoke with  N/S  resident,  stated  that  spine  surgery  is  nn emergent  start d/c  planning  to  rehab  d c   to rehab/  daughter  aware  pt  will  f/p  with  vascular 88 year old M with PMH of CAD  ,   (w/ PCI to LAD and Cx    in 2/2018),   on asa/ plavix/ statin,. CKDIII, and HTN ,  hld.  c/c  anemia  admitted  with  b/l  leg  posterior  leg  pain,   pt   had  back pain  which  has   resolved/  also  has  a h/o  of  c/c  back  pain  for  past  year, denies  abd pain/ fevers/  cp/sob,  now  with  an incidental  finding  of AAA  on out pt  axr  emergent  er  u/s, AAA of  5.3  by  5.8.  vascular  eval noted,ct  abd,  AAA 5.8 cm  per  card/ dr alka monroe, , to  continue  asa/ plavix  traumatic  distal  leg  wound/  wound  care  RASHAUN , resolving: Lasix restarted.   mri t spine, severe  spinal  stenosis: L4/5,  / seen by N/S,   mri  c  spine, noted per  dr Yan,  pt  will need  AAA repair, at a later  date as it is non emergent.    d c   to rehab/  daughter  aware    pt  will  f/p  with  vascular, Neurosurgery and PMD as an outpatient. 88 year old M with PMH of CAD  ,   (w/ PCI to LAD and Cx    in 2/2018),   on asa/ plavix/ statin,. CKDIII, and HTN ,  hld.  c/c  anemia  admitted  with  b/l  leg  posterior  leg  pain,   pt   had  back pain  which  has   resolved/  also  has  a h/o  of  c/c  back  pain  for  past  year, denies  abd pain/ fevers/  cp/sob,  now  with  an incidental  finding  of AAA  on out pt  axr  emergent  er  u/s, AAA of  5.3  by  5.8.  vascular  eval noted,ct  abd,  AAA 5.8 cm  per  card/ dr alka monroe, , to  continue  asa/ plavix  traumatic  distal  leg  wound/  wound  care  RASHAUN , resolving: Lasix restarted.   mri t spine, severe  spinal  stenosis: L4/5,  / seen by N/S,   mri  c  spine, noted per  dr Yan,  pt  will need  AAA repair, at a later  date as it is non emergent.    d c   to rehab/  daughter  aware    pt  will  f/p  with  vascular, Neurosurgery and PMD as an outpatient.   dr mondragon . for  aaa  repair, will  scgedule

## 2018-12-18 NOTE — PROGRESS NOTE ADULT - ASSESSMENT
88 year old M with    PMH of CAD  ,   (w/ PCI to LAD and Cx    in 2/2018),   on asa/ plavix/ statin    CKDIII, and HTN ,  hld.  c/c  anemia     admitted  with  b/l  leg  posterior  leg  pain,   pt   had  back pain  which  has   resolved/  also  has  a h/o  of  c/c  back  pain  for  past  year    denies  abd pain/ fevers/  cp/sob,  now  with  an incidental  finding  of AAA  on out pt  axr  emergent  er  u/s, AAA of  5.3  by  5.8    vascular  eval noted  ct  abd,  AAA 5.8 cm  per  card/ dr alka monroe, , to  continue  asa/ plavix  traumatic  distal  leg  wound/  wound  care  RASHAUN , resolving  will need  to restart  lasix    mri t spine, severe  spinal  stenosi. L4/5,  / seen by N/S,   mri  c  spine, noted  per  dr mondragon, pt  will need  AAA repair, at a later  date  spoke with  N/S  resident,  stated  that  spine  surgery  is  nn emergent  start d/c  planning  to  rehab  d c   to rehab/  daughter  aware  pt  will  f/p  with  vascular      < from: MR Lumbar Spine No Cont (12.10.18 @ 22:57) >  IMPRESSION: Degenerative changes of the lumbar spine with severe spinal   stenosis at L4-5 due to a combination of degenerative facet changes and   grade 1anterolisthesis L4 on L5. Large abdominal aortic aneurys  < end of copied text >     < from: CT Abdomen and Pelvis No Cont (12.10.18 @ 17:36) >  INTERPRETATION:  Infrarenal 5.8 cm abdominal aortic aneurysm. No   intramural hematoma or periaortic fat stranding.  < end of copied text >

## 2018-12-18 NOTE — DISCHARGE NOTE ADULT - CARE PLAN
Principal Discharge DX:	Chronic bilateral low back pain without sciatica  Goal:	Patient remains hemodynamically stable.  Assessment and plan of treatment:	Follow up with Vascular Surgeon as an outpatient.  Secondary Diagnosis:	CAD (coronary artery disease)  Assessment and plan of treatment:	Coronary artery disease is a condition where the arteries the supply the heart muscle get clogges with fatty deposits & puts you at risk for a heart attack  Call your doctor if you have any new pain, pressure, or discomfort in the center of your chest, pain, tingling or discomfort in arms, back, neck, jaw, or stomach, shortness of breath, nausea, vomiting, burping or heartburn, sweating, cold and clammy skin, racing or abnormal heartbeat for more than 10 minutes or if they keep coming & going.  Call 911 and do not tr to get to hospital by care  You can help yourself with lefestyle changes (quitting smoking if you smoke), eat lots of fruits & vegetables & low fat dairy products, not a lot of meat & fatty foods, walk or some form of physical activity most days of the week, lose weight if you are overweight  Take your cardiac medication as prescribed to lower cholesterol, to lower blood pressure, aspirin to prevent blood clots, and diabetes control  Make sure to keep appointments with doctor for cardiac follow up care  Secondary Diagnosis:	HTN (hypertension)  Assessment and plan of treatment:	Low salt diet  Activity as tolerated.  Take all medication as prescribed.  Follow up with your medical doctor for routine blood pressure monitoring at your next visit.  Notify your doctor if you have any of the following symptoms:   Dizziness, Lightheadedness, Blurry vision, Headache, Chest pain, Shortness of breath  Secondary Diagnosis:	HLD (hyperlipidemia)  Assessment and plan of treatment:	Stable  Secondary Diagnosis:	CKD (chronic kidney disease)  Assessment and plan of treatment:	Avoid taking (NSAIDs) - (ex: Ibuprofen, Advil, Celebrex, Naprosyn)  Avoid taking any nephrotoxic agents (can harm kidneys) - Intravenous contrast for diagnostic testing, combination cold medications.  Have all medications adjusted for your renal function by your Health Care Provider.  Blood pressure control is important.  Take all medication as prescribed. Principal Discharge DX:	Chronic bilateral low back pain without sciatica  Goal:	Patient remains hemodynamically stable.  Assessment and plan of treatment:	Follow up with Vascular Surgeon, Dr. Disla  as an outpatient for AAA repair.  Secondary Diagnosis:	CAD (coronary artery disease)  Assessment and plan of treatment:	Coronary artery disease is a condition where the arteries the supply the heart muscle get clogges with fatty deposits & puts you at risk for a heart attack  Call your doctor if you have any new pain, pressure, or discomfort in the center of your chest, pain, tingling or discomfort in arms, back, neck, jaw, or stomach, shortness of breath, nausea, vomiting, burping or heartburn, sweating, cold and clammy skin, racing or abnormal heartbeat for more than 10 minutes or if they keep coming & going.  Call 911 and do not tr to get to hospital by care  You can help yourself with lefestyle changes (quitting smoking if you smoke), eat lots of fruits & vegetables & low fat dairy products, not a lot of meat & fatty foods, walk or some form of physical activity most days of the week, lose weight if you are overweight  Take your cardiac medication as prescribed to lower cholesterol, to lower blood pressure, aspirin to prevent blood clots, and diabetes control  Make sure to keep appointments with doctor for cardiac follow up care  Secondary Diagnosis:	HTN (hypertension)  Assessment and plan of treatment:	Low salt diet  Activity as tolerated.  Take all medication as prescribed.  Follow up with your medical doctor for routine blood pressure monitoring at your next visit.  Notify your doctor if you have any of the following symptoms:   Dizziness, Lightheadedness, Blurry vision, Headache, Chest pain, Shortness of breath  Secondary Diagnosis:	HLD (hyperlipidemia)  Assessment and plan of treatment:	Stable  Secondary Diagnosis:	CKD (chronic kidney disease)  Assessment and plan of treatment:	Avoid taking (NSAIDs) - (ex: Ibuprofen, Advil, Celebrex, Naprosyn)  Avoid taking any nephrotoxic agents (can harm kidneys) - Intravenous contrast for diagnostic testing, combination cold medications.  Have all medications adjusted for your renal function by your Health Care Provider.  Blood pressure control is important.  Take all medication as prescribed.  Secondary Diagnosis:	Weakness  Assessment and plan of treatment:	Follow up with Neurosurgery, Dr. Yan for focal decompression.

## 2018-12-18 NOTE — DISCHARGE NOTE ADULT - CARE PROVIDER_API CALL
Joaquin Disla), Surgery  Vascular  [address] [facility]  [address] [address]  Phone: [phone]  Fax: [phone]    Jean Yan), [place] [address]  Phone: [phone]  Fax: [phone]

## 2018-12-18 NOTE — DISCHARGE NOTE ADULT - MEDICATION SUMMARY - MEDICATIONS TO TAKE
I will START or STAY ON the medications listed below when I get home from the hospital:    vitamin b complex  -- orally once a day  -- Indication: For Vitamin    Iron Release  -- orally once a day  -- Indication: For Vitramin    aspirin 81 mg oral delayed release tablet  -- 1 tab(s) by mouth once a day  -- Indication: For CAD (coronary artery disease)    tamsulosin 0.4 mg oral capsule  -- 1 cap(s) by mouth once a day (at bedtime)  -- Indication: For BPH    atorvastatin 40 mg oral tablet  -- 1 tab(s) by mouth once a day (at bedtime)  -- Indication: For HLD (hyperlipidemia)    clopidogrel 75 mg oral tablet  -- 1 tab(s) by mouth once a day  -- Indication: For CAD (coronary artery disease)    metoprolol succinate 50 mg oral tablet, extended release  -- 1 tab(s) by mouth once a day  -- Indication: For Blood Pressure    nystatin 100,000 units/g topical powder  -- 1 application on skin 3 times a day  -- Indication: For Topical agent    furosemide 20 mg oral tablet  -- 1 tab(s) by mouth once a day  -- Indication: For Diuretic    famotidine 20 mg oral tablet  -- 1 tab(s) by mouth once a day  -- Indication: For Stomach    docusate sodium 100 mg oral capsule  -- 1 cap(s) by mouth 3 times a day  -- Indication: For Laxative

## 2018-12-18 NOTE — DISCHARGE NOTE ADULT - PLAN OF CARE
Patient remains hemodynamically stable. Follow up with Vascular Surgeon as an outpatient. Coronary artery disease is a condition where the arteries the supply the heart muscle get clogges with fatty deposits & puts you at risk for a heart attack  Call your doctor if you have any new pain, pressure, or discomfort in the center of your chest, pain, tingling or discomfort in arms, back, neck, jaw, or stomach, shortness of breath, nausea, vomiting, burping or heartburn, sweating, cold and clammy skin, racing or abnormal heartbeat for more than 10 minutes or if they keep coming & going.  Call 911 and do not tr to get to hospital by care  You can help yourself with lefestyle changes (quitting smoking if you smoke), eat lots of fruits & vegetables & low fat dairy products, not a lot of meat & fatty foods, walk or some form of physical activity most days of the week, lose weight if you are overweight  Take your cardiac medication as prescribed to lower cholesterol, to lower blood pressure, aspirin to prevent blood clots, and diabetes control  Make sure to keep appointments with doctor for cardiac follow up care Low salt diet  Activity as tolerated.  Take all medication as prescribed.  Follow up with your medical doctor for routine blood pressure monitoring at your next visit.  Notify your doctor if you have any of the following symptoms:   Dizziness, Lightheadedness, Blurry vision, Headache, Chest pain, Shortness of breath Stable Avoid taking (NSAIDs) - (ex: Ibuprofen, Advil, Celebrex, Naprosyn)  Avoid taking any nephrotoxic agents (can harm kidneys) - Intravenous contrast for diagnostic testing, combination cold medications.  Have all medications adjusted for your renal function by your Health Care Provider.  Blood pressure control is important.  Take all medication as prescribed. Follow up with Vascular Surgeon, Dr. Disla  as an outpatient for AAA repair. Follow up with Neurosurgery, Dr. Yan for focal decompression.

## 2018-12-18 NOTE — PROGRESS NOTE ADULT - PROVIDER SPECIALTY LIST ADULT
Cardiology
Internal Medicine
Nephrology
Neurosurgery
Neurosurgery
Vascular Surgery
Cardiology
Internal Medicine
Vascular Surgery
Vascular Surgery

## 2018-12-18 NOTE — PROGRESS NOTE ADULT - REASON FOR ADMISSION
leg  pain

## 2018-12-18 NOTE — PROGRESS NOTE ADULT - SUBJECTIVE AND OBJECTIVE BOX
CARDIOLOGY     PROGRESS  NOTE   ________________________________________________    CHIEF COMPLAINT:Patient is a 89y old  Male who presents with a chief complaint of leg  pain (14 Dec 2018 08:26)  doing better.  	  REVIEW OF SYSTEMS:  CONSTITUTIONAL: No fever, weight loss, or fatigue  EYES: No eye pain, visual disturbances, or discharge  ENT:  No difficulty hearing, tinnitus, vertigo; No sinus or throat pain  NECK: No pain or stiffness  RESPIRATORY: No cough, wheezing, chills or hemoptysis; No Shortness of Breath  CARDIOVASCULAR: No chest pain, palpitations, passing out, dizziness, or leg swelling  GASTROINTESTINAL: No abdominal or epigastric pain. No nausea, vomiting, or hematemesis; No diarrhea or constipation. No melena or hematochezia.  GENITOURINARY: No dysuria, frequency, hematuria, or incontinence  NEUROLOGICAL: No headaches, memory loss, loss of strength, numbness, or tremors  SKIN: No itching, burning, rashes, or lesions   LYMPH Nodes: No enlarged glands  ENDOCRINE: No heat or cold intolerance; No hair loss  MUSCULOSKELETAL: No joint pain or swelling; No muscle, back, or extremity pain  PSYCHIATRIC: No depression, anxiety, mood swings, or difficulty sleeping  HEME/LYMPH: No easy bruising, or bleeding gums  ALLERGY AND IMMUNOLOGIC: No hives or eczema	    [ ] All others negative	  [ ] Unable to obtain    PHYSICAL EXAM:  T(C): 36.9 (12-14-18 @ 04:53), Max: 36.9 (12-14-18 @ 04:53)  HR: 64 (12-14-18 @ 04:53) (64 - 70)  BP: 113/63 (12-14-18 @ 04:53) (111/65 - 113/63)  RR: 18 (12-14-18 @ 04:53) (16 - 18)  SpO2: 99% (12-14-18 @ 04:53) (98% - 100%)  Wt(kg): --  I&O's Summary    13 Dec 2018 07:01  -  14 Dec 2018 07:00  --------------------------------------------------------  IN: 1300 mL / OUT: 1450 mL / NET: -150 mL        Appearance: Normal	  HEENT:   Normal oral mucosa, PERRL, EOMI	  Lymphatic: No lymphadenopathy  Cardiovascular: Normal S1 S2, No JVD, + murmurs, No edema  Respiratory: Lungs clear to auscultation	  Psychiatry: A & O x 3, Mood & affect appropriate  Gastrointestinal:  Soft, Non-tender, + BS	  Skin: No rashes, No ecchymoses, No cyanosis	  Neurologic: Non-focal  Extremities: Normal range of motion, No clubbing, cyanosis or edema  Vascular: Peripheral pulses palpable 2+ bilaterally    MEDICATIONS  (STANDING):  amLODIPine   Tablet 2.5 milliGRAM(s) Oral daily  aspirin enteric coated 81 milliGRAM(s) Oral daily  atorvastatin 40 milliGRAM(s) Oral at bedtime  clopidogrel Tablet 75 milliGRAM(s) Oral daily  docusate sodium 100 milliGRAM(s) Oral three times a day  famotidine    Tablet 20 milliGRAM(s) Oral daily  heparin  Injectable 5000 Unit(s) SubCutaneous every 12 hours  influenza   Vaccine 0.5 milliLiter(s) IntraMuscular once  metoprolol succinate ER 50 milliGRAM(s) Oral daily  nystatin Powder 1 Application(s) Topical three times a day  senna 2 Tablet(s) Oral at bedtime  tamsulosin 0.4 milliGRAM(s) Oral at bedtime      TELEMETRY: 	    ECG:  	  RADIOLOGY:  OTHER: 	  	  LABS:	 	    CARDIAC MARKERS:                                9.8    8.02  )-----------( 231      ( 14 Dec 2018 07:14 )             29.4   < from: MR Lumbar Spine No Cont (12.10.18 @ 22:57) >  IMPRESSION: Degenerative changes of the lumbar spine with severe spinal   stenosis at L4-5 due to a combination of degenerative facet changes and   grade 1anterolisthesis L4 on L5. Large abdominal aortic aneurysm      < end of copied text >    12-14    140  |  105  |  30<H>  ----------------------------<  103<H>  4.1   |  26  |  1.70<H>    Ca    8.8      14 Dec 2018 06:08  Mg     2.1     12-13      proBNP:   Lipid Profile:   HgA1c:   TSH:     < from: TTE with Doppler (w/Cont) (12.11.18 @ 13:23) >  1. Mild concentric left ventricular hypertrophy.  2. Endocardial visualization enhanced with intravenous  injection of Ultrasonic Enhancing Agent (Definity).  Hyperdynamic left ventricle. No obvious wall motion  abnormalities.  3. Reversal of the E-A  waves of the mitral inflow pattern  is consistent with diastolicLV dysfunction.  4. Normal right ventricular size and function.        Assessment and plan  ---------------------------  pt with known hx of ashd s/p stent ?location  will need to get record of his cath and stent.  continue current meds  ? need of stress test prior to surgery if needed
Surgery Progress Note    S: Patient seen and examined. No acute events overnight.     O:  Vital Signs Last 24 Hrs  T(C): 37.2 (13 Dec 2018 04:55), Max: 37.3 (12 Dec 2018 14:24)  T(F): 98.9 (13 Dec 2018 04:55), Max: 99.2 (12 Dec 2018 14:24)  HR: 65 (13 Dec 2018 04:55) (65 - 71)  BP: 125/70 (13 Dec 2018 04:55) (100/60 - 125/70)  BP(mean): --  RR: 16 (13 Dec 2018 04:55) (16 - 18)  SpO2: 97% (13 Dec 2018 04:55) (97% - 100%)    I&O's Detail    12 Dec 2018 07:01  -  13 Dec 2018 07:00  --------------------------------------------------------  IN:    Oral Fluid: 860 mL  Total IN: 860 mL    OUT:    Voided: 1050 mL  Total OUT: 1050 mL    Total NET: -190 mL          MEDICATIONS  (STANDING):  amLODIPine   Tablet 2.5 milliGRAM(s) Oral daily  aspirin enteric coated 81 milliGRAM(s) Oral daily  atorvastatin 40 milliGRAM(s) Oral at bedtime  clopidogrel Tablet 75 milliGRAM(s) Oral daily  docusate sodium 100 milliGRAM(s) Oral three times a day  famotidine    Tablet 20 milliGRAM(s) Oral daily  heparin  Injectable 5000 Unit(s) SubCutaneous every 12 hours  influenza   Vaccine 0.5 milliLiter(s) IntraMuscular once  metoprolol succinate ER 50 milliGRAM(s) Oral daily  nystatin Powder 1 Application(s) Topical three times a day  senna 2 Tablet(s) Oral at bedtime  tamsulosin 0.4 milliGRAM(s) Oral at bedtime    MEDICATIONS  (PRN):  oxyCODONE    5 mG/acetaminophen 325 mG 1 Tablet(s) Oral every 6 hours PRN Mild Pain (1 - 3)  oxyCODONE    5 mG/acetaminophen 325 mG 2 Tablet(s) Oral every 12 hours PRN Severe Pain (7 - 10)                            9.8    10.06 )-----------( 256      ( 13 Dec 2018 06:46 )             30.8       12-13    141  |  105  |  34<H>  ----------------------------<  102<H>  4.1   |  25  |  2.13<H>    Ca    8.9      13 Dec 2018 05:27  Phos  3.4     12-12  Mg     2.1     12-13        Physical Exam:  Gen: Laying in bed, NAD  Resp: Unlabored breathing  Abd: soft, NTND  Ext: WWP. DP signals b/l  Skin: No rashes
      Patient seen and examined in bed. No pain, no SOB.       MEDICATIONS  (STANDING):  amLODIPine   Tablet 2.5 milliGRAM(s) Oral daily  aspirin enteric coated 81 milliGRAM(s) Oral daily  atorvastatin 40 milliGRAM(s) Oral at bedtime  clopidogrel Tablet 75 milliGRAM(s) Oral daily  docusate sodium 100 milliGRAM(s) Oral three times a day  famotidine    Tablet 20 milliGRAM(s) Oral daily  heparin  Injectable 5000 Unit(s) SubCutaneous every 12 hours  influenza   Vaccine 0.5 milliLiter(s) IntraMuscular once  metoprolol succinate ER 50 milliGRAM(s) Oral daily  nystatin Powder 1 Application(s) Topical three times a day  senna 2 Tablet(s) Oral at bedtime  tamsulosin 0.4 milliGRAM(s) Oral at bedtime      VITAL:  T(C): , Max: 37.4 (12-14-18 @ 20:52)  T(F): , Max: 99.4 (12-14-18 @ 20:52)  HR: 75 (12-15-18 @ 13:07)  BP: 129/70 (12-15-18 @ 13:07)  RR: 16 (12-15-18 @ 13:07)  SpO2: 99% (12-15-18 @ 13:07)      I and O's:    12-14 @ 07:01  -  12-15 @ 07:00  --------------------------------------------------------  IN: 700 mL / OUT: 1000 mL / NET: -300 mL    12-15 @ 07:01  -  12-15 @ 14:30  --------------------------------------------------------  IN: 474 mL / OUT: 500 mL / NET: -26 mL          PHYSICAL EXAM:    Constitutional: NAD  Neck:  No JVD  Respiratory: CTAB/L  Cardiovascular: S1 and S2  Gastrointestinal: BS+, soft, NT/ND  Extremities: No peripheral edema  Neurological: A/O x 3, no focal deficits  Psychiatric: Normal mood, normal affect  : No Flores  Skin: No rashes  Access: Not applicable    LABS:                        10.0   5.67  )-----------( 268      ( 15 Dec 2018 09:23 )             31.4     12-15    141  |  106  |  28<H>  ----------------------------<  96  4.4   |  24  |  1.57<H>    Ca    8.9      15 Dec 2018 07:25    ASSESSMENT/PLAN:  89-yearold gentleman with the past medical history of hypertension, chronic kidney disease stage IV, anemia, presents with inability to ambulate and found to have severe spinal stenosis.  The patient also has abdominal aortic aneurysm.    Abnormal renal function is relatively nonproteinuric likely related to years of hypertension.  Again, his hypertension was likely undiagnosed as he did not seek medical help.    1. Neurosurgery.  Neurosurgery is following.  The patient will need to be off aspirin and Plavix.  per neurosurgery the surgery is not imminent/emergent.   2. Cardiology.  The patient's blood pressure is on the lower side given the abdominal aortic aneurysm. Cardiology to decide on DAPT. Again, he has had the last stent was in 02/2018.  3. Renal.  No need for a renal sonogram.  trace proteinuria noted on UA  4. Vascular - plan for EVAR procedure as an outpatient      Arelis Katz, NP-C  [facility]  [phone]
CARDIOLOGY     PROGRESS  NOTE   ________________________________________________    CHIEF COMPLAINT:Patient is a 89y old  Male who presents with a chief complaint of leg  pain (11 Dec 2018 07:44)  np complain.  	  REVIEW OF SYSTEMS:  CONSTITUTIONAL: No fever, weight loss, or fatigue  EYES: No eye pain, visual disturbances, or discharge  ENT:  No difficulty hearing, tinnitus, vertigo; No sinus or throat pain  NECK: No pain or stiffness  RESPIRATORY: No cough, wheezing, chills or hemoptysis; No Shortness of Breath  CARDIOVASCULAR: No chest pain, palpitations, passing out, dizziness, or leg swelling  GASTROINTESTINAL: No abdominal or epigastric pain. No nausea, vomiting, or hematemesis; No diarrhea or constipation. No melena or hematochezia.  GENITOURINARY: No dysuria, frequency, hematuria, or incontinence  NEUROLOGICAL: No headaches, memory loss, loss of strength, numbness, or tremors  SKIN: No itching, burning, rashes, or lesions   LYMPH Nodes: No enlarged glands  ENDOCRINE: No heat or cold intolerance; No hair loss  MUSCULOSKELETAL: No joint pain or swelling; No muscle, back, or extremity pain  PSYCHIATRIC: No depression, anxiety, mood swings, or difficulty sleeping  HEME/LYMPH: No easy bruising, or bleeding gums  ALLERGY AND IMMUNOLOGIC: No hives or eczema	    [ ] All others negative	  [ ] Unable to obtain    PHYSICAL EXAM:  T(C): 36.7 (12-11-18 @ 05:25), Max: 36.7 (12-11-18 @ 05:25)  HR: 76 (12-11-18 @ 06:42) (67 - 76)  BP: 175/82 (12-11-18 @ 06:42) (109/67 - 185/84)  RR: 18 (12-11-18 @ 05:25) (18 - 18)  SpO2: 100% (12-11-18 @ 05:25) (99% - 100%)  Wt(kg): --  I&O's Summary    10 Dec 2018 07:01  -  11 Dec 2018 07:00  --------------------------------------------------------  IN: 0 mL / OUT: 400 mL / NET: -400 mL        Appearance: Normal	  HEENT:   Normal oral mucosa, PERRL, EOMI	  Lymphatic: No lymphadenopathy  Cardiovascular: Normal S1 S2, No JVD, +murmurs, No edema  Respiratory: Lungs clear to auscultation	  Psychiatry: A & O x 3, Mood & affect appropriate  Gastrointestinal:  Soft, Non-tender, + BS	  Skin: No rashes, No ecchymoses, No cyanosis	  Neurologic: Non-focal  Extremities: Normal range of motion, No clubbing, cyanosis or edema  Vascular: Peripheral pulses palpable 2+ bilaterally    MEDICATIONS  (STANDING):  aspirin enteric coated 81 milliGRAM(s) Oral daily  atorvastatin 20 milliGRAM(s) Oral at bedtime  bisacodyl Suppository 10 milliGRAM(s) Rectal once  clopidogrel Tablet 75 milliGRAM(s) Oral daily  docusate sodium 100 milliGRAM(s) Oral three times a day  heparin  Injectable 5000 Unit(s) SubCutaneous every 12 hours  influenza   Vaccine 0.5 milliLiter(s) IntraMuscular once  lactulose Syrup 20 Gram(s) Oral every 4 hours  metoprolol succinate ER 25 milliGRAM(s) Oral daily  senna 2 Tablet(s) Oral at bedtime      TELEMETRY: 	    ECG:  	  RADIOLOGY:  OTHER: 	  	  LABS:	 	    CARDIAC MARKERS:                                9.9    3.96  )-----------( 262      ( 11 Dec 2018 07:16 )             31.3     12-11    143  |  103  |  38<H>  ----------------------------<  97  4.2   |  26  |  2.15<H>    Ca    9.0      11 Dec 2018 05:44    TPro  7.3  /  Alb  4.1  /  TBili  0.5  /  DBili  x   /  AST  18  /  ALT  17  /  AlkPhos  86  12-10    proBNP:   Lipid Profile:   HgA1c:   TSH:   PT/INR - ( 10 Dec 2018 15:47 )   PT: 12.0 sec;   INR: 1.04 ratio         PTT - ( 10 Dec 2018 15:47 )  PTT:27.2 sec      Assessment and plan  ---------------------------  AAA/ASHD/  increase beta blocker  if increase bp will add norvasc for better bp control  vscular surgery follow up  lipid  dvt prophylaxis  will restart lasix later
CARDIOLOGY     PROGRESS  NOTE   ________________________________________________    CHIEF COMPLAINT:Patient is a 89y old  Male who presents with a chief complaint of leg  pain (12 Dec 2018 05:31)  doing better.  	  REVIEW OF SYSTEMS:  CONSTITUTIONAL: No fever, weight loss, or fatigue  EYES: No eye pain, visual disturbances, or discharge  ENT:  No difficulty hearing, tinnitus, vertigo; No sinus or throat pain  NECK: No pain or stiffness  RESPIRATORY: No cough, wheezing, chills or hemoptysis; No Shortness of Breath  CARDIOVASCULAR: No chest pain, palpitations, passing out, dizziness, or leg swelling  GASTROINTESTINAL: No abdominal or epigastric pain. No nausea, vomiting, or hematemesis; No diarrhea or constipation. No melena or hematochezia.  GENITOURINARY: No dysuria, frequency, hematuria, or incontinence  NEUROLOGICAL: No headaches, memory loss, loss of strength, numbness, or tremors  SKIN: No itching, burning, rashes, or lesions   LYMPH Nodes: No enlarged glands  ENDOCRINE: No heat or cold intolerance; No hair loss  MUSCULOSKELETAL: No joint pain or swelling; No muscle, back, or extremity pain  PSYCHIATRIC: No depression, anxiety, mood swings, or difficulty sleeping  HEME/LYMPH: No easy bruising, or bleeding gums  ALLERGY AND IMMUNOLOGIC: No hives or eczema	    [ ] All others negative	  [ ] Unable to obtain    PHYSICAL EXAM:  T(C): 36.7 (12-12-18 @ 05:22), Max: 36.7 (12-11-18 @ 13:07)  HR: 70 (12-12-18 @ 05:22) (65 - 70)  BP: 146/75 (12-12-18 @ 05:22) (118/67 - 146/75)  RR: 16 (12-12-18 @ 05:22) (16 - 18)  SpO2: 98% (12-12-18 @ 05:22) (97% - 98%)  Wt(kg): --  I&O's Summary    11 Dec 2018 07:01  -  12 Dec 2018 07:00  --------------------------------------------------------  IN: 660 mL / OUT: 1450 mL / NET: -790 mL        Appearance: Normal	  HEENT:   Normal oral mucosa, PERRL, EOMI	  Lymphatic: No lymphadenopathy  Cardiovascular: Normal S1 S2, No JVD, + murmurs, No edema  Respiratory: Lungs clear to auscultation	  Psychiatry: A & O x 3, Mood & affect appropriate  Gastrointestinal:  Soft, Non-tender, + BS	  Skin: No rashes, No ecchymoses, No cyanosis	  Neurologic: Non-focal  Extremities: Normal range of motion, No clubbing, cyanosis or edema  Vascular: Peripheral pulses palpable 2+ bilaterally    MEDICATIONS  (STANDING):  amLODIPine   Tablet 2.5 milliGRAM(s) Oral daily  aspirin enteric coated 81 milliGRAM(s) Oral daily  atorvastatin 40 milliGRAM(s) Oral at bedtime  clopidogrel Tablet 75 milliGRAM(s) Oral daily  docusate sodium 100 milliGRAM(s) Oral three times a day  famotidine    Tablet 20 milliGRAM(s) Oral daily  heparin  Injectable 5000 Unit(s) SubCutaneous every 12 hours  influenza   Vaccine 0.5 milliLiter(s) IntraMuscular once  metoprolol succinate ER 50 milliGRAM(s) Oral daily  nystatin Powder 1 Application(s) Topical three times a day  senna 2 Tablet(s) Oral at bedtime  tamsulosin 0.4 milliGRAM(s) Oral at bedtime      TELEMETRY: 	    ECG:  	  RADIOLOGY:  OTHER: 	  	  LABS:	 	    CARDIAC MARKERS:                                9.9    3.96  )-----------( 262      ( 11 Dec 2018 07:16 )             31.3     12-12    142  |  104  |  31<H>  ----------------------------<  105<H>  4.8   |  27  |  1.87<H>    Ca    9.5      12 Dec 2018 06:38  Phos  3.4     12-12  Mg     2.3     12-12    TPro  7.3  /  Alb  4.1  /  TBili  0.5  /  DBili  x   /  AST  18  /  ALT  17  /  AlkPhos  86  12-10    proBNP:   Lipid Profile:   HgA1c:   TSH:   PT/INR - ( 10 Dec 2018 15:47 )   PT: 12.0 sec;   INR: 1.04 ratio         PTT - ( 10 Dec 2018 15:47 )  PTT:27.2 sec  < from: TTE with Doppler (w/Cont) (12.11.18 @ 13:23) >  1. Mild concentric left ventricular hypertrophy.  2. Endocardial visualization enhanced with intravenous  injection of Ultrasonic Enhancing Agent (Definity).  Hyperdynamic left ventricle. No obvious wall motion  abnormalities.  3. Reversal of the E-A  waves of the mitral inflow pattern  is consistent with diastolicLV dysfunction.  4. Normal right ventricular size and function.    < end of copied text >      Assessment and plan  ---------------------------  doing better  pt with severe spinal stenosis   will consider echo  will discuss dr monroe regarding any cardiac work up befotre  increase norvasc if increase bp  f/u renal function  ARIA stocking
CARDIOLOGY     PROGRESS  NOTE   ________________________________________________    CHIEF COMPLAINT:Patient is a 89y old  Male who presents with a chief complaint of leg  pain (13 Dec 2018 08:06)    	  REVIEW OF SYSTEMS:  CONSTITUTIONAL: No fever, weight loss, or fatigue  EYES: No eye pain, visual disturbances, or discharge  ENT:  No difficulty hearing, tinnitus, vertigo; No sinus or throat pain  NECK: No pain or stiffness  RESPIRATORY: No cough, wheezing, chills or hemoptysis; No Shortness of Breath  CARDIOVASCULAR: No chest pain, palpitations, passing out, dizziness, or leg swelling  GASTROINTESTINAL: No abdominal or epigastric pain. No nausea, vomiting, or hematemesis; No diarrhea or constipation. No melena or hematochezia.  GENITOURINARY: No dysuria, frequency, hematuria, or incontinence  NEUROLOGICAL: No headaches, memory loss, loss of strength, numbness, or tremors  SKIN: No itching, burning, rashes, or lesions   LYMPH Nodes: No enlarged glands  ENDOCRINE: No heat or cold intolerance; No hair loss  MUSCULOSKELETAL: No joint pain or swelling; No muscle, back, or extremity pain  PSYCHIATRIC: No depression, anxiety, mood swings, or difficulty sleeping  HEME/LYMPH: No easy bruising, or bleeding gums  ALLERGY AND IMMUNOLOGIC: No hives or eczema	    [ ] All others negative	  [ ] Unable to obtain    PHYSICAL EXAM:  T(C): 37.2 (12-13-18 @ 04:55), Max: 37.3 (12-12-18 @ 14:24)  HR: 65 (12-13-18 @ 04:55) (65 - 71)  BP: 125/70 (12-13-18 @ 04:55) (100/60 - 125/70)  RR: 16 (12-13-18 @ 04:55) (16 - 18)  SpO2: 97% (12-13-18 @ 04:55) (97% - 100%)  Wt(kg): --  I&O's Summary    12 Dec 2018 07:01  -  13 Dec 2018 07:00  --------------------------------------------------------  IN: 860 mL / OUT: 1050 mL / NET: -190 mL        Appearance: Normal	  HEENT:   Normal oral mucosa, PERRL, EOMI	  Lymphatic: No lymphadenopathy  Cardiovascular: Normal S1 S2, No JVD, + murmurs, No edema  Respiratory: Lungs clear to auscultation	  Psychiatry: A & O x 3, Mood & affect appropriate  Gastrointestinal:  Soft, Non-tender, + BS	  Skin: No rashes, No ecchymoses, No cyanosis	  Neurologic: Non-focal  Extremities: Normal range of motion, No clubbing, cyanosis or edema  Vascular: Peripheral pulses palpable 2+ bilaterally    MEDICATIONS  (STANDING):  amLODIPine   Tablet 2.5 milliGRAM(s) Oral daily  aspirin enteric coated 81 milliGRAM(s) Oral daily  atorvastatin 40 milliGRAM(s) Oral at bedtime  clopidogrel Tablet 75 milliGRAM(s) Oral daily  docusate sodium 100 milliGRAM(s) Oral three times a day  famotidine    Tablet 20 milliGRAM(s) Oral daily  heparin  Injectable 5000 Unit(s) SubCutaneous every 12 hours  influenza   Vaccine 0.5 milliLiter(s) IntraMuscular once  metoprolol succinate ER 50 milliGRAM(s) Oral daily  nystatin Powder 1 Application(s) Topical three times a day  senna 2 Tablet(s) Oral at bedtime  tamsulosin 0.4 milliGRAM(s) Oral at bedtime      TELEMETRY: 	    ECG:  	  RADIOLOGY:  OTHER: 	  	  LABS:	 	    CARDIAC MARKERS:    < from: TTE with Doppler (w/Cont) (12.11.18 @ 13:23) >  1. Mild concentric left ventricular hypertrophy.  2. Endocardial visualization enhanced with intravenous  injection of Ultrasonic Enhancing Agent (Definity).  Hyperdynamic left ventricle. No obvious wall motion  abnormalities.  3. Reversal of the E-A  waves of the mitral inflow pattern  is consistent with diastolicLV dysfunction.  4. Normal right ventricular size and function.    < end of copied text >                              9.8    10.06 )-----------( 256      ( 13 Dec 2018 06:46 )             30.8     12-13    141  |  105  |  34<H>  ----------------------------<  102<H>  4.1   |  25  |  2.13<H>    Ca    8.9      13 Dec 2018 05:27  Phos  3.4     12-12  Mg     2.1     12-13      proBNP:   Lipid Profile:   HgA1c:   TSH:         Assessment and plan  ---------------------------  pt with hx of diastolic dysfunction  spinal stenosis  pt can get clearance if needs back surgery  continue current neds bp is well controlled
CARDIOLOGY     PROGRESS  NOTE   ________________________________________________    CHIEF COMPLAINT:Patient is a 89y old  Male who presents with a chief complaint of leg  pain (15 Dec 2018 08:44)    	  REVIEW OF SYSTEMS:  CONSTITUTIONAL: No fever, weight loss, or fatigue  EYES: No eye pain, visual disturbances, or discharge  ENT:  No difficulty hearing, tinnitus, vertigo; No sinus or throat pain  NECK: No pain or stiffness  RESPIRATORY: No cough, wheezing, chills or hemoptysis; No Shortness of Breath  CARDIOVASCULAR: No chest pain, palpitations, passing out, dizziness, or leg swelling  GASTROINTESTINAL: No abdominal or epigastric pain. No nausea, vomiting, or hematemesis; No diarrhea or constipation. No melena or hematochezia.  GENITOURINARY: No dysuria, frequency, hematuria, or incontinence  NEUROLOGICAL: No headaches, memory loss, loss of strength, numbness, or tremors  SKIN: No itching, burning, rashes, or lesions   LYMPH Nodes: No enlarged glands  ENDOCRINE: No heat or cold intolerance; No hair loss  MUSCULOSKELETAL: No joint pain or swelling; No muscle, back, or extremity pain  PSYCHIATRIC: No depression, anxiety, mood swings, or difficulty sleeping  HEME/LYMPH: No easy bruising, or bleeding gums  ALLERGY AND IMMUNOLOGIC: No hives or eczema	    [ ] All others negative	  [ ] Unable to obtain    PHYSICAL EXAM:  T(C): 36.7 (12-15-18 @ 05:42), Max: 37.4 (12-14-18 @ 20:52)  HR: 68 (12-15-18 @ 05:42) (68 - 74)  BP: 119/72 (12-15-18 @ 05:42) (108/65 - 119/72)  RR: 18 (12-15-18 @ 05:42) (16 - 18)  SpO2: 100% (12-15-18 @ 05:42) (99% - 100%)  Wt(kg): --  I&O's Summary    14 Dec 2018 07:01  -  15 Dec 2018 07:00  --------------------------------------------------------  IN: 700 mL / OUT: 1000 mL / NET: -300 mL        Appearance: Normal	  HEENT:   Normal oral mucosa, PERRL, EOMI	  Lymphatic: No lymphadenopathy  Cardiovascular: Normal S1 S2, No JVD, No murmurs, No edema  Respiratory: Lungs clear to auscultation	  Psychiatry: A & O x 3, Mood & affect appropriate  Gastrointestinal:  Soft, Non-tender, + BS	  Skin: No rashes, No ecchymoses, No cyanosis	  Neurologic: Non-focal  Extremities: Normal range of motion, No clubbing, cyanosis or edema  Vascular: Peripheral pulses palpable 2+ bilaterally    MEDICATIONS  (STANDING):  amLODIPine   Tablet 2.5 milliGRAM(s) Oral daily  aspirin enteric coated 81 milliGRAM(s) Oral daily  atorvastatin 40 milliGRAM(s) Oral at bedtime  clopidogrel Tablet 75 milliGRAM(s) Oral daily  docusate sodium 100 milliGRAM(s) Oral three times a day  famotidine    Tablet 20 milliGRAM(s) Oral daily  heparin  Injectable 5000 Unit(s) SubCutaneous every 12 hours  influenza   Vaccine 0.5 milliLiter(s) IntraMuscular once  metoprolol succinate ER 50 milliGRAM(s) Oral daily  nystatin Powder 1 Application(s) Topical three times a day  senna 2 Tablet(s) Oral at bedtime  tamsulosin 0.4 milliGRAM(s) Oral at bedtime      TELEMETRY: 	    ECG:  	  RADIOLOGY:  OTHER: 	  	  LABS:	 	    CARDIAC MARKERS:                                9.8    8.02  )-----------( 231      ( 14 Dec 2018 07:14 )             29.4     12-15    141  |  106  |  28<H>  ----------------------------<  96  4.4   |  24  |  1.57<H>    Ca    8.9      15 Dec 2018 07:25      proBNP:   Lipid Profile:   HgA1c:   TSH:         Assessment and plan  ---------------------------  doing better  awaiting rehab  AAA discussed  with vascular stent as out pt  fu tank barraza stockrachel  dvt prophylaxis
CARDIOLOGY     PROGRESS  NOTE   ________________________________________________    CHIEF COMPLAINT:Patient is a 89y old  Male who presents with a chief complaint of leg  pain (15 Dec 2018 14:29)    	  REVIEW OF SYSTEMS:  CONSTITUTIONAL: No fever, weight loss, or fatigue  EYES: No eye pain, visual disturbances, or discharge  ENT:  No difficulty hearing, tinnitus, vertigo; No sinus or throat pain  NECK: No pain or stiffness  RESPIRATORY: No cough, wheezing, chills or hemoptysis; No Shortness of Breath  CARDIOVASCULAR: No chest pain, palpitations, passing out, dizziness, or leg swelling  GASTROINTESTINAL: No abdominal or epigastric pain. No nausea, vomiting, or hematemesis; No diarrhea or constipation. No melena or hematochezia.  GENITOURINARY: No dysuria, frequency, hematuria, or incontinence  NEUROLOGICAL: No headaches, memory loss, loss of strength, numbness, or tremors  SKIN: No itching, burning, rashes, or lesions   LYMPH Nodes: No enlarged glands  ENDOCRINE: No heat or cold intolerance; No hair loss  MUSCULOSKELETAL: No joint pain or swelling; No muscle, back, or extremity pain  PSYCHIATRIC: No depression, anxiety, mood swings, or difficulty sleeping  HEME/LYMPH: No easy bruising, or bleeding gums  ALLERGY AND IMMUNOLOGIC: No hives or eczema	    [ ] All others negative	  [ ] Unable to obtain    PHYSICAL EXAM:  T(C): 36.8 (12-16-18 @ 07:37), Max: 36.8 (12-16-18 @ 07:37)  HR: 58 (12-16-18 @ 07:37) (58 - 75)  BP: 107/62 (12-16-18 @ 07:37) (107/62 - 137/72)  RR: 20 (12-16-18 @ 07:37) (16 - 20)  SpO2: 97% (12-16-18 @ 07:37) (97% - 99%)  Wt(kg): --  I&O's Summary    15 Dec 2018 07:01  -  16 Dec 2018 07:00  --------------------------------------------------------  IN: 694 mL / OUT: 1000 mL / NET: -306 mL        Appearance: Normal	  HEENT:   Normal oral mucosa, PERRL, EOMI	  Lymphatic: No lymphadenopathy  Cardiovascular: Normal S1 S2, No JVD, + murmurs, No edema  Respiratory: Lungs clear to auscultation	  Psychiatry: A & O x 3, Mood & affect appropriate  Gastrointestinal:  Soft, Non-tender, + BS	  Skin: No rashes, No ecchymoses, No cyanosis	  Neurologic: Non-focal  Extremities: Normal range of motion, No clubbing, cyanosis or edema  Vascular: Peripheral pulses palpable 2+ bilaterally    MEDICATIONS  (STANDING):  amLODIPine   Tablet 2.5 milliGRAM(s) Oral daily  aspirin enteric coated 81 milliGRAM(s) Oral daily  atorvastatin 40 milliGRAM(s) Oral at bedtime  clopidogrel Tablet 75 milliGRAM(s) Oral daily  docusate sodium 100 milliGRAM(s) Oral three times a day  famotidine    Tablet 20 milliGRAM(s) Oral daily  heparin  Injectable 5000 Unit(s) SubCutaneous every 12 hours  influenza   Vaccine 0.5 milliLiter(s) IntraMuscular once  metoprolol succinate ER 50 milliGRAM(s) Oral daily  nystatin Powder 1 Application(s) Topical three times a day  senna 2 Tablet(s) Oral at bedtime  tamsulosin 0.4 milliGRAM(s) Oral at bedtime      TELEMETRY: 	    ECG:  	  RADIOLOGY:  OTHER: 	  	  LABS:	 	    CARDIAC MARKERS:                                10.0   5.67  )-----------( 268      ( 15 Dec 2018 09:23 )             31.4     12-15    141  |  106  |  28<H>  ----------------------------<  96  4.4   |  24  |  1.57<H>    Ca    8.9      15 Dec 2018 07:25      proBNP:   Lipid Profile:   HgA1c:   TSH:         Assessment and plan  ---------------------------  doing well  AAA stent as out pt  cad, chf stable  continue current meds  dvt prophylaxis
CARDIOLOGY     PROGRESS  NOTE   ________________________________________________    CHIEF COMPLAINT:Patient is a 89y old  Male who presents with a chief complaint of leg  pain (16 Dec 2018 10:38)  doing well, no complain.  	  REVIEW OF SYSTEMS:  CONSTITUTIONAL: No fever, weight loss, or fatigue  EYES: No eye pain, visual disturbances, or discharge  ENT:  No difficulty hearing, tinnitus, vertigo; No sinus or throat pain  NECK: No pain or stiffness  RESPIRATORY: No cough, wheezing, chills or hemoptysis; No Shortness of Breath  CARDIOVASCULAR: No chest pain, palpitations, passing out, dizziness, or leg swelling  GASTROINTESTINAL: No abdominal or epigastric pain. No nausea, vomiting, or hematemesis; No diarrhea or constipation. No melena or hematochezia.  GENITOURINARY: No dysuria, frequency, hematuria, or incontinence  NEUROLOGICAL: No headaches, memory loss, loss of strength, numbness, or tremors  SKIN: No itching, burning, rashes, or lesions   LYMPH Nodes: No enlarged glands  ENDOCRINE: No heat or cold intolerance; No hair loss  MUSCULOSKELETAL: No joint pain or swelling; No muscle, back, or extremity pain  PSYCHIATRIC: No depression, anxiety, mood swings, or difficulty sleeping  HEME/LYMPH: No easy bruising, or bleeding gums  ALLERGY AND IMMUNOLOGIC: No hives or eczema	    [ ] All others negative	  [ ] Unable to obtain    PHYSICAL EXAM:  T(C): 36.7 (12-17-18 @ 05:24), Max: 36.9 (12-16-18 @ 14:30)  HR: 60 (12-17-18 @ 05:24) (58 - 67)  BP: 124/74 (12-17-18 @ 05:24) (101/59 - 124/74)  RR: 18 (12-17-18 @ 05:24) (18 - 20)  SpO2: 99% (12-17-18 @ 05:24) (96% - 99%)  Wt(kg): --  I&O's Summary    16 Dec 2018 07:01  -  17 Dec 2018 07:00  --------------------------------------------------------  IN: 500 mL / OUT: 1450 mL / NET: -950 mL        Appearance: Normal	  HEENT:   Normal oral mucosa, PERRL, EOMI	  Lymphatic: No lymphadenopathy  Cardiovascular: Normal S1 S2, No JVD, + murmurs, No edema  Respiratory: Lungs clear to auscultation	  Psychiatry: A & O x 3, Mood & affect appropriate  Gastrointestinal:  Soft, Non-tender, + BS	  Skin: No rashes, No ecchymoses, No cyanosis	  Neurologic: Non-focal  Extremities: Normal range of motion, No clubbing, cyanosis or edema  Vascular: Peripheral pulses palpable 2+ bilaterally    MEDICATIONS  (STANDING):  aspirin enteric coated 81 milliGRAM(s) Oral daily  atorvastatin 40 milliGRAM(s) Oral at bedtime  clopidogrel Tablet 75 milliGRAM(s) Oral daily  docusate sodium 100 milliGRAM(s) Oral three times a day  famotidine    Tablet 20 milliGRAM(s) Oral daily  furosemide    Tablet 20 milliGRAM(s) Oral daily  heparin  Injectable 5000 Unit(s) SubCutaneous every 12 hours  influenza   Vaccine 0.5 milliLiter(s) IntraMuscular once  metoprolol succinate ER 50 milliGRAM(s) Oral daily  nystatin Powder 1 Application(s) Topical three times a day  senna 2 Tablet(s) Oral at bedtime  tamsulosin 0.4 milliGRAM(s) Oral at bedtime      TELEMETRY: nsr    ECG:  	  RADIOLOGY:  OTHER: 	  	  LABS:	 	    CARDIAC MARKERS:                                10.0   5.67  )-----------( 268      ( 15 Dec 2018 09:23 )             31.4     12-16    142  |  106  |  26<H>  ----------------------------<  101<H>  4.3   |  26  |  1.56<H>    Ca    8.9      16 Dec 2018 08:22  Phos  2.7     12-16  Mg     2.0     12-16      proBNP:   Lipid Profile:   HgA1c:   TSH:         Assessment and plan  ---------------------------  continue current meds  no further wct  continue beta blocker ,normal EF  s/p recent cath ,stent
CARDIOLOGY     PROGRESS  NOTE   ________________________________________________    CHIEF COMPLAINT:Patient is a 89y old  Male who presents with a chief complaint of leg  pain (18 Dec 2018 07:55)  doing well.  	  REVIEW OF SYSTEMS:  CONSTITUTIONAL: No fever, weight loss, or fatigue  EYES: No eye pain, visual disturbances, or discharge  ENT:  No difficulty hearing, tinnitus, vertigo; No sinus or throat pain  NECK: No pain or stiffness  RESPIRATORY: No cough, wheezing, chills or hemoptysis; No Shortness of Breath  CARDIOVASCULAR: No chest pain, palpitations, passing out, dizziness, or leg swelling  GASTROINTESTINAL: No abdominal or epigastric pain. No nausea, vomiting, or hematemesis; No diarrhea or constipation. No melena or hematochezia.  GENITOURINARY: No dysuria, frequency, hematuria, or incontinence  NEUROLOGICAL: No headaches, memory loss, loss of strength, numbness, or tremors  SKIN: No itching, burning, rashes, or lesions   LYMPH Nodes: No enlarged glands  ENDOCRINE: No heat or cold intolerance; No hair loss  MUSCULOSKELETAL: No joint pain or swelling; No muscle, back, or extremity pain  PSYCHIATRIC: No depression, anxiety, mood swings, or difficulty sleeping  HEME/LYMPH: No easy bruising, or bleeding gums  ALLERGY AND IMMUNOLOGIC: No hives or eczema	    [ ] All others negative	  [ ] Unable to obtain    PHYSICAL EXAM:  T(C): 36.7 (12-18-18 @ 04:49), Max: 36.7 (12-17-18 @ 14:00)  HR: 59 (12-18-18 @ 04:49) (59 - 68)  BP: 122/73 (12-18-18 @ 04:49) (103/57 - 122/73)  RR: 16 (12-18-18 @ 04:49) (16 - 18)  SpO2: 99% (12-18-18 @ 04:49) (99% - 99%)  Wt(kg): --  I&O's Summary    17 Dec 2018 07:01  -  18 Dec 2018 07:00  --------------------------------------------------------  IN: 985 mL / OUT: 900 mL / NET: 85 mL        Appearance: Normal	  HEENT:   Normal oral mucosa, PERRL, EOMI	  Lymphatic: No lymphadenopathy  Cardiovascular: Normal S1 S2, No JVD, + murmurs, No edema  Respiratory: Lungs clear to auscultation	  Psychiatry: A & O x 3, Mood & affect appropriate  Gastrointestinal:  Soft, Non-tender, + BS	  Skin: No rashes, No ecchymoses, No cyanosis	  Neurologic: Non-focal  Extremities: Normal range of motion, No clubbing, cyanosis or edema  Vascular: Peripheral pulses palpable 2+ bilaterally    MEDICATIONS  (STANDING):  aspirin enteric coated 81 milliGRAM(s) Oral daily  atorvastatin 40 milliGRAM(s) Oral at bedtime  clopidogrel Tablet 75 milliGRAM(s) Oral daily  docusate sodium 100 milliGRAM(s) Oral three times a day  famotidine    Tablet 20 milliGRAM(s) Oral daily  furosemide    Tablet 20 milliGRAM(s) Oral daily  heparin  Injectable 5000 Unit(s) SubCutaneous every 12 hours  influenza   Vaccine 0.5 milliLiter(s) IntraMuscular once  metoprolol succinate ER 50 milliGRAM(s) Oral daily  nystatin Powder 1 Application(s) Topical three times a day  senna 2 Tablet(s) Oral at bedtime  tamsulosin 0.4 milliGRAM(s) Oral at bedtime      TELEMETRY: 	    ECG:  	  RADIOLOGY:  OTHER: 	  	  LABS:	 	    CARDIAC MARKERS:            12-16    142  |  106  |  26<H>  ----------------------------<  101<H>  4.3   |  26  |  1.56<H>    Ca    8.9      16 Dec 2018 08:22  Phos  2.7     12-16  Mg     2.0     12-16      proBNP:   Lipid Profile:   HgA1c:   TSH:         Assessment and plan  ---------------------------  cad,htn, spinal stenosis, AAA  continue current meds  continue asa,plavix ,beta blocker  dvt priophylaxis
[facility] [phone]        Patient seen and examined         MEDICATIONS  (STANDING):  aspirin enteric coated 81 milliGRAM(s) Oral daily  atorvastatin 40 milliGRAM(s) Oral at bedtime  clopidogrel Tablet 75 milliGRAM(s) Oral daily  docusate sodium 100 milliGRAM(s) Oral three times a day  famotidine    Tablet 20 milliGRAM(s) Oral daily  furosemide    Tablet 20 milliGRAM(s) Oral daily  heparin  Injectable 5000 Unit(s) SubCutaneous every 12 hours  influenza   Vaccine 0.5 milliLiter(s) IntraMuscular once  metoprolol succinate ER 50 milliGRAM(s) Oral daily  nystatin Powder 1 Application(s) Topical three times a day  senna 2 Tablet(s) Oral at bedtime  tamsulosin 0.4 milliGRAM(s) Oral at bedtime      VITAL:  T(C): , Max: 36.9 (18 @ 14:30)  T(F): , Max: 98.4 (18 @ 14:30)  HR: 60 (18 @ 05:24)  BP: 124/74 (18 @ 05:24)  BP(mean): --  RR: 18 (18 @ 05:24)  SpO2: 99% (18 @ 05:24)  Wt(kg): --    I and O's:     @ 07:01  -   @ 07:00  --------------------------------------------------------  IN: 500 mL / OUT: 1450 mL / NET: -950 mL          PHYSICAL EXAM:    Constitutional: NAD  Neck:  No JVD  Respiratory: CTAB/L  Cardiovascular: S1 and S2  Gastrointestinal: BS+, soft, NT/ND  Extremities: No peripheral edema  Neurological: A/O x 3, no focal deficits  Psychiatric: Normal mood, normal affect  : No Flores  Skin: No rashes  Access: Not applicable    LABS:        142  |  106  |  26<H>  ----------------------------<  101<H>  4.3   |  26  |  1.56<H>    Ca    8.9      16 Dec 2018 08:22  Phos  2.7     12-16  Mg     2.0     12-16            Urine Studies:  Urinalysis Basic - ( 15 Dec 2018 15:06 )    Color: Yellow / Appearance: Clear / S.019 / pH: x  Gluc: x / Ketone: Negative  / Bili: Negative / Urobili: Negative mg/dL   Blood: x / Protein: Negative mg/dL / Nitrite: Negative   Leuk Esterase: Moderate / RBC: 2 /HPF / WBC 26 /HPF   Sq Epi: x / Non Sq Epi: 0 /HPF / Bacteria: Occasional            RADIOLOGY & ADDITIONAL STUDIES:
[facility] [phone]        Patient seen and examined in bed.   He was in good spirits and offered no complaints.  No NV noted        MEDICATIONS  (STANDING):  amLODIPine   Tablet 2.5 milliGRAM(s) Oral daily  aspirin enteric coated 81 milliGRAM(s) Oral daily  atorvastatin 40 milliGRAM(s) Oral at bedtime  clopidogrel Tablet 75 milliGRAM(s) Oral daily  docusate sodium 100 milliGRAM(s) Oral three times a day  famotidine    Tablet 20 milliGRAM(s) Oral daily  heparin  Injectable 5000 Unit(s) SubCutaneous every 12 hours  influenza   Vaccine 0.5 milliLiter(s) IntraMuscular once  metoprolol succinate ER 50 milliGRAM(s) Oral daily  nystatin Powder 1 Application(s) Topical three times a day  senna 2 Tablet(s) Oral at bedtime  tamsulosin 0.4 milliGRAM(s) Oral at bedtime      VITAL:  T(C): , Max: 36.9 (18 @ 04:53)  T(F): , Max: 98.4 (18 @ 04:53)  HR: 64 (18 @ 04:53)  BP: 113/63 (18 @ 04:53)  BP(mean): --  RR: 18 (18 @ 04:53)  SpO2: 99% (18 @ 04:53)  Wt(kg): --    I and O's:     @ 07:01  -   @ 07:00  --------------------------------------------------------  IN: 1300 mL / OUT: 1450 mL / NET: -150 mL     @ 07:01  -   @ 12:54  --------------------------------------------------------  IN: 360 mL / OUT: 0 mL / NET: 360 mL          PHYSICAL EXAM:    Constitutional: NAD  Neck:  No JVD  Respiratory: CTAB/L  Cardiovascular: S1 and S2  Gastrointestinal: BS+, soft, NT/ND  Extremities: No peripheral edema  Neurological: A/O x 3, no focal deficits  Psychiatric: Normal mood, normal affect  : No Flores  Skin: No rashes  Access: Not applicable    LABS:                        9.8    8.02  )-----------( 231      ( 14 Dec 2018 07:14 )             29.4     12-14    140  |  105  |  30<H>  ----------------------------<  103<H>  4.1   |  26  |  1.70<H>    Ca    8.8      14 Dec 2018 06:08  Mg     2.1     -            Urine Studies:  Urinalysis Basic - ( 12 Dec 2018 19:18 )    Color: Yellow / Appearance: Slightly Turbid / S.022 / pH: x  Gluc: x / Ketone: Negative  / Bili: Negative / Urobili: Negative mg/dL   Blood: x / Protein: Trace mg/dL / Nitrite: Negative   Leuk Esterase: Small / RBC: 406 /HPF / WBC 12 /HPF   Sq Epi: x / Non Sq Epi: 1 /HPF / Bacteria: Negative            RADIOLOGY & ADDITIONAL STUDIES:          < from: MR Cervical Spine No Cont (.13.18 @ 10:29) >    EXAM:  MR SPINE CERVICAL                            PROCEDURE DATE:  2018            INTERPRETATION:  HISTORY: Numbness, bilateral hands and arms    TECHNIQUE: Noncontrast MRI of the cervical spine was performed.    Sagittal T1, T2, STIR, axial T2, gradient-echo sequences were obtained.    COMPARISON: None.    FINDINGS:    There is reversal of the cervical lordosis from C2-C7 with 2.6 mm   anterior displacement of C2 on C3, 4 mm anterior displacement of C3 on   C4, and 3.9 mm posterior displacement of C5 on C6, and 3 mm anterior   displacement of C7 on T1. There is loss of vertebral height with endplate   degenerative sclerosis at C5-6. The craniocervical and cervicomedullary   junction are intact. Vertebrae demonstrate degenerative change with loss   of height height and decreased T1 signal without fracture, dislocation or   marrow edema.    The spinal cord is flattened at multiple levels secondary to degenerative   change and disc osteophyte ridges with a faint  hyperintense T2 signal   within the cord, sagittal T2 series 4 image 7, axial T2 image 20-23,   there are no obvious mass lesions. Limited visualization the brain   parenchyma demonstrates volume loss and microvascular disease    There is loss of disc height and signal from the C2-C7 level. There is   multilevel degenerative change with uncovertebral hypertrophy and facet   arthropathy from the C2-C7 level. Findings at specific levels are as   follows:    C2-C3: There is left greater than right uncovertebral hypertrophy and   facet arthropathy causing moderate left foraminal narrowing without canal   or right foraminal narrowing        C3-C4: A disc osteophyte ridge with a far left lateral disc deformity,   left greater then right uncovertebral hypertrophy and facet arthropathy   cause severe left foraminal narrowing with mild to moderate right   foraminal narrowing, canal AP diameter measures 8.3 mm.    C4-C5: A disc osteophyte ridge with a central and right paracentral disc   deformity flatten the thecal sac, canal AP diameter measures 7.7 mm,,   right greater than left uncovertebral and facet hypertrophy cause severe   right foraminal narrowing with marked left foraminal narrowing    C5-C6: A disc osteophyte ridge with a central paracentral and farright   lateral disc herniation flatten the ventral cord, se 6 image 19, canal AP   diameter measures 7.5 mm, uncovertebral and facet degenerative change   cause marked right and moderate to marked left foraminal narrowing likely   affecting the exiting bilateral nerve roots.    C6-C7: Broad-based disc bulge with far lateral extension and right   greater than left facet hypertrophic changes narrow the canal AP to 6.7   mm, and cause severe right foraminal narrowing and mild left foraminal   narrowing.        C7-T1: Broad-based disc bulge, without canal or foraminal narrowing.    There is no soft tissue neck mass or fluid collection. There is a   heterogeneous thyroid that could be better characterized with thyroid   ultrasound    IMPRESSION:    Degenerative change with loss of disc height, reversal of the cervical   lordosis with disc osteophyte complexes, uncovertebral hypertrophy and   facet arthropathy causing central canal narrowing. There is there is   faint high T2 signal in the cord from C5-C7 level, which may represent   edema and/or myelomalacia.. There is uncovertebral hypertrophy and facet   arthropathy causing right greater than left foraminal narrowing  as   detailed above.                    LAURA JIM M.D., ATTENDING RADIOLOGIST  This document has been electronically signed. Dec 13 2018 11:47AM                < end of copied text >
Patient  is  seen. I spoke to the patient  and his  daughter Delmi today Patient has  a 5.6 cm  AAA.Patient is going to Rehab  first  he also has  CKD  which puts him  at  a higher risk  for  AKInjury  Plan to do EVAR after  he   is  ambulating  and   gets out of Rehab  I will schedule the EVAR  in  second week of  january  if  cardiac and  renal wise he is  OK
Patient seen and examined at bedside.    --Anticoagulation--  aspirin enteric coated 81 milliGRAM(s) Oral daily  clopidogrel Tablet 75 milliGRAM(s) Oral daily  heparin  Injectable 5000 Unit(s) SubCutaneous every 12 hours    T(C): 37.2 (12-13-18 @ 04:55), Max: 37.3 (12-12-18 @ 14:24)  HR: 65 (12-13-18 @ 04:55) (65 - 71)  BP: 125/70 (12-13-18 @ 04:55) (100/60 - 125/70)  RR: 16 (12-13-18 @ 04:55) (16 - 18)  SpO2: 97% (12-13-18 @ 04:55) (97% - 100%)  Wt(kg): --    Exam:  Constitutional: No Acute Distress     Neurological: AOx3, Following Commands    Motor exam:          Upper extremity                         Delt     Bicep     Tricep    HG                                                 R         5/5        5/5        5/5       5/5                                               L          5/5        5/5        5/5       5/5          Lower extremity                        HF         KF        KE       DF         PF                                                  R        4/5        5/5        5/5       5/5         5/5                                               L         4/5        5/5       5/5       5/5          5/5                                               HF pain limited weakness  Sensation: [x] intact to light touch  [] decreased:     No clonus, b/l Hoffmans
Patient seen and examined at bedside.    T(C): 36.7 (12-12-18 @ 05:22), Max: 36.7 (12-11-18 @ 13:07)  HR: 70 (12-12-18 @ 05:22) (65 - 76)  BP: 146/75 (12-12-18 @ 05:22) (118/67 - 185/84)  RR: 16 (12-12-18 @ 05:22) (16 - 18)  SpO2: 98% (12-12-18 @ 05:22) (97% - 98%)  Wt(kg): --    Exam:    Constitutional: No Acute Distress     Neurological: AOx3, Following Commands    Motor exam:          Upper extremity                         Delt     Bicep     Tricep    HG                                                 R         5/5        5/5        5/5       5/5                                               L          5/5        5/5        5/5       5/5          Lower extremity                        HF         KF        KE       DF         PF                                                  R        4/5        5/5        5/5       5/5         5/5                                               L         4/5        5/5       5/5       5/5          5/5                                               HF pain limited weakness  Sensation: [x] intact to light touch  [] decreased:     No clonus, b/l Hoffmans
Surgery Progress Note    S: Patient seen and examined. No acute events overnight.     O:  Vital Signs Last 24 Hrs  T(C): 36.7 (12 Dec 2018 20:46), Max: 37.3 (12 Dec 2018 14:24)  T(F): 98.1 (12 Dec 2018 20:46), Max: 99.2 (12 Dec 2018 14:24)  HR: 71 (12 Dec 2018 20:46) (67 - 71)  BP: 113/70 (12 Dec 2018 20:46) (100/60 - 146/75)  BP(mean): --  RR: 16 (12 Dec 2018 20:46) (16 - 18)  SpO2: 97% (12 Dec 2018 20:46) (97% - 100%)    I&O's Detail    11 Dec 2018 07:01  -  12 Dec 2018 07:00  --------------------------------------------------------  IN:    Oral Fluid: 660 mL  Total IN: 660 mL    OUT:    Intermittent Catheterization - Urethral: 600 mL    Voided: 850 mL  Total OUT: 1450 mL    Total NET: -790 mL      12 Dec 2018 07:01  -  12 Dec 2018 21:12  --------------------------------------------------------  IN:    Oral Fluid: 660 mL  Total IN: 660 mL    OUT:    Voided: 550 mL  Total OUT: 550 mL    Total NET: 110 mL          MEDICATIONS  (STANDING):  amLODIPine   Tablet 2.5 milliGRAM(s) Oral daily  aspirin enteric coated 81 milliGRAM(s) Oral daily  atorvastatin 40 milliGRAM(s) Oral at bedtime  clopidogrel Tablet 75 milliGRAM(s) Oral daily  docusate sodium 100 milliGRAM(s) Oral three times a day  famotidine    Tablet 20 milliGRAM(s) Oral daily  heparin  Injectable 5000 Unit(s) SubCutaneous every 12 hours  influenza   Vaccine 0.5 milliLiter(s) IntraMuscular once  metoprolol succinate ER 50 milliGRAM(s) Oral daily  nystatin Powder 1 Application(s) Topical three times a day  senna 2 Tablet(s) Oral at bedtime  tamsulosin 0.4 milliGRAM(s) Oral at bedtime    MEDICATIONS  (PRN):  oxyCODONE    5 mG/acetaminophen 325 mG 1 Tablet(s) Oral every 6 hours PRN Mild Pain (1 - 3)  oxyCODONE    5 mG/acetaminophen 325 mG 2 Tablet(s) Oral every 12 hours PRN Severe Pain (7 - 10)                            10.4   8.21  )-----------( 256      ( 12 Dec 2018 07:51 )             32.2       12-12    142  |  104  |  31<H>  ----------------------------<  105<H>  4.8   |  27  |  1.87<H>    Ca    9.5      12 Dec 2018 06:38  Phos  3.4     12-12  Mg     2.3     12-12        Physical Exam:  Gen: Laying in bed, NAD  Resp: Unlabored breathing  Abd: soft, NTND  Ext: WWP. DP signals b/l  Skin: No rashes
Surgery Progress Note    S: Patient seen and examined. No acute events overnight.     O:  Vital Signs Last 24 Hrs  T(C): 36.7 (15 Dec 2018 05:42), Max: 37.4 (14 Dec 2018 20:52)  T(F): 98 (15 Dec 2018 05:42), Max: 99.4 (14 Dec 2018 20:52)  HR: 68 (15 Dec 2018 05:42) (68 - 74)  BP: 119/72 (15 Dec 2018 05:42) (108/65 - 119/72)  BP(mean): --  RR: 18 (15 Dec 2018 05:42) (16 - 18)  SpO2: 100% (15 Dec 2018 05:42) (99% - 100%)    I&O's Detail    14 Dec 2018 07:01  -  15 Dec 2018 07:00  --------------------------------------------------------  IN:    Oral Fluid: 700 mL  Total IN: 700 mL    OUT:    Voided: 1000 mL  Total OUT: 1000 mL    Total NET: -300 mL          MEDICATIONS  (STANDING):  amLODIPine   Tablet 2.5 milliGRAM(s) Oral daily  aspirin enteric coated 81 milliGRAM(s) Oral daily  atorvastatin 40 milliGRAM(s) Oral at bedtime  clopidogrel Tablet 75 milliGRAM(s) Oral daily  docusate sodium 100 milliGRAM(s) Oral three times a day  famotidine    Tablet 20 milliGRAM(s) Oral daily  heparin  Injectable 5000 Unit(s) SubCutaneous every 12 hours  influenza   Vaccine 0.5 milliLiter(s) IntraMuscular once  metoprolol succinate ER 50 milliGRAM(s) Oral daily  nystatin Powder 1 Application(s) Topical three times a day  senna 2 Tablet(s) Oral at bedtime  tamsulosin 0.4 milliGRAM(s) Oral at bedtime    MEDICATIONS  (PRN):  oxyCODONE    5 mG/acetaminophen 325 mG 1 Tablet(s) Oral every 6 hours PRN Mild Pain (1 - 3)  oxyCODONE    5 mG/acetaminophen 325 mG 2 Tablet(s) Oral every 12 hours PRN Severe Pain (7 - 10)                            9.8    8.02  )-----------( 231      ( 14 Dec 2018 07:14 )             29.4       12-15    x   |  x   |  28<H>  ----------------------------<  96  x    |  24  |  1.57<H>    Ca    8.9      15 Dec 2018 07:25        Physical Exam:  Gen: Laying in bed, NAD  Resp: Unlabored breathing  Abd: soft, NTND  Ext: WWP. DP signals b/l  Skin: No rashes
Vascular Surgery Progress Note     S: Patient seen and examined today. No acute events overnight.    MEDICATIONS  (STANDING):  aspirin enteric coated 81 milliGRAM(s) Oral daily  atorvastatin 20 milliGRAM(s) Oral at bedtime  clopidogrel Tablet 75 milliGRAM(s) Oral daily  heparin  Injectable 5000 Unit(s) SubCutaneous every 12 hours  influenza   Vaccine 0.5 milliLiter(s) IntraMuscular once  metoprolol succinate ER 25 milliGRAM(s) Oral daily    MEDICATIONS  (PRN):  oxyCODONE    5 mG/acetaminophen 325 mG 1 Tablet(s) Oral every 6 hours PRN Mild Pain (1 - 3)  oxyCODONE    5 mG/acetaminophen 325 mG 2 Tablet(s) Oral every 12 hours PRN Severe Pain (7 - 10)      Physical Exam:    Vital Signs Last 24 Hrs  T(C): 36.7 (11 Dec 2018 05:25), Max: 36.7 (11 Dec 2018 05:25)  T(F): 98.1 (11 Dec 2018 05:25), Max: 98.1 (11 Dec 2018 05:25)  HR: 76 (11 Dec 2018 06:42) (67 - 76)  BP: 175/82 (11 Dec 2018 06:42) (109/67 - 185/84)  BP(mean): --  RR: 18 (11 Dec 2018 05:25) (18 - 18)  SpO2: 100% (11 Dec 2018 05:25) (99% - 100%)    12-10-18 @ 07:01  -  12-11-18 @ 07:00  --------------------------------------------------------  IN: 0 mL / OUT: 400 mL / NET: -400 mL        Gen: alert, NAD  Resp: no increase work of breathing  Vascular: All 4 extremities warm. DP signals       LABS:                        9.9    3.96  )-----------( 262      ( 11 Dec 2018 07:16 )             31.3     12-11    143  |  103  |  38<H>  ----------------------------<  97  4.2   |  26  |  2.15<H>    Ca    9.0      11 Dec 2018 05:44    TPro  7.3  /  Alb  4.1  /  TBili  0.5  /  DBili  x   /  AST  18  /  ALT  17  /  AlkPhos  86  12-10
less back pain    REVIEW OF SYSTEMS:  GEN: no fever,    no chills  RESP: no SOB,   no cough  CVS: no chest pain,   no palpitations  GI: no abdominal pain,   no nausea,   no vomiting,   no constipation,   no diarrhea  : no dysuria,   no frequency  NEURO: no headache,   no dizziness  PSYCH: no depression,   not anxious  Derm : no rash    MEDICATIONS  (STANDING):  amLODIPine   Tablet 2.5 milliGRAM(s) Oral daily  aspirin enteric coated 81 milliGRAM(s) Oral daily  atorvastatin 40 milliGRAM(s) Oral at bedtime  clopidogrel Tablet 75 milliGRAM(s) Oral daily  docusate sodium 100 milliGRAM(s) Oral three times a day  famotidine    Tablet 20 milliGRAM(s) Oral daily  heparin  Injectable 5000 Unit(s) SubCutaneous every 12 hours  influenza   Vaccine 0.5 milliLiter(s) IntraMuscular once  metoprolol succinate ER 50 milliGRAM(s) Oral daily  nystatin Powder 1 Application(s) Topical three times a day  senna 2 Tablet(s) Oral at bedtime  tamsulosin 0.4 milliGRAM(s) Oral at bedtime    MEDICATIONS  (PRN):  oxyCODONE    5 mG/acetaminophen 325 mG 1 Tablet(s) Oral every 6 hours PRN Mild Pain (1 - 3)  oxyCODONE    5 mG/acetaminophen 325 mG 2 Tablet(s) Oral every 12 hours PRN Severe Pain (7 - 10)      Vital Signs Last 24 Hrs  T(C): 37.2 (13 Dec 2018 04:55), Max: 37.3 (12 Dec 2018 14:24)  T(F): 98.9 (13 Dec 2018 04:55), Max: 99.2 (12 Dec 2018 14:24)  HR: 65 (13 Dec 2018 04:55) (65 - 71)  BP: 125/70 (13 Dec 2018 04:55) (100/60 - 125/70)  BP(mean): --  RR: 16 (13 Dec 2018 04:55) (16 - 18)  SpO2: 97% (13 Dec 2018 04:55) (97% - 100%)  CAPILLARY BLOOD GLUCOSE        I&O's Summary    12 Dec 2018 07:01  -  13 Dec 2018 07:00  --------------------------------------------------------  IN: 860 mL / OUT: 1050 mL / NET: -190 mL        PHYSICAL EXAM:  HEAD:  Atraumatic, Normocephalic  NECK: Supple, No   JVD  CHEST/LUNG:   no     rales,     no,    rhonchi  HEART: Regular rate and rhythm;         murmur  ABDOMEN: Soft, Nontender, ;   EXTREMITIES:     less   edema  NEUROLOGY:  alert    LABS:                        9.8    10.06 )-----------( 256      ( 13 Dec 2018 06:46 )             30.8     12-13    141  |  105  |  34<H>  ----------------------------<  102<H>  4.1   |  25  |  2.13<H>    Ca    8.9      13 Dec 2018 05:27  Phos  3.4     12-12  Mg     2.1     12-13            Urinalysis Basic - ( 12 Dec 2018 19:18 )    Color: Yellow / Appearance: Slightly Turbid / S.022 / pH: x  Gluc: x / Ketone: Negative  / Bili: Negative / Urobili: Negative mg/dL   Blood: x / Protein: Trace mg/dL / Nitrite: Negative   Leuk Esterase: Small / RBC: 406 /HPF / WBC 12 /HPF   Sq Epi: x / Non Sq Epi: 1 /HPF / Bacteria: Negative                      Consultant(s) Notes Reviewed:      Care Discussed with Consultants/Other Providers:
no  compalints    REVIEW OF SYSTEMS:  GEN: no fever,    no chills  RESP: no SOB,   no cough  CVS: no chest pain,   no palpitations  GI: no abdominal pain,   no nausea,   no vomiting,   no constipation,   no diarrhea  : no dysuria,   no frequency  NEURO: no headache,   no dizziness  PSYCH: no depression,   not anxious  Derm : no rash    MEDICATIONS  (STANDING):  aspirin enteric coated 81 milliGRAM(s) Oral daily  atorvastatin 40 milliGRAM(s) Oral at bedtime  clopidogrel Tablet 75 milliGRAM(s) Oral daily  docusate sodium 100 milliGRAM(s) Oral three times a day  famotidine    Tablet 20 milliGRAM(s) Oral daily  furosemide    Tablet 20 milliGRAM(s) Oral daily  heparin  Injectable 5000 Unit(s) SubCutaneous every 12 hours  influenza   Vaccine 0.5 milliLiter(s) IntraMuscular once  metoprolol succinate ER 50 milliGRAM(s) Oral daily  nystatin Powder 1 Application(s) Topical three times a day  senna 2 Tablet(s) Oral at bedtime  tamsulosin 0.4 milliGRAM(s) Oral at bedtime    MEDICATIONS  (PRN):  oxyCODONE    5 mG/acetaminophen 325 mG 1 Tablet(s) Oral every 6 hours PRN Mild Pain (1 - 3)  oxyCODONE    5 mG/acetaminophen 325 mG 2 Tablet(s) Oral every 12 hours PRN Severe Pain (7 - 10)      Vital Signs Last 24 Hrs  T(C): 36.7 (17 Dec 2018 05:24), Max: 36.9 (16 Dec 2018 14:30)  T(F): 98 (17 Dec 2018 05:24), Max: 98.4 (16 Dec 2018 14:30)  HR: 60 (17 Dec 2018 05:24) (60 - 67)  BP: 124/74 (17 Dec 2018 05:24) (101/59 - 124/74)  BP(mean): --  RR: 18 (17 Dec 2018 05:24) (18 - 18)  SpO2: 99% (17 Dec 2018 05:24) (96% - 99%)  CAPILLARY BLOOD GLUCOSE        I&O's Summary    16 Dec 2018 07:01  -  17 Dec 2018 07:00  --------------------------------------------------------  IN: 500 mL / OUT: 1450 mL / NET: -950 mL        PHYSICAL EXAM:  HEAD:  Atraumatic, Normocephalic  NECK: Supple, No   JVD  CHEST/LUNG:   no     rales,     no,    rhonchi  HEART: Regular rate and rhythm;         murmur  ABDOMEN: Soft, Nontender, ;   EXTREMITIES:    no    edema  NEUROLOGY:  alert    LABS:                        10.0   5.67  )-----------( 268      ( 15 Dec 2018 09:23 )             31.4     12-16    142  |  106  |  26<H>  ----------------------------<  101<H>  4.3   |  26  |  1.56<H>    Ca    8.9      16 Dec 2018 08:22  Phos  2.7     12-16  Mg     2.0     12-16            Urinalysis Basic - ( 15 Dec 2018 15:06 )    Color: Yellow / Appearance: Clear / S.019 / pH: x  Gluc: x / Ketone: Negative  / Bili: Negative / Urobili: Negative mg/dL   Blood: x / Protein: Negative mg/dL / Nitrite: Negative   Leuk Esterase: Moderate / RBC: 2 /HPF / WBC 26 /HPF   Sq Epi: x / Non Sq Epi: 0 /HPF / Bacteria: Occasional                      Consultant(s) Notes Reviewed:      Care Discussed with Consultants/Other Providers:
no  complaints    REVIEW OF SYSTEMS:  GEN: no fever,    no chills  RESP: no SOB,   no cough  CVS: no chest pain,   no palpitations  GI: no abdominal pain,   no nausea,   no vomiting,   no constipation,   no diarrhea  : no dysuria,   no frequency  NEURO: no headache,   no dizziness  PSYCH: no depression,   not anxious  Derm : no rash    MEDICATIONS  (STANDING):  aspirin enteric coated 81 milliGRAM(s) Oral daily  atorvastatin 40 milliGRAM(s) Oral at bedtime  clopidogrel Tablet 75 milliGRAM(s) Oral daily  docusate sodium 100 milliGRAM(s) Oral three times a day  famotidine    Tablet 20 milliGRAM(s) Oral daily  furosemide    Tablet 20 milliGRAM(s) Oral daily  heparin  Injectable 5000 Unit(s) SubCutaneous every 12 hours  influenza   Vaccine 0.5 milliLiter(s) IntraMuscular once  metoprolol succinate ER 50 milliGRAM(s) Oral daily  nystatin Powder 1 Application(s) Topical three times a day  senna 2 Tablet(s) Oral at bedtime  tamsulosin 0.4 milliGRAM(s) Oral at bedtime    MEDICATIONS  (PRN):  oxyCODONE    5 mG/acetaminophen 325 mG 1 Tablet(s) Oral every 6 hours PRN Mild Pain (1 - 3)  oxyCODONE    5 mG/acetaminophen 325 mG 2 Tablet(s) Oral every 12 hours PRN Severe Pain (7 - 10)      Vital Signs Last 24 Hrs  T(C): 36.7 (18 Dec 2018 04:49), Max: 36.7 (17 Dec 2018 14:00)  T(F): 98.1 (18 Dec 2018 04:49), Max: 98.1 (17 Dec 2018 14:00)  HR: 59 (18 Dec 2018 04:49) (59 - 68)  BP: 122/73 (18 Dec 2018 04:49) (103/57 - 122/73)  BP(mean): --  RR: 16 (18 Dec 2018 04:49) (16 - 18)  SpO2: 99% (18 Dec 2018 04:49) (99% - 99%)  CAPILLARY BLOOD GLUCOSE        I&O's Summary    17 Dec 2018 07:01  -  18 Dec 2018 07:00  --------------------------------------------------------  IN: 985 mL / OUT: 900 mL / NET: 85 mL        PHYSICAL EXAM:  HEAD:  Atraumatic, Normocephalic  NECK: Supple, No   JVD  CHEST/LUNG:   no     rales,     no,    rhonchi  HEART: Regular rate and rhythm;         murmur  ABDOMEN: Soft, Nontender, ;   EXTREMITIES:   no     edema  NEUROLOGY:  alert    LABS:    12-16    142  |  106  |  26<H>  ----------------------------<  101<H>  4.3   |  26  |  1.56<H>    Ca    8.9      16 Dec 2018 08:22  Phos  2.7     12-16  Mg     2.0     12-16                              Consultant(s) Notes Reviewed:      Care Discussed with Consultants/Other Providers:
no  cp/sob    REVIEW OF SYSTEMS:  GEN: no fever,    no chills  RESP: no SOB,   no cough  CVS: no chest pain,   no palpitations  GI: no abdominal pain,   no nausea,   no vomiting,   no constipation,   no diarrhea  : no dysuria,   no frequency  NEURO: no headache,   no dizziness  PSYCH: no depression,   not anxious  Derm : no rash    MEDICATIONS  (STANDING):  amLODIPine   Tablet 2.5 milliGRAM(s) Oral daily  aspirin enteric coated 81 milliGRAM(s) Oral daily  atorvastatin 40 milliGRAM(s) Oral at bedtime  clopidogrel Tablet 75 milliGRAM(s) Oral daily  docusate sodium 100 milliGRAM(s) Oral three times a day  famotidine    Tablet 20 milliGRAM(s) Oral daily  heparin  Injectable 5000 Unit(s) SubCutaneous every 12 hours  influenza   Vaccine 0.5 milliLiter(s) IntraMuscular once  metoprolol succinate ER 50 milliGRAM(s) Oral daily  nystatin Powder 1 Application(s) Topical three times a day  senna 2 Tablet(s) Oral at bedtime  tamsulosin 0.4 milliGRAM(s) Oral at bedtime    MEDICATIONS  (PRN):  oxyCODONE    5 mG/acetaminophen 325 mG 1 Tablet(s) Oral every 6 hours PRN Mild Pain (1 - 3)  oxyCODONE    5 mG/acetaminophen 325 mG 2 Tablet(s) Oral every 12 hours PRN Severe Pain (7 - 10)      Vital Signs Last 24 Hrs  T(C): 36.9 (14 Dec 2018 04:53), Max: 36.9 (14 Dec 2018 04:53)  T(F): 98.4 (14 Dec 2018 04:53), Max: 98.4 (14 Dec 2018 04:53)  HR: 64 (14 Dec 2018 04:53) (64 - 70)  BP: 113/63 (14 Dec 2018 04:53) (111/65 - 113/63)  BP(mean): --  RR: 18 (14 Dec 2018 04:53) (16 - 18)  SpO2: 99% (14 Dec 2018 04:53) (98% - 100%)  CAPILLARY BLOOD GLUCOSE        I&O's Summary    13 Dec 2018 07:01  -  14 Dec 2018 07:00  --------------------------------------------------------  IN: 1300 mL / OUT: 1450 mL / NET: -150 mL        PHYSICAL EXAM:  HEAD:  Atraumatic, Normocephalic  NECK: Supple, No   JVD  CHEST/LUNG:   no     rales,     no,    rhonchi  HEART: Regular rate and rhythm;         murmur  ABDOMEN: Soft, Nontender, ;   EXTREMITIES:     no   edema  NEUROLOGY:  alert    LABS:                        9.8    8.02  )-----------( 231      ( 14 Dec 2018 07:14 )             29.4     12-14    140  |  105  |  30<H>  ----------------------------<  103<H>  4.1   |  26  |  1.70<H>    Ca    8.8      14 Dec 2018 06:08  Mg     2.1     12-13            Urinalysis Basic - ( 12 Dec 2018 19:18 )    Color: Yellow / Appearance: Slightly Turbid / S.022 / pH: x  Gluc: x / Ketone: Negative  / Bili: Negative / Urobili: Negative mg/dL   Blood: x / Protein: Trace mg/dL / Nitrite: Negative   Leuk Esterase: Small / RBC: 406 /HPF / WBC 12 /HPF   Sq Epi: x / Non Sq Epi: 1 /HPF / Bacteria: Negative                      Consultant(s) Notes Reviewed:      Care Discussed with Consultants/Other Providers:
no  cp/sob  no abd  pain/ flank pain    REVIEW OF SYSTEMS:  GEN: no fever,    no chills  RESP: no SOB,   no cough  CVS: no chest pain,   no palpitations  GI: no abdominal pain,   no nausea,   no vomiting,   no constipation,   no diarrhea  : no dysuria,   no frequency  NEURO: no headache,   no dizziness  PSYCH: no depression,   not anxious  Derm : no rash    MEDICATIONS  (STANDING):  aspirin enteric coated 81 milliGRAM(s) Oral daily  atorvastatin 20 milliGRAM(s) Oral at bedtime  bisacodyl Suppository 10 milliGRAM(s) Rectal once  clopidogrel Tablet 75 milliGRAM(s) Oral daily  docusate sodium 100 milliGRAM(s) Oral three times a day  heparin  Injectable 5000 Unit(s) SubCutaneous every 12 hours  influenza   Vaccine 0.5 milliLiter(s) IntraMuscular once  lactulose Syrup 20 Gram(s) Oral every 4 hours  metoprolol succinate ER 25 milliGRAM(s) Oral daily  senna 2 Tablet(s) Oral at bedtime    MEDICATIONS  (PRN):  oxyCODONE    5 mG/acetaminophen 325 mG 1 Tablet(s) Oral every 6 hours PRN Mild Pain (1 - 3)  oxyCODONE    5 mG/acetaminophen 325 mG 2 Tablet(s) Oral every 12 hours PRN Severe Pain (7 - 10)      Vital Signs Last 24 Hrs  T(C): 36.7 (11 Dec 2018 05:25), Max: 36.7 (11 Dec 2018 05:25)  T(F): 98.1 (11 Dec 2018 05:25), Max: 98.1 (11 Dec 2018 05:25)  HR: 76 (11 Dec 2018 06:42) (67 - 76)  BP: 175/82 (11 Dec 2018 06:42) (109/67 - 185/84)  BP(mean): --  RR: 18 (11 Dec 2018 05:25) (18 - 18)  SpO2: 100% (11 Dec 2018 05:25) (99% - 100%)  CAPILLARY BLOOD GLUCOSE        I&O's Summary    10 Dec 2018 07:01  -  11 Dec 2018 07:00  --------------------------------------------------------  IN: 0 mL / OUT: 400 mL / NET: -400 mL        PHYSICAL EXAM:  HEAD:  Atraumatic, Normocephalic  NECK: Supple, No   JVD  CHEST/LUNG:   no     rales,     no,    rhonchi  HEART: Regular rate and rhythm;         murmur  ABDOMEN: Soft, Nontender, ;   EXTREMITIES:   less     edema  NEUROLOGY:  alert/ a ble  to raise  b/l legs    LABS:                        9.9    3.96  )-----------( 262      ( 11 Dec 2018 07:16 )             31.3     12-11    143  |  103  |  38<H>  ----------------------------<  97  4.2   |  26  |  2.15<H>    Ca    9.0      11 Dec 2018 05:44    TPro  7.3  /  Alb  4.1  /  TBili  0.5  /  DBili  x   /  AST  18  /  ALT  17  /  AlkPhos  86  12-10    PT/INR - ( 10 Dec 2018 15:47 )   PT: 12.0 sec;   INR: 1.04 ratio         PTT - ( 10 Dec 2018 15:47 )  PTT:27.2 sec                        Consultant(s) Notes Reviewed:      Care Discussed with Consultants/Other Providers:
no cp/sob    REVIEW OF SYSTEMS:  GEN: no fever,    no chills  RESP: no SOB,   no cough  CVS: no chest pain,   no palpitations  GI: no abdominal pain,   no nausea,   no vomiting,   no constipation,   no diarrhea  : no dysuria,   no frequency  NEURO: no headache,   no dizziness  PSYCH: no depression,   not anxious  Derm : no rash    MEDICATIONS  (STANDING):  amLODIPine   Tablet 2.5 milliGRAM(s) Oral daily  aspirin enteric coated 81 milliGRAM(s) Oral daily  atorvastatin 40 milliGRAM(s) Oral at bedtime  clopidogrel Tablet 75 milliGRAM(s) Oral daily  docusate sodium 100 milliGRAM(s) Oral three times a day  famotidine    Tablet 20 milliGRAM(s) Oral daily  heparin  Injectable 5000 Unit(s) SubCutaneous every 12 hours  influenza   Vaccine 0.5 milliLiter(s) IntraMuscular once  metoprolol succinate ER 50 milliGRAM(s) Oral daily  nystatin Powder 1 Application(s) Topical three times a day  senna 2 Tablet(s) Oral at bedtime  tamsulosin 0.4 milliGRAM(s) Oral at bedtime    MEDICATIONS  (PRN):  oxyCODONE    5 mG/acetaminophen 325 mG 1 Tablet(s) Oral every 6 hours PRN Mild Pain (1 - 3)  oxyCODONE    5 mG/acetaminophen 325 mG 2 Tablet(s) Oral every 12 hours PRN Severe Pain (7 - 10)      Vital Signs Last 24 Hrs  T(C): 36.7 (15 Dec 2018 05:42), Max: 37.4 (14 Dec 2018 20:52)  T(F): 98 (15 Dec 2018 05:42), Max: 99.4 (14 Dec 2018 20:52)  HR: 68 (15 Dec 2018 05:42) (68 - 74)  BP: 119/72 (15 Dec 2018 05:42) (108/65 - 119/72)  BP(mean): --  RR: 18 (15 Dec 2018 05:42) (16 - 18)  SpO2: 100% (15 Dec 2018 05:42) (99% - 100%)  CAPILLARY BLOOD GLUCOSE        I&O's Summary    14 Dec 2018 07:01  -  15 Dec 2018 07:00  --------------------------------------------------------  IN: 700 mL / OUT: 1000 mL / NET: -300 mL        PHYSICAL EXAM:  HEAD:  Atraumatic, Normocephalic  NECK: Supple, No   JVD  CHEST/LUNG:   no     rales,     no,    rhonchi  HEART: Regular rate and rhythm;         murmur  ABDOMEN: Soft, Nontender, ;   EXTREMITIES:    no    edema  NEUROLOGY:  alert    LABS:                        9.8    8.02  )-----------( 231      ( 14 Dec 2018 07:14 )             29.4     12-15    141  |  106  |  28<H>  ----------------------------<  96  4.4   |  24  |  1.57<H>    Ca    8.9      15 Dec 2018 07:25                              Consultant(s) Notes Reviewed:      Care Discussed with Consultants/Other Providers:
no cp/sob    REVIEW OF SYSTEMS:  GEN: no fever,    no chills  RESP: no SOB,   no cough  CVS: no chest pain,   no palpitations  GI: no abdominal pain,   no nausea,   no vomiting,   no constipation,   no diarrhea  : no dysuria,   no frequency  NEURO: no headache,   no dizziness  PSYCH: no depression,   not anxious  Derm : no rash    MEDICATIONS  (STANDING):  amLODIPine   Tablet 2.5 milliGRAM(s) Oral daily  aspirin enteric coated 81 milliGRAM(s) Oral daily  atorvastatin 40 milliGRAM(s) Oral at bedtime  clopidogrel Tablet 75 milliGRAM(s) Oral daily  docusate sodium 100 milliGRAM(s) Oral three times a day  famotidine    Tablet 20 milliGRAM(s) Oral daily  heparin  Injectable 5000 Unit(s) SubCutaneous every 12 hours  influenza   Vaccine 0.5 milliLiter(s) IntraMuscular once  metoprolol succinate ER 50 milliGRAM(s) Oral daily  nystatin Powder 1 Application(s) Topical three times a day  senna 2 Tablet(s) Oral at bedtime  tamsulosin 0.4 milliGRAM(s) Oral at bedtime    MEDICATIONS  (PRN):  oxyCODONE    5 mG/acetaminophen 325 mG 1 Tablet(s) Oral every 6 hours PRN Mild Pain (1 - 3)  oxyCODONE    5 mG/acetaminophen 325 mG 2 Tablet(s) Oral every 12 hours PRN Severe Pain (7 - 10)      Vital Signs Last 24 Hrs  T(C): 36.8 (16 Dec 2018 07:37), Max: 36.8 (16 Dec 2018 07:37)  T(F): 98.3 (16 Dec 2018 07:37), Max: 98.3 (16 Dec 2018 07:37)  HR: 58 (16 Dec 2018 07:37) (58 - 75)  BP: 107/62 (16 Dec 2018 07:37) (107/62 - 137/72)  BP(mean): --  RR: 20 (16 Dec 2018 07:37) (16 - 20)  SpO2: 97% (16 Dec 2018 07:37) (97% - 99%)  CAPILLARY BLOOD GLUCOSE        I&O's Summary    15 Dec 2018 07:01  -  16 Dec 2018 07:00  --------------------------------------------------------  IN: 694 mL / OUT: 1000 mL / NET: -306 mL        PHYSICAL EXAM:  HEAD:  Atraumatic, Normocephalic  NECK: Supple, No   JVD  CHEST/LUNG:   no     rales,     no,    rhonchi  HEART: Regular rate and rhythm;         murmur  ABDOMEN: Soft, Nontender, ;   EXTREMITIES:   less    edema  NEUROLOGY:  alert    LABS:                        10.0   5.67  )-----------( 268      ( 15 Dec 2018 09:23 )             31.4     12-16    142  |  106  |  26<H>  ----------------------------<  101<H>  4.3   |  26  |  1.56<H>    Ca    8.9      16 Dec 2018 08:22  Phos  2.7     12-16  Mg     2.0     12-16            Urinalysis Basic - ( 15 Dec 2018 15:06 )    Color: Yellow / Appearance: Clear / S.019 / pH: x  Gluc: x / Ketone: Negative  / Bili: Negative / Urobili: Negative mg/dL   Blood: x / Protein: Negative mg/dL / Nitrite: Negative   Leuk Esterase: Moderate / RBC: 2 /HPF / WBC 26 /HPF   Sq Epi: x / Non Sq Epi: 0 /HPF / Bacteria: Occasional                      Consultant(s) Notes Reviewed:      Care Discussed with Consultants/Other Providers:
Surgery Progress Note    S: Patient seen and examined. No acute events overnight.     O:  Vital Signs Last 24 Hrs  T(C): 36.9 (14 Dec 2018 04:53), Max: 36.9 (14 Dec 2018 04:53)  T(F): 98.4 (14 Dec 2018 04:53), Max: 98.4 (14 Dec 2018 04:53)  HR: 64 (14 Dec 2018 04:53) (64 - 70)  BP: 113/63 (14 Dec 2018 04:53) (111/65 - 113/63)  BP(mean): --  RR: 18 (14 Dec 2018 04:53) (16 - 18)  SpO2: 99% (14 Dec 2018 04:53) (98% - 100%)    I&O's Detail    13 Dec 2018 07:01  -  14 Dec 2018 07:00  --------------------------------------------------------  IN:    Oral Fluid: 1300 mL  Total IN: 1300 mL    OUT:    Voided: 1450 mL  Total OUT: 1450 mL    Total NET: -150 mL      14 Dec 2018 07:01  -  14 Dec 2018 12:19  --------------------------------------------------------  IN:    Oral Fluid: 360 mL  Total IN: 360 mL    OUT:  Total OUT: 0 mL    Total NET: 360 mL          MEDICATIONS  (STANDING):  amLODIPine   Tablet 2.5 milliGRAM(s) Oral daily  aspirin enteric coated 81 milliGRAM(s) Oral daily  atorvastatin 40 milliGRAM(s) Oral at bedtime  clopidogrel Tablet 75 milliGRAM(s) Oral daily  docusate sodium 100 milliGRAM(s) Oral three times a day  famotidine    Tablet 20 milliGRAM(s) Oral daily  heparin  Injectable 5000 Unit(s) SubCutaneous every 12 hours  influenza   Vaccine 0.5 milliLiter(s) IntraMuscular once  metoprolol succinate ER 50 milliGRAM(s) Oral daily  nystatin Powder 1 Application(s) Topical three times a day  senna 2 Tablet(s) Oral at bedtime  tamsulosin 0.4 milliGRAM(s) Oral at bedtime    MEDICATIONS  (PRN):  oxyCODONE    5 mG/acetaminophen 325 mG 1 Tablet(s) Oral every 6 hours PRN Mild Pain (1 - 3)  oxyCODONE    5 mG/acetaminophen 325 mG 2 Tablet(s) Oral every 12 hours PRN Severe Pain (7 - 10)                            9.8    8.02  )-----------( 231      ( 14 Dec 2018 07:14 )             29.4       12-14    140  |  105  |  30<H>  ----------------------------<  103<H>  4.1   |  26  |  1.70<H>    Ca    8.8      14 Dec 2018 06:08  Mg     2.1     12-13        Physical Exam:  Gen: Laying in bed, NAD  Resp: Unlabored breathing  Abd: soft, NTND  Ext: WWP. DP signals b/l  Skin: No rashes
no cp/sob  REVIEW OF SYSTEMS:  GEN: no fever,    no chills  RESP: no SOB,   no cough  CVS: no chest pain,   no palpitations  GI: no abdominal pain,   no nausea,   no vomiting,   no constipation,   no diarrhea  : no dysuria,   no frequency  NEURO: no headache,   no dizziness  PSYCH: no depression,   not anxious  Derm : no rash    MEDICATIONS  (STANDING):  amLODIPine   Tablet 2.5 milliGRAM(s) Oral daily  aspirin enteric coated 81 milliGRAM(s) Oral daily  atorvastatin 40 milliGRAM(s) Oral at bedtime  clopidogrel Tablet 75 milliGRAM(s) Oral daily  docusate sodium 100 milliGRAM(s) Oral three times a day  famotidine    Tablet 20 milliGRAM(s) Oral daily  heparin  Injectable 5000 Unit(s) SubCutaneous every 12 hours  influenza   Vaccine 0.5 milliLiter(s) IntraMuscular once  metoprolol succinate ER 50 milliGRAM(s) Oral daily  nystatin Powder 1 Application(s) Topical three times a day  senna 2 Tablet(s) Oral at bedtime  tamsulosin 0.4 milliGRAM(s) Oral at bedtime    MEDICATIONS  (PRN):  oxyCODONE    5 mG/acetaminophen 325 mG 1 Tablet(s) Oral every 6 hours PRN Mild Pain (1 - 3)  oxyCODONE    5 mG/acetaminophen 325 mG 2 Tablet(s) Oral every 12 hours PRN Severe Pain (7 - 10)      Vital Signs Last 24 Hrs  T(C): 36.7 (12 Dec 2018 05:22), Max: 36.7 (11 Dec 2018 13:07)  T(F): 98.1 (12 Dec 2018 05:22), Max: 98.1 (11 Dec 2018 13:07)  HR: 70 (12 Dec 2018 05:22) (65 - 70)  BP: 146/75 (12 Dec 2018 05:22) (118/67 - 146/75)  BP(mean): --  RR: 16 (12 Dec 2018 05:22) (16 - 18)  SpO2: 98% (12 Dec 2018 05:22) (97% - 98%)  CAPILLARY BLOOD GLUCOSE        I&O's Summary    11 Dec 2018 07:01  -  12 Dec 2018 07:00  --------------------------------------------------------  IN: 660 mL / OUT: 1450 mL / NET: -790 mL        PHYSICAL EXAM:  HEAD:  Atraumatic, Normocephalic  NECK: Supple, No   JVD  CHEST/LUNG:   no     rales,     no,    rhonchi  HEART: Regular rate and rhythm;         murmur  ABDOMEN: Soft, Nontender, ;   EXTREMITIES:    no    edema  NEUROLOGY:  alert    LABS:                        9.9    3.96  )-----------( 262      ( 11 Dec 2018 07:16 )             31.3     12-12    142  |  104  |  31<H>  ----------------------------<  105<H>  4.8   |  27  |  1.87<H>    Ca    9.5      12 Dec 2018 06:38  Phos  3.4     12-12  Mg     2.3     12-12    TPro  7.3  /  Alb  4.1  /  TBili  0.5  /  DBili  x   /  AST  18  /  ALT  17  /  AlkPhos  86  12-10    PT/INR - ( 10 Dec 2018 15:47 )   PT: 12.0 sec;   INR: 1.04 ratio         PTT - ( 10 Dec 2018 15:47 )  PTT:27.2 sec                        Consultant(s) Notes Reviewed:      Care Discussed with Consultants/Other Providers:

## 2018-12-18 NOTE — DISCHARGE NOTE ADULT - SECONDARY DIAGNOSIS.
CAD (coronary artery disease) HTN (hypertension) HLD (hyperlipidemia) CKD (chronic kidney disease) Weakness

## 2018-12-18 NOTE — DISCHARGE NOTE ADULT - ADDITIONAL INSTRUCTIONS
Follow up with PMD within 1 week of discharge.  Follow up with Vascular Surgeon as an outpatient. Follow up with PMD within 1 week of discharge.  Follow up with Vascular Surgeon and Neurosurgery as an outpatient.  Follow up with Dr. Snow as an outpatient.

## 2018-12-18 NOTE — DISCHARGE NOTE ADULT - MEDICATION SUMMARY - MEDICATIONS TO STOP TAKING
I will STOP taking the medications listed below when I get home from the hospital:    Klor-Con    potassium chloride 20 mEq oral tablet, extended release  -- 1 tab(s) by mouth once a day    isosorbide mononitrate 30 mg oral tablet, extended release  -- 1 tab(s) by mouth once a day (in the morning)

## 2019-01-08 NOTE — DISCHARGE NOTE ADULT - SECONDARY DIAGNOSIS.
Pt had a bout of diarrhea 4 days ago, she has not had BM since then. She is having some lower abdominal pain. She will try some Miralax and drink extra water today and call with f/u tomorrow. Pt advised to go to ER if pain becomes severe or she starts vomiting.    HTN (hypertension), benign Mixed hyperlipidemia

## 2019-01-25 ENCOUNTER — OUTPATIENT (OUTPATIENT)
Dept: OUTPATIENT SERVICES | Facility: HOSPITAL | Age: 84
LOS: 1 days | End: 2019-01-25
Payer: MEDICARE

## 2019-01-25 VITALS
RESPIRATION RATE: 20 BRPM | WEIGHT: 195.11 LBS | SYSTOLIC BLOOD PRESSURE: 92 MMHG | OXYGEN SATURATION: 99 % | TEMPERATURE: 98 F | HEART RATE: 62 BPM | DIASTOLIC BLOOD PRESSURE: 52 MMHG | HEIGHT: 69 IN

## 2019-01-25 DIAGNOSIS — Z95.5 PRESENCE OF CORONARY ANGIOPLASTY IMPLANT AND GRAFT: Chronic | ICD-10-CM

## 2019-01-25 DIAGNOSIS — I71.4 ABDOMINAL AORTIC ANEURYSM, WITHOUT RUPTURE: ICD-10-CM

## 2019-01-25 DIAGNOSIS — Z29.9 ENCOUNTER FOR PROPHYLACTIC MEASURES, UNSPECIFIED: ICD-10-CM

## 2019-01-25 DIAGNOSIS — Z98.89 OTHER SPECIFIED POSTPROCEDURAL STATES: Chronic | ICD-10-CM

## 2019-01-25 DIAGNOSIS — Z01.818 ENCOUNTER FOR OTHER PREPROCEDURAL EXAMINATION: ICD-10-CM

## 2019-01-25 DIAGNOSIS — Z87.19 PERSONAL HISTORY OF OTHER DISEASES OF THE DIGESTIVE SYSTEM: Chronic | ICD-10-CM

## 2019-01-25 DIAGNOSIS — Z98.890 OTHER SPECIFIED POSTPROCEDURAL STATES: Chronic | ICD-10-CM

## 2019-01-25 LAB
ANION GAP SERPL CALC-SCNC: 11 MMOL/L — SIGNIFICANT CHANGE UP (ref 5–17)
BLD GP AB SCN SERPL QL: NEGATIVE — SIGNIFICANT CHANGE UP
BUN SERPL-MCNC: 31 MG/DL — HIGH (ref 7–23)
CALCIUM SERPL-MCNC: 9.1 MG/DL — SIGNIFICANT CHANGE UP (ref 8.4–10.5)
CHLORIDE SERPL-SCNC: 104 MMOL/L — SIGNIFICANT CHANGE UP (ref 96–108)
CO2 SERPL-SCNC: 26 MMOL/L — SIGNIFICANT CHANGE UP (ref 22–31)
CREAT SERPL-MCNC: 2.34 MG/DL — HIGH (ref 0.5–1.3)
GLUCOSE SERPL-MCNC: 105 MG/DL — HIGH (ref 70–99)
HCT VFR BLD CALC: 30.8 % — LOW (ref 39–50)
HGB BLD-MCNC: 9.6 G/DL — LOW (ref 13–17)
MCHC RBC-ENTMCNC: 28.5 PG — SIGNIFICANT CHANGE UP (ref 27–34)
MCHC RBC-ENTMCNC: 31.2 GM/DL — LOW (ref 32–36)
MCV RBC AUTO: 91.4 FL — SIGNIFICANT CHANGE UP (ref 80–100)
PLATELET # BLD AUTO: 276 K/UL — SIGNIFICANT CHANGE UP (ref 150–400)
POTASSIUM SERPL-MCNC: 4.9 MMOL/L — SIGNIFICANT CHANGE UP (ref 3.5–5.3)
POTASSIUM SERPL-SCNC: 4.9 MMOL/L — SIGNIFICANT CHANGE UP (ref 3.5–5.3)
RBC # BLD: 3.37 M/UL — LOW (ref 4.2–5.8)
RBC # FLD: 18.6 % — HIGH (ref 10.3–14.5)
RH IG SCN BLD-IMP: POSITIVE — SIGNIFICANT CHANGE UP
SODIUM SERPL-SCNC: 141 MMOL/L — SIGNIFICANT CHANGE UP (ref 135–145)
WBC # BLD: 6.92 K/UL — SIGNIFICANT CHANGE UP (ref 3.8–10.5)
WBC # FLD AUTO: 6.92 K/UL — SIGNIFICANT CHANGE UP (ref 3.8–10.5)

## 2019-01-25 PROCEDURE — 80048 BASIC METABOLIC PNL TOTAL CA: CPT

## 2019-01-25 PROCEDURE — G0463: CPT

## 2019-01-25 PROCEDURE — 85027 COMPLETE CBC AUTOMATED: CPT

## 2019-01-25 PROCEDURE — 86850 RBC ANTIBODY SCREEN: CPT

## 2019-01-25 PROCEDURE — 86901 BLOOD TYPING SEROLOGIC RH(D): CPT

## 2019-01-25 PROCEDURE — 86900 BLOOD TYPING SEROLOGIC ABO: CPT

## 2019-01-25 RX ORDER — VANCOMYCIN HCL 1 G
1250 VIAL (EA) INTRAVENOUS ONCE
Qty: 0 | Refills: 0 | Status: DISCONTINUED | OUTPATIENT
Start: 2019-02-04 | End: 2019-02-05

## 2019-01-25 RX ORDER — SODIUM CHLORIDE 9 MG/ML
3 INJECTION INTRAMUSCULAR; INTRAVENOUS; SUBCUTANEOUS EVERY 8 HOURS
Qty: 0 | Refills: 0 | Status: DISCONTINUED | OUTPATIENT
Start: 2019-02-04 | End: 2019-02-04

## 2019-01-25 NOTE — H&P PST ADULT - ATTENDING COMMENTS
Patient has  a large  5.7 cm  Infrarenal AAA  patient  also has  CKD  He  has   many comorbid  conditions  He  got  discharged  recently from  rehab  following   a Hip ailment   He  aslo  has  spinal  stenosis  EVAR vs  Open  repair  vs  observation  discussed   extensively with  the  patient  his  son and  his  daughter  I spent  more than  45 minutes  to discuss  these procedures   risks  of death  HD  dependant  renal  failure  and  other  complications  discussed  patient  wants  to undergo EVAR

## 2019-01-25 NOTE — H&P PST ADULT - PMH
BPH (benign prostatic hyperplasia)    Cellulitis  BL  CKD (chronic kidney disease) stage 3, GFR 30-59 ml/min    Colon polyp    Leg swelling  related to cellulitis of LLE AAA (abdominal aortic aneurysm) without rupture    BPH (benign prostatic hyperplasia)    Cellulitis  BL  CKD (chronic kidney disease) stage 3, GFR 30-59 ml/min    Colon polyp    Leg swelling  related to cellulitis of LLE  OA (osteoarthritis)    PAD (peripheral artery disease)

## 2019-01-25 NOTE — H&P PST ADULT - PSH
S/P hemorrhoidectomy H/O inguinal hernia  repair  History of colonoscopy    History of coronary artery stent placement    S/P hemorrhoidectomy

## 2019-01-25 NOTE — H&P PST ADULT - HISTORY OF PRESENT ILLNESS
High Risk Travel:  International Travel? No(1)     88y Male complaining of back pain general.	    88M PMH  CKD,  CHF  , CAD s/p stents presents with 2 day history of back pain. a nd sob on exertion/  pt  stoppe d his  lasix    Patient reports he went to a store to purchase a reclining chair on Saturday and was flexing and extending his back quite often testing each recliner.     Patient reports pain is located to the left lower back and radiates to his anterior thigh.   Patient denies trauma to his back.  he stopped taking his furosemide medication as he could not get up to urinate.   Patient reports lower extremity edema bilaterally, but patient denies  chest pain, fevers, chills, numbness, tingling. 89 y/o male with PMH HTN, HLD, CAD - prior stents ( last stent in 2/2018), PAD, Hital hernia, BPH, CKD, know AAA of 5.6cm, seen & evaluated by Dr Reynoso & now recommends for Endovascular abdominal aortic aneurysm on 2/4/19.

## 2019-01-25 NOTE — H&P PST ADULT - ASSESSMENT
CAPRINI SCORE [CLOT]    AGE RELATED RISK FACTORS                                                       MOBILITY RELATED FACTORS  [ ] Age 41-60 years                                            (1 Point)                  [ ] Bed rest                                                        (1 Point)  [ ] Age: 61-74 years                                           (2 Points)                 [ ] Plaster cast                                                   (2 Points)  [x ] Age= 75 years                                              (3 Points)                 [ ] Bed bound for more than 72 hours                 (2 Points)    DISEASE RELATED RISK FACTORS                                               GENDER SPECIFIC FACTORS  [ x] Edema in the lower extremities                       (1 Point)                  [ ] Pregnancy                                                     (1 Point)  [ ] Varicose veins                                               (1 Point)                  [ ] Post-partum < 6 weeks                                   (1 Point)             [ ] BMI > 25 Kg/m2                                            (1 Point)                  [ ] Hormonal therapy  or oral contraception          (1 Point)                 [ ] Sepsis (in the previous month)                        (1 Point)                  [ ] History of pregnancy complications                 (1 point)  [ ] Pneumonia or serious lung disease                                               [ ] Unexplained or recurrent                     (1 Point)           (in the previous month)                               (1 Point)  [ ] Abnormal pulmonary function test                     (1 Point)                 SURGERY RELATED RISK FACTORS  [ ] Acute myocardial infarction                              (1 Point)                 [ ]  Section                                             (1 Point)  [ ] Congestive heart failure (in the previous month)  (1 Point)               [ ] Minor surgery                                                  (1 Point)   [ ] Inflammatory bowel disease                             (1 Point)                 [ ] Arthroscopic surgery                                        (2 Points)  [ ] Central venous access                                      (2 Points)                [x ] General surgery lasting more than 45 minutes   (2 Points)       [ ] Stroke (in the previous month)                          (5 Points)               [ ] Elective arthroplasty                                         (5 Points)                                                                                                                                               HEMATOLOGY RELATED FACTORS                                                 TRAUMA RELATED RISK FACTORS  [ ] Prior episodes of VTE                                     (3 Points)                 [ ] Fracture of the hip, pelvis, or leg                       (5 Points)  [ ] Positive family history for VTE                         (3 Points)                 [ ] Acute spinal cord injury (in the previous month)  (5 Points)  [ ] Prothrombin 11627 A                                     (3 Points)                 [ ] Paralysis  (less than 1 month)                             (5 Points)  [ ] Factor V Leiden                                             (3 Points)                  [ ] Multiple Trauma within 1 month                        (5 Points)  [ ] Lupus anticoagulants                                     (3 Points)                                                           [ ] Anticardiolipin antibodies                               (3 Points)                                                       [ ] High homocysteine in the blood                      (3 Points)                                             [ ] Other congenital or acquired thrombophilia      (3 Points)                                                [ ] Heparin induced thrombocytopenia                  (3 Points)                                          Total Score [     5     ]

## 2019-01-29 ENCOUNTER — APPOINTMENT (OUTPATIENT)
Dept: VASCULAR SURGERY | Facility: CLINIC | Age: 84
End: 2019-01-29
Payer: MEDICARE

## 2019-01-29 VITALS
WEIGHT: 190 LBS | DIASTOLIC BLOOD PRESSURE: 65 MMHG | SYSTOLIC BLOOD PRESSURE: 125 MMHG | HEART RATE: 67 BPM | HEIGHT: 69 IN | BODY MASS INDEX: 28.14 KG/M2

## 2019-01-29 PROCEDURE — 99202 OFFICE O/P NEW SF 15 MIN: CPT

## 2019-01-30 NOTE — PHYSICAL EXAM
[2+] : left 2+ [0] : left 0 [Ankle Swelling (On Exam)] : not present [Varicose Veins Of Lower Extremities] : not present [] : not present [de-identified] : appears well [de-identified] : soft, non-tender

## 2019-01-30 NOTE — ASSESSMENT
[Aneurysm Surgery] : aneurysm surgery [FreeTextEntry1] : 89yo M w/ AAA\par \par Surgical options of open AAA repair vs. EVAR were discussed with the patient and his son at length, including risks and benefits of both including but not limited to renal failure requiring dialysis, hernia, further interventions in the future, prolonged recovery, etc. Endovascular repair was recommended to the patient due to his age and comorbidities, however, due to his CKD (last Cr 2.3), there was a small chance he may end up on dialysis after the repair due to contrast. Patient also asked about risk of rupture if he did not have the AAA fixed. This was explained as an option as well. At this time, the patient would like some time to discuss and decide about operative intervention. He was advised to stop his Plavix starting today in case he decides to proceed with surgery next week. Dr. Prather was present and lead the discussion. I had a extensive  discussion with the patient  and his  son Joseph  lasting  30 minutes  which included options  of EVAR , Open repair  and observation  I recommend EVAR  with  inherent  renal failure  as a complication  because of the  contrast Patient to call back

## 2019-01-30 NOTE — HISTORY OF PRESENT ILLNESS
[FreeTextEntry1] : 89yo M recently hospitalized at [facility] for back pain, hip pain (h/o spinal stenosis, arthritis of hip), found to have a 6cm infra-renal AAA. Pt has CKD and this was seen on a non-contrast CT A/P. Intervention was deferred until patient's back/cervical pain was resolved. He follows up today in the office to discuss surgical options as his hip replacement is on hold due to the AAA.

## 2019-02-03 ENCOUNTER — OUTPATIENT (OUTPATIENT)
Dept: OUTPATIENT SERVICES | Facility: HOSPITAL | Age: 84
LOS: 1 days | End: 2019-02-03
Payer: MEDICARE

## 2019-02-03 ENCOUNTER — TRANSCRIPTION ENCOUNTER (OUTPATIENT)
Age: 84
End: 2019-02-03

## 2019-02-03 DIAGNOSIS — Z98.89 OTHER SPECIFIED POSTPROCEDURAL STATES: Chronic | ICD-10-CM

## 2019-02-03 DIAGNOSIS — Z95.5 PRESENCE OF CORONARY ANGIOPLASTY IMPLANT AND GRAFT: Chronic | ICD-10-CM

## 2019-02-03 DIAGNOSIS — Z87.19 PERSONAL HISTORY OF OTHER DISEASES OF THE DIGESTIVE SYSTEM: Chronic | ICD-10-CM

## 2019-02-03 DIAGNOSIS — Z98.890 OTHER SPECIFIED POSTPROCEDURAL STATES: Chronic | ICD-10-CM

## 2019-02-03 PROCEDURE — 86923 COMPATIBILITY TEST ELECTRIC: CPT

## 2019-02-04 ENCOUNTER — INPATIENT (INPATIENT)
Facility: HOSPITAL | Age: 84
LOS: 2 days | Discharge: HOME | DRG: 269 | End: 2019-02-07
Attending: SURGERY | Admitting: SURGERY
Payer: MEDICARE

## 2019-02-04 ENCOUNTER — APPOINTMENT (OUTPATIENT)
Dept: VASCULAR SURGERY | Facility: HOSPITAL | Age: 84
End: 2019-02-04

## 2019-02-04 VITALS
HEIGHT: 69 IN | RESPIRATION RATE: 18 BRPM | OXYGEN SATURATION: 98 % | TEMPERATURE: 98 F | DIASTOLIC BLOOD PRESSURE: 70 MMHG | SYSTOLIC BLOOD PRESSURE: 124 MMHG | HEART RATE: 74 BPM | WEIGHT: 197.98 LBS

## 2019-02-04 DIAGNOSIS — I71.4 ABDOMINAL AORTIC ANEURYSM, WITHOUT RUPTURE: ICD-10-CM

## 2019-02-04 DIAGNOSIS — Z95.5 PRESENCE OF CORONARY ANGIOPLASTY IMPLANT AND GRAFT: Chronic | ICD-10-CM

## 2019-02-04 DIAGNOSIS — Z87.19 PERSONAL HISTORY OF OTHER DISEASES OF THE DIGESTIVE SYSTEM: Chronic | ICD-10-CM

## 2019-02-04 DIAGNOSIS — Z98.890 OTHER SPECIFIED POSTPROCEDURAL STATES: Chronic | ICD-10-CM

## 2019-02-04 DIAGNOSIS — Z98.89 OTHER SPECIFIED POSTPROCEDURAL STATES: Chronic | ICD-10-CM

## 2019-02-04 LAB
ANION GAP SERPL CALC-SCNC: 10 MMOL/L — SIGNIFICANT CHANGE UP (ref 5–17)
APTT BLD: 29.8 SEC — SIGNIFICANT CHANGE UP (ref 27.5–36.3)
BUN SERPL-MCNC: 29 MG/DL — HIGH (ref 7–23)
CALCIUM SERPL-MCNC: 8.1 MG/DL — LOW (ref 8.4–10.5)
CHLORIDE SERPL-SCNC: 112 MMOL/L — HIGH (ref 96–108)
CO2 SERPL-SCNC: 22 MMOL/L — SIGNIFICANT CHANGE UP (ref 22–31)
CREAT SERPL-MCNC: 1.56 MG/DL — HIGH (ref 0.5–1.3)
GAS PNL BLDA: SIGNIFICANT CHANGE UP
GLUCOSE SERPL-MCNC: 135 MG/DL — HIGH (ref 70–99)
HCT VFR BLD CALC: 30.5 % — LOW (ref 39–50)
HGB BLD-MCNC: 10.3 G/DL — LOW (ref 13–17)
INR BLD: 1.02 RATIO — SIGNIFICANT CHANGE UP (ref 0.88–1.16)
MCHC RBC-ENTMCNC: 30.2 PG — SIGNIFICANT CHANGE UP (ref 27–34)
MCHC RBC-ENTMCNC: 33.6 GM/DL — SIGNIFICANT CHANGE UP (ref 32–36)
MCV RBC AUTO: 89.7 FL — SIGNIFICANT CHANGE UP (ref 80–100)
PLATELET # BLD AUTO: 164 K/UL — SIGNIFICANT CHANGE UP (ref 150–400)
POTASSIUM SERPL-MCNC: 4.8 MMOL/L — SIGNIFICANT CHANGE UP (ref 3.5–5.3)
POTASSIUM SERPL-SCNC: 4.8 MMOL/L — SIGNIFICANT CHANGE UP (ref 3.5–5.3)
PROTHROM AB SERPL-ACNC: 11.7 SEC — SIGNIFICANT CHANGE UP (ref 10–12.9)
RBC # BLD: 3.4 M/UL — LOW (ref 4.2–5.8)
RBC # FLD: 16.7 % — HIGH (ref 10.3–14.5)
SODIUM SERPL-SCNC: 144 MMOL/L — SIGNIFICANT CHANGE UP (ref 135–145)
WBC # BLD: 4.2 K/UL — SIGNIFICANT CHANGE UP (ref 3.8–10.5)
WBC # FLD AUTO: 4.2 K/UL — SIGNIFICANT CHANGE UP (ref 3.8–10.5)

## 2019-02-04 PROCEDURE — 34705 EVAC RPR A-BIILIAC NDGFT: CPT | Mod: 62

## 2019-02-04 PROCEDURE — 34812 OPN FEM ART EXPOS: CPT | Mod: 62

## 2019-02-04 PROCEDURE — 34713 PERQ ACCESS & CLSR FEM ART: CPT | Mod: LT,62

## 2019-02-04 PROCEDURE — 34812 OPN FEM ART EXPOS: CPT

## 2019-02-04 PROCEDURE — 34713 PERQ ACCESS & CLSR FEM ART: CPT | Mod: 62

## 2019-02-04 PROCEDURE — 0254T: CPT | Mod: 62

## 2019-02-04 RX ORDER — ACETAMINOPHEN WITH CODEINE 300MG-30MG
1 TABLET ORAL EVERY 4 HOURS
Qty: 0 | Refills: 0 | Status: DISCONTINUED | OUTPATIENT
Start: 2019-02-04 | End: 2019-02-04

## 2019-02-04 RX ORDER — OXYCODONE AND ACETAMINOPHEN 5; 325 MG/1; MG/1
1 TABLET ORAL EVERY 4 HOURS
Qty: 0 | Refills: 0 | Status: DISCONTINUED | OUTPATIENT
Start: 2019-02-04 | End: 2019-02-07

## 2019-02-04 RX ORDER — OXYCODONE AND ACETAMINOPHEN 5; 325 MG/1; MG/1
2 TABLET ORAL EVERY 6 HOURS
Qty: 0 | Refills: 0 | Status: DISCONTINUED | OUTPATIENT
Start: 2019-02-04 | End: 2019-02-07

## 2019-02-04 RX ORDER — ATORVASTATIN CALCIUM 80 MG/1
40 TABLET, FILM COATED ORAL AT BEDTIME
Qty: 0 | Refills: 0 | Status: DISCONTINUED | OUTPATIENT
Start: 2019-02-04 | End: 2019-02-07

## 2019-02-04 RX ORDER — GABAPENTIN 400 MG/1
300 CAPSULE ORAL
Qty: 0 | Refills: 0 | Status: DISCONTINUED | OUTPATIENT
Start: 2019-02-04 | End: 2019-02-05

## 2019-02-04 RX ORDER — FAMOTIDINE 10 MG/ML
20 INJECTION INTRAVENOUS DAILY
Qty: 0 | Refills: 0 | Status: DISCONTINUED | OUTPATIENT
Start: 2019-02-04 | End: 2019-02-07

## 2019-02-04 RX ORDER — POTASSIUM CHLORIDE 20 MEQ
20 PACKET (EA) ORAL DAILY
Qty: 0 | Refills: 0 | Status: DISCONTINUED | OUTPATIENT
Start: 2019-02-04 | End: 2019-02-06

## 2019-02-04 RX ORDER — SENNA PLUS 8.6 MG/1
2 TABLET ORAL AT BEDTIME
Qty: 0 | Refills: 0 | Status: DISCONTINUED | OUTPATIENT
Start: 2019-02-04 | End: 2019-02-07

## 2019-02-04 RX ORDER — TAMSULOSIN HYDROCHLORIDE 0.4 MG/1
0.4 CAPSULE ORAL AT BEDTIME
Qty: 0 | Refills: 0 | Status: DISCONTINUED | OUTPATIENT
Start: 2019-02-04 | End: 2019-02-06

## 2019-02-04 RX ORDER — METOPROLOL TARTRATE 50 MG
50 TABLET ORAL DAILY
Qty: 0 | Refills: 0 | Status: DISCONTINUED | OUTPATIENT
Start: 2019-02-04 | End: 2019-02-07

## 2019-02-04 RX ORDER — ACETAMINOPHEN 500 MG
1000 TABLET ORAL ONCE
Qty: 0 | Refills: 0 | Status: DISCONTINUED | OUTPATIENT
Start: 2019-02-04 | End: 2019-02-04

## 2019-02-04 RX ORDER — METOPROLOL TARTRATE 50 MG
25 TABLET ORAL DAILY
Qty: 0 | Refills: 0 | Status: DISCONTINUED | OUTPATIENT
Start: 2019-02-04 | End: 2019-02-04

## 2019-02-04 RX ORDER — HEPARIN SODIUM 5000 [USP'U]/ML
5000 INJECTION INTRAVENOUS; SUBCUTANEOUS EVERY 8 HOURS
Qty: 0 | Refills: 0 | Status: DISCONTINUED | OUTPATIENT
Start: 2019-02-04 | End: 2019-02-07

## 2019-02-04 RX ORDER — ONDANSETRON 8 MG/1
4 TABLET, FILM COATED ORAL ONCE
Qty: 0 | Refills: 0 | Status: DISCONTINUED | OUTPATIENT
Start: 2019-02-04 | End: 2019-02-04

## 2019-02-04 RX ORDER — SODIUM CHLORIDE 9 MG/ML
1000 INJECTION INTRAMUSCULAR; INTRAVENOUS; SUBCUTANEOUS
Qty: 0 | Refills: 0 | Status: DISCONTINUED | OUTPATIENT
Start: 2019-02-04 | End: 2019-02-05

## 2019-02-04 RX ORDER — ISOSORBIDE MONONITRATE 60 MG/1
30 TABLET, EXTENDED RELEASE ORAL DAILY
Qty: 0 | Refills: 0 | Status: DISCONTINUED | OUTPATIENT
Start: 2019-02-05 | End: 2019-02-07

## 2019-02-04 RX ORDER — DEXTROSE MONOHYDRATE, SODIUM CHLORIDE, AND POTASSIUM CHLORIDE 50; .745; 4.5 G/1000ML; G/1000ML; G/1000ML
1000 INJECTION, SOLUTION INTRAVENOUS
Qty: 0 | Refills: 0 | Status: DISCONTINUED | OUTPATIENT
Start: 2019-02-04 | End: 2019-02-04

## 2019-02-04 RX ORDER — LABETALOL HCL 100 MG
10 TABLET ORAL ONCE
Qty: 0 | Refills: 0 | Status: COMPLETED | OUTPATIENT
Start: 2019-02-04 | End: 2019-02-04

## 2019-02-04 RX ORDER — ASPIRIN/CALCIUM CARB/MAGNESIUM 324 MG
81 TABLET ORAL DAILY
Qty: 0 | Refills: 0 | Status: DISCONTINUED | OUTPATIENT
Start: 2019-02-04 | End: 2019-02-07

## 2019-02-04 RX ORDER — GABAPENTIN 400 MG/1
100 CAPSULE ORAL
Qty: 0 | Refills: 0 | Status: DISCONTINUED | OUTPATIENT
Start: 2019-02-04 | End: 2019-02-04

## 2019-02-04 RX ORDER — FUROSEMIDE 40 MG
40 TABLET ORAL DAILY
Qty: 0 | Refills: 0 | Status: DISCONTINUED | OUTPATIENT
Start: 2019-02-04 | End: 2019-02-07

## 2019-02-04 RX ORDER — LABETALOL HCL 100 MG
20 TABLET ORAL ONCE
Qty: 0 | Refills: 0 | Status: COMPLETED | OUTPATIENT
Start: 2019-02-04 | End: 2019-02-04

## 2019-02-04 RX ORDER — METOPROLOL TARTRATE 50 MG
50 TABLET ORAL ONCE
Qty: 0 | Refills: 0 | Status: COMPLETED | OUTPATIENT
Start: 2019-02-04 | End: 2019-02-04

## 2019-02-04 RX ADMIN — TAMSULOSIN HYDROCHLORIDE 0.4 MILLIGRAM(S): 0.4 CAPSULE ORAL at 21:57

## 2019-02-04 RX ADMIN — Medication 50 MILLIGRAM(S): at 15:24

## 2019-02-04 RX ADMIN — Medication 20 MILLIGRAM(S): at 15:37

## 2019-02-04 RX ADMIN — SENNA PLUS 2 TABLET(S): 8.6 TABLET ORAL at 21:57

## 2019-02-04 RX ADMIN — GABAPENTIN 300 MILLIGRAM(S): 400 CAPSULE ORAL at 17:37

## 2019-02-04 RX ADMIN — HEPARIN SODIUM 5000 UNIT(S): 5000 INJECTION INTRAVENOUS; SUBCUTANEOUS at 17:38

## 2019-02-04 RX ADMIN — Medication 10 MILLIGRAM(S): at 13:56

## 2019-02-04 RX ADMIN — DEXTROSE MONOHYDRATE, SODIUM CHLORIDE, AND POTASSIUM CHLORIDE 150 MILLILITER(S): 50; .745; 4.5 INJECTION, SOLUTION INTRAVENOUS at 13:35

## 2019-02-04 RX ADMIN — SODIUM CHLORIDE 75 MILLILITER(S): 9 INJECTION INTRAMUSCULAR; INTRAVENOUS; SUBCUTANEOUS at 17:38

## 2019-02-04 RX ADMIN — ATORVASTATIN CALCIUM 40 MILLIGRAM(S): 80 TABLET, FILM COATED ORAL at 21:57

## 2019-02-04 NOTE — PRE-ANESTHESIA EVALUATION ADULT - NSANTHOSAYNRD_GEN_A_CORE
No. DARLENE screening performed.  STOP BANG Legend: 0-2 = LOW Risk; 3-4 = INTERMEDIATE Risk; 5-8 = HIGH Risk

## 2019-02-04 NOTE — CHART NOTE - NSCHARTNOTEFT_GEN_A_CORE
VASCULAR SURGERY POST-OP NOTE    PROCEDURE: EVAR    SUBJECTIVE/ROS: Patient seen and examined at bedside.  Patients pain is well controlled.  He denies any chest pain, shortness of breath, abdominal pain, nausea, vomiting.  IN PACU patient received 30 of labetalol IV push and 50 of metoprolol PO for BP control.  Now BP ranging 150-160 systolic.         MEDICATIONS  (STANDING):  aspirin enteric coated 81 milliGRAM(s) Oral daily  atorvastatin 40 milliGRAM(s) Oral at bedtime  famotidine    Tablet 20 milliGRAM(s) Oral daily  furosemide    Tablet 40 milliGRAM(s) Oral daily  gabapentin 300 milliGRAM(s) Oral two times a day  heparin  Injectable 5000 Unit(s) SubCutaneous every 8 hours  metoprolol succinate ER 50 milliGRAM(s) Oral daily  potassium chloride    Tablet ER 20 milliEquivalent(s) Oral daily  senna 2 Tablet(s) Oral at bedtime  sodium chloride 0.9%. 1000 milliLiter(s) (75 mL/Hr) IV Continuous <Continuous>  tamsulosin 0.4 milliGRAM(s) Oral at bedtime  vancomycin  IVPB 1250 milliGRAM(s) IV Intermittent once    MEDICATIONS  (PRN):  acetaminophen  IVPB .. 1000 milliGRAM(s) IV Intermittent once PRN Mild Pain (1 - 3), Moderate Pain (4 - 6), Severe Pain (7 - 10)  ondansetron Injectable 4 milliGRAM(s) IV Push once PRN Nausea and/or Vomiting  oxyCODONE    5 mG/acetaminophen 325 mG 1 Tablet(s) Oral every 4 hours PRN Moderate Pain (4 - 6)  oxyCODONE    5 mG/acetaminophen 325 mG 2 Tablet(s) Oral every 6 hours PRN Severe Pain (7 - 10)      OBJECTIVE:    Vital Signs Last 24 Hrs  T(C): 36.5 (04 Feb 2019 16:00), Max: 36.5 (04 Feb 2019 16:00)  T(F): 97.7 (04 Feb 2019 16:00), Max: 97.7 (04 Feb 2019 16:00)  HR: 64 (04 Feb 2019 19:00) (57 - 74)  BP: 167/72 (04 Feb 2019 19:00) (124/70 - 167/72)  BP(mean): 104 (04 Feb 2019 19:00) (104 - 104)  RR: 16 (04 Feb 2019 19:00) (15 - 18)  SpO2: 99% (04 Feb 2019 19:00) (98% - 100%)        I&O's Detail    04 Feb 2019 07:01  -  04 Feb 2019 19:37  --------------------------------------------------------  IN:    sodium chloride 0.9% with potassium chloride 20 mEq/L: 600 mL    sodium chloride 0.9%.: 225 mL  Total IN: 825 mL    OUT:    Indwelling Catheter - Urethral: 455 mL  Total OUT: 455 mL    Total NET: 370 mL          Daily Height in cm: 175.26 (04 Feb 2019 06:42)    Daily     LABS:                        10.3   4.2   )-----------( 164      ( 04 Feb 2019 13:37 )             30.5     02-04    144  |  112<H>  |  29<H>  ----------------------------<  135<H>  4.8   |  22  |  1.56<H>    Ca    8.1<L>      04 Feb 2019 13:37      PT/INR - ( 04 Feb 2019 13:36 )   PT: 11.7 sec;   INR: 1.02 ratio         PTT - ( 04 Feb 2019 13:36 )  PTT:29.8 sec              PHYSICAL EXAM:  Constitutional: well developed, well nourished, NAD, [place]  Respiratory: No increased WOB, saturating well on RA  Abdomen: soft, non-tender, non distended  Extremities: RLE: R. groin with dressing in place c/d/i, soft, no hematoma.  +dopplerable dp/pt pulses foot warm +motor + sensation  LLE: L. groin with dressing in place c/d/i, soft, no hematoma.  +dopplerable dp/pt pulses foot warm +motor + sensation        ASSESSMENT/PLAN:    - Pain control  - CLD  - ASA, SQH, holding plavix  - Monitor UOP  - f/u AM labs  - PT consult ordered  - Routine neurovascular checks      Vernell German PA-C   [phone]

## 2019-02-04 NOTE — PRE-OP CHECKLIST - DENTURES
Bleeding precautions and activity restrictions reviewed with patient, verbalizes and demonstrate understanding.   no

## 2019-02-04 NOTE — BRIEF OPERATIVE NOTE - PROCEDURE
<<-----Click on this checkbox to enter Procedure Endovascular repair of aorta  02/04/2019    Active  [id number]

## 2019-02-05 ENCOUNTER — TRANSCRIPTION ENCOUNTER (OUTPATIENT)
Age: 84
End: 2019-02-05

## 2019-02-05 LAB
ANION GAP SERPL CALC-SCNC: 9 MMOL/L — SIGNIFICANT CHANGE UP (ref 5–17)
BASOPHILS # BLD AUTO: 0 K/UL — SIGNIFICANT CHANGE UP (ref 0–0.2)
BASOPHILS NFR BLD AUTO: 0.2 % — SIGNIFICANT CHANGE UP (ref 0–2)
BUN SERPL-MCNC: 26 MG/DL — HIGH (ref 7–23)
CALCIUM SERPL-MCNC: 8.3 MG/DL — LOW (ref 8.4–10.5)
CHLORIDE SERPL-SCNC: 109 MMOL/L — HIGH (ref 96–108)
CO2 SERPL-SCNC: 24 MMOL/L — SIGNIFICANT CHANGE UP (ref 22–31)
CREAT SERPL-MCNC: 1.49 MG/DL — HIGH (ref 0.5–1.3)
EOSINOPHIL # BLD AUTO: 0 K/UL — SIGNIFICANT CHANGE UP (ref 0–0.5)
EOSINOPHIL NFR BLD AUTO: 0.4 % — SIGNIFICANT CHANGE UP (ref 0–6)
GLUCOSE SERPL-MCNC: 125 MG/DL — HIGH (ref 70–99)
HCT VFR BLD CALC: 31.1 % — LOW (ref 39–50)
HGB BLD-MCNC: 10.3 G/DL — LOW (ref 13–17)
LYMPHOCYTES # BLD AUTO: 0.7 K/UL — LOW (ref 1–3.3)
LYMPHOCYTES # BLD AUTO: 9.7 % — LOW (ref 13–44)
MCHC RBC-ENTMCNC: 30 PG — SIGNIFICANT CHANGE UP (ref 27–34)
MCHC RBC-ENTMCNC: 33 GM/DL — SIGNIFICANT CHANGE UP (ref 32–36)
MCV RBC AUTO: 90.9 FL — SIGNIFICANT CHANGE UP (ref 80–100)
MONOCYTES # BLD AUTO: 0.5 K/UL — SIGNIFICANT CHANGE UP (ref 0–0.9)
MONOCYTES NFR BLD AUTO: 7.2 % — SIGNIFICANT CHANGE UP (ref 2–14)
NEUTROPHILS # BLD AUTO: 5.6 K/UL — SIGNIFICANT CHANGE UP (ref 1.8–7.4)
NEUTROPHILS NFR BLD AUTO: 82.6 % — HIGH (ref 43–77)
PLATELET # BLD AUTO: 170 K/UL — SIGNIFICANT CHANGE UP (ref 150–400)
POTASSIUM SERPL-MCNC: 4.8 MMOL/L — SIGNIFICANT CHANGE UP (ref 3.5–5.3)
POTASSIUM SERPL-SCNC: 4.8 MMOL/L — SIGNIFICANT CHANGE UP (ref 3.5–5.3)
RBC # BLD: 3.42 M/UL — LOW (ref 4.2–5.8)
RBC # FLD: 16.7 % — HIGH (ref 10.3–14.5)
SODIUM SERPL-SCNC: 142 MMOL/L — SIGNIFICANT CHANGE UP (ref 135–145)
WBC # BLD: 6.8 K/UL — SIGNIFICANT CHANGE UP (ref 3.8–10.5)
WBC # FLD AUTO: 6.8 K/UL — SIGNIFICANT CHANGE UP (ref 3.8–10.5)

## 2019-02-05 RX ORDER — CLOPIDOGREL BISULFATE 75 MG/1
75 TABLET, FILM COATED ORAL DAILY
Qty: 0 | Refills: 0 | Status: DISCONTINUED | OUTPATIENT
Start: 2019-02-05 | End: 2019-02-05

## 2019-02-05 RX ORDER — GABAPENTIN 400 MG/1
200 CAPSULE ORAL THREE TIMES A DAY
Qty: 0 | Refills: 0 | Status: DISCONTINUED | OUTPATIENT
Start: 2019-02-05 | End: 2019-02-07

## 2019-02-05 RX ADMIN — OXYCODONE AND ACETAMINOPHEN 1 TABLET(S): 5; 325 TABLET ORAL at 17:52

## 2019-02-05 RX ADMIN — GABAPENTIN 300 MILLIGRAM(S): 400 CAPSULE ORAL at 17:22

## 2019-02-05 RX ADMIN — OXYCODONE AND ACETAMINOPHEN 1 TABLET(S): 5; 325 TABLET ORAL at 17:22

## 2019-02-05 RX ADMIN — Medication 10 MILLIGRAM(S): at 17:22

## 2019-02-05 RX ADMIN — ATORVASTATIN CALCIUM 40 MILLIGRAM(S): 80 TABLET, FILM COATED ORAL at 21:12

## 2019-02-05 RX ADMIN — TAMSULOSIN HYDROCHLORIDE 0.4 MILLIGRAM(S): 0.4 CAPSULE ORAL at 21:13

## 2019-02-05 RX ADMIN — SENNA PLUS 2 TABLET(S): 8.6 TABLET ORAL at 21:13

## 2019-02-05 RX ADMIN — Medication 20 MILLIEQUIVALENT(S): at 13:35

## 2019-02-05 RX ADMIN — ISOSORBIDE MONONITRATE 30 MILLIGRAM(S): 60 TABLET, EXTENDED RELEASE ORAL at 13:34

## 2019-02-05 RX ADMIN — HEPARIN SODIUM 5000 UNIT(S): 5000 INJECTION INTRAVENOUS; SUBCUTANEOUS at 21:15

## 2019-02-05 RX ADMIN — FAMOTIDINE 20 MILLIGRAM(S): 10 INJECTION INTRAVENOUS at 13:34

## 2019-02-05 RX ADMIN — HEPARIN SODIUM 5000 UNIT(S): 5000 INJECTION INTRAVENOUS; SUBCUTANEOUS at 13:34

## 2019-02-05 RX ADMIN — Medication 50 MILLIGRAM(S): at 05:35

## 2019-02-05 RX ADMIN — CLOPIDOGREL BISULFATE 75 MILLIGRAM(S): 75 TABLET, FILM COATED ORAL at 13:34

## 2019-02-05 RX ADMIN — Medication 81 MILLIGRAM(S): at 13:34

## 2019-02-05 RX ADMIN — Medication 40 MILLIGRAM(S): at 05:35

## 2019-02-05 RX ADMIN — GABAPENTIN 300 MILLIGRAM(S): 400 CAPSULE ORAL at 05:35

## 2019-02-05 RX ADMIN — GABAPENTIN 200 MILLIGRAM(S): 400 CAPSULE ORAL at 23:44

## 2019-02-05 RX ADMIN — HEPARIN SODIUM 5000 UNIT(S): 5000 INJECTION INTRAVENOUS; SUBCUTANEOUS at 01:28

## 2019-02-05 NOTE — DISCHARGE NOTE ADULT - PLAN OF CARE
recovering from surgery Return to ER for temperatures greater than 101, chills sweats, severe pain or pain not controlled with pain medications, bloody bowel movements, persistent nausea and vomiting, or acutely concerning matters to you, that may require urgent medical attention.  Please keep incision sites clean and dry, shower only.  Do not bathe or immerse incision sites in water for a prolonged amount of time.  Please follow up with Dr. Prather within one week after discharge from the hospital. You may call [phone] to make an appointment. follow up and munoz plan Dr. Tom, Urology, in 3 to 5 days to discontinue munoz and trial void.  Call [phone] for appointment. follow up Your Nephrologist, Dr. Tiffanie Hendrix, in one week.  Call their office [phone] for appointment.

## 2019-02-05 NOTE — DISCHARGE NOTE ADULT - HOME CARE AGENCY
[facility] at [place]- [phone] to start day after discharge, Rn eval , Home PT, post-op follow up, aide eval [facility] at [place]- [phone] to start day after discharge, Rn eval , Home PT, post-op follow up, aide eval

## 2019-02-05 NOTE — DISCHARGE NOTE ADULT - MEDICATION SUMMARY - MEDICATIONS TO CHANGE
I will SWITCH the dose or number of times a day I take the medications listed below when I get home from the hospital:  None I will SWITCH the dose or number of times a day I take the medications listed below when I get home from the hospital:    tamsulosin 0.4 mg oral capsule  -- 1 cap(s) by mouth once a day (at bedtime)    gabapentin 100 mg oral capsule  -- 1 cap(s) by mouth 2 times a day I will SWITCH the dose or number of times a day I take the medications listed below when I get home from the hospital:    tamsulosin 0.4 mg oral capsule  -- 1 cap(s) by mouth once a day (at bedtime)    potassium chloride 20 mEq oral tablet, extended release  -- 1 tab(s) by mouth once a day    gabapentin 100 mg oral capsule  -- 1 cap(s) by mouth 2 times a day

## 2019-02-05 NOTE — DISCHARGE NOTE ADULT - CARE PLAN
Principal Discharge DX:	Abdominal aortic aneurysm (AAA) 3.0 cm to 5.5 cm in diameter in male  Goal:	recovering from surgery  Assessment and plan of treatment:	Return to ER for temperatures greater than 101, chills sweats, severe pain or pain not controlled with pain medications, bloody bowel movements, persistent nausea and vomiting, or acutely concerning matters to you, that may require urgent medical attention.  Please keep incision sites clean and dry, shower only.  Do not bathe or immerse incision sites in water for a prolonged amount of time.  Please follow up with Dr. Prather within one week after discharge from the hospital. You may call [phone] to make an appointment. Principal Discharge DX:	Abdominal aortic aneurysm (AAA) 3.0 cm to 5.5 cm in diameter in male  Goal:	recovering from surgery  Assessment and plan of treatment:	Return to ER for temperatures greater than 101, chills sweats, severe pain or pain not controlled with pain medications, bloody bowel movements, persistent nausea and vomiting, or acutely concerning matters to you, that may require urgent medical attention.  Please keep incision sites clean and dry, shower only.  Do not bathe or immerse incision sites in water for a prolonged amount of time.  Please follow up with Dr. Prather within one week after discharge from the hospital. You may call [phone] to make an appointment.  Secondary Diagnosis:	BPH (benign prostatic hyperplasia)  Goal:	follow up and munoz plan  Assessment and plan of treatment:	Dr. Tom, Urology, in 3 to 5 days to discontinue munoz and trial void.  Call [phone] for appointment.  Secondary Diagnosis:	CKD (chronic kidney disease) stage 3, GFR 30-59 ml/min  Goal:	follow up  Assessment and plan of treatment:	Your Nephrologist, Dr. Tiffanie Hendrix, in one week.  Call their office [phone] for appointment.

## 2019-02-05 NOTE — DISCHARGE NOTE ADULT - INSTRUCTIONS
Cardiac Diet- Sodium and Cholesterol Restriction munoz care provided and patient changed to leg bag, extra supplies provided

## 2019-02-05 NOTE — DISCHARGE NOTE ADULT - PROVIDER TOKENS
PROVIDER:[TOKEN:[[id number]]] PROVIDER:[TOKEN:[[id number]]],PROVIDER:[TOKEN:[[id number]]] PROVIDER:[TOKEN:[[id number]]],PROVIDER:[TOKEN:[[id number]]],PROVIDER:[TOKEN:[[id number]]]

## 2019-02-05 NOTE — DISCHARGE NOTE ADULT - MEDICATION SUMMARY - MEDICATIONS TO STOP TAKING
I will STOP taking the medications listed below when I get home from the hospital:  None I will STOP taking the medications listed below when I get home from the hospital:    furosemide 40 mg oral tablet  -- 1 tab(s) by mouth once a day

## 2019-02-05 NOTE — PHYSICAL THERAPY INITIAL EVALUATION ADULT - CRITERIA FOR SKILLED THERAPEUTIC INTERVENTIONS
risk reduction/prevention/predicted duration of therapy intervention/anticipated equipment needs at discharge/anticipated discharge recommendation/impairments found/functional limitations in following categories/therapy frequency

## 2019-02-05 NOTE — PHYSICAL THERAPY INITIAL EVALUATION ADULT - GAIT TRAINING, PT EVAL
GOAL: Patient will ambulate 200 feet independently with RW in 1 week. Pt will be able to neg 5 steps w/ 1 HR and sup in 1 week

## 2019-02-05 NOTE — DISCHARGE NOTE ADULT - CARE PROVIDERS DIRECT ADDRESSES
,[email or web address] ,[email or web address],DirectAddress_Unknown

## 2019-02-05 NOTE — DISCHARGE NOTE ADULT - CARE PROVIDER_API CALL
Dawood Prather (MD)  Surgery; Surgical Critical Care; Vascular Surgery  [address] [facility], [address]  [place] [address]  Phone: [phone]  Fax: [phone]  Follow Up Time: Dawood Prather)  Surgery; Surgical Critical Care; Vascular Surgery  [address] [facility], [address]  [place] [address]  Phone: [phone]  Fax: [phone]  Follow Up Time:     James Tom)  Urology  [address] [facility], [address] [address]  Phone: [phone]  Fax: [phone]  Follow Up Time: Dawood Prather)  Surgery; Surgical Critical Care; Vascular Surgery  [address] [facility], [address]  [place] [address]  Phone: [phone]  Fax: [phone]  Follow Up Time:     James Tom)  Urology  [address] [facility], [address] [address]  Phone: [phone]  Fax: [phone]  Follow Up Time:     Tiffanie Hendrix)  Internal Medicine; Nephrology  [address] [facility], [address]  [place] [address]  Phone: [phone]  Fax: [phone]  Follow Up Time:

## 2019-02-05 NOTE — PHYSICAL THERAPY INITIAL EVALUATION ADULT - ADDITIONAL COMMENTS
Pt lives alone in an apartment with 5 steps to enter. Pt amb w/ RW PTA. Pt was independent w/ ADL's PTA. Pt has a RW and shower chair at home. Pt states that his family is supportive.

## 2019-02-05 NOTE — PHYSICAL THERAPY INITIAL EVALUATION ADULT - DISCHARGE DISPOSITION, PT EVAL
Pt's family will assist as needed. pt will benefit from Home PT to improve his functional mobility, balance, endurance and strength./home w/ home PT/home w/ assist

## 2019-02-05 NOTE — PHYSICAL THERAPY INITIAL EVALUATION ADULT - PERTINENT HX OF CURRENT PROBLEM, REHAB EVAL
Pt is an 89 y/o male s/p endovascular abdominal aortic aneurysm on 2/4/2019. Pt is an 88 y/o male s/p endovascular abdominal aortic aneurysm repair on 2/4/2019.

## 2019-02-05 NOTE — DISCHARGE NOTE ADULT - HOSPITAL COURSE
89 y/o male with PMH HTN, HLD, CAD - prior stents ( last stent in 2/2018), PAD, Hital hernia, BPH, CKD, know AAA of 5.6cm, seen & evaluated by Dr Prather. Now status post endovascular repair of aorta on 2/4/19. Post operatively, patient hypertensive and received labetalol 30 mg IV push and Metoprolol 50 mg PO for BP control. Home medications restarted and systolic blood pressure 130-140. POD 1 2/5/19, patient doing well. Patient was restarted on a regular cardiac diet, Plavix was resumed and Flores catheter was discontinued.   At the time of discharge, the patient was hemodynamically stable, was tolerating PO diet, was voiding urine and passing stool, was ambulating, and was comfortable with adequate pain control. The patient was instructed to follow up with Dr. Prather within 1-2 weeks after discharge from the hospital. The patient felt comfortable with discharge. The patient was discharged to home with home physical therapy. The patient had no other issues. 89 y/o male with PMH HTN, HLD, CAD - prior stents ( last stent in 2/2018), PAD, Hital hernia, BPH, CKD, know AAA of 5.6cm, seen & evaluated by Dr Prather. Now status post endovascular repair of aorta on 2/4/19. Post operatively, patient hypertensive and received labetalol 30 mg IV push and Metoprolol 50 mg PO for BP control. Home medications restarted and systolic blood pressure 130-140. POD 1 2/5/19, patient doing well. Patient was restarted on a regular cardiac diet, Plavix was resumed and Flores catheter was discontinued.     Nephrology consulted CKD stage 3 and now with RASHAUN following post obstruction and contras, and recommended increasing  Flomax to 0.9 reduce the potassium to 10meq po qd, iIf creatinine is up in am then dc lasix. Uology called  At the time of discharge, the patient was hemodynamically stable, was tolerating PO diet, was voiding urine and passing stool, was ambulating, and was comfortable with adequate pain control. The patient was instructed to follow up with Dr. Prather within 1-2 weeks after discharge from the hospital. The patient felt comfortable with discharge. The patient was discharged to home with home physical therapy. The patient had no other issues.

## 2019-02-05 NOTE — DISCHARGE NOTE ADULT - MEDICATION SUMMARY - MEDICATIONS TO TAKE
I will START or STAY ON the medications listed below when I get home from the hospital:    aspirin 81 mg oral delayed release tablet  -- 1 tab(s) by mouth once a day  -- Indication: For History of coronary artery stent placement    oxycodone-acetaminophen 5 mg-325 mg oral tablet  -- 1 tab(s) by mouth every 4 hours, As needed, Severe Pain  MDD:MDD: 6  -- Indication: For PAIN     Tylenol 325 mg oral tablet  -- 2 tab(s) by mouth every 6 hours, As Needed for pain   -- Indication: For PAin     tamsulosin 0.4 mg oral capsule  -- 1 cap(s) by mouth once a day (at bedtime)  -- Indication: For BPH     Imdur 30 mg oral tablet, extended release  -- 1 tab(s) by mouth once a day (in the morning)  -- Indication: For High blood pressure     gabapentin 100 mg oral capsule  -- 1 cap(s) by mouth 2 times a day  -- Indication: For Neuropathy     atorvastatin 40 mg oral tablet  -- 1 tab(s) by mouth once a day (at bedtime)  -- Indication: For High Cholesterol     clopidogrel 75 mg oral tablet  -- 1 tab(s) by mouth once a day  -- Indication: For History of coronary artery stent placement    Metoprolol Succinate ER 50 mg oral tablet, extended release  -- 1 tab(s) by mouth once a day  -- Indication: For High Blood Pressure     furosemide 40 mg oral tablet  -- 1 tab(s) by mouth once a day  -- Indication: For Diuretic     famotidine 20 mg oral tablet  -- 1 tab(s) by mouth once a day  -- Indication: For Reflux     senna oral tablet  -- 2 tab(s) by mouth once a day (at bedtime)  -- Indication: For laxative     potassium chloride 20 mEq oral tablet, extended release  -- 1 tab(s) by mouth once a day  -- Indication: For supplement I will START or STAY ON the medications listed below when I get home from the hospital:    aspirin 81 mg oral delayed release tablet  -- 1 tab(s) by mouth once a day  -- Indication: For blood thinner    oxycodone-acetaminophen 5 mg-325 mg oral tablet  -- 1 tab(s) by mouth every 4 hours, As needed, Severe Pain  MDD:MDD: 6  -- Indication: For PAin    Tylenol 325 mg oral tablet  -- 2 tab(s) by mouth every 6 hours, As Needed for pain   -- Indication: For PAin    tamsulosin 0.4 mg oral capsule  -- 2 cap(s) by mouth once a day (at bedtime)  -- Indication: For urinary    Imdur 30 mg oral tablet, extended release  -- 1 tab(s) by mouth once a day (in the morning)  -- Indication: For Angina    gabapentin 100 mg oral capsule  -- 2 cap(s) by mouth 3 times a day MDD:6  -- Indication: For neuropathy    atorvastatin 40 mg oral tablet  -- 1 tab(s) by mouth once a day (at bedtime)  -- Indication: For cholesterol    clopidogrel 75 mg oral tablet  -- 1 tab(s) by mouth once a day  -- Indication: For Antiplatelet    Metoprolol Succinate ER 50 mg oral tablet, extended release  -- 1 tab(s) by mouth once a day  -- Indication: For blood pressure    famotidine 20 mg oral tablet  -- 1 tab(s) by mouth once a day  -- Indication: For reflux    senna oral tablet  -- 2 tab(s) by mouth once a day (at bedtime)  -- Indication: For laxative    potassium chloride 10 mEq oral tablet, extended release  -- 1 tab(s) by mouth once a day MDD:1  -- Indication: For supplement I will START or STAY ON the medications listed below when I get home from the hospital:    aspirin 81 mg oral delayed release tablet  -- 1 tab(s) by mouth once a day  -- Indication: For blood thinner    oxycodone-acetaminophen 5 mg-325 mg oral tablet  -- 1 tab(s) by mouth every 4 hours, As needed, Severe Pain  MDD:MDD: 6  -- Indication: For PAin    Tylenol 325 mg oral tablet  -- 2 tab(s) by mouth every 6 hours, As Needed for pain   -- Indication: For PAin    tamsulosin 0.4 mg oral capsule  -- 2 cap(s) by mouth once a day (at bedtime)  -- Indication: For urinary    Imdur 30 mg oral tablet, extended release  -- 1 tab(s) by mouth once a day (in the morning)  -- Indication: For Angina    gabapentin 100 mg oral capsule  -- 2 cap(s) by mouth 3 times a day MDD:6  -- Indication: For neuropathy    atorvastatin 40 mg oral tablet  -- 1 tab(s) by mouth once a day (at bedtime)  -- Indication: For cholesterol    clopidogrel 75 mg oral tablet  -- 1 tab(s) by mouth once a day  -- Indication: For Antiplatelet    Metoprolol Succinate ER 50 mg oral tablet, extended release  -- 1 tab(s) by mouth once a day  -- Indication: For blood pressure    furosemide 40 mg oral tablet  -- 1 tab(s) by mouth once a day  -- Indication: For diuretic    famotidine 20 mg oral tablet  -- 1 tab(s) by mouth once a day  -- Indication: For reflux    senna oral tablet  -- 2 tab(s) by mouth once a day (at bedtime)  -- Indication: For laxative    potassium chloride 10 mEq oral tablet, extended release  -- 1 tab(s) by mouth Monday, Wednesday, and Friday MDD:1   -- Indication: For supplement

## 2019-02-06 LAB
ANION GAP SERPL CALC-SCNC: 10 MMOL/L — SIGNIFICANT CHANGE UP (ref 5–17)
APPEARANCE UR: CLEAR — SIGNIFICANT CHANGE UP
BILIRUB UR-MCNC: NEGATIVE — SIGNIFICANT CHANGE UP
BUN SERPL-MCNC: 32 MG/DL — HIGH (ref 7–23)
CALCIUM SERPL-MCNC: 8.3 MG/DL — LOW (ref 8.4–10.5)
CHLORIDE SERPL-SCNC: 108 MMOL/L — SIGNIFICANT CHANGE UP (ref 96–108)
CO2 SERPL-SCNC: 23 MMOL/L — SIGNIFICANT CHANGE UP (ref 22–31)
COLOR SPEC: SIGNIFICANT CHANGE UP
CREAT SERPL-MCNC: 2.03 MG/DL — HIGH (ref 0.5–1.3)
DIFF PNL FLD: ABNORMAL
GLUCOSE SERPL-MCNC: 98 MG/DL — SIGNIFICANT CHANGE UP (ref 70–99)
GLUCOSE UR QL: NEGATIVE — SIGNIFICANT CHANGE UP
HCT VFR BLD CALC: 30.2 % — LOW (ref 39–50)
HGB BLD-MCNC: 10 G/DL — LOW (ref 13–17)
KETONES UR-MCNC: NEGATIVE — SIGNIFICANT CHANGE UP
LEUKOCYTE ESTERASE UR-ACNC: ABNORMAL
MAGNESIUM SERPL-MCNC: 2 MG/DL — SIGNIFICANT CHANGE UP (ref 1.6–2.6)
MCHC RBC-ENTMCNC: 29.9 PG — SIGNIFICANT CHANGE UP (ref 27–34)
MCHC RBC-ENTMCNC: 32.9 GM/DL — SIGNIFICANT CHANGE UP (ref 32–36)
MCV RBC AUTO: 90.9 FL — SIGNIFICANT CHANGE UP (ref 80–100)
NITRITE UR-MCNC: NEGATIVE — SIGNIFICANT CHANGE UP
PH UR: 5.5 — SIGNIFICANT CHANGE UP (ref 5–8)
PHOSPHATE SERPL-MCNC: 2.8 MG/DL — SIGNIFICANT CHANGE UP (ref 2.5–4.5)
PLATELET # BLD AUTO: 148 K/UL — LOW (ref 150–400)
POTASSIUM SERPL-MCNC: 4.8 MMOL/L — SIGNIFICANT CHANGE UP (ref 3.5–5.3)
POTASSIUM SERPL-SCNC: 4.8 MMOL/L — SIGNIFICANT CHANGE UP (ref 3.5–5.3)
PROT UR-MCNC: NEGATIVE — SIGNIFICANT CHANGE UP
RBC # BLD: 3.33 M/UL — LOW (ref 4.2–5.8)
RBC # FLD: 16.6 % — HIGH (ref 10.3–14.5)
SODIUM SERPL-SCNC: 141 MMOL/L — SIGNIFICANT CHANGE UP (ref 135–145)
SP GR SPEC: 1.01 — LOW (ref 1.01–1.02)
UROBILINOGEN FLD QL: NEGATIVE — SIGNIFICANT CHANGE UP
WBC # BLD: 6.3 K/UL — SIGNIFICANT CHANGE UP (ref 3.8–10.5)
WBC # FLD AUTO: 6.3 K/UL — SIGNIFICANT CHANGE UP (ref 3.8–10.5)

## 2019-02-06 RX ORDER — TAMSULOSIN HYDROCHLORIDE 0.4 MG/1
0.9 CAPSULE ORAL AT BEDTIME
Qty: 0 | Refills: 0 | Status: DISCONTINUED | OUTPATIENT
Start: 2019-02-06 | End: 2019-02-06

## 2019-02-06 RX ORDER — TAMSULOSIN HYDROCHLORIDE 0.4 MG/1
0.8 CAPSULE ORAL AT BEDTIME
Qty: 0 | Refills: 0 | Status: DISCONTINUED | OUTPATIENT
Start: 2019-02-06 | End: 2019-02-06

## 2019-02-06 RX ORDER — POTASSIUM CHLORIDE 20 MEQ
10 PACKET (EA) ORAL DAILY
Qty: 0 | Refills: 0 | Status: DISCONTINUED | OUTPATIENT
Start: 2019-02-06 | End: 2019-02-07

## 2019-02-06 RX ORDER — TAMSULOSIN HYDROCHLORIDE 0.4 MG/1
0.8 CAPSULE ORAL AT BEDTIME
Qty: 0 | Refills: 0 | Status: DISCONTINUED | OUTPATIENT
Start: 2019-02-06 | End: 2019-02-07

## 2019-02-06 RX ADMIN — TAMSULOSIN HYDROCHLORIDE 0.8 MILLIGRAM(S): 0.4 CAPSULE ORAL at 21:40

## 2019-02-06 RX ADMIN — Medication 81 MILLIGRAM(S): at 12:20

## 2019-02-06 RX ADMIN — Medication 10 MILLIEQUIVALENT(S): at 12:19

## 2019-02-06 RX ADMIN — GABAPENTIN 200 MILLIGRAM(S): 400 CAPSULE ORAL at 13:58

## 2019-02-06 RX ADMIN — ATORVASTATIN CALCIUM 40 MILLIGRAM(S): 80 TABLET, FILM COATED ORAL at 21:40

## 2019-02-06 RX ADMIN — OXYCODONE AND ACETAMINOPHEN 1 TABLET(S): 5; 325 TABLET ORAL at 12:22

## 2019-02-06 RX ADMIN — GABAPENTIN 200 MILLIGRAM(S): 400 CAPSULE ORAL at 05:52

## 2019-02-06 RX ADMIN — SENNA PLUS 2 TABLET(S): 8.6 TABLET ORAL at 21:40

## 2019-02-06 RX ADMIN — OXYCODONE AND ACETAMINOPHEN 1 TABLET(S): 5; 325 TABLET ORAL at 12:52

## 2019-02-06 RX ADMIN — ISOSORBIDE MONONITRATE 30 MILLIGRAM(S): 60 TABLET, EXTENDED RELEASE ORAL at 12:19

## 2019-02-06 RX ADMIN — HEPARIN SODIUM 5000 UNIT(S): 5000 INJECTION INTRAVENOUS; SUBCUTANEOUS at 05:52

## 2019-02-06 RX ADMIN — HEPARIN SODIUM 5000 UNIT(S): 5000 INJECTION INTRAVENOUS; SUBCUTANEOUS at 12:19

## 2019-02-06 RX ADMIN — GABAPENTIN 200 MILLIGRAM(S): 400 CAPSULE ORAL at 21:40

## 2019-02-06 RX ADMIN — FAMOTIDINE 20 MILLIGRAM(S): 10 INJECTION INTRAVENOUS at 12:20

## 2019-02-06 RX ADMIN — Medication 40 MILLIGRAM(S): at 05:51

## 2019-02-06 RX ADMIN — HEPARIN SODIUM 5000 UNIT(S): 5000 INJECTION INTRAVENOUS; SUBCUTANEOUS at 21:40

## 2019-02-06 RX ADMIN — Medication 50 MILLIGRAM(S): at 05:50

## 2019-02-06 NOTE — CONSULT NOTE ADULT - ASSESSMENT
Urinary retention  - continue Flomax, consider increasing to twice daily  - check UA, urine culture  - OOB/ Ambulate  - Bowel regimen  - Recommend patient to go home with munoz, but if patient insists can give another TOV prior to discharge Urinary retention  - continue Flomax, consider increasing to twice daily  - check UA, urine culture  - OOB/ Ambulate  - Bowel regimen  - Recommend patient to go home with munoz, but if patient insists can give another TOV prior to discharge   - To follow up with Dr. James Tom at the [facility] [phone]

## 2019-02-06 NOTE — CHART NOTE - NSCHARTNOTEFT_GEN_A_CORE
Pt was seen and examined.  Briefly this is a elderly male c hx HTN inc irgo CAD sp EVAR  CKD stage 3 and now with RASHAUN following post obstruction and contrast    Suggest  -Increase Flomax to 0.9 and urology called  -Reduce the potassium to 10meq po qd  -If creatinine is up in am then catie suarez          Sayed Simeon  [facility]  [phone]

## 2019-02-06 NOTE — CONSULT NOTE ADULT - SUBJECTIVE AND OBJECTIVE BOX
HPI:  Patient is a 89y Male s/p EVAR referred for urinary retention.  Flores was placed yesterday for 650cc.  He states that prior to admission he voided without having to strain, and denies any hesitancy.  He does have urinary frequency but he states that is because he takes a diuretic.  He denies constipation and had a BM yesterday. He ambulates with a walker.  He has seen a urologist in the past one time at [facility].  Denies fever or chills, hematuria or dysuria, or flank pain.         PAST MEDICAL & SURGICAL HISTORY:  AAA (abdominal aortic aneurysm) without rupture  PAD (peripheral artery disease)  OA (osteoarthritis)  Colon polyp  BPH (benign prostatic hyperplasia)  Cellulitis: BL  CKD (chronic kidney disease) stage 3, GFR 30-59 ml/min  Leg swelling: related to cellulitis of LLE  No significant past medical history  History of coronary artery stent placement  History of colonoscopy  H/O inguinal hernia: repair  S/P hemorrhoidectomy    MEDICATIONS  (STANDING):  aspirin enteric coated 81 milliGRAM(s) Oral daily  atorvastatin 40 milliGRAM(s) Oral at bedtime  famotidine    Tablet 20 milliGRAM(s) Oral daily  furosemide    Tablet 40 milliGRAM(s) Oral daily  gabapentin 200 milliGRAM(s) Oral three times a day  heparin  Injectable 5000 Unit(s) SubCutaneous every 8 hours  isosorbide   mononitrate ER Tablet (IMDUR) 30 milliGRAM(s) Oral daily  metoprolol succinate ER 50 milliGRAM(s) Oral daily  potassium chloride    Tablet ER 20 milliEquivalent(s) Oral daily  senna 2 Tablet(s) Oral at bedtime  tamsulosin 0.4 milliGRAM(s) Oral at bedtime    MEDICATIONS  (PRN):  oxyCODONE    5 mG/acetaminophen 325 mG 1 Tablet(s) Oral every 4 hours PRN Moderate Pain (4 - 6)  oxyCODONE    5 mG/acetaminophen 325 mG 2 Tablet(s) Oral every 6 hours PRN Severe Pain (7 - 10)    FAMILY HISTORY:  No pertinent family history in first degree relatives    Allergies    penicillin (Angioedema)    Intolerances      SOCIAL HISTORY:   Tobacco hx: none       REVIEW OF SYSTEMS: Pertinent positives and negatives as stated in HPI, otherwise negative    Vital signs  T(C): 37.7 (02-06-19 @ 09:14), Max: 37.7 (02-06-19 @ 09:14)  HR: 70 (02-06-19 @ 09:14)  BP: 107/54 (02-06-19 @ 09:14)  SpO2: 97% (02-06-19 @ 09:14)  Wt(kg): --    I&O's Detail    05 Feb 2019 07:01  -  06 Feb 2019 07:00  --------------------------------------------------------  IN:    Oral Fluid: 1050 mL  Total IN: 1050 mL    OUT:    Indwelling Catheter - Urethral: 2050 mL    Voided: 100 mL  Total OUT: 2150 mL    Total NET: -1100 mL          Physical Exam  Gen: NAD  Pulm: No respiratory distress, no subcostal retractions  CV: RRR, no JVD  Abd: Soft, NT, ND  : Circumcised, no lesions.  No discharge or blood at urethral meatus.  Testes descended bilaterally.  Testes and epididymis nontender bilaterally.  Flores- clear yellow urine.    Prostate: smooth, nontender, 3+ in size   MSK: No edema present    LABS:          02-06 @ 06:41    WBC 6.3   / Hct 30.2  / SCr --       02-06 @ 06:40    WBC --    / Hct --    / SCr 2.03     02-06    141  |  108  |  32<H>  ----------------------------<  98  4.8   |  23  |  2.03<H>    Ca    8.3<L>      06 Feb 2019 06:40  Phos  2.8     02-06  Mg     2.0     02-06      PT/INR - ( 04 Feb 2019 13:36 )   PT: 11.7 sec;   INR: 1.02 ratio         PTT - ( 04 Feb 2019 13:36 )  PTT:29.8 sec

## 2019-02-07 VITALS
SYSTOLIC BLOOD PRESSURE: 115 MMHG | OXYGEN SATURATION: 99 % | HEART RATE: 72 BPM | DIASTOLIC BLOOD PRESSURE: 60 MMHG | TEMPERATURE: 98 F | RESPIRATION RATE: 17 BRPM

## 2019-02-07 LAB
ANION GAP SERPL CALC-SCNC: 12 MMOL/L — SIGNIFICANT CHANGE UP (ref 5–17)
BUN SERPL-MCNC: 34 MG/DL — HIGH (ref 7–23)
CALCIUM SERPL-MCNC: 8.5 MG/DL — SIGNIFICANT CHANGE UP (ref 8.4–10.5)
CHLORIDE SERPL-SCNC: 105 MMOL/L — SIGNIFICANT CHANGE UP (ref 96–108)
CO2 SERPL-SCNC: 24 MMOL/L — SIGNIFICANT CHANGE UP (ref 22–31)
CREAT SERPL-MCNC: 2.22 MG/DL — HIGH (ref 0.5–1.3)
GLUCOSE SERPL-MCNC: 116 MG/DL — HIGH (ref 70–99)
HCT VFR BLD CALC: 28.4 % — LOW (ref 39–50)
HGB BLD-MCNC: 9.8 G/DL — LOW (ref 13–17)
MAGNESIUM SERPL-MCNC: 1.9 MG/DL — SIGNIFICANT CHANGE UP (ref 1.6–2.6)
MCHC RBC-ENTMCNC: 31.3 PG — SIGNIFICANT CHANGE UP (ref 27–34)
MCHC RBC-ENTMCNC: 34.4 GM/DL — SIGNIFICANT CHANGE UP (ref 32–36)
MCV RBC AUTO: 91 FL — SIGNIFICANT CHANGE UP (ref 80–100)
PHOSPHATE SERPL-MCNC: 3.7 MG/DL — SIGNIFICANT CHANGE UP (ref 2.5–4.5)
PLATELET # BLD AUTO: 138 K/UL — LOW (ref 150–400)
POTASSIUM SERPL-MCNC: 4.3 MMOL/L — SIGNIFICANT CHANGE UP (ref 3.5–5.3)
POTASSIUM SERPL-SCNC: 4.3 MMOL/L — SIGNIFICANT CHANGE UP (ref 3.5–5.3)
RBC # BLD: 3.13 M/UL — LOW (ref 4.2–5.8)
RBC # FLD: 16 % — HIGH (ref 10.3–14.5)
SODIUM SERPL-SCNC: 141 MMOL/L — SIGNIFICANT CHANGE UP (ref 135–145)
WBC # BLD: 6 K/UL — SIGNIFICANT CHANGE UP (ref 3.8–10.5)
WBC # FLD AUTO: 6 K/UL — SIGNIFICANT CHANGE UP (ref 3.8–10.5)

## 2019-02-07 PROCEDURE — 76000 FLUOROSCOPY <1 HR PHYS/QHP: CPT

## 2019-02-07 PROCEDURE — C1887: CPT

## 2019-02-07 PROCEDURE — 82803 BLOOD GASES ANY COMBINATION: CPT

## 2019-02-07 PROCEDURE — 85730 THROMBOPLASTIN TIME PARTIAL: CPT

## 2019-02-07 PROCEDURE — 82435 ASSAY OF BLOOD CHLORIDE: CPT

## 2019-02-07 PROCEDURE — 84132 ASSAY OF SERUM POTASSIUM: CPT

## 2019-02-07 PROCEDURE — 85610 PROTHROMBIN TIME: CPT

## 2019-02-07 PROCEDURE — 86923 COMPATIBILITY TEST ELECTRIC: CPT

## 2019-02-07 PROCEDURE — 81001 URINALYSIS AUTO W/SCOPE: CPT

## 2019-02-07 PROCEDURE — C1760: CPT

## 2019-02-07 PROCEDURE — 82947 ASSAY GLUCOSE BLOOD QUANT: CPT

## 2019-02-07 PROCEDURE — 84100 ASSAY OF PHOSPHORUS: CPT

## 2019-02-07 PROCEDURE — 80048 BASIC METABOLIC PNL TOTAL CA: CPT

## 2019-02-07 PROCEDURE — C1889: CPT

## 2019-02-07 PROCEDURE — 36430 TRANSFUSION BLD/BLD COMPNT: CPT

## 2019-02-07 PROCEDURE — 83605 ASSAY OF LACTIC ACID: CPT

## 2019-02-07 PROCEDURE — 83735 ASSAY OF MAGNESIUM: CPT

## 2019-02-07 PROCEDURE — C1725: CPT

## 2019-02-07 PROCEDURE — 82565 ASSAY OF CREATININE: CPT

## 2019-02-07 PROCEDURE — 85014 HEMATOCRIT: CPT

## 2019-02-07 PROCEDURE — 97161 PT EVAL LOW COMPLEX 20 MIN: CPT

## 2019-02-07 PROCEDURE — C1894: CPT

## 2019-02-07 PROCEDURE — 84295 ASSAY OF SERUM SODIUM: CPT

## 2019-02-07 PROCEDURE — 85027 COMPLETE CBC AUTOMATED: CPT

## 2019-02-07 PROCEDURE — C1769: CPT

## 2019-02-07 PROCEDURE — 82330 ASSAY OF CALCIUM: CPT

## 2019-02-07 PROCEDURE — C1874: CPT

## 2019-02-07 PROCEDURE — P9016: CPT

## 2019-02-07 RX ORDER — POTASSIUM CHLORIDE 20 MEQ
1 PACKET (EA) ORAL
Qty: 13 | Refills: 0
Start: 2019-02-07 | End: 2019-03-08

## 2019-02-07 RX ORDER — FUROSEMIDE 40 MG
1 TABLET ORAL
Qty: 0 | Refills: 0 | DISCHARGE
Start: 2019-02-07

## 2019-02-07 RX ORDER — TAMSULOSIN HYDROCHLORIDE 0.4 MG/1
2 CAPSULE ORAL
Qty: 0 | Refills: 0 | DISCHARGE
Start: 2019-02-07

## 2019-02-07 RX ORDER — GABAPENTIN 400 MG/1
1 CAPSULE ORAL
Qty: 0 | Refills: 0 | COMMUNITY

## 2019-02-07 RX ORDER — POTASSIUM CHLORIDE 20 MEQ
1 PACKET (EA) ORAL
Qty: 14 | Refills: 0 | OUTPATIENT
Start: 2019-02-07 | End: 2019-02-20

## 2019-02-07 RX ORDER — GABAPENTIN 400 MG/1
2 CAPSULE ORAL
Qty: 180 | Refills: 0
Start: 2019-02-07 | End: 2019-03-08

## 2019-02-07 RX ADMIN — FAMOTIDINE 20 MILLIGRAM(S): 10 INJECTION INTRAVENOUS at 15:25

## 2019-02-07 RX ADMIN — Medication 10 MILLIEQUIVALENT(S): at 15:25

## 2019-02-07 RX ADMIN — GABAPENTIN 200 MILLIGRAM(S): 400 CAPSULE ORAL at 15:24

## 2019-02-07 RX ADMIN — GABAPENTIN 200 MILLIGRAM(S): 400 CAPSULE ORAL at 05:26

## 2019-02-07 RX ADMIN — ISOSORBIDE MONONITRATE 30 MILLIGRAM(S): 60 TABLET, EXTENDED RELEASE ORAL at 15:25

## 2019-02-07 RX ADMIN — Medication 40 MILLIGRAM(S): at 05:26

## 2019-02-07 RX ADMIN — HEPARIN SODIUM 5000 UNIT(S): 5000 INJECTION INTRAVENOUS; SUBCUTANEOUS at 05:27

## 2019-02-07 RX ADMIN — Medication 50 MILLIGRAM(S): at 05:26

## 2019-02-07 RX ADMIN — Medication 81 MILLIGRAM(S): at 15:25

## 2019-02-07 RX ADMIN — HEPARIN SODIUM 5000 UNIT(S): 5000 INJECTION INTRAVENOUS; SUBCUTANEOUS at 15:25

## 2019-02-07 NOTE — PROGRESS NOTE ADULT - SUBJECTIVE AND OBJECTIVE BOX
SURGERY DAILY PROGRESS NOTE:       SUBJECTIVE/ROS: Patient examined at bedside. No acute events overnight. Tolerated CLD well. -160 range. Denies CP/SOB.          MEDICATIONS  (STANDING):  aspirin enteric coated 81 milliGRAM(s) Oral daily  atorvastatin 40 milliGRAM(s) Oral at bedtime  clopidogrel Tablet 75 milliGRAM(s) Oral daily  famotidine    Tablet 20 milliGRAM(s) Oral daily  furosemide    Tablet 40 milliGRAM(s) Oral daily  gabapentin 300 milliGRAM(s) Oral two times a day  heparin  Injectable 5000 Unit(s) SubCutaneous every 8 hours  isosorbide   mononitrate ER Tablet (IMDUR) 30 milliGRAM(s) Oral daily  metoprolol succinate ER 50 milliGRAM(s) Oral daily  potassium chloride    Tablet ER 20 milliEquivalent(s) Oral daily  senna 2 Tablet(s) Oral at bedtime  tamsulosin 0.4 milliGRAM(s) Oral at bedtime  vancomycin  IVPB 1250 milliGRAM(s) IV Intermittent once    MEDICATIONS  (PRN):  oxyCODONE    5 mG/acetaminophen 325 mG 1 Tablet(s) Oral every 4 hours PRN Moderate Pain (4 - 6)  oxyCODONE    5 mG/acetaminophen 325 mG 2 Tablet(s) Oral every 6 hours PRN Severe Pain (7 - 10)      OBJECTIVE:    Vital Signs Last 24 Hrs  T(C): 36.6 (05 Feb 2019 09:08), Max: 36.8 (04 Feb 2019 23:42)  T(F): 97.9 (05 Feb 2019 09:08), Max: 98.2 (04 Feb 2019 23:42)  HR: 66 (05 Feb 2019 09:41) (57 - 77)  BP: 133/59 (05 Feb 2019 09:41) (127/69 - 167/72)  BP(mean): 104 (04 Feb 2019 19:00) (104 - 104)  RR: 18 (05 Feb 2019 09:08) (15 - 18)  SpO2: 98% (05 Feb 2019 09:41) (96% - 100%)        I&O's Detail    04 Feb 2019 07:01  -  05 Feb 2019 07:00  --------------------------------------------------------  IN:    Oral Fluid: 240 mL    sodium chloride 0.9%: 1025 mL    sodium chloride 0.9% with potassium chloride 20 mEq/L: 600 mL  Total IN: 1865 mL    OUT:    Indwelling Catheter - Urethral: 1115 mL  Total OUT: 1115 mL    Total NET: 750 mL      05 Feb 2019 07:01  -  05 Feb 2019 09:47  --------------------------------------------------------  IN:  Total IN: 0 mL    OUT:    Indwelling Catheter - Urethral: 850 mL  Total OUT: 850 mL    Total NET: -850 mL          Daily     Daily     LABS:                        10.3   6.8   )-----------( 170      ( 05 Feb 2019 07:04 )             31.1     02-05    142  |  109<H>  |  26<H>  ----------------------------<  125<H>  4.8   |  24  |  1.49<H>    Ca    8.3<L>      05 Feb 2019 07:04      PT/INR - ( 04 Feb 2019 13:36 )   PT: 11.7 sec;   INR: 1.02 ratio         PTT - ( 04 Feb 2019 13:36 )  PTT:29.8 sec              PHYSICAL EXAM:  Constitutional: well developed, well nourished, NAD, [place]  Respiratory: No increased WOB, saturating well on RA  Abdomen: soft, non-tender, non distended  Extremities: RLE: R. groin with dressing in place c/d/i, soft, no hematoma.  +dopplerable dp/pt pulses foot warm +motor + sensation  LLE: L. groin with dressing in place c/d/i, soft, no hematoma.  +dopplerable dp/pt pulses foot warm +motor + sensation
[facility] [phone]        Patient seen and examined in bed.  Still with the munoz         MEDICATIONS  (STANDING):  aspirin enteric coated 81 milliGRAM(s) Oral daily  atorvastatin 40 milliGRAM(s) Oral at bedtime  famotidine    Tablet 20 milliGRAM(s) Oral daily  furosemide    Tablet 40 milliGRAM(s) Oral daily  gabapentin 200 milliGRAM(s) Oral three times a day  heparin  Injectable 5000 Unit(s) SubCutaneous every 8 hours  isosorbide   mononitrate ER Tablet (IMDUR) 30 milliGRAM(s) Oral daily  metoprolol succinate ER 50 milliGRAM(s) Oral daily  potassium chloride    Tablet ER 10 milliEquivalent(s) Oral daily  senna 2 Tablet(s) Oral at bedtime  tamsulosin 0.8 milliGRAM(s) Oral at bedtime      VITAL:  T(C): , Max: 37.4 (19 @ 01:16)  T(F): , Max: 99.4 (19 @ 01:16)  HR: 78 (19 @ 05:12)  BP: 132/64 (19 @ 05:12)  BP(mean): --  RR: 18 (19 @ 05:12)  SpO2: 99% (19 @ 05:12)  Wt(kg): --    I and O's:     @ 07:01  -   @ 07:00  --------------------------------------------------------  IN: 1585 mL / OUT: 2100 mL / NET: -515 mL          PHYSICAL EXAM:    Constitutional: NAD  Neck:  No JVD  Respiratory: CTAB/L  Cardiovascular: S1 and S2  Gastrointestinal: BS+, soft, NT/ND  Extremities: No peripheral edema  Neurological: A/O x 3, no focal deficits  Psychiatric: Normal mood, normal affect  : +  Munoz  Skin: No rashes  Access: Not applicable    LABS:                        9.8    6.0   )-----------( 138      ( 2019 05:51 )             28.4         141  |  105  |  34<H>  ----------------------------<  116<H>  4.3   |  24  |  2.22<H>    Ca    8.5      2019 05:51  Phos  3.7     02-07  Mg     1.9     02-07            Urine Studies:  Urinalysis Basic - ( 2019 12:41 )    Color: Light Yellow / Appearance: Clear / S.009 / pH: x  Gluc: x / Ketone: Negative  / Bili: Negative / Urobili: Negative   Blood: x / Protein: Negative / Nitrite: Negative   Leuk Esterase: Moderate / RBC: 10 /hpf / WBC 10 /HPF   Sq Epi: x / Non Sq Epi: 2 / Bacteria: Negative            RADIOLOGY & ADDITIONAL STUDIES:
PO lee 2  Doing well except  for  failed munoz removal   Plan  Urology evaluation  for  follow  up  Right  groin  incision  clean  Good  distal pedal signals   Most likely can go  to  rehab or  home  based on PT evaluation
Right  groin  incision is  clen  No hematoma  or infection  Still has  a munoz most likely to go home  with  leg bag  He can go home  with Home  PT  Creatinine  stable
SURGERY DAILY PROGRESS NOTE:       SUBJECTIVE/ROS: Patient examined at bedside. No acute events overnight. Failed TOV, required munoz insertion. Reports R inguinal pain. Didn't ambulate much yesterday. Per PT, can go home with home PT.          MEDICATIONS  (STANDING):  aspirin enteric coated 81 milliGRAM(s) Oral daily  atorvastatin 40 milliGRAM(s) Oral at bedtime  famotidine    Tablet 20 milliGRAM(s) Oral daily  furosemide    Tablet 40 milliGRAM(s) Oral daily  gabapentin 200 milliGRAM(s) Oral three times a day  heparin  Injectable 5000 Unit(s) SubCutaneous every 8 hours  isosorbide   mononitrate ER Tablet (IMDUR) 30 milliGRAM(s) Oral daily  metoprolol succinate ER 50 milliGRAM(s) Oral daily  potassium chloride    Tablet ER 20 milliEquivalent(s) Oral daily  senna 2 Tablet(s) Oral at bedtime  tamsulosin 0.4 milliGRAM(s) Oral at bedtime    MEDICATIONS  (PRN):  oxyCODONE    5 mG/acetaminophen 325 mG 1 Tablet(s) Oral every 4 hours PRN Moderate Pain (4 - 6)  oxyCODONE    5 mG/acetaminophen 325 mG 2 Tablet(s) Oral every 6 hours PRN Severe Pain (7 - 10)      OBJECTIVE:    Vital Signs Last 24 Hrs  T(C): 37.1 (2019 05:04), Max: 37.2 (2019 16:46)  T(F): 98.8 (2019 05:04), Max: 98.9 (2019 16:46)  HR: 71 (2019 05:04) (62 - 71)  BP: 155/73 (2019 05:04) (112/60 - 155/73)  BP(mean): --  RR: 17 (2019 05:04) (17 - 18)  SpO2: 97% (2019 05:04) (97% - 100%)        I&O's Detail    2019 07:01  -  2019 07:00  --------------------------------------------------------  IN:    Oral Fluid: 1050 mL  Total IN: 1050 mL    OUT:    Indwelling Catheter - Urethral: 2050 mL    Voided: 100 mL  Total OUT: 2150 mL    Total NET: -1100 mL          Daily     Daily Weight in k.8 (2019 10:43)    LABS:                        10.0   6.3   )-----------( 148      ( 2019 06:41 )             30.2     02-06    141  |  108  |  32<H>  ----------------------------<  98  4.8   |  23  |  2.03<H>    Ca    8.3<L>      2019 06:40  Phos  2.8     02-  Mg     2.0     02-06      PT/INR - ( 2019 13:36 )   PT: 11.7 sec;   INR: 1.02 ratio         PTT - ( 2019 13:36 )  PTT:29.8 sec      PHYSICAL EXAM:  Constitutional: well developed, well nourished, NAD, [place]  Respiratory: No increased WOB, saturating well on RA  Abdomen: soft, non-tender, non distended  Extremities: RLE: R. groin incision w/ steristrips c/d/i, soft, no hematoma.  +dopplerable dp/pt pulses foot warm +motor + sensation  LLE: L. groin access site - c/d/i, soft, no hematoma.  +dopplerable dp/pt pulses foot warm +motor + sensation
SURGERY DAILY PROGRESS NOTE:       SUBJECTIVE/ROS: Patient examined at bedside. No acute events overnight. Failed TOV, required munoz insertion. Reports R inguinal pain. Didn't ambulate much yesterday. Per PT, can go home with home PT.          Vital Signs Last 24 Hrs  T(C): 37.1 (2019 10:21), Max: 37.4 (2019 01:16)  T(F): 98.7 (2019 10:21), Max: 99.4 (2019 01:16)  HR: 74 (2019 10:) (70 - 79)  BP: 110/66 (2019 10:) (95/48 - 136/70)  BP(mean): --  RR: 18 (2019 10:21) (18 - 18)  SpO2: 100% (2019 10:21) (97% - 100%)    I&O's Detail    2019 07:01  -  2019 07:00  --------------------------------------------------------  IN:    Oral Fluid: 1585 mL  Total IN: 1585 mL    OUT:    Indwelling Catheter - Urethral: 2100 mL  Total OUT: 2100 mL    Total NET: -515 mL      2019 07:01  -  2019 12:58  --------------------------------------------------------  IN:    Oral Fluid: 240 mL  Total IN: 240 mL    OUT:  Total OUT: 0 mL    Total NET: 240 mL          MEDICATIONS  (STANDING):  aspirin enteric coated 81 milliGRAM(s) Oral daily  atorvastatin 40 milliGRAM(s) Oral at bedtime  famotidine    Tablet 20 milliGRAM(s) Oral daily  furosemide    Tablet 40 milliGRAM(s) Oral daily  gabapentin 200 milliGRAM(s) Oral three times a day  heparin  Injectable 5000 Unit(s) SubCutaneous every 8 hours  isosorbide   mononitrate ER Tablet (IMDUR) 30 milliGRAM(s) Oral daily  metoprolol succinate ER 50 milliGRAM(s) Oral daily  potassium chloride    Tablet ER 10 milliEquivalent(s) Oral daily  senna 2 Tablet(s) Oral at bedtime  tamsulosin 0.8 milliGRAM(s) Oral at bedtime    MEDICATIONS  (PRN):  oxyCODONE    5 mG/acetaminophen 325 mG 1 Tablet(s) Oral every 4 hours PRN Moderate Pain (4 - 6)  oxyCODONE    5 mG/acetaminophen 325 mG 2 Tablet(s) Oral every 6 hours PRN Severe Pain (7 - 10)      LABS:                        9.8    6.0   )-----------( 138      ( 2019 05:51 )             28.4     02-    141  |  105  |  34<H>  ----------------------------<  116<H>  4.3   |  24  |  2.22<H>    Ca    8.5      2019 05:51  Phos  3.7     -  Mg     1.9     -        Urinalysis Basic - ( 2019 12:41 )    Color: Light Yellow / Appearance: Clear / S.009 / pH: x  Gluc: x / Ketone: Negative  / Bili: Negative / Urobili: Negative   Blood: x / Protein: Negative / Nitrite: Negative   Leuk Esterase: Moderate / RBC: 10 /hpf / WBC 10 /HPF   Sq Epi: x / Non Sq Epi: 2 / Bacteria: Negative                PHYSICAL EXAM:  Constitutional: well developed, well nourished, NAD, [place]  Respiratory: No increased WOB, saturating well on RA  Abdomen: soft, non-tender, non distended  Extremities: RLE: R. groin incision w/ steristrips c/d/i, soft, no hematoma.  +dopplerable dp/pt pulses foot warm +motor + sensation  LLE: L. groin access site - c/d/i, soft, no hematoma.  +dopplerable dp/pt pulses foot warm +motor + sensation

## 2019-02-07 NOTE — PROGRESS NOTE ADULT - ASSESSMENT
ASSESSMENT/PLAN:    - D/c Flores  - Cardiac diet  - D/c IVF  - Restart Plavix, c/w ASA, SQH  - Monitor UOP  - f/u AM labs  - PT consult   - Routine neurovascular checks      Vascular [id number]
ASSESSMENT/PLAN:    - C/w Flores  - f/u AM labs  - Routine neurovascular checks  - Dispo: home w/ home PT when meets criteria.
ASSESSMENT/PLAN:    - Urology c/s for urinary retention   - C/w Flores  - Cardiac diet  - Hold Plavix, c/w ASA, SQH  - f/u AM labs  - Routine neurovascular checks  - Dispo: home w/ home PT when meets criteria.       Vascular [id number]
This is an 89-year-old gentleman with the past medical history of hypertension, coronary artery disease, benign prostatic hypertrophy, chronic kidney disease stage III presents status post endovascular aneurysm repair.         1.	Genitourinary.   Flomax to 0.8.  DC with leg bag and follow up with Urology.  2.	Renal.  Cont Lasix and lowered potassium dose.  3.	Surgery.  Pain control.  Monitor for constipation.

## 2019-02-08 DIAGNOSIS — I71.4 ABDOMINAL AORTIC ANEURYSM, WITHOUT RUPTURE: ICD-10-CM

## 2019-02-08 PROBLEM — M19.90 UNSPECIFIED OSTEOARTHRITIS, UNSPECIFIED SITE: Chronic | Status: ACTIVE | Noted: 2019-01-25

## 2019-02-08 PROBLEM — I73.9 PERIPHERAL VASCULAR DISEASE, UNSPECIFIED: Chronic | Status: ACTIVE | Noted: 2019-01-25

## 2019-02-13 ENCOUNTER — TRANSCRIPTION ENCOUNTER (OUTPATIENT)
Age: 84
End: 2019-02-13

## 2019-02-13 ENCOUNTER — INPATIENT (INPATIENT)
Facility: HOSPITAL | Age: 84
LOS: 8 days | Discharge: NURSING, REHAB OR LONG-TERM CARE | DRG: 378 | End: 2019-02-22
Attending: INTERNAL MEDICINE | Admitting: INTERNAL MEDICINE
Payer: MEDICARE

## 2019-02-13 VITALS
OXYGEN SATURATION: 99 % | HEIGHT: 69 IN | WEIGHT: 194.01 LBS | TEMPERATURE: 98 F | SYSTOLIC BLOOD PRESSURE: 90 MMHG | DIASTOLIC BLOOD PRESSURE: 50 MMHG | HEART RATE: 85 BPM

## 2019-02-13 DIAGNOSIS — Z95.5 PRESENCE OF CORONARY ANGIOPLASTY IMPLANT AND GRAFT: Chronic | ICD-10-CM

## 2019-02-13 DIAGNOSIS — Z98.890 OTHER SPECIFIED POSTPROCEDURAL STATES: Chronic | ICD-10-CM

## 2019-02-13 DIAGNOSIS — Z87.19 PERSONAL HISTORY OF OTHER DISEASES OF THE DIGESTIVE SYSTEM: Chronic | ICD-10-CM

## 2019-02-13 DIAGNOSIS — Z98.89 OTHER SPECIFIED POSTPROCEDURAL STATES: Chronic | ICD-10-CM

## 2019-02-13 DIAGNOSIS — K92.2 GASTROINTESTINAL HEMORRHAGE, UNSPECIFIED: ICD-10-CM

## 2019-02-13 LAB
ALBUMIN SERPL ELPH-MCNC: 3.4 G/DL — SIGNIFICANT CHANGE UP (ref 3.3–5)
ALP SERPL-CCNC: 77 U/L — SIGNIFICANT CHANGE UP (ref 40–120)
ALT FLD-CCNC: 16 U/L — SIGNIFICANT CHANGE UP (ref 10–45)
ANION GAP SERPL CALC-SCNC: 13 MMOL/L — SIGNIFICANT CHANGE UP (ref 5–17)
APPEARANCE UR: ABNORMAL
APTT BLD: 23.7 SEC — LOW (ref 27.5–36.3)
AST SERPL-CCNC: 19 U/L — SIGNIFICANT CHANGE UP (ref 10–40)
BACTERIA # UR AUTO: ABNORMAL
BASE EXCESS BLDV CALC-SCNC: 4.2 MMOL/L — HIGH (ref -2–2)
BASOPHILS # BLD AUTO: 0 K/UL — SIGNIFICANT CHANGE UP (ref 0–0.2)
BASOPHILS NFR BLD AUTO: 0.6 % — SIGNIFICANT CHANGE UP (ref 0–2)
BILIRUB SERPL-MCNC: 0.3 MG/DL — SIGNIFICANT CHANGE UP (ref 0.2–1.2)
BILIRUB UR-MCNC: NEGATIVE — SIGNIFICANT CHANGE UP
BUN SERPL-MCNC: 30 MG/DL — HIGH (ref 7–23)
CA-I SERPL-SCNC: 1.19 MMOL/L — SIGNIFICANT CHANGE UP (ref 1.12–1.3)
CALCIUM SERPL-MCNC: 8.5 MG/DL — SIGNIFICANT CHANGE UP (ref 8.4–10.5)
CHLORIDE BLDV-SCNC: 102 MMOL/L — SIGNIFICANT CHANGE UP (ref 96–108)
CHLORIDE SERPL-SCNC: 102 MMOL/L — SIGNIFICANT CHANGE UP (ref 96–108)
CO2 BLDV-SCNC: 32 MMOL/L — HIGH (ref 22–30)
CO2 SERPL-SCNC: 28 MMOL/L — SIGNIFICANT CHANGE UP (ref 22–31)
COLOR SPEC: YELLOW — SIGNIFICANT CHANGE UP
CREAT SERPL-MCNC: 1.89 MG/DL — HIGH (ref 0.5–1.3)
DIFF PNL FLD: ABNORMAL
EOSINOPHIL # BLD AUTO: 0.2 K/UL — SIGNIFICANT CHANGE UP (ref 0–0.5)
EOSINOPHIL NFR BLD AUTO: 2.4 % — SIGNIFICANT CHANGE UP (ref 0–6)
EPI CELLS # UR: 0 /HPF — SIGNIFICANT CHANGE UP
GAS PNL BLDV: 143 MMOL/L — SIGNIFICANT CHANGE UP (ref 136–145)
GAS PNL BLDV: SIGNIFICANT CHANGE UP
GAS PNL BLDV: SIGNIFICANT CHANGE UP
GLUCOSE BLDV-MCNC: 113 MG/DL — HIGH (ref 70–99)
GLUCOSE SERPL-MCNC: 120 MG/DL — HIGH (ref 70–99)
GLUCOSE UR QL: NEGATIVE — SIGNIFICANT CHANGE UP
HCO3 BLDV-SCNC: 30 MMOL/L — HIGH (ref 21–29)
HCT VFR BLD CALC: 25.9 % — LOW (ref 39–50)
HCT VFR BLD CALC: 26.3 % — LOW (ref 39–50)
HCT VFR BLDA CALC: 29 % — LOW (ref 39–50)
HGB BLD CALC-MCNC: 9.5 G/DL — LOW (ref 13–17)
HGB BLD-MCNC: 8.6 G/DL — LOW (ref 13–17)
HGB BLD-MCNC: 9 G/DL — LOW (ref 13–17)
HYALINE CASTS # UR AUTO: 1 /LPF — SIGNIFICANT CHANGE UP (ref 0–2)
INR BLD: 1.05 RATIO — SIGNIFICANT CHANGE UP (ref 0.88–1.16)
KETONES UR-MCNC: NEGATIVE — SIGNIFICANT CHANGE UP
LACTATE BLDV-MCNC: 1.7 MMOL/L — SIGNIFICANT CHANGE UP (ref 0.7–2)
LEUKOCYTE ESTERASE UR-ACNC: ABNORMAL
LYMPHOCYTES # BLD AUTO: 0.8 K/UL — LOW (ref 1–3.3)
LYMPHOCYTES # BLD AUTO: 11 % — LOW (ref 13–44)
MCHC RBC-ENTMCNC: 30.1 PG — SIGNIFICANT CHANGE UP (ref 27–34)
MCHC RBC-ENTMCNC: 30.5 PG — SIGNIFICANT CHANGE UP (ref 27–34)
MCHC RBC-ENTMCNC: 33.3 GM/DL — SIGNIFICANT CHANGE UP (ref 32–36)
MCHC RBC-ENTMCNC: 34.1 GM/DL — SIGNIFICANT CHANGE UP (ref 32–36)
MCV RBC AUTO: 89.6 FL — SIGNIFICANT CHANGE UP (ref 80–100)
MCV RBC AUTO: 90.3 FL — SIGNIFICANT CHANGE UP (ref 80–100)
MONOCYTES # BLD AUTO: 0.5 K/UL — SIGNIFICANT CHANGE UP (ref 0–0.9)
MONOCYTES NFR BLD AUTO: 7 % — SIGNIFICANT CHANGE UP (ref 2–14)
NEUTROPHILS # BLD AUTO: 5.4 K/UL — SIGNIFICANT CHANGE UP (ref 1.8–7.4)
NEUTROPHILS NFR BLD AUTO: 79.1 % — HIGH (ref 43–77)
NITRITE UR-MCNC: NEGATIVE — SIGNIFICANT CHANGE UP
NT-PROBNP SERPL-SCNC: 1820 PG/ML — HIGH (ref 0–300)
OB PNL STL: POSITIVE
PCO2 BLDV: 57 MMHG — HIGH (ref 35–50)
PH BLDV: 7.34 — LOW (ref 7.35–7.45)
PH UR: 6 — SIGNIFICANT CHANGE UP (ref 5–8)
PLATELET # BLD AUTO: 262 K/UL — SIGNIFICANT CHANGE UP (ref 150–400)
PLATELET # BLD AUTO: 286 K/UL — SIGNIFICANT CHANGE UP (ref 150–400)
PO2 BLDV: <20 MMHG — LOW (ref 25–45)
POTASSIUM BLDV-SCNC: 4.2 MMOL/L — SIGNIFICANT CHANGE UP (ref 3.5–5.3)
POTASSIUM SERPL-MCNC: 4.5 MMOL/L — SIGNIFICANT CHANGE UP (ref 3.5–5.3)
POTASSIUM SERPL-SCNC: 4.5 MMOL/L — SIGNIFICANT CHANGE UP (ref 3.5–5.3)
PROT SERPL-MCNC: 6.4 G/DL — SIGNIFICANT CHANGE UP (ref 6–8.3)
PROT UR-MCNC: 100 — SIGNIFICANT CHANGE UP
PROTHROM AB SERPL-ACNC: 12 SEC — SIGNIFICANT CHANGE UP (ref 10–12.9)
RBC # BLD: 2.87 M/UL — LOW (ref 4.2–5.8)
RBC # BLD: 2.94 M/UL — LOW (ref 4.2–5.8)
RBC # FLD: 14.6 % — HIGH (ref 10.3–14.5)
RBC # FLD: 14.7 % — HIGH (ref 10.3–14.5)
RBC CASTS # UR COMP ASSIST: 39 /HPF — HIGH (ref 0–4)
SAO2 % BLDV: 16 % — LOW (ref 67–88)
SODIUM SERPL-SCNC: 143 MMOL/L — SIGNIFICANT CHANGE UP (ref 135–145)
SP GR SPEC: 1.02 — SIGNIFICANT CHANGE UP (ref 1.01–1.02)
TROPONIN T, HIGH SENSITIVITY RESULT: 54 NG/L — HIGH (ref 0–51)
TROPONIN T, HIGH SENSITIVITY RESULT: 59 NG/L — HIGH (ref 0–51)
UROBILINOGEN FLD QL: NEGATIVE — SIGNIFICANT CHANGE UP
WBC # BLD: 6.4 K/UL — SIGNIFICANT CHANGE UP (ref 3.8–10.5)
WBC # BLD: 6.8 K/UL — SIGNIFICANT CHANGE UP (ref 3.8–10.5)
WBC # FLD AUTO: 6.4 K/UL — SIGNIFICANT CHANGE UP (ref 3.8–10.5)
WBC # FLD AUTO: 6.8 K/UL — SIGNIFICANT CHANGE UP (ref 3.8–10.5)
WBC UR QL: 233 /HPF — HIGH (ref 0–5)

## 2019-02-13 PROCEDURE — 74176 CT ABD & PELVIS W/O CONTRAST: CPT | Mod: 26

## 2019-02-13 PROCEDURE — 70450 CT HEAD/BRAIN W/O DYE: CPT | Mod: 26

## 2019-02-13 PROCEDURE — 73552 X-RAY EXAM OF FEMUR 2/>: CPT | Mod: 26,LT

## 2019-02-13 PROCEDURE — 73502 X-RAY EXAM HIP UNI 2-3 VIEWS: CPT | Mod: 26,LT

## 2019-02-13 PROCEDURE — 71045 X-RAY EXAM CHEST 1 VIEW: CPT | Mod: 26

## 2019-02-13 PROCEDURE — 99285 EMERGENCY DEPT VISIT HI MDM: CPT

## 2019-02-13 PROCEDURE — 72125 CT NECK SPINE W/O DYE: CPT | Mod: 26

## 2019-02-13 RX ORDER — ATORVASTATIN CALCIUM 80 MG/1
40 TABLET, FILM COATED ORAL AT BEDTIME
Refills: 0 | Status: DISCONTINUED | OUTPATIENT
Start: 2019-02-13 | End: 2019-02-22

## 2019-02-13 RX ORDER — HEPARIN SODIUM 5000 [USP'U]/ML
5000 INJECTION INTRAVENOUS; SUBCUTANEOUS EVERY 12 HOURS
Refills: 0 | Status: DISCONTINUED | OUTPATIENT
Start: 2019-02-13 | End: 2019-02-13

## 2019-02-13 RX ORDER — TAMSULOSIN HYDROCHLORIDE 0.4 MG/1
0.4 CAPSULE ORAL AT BEDTIME
Refills: 0 | Status: DISCONTINUED | OUTPATIENT
Start: 2019-02-13 | End: 2019-02-22

## 2019-02-13 RX ORDER — CLOPIDOGREL BISULFATE 75 MG/1
75 TABLET, FILM COATED ORAL DAILY
Refills: 0 | Status: DISCONTINUED | OUTPATIENT
Start: 2019-02-13 | End: 2019-02-13

## 2019-02-13 RX ORDER — ASPIRIN/CALCIUM CARB/MAGNESIUM 324 MG
81 TABLET ORAL DAILY
Refills: 0 | Status: DISCONTINUED | OUTPATIENT
Start: 2019-02-13 | End: 2019-02-13

## 2019-02-13 RX ORDER — PANTOPRAZOLE SODIUM 20 MG/1
40 TABLET, DELAYED RELEASE ORAL
Refills: 0 | Status: DISCONTINUED | OUTPATIENT
Start: 2019-02-13 | End: 2019-02-22

## 2019-02-13 RX ORDER — METOPROLOL TARTRATE 50 MG
50 TABLET ORAL DAILY
Refills: 0 | Status: DISCONTINUED | OUTPATIENT
Start: 2019-02-13 | End: 2019-02-22

## 2019-02-13 RX ORDER — FUROSEMIDE 40 MG
20 TABLET ORAL DAILY
Refills: 0 | Status: DISCONTINUED | OUTPATIENT
Start: 2019-02-13 | End: 2019-02-19

## 2019-02-13 RX ORDER — DOCUSATE SODIUM 100 MG
100 CAPSULE ORAL THREE TIMES A DAY
Refills: 0 | Status: DISCONTINUED | OUTPATIENT
Start: 2019-02-13 | End: 2019-02-22

## 2019-02-13 RX ORDER — FAMOTIDINE 10 MG/ML
20 INJECTION INTRAVENOUS DAILY
Refills: 0 | Status: DISCONTINUED | OUTPATIENT
Start: 2019-02-13 | End: 2019-02-13

## 2019-02-13 RX ORDER — SODIUM CHLORIDE 9 MG/ML
1000 INJECTION INTRAMUSCULAR; INTRAVENOUS; SUBCUTANEOUS ONCE
Refills: 0 | Status: COMPLETED | OUTPATIENT
Start: 2019-02-13 | End: 2019-02-13

## 2019-02-13 RX ADMIN — Medication 100 MILLIGRAM(S): at 23:50

## 2019-02-13 RX ADMIN — TAMSULOSIN HYDROCHLORIDE 0.4 MILLIGRAM(S): 0.4 CAPSULE ORAL at 23:50

## 2019-02-13 RX ADMIN — SODIUM CHLORIDE 1000 MILLILITER(S): 9 INJECTION INTRAMUSCULAR; INTRAVENOUS; SUBCUTANEOUS at 18:06

## 2019-02-13 NOTE — CHART NOTE - NSCHARTNOTEFT_GEN_A_CORE
pt noted with H/H of 8.6/25.9. Previous H/H 9.0/26.3. Pt admitted s/p fall, guaiac + in ED. Will d/c ASA, plavix and subQ heparin. Will place pt on protonix 40 IV BID. Will recheck CBC @ 0300.  s/p CT abd/pelvis, d/w radiology resident, no emergent findings. D/W Dr. Oswald. F/U with primary team in .    Bhavani Gaines NP  [id number]

## 2019-02-13 NOTE — ED ADULT NURSE NOTE - NSIMPLEMENTINTERV_GEN_ALL_ED
Implemented All Fall with Harm Risk Interventions:  [place] to call system. Call bell, personal items and telephone within reach. Instruct patient to call for assistance. Room bathroom lighting operational. Non-slip footwear when patient is off stretcher. Physically safe environment: no spills, clutter or unnecessary equipment. Stretcher in lowest position, wheels locked, appropriate side rails in place. Provide visual cue, wrist band, yellow gown, etc. Monitor gait and stability. Monitor for mental status changes and reorient to person, place, and time. Review medications for side effects contributing to fall risk. Reinforce activity limits and safety measures with patient and family. Provide visual clues: red socks.

## 2019-02-13 NOTE — H&P ADULT - HISTORY OF PRESENT ILLNESS
CHIEF COMPLAINT:Patient is a 89y old  Male who presents with a chief complaint of fall.    HPI:  88 year old M with PMH of CAD  ,   (w/ PCI to LAD and Cx    in 2/2018),   on asa/ plavix/ statin,. CKDIII, and HTN ,  hld who presents to the ED following Fall. patient was walking with walker while making toast and fell backwards onto his buttocks and head. he was unable to get up so called  to help him. he then went about his day getting ready for follow up with MD following his endovascular AAA repair, but thrgouhtout the course of the day became weaker. patient with no chest pain or sob prior to fall. states he had NO LOC and recalls the whole thing. no fever/chills/n/v/abdominal pain. no cough/URI symptoms.    PAST MEDICAL & SURGICAL HISTORY:  CAD (coronary artery disease)  HLD (hyperlipidemia)  CKD (chronic kidney disease)  HTN (hypertension)  Stented coronary artery      MEDICATIONS  (STANDING):    MEDICATIONS  (PRN):      FAMILY HISTORY:      SOCIAL HISTORY:    [ ] Non-smoker  [ ] Smoker  [ ] Alcohol    Allergies    penicillin (Angioedema)    Intolerances    	    REVIEW OF SYSTEMS:  CONSTITUTIONAL: No fever, weight loss, or fatigue  EYES: No eye pain, visual disturbances, or discharge  ENT:  No difficulty hearing, tinnitus, vertigo; No sinus or throat pain  NECK: No pain or stiffness  RESPIRATORY: No cough, wheezing, chills or hemoptysis; + Shortness of Breath  CARDIOVASCULAR: No chest pain, palpitations, passing out, dizziness, or leg swelling  GASTROINTESTINAL: No abdominal or epigastric pain. No nausea, vomiting, or hematemesis; No diarrhea or constipation. No melena or hematochezia.  GENITOURINARY: No dysuria, frequency, hematuria, or incontinence  NEUROLOGICAL: No headaches, memory loss, loss of strength, numbness, or tremors  SKIN: No itching, burning, rashes, or lesions   LYMPH Nodes: No enlarged glands  ENDOCRINE: No heat or cold intolerance; No hair loss  MUSCULOSKELETAL: No joint pain or swelling; No muscle, back, or extremity pain  PSYCHIATRIC: No depression, anxiety, mood swings, or difficulty sleeping  HEME/LYMPH: No easy bruising, or bleeding gums  ALLERGY AND IMMUNOLOGIC: No hives or eczema	    [ ] All others negative	  [ ] Unable to obtain    PHYSICAL EXAM:  T(C): 36.7 (02-13-19 @ 17:59), Max: 36.8 (02-13-19 @ 17:48)  HR: 63 (02-13-19 @ 21:45) (61 - 85)  BP: 145/66 (02-13-19 @ 21:45) (90/50 - 145/66)  RR: 16 (02-13-19 @ 21:45) (16 - 19)  SpO2: 100% (02-13-19 @ 21:45) (99% - 100%)  Wt(kg): --  I&O's Summary      Appearance: Normal	  HEENT:   Normal oral mucosa, PERRL, EOMI	  Lymphatic: No lymphadenopathy  Cardiovascular: Normal S1 S2, No JVD, +murmurs, No edema  Respiratory: Lungs clear to auscultation	  Psychiatry: A & O x 3, Mood & affect appropriate  Gastrointestinal:  Soft, Non-tender, + BS	  Skin: No rashes, No ecchymoses, No cyanosis	  Neurologic: Non-focal  Extremities: Normal range of motion, No clubbing, cyanosis or edema  Vascular: Peripheral pulses palpable 2+ bilaterally    TELEMETRY: 	    ECG:  	  RADIOLOGY:  OTHER: 	  	  LABS:	 	    CARDIAC MARKERS:                              9.0    6.8   )-----------( 286      ( 13 Feb 2019 18:36 )             26.3     02-13    143  |  102  |  30<H>  ----------------------------<  120<H>  4.5   |  28  |  1.89<H>    Ca    8.5      13 Feb 2019 18:36    TPro  6.4  /  Alb  3.4  /  TBili  0.3  /  DBili  x   /  AST  19  /  ALT  16  /  AlkPhos  77  02-13    proBNP: Serum Pro-Brain Natriuretic Peptide: 1820 pg/mL (02-13 @ 18:36)    Lipid Profile:   HgA1c:   TSH:   PT/INR - ( 13 Feb 2019 18:36 )   PT: 12.0 sec;   INR: 1.05 ratio         PTT - ( 13 Feb 2019 18:36 )  PTT:23.7 sec    PREVIOUS DIAGNOSTIC TESTING:    < from: TTE with Doppler (w/Cont) (12.11.18 @ 13:23) >  1. Mild concentric left ventricular hypertrophy.  2. Endocardial visualization enhanced with intravenous  injection of Ultrasonic Enhancing Agent (Definity).  Hyperdynamic left ventricle. No obvious wall motion  abnormalities.  3. Reversal of the E-A  waves of the mitral inflow pattern  is consistent with diastolicLV dysfunction.  4. Normal right ventricular size and function.    < from: CT Cervical Spine No Cont (02.13.19 @ 19:10) >    1. Head CT: No intracranial hemorrhage or displaced calvarial fracture.   Age-indeterminate lacunar infarct left centrum semiovale.    2. Cervical spine CT: No acute displaced fracture or traumatic   malalignment.  Multilevel degenerative disc disease.      < from: Xray Chest 1 View AP/PA (02.13.19 @ 19:37) >  INTERPRETATION:  no emergent findings    < from: CT Abdomen and Pelvis No Cont (12.10.18 @ 17:36) >  A 5.6 cm infrarenal abdominal aortic aneurysm, as above. No surrounding   inflammatory change or freefluid.      < end of copied text >

## 2019-02-13 NOTE — ED PROVIDER NOTE - INTERPRETATION
sinus bradycardia, 1st degree AVB, RBBB, LAFB, left axis deiation, nonspecific ST and TW changes similar to prior

## 2019-02-13 NOTE — ED ADULT TRIAGE NOTE - TEMPERATURE IN CELSIUS (DEGREES C)
=================================================================

Del Sum A-C

=================================================================

Datetime Report Generated by CPN: 2017 23:31

   

   

=================================================================

DELIVERY PERSONNEL

=================================================================

   

DELIVERY PERSONNEL:  [id number]

Delivery Doctor::  Lana Lane MD

Labor and Delivery Nurse::  Merary Flores RN

Labor and Delivery Nurse::  Mirlande Valladares RN

Nursery Nurse::  Mami Mary RN

Nursery Nurse::  Mi Lane RN

Scrub Tech/CNA:  Reid Ertel, CNA

   

=================================================================

MATERNAL INFORMATION

=================================================================

   

Delivery Anesthesia:  Local

Medications After Delivery:  Pitocin Drip 20 Units/1000ml NSS

Estimated  Blood Loss (ml):  200

Maternal Complications:  None

   

=================================================================

LABOR SUMMARY

=================================================================

   

EDC:  2017 00:00

No. Babies in Womb:  1

 Attempted:  No

Labor Anesthesia:  None

   

=================================================================

LABOR INFORMATION

=================================================================

   

Reason for Induction:  Pre-Eclampsia

Onset of Labor:  2017 21:00

Complete Dilatation:  2017 22:12

Cervical Ripening Agents:  Cervidil

Oxytocin:  Induction

Group B Beta Strep:  negative

Antibiotics # of Doses:  0

Steroids Given:  None

Reason Steroids Not Administered:  Not Applicable

   

=================================================================

MEMBRANES

=================================================================

   

Membranes Rupture Method:  Spontaneous

Rupture of Membranes:  2017 21:00

Length of Rupture (hr):  1.45

Amniotic Fluid Color:  Clear

Amniotic Fluid Amount:  Small

Amniotic Fluid Odor:  Normal

   

=================================================================

STAGES OF LABOR

=================================================================

   

Stage 1 hr:  1

Stage 1 min:  12

Stage 2 hr:  0

Stage 2 min:  15

Stage 3 hr:  0

Stage 3 min:  7

Total Time in Labor hr:  1

Total Time in Labor min:  34

   

=================================================================

VAGINAL DELIVERY

=================================================================

   

Episiotomy:  None

Laceration Extension:  Second Degree

Laceration Type:  Perineal

Laceration Repair:  Yes

Laceration Repair Note:  small perineal laceration repaired with 3-0

   chromic suture in ususal fashion.

Sponge Count Correct:  Vaginal Sweep Performed

Sharps Count Correct:  Yes

   

=================================================================

CSECTION DELIVERY

=================================================================

   

Primary Indication:  N/A

Secondary Indication:  N/A

CSection Incidence:  N/A

Labor:  N/A

Elective:  N/A

CSection Incision:  N/A

   

=================================================================

BABY A INFORMATION

=================================================================

   

Infant Delivery Date/Time:  2017 22:27

Method of Delivery:  Vaginal

Born in Route :  No

:  N/A

Forceps:  N/A

Vacuum Extraction:  N/A

Shoulder Dystocia :  No

   

=================================================================

PRESENTATION/POSITION BABY A

=================================================================

   

Presentation:  Cephalic

Cephalic Presentation:  Vertex

Vertex Position:  Left Occipital Anterior

Breech Presentation:  N/A

   

=================================================================

PLACENTA INFORMATION BABY A

=================================================================

   

Placenta Delivery Time :  2017 22:34

Placenta Method of Delivery:  Spontaneous

Placenta Status:  Delivered

   

=================================================================

APGAR SCORES BABY A

=================================================================

   

Heart Rate 1 min:  >100 bpm

Resp Effort 1 min:  Good Cry

Reflex Irritability 1 min:  Cough or Sneeze or Pulls Away

Muscle Tone 1 min:  Active Motion

Color 1 min:  Body Pink, Extremities Blue

Resuscitation Effort 1 min:  N/A

APGAR SCORE 1 MIN:  9

Heart Rate 5 min:  >100 bpm

Resp Effort 5 min:  Good Cry

Reflex Irritability 5 min:  Cough or Sneeze or Pulls Away

Muscle Tone 5 min:  Active Motion

Color 5 min:  Body Pink, Extremities Blue

APGAR SCORE 5 MIN:  9

   

=================================================================

INFANT INFORMATION BABY A

=================================================================

   

Gestational Age at Delivery:  37.4

Gestational Status:  Early Term- 37- 38.6 Weeks

Infant Outcome :  Liveborn

Infant Condition :  Stable

Infant Sex:  Female

   

=================================================================

IDENTIFICATION BABY A

=================================================================

   

Infant Verification Date/Time:  2017 22:43

ID Band Number:  [id number]

Mother's Name Verified:  Yes

Infant Medical Record Number:  [id number]

RN Verifying Infant:  S. Lattibeaudeir, RN

Additional Verifying Personnel:  DEnrrique Moi, CNA/US

   

=================================================================

WEIGHT/LENGTH BABY A

=================================================================

   

Infant Birthweight (gm):  2578

Infant Weight (lb):  5

Infant Weight (oz):  11

Infant Length (in):  19.00

Infant Length (cm):  48.26

   

=================================================================

CORD INFORMATION BABY A

=================================================================

   

No. Cord Vessels:  3

Nuchal Cord :  N/A

Cord Blood Taken:  Yes-For Storage (Mom's Blood type +)

Infant Suction:  None

   

=================================================================

ASSESSMENT BABY A

=================================================================

   

Infant Complications:  None

Physical Findings at Delivery:  Within Normal Limits

Infant Respirations:  Appears Normal

Skin to Skin:  Yes

Transferred To:  Remains with Mother

   

=================================================================

BABY B INFORMATION

=================================================================

   

 :  N/A

   

=================================================================

SIGNATURES

=================================================================

   

Signature:  Electronically signed by Lana Lane MD (CAMERON) on

   2017 at 22:41  with User ID: [id number]
36.8

## 2019-02-13 NOTE — H&P ADULT - ASSESSMENT
pt with hx of ashd, hfpef,aortic aneurysm abdominal 5.6 cm with s/p fall pt with hx opf cardiac arrtythmia  tele  continue cardiac meds  dvt prophylaxis  observe on tele  vascular consult for AAA pt has no abdominal or neurological complain repeat ct abdomen.  physical therapy  ckd stage 2/3 chronic  cherck orthostatic

## 2019-02-13 NOTE — ED ADULT NURSE REASSESSMENT NOTE - NS ED NURSE REASSESS COMMENT FT1
changed to new leg bag; await urine to sent for spec; pt is alert and oriented x 3; c collar in place

## 2019-02-13 NOTE — ED PROVIDER NOTE - OBJECTIVE STATEMENT
88 year old M with PMH of CAD  ,   (w/ PCI to LAD and Cx    in 2/2018),   on asa/ plavix/ statin,. CKDIII, and HTN ,  hld. 88 year old M with PMH of CAD  ,   (w/ PCI to LAD and Cx    in 2/2018),   on asa/ plavix/ statin,. CKDIII, and HTN ,  hld who presents to the ED following Fall. patient was walking with walker while making toast and fell backwards onto his buttocks and head. he was unable to get up so called  to help him. he then went about his day getting ready for follow up with MD following his endovascular AAA repair, but thrgouhtout the course of the day became weaker. patient with no chest pain or sob prior to fall. states he had NO LOC and recalls the whole thing. no fever/chills/n/v/abdominal pain. no cough/URI symptoms.

## 2019-02-13 NOTE — ED PROVIDER NOTE - ATTENDING CONTRIBUTION TO CARE
ATTENDING MD: I, Adolph Duggan, have seen and evaluated this patient with the advance practice clinician.  I have discussed the history, exam ,and aspects of care with the APC.  I have reviewed the APC's note. I agree with the findings  unless other wise stated in the HPI, PE, MDM, and progress notes.    awake, alert, nontoxic. NCAT, eyes clear, mucus membranes are moist, oropharynx is within normal limits, heart rate normal, lungs clera. abdomen soft, nontender, nondistended. rectal exam with brown stool, guiac send to lab. 5/5 strength in all extremities, normal reflexes, 1+ radial pusles bilaterally, LEs with nonpalpable but dopplerable pedal pulses, no skin changes, back nontende,r no sponal stepoff or deformity. L-hip tender and pain on ROM but leg not shortened or rotated.    MDM: generalized weakness after AAA repair, no sign of major trauma, XRs and CT without occult trauma idnetified, H/H down, possibly from occult bleeding, medicine aware, vascular to be consulted as inpatient do not think emergent CTA indicated at this time    Charts made in real time. Please forgive typos.

## 2019-02-13 NOTE — ED ADULT NURSE NOTE - OBJECTIVE STATEMENT
89 year old male presents to the ED via EMS from home s/p mechanical fall in home this afternoon, fell backwards onto bottom and hit back of head, called superintendent of building to help him get up and then had son come over. PMH of CHF, HTN, HLD, CAD s/p recent endovascular repair of aorta- discharged on 2/7/19 on plavix. As per EMS, son called EMS due to pt. being weak in bilateral extremities when son and pt. were leaving house for neurologist apt hours after fall. EMS report patient was hypotensive to 80s/60s and they inserted a 20g peripheral IV in L ac and started on NS- received approximately 100mL. On arrival, Patient is awake, alert, a&ox4, following commands, sensory in tact but diminished in BL LE. Patient has +2 pitting edema bilaterally. Patient reports pain in center of neck and left hip. labs drawn and sent. Cervical collar applied. Pt. denies fever, chills, nausea, vomiting, diarrhea, dsyuria, hematuria.

## 2019-02-14 ENCOUNTER — TRANSCRIPTION ENCOUNTER (OUTPATIENT)
Age: 84
End: 2019-02-14

## 2019-02-14 LAB
ALBUMIN SERPL ELPH-MCNC: 3 G/DL — LOW (ref 3.3–5)
ALP SERPL-CCNC: 76 U/L — SIGNIFICANT CHANGE UP (ref 40–120)
ALT FLD-CCNC: 15 U/L — SIGNIFICANT CHANGE UP (ref 10–45)
ANION GAP SERPL CALC-SCNC: 12 MMOL/L — SIGNIFICANT CHANGE UP (ref 5–17)
AST SERPL-CCNC: 17 U/L — SIGNIFICANT CHANGE UP (ref 10–40)
BILIRUB SERPL-MCNC: 0.3 MG/DL — SIGNIFICANT CHANGE UP (ref 0.2–1.2)
BUN SERPL-MCNC: 26 MG/DL — HIGH (ref 7–23)
CALCIUM SERPL-MCNC: 8.3 MG/DL — LOW (ref 8.4–10.5)
CHLORIDE SERPL-SCNC: 105 MMOL/L — SIGNIFICANT CHANGE UP (ref 96–108)
CO2 SERPL-SCNC: 26 MMOL/L — SIGNIFICANT CHANGE UP (ref 22–31)
CREAT SERPL-MCNC: 1.55 MG/DL — HIGH (ref 0.5–1.3)
GLUCOSE SERPL-MCNC: 101 MG/DL — HIGH (ref 70–99)
HCT VFR BLD CALC: 27.7 % — LOW (ref 39–50)
HGB BLD-MCNC: 9.4 G/DL — LOW (ref 13–17)
MCHC RBC-ENTMCNC: 30.8 PG — SIGNIFICANT CHANGE UP (ref 27–34)
MCHC RBC-ENTMCNC: 33.9 GM/DL — SIGNIFICANT CHANGE UP (ref 32–36)
MCV RBC AUTO: 90.7 FL — SIGNIFICANT CHANGE UP (ref 80–100)
PLATELET # BLD AUTO: 280 K/UL — SIGNIFICANT CHANGE UP (ref 150–400)
POTASSIUM SERPL-MCNC: 4.1 MMOL/L — SIGNIFICANT CHANGE UP (ref 3.5–5.3)
POTASSIUM SERPL-SCNC: 4.1 MMOL/L — SIGNIFICANT CHANGE UP (ref 3.5–5.3)
PROT SERPL-MCNC: 5.8 G/DL — LOW (ref 6–8.3)
RBC # BLD: 3.05 M/UL — LOW (ref 4.2–5.8)
RBC # FLD: 14.4 % — SIGNIFICANT CHANGE UP (ref 10.3–14.5)
SODIUM SERPL-SCNC: 143 MMOL/L — SIGNIFICANT CHANGE UP (ref 135–145)
WBC # BLD: 5.4 K/UL — SIGNIFICANT CHANGE UP (ref 3.8–10.5)
WBC # FLD AUTO: 5.4 K/UL — SIGNIFICANT CHANGE UP (ref 3.8–10.5)

## 2019-02-14 RX ORDER — INFLUENZA VIRUS VACCINE 15; 15; 15; 15 UG/.5ML; UG/.5ML; UG/.5ML; UG/.5ML
0.5 SUSPENSION INTRAMUSCULAR ONCE
Refills: 0 | Status: DISCONTINUED | OUTPATIENT
Start: 2019-02-14 | End: 2019-02-22

## 2019-02-14 RX ORDER — HEPARIN SODIUM 5000 [USP'U]/ML
5000 INJECTION INTRAVENOUS; SUBCUTANEOUS EVERY 12 HOURS
Refills: 0 | Status: DISCONTINUED | OUTPATIENT
Start: 2019-02-14 | End: 2019-02-22

## 2019-02-14 RX ORDER — ASPIRIN/CALCIUM CARB/MAGNESIUM 324 MG
81 TABLET ORAL DAILY
Refills: 0 | Status: DISCONTINUED | OUTPATIENT
Start: 2019-02-14 | End: 2019-02-22

## 2019-02-14 RX ADMIN — ATORVASTATIN CALCIUM 40 MILLIGRAM(S): 80 TABLET, FILM COATED ORAL at 22:11

## 2019-02-14 RX ADMIN — PANTOPRAZOLE SODIUM 40 MILLIGRAM(S): 20 TABLET, DELAYED RELEASE ORAL at 17:34

## 2019-02-14 RX ADMIN — HEPARIN SODIUM 5000 UNIT(S): 5000 INJECTION INTRAVENOUS; SUBCUTANEOUS at 17:34

## 2019-02-14 RX ADMIN — Medication 81 MILLIGRAM(S): at 11:54

## 2019-02-14 RX ADMIN — Medication 100 MILLIGRAM(S): at 05:27

## 2019-02-14 RX ADMIN — Medication 50 MILLIGRAM(S): at 05:26

## 2019-02-14 RX ADMIN — TAMSULOSIN HYDROCHLORIDE 0.4 MILLIGRAM(S): 0.4 CAPSULE ORAL at 22:11

## 2019-02-14 RX ADMIN — Medication 100 MILLIGRAM(S): at 22:11

## 2019-02-14 RX ADMIN — Medication 20 MILLIGRAM(S): at 05:26

## 2019-02-14 RX ADMIN — PANTOPRAZOLE SODIUM 40 MILLIGRAM(S): 20 TABLET, DELAYED RELEASE ORAL at 05:27

## 2019-02-14 RX ADMIN — Medication 100 MILLIGRAM(S): at 11:54

## 2019-02-14 NOTE — CONSULT NOTE ADULT - ASSESSMENT
spoke with dr. monroe.  pt with history of astrid and fobt positive.  he recommends inpatient pan endospcy and capsule.  pt agreeale.  will speak with family will be next week possible (endo closed on monday- likely tues wed.

## 2019-02-14 NOTE — DISCHARGE NOTE ADULT - PATIENT PORTAL LINK FT
You can access the [facility] Patient Portal, offered by [facility], by registering with the following website: [email or web address]

## 2019-02-14 NOTE — CONSULT NOTE ADULT - ASSESSMENT
88M [facility] AAA s/p EVAR (2/4/19), CAD s/p PCI (2/2018), on Asa/ plavix/ statin, CKDIII, HTN, HLD, presented 2/13 s/p fall.    - Aneurysm sac seen on imaging is unchanged in size. Endograft in place.   - No indication for acute Vascular Surgery intervention at this time  - Please call back with any questions or acute changes in clinical status    YASMANI Douglass PGY2  Vascular surgery  [phone]

## 2019-02-14 NOTE — DISCHARGE NOTE ADULT - CARE PLAN
Principal Discharge DX:	GIB (gastrointestinal bleeding) Principal Discharge DX:	Fall  Goal:	maintain safety  Assessment and plan of treatment:	CT head:  No intracranial hemorrhage or displaced calvarial fracture.   Age-indeterminate lacunar infarct left centrum semiovale.  Cervical spine CT: No acute displaced fracture or traumatic   malalignment. Multilevel degenerative disc disease.  X-ray left femur/ left hip: no acute fracture or dislocation. Severe left hip arthrosis.  MRI brain: MR Head No Cont - Age-appropriate involutional and ischemic gliotic changes.   Small vessel white matter and basal ganglia ischemic changes. No acute infarcts.  Secondary Diagnosis:	GIB (gastrointestinal bleeding)  Assessment and plan of treatment:	Upper endoscopy normal  Colonoscopy with polyp noted, s/p polypectomy  Follow up with gastroenterologists for out-patient capsule study  Secondary Diagnosis:	Urine culture positive  Assessment and plan of treatment:	No signs or symptoms of infection, will view Enterococcus as c/w asymptomatic bacteriuria  Observe off antibiotics  Secondary Diagnosis:	HTN (hypertension)  Assessment and plan of treatment:	Low salt diet  Activity as tolerated.  Take all medication as prescribed.  Follow up with your medical doctor for routine blood pressure monitoring at your next visit.  Notify your doctor if you have any of the following symptoms:   Dizziness, Lightheadedness, Blurry vision, Headache, Chest pain, Shortness of breath  Secondary Diagnosis:	CKD (chronic kidney disease)  Assessment and plan of treatment:	Avoid taking (NSAIDs) - (ex: Ibuprofen, Advil, Celebrex, Naprosyn)  Avoid taking any nephrotoxic agents (can harm kidneys) - Intravenous contrast for diagnostic testing, combination cold medications.  Have all medications adjusted for your renal function by your Health Care Provider.  Blood pressure control is important.  Take all medication as prescribed.  Secondary Diagnosis:	CAD (coronary artery disease)  Assessment and plan of treatment:	Coronary artery disease is a condition where the arteries the supply the heart muscle get clogged with fatty deposits & puts you at risk for a heart attack  Call your doctor if you have any new pain, pressure, or discomfort in the center of your chest, pain, tingling or discomfort in arms, back, neck, jaw, or stomach, shortness of breath, nausea, vomiting, burping or heartburn, sweating, cold and clammy skin, racing or abnormal heartbeat for more than 10 minutes or if they keep coming & going.  Call 911 and do not tr to get to hospital by care  You can help yourself with lefestyle changes (quitting smoking if you smoke), eat lots of fruits & vegetables & low fat dairy products, not a lot of meat & fatty foods, walk or some form of physical activity most days of the week, lose weight if you are overweight  Take your cardiac medication as prescribed to lower cholesterol, to lower blood pressure, aspirin to prevent blood clots, and diabetes control  Make sure to keep appointments with doctor for cardiac follow up care

## 2019-02-14 NOTE — DISCHARGE NOTE ADULT - PROVIDER TOKENS
PROVIDER:[TOKEN:[[id number]]] PROVIDER:[TOKEN:[[id number]]],PROVIDER:[TOKEN:[[id number]],FOLLOWUP:[2 weeks]],PROVIDER:[TOKEN:[[id number]],FOLLOWUP:[1 month]]

## 2019-02-14 NOTE — CONSULT NOTE ADULT - SUBJECTIVE AND OBJECTIVE BOX
Vascular Surgery Consult  Consulting surgical team: Vascular Surgery  Consulting attending: Dr. Prather    HPI:  88M PMH AAA s/p EVAR (19), CAD s/p PCI (2018), on Asa/ plavix/ statin, CKDIII, HTN, HLD, presented  s/p fall. Patient states he was walking at home while making toast and fell backwards onto his buttocks and head. Denies LOC. Upon arrival to [facility] ED, CT head obtained, which was negative for ICH. CT abd/pelvis obtained, which showed no acute pathology, demonstrated post aortobi-iliac endograft with no change in size of aneurysm sac.     PAST MEDICAL HISTORY:  CAD (coronary artery disease)  HLD (hyperlipidemia)  CKD (chronic kidney disease)  HTN (hypertension)  Stented coronary artery      PAST SURGICAL HISTORY:  EVAR (19)    MEDICATIONS:  aspirin enteric coated 81 milliGRAM(s) Oral daily  atorvastatin 40 milliGRAM(s) Oral at bedtime  docusate sodium 100 milliGRAM(s) Oral three times a day  furosemide    Tablet 20 milliGRAM(s) Oral daily  heparin  Injectable 5000 Unit(s) SubCutaneous every 12 hours  influenza   Vaccine 0.5 milliLiter(s) IntraMuscular once  metoprolol succinate ER 50 milliGRAM(s) Oral daily  pantoprazole  Injectable 40 milliGRAM(s) IV Push two times a day  tamsulosin 0.4 milliGRAM(s) Oral at bedtime      ALLERGIES:  penicillin (Angioedema)      VITALS & I/Os:  Vital Signs Last 24 Hrs  T(C): 36.6 (2019 12:07), Max: 36.8 (2019 17:48)  T(F): 97.9 (2019 12:07), Max: 98.3 (2019 17:48)  HR: 61 (2019 12:07) (61 - 85)  BP: 138/73 (2019 12:07) (90/50 - 157/73)  BP(mean): --  RR: 18 (2019 12:07) (16 - 19)  SpO2: 99% (2019 12:07) (98% - 100%)    I&O's Summary    2019 07:01  -  2019 07:00  --------------------------------------------------------  IN: 240 mL / OUT: 670 mL / NET: -430 mL    2019 07:01  -  2019 15:41  --------------------------------------------------------  IN: 600 mL / OUT: 500 mL / NET: 100 mL        PHYSICAL EXAM:  General: Laying in bed, in no acute distress  Respiratory: Nonlabored  Abdominal: Soft, nondistended, nontender.   Extremities: Warm    LABS:                        9.4    5.4   )-----------( 280      ( 2019 05:58 )             27.7         143  |  105  |  26<H>  ----------------------------<  101<H>  4.1   |  26  |  1.55<H>    Ca    8.3<L>      2019 05:58    TPro  5.8<L>  /  Alb  3.0<L>  /  TBili  0.3  /  DBili  x   /  AST  17  /  ALT  15  /  AlkPhos  76      Lactate:  02-13 @ 18:36  1.7    PT/INR - ( 2019 18:36 )   PT: 12.0 sec;   INR: 1.05 ratio         PTT - ( 2019 18:36 )  PTT:23.7 sec          Urinalysis Basic - ( 2019 22:39 )    Color: Yellow / Appearance: Slightly Turbid / S.019 / pH: x  Gluc: x / Ketone: Negative  / Bili: Negative / Urobili: Negative   Blood: x / Protein: 100 / Nitrite: Negative   Leuk Esterase: Large / RBC: 39 /hpf /  /HPF   Sq Epi: x / Non Sq Epi: 0 /hpf / Bacteria: Few        IMAGING:  CT Cervical Spine No Cont (19 @ 19:10)  HEAD:    There is no acute intracranial hemorrhage or mass effect. There are   chronic white matter microvascular ischemic changes. The ventricles and   sulci are normal in size.    An age-indeterminate lacunar infarct involves the left centrum semiovale.   Correlate with history and physical exam findings.    There is no displaced calvarial fracture.     The visualized portions of the paranasal sinuses and mastoid air cells   are clear.    CERVICAL SPINE:    Reversal of the normal cervical lordosis.There are multilevel   degenerative changes characterized by multilevel disc space narrowing,   disc bulges, disc osteophyte complexes, and facet and uncinate   hypertrophy. This results in multilevel canal stenosis and multilevel   neural foraminal narrowing, the overall degree of which is not well   demonstrated on this exam.    There is no evidence for acute fracture or subluxation. There is no   prevertebral soft tissue swelling.     The visualized soft tissues are unremarkable.    The lungapices are clear.     IMPRESSION:    1. Head CT: No intracranial hemorrhage or displaced calvarial fracture.   Age-indeterminate lacunar infarct left centrum semiovale.    2. Cervical spine CT: No acute displaced fracture or traumatic   malalignment.  Multilevel degenerative disc disease.      CT Abdomen and Pelvis No Cont (19 @ 23:03)  FINDINGS:    LOWER CHEST: Trace left pleural effusion. Coronary artery calcifications.    LIVER: Within normal limits.  SPLEEN: Within normal limits.  PANCREAS: Within normal limits.  GALLBLADDER: Within normal limits.  BILE DUCTS: Normal caliber.  ADRENALS: Within normal limits.  KIDNEYS/URETERS: No mass, stone or hydronephrosis. Left renal cyst.      RETROPERITONEUM: No lymphadenopathy.    VESSELS:  Status post interval aortobiiliac endograft placement with no   change in size of the sac which measures 5.4 cm in maximal AP dimension.    BOWEL: No bowel obstruction, wall thickening or inflammatory change.   Appendix normal. Colonic diverticulosis without diverticulitis. Large   hiatal hernia.  PERITONEUM: No ascites or pneumoperitoneum.    REPRODUCTIVE ORGANS: The prostate is enlarged.  BLADDER: Collapsed around a Flores catheter balloon.    ABDOMINAL WALL: Small amount of hemorrhage inthe right groin   subcutaneous tissues.  BONES: No acute bony abnormality. Spinal and left hip degenerative   arthritic changes.    IMPRESSION:   No acute pathology.  Status post aortobiiliac endograft with no change in size of aneurysm sac   as compared with December 10, 2018.

## 2019-02-14 NOTE — DISCHARGE NOTE ADULT - PLAN OF CARE
maintain safety CT head:  No intracranial hemorrhage or displaced calvarial fracture.   Age-indeterminate lacunar infarct left centrum semiovale.  Cervical spine CT: No acute displaced fracture or traumatic   malalignment. Multilevel degenerative disc disease.  X-ray left femur/ left hip: no acute fracture or dislocation. Severe left hip arthrosis.  MRI brain: MR Head No Cont - Age-appropriate involutional and ischemic gliotic changes.   Small vessel white matter and basal ganglia ischemic changes. No acute infarcts. Upper endoscopy normal  Colonoscopy with polyp noted, s/p polypectomy  Follow up with gastroenterologists for out-patient capsule study No signs or symptoms of infection, will view Enterococcus as c/w asymptomatic bacteriuria  Observe off antibiotics Low salt diet  Activity as tolerated.  Take all medication as prescribed.  Follow up with your medical doctor for routine blood pressure monitoring at your next visit.  Notify your doctor if you have any of the following symptoms:   Dizziness, Lightheadedness, Blurry vision, Headache, Chest pain, Shortness of breath Avoid taking (NSAIDs) - (ex: Ibuprofen, Advil, Celebrex, Naprosyn)  Avoid taking any nephrotoxic agents (can harm kidneys) - Intravenous contrast for diagnostic testing, combination cold medications.  Have all medications adjusted for your renal function by your Health Care Provider.  Blood pressure control is important.  Take all medication as prescribed. Coronary artery disease is a condition where the arteries the supply the heart muscle get clogged with fatty deposits & puts you at risk for a heart attack  Call your doctor if you have any new pain, pressure, or discomfort in the center of your chest, pain, tingling or discomfort in arms, back, neck, jaw, or stomach, shortness of breath, nausea, vomiting, burping or heartburn, sweating, cold and clammy skin, racing or abnormal heartbeat for more than 10 minutes or if they keep coming & going.  Call 911 and do not tr to get to hospital by care  You can help yourself with lefestyle changes (quitting smoking if you smoke), eat lots of fruits & vegetables & low fat dairy products, not a lot of meat & fatty foods, walk or some form of physical activity most days of the week, lose weight if you are overweight  Take your cardiac medication as prescribed to lower cholesterol, to lower blood pressure, aspirin to prevent blood clots, and diabetes control  Make sure to keep appointments with doctor for cardiac follow up care

## 2019-02-14 NOTE — CONSULT NOTE ADULT - SUBJECTIVE AND OBJECTIVE BOX
Patient is a 89y Male     Patient is a 89y old  Male who presents with a chief complaint of fall ?syncope (14 Feb 2019 15:31)      HPI:  CHIEF COMPLAINT:Patient is a 89y old  Male who presents with a chief complaint of fall.    HPI:  88 year old M with PMH of CAD  ,   (w/ PCI to LAD and Cx    in 2/2018),   on asa/ plavix/ statin,. CKDIII, and HTN ,  hld who presents to the ED following Fall. patient was walking with walker while making toast and fell backwards onto his buttocks and head. he was unable to get up so called  to help him. he then went about his day getting ready for follow up with MD following his endovascular AAA repair, but thrgouhtout the course of the day became weaker. patient with no chest pain or sob prior to fall. states he had NO LOC and recalls the whole thing. no fever/chills/n/v/abdominal pain. no cough/URI symptoms.    PAST MEDICAL & SURGICAL HISTORY:  CAD (coronary artery disease)  HLD (hyperlipidemia)  CKD (chronic kidney disease)  HTN (hypertension)  Stented coronary artery      MEDICATIONS  (STANDING):    MEDICATIONS  (PRN):      FAMILY HISTORY:      SOCIAL HISTORY:    [ ] Non-smoker  [ ] Smoker  [ ] Alcohol    Allergies    penicillin (Angioedema)    Intolerances    	    REVIEW OF SYSTEMS:  CONSTITUTIONAL: No fever, weight loss, or fatigue  EYES: No eye pain, visual disturbances, or discharge  ENT:  No difficulty hearing, tinnitus, vertigo; No sinus or throat pain  NECK: No pain or stiffness  RESPIRATORY: No cough, wheezing, chills or hemoptysis; + Shortness of Breath  CARDIOVASCULAR: No chest pain, palpitations, passing out, dizziness, or leg swelling  GASTROINTESTINAL: No abdominal or epigastric pain. No nausea, vomiting, or hematemesis; No diarrhea or constipation. No melena or hematochezia.  GENITOURINARY: No dysuria, frequency, hematuria, or incontinence  NEUROLOGICAL: No headaches, memory loss, loss of strength, numbness, or tremors  SKIN: No itching, burning, rashes, or lesions   LYMPH Nodes: No enlarged glands  ENDOCRINE: No heat or cold intolerance; No hair loss  MUSCULOSKELETAL: No joint pain or swelling; No muscle, back, or extremity pain  PSYCHIATRIC: No depression, anxiety, mood swings, or difficulty sleeping  HEME/LYMPH: No easy bruising, or bleeding gums  ALLERGY AND IMMUNOLOGIC: No hives or eczema	    [ ] All others negative	  [ ] Unable to obtain    PHYSICAL EXAM:  T(C): 36.7 (02-13-19 @ 17:59), Max: 36.8 (02-13-19 @ 17:48)  HR: 63 (02-13-19 @ 21:45) (61 - 85)  BP: 145/66 (02-13-19 @ 21:45) (90/50 - 145/66)  RR: 16 (02-13-19 @ 21:45) (16 - 19)  SpO2: 100% (02-13-19 @ 21:45) (99% - 100%)  Wt(kg): --  I&O's Summary      Appearance: Normal	  HEENT:   Normal oral mucosa, PERRL, EOMI	  Lymphatic: No lymphadenopathy  Cardiovascular: Normal S1 S2, No JVD, +murmurs, No edema  Respiratory: Lungs clear to auscultation	  Psychiatry: A & O x 3, Mood & affect appropriate  Gastrointestinal:  Soft, Non-tender, + BS	  Skin: No rashes, No ecchymoses, No cyanosis	  Neurologic: Non-focal  Extremities: Normal range of motion, No clubbing, cyanosis or edema  Vascular: Peripheral pulses palpable 2+ bilaterally    TELEMETRY: 	    ECG:  	  RADIOLOGY:  OTHER: 	  	  LABS:	 	    CARDIAC MARKERS:                              9.0    6.8   )-----------( 286      ( 13 Feb 2019 18:36 )             26.3     02-13    143  |  102  |  30<H>  ----------------------------<  120<H>  4.5   |  28  |  1.89<H>    Ca    8.5      13 Feb 2019 18:36    TPro  6.4  /  Alb  3.4  /  TBili  0.3  /  DBili  x   /  AST  19  /  ALT  16  /  AlkPhos  77  02-13    proBNP: Serum Pro-Brain Natriuretic Peptide: 1820 pg/mL (02-13 @ 18:36)    Lipid Profile:   HgA1c:   TSH:   PT/INR - ( 13 Feb 2019 18:36 )   PT: 12.0 sec;   INR: 1.05 ratio         PTT - ( 13 Feb 2019 18:36 )  PTT:23.7 sec    PREVIOUS DIAGNOSTIC TESTING:    < from: TTE with Doppler (w/Cont) (12.11.18 @ 13:23) >  1. Mild concentric left ventricular hypertrophy.  2. Endocardial visualization enhanced with intravenous  injection of Ultrasonic Enhancing Agent (Definity).  Hyperdynamic left ventricle. No obvious wall motion  abnormalities.  3. Reversal of the E-A  waves of the mitral inflow pattern  is consistent with diastolicLV dysfunction.  4. Normal right ventricular size and function.    < from: CT Cervical Spine No Cont (02.13.19 @ 19:10) >    1. Head CT: No intracranial hemorrhage or displaced calvarial fracture.   Age-indeterminate lacunar infarct left centrum semiovale.    2. Cervical spine CT: No acute displaced fracture or traumatic   malalignment.  Multilevel degenerative disc disease.      < from: Xray Chest 1 View AP/PA (02.13.19 @ 19:37) >  INTERPRETATION:  no emergent findings    < from: CT Abdomen and Pelvis No Cont (12.10.18 @ 17:36) >  A 5.6 cm infrarenal abdominal aortic aneurysm, as above. No surrounding   inflammatory change or freefluid.      < end of copied text > (13 Feb 2019 21:47)      PAST MEDICAL & SURGICAL HISTORY:  CAD (coronary artery disease)  HLD (hyperlipidemia)  CKD (chronic kidney disease)  HTN (hypertension)  Stented coronary artery      MEDICATIONS  (STANDING):  aspirin enteric coated 81 milliGRAM(s) Oral daily  atorvastatin 40 milliGRAM(s) Oral at bedtime  docusate sodium 100 milliGRAM(s) Oral three times a day  furosemide    Tablet 20 milliGRAM(s) Oral daily  heparin  Injectable 5000 Unit(s) SubCutaneous every 12 hours  influenza   Vaccine 0.5 milliLiter(s) IntraMuscular once  metoprolol succinate ER 50 milliGRAM(s) Oral daily  pantoprazole  Injectable 40 milliGRAM(s) IV Push two times a day  tamsulosin 0.4 milliGRAM(s) Oral at bedtime      Allergies    penicillin (Angioedema)    Intolerances        SOCIAL HISTORY:  Denies ETOh,Smoking,     FAMILY HISTORY:      REVIEW OF SYSTEMS:    CONSTITUTIONAL: No weakness, fevers or chills  EYES/ENT: No visual changes;  No vertigo or throat pain   NECK: No pain or stiffness  RESPIRATORY: No cough, wheezing, hemoptysis; No shortness of breath  CARDIOVASCULAR: No chest pain or palpitations  GASTROINTESTINAL: No abdominal or epigastric pain. No nausea, vomiting, or hematemesis; No diarrhea or constipation. No melena or hematochezia.  GENITOURINARY: No dysuria, frequency or hematuria  NEUROLOGICAL: No numbness or weakness  SKIN: No itching, burning, rashes, or lesions   All other review of systems is negative unless indicated above.    VITAL:  T(C): , Max: 36.8 (02-13-19 @ 17:48)  T(F): , Max: 98.3 (02-13-19 @ 17:48)  HR: 61 (02-14-19 @ 12:07)  BP: 138/73 (02-14-19 @ 12:07)  BP(mean): --  RR: 18 (02-14-19 @ 12:07)  SpO2: 99% (02-14-19 @ 12:07)  Wt(kg): --    I and O's:    02-13 @ 07:01  -  02-14 @ 07:00  --------------------------------------------------------  IN: 240 mL / OUT: 670 mL / NET: -430 mL    02-14 @ 07:01  -  02-14 @ 15:49  --------------------------------------------------------  IN: 600 mL / OUT: 500 mL / NET: 100 mL      Height (cm): 175.26 (02-13 @ 17:48)  Weight (kg): 88 (02-13 @ 17:48)  BMI (kg/m2): 28.6 (02-13 @ 17:48)  BSA (m2): 2.04 (02-13 @ 17:48)    PHYSICAL EXAM:    Constitutional: NAD  HEENT: PERRLA,   Neck: No JVD  Respiratory: CTA B/L  Cardiovascular: S1 and S2  Gastrointestinal: BS+, soft, NT/ND  Extremities: No peripheral edema  Neurological: A/O x 3, no focal deficits  Psychiatric: Normal mood, normal affect  : No Flores  Skin: No rashes  Access: Not applicable  Back: No CVA tenderness    LABS:                        9.4    5.4   )-----------( 280      ( 14 Feb 2019 05:58 )             27.7     02-14    143  |  105  |  26<H>  ----------------------------<  101<H>  4.1   |  26  |  1.55<H>    Ca    8.3<L>      14 Feb 2019 05:58    TPro  5.8<L>  /  Alb  3.0<L>  /  TBili  0.3  /  DBili  x   /  AST  17  /  ALT  15  /  AlkPhos  76  02-14          RADIOLOGY & ADDITIONAL STUDIES:

## 2019-02-14 NOTE — PROGRESS NOTE ADULT - SUBJECTIVE AND OBJECTIVE BOX
CARDIOLOGY     PROGRESS  NOTE   ________________________________________________    CHIEF COMPLAINT:Patient is a 89y old  Male who presents with a chief complaint of fall ?syncope (14 Feb 2019 08:21)  no complain.  	  REVIEW OF SYSTEMS:  CONSTITUTIONAL: No fever, weight loss, or fatigue  EYES: No eye pain, visual disturbances, or discharge  ENT:  No difficulty hearing, tinnitus, vertigo; No sinus or throat pain  NECK: No pain or stiffness  RESPIRATORY: No cough, wheezing, chills or hemoptysis; No Shortness of Breath  CARDIOVASCULAR: No chest pain, palpitations, passing out, dizziness, or leg swelling  GASTROINTESTINAL: No abdominal or epigastric pain. No nausea, vomiting, or hematemesis; No diarrhea or constipation. No melena or hematochezia.  GENITOURINARY: No dysuria, frequency, hematuria, or incontinence  NEUROLOGICAL: No headaches, memory loss, loss of strength, numbness, or tremors  SKIN: No itching, burning, rashes, or lesions   LYMPH Nodes: No enlarged glands  ENDOCRINE: No heat or cold intolerance; No hair loss  MUSCULOSKELETAL: No joint pain or swelling; No muscle, back, or extremity pain  PSYCHIATRIC: No depression, anxiety, mood swings, or difficulty sleeping  HEME/LYMPH: No easy bruising, or bleeding gums  ALLERGY AND IMMUNOLOGIC: No hives or eczema	    [ ] All others negative	  [ ] Unable to obtain    PHYSICAL EXAM:  T(C): 36.7 (02-14-19 @ 04:29), Max: 36.8 (02-13-19 @ 17:48)  HR: 63 (02-14-19 @ 04:29) (61 - 85)  BP: 145/70 (02-14-19 @ 04:29) (90/50 - 157/73)  RR: 18 (02-14-19 @ 04:29) (16 - 19)  SpO2: 98% (02-14-19 @ 04:29) (98% - 100%)  Wt(kg): --  I&O's Summary    13 Feb 2019 07:01  -  14 Feb 2019 07:00  --------------------------------------------------------  IN: 240 mL / OUT: 670 mL / NET: -430 mL        Appearance: Normal	  HEENT:   Normal oral mucosa, PERRL, EOMI	  Lymphatic: No lymphadenopathy  Cardiovascular: Normal S1 S2, No JVD, + murmurs, No edema  Respiratory: Lungs clear to auscultation	  Psychiatry: A & O x 3, Mood & affect appropriate  Gastrointestinal:  Soft, Non-tender, + BS	  Skin: No rashes, No ecchymoses, No cyanosis	  Neurologic: Non-focal  Extremities: Normal range of motion, No clubbing, cyanosis . + edema  Vascular: Peripheral pulses palpable 2+ bilaterally    MEDICATIONS  (STANDING):  aspirin enteric coated 81 milliGRAM(s) Oral daily  atorvastatin 40 milliGRAM(s) Oral at bedtime  docusate sodium 100 milliGRAM(s) Oral three times a day  furosemide    Tablet 20 milliGRAM(s) Oral daily  heparin  Injectable 5000 Unit(s) SubCutaneous every 12 hours  influenza   Vaccine 0.5 milliLiter(s) IntraMuscular once  metoprolol succinate ER 50 milliGRAM(s) Oral daily  pantoprazole  Injectable 40 milliGRAM(s) IV Push two times a day  tamsulosin 0.4 milliGRAM(s) Oral at bedtime      TELEMETRY: 	    ECG:  	  RADIOLOGY:  OTHER: 	  	  LABS:	 	    CARDIAC MARKERS:                                9.4    5.4   )-----------( 280      ( 14 Feb 2019 05:58 )             27.7     02-14    143  |  105  |  26<H>  ----------------------------<  101<H>  4.1   |  26  |  1.55<H>    Ca    8.3<L>      14 Feb 2019 05:58    TPro  5.8<L>  /  Alb  3.0<L>  /  TBili  0.3  /  DBili  x   /  AST  17  /  ALT  15  /  AlkPhos  76  02-14    proBNP: Serum Pro-Brain Natriuretic Peptide: 1820 pg/mL (02-13 @ 18:36)    Lipid Profile:   HgA1c:   TSH:   PT/INR - ( 13 Feb 2019 18:36 )   PT: 12.0 sec;   INR: 1.05 ratio         PTT - ( 13 Feb 2019 18:36 )  PTT:23.7 sec      Assessment and plan  ---------------------------  anemia, guaiac +  ? gi eval  no arrythmia  vascular follow up AAA  physical therapy

## 2019-02-14 NOTE — DISCHARGE NOTE ADULT - CARE PROVIDERS DIRECT ADDRESSES
[email or web address],[email or web address]_Unknown

## 2019-02-14 NOTE — DISCHARGE NOTE ADULT - MEDICATION SUMMARY - MEDICATIONS TO TAKE
I will START or STAY ON the medications listed below when I get home from the hospital:    aspirin 81 mg oral delayed release tablet  -- 1 tab(s) by mouth once a day  -- Indication: For CAD (coronary artery disease)    tamsulosin 0.4 mg oral capsule  -- 1 cap(s) by mouth once a day (at bedtime)  -- Indication: For BPH    heparin  -- 5000 unit(s) subcutaneous every 12 hours  -- Indication: For prophylasis    gabapentin 300 mg oral capsule  -- 1 cap(s) by mouth 3 times a day  -- Indication: For nerve pain    atorvastatin 40 mg oral tablet  -- 1 tab(s) by mouth once a day (at bedtime)  -- Indication: For Hyperlipidemia    metoprolol succinate 50 mg oral tablet, extended release  -- 1 tab(s) by mouth once a day  -- Indication: For HTN (hypertension)    amLODIPine 2.5 mg oral tablet  -- 1 tab(s) by mouth once a day  -- Indication: For HTN (hypertension)    furosemide 20 mg oral tablet  -- 1 tab(s) by mouth once a day  -- Indication: For CAD (coronary artery disease)    famotidine 20 mg oral tablet  -- 1 tab(s) by mouth once a day  -- Indication: For GIB (gastrointestinal bleeding)    docusate sodium 100 mg oral capsule  -- 1 cap(s) by mouth 3 times a day  -- Indication: For Constipation

## 2019-02-14 NOTE — DISCHARGE NOTE ADULT - CARE PROVIDER_API CALL
Kenrick Conti (DO)  Gastroenterology; Internal Medicine  [address] [place] [address]  Phone: [phone]  Fax: [phone]  Follow Up Time: Kenrick Conti (DO)  Gastroenterology; Internal Medicine  [address]  [place] [address]  Phone: [phone]  Fax: [phone]  Follow Up Time:     Anselmo Oswald (DO)  Cardiology Medicine  [address], [address]  [place] [address]  Phone: [phone]  Fax: [phone]  Follow Up Time: 2 weeks    Arsalan Blackmon)  Internal Medicine  [address]  [place] [address]  Phone: [phone]  Fax: [phone]  Follow Up Time: 1 month

## 2019-02-14 NOTE — DISCHARGE NOTE ADULT - SECONDARY DIAGNOSIS.
GIB (gastrointestinal bleeding) Urine culture positive HTN (hypertension) CKD (chronic kidney disease) CAD (coronary artery disease)

## 2019-02-14 NOTE — CHART NOTE - NSCHARTNOTEFT_GEN_A_CORE
Discussion with ARAM Orosco, Stroke Quality Core Measures, about CT Head results for Mr. yL stating "age-indertiminate lacunar infarct". According to Dr. Monique there is no current clinical suspicion, signs or symptoms that pt had an acute infarct. There are no further planned interventions in regards to CT Head results from 2/13/19.      Jeannette Murdock PA-C Discussion with ARAM Orosco, Stroke Quality Core Measures, about CT Head results for Mr. Ly stating "age-indertiminate lacunar infarct". According to Dr. Monique there is no current clinical suspicion, signs or symptoms that pt had an acute infarct. Will order MR Head to further evaluate CT Head results.      Jeannette Murdock PA-C

## 2019-02-14 NOTE — CONSULT NOTE ADULT - ASSESSMENT
88 year old M with    PMH of CAD  ,   (w/ PCI to LAD and Cx    in 2/2018),   on asa/ plavix/ statin    CKDIII, and HTN ,  hld.  c/c  anemia,  old  traumatic  leg wound, severe  spinal stenosis L 4/  AAA  5.8    admitted  with a fall at home/ no loc  denies  abd pain/ fevers/  cp/sob  per  dr mondragon, pt  will need  AAA repair, at a later  date  Ct  abdomen, pending      < from: MR Lumbar Spine No Cont (12.10.18 @ 22:57) >  IMPRESSION: Degenerative changes of the lumbar spine with severe spinal   stenosis at L4-5 due to a combination of degenerative facet changes and   grade 1anterolisthesis L4 on L5. Large abdominal aortic aneurys  < end of copied text >     < from: CT Abdomen and Pelvis No Cont (12.10.18 @ 17:36) >  INTERPRETATION:  Infrarenal 5.8 cm abdominal aortic aneurysm. No   intramural hematoma or periaortic fat stranding.  < end of copied text > 88 year old M with    PMH of CAD  ,   (w/ PCI to LAD and Cx    in 2/2018),   on asa/ plavix/ statin    CKDIII, and HTN ,  hld.  c/c  anemia,  old  traumatic  leg wound, severe  spinal stenosis L 4/  AAA  5.8    admitted  with a fall at home/ no loc  denies  abd pain/ fevers/  cp/sob  s/p AAA repair.a nd upon  d/c  , needed  munoz  plan was  for tov in am  will d/c  munoz in  am      < from: MR Lumbar Spine No Cont (12.10.18 @ 22:57) >  IMPRESSION: Degenerative changes of the lumbar spine with severe spinal   stenosis at L4-5 due to a combination of degenerative facet changes and   grade 1anterolisthesis L4 on L5. Large abdominal aortic aneurys  < end of copied text >     < from: CT Abdomen and Pelvis No Cont (12.10.18 @ 17:36) >  INTERPRETATION:  Infrarenal 5.8 cm abdominal aortic aneurysm. No   intramural hematoma or periaortic fat stranding.  < end of copied text > 88 year old M with    PMH of CAD  ,   (w/ PCI to LAD and Cx    in 2/2018),   on asa/ plavix/ statin    CKDIII, and HTN ,  hld.  c/c  anemia,  old  traumatic  leg wound, severe  spinal stenosis L 4/  AAA  5.8    admitted  with a fall at home/ no loc  troponin  positive  denies  abd pain/ fevers/  cp/sob  s/p AAA repair.  and   on   d/c  , needed  munoz  plan was  for tov in am  guaiac  positive/ gi  eval  will d/c  munoz in  am      < from: MR Lumbar Spine No Cont (12.10.18 @ 22:57) >  IMPRESSION: Degenerative changes of the lumbar spine with severe spinal   stenosis at L4-5 due to a combination of degenerative facet changes and   grade 1anterolisthesis L4 on L5. Large abdominal aortic aneurys  < end of copied text >     < from: CT Abdomen and Pelvis No Cont (12.10.18 @ 17:36) >  INTERPRETATION:  Infrarenal 5.8 cm abdominal aortic aneurysm. No   intramural hematoma or periaortic fat stranding.  < end of copied text > 88 year old M with    PMH of CAD  ,   (w/ PCI to LAD and Cx    in 2/2018),   on asa/ plavix/ statin    CKDIII, and HTN ,  hld.  c/c  anemia,  old  traumatic  leg wound, severe  spinal stenosis L 4/  AAA  5.8    admitted  with a fall at home/  lost  his balance/ has   ah/o falls in the past / no loc  ct head, no acute   cva/ indeterminate  infarct  clinically, pt  has  normal speech, /  no new  motor  deficit/  pt   does  not  have  an  acute cva  troponin  positive  denies  abd pain/ fevers/  cp/sob  s/p AAA repair.  and   on   d/c  , needed  munoz  plan was  for tov in am  guaiac  positive/ gi  eval/ dr mccloud  will d/c  munoz in  am      < from: MR Lumbar Spine No Cont (12.10.18 @ 22:57) >  IMPRESSION: Degenerative changes of the lumbar spine with severe spinal   stenosis at L4-5 due to a combination of degenerative facet changes and   grade 1anterolisthesis L4 on L5. Large abdominal aortic aneurys  < end of copied text >     < from: CT Abdomen and Pelvis No Cont (12.10.18 @ 17:36) >  INTERPRETATION:  Infrarenal 5.8 cm abdominal aortic aneurysm. No   intramural hematoma or periaortic fat stranding.  < end of copied text >

## 2019-02-14 NOTE — DISCHARGE NOTE ADULT - HOSPITAL COURSE
89-year-old gentleman with the past medical history of hypertension, diastolic congestive heart failure, L4-L5 severe spinal stenosis, chronic kidney disease stage IV, recently had an endovascular aneurysm repair present from home s/p fall and GIB. UA positive for leukocytes and UCx with enterococcus. ID eval noted, no Sxs or signs of infection, will view Enterococcus as c/w asymptomatic bacteriuria observe off antibiotics. CT head and MRI head negative for acute infarct or bleed. No fractures noted on cervical spine CT or hip, femur xrays. Cardiology eval noted. s/p Upper endoscopy normal and Colonoscopy with polyp noted, s/p polypectomy, plan for capsule study as out-pt 89-year-old gentleman with the past medical history of hypertension, diastolic congestive heart failure, L4-L5 severe spinal stenosis, chronic kidney disease stage IV, recently had an endovascular aneurysm repair present from home s/p fall and GIB. UA positive for leukocytes and UCx with enterococcus. ID eval noted, no Sxs or signs of infection, will view Enterococcus as c/w asymptomatic bacteriuria observe off antibiotics. CT head and MRI head negative for acute infarct or bleed. No fractures noted on cervical spine CT or hip, femur xrays. Cardiology eval noted. s/p Upper endoscopy normal and Colonoscopy with polyp noted, s/p polypectomy, plan for capsule study as out-pt     88 year old M with    PMH of CAD  ,   (w/ PCI to LAD and Cx    in 2/2018),   on asa/ plavix/ statin    CKDIII, and HTN ,  hld.  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  and   on   d/c  , needed  munoz  tov, on 2/15,  voiding  well  guaiac  positive/   h/o falls, PT eval recommended  rehab   mri head, no  cvaegd normal/  colonoscopy, polyp removed  capsule  endoscopy as  an   op  d/c  planning to rehab, / son  [place]  dc today      < from: MR Lumbar Spine No Cont (12.10.18 @ 22:57) >  IMPRESSION: Degenerative changes of the lumbar spine with severe spinal   stenosis at L4-5 due to a combination of degenerative facet changes and   grade 1anterolisthesis L4 on L5. Large abdominal aortic aneurys  < end of copied text >

## 2019-02-14 NOTE — DISCHARGE NOTE ADULT - ADDITIONAL INSTRUCTIONS
Follow up with gastroenterologists for out-patient capsule study upon discharge from rehab  Follow up with primary care physician after rehab  Call to make appointments, bring discharge paperwork to follow up visits

## 2019-02-14 NOTE — CONSULT NOTE ADULT - SUBJECTIVE AND OBJECTIVE BOX
HPI:  CHIEF COMPLAINT:Patient is a 89y old  Male who presents with a chief complaint of fall.    HPI:  88 year old M with PMH    of CAD  ,   (w/ PCI to LAD and Cx    in 2018),    on asa/ plavix/ statin,.   CKDIII, and HTN ,  hld who presents to the ED  with a    Fall. patient   with walker while making toast and fell backwards onto his buttocks and head. he was unable to get up so called  to help him. he then went about his day getting ready for follow up with MD following his endovascular AAA repair, but throughout the course of the day became weaker.   patient with no chest pain or sob prior to fall. states he had NO LOC and recalls the whole thing. no fever/chills/n/v/abdominal pain. no cough/URI symptoms.    PAST MEDICAL & SURGICAL HISTORY:  CAD (coronary artery disease)  HLD (hyperlipidemia)  CKD (chronic kidney disease)  HTN (hypertension)  Stented coronary artery      MEDICATIONS  (STANDING):    MEDICATIONS  (PRN):      FAMILY HISTORY:      SOCIAL HISTORY:    [ ] Non-smoker  [ ] Smoker  [ ] Alcohol    Allergies    penicillin (Angioedema)    Intolerances    	    REVIEW OF SYSTEMS:  CONSTITUTIONAL: No fever, weight loss, or fatigue  EYES: No eye pain, visual disturbances, or discharge  ENT:  No difficulty hearing, tinnitus, vertigo; No sinus or throat pain  NECK: No pain or stiffness  RESPIRATORY: No cough, wheezing, chills or hemoptysis; + Shortness of Breath  CARDIOVASCULAR: No chest pain, palpitations, passing out, dizziness, or leg swelling  GASTROINTESTINAL: No abdominal or epigastric pain. No nausea, vomiting, or hematemesis; No diarrhea or constipation. No melena or hematochezia.  GENITOURINARY: No dysuria, frequency, hematuria, or incontinence  NEUROLOGICAL: No headaches, memory loss, loss of strength, numbness, or tremors  SKIN: No itching, burning, rashes, or lesions   LYMPH Nodes: No enlarged glands  ENDOCRINE: No heat or cold intolerance; No hair loss  MUSCULOSKELETAL: No joint pain or swelling; No muscle, back, or extremity pain  PSYCHIATRIC: No depression, anxiety, mood swings, or difficulty sleeping  HEME/LYMPH: No easy bruising, or bleeding gums  ALLERGY AND IMMUNOLOGIC: No hives or eczema	    [ ] All others negative	  [ ] Unable to obtain    PHYSICAL EXAM:  T(C): 36.7 (19 @ 17:59), Max: 36.8 (19 @ 17:48)  HR: 63 (19 @ 21:45) (61 - 85)  BP: 145/66 (19 @ 21:45) (90/50 - 145/66)  RR: 16 (19 @ 21:45) (16 - 19)  SpO2: 100% (19 @ 21:45) (99% - 100%)  Wt(kg): --  I&O's Summary      Appearance: Normal	  HEENT:   Normal oral mucosa, PERRL, EOMI	  Lymphatic: No lymphadenopathy  Cardiovascular: Normal S1 S2, No JVD, +murmurs, No edema  Respiratory: Lungs clear to auscultation	  Psychiatry: A & O x 3, Mood & affect appropriate  Gastrointestinal:  Soft, Non-tender, + BS	  Skin: No rashes, No ecchymoses, No cyanosis	  Neurologic: Non-focal  Extremities: Normal range of motion, No clubbing, cyanosis or edema  Vascular: Peripheral pulses palpable 2+ bilaterally    TELEMETRY: 	    ECG:  	  RADIOLOGY:  OTHER: 	  	  LABS:	 	    CARDIAC MARKERS:                              9.0    6.8   )-----------( 286      ( 2019 18:36 )             26.3         143  |  102  |  30<H>  ----------------------------<  120<H>  4.5   |  28  |  1.89<H>    Ca    8.5      2019 18:36    TPro  6.4  /  Alb  3.4  /  TBili  0.3  /  DBili  x   /  AST  19  /  ALT  16  /  AlkPhos  77      proBNP: Serum Pro-Brain Natriuretic Peptide: 1820 pg/mL ( @ 18:36)    Lipid Profile:   HgA1c:   TSH:   PT/INR - ( 2019 18:36 )   PT: 12.0 sec;   INR: 1.05 ratio         PTT - ( 2019 18:36 )  PTT:23.7 sec    PREVIOUS DIAGNOSTIC TESTING:    < from: TTE with Doppler (w/Cont) (. @ 13:23) >  1. Mild concentric left ventricular hypertrophy.  2. Endocardial visualization enhanced with intravenous  injection of Ultrasonic Enhancing Agent (Definity).  Hyperdynamic left ventricle. No obvious wall motion  abnormalities.  3. Reversal of the E-A  waves of the mitral inflow pattern  is consistent with diastolicLV dysfunction.  4. Normal right ventricular size and function.    < from: CT Cervical Spine No Cont (19 @ 19:10) >    1. Head CT: No intracranial hemorrhage or displaced calvarial fracture.   Age-indeterminate lacunar infarct left centrum semiovale.    2. Cervical spine CT: No acute displaced fracture or traumatic   malalignment.  Multilevel degenerative disc disease.      < from: Xray Chest 1 View AP/PA (19 @ 19:37) >  INTERPRETATION:  no emergent findings    < from: CT Abdomen and Pelvis No Cont (12.10.18 @ 17:36) >  A 5.6 cm infrarenal abdominal aortic aneurysm, as above. No surrounding   inflammatory change or freefluid.      < end of copied text > (2019 21:47)      REVIEW OF SYSTEMS:  GEN: no fever,    no chills  RESP: no SOB,   no cough  CVS: no chest pain,   no palpitations  GI: no abdominal pain,   no nausea,   no vomiting,   no constipation,   no diarrhea  : no dysuria,   no frequency  NEURO: no headache,   no dizziness  PSYCH: no depression,   not anxious  Derm : no rash    Allergies    penicillin (Angioedema)    Intolerances        CAPILLARY BLOOD GLUCOSE        I&O's Summary    2019 07:01  -  2019 07:00  --------------------------------------------------------  IN: 240 mL / OUT: 670 mL / NET: -430 mL      EXTREMITIES:         edema  NEUROLOGY:  alert    MEDICATIONS  (STANDING):  atorvastatin 40 milliGRAM(s) Oral at bedtime  docusate sodium 100 milliGRAM(s) Oral three times a day  furosemide    Tablet 20 milliGRAM(s) Oral daily  influenza   Vaccine 0.5 milliLiter(s) IntraMuscular once  metoprolol succinate ER 50 milliGRAM(s) Oral daily  pantoprazole  Injectable 40 milliGRAM(s) IV Push two times a day  tamsulosin 0.4 milliGRAM(s) Oral at bedtime    MEDICATIONS  (PRN):    LABS:                        9.4    5.4   )-----------( 280      ( 2019 05:58 )             27.7     02-    143  |  105  |  26<H>  ----------------------------<  101<H>  4.1   |  26  |  1.55<H>    Ca    8.3<L>      2019 05:58    TPro  5.8<L>  /  Alb  3.0<L>  /  TBili  0.3  /  DBili  x   /  AST  17  /  ALT  15  /  AlkPhos  76  02-14    PT/INR - ( 2019 18:36 )   PT: 12.0 sec;   INR: 1.05 ratio         PTT - ( 2019 18:36 )  PTT:23.7 sec      Urinalysis Basic - ( 2019 22:39 )    Color: Yellow / Appearance: Slightly Turbid / S.019 / pH: x  Gluc: x / Ketone: Negative  / Bili: Negative / Urobili: Negative   Blood: x / Protein: 100 / Nitrite: Negative   Leuk Esterase: Large / RBC: 39 /hpf /  /HPF   Sq Epi: x / Non Sq Epi: 0 /hpf / Bacteria: Few          - @ 18:36  4.2  <20              Consultant(s) Notes Reviewed:      Care Discussed with Consultants/Other Providers:  Plan:

## 2019-02-15 ENCOUNTER — APPOINTMENT (OUTPATIENT)
Dept: UROLOGY | Facility: CLINIC | Age: 84
End: 2019-02-15

## 2019-02-15 LAB
ANION GAP SERPL CALC-SCNC: 11 MMOL/L — SIGNIFICANT CHANGE UP (ref 5–17)
BUN SERPL-MCNC: 21 MG/DL — SIGNIFICANT CHANGE UP (ref 7–23)
CALCIUM SERPL-MCNC: 8.4 MG/DL — SIGNIFICANT CHANGE UP (ref 8.4–10.5)
CHLORIDE SERPL-SCNC: 105 MMOL/L — SIGNIFICANT CHANGE UP (ref 96–108)
CHOLEST SERPL-MCNC: 120 MG/DL — SIGNIFICANT CHANGE UP (ref 10–199)
CO2 SERPL-SCNC: 27 MMOL/L — SIGNIFICANT CHANGE UP (ref 22–31)
CREAT SERPL-MCNC: 1.6 MG/DL — HIGH (ref 0.5–1.3)
GLUCOSE SERPL-MCNC: 100 MG/DL — HIGH (ref 70–99)
HBA1C BLD-MCNC: 5 % — SIGNIFICANT CHANGE UP (ref 4–5.6)
HCT VFR BLD CALC: 29 % — LOW (ref 39–50)
HDLC SERPL-MCNC: 37 MG/DL — LOW
HGB BLD-MCNC: 9.3 G/DL — LOW (ref 13–17)
LIPID PNL WITH DIRECT LDL SERPL: 67 MG/DL — SIGNIFICANT CHANGE UP
MCHC RBC-ENTMCNC: 29.4 PG — SIGNIFICANT CHANGE UP (ref 27–34)
MCHC RBC-ENTMCNC: 32.1 GM/DL — SIGNIFICANT CHANGE UP (ref 32–36)
MCV RBC AUTO: 91.8 FL — SIGNIFICANT CHANGE UP (ref 80–100)
PLATELET # BLD AUTO: 340 K/UL — SIGNIFICANT CHANGE UP (ref 150–400)
POTASSIUM SERPL-MCNC: 4 MMOL/L — SIGNIFICANT CHANGE UP (ref 3.5–5.3)
POTASSIUM SERPL-SCNC: 4 MMOL/L — SIGNIFICANT CHANGE UP (ref 3.5–5.3)
RBC # BLD: 3.16 M/UL — LOW (ref 4.2–5.8)
RBC # FLD: 15.8 % — HIGH (ref 10.3–14.5)
SODIUM SERPL-SCNC: 143 MMOL/L — SIGNIFICANT CHANGE UP (ref 135–145)
TOTAL CHOLESTEROL/HDL RATIO MEASUREMENT: 3.2 RATIO — LOW (ref 3.4–9.6)
TRIGL SERPL-MCNC: 78 MG/DL — SIGNIFICANT CHANGE UP (ref 10–149)
WBC # BLD: 5.68 K/UL — SIGNIFICANT CHANGE UP (ref 3.8–10.5)
WBC # FLD AUTO: 5.68 K/UL — SIGNIFICANT CHANGE UP (ref 3.8–10.5)

## 2019-02-15 RX ORDER — ACETAMINOPHEN 500 MG
650 TABLET ORAL ONCE
Refills: 0 | Status: COMPLETED | OUTPATIENT
Start: 2019-02-15 | End: 2019-02-15

## 2019-02-15 RX ADMIN — Medication 81 MILLIGRAM(S): at 11:04

## 2019-02-15 RX ADMIN — Medication 50 MILLIGRAM(S): at 05:07

## 2019-02-15 RX ADMIN — Medication 650 MILLIGRAM(S): at 17:55

## 2019-02-15 RX ADMIN — PANTOPRAZOLE SODIUM 40 MILLIGRAM(S): 20 TABLET, DELAYED RELEASE ORAL at 17:00

## 2019-02-15 RX ADMIN — ATORVASTATIN CALCIUM 40 MILLIGRAM(S): 80 TABLET, FILM COATED ORAL at 21:48

## 2019-02-15 RX ADMIN — HEPARIN SODIUM 5000 UNIT(S): 5000 INJECTION INTRAVENOUS; SUBCUTANEOUS at 17:00

## 2019-02-15 RX ADMIN — Medication 20 MILLIGRAM(S): at 05:07

## 2019-02-15 RX ADMIN — Medication 650 MILLIGRAM(S): at 17:01

## 2019-02-15 RX ADMIN — Medication 650 MILLIGRAM(S): at 11:04

## 2019-02-15 RX ADMIN — PANTOPRAZOLE SODIUM 40 MILLIGRAM(S): 20 TABLET, DELAYED RELEASE ORAL at 05:07

## 2019-02-15 RX ADMIN — HEPARIN SODIUM 5000 UNIT(S): 5000 INJECTION INTRAVENOUS; SUBCUTANEOUS at 05:07

## 2019-02-15 RX ADMIN — Medication 100 MILLIGRAM(S): at 05:07

## 2019-02-15 RX ADMIN — TAMSULOSIN HYDROCHLORIDE 0.4 MILLIGRAM(S): 0.4 CAPSULE ORAL at 21:48

## 2019-02-15 RX ADMIN — Medication 650 MILLIGRAM(S): at 11:50

## 2019-02-15 NOTE — PROGRESS NOTE ADULT - ASSESSMENT
plan is for endoscopic evaluation next week off plavix  follow CBC  guaiac positive stool noted  CT scan shows no obvious GI pathology

## 2019-02-15 NOTE — PROGRESS NOTE ADULT - ASSESSMENT
88 year old M with    PMH of CAD  ,   (w/ PCI to LAD and Cx    in 2/2018),   on asa/ plavix/ statin    CKDIII, and HTN ,  hld.  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  and   on   d/c  , needed  munoz  tov, this  am  follow  urine  c/s  guaiac  positive/ gi   w/p,  next  week per  GI  h/o falls, PT eval      < from: MR Lumbar Spine No Cont (12.10.18 @ 22:57) >  IMPRESSION: Degenerative changes of the lumbar spine with severe spinal   stenosis at L4-5 due to a combination of degenerative facet changes and   grade 1anterolisthesis L4 on L5. Large abdominal aortic aneurys  < end of copied text >

## 2019-02-15 NOTE — PROGRESS NOTE ADULT - ASSESSMENT
The patient is an 89-year-old gentleman with the past medical history of hypertension, diastolic congestive heart failure, chronic kidney disease stage IV presents with falling and had an inability of strength in his legs.  The patient recently had an endovascular aneurysm repair done and he was unable to urinate without the Flores.  The patient has a history of L4-L5 severe spinal stenosis.  I wonder if this is playing a role in his inability to stand.  Guaiac positive stools   1.	Renal.  At present, he is at his baseline renal function--> in fact slightly improved.  No need for a renal sonogram.  I would leave in the Flores.  2.	Cardiovascular.  Orthostasis has been ordered.  3.	Neurosurg-Will discuss with primary care regarding getting neurosurgery to evaluate him again.  4.	GI -EGD Monday

## 2019-02-15 NOTE — PROGRESS NOTE ADULT - SUBJECTIVE AND OBJECTIVE BOX
no  compalints    REVIEW OF SYSTEMS:  GEN: no fever,    no chills  RESP: no SOB,   no cough  CVS: no chest pain,   no palpitations  GI: no abdominal pain,   no nausea,   no vomiting,   no constipation,   no diarrhea  : no dysuria,   no frequency  NEURO: no headache,   no dizziness  PSYCH: no depression,   not anxious  Derm : no rash    MEDICATIONS  (STANDING):  aspirin enteric coated 81 milliGRAM(s) Oral daily  atorvastatin 40 milliGRAM(s) Oral at bedtime  docusate sodium 100 milliGRAM(s) Oral three times a day  furosemide    Tablet 20 milliGRAM(s) Oral daily  heparin  Injectable 5000 Unit(s) SubCutaneous every 12 hours  influenza   Vaccine 0.5 milliLiter(s) IntraMuscular once  metoprolol succinate ER 50 milliGRAM(s) Oral daily  pantoprazole  Injectable 40 milliGRAM(s) IV Push two times a day  tamsulosin 0.4 milliGRAM(s) Oral at bedtime    MEDICATIONS  (PRN):      Vital Signs Last 24 Hrs  T(C): 36.7 (15 Feb 2019 04:31), Max: 36.8 (2019 20:04)  T(F): 98 (15 Feb 2019 04:31), Max: 98.3 (2019 20:04)  HR: 64 (15 Feb 2019 04:31) (61 - 66)  BP: 149/72 (15 Feb 2019 04:31) (123/63 - 149/72)  BP(mean): --  RR: 18 (15 Feb 2019 04:31) (18 - 18)  SpO2: 97% (15 Feb 2019 04:31) (97% - 99%)  CAPILLARY BLOOD GLUCOSE        I&O's Summary    2019 07:01  -  15 Feb 2019 07:00  --------------------------------------------------------  IN: 1080 mL / OUT: 1410 mL / NET: -330 mL        PHYSICAL EXAM:  HEAD:  Atraumatic, Normocephalic  NECK: Supple, No   JVD  CHEST/LUNG:   no     rales,     no,    rhonchi  HEART: Regular rate and rhythm;         murmur  ABDOMEN: Soft, Nontender, ;   EXTREMITIES:    no    edema  NEUROLOGY:  alert    LABS:                        9.4    5.4   )-----------( 280      ( 2019 05:58 )             27.7     02-15    143  |  105  |  21  ----------------------------<  100<H>  4.0   |  27  |  1.60<H>    Ca    8.4      15 Feb 2019 05:58    TPro  5.8<L>  /  Alb  3.0<L>  /  TBili  0.3  /  DBili  x   /  AST  17  /  ALT  15  /  AlkPhos  76  02-14    PT/INR - ( 2019 18:36 )   PT: 12.0 sec;   INR: 1.05 ratio         PTT - ( 2019 18:36 )  PTT:23.7 sec      Urinalysis Basic - ( 2019 22:39 )    Color: Yellow / Appearance: Slightly Turbid / S.019 / pH: x  Gluc: x / Ketone: Negative  / Bili: Negative / Urobili: Negative   Blood: x / Protein: 100 / Nitrite: Negative   Leuk Esterase: Large / RBC: 39 /hpf /  /HPF   Sq Epi: x / Non Sq Epi: 0 /hpf / Bacteria: Few          - @ 18:36  4.2  <20              Consultant(s) Notes Reviewed:      Care Discussed with Consultants/Other Providers:

## 2019-02-15 NOTE — PROGRESS NOTE ADULT - SUBJECTIVE AND OBJECTIVE BOX
Post  EVAR  for  AAA  Right  groin  incison healed  well Both  legs  and  feet  are warm Intact motor   and sensory function  No  ischemia   Flores  removed  this  morning  As far as Vascular  surgery  he  is  doing  well following  the  EVAR

## 2019-02-15 NOTE — PROGRESS NOTE ADULT - SUBJECTIVE AND OBJECTIVE BOX
[facility] [phone]        Patient seen and examined in bed.  He was in good spirits and offered no complaints        MEDICATIONS  (STANDING):  aspirin enteric coated 81 milliGRAM(s) Oral daily  atorvastatin 40 milliGRAM(s) Oral at bedtime  docusate sodium 100 milliGRAM(s) Oral three times a day  furosemide    Tablet 20 milliGRAM(s) Oral daily  heparin  Injectable 5000 Unit(s) SubCutaneous every 12 hours  influenza   Vaccine 0.5 milliLiter(s) IntraMuscular once  metoprolol succinate ER 50 milliGRAM(s) Oral daily  pantoprazole  Injectable 40 milliGRAM(s) IV Push two times a day  tamsulosin 0.4 milliGRAM(s) Oral at bedtime      VITAL:  T(C): , Max: 36.8 (19 @ 20:04)  T(F): , Max: 98.3 (19 @ 20:04)  HR: 64 (02-15-19 @ 04:31)  BP: 149/72 (02-15-19 @ 04:31)  BP(mean): --  RR: 18 (02-15-19 @ 04:31)  SpO2: 97% (02-15-19 @ 04:31)  Wt(kg): --    I and O's:     @ 07:01  -  02-15 @ 07:00  --------------------------------------------------------  IN: 840 mL / OUT: 800 mL / NET: 40 mL          PHYSICAL EXAM:    Constitutional: NAD  Neck:  No JVD  Respiratory: CTAB/L  Cardiovascular: S1 and S2  Gastrointestinal: BS+, soft, NT/ND  Extremities: No peripheral edema  Neurological: A/O x 3,   Psychiatric: Normal mood, normal affect  : +  Flores  Skin: No rashes  Access: Not applicable    LABS:                        9.4    5.4   )-----------( 280      ( 2019 05:58 )             27.7     02-15    143  |  105  |  21  ----------------------------<  100<H>  4.0   |  27  |  1.60<H>    Ca    8.4      15 Feb 2019 05:58    TPro  5.8<L>  /  Alb  3.0<L>  /  TBili  0.3  /  DBili  x   /  AST  17  /  ALT  15  /  AlkPhos  76  02-14          Urine Studies:  Urinalysis Basic - ( 2019 22:39 )    Color: Yellow / Appearance: Slightly Turbid / S.019 / pH: x  Gluc: x / Ketone: Negative  / Bili: Negative / Urobili: Negative   Blood: x / Protein: 100 / Nitrite: Negative   Leuk Esterase: Large / RBC: 39 /hpf /  /HPF   Sq Epi: x / Non Sq Epi: 0 /hpf / Bacteria: Few            RADIOLOGY & ADDITIONAL STUDIES:          < from: CT Abdomen and Pelvis No Cont (19 @ 23:03) >    EXAM:  CT ABDOMEN AND PELVIS                            PROCEDURE DATE:  2019            INTERPRETATION:  Clinical information: Abdominal aortic aneurysm    COMPARISON: December 10, 2018    PROCEDURE:   CT of the abdomen and pelvis was performed.  IV contrast:  None  Oral contrast:  None  Coronal and sagittal reformatted images were obtained    FINDINGS:    LOWER CHEST: Trace left pleural effusion. Coronary artery calcifications.    LIVER: Within normal limits.  SPLEEN: Within normal limits.  PANCREAS: Within normal limits.  GALLBLADDER: Within normal limits.  BILE DUCTS: Normal caliber.  ADRENALS: Within normal limits.  KIDNEYS/URETERS: No mass, stone or hydronephrosis. Left renal cyst.      RETROPERITONEUM: No lymphadenopathy.    VESSELS:  Status post interval aortobiiliac endograft placement with no   change in size of the sac which measures 5.4 cm in maximal AP dimension.    BOWEL: No bowel obstruction, wall thickening or inflammatory change.   Appendix normal. Colonic diverticulosis without diverticulitis. Large   hiatal hernia.  PERITONEUM: No ascites or pneumoperitoneum.    REPRODUCTIVE ORGANS: The prostate is enlarged.  BLADDER: Collapsed around a Flores catheter balloon.    ABDOMINAL WALL: Small amount of hemorrhage inthe right groin   subcutaneous tissues.  BONES: No acute bony abnormality. Spinal and left hip degenerative   arthritic changes.    IMPRESSION:   No acute pathology.  Status post aortobiiliac endograft with no change in size of aneurysm sac   as compared with December 10, 2018.                          NEETU MCKEON M.D., ATTENDING RADIOLOGIST  This document has been electronically signed. 2019 10:03AM                < end of copied text >

## 2019-02-15 NOTE — PHYSICAL THERAPY INITIAL EVALUATION ADULT - PLANNED THERAPY INTERVENTIONS, PT EVAL
GOAL: Pt will perform 5 stairs with or without U HR as needed within 3-4weeks./bed mobility training/transfer training/balance training/gait training

## 2019-02-15 NOTE — PROGRESS NOTE ADULT - SUBJECTIVE AND OBJECTIVE BOX
INTERVAL HPI/OVERNIGHT EVENTS: no active bleeding, stable overnight, plavix held    MEDICATIONS  (STANDING):  aspirin enteric coated 81 milliGRAM(s) Oral daily  atorvastatin 40 milliGRAM(s) Oral at bedtime  docusate sodium 100 milliGRAM(s) Oral three times a day  furosemide    Tablet 20 milliGRAM(s) Oral daily  heparin  Injectable 5000 Unit(s) SubCutaneous every 12 hours  influenza   Vaccine 0.5 milliLiter(s) IntraMuscular once  metoprolol succinate ER 50 milliGRAM(s) Oral daily  pantoprazole  Injectable 40 milliGRAM(s) IV Push two times a day  tamsulosin 0.4 milliGRAM(s) Oral at bedtime    MEDICATIONS  (PRN):      Allergies    penicillin (Angioedema)    Intolerances            PHYSICAL EXAM:   Vital Signs:  Vital Signs Last 24 Hrs  T(C): 36.7 (15 Feb 2019 04:31), Max: 36.8 (2019 20:04)  T(F): 98 (15 Feb 2019 04:31), Max: 98.3 (2019 20:04)  HR: 64 (15 Feb 2019 04:31) (61 - 66)  BP: 149/72 (15 Feb 2019 04:31) (123/63 - 149/72)  BP(mean): --  RR: 18 (15 Feb 2019 04:31) (18 - 18)  SpO2: 97% (15 Feb 2019 04:31) (97% - 99%)  Daily     Daily     GENERAL:  no distress  HEENT:  NC/AT,  anicteric  CHEST:   no increased effort, breath sounds clear  HEART:  Regular rhythm  ABDOMEN:  Soft, non-tender, non-distended, normoactive bowel sounds,  no masses ,no hepato-splenomegaly, no signs of chronic liver disease  EXTEREMITIES:  no cyanosis      LABS:                        9.4    5.4   )-----------( 280      ( 2019 05:58 )             27.7     02-15    143  |  105  |  21  ----------------------------<  100<H>  4.0   |  27  |  1.60<H>    Ca    8.4      15 Feb 2019 05:58    TPro  5.8<L>  /  Alb  3.0<L>  /  TBili  0.3  /  DBili  x   /  AST  17  /  ALT  15  /  AlkPhos  76  02-14    PT/INR - ( 2019 18:36 )   PT: 12.0 sec;   INR: 1.05 ratio         PTT - ( 2019 18:36 )  PTT:23.7 sec  Urinalysis Basic - ( 2019 22:39 )    Color: Yellow / Appearance: Slightly Turbid / S.019 / pH: x  Gluc: x / Ketone: Negative  / Bili: Negative / Urobili: Negative   Blood: x / Protein: 100 / Nitrite: Negative   Leuk Esterase: Large / RBC: 39 /hpf /  /HPF   Sq Epi: x / Non Sq Epi: 0 /hpf / Bacteria: Few        RADIOLOGY & ADDITIONAL TESTS:

## 2019-02-15 NOTE — PHYSICAL THERAPY INITIAL EVALUATION ADULT - PRECAUTIONS/LIMITATIONS, REHAB EVAL
Head CT (2/13): No intracranial hemorrhage or displaced calvarial fracture. Age-indeterminate lacunar infarct left centrum semiovale.

## 2019-02-15 NOTE — PROGRESS NOTE ADULT - SUBJECTIVE AND OBJECTIVE BOX
CARDIOLOGY     PROGRESS  NOTE   ________________________________________________    CHIEF COMPLAINT:Patient is a 89y old  Male who presents with a chief complaint of fall ?syncope (15 Feb 2019 08:19)  doing better.  	  REVIEW OF SYSTEMS:  CONSTITUTIONAL: No fever, weight loss, or fatigue  EYES: No eye pain, visual disturbances, or discharge  ENT:  No difficulty hearing, tinnitus, vertigo; No sinus or throat pain  NECK: No pain or stiffness  RESPIRATORY: No cough, wheezing, chills or hemoptysis; No Shortness of Breath  CARDIOVASCULAR: No chest pain, palpitations, passing out, dizziness, or leg swelling  GASTROINTESTINAL: No abdominal or epigastric pain. No nausea, vomiting, or hematemesis; No diarrhea or constipation. No melena or hematochezia.  GENITOURINARY: No dysuria, frequency, hematuria, or incontinence  NEUROLOGICAL: No headaches, memory loss, loss of strength, numbness, or tremors  SKIN: No itching, burning, rashes, or lesions   LYMPH Nodes: No enlarged glands  ENDOCRINE: No heat or cold intolerance; No hair loss  MUSCULOSKELETAL: No joint pain or swelling; No muscle, back, or extremity pain  PSYCHIATRIC: No depression, anxiety, mood swings, or difficulty sleeping  HEME/LYMPH: No easy bruising, or bleeding gums  ALLERGY AND IMMUNOLOGIC: No hives or eczema	    [ ] All others negative	  [ ] Unable to obtain    PHYSICAL EXAM:  T(C): 36.7 (02-15-19 @ 04:31), Max: 36.8 (19 @ 20:04)  HR: 64 (02-15-19 @ 04:31) (61 - 66)  BP: 149/72 (02-15-19 @ 04:31) (123/63 - 149/72)  RR: 18 (02-15-19 @ 04:31) (18 - 18)  SpO2: 97% (02-15-19 @ 04:31) (97% - 99%)  Wt(kg): --  I&O's Summary    2019 07:01  -  15 2019 07:00  --------------------------------------------------------  IN: 1080 mL / OUT: 1410 mL / NET: -330 mL        Appearance: Normal	  HEENT:   Normal oral mucosa, PERRL, EOMI	  Lymphatic: No lymphadenopathy  Cardiovascular: Normal S1 S2, No JVD, + murmurs, No edema  Respiratory: Lungs clear to auscultation	  Psychiatry: A & O x 3, Mood & affect appropriate  Gastrointestinal:  Soft, Non-tender, + BS	  Skin: No rashes, No ecchymoses, No cyanosis	  Neurologic: Non-focal  Extremities: Normal range of motion, No clubbing, cyanosis or edema  Vascular: Peripheral pulses palpable 2+ bilaterally    MEDICATIONS  (STANDING):  aspirin enteric coated 81 milliGRAM(s) Oral daily  atorvastatin 40 milliGRAM(s) Oral at bedtime  docusate sodium 100 milliGRAM(s) Oral three times a day  furosemide    Tablet 20 milliGRAM(s) Oral daily  heparin  Injectable 5000 Unit(s) SubCutaneous every 12 hours  influenza   Vaccine 0.5 milliLiter(s) IntraMuscular once  metoprolol succinate ER 50 milliGRAM(s) Oral daily  pantoprazole  Injectable 40 milliGRAM(s) IV Push two times a day  tamsulosin 0.4 milliGRAM(s) Oral at bedtime      TELEMETRY: 	    ECG:  	  RADIOLOGY:  OTHER: 	  	  LABS:	 	    CARDIAC MARKERS:                                9.4    5.4   )-----------( 280      ( 2019 05:58 )             27.7     02-15    143  |  105  |  21  ----------------------------<  100<H>  4.0   |  27  |  1.60<H>    Ca    8.4      15 Feb 2019 05:58    TPro  5.8<L>  /  Alb  3.0<L>  /  TBili  0.3  /  DBili  x   /  AST  17  /  ALT  15  /  AlkPhos  76  02    proBNP: Serum Pro-Brain Natriuretic Peptide: 1820 pg/mL ( @ 18:36)    Lipid Profile:   HgA1c:   TSH:   PT/INR - ( 2019 18:36 )   PT: 12.0 sec;   INR: 1.05 ratio         PTT - ( 2019 18:36 )  PTT:23.7 sec      Assessment and plan  ---------------------------  pt is a high risk candidate for surgery  AAA needs to be addressed vascular DR mondragon discussed with primary   continue cardiac m,eds CARDIOLOGY     PROGRESS  NOTE   ________________________________________________    CHIEF COMPLAINT:Patient is a 89y old  Male who presents with a chief complaint of fall ?syncope (15 Feb 2019 08:19)  doing better.  	  REVIEW OF SYSTEMS:  CONSTITUTIONAL: No fever, weight loss, or fatigue  EYES: No eye pain, visual disturbances, or discharge  ENT:  No difficulty hearing, tinnitus, vertigo; No sinus or throat pain  NECK: No pain or stiffness  RESPIRATORY: No cough, wheezing, chills or hemoptysis; No Shortness of Breath  CARDIOVASCULAR: No chest pain, palpitations, passing out, dizziness, or leg swelling  GASTROINTESTINAL: No abdominal or epigastric pain. No nausea, vomiting, or hematemesis; No diarrhea or constipation. No melena or hematochezia.  GENITOURINARY: No dysuria, frequency, hematuria, or incontinence  NEUROLOGICAL: No headaches, memory loss, loss of strength, numbness, or tremors  SKIN: No itching, burning, rashes, or lesions   LYMPH Nodes: No enlarged glands  ENDOCRINE: No heat or cold intolerance; No hair loss  MUSCULOSKELETAL: No joint pain or swelling; No muscle, back, or extremity pain  PSYCHIATRIC: No depression, anxiety, mood swings, or difficulty sleeping  HEME/LYMPH: No easy bruising, or bleeding gums  ALLERGY AND IMMUNOLOGIC: No hives or eczema	    [ ] All others negative	  [ ] Unable to obtain    PHYSICAL EXAM:  T(C): 36.7 (02-15-19 @ 04:31), Max: 36.8 (02-14-19 @ 20:04)  HR: 64 (02-15-19 @ 04:31) (61 - 66)  BP: 149/72 (02-15-19 @ 04:31) (123/63 - 149/72)  RR: 18 (02-15-19 @ 04:31) (18 - 18)  SpO2: 97% (02-15-19 @ 04:31) (97% - 99%)  Wt(kg): --  I&O's Summary    14 Feb 2019 07:01  -  15 Feb 2019 07:00  --------------------------------------------------------  IN: 1080 mL / OUT: 1410 mL / NET: -330 mL        Appearance: Normal	  HEENT:   Normal oral mucosa, PERRL, EOMI	  Lymphatic: No lymphadenopathy  Cardiovascular: Normal S1 S2, No JVD, + murmurs, No edema  Respiratory: Lungs clear to auscultation	  Psychiatry: A & O x 3, Mood & affect appropriate  Gastrointestinal:  Soft, Non-tender, + BS	  Skin: No rashes, No ecchymoses, No cyanosis	  Neurologic: Non-focal  Extremities: Normal range of motion, No clubbing, cyanosis or edema  Vascular: Peripheral pulses palpable 2+ bilaterally    MEDICATIONS  (STANDING):  aspirin enteric coated 81 milliGRAM(s) Oral daily  atorvastatin 40 milliGRAM(s) Oral at bedtime  docusate sodium 100 milliGRAM(s) Oral three times a day  furosemide    Tablet 20 milliGRAM(s) Oral daily  heparin  Injectable 5000 Unit(s) SubCutaneous every 12 hours  influenza   Vaccine 0.5 milliLiter(s) IntraMuscular once  metoprolol succinate ER 50 milliGRAM(s) Oral daily  pantoprazole  Injectable 40 milliGRAM(s) IV Push two times a day  tamsulosin 0.4 milliGRAM(s) Oral at bedtime      TELEMETRY: 	    ECG:  	  RADIOLOGY:  OTHER: 	  	  LABS:	 	    CARDIAC MARKERS:                                9.4    5.4   )-----------( 280      ( 14 Feb 2019 05:58 )             27.7     02-15    143  |  105  |  21  ----------------------------<  100<H>  4.0   |  27  |  1.60<H>    Ca    8.4      15 Feb 2019 05:58    TPro  5.8<L>  /  Alb  3.0<L>  /  TBili  0.3  /  DBili  x   /  AST  17  /  ALT  15  /  AlkPhos  76  02-14    proBNP: Serum Pro-Brain Natriuretic Peptide: 1820 pg/mL (02-13 @ 18:36)    Lipid Profile:   HgA1c:   TSH:   PT/INR - ( 13 Feb 2019 18:36 )   PT: 12.0 sec;   INR: 1.05 ratio         PTT - ( 13 Feb 2019 18:36 )  PTT:23.7 sec      Assessment and plan  ---------------------------  pt is a high risk candidate for surgery  AAA endovascular stent was done 2 weeks ago  arik weber,eds

## 2019-02-15 NOTE — PHYSICAL THERAPY INITIAL EVALUATION ADULT - PERTINENT HX OF CURRENT PROBLEM, REHAB EVAL
Pt is a 88 year old M with PMH of CAD  , (w/ PCI to LAD and Cx    in 2/2018),   on asa/ plavix/ statin,. CKDIII, and HTN ,  hld who presents to the ED following Fall. patient was walking with walker while making toast and fell backwards onto his buttocks and head.X-ray L femur (2/13):No acute fracture or dislocation. Severe left hip arthrosis. Post  EVAR  for  AAA Pt is a 88 year old M with PMH of CAD  , (w/ PCI to LAD and Cx    in 2/2018),   on asa/ plavix/ statin,. CKDIII, and HTN ,  hld who presents to the ED following Fall. patient was walking with walker while making toast and fell backwards onto his buttocks and head.X-ray L femur (2/13):No acute fracture or dislocation. Severe left hip arthrosis. Post  EVAR  for  AAA (2/4/19)

## 2019-02-15 NOTE — PHYSICAL THERAPY INITIAL EVALUATION ADULT - ADDITIONAL COMMENTS
As per pt, pt lives in an apartment alone and 5 steps to enter w/ UHR w/ elevator acess. PTA pt was independent w/ all mobility w/ RW and ADLs. Pt states his sone lives near and is able to assist as needed.

## 2019-02-16 LAB
-  AMPICILLIN: SIGNIFICANT CHANGE UP
-  CIPROFLOXACIN: SIGNIFICANT CHANGE UP
-  LEVOFLOXACIN: SIGNIFICANT CHANGE UP
-  NITROFURANTOIN: SIGNIFICANT CHANGE UP
-  TETRACYCLINE: SIGNIFICANT CHANGE UP
-  VANCOMYCIN: SIGNIFICANT CHANGE UP
ANION GAP SERPL CALC-SCNC: 10 MMOL/L — SIGNIFICANT CHANGE UP (ref 5–17)
BUN SERPL-MCNC: 24 MG/DL — HIGH (ref 7–23)
CALCIUM SERPL-MCNC: 8.7 MG/DL — SIGNIFICANT CHANGE UP (ref 8.4–10.5)
CHLORIDE SERPL-SCNC: 105 MMOL/L — SIGNIFICANT CHANGE UP (ref 96–108)
CO2 SERPL-SCNC: 27 MMOL/L — SIGNIFICANT CHANGE UP (ref 22–31)
CREAT SERPL-MCNC: 1.69 MG/DL — HIGH (ref 0.5–1.3)
CULTURE RESULTS: SIGNIFICANT CHANGE UP
GLUCOSE SERPL-MCNC: 94 MG/DL — SIGNIFICANT CHANGE UP (ref 70–99)
HCT VFR BLD CALC: 30.6 % — LOW (ref 39–50)
HGB BLD-MCNC: 9.4 G/DL — LOW (ref 13–17)
MCHC RBC-ENTMCNC: 28.4 PG — SIGNIFICANT CHANGE UP (ref 27–34)
MCHC RBC-ENTMCNC: 30.7 GM/DL — LOW (ref 32–36)
MCV RBC AUTO: 92.4 FL — SIGNIFICANT CHANGE UP (ref 80–100)
METHOD TYPE: SIGNIFICANT CHANGE UP
ORGANISM # SPEC MICROSCOPIC CNT: SIGNIFICANT CHANGE UP
ORGANISM # SPEC MICROSCOPIC CNT: SIGNIFICANT CHANGE UP
PLATELET # BLD AUTO: 355 K/UL — SIGNIFICANT CHANGE UP (ref 150–400)
POTASSIUM SERPL-MCNC: 3.9 MMOL/L — SIGNIFICANT CHANGE UP (ref 3.5–5.3)
POTASSIUM SERPL-SCNC: 3.9 MMOL/L — SIGNIFICANT CHANGE UP (ref 3.5–5.3)
RBC # BLD: 3.31 M/UL — LOW (ref 4.2–5.8)
RBC # FLD: 15.8 % — HIGH (ref 10.3–14.5)
SODIUM SERPL-SCNC: 142 MMOL/L — SIGNIFICANT CHANGE UP (ref 135–145)
SPECIMEN SOURCE: SIGNIFICANT CHANGE UP
WBC # BLD: 4.61 K/UL — SIGNIFICANT CHANGE UP (ref 3.8–10.5)
WBC # FLD AUTO: 4.61 K/UL — SIGNIFICANT CHANGE UP (ref 3.8–10.5)

## 2019-02-16 PROCEDURE — 70551 MRI BRAIN STEM W/O DYE: CPT | Mod: 26

## 2019-02-16 RX ORDER — GABAPENTIN 400 MG/1
300 CAPSULE ORAL THREE TIMES A DAY
Refills: 0 | Status: DISCONTINUED | OUTPATIENT
Start: 2019-02-16 | End: 2019-02-22

## 2019-02-16 RX ADMIN — TAMSULOSIN HYDROCHLORIDE 0.4 MILLIGRAM(S): 0.4 CAPSULE ORAL at 21:51

## 2019-02-16 RX ADMIN — PANTOPRAZOLE SODIUM 40 MILLIGRAM(S): 20 TABLET, DELAYED RELEASE ORAL at 05:16

## 2019-02-16 RX ADMIN — GABAPENTIN 300 MILLIGRAM(S): 400 CAPSULE ORAL at 11:29

## 2019-02-16 RX ADMIN — GABAPENTIN 300 MILLIGRAM(S): 400 CAPSULE ORAL at 21:51

## 2019-02-16 RX ADMIN — Medication 81 MILLIGRAM(S): at 11:29

## 2019-02-16 RX ADMIN — Medication 20 MILLIGRAM(S): at 05:17

## 2019-02-16 RX ADMIN — HEPARIN SODIUM 5000 UNIT(S): 5000 INJECTION INTRAVENOUS; SUBCUTANEOUS at 17:02

## 2019-02-16 RX ADMIN — PANTOPRAZOLE SODIUM 40 MILLIGRAM(S): 20 TABLET, DELAYED RELEASE ORAL at 17:01

## 2019-02-16 RX ADMIN — Medication 50 MILLIGRAM(S): at 05:17

## 2019-02-16 RX ADMIN — ATORVASTATIN CALCIUM 40 MILLIGRAM(S): 80 TABLET, FILM COATED ORAL at 21:51

## 2019-02-16 RX ADMIN — HEPARIN SODIUM 5000 UNIT(S): 5000 INJECTION INTRAVENOUS; SUBCUTANEOUS at 05:16

## 2019-02-16 NOTE — PROGRESS NOTE ADULT - SUBJECTIVE AND OBJECTIVE BOX
CARDIOLOGY     PROGRESS  NOTE   ________________________________________________    CHIEF COMPLAINT:Patient is a 89y old  Male who presents with a chief complaint of fall ?syncope (16 Feb 2019 08:44)  no complain.  	  REVIEW OF SYSTEMS:  CONSTITUTIONAL: No fever, weight loss, or fatigue  EYES: No eye pain, visual disturbances, or discharge  ENT:  No difficulty hearing, tinnitus, vertigo; No sinus or throat pain  NECK: No pain or stiffness  RESPIRATORY: No cough, wheezing, chills or hemoptysis; +Shortness of Breath  CARDIOVASCULAR: No chest pain, palpitations, passing out, dizziness, or leg swelling  GASTROINTESTINAL: No abdominal or epigastric pain. No nausea, vomiting, or hematemesis; No diarrhea or constipation. No melena or hematochezia.  GENITOURINARY: No dysuria, frequency, hematuria, or incontinence  NEUROLOGICAL: No headaches, memory loss, loss of strength, numbness, or tremors  SKIN: No itching, burning, rashes, or lesions   LYMPH Nodes: No enlarged glands  ENDOCRINE: No heat or cold intolerance; No hair loss  MUSCULOSKELETAL: No joint pain or swelling; No muscle, back, or extremity pain  PSYCHIATRIC: No depression, anxiety, mood swings, or difficulty sleeping  HEME/LYMPH: No easy bruising, or bleeding gums  ALLERGY AND IMMUNOLOGIC: No hives or eczema	    [ ] All others negative	  [ ] Unable to obtain    PHYSICAL EXAM:  T(C): 36.6 (02-16-19 @ 04:27), Max: 36.8 (02-15-19 @ 11:28)  HR: 62 (02-16-19 @ 05:09) (62 - 70)  BP: 166/68 (02-16-19 @ 05:09) (127/63 - 166/68)  RR: 18 (02-16-19 @ 04:27) (18 - 18)  SpO2: 98% (02-16-19 @ 04:27) (97% - 99%)  Wt(kg): --  I&O's Summary    15 Feb 2019 07:01  -  16 Feb 2019 07:00  --------------------------------------------------------  IN: 940 mL / OUT: 850 mL / NET: 90 mL        Appearance: Normal	  HEENT:   Normal oral mucosa, PERRL, EOMI	  Lymphatic: No lymphadenopathy  Cardiovascular: Normal S1 S2, No JVD, + murmurs, No edema  Respiratory: Lungs clear to auscultation	  Psychiatry: A & O x 3, Mood & affect appropriate  Gastrointestinal:  Soft, Non-tender, + BS	  Skin: No rashes, No ecchymoses, No cyanosis	  Neurologic: Non-focal  Extremities: Normal range of motion, No clubbing, cyanosis or edema  Vascular: Peripheral pulses palpable 2+ bilaterally    MEDICATIONS  (STANDING):  aspirin enteric coated 81 milliGRAM(s) Oral daily  atorvastatin 40 milliGRAM(s) Oral at bedtime  docusate sodium 100 milliGRAM(s) Oral three times a day  furosemide    Tablet 20 milliGRAM(s) Oral daily  gabapentin 300 milliGRAM(s) Oral three times a day  heparin  Injectable 5000 Unit(s) SubCutaneous every 12 hours  influenza   Vaccine 0.5 milliLiter(s) IntraMuscular once  metoprolol succinate ER 50 milliGRAM(s) Oral daily  pantoprazole  Injectable 40 milliGRAM(s) IV Push two times a day  tamsulosin 0.4 milliGRAM(s) Oral at bedtime      TELEMETRY: 	    ECG:  	  RADIOLOGY:  OTHER: 	  	  LABS:	 	    CARDIAC MARKERS:                                9.3    5.68  )-----------( 340      ( 15 Feb 2019 08:02 )             29.0     02-16    142  |  105  |  24<H>  ----------------------------<  94  3.9   |  27  |  1.69<H>    Ca    8.7      16 Feb 2019 06:59      proBNP: Serum Pro-Brain Natriuretic Peptide: 1820 pg/mL (02-13 @ 18:36)    Lipid Profile: Cholesterol 120  LDL 67  HDL 37  TG 78    HgA1c: Hemoglobin A1C, Whole Blood: 5.0 % (02-15 @ 08:02)    TSH:         Assessment and plan  ---------------------------  doing better  no arrythmia on tele  will adjust bp ,meds  s/p AAA stent

## 2019-02-16 NOTE — PROGRESS NOTE ADULT - SUBJECTIVE AND OBJECTIVE BOX
no gi bleed    REVIEW OF SYSTEMS:  GEN: no fever,    no chills  RESP: no SOB,   no cough  CVS: no chest pain,   no palpitations  GI: no abdominal pain,   no nausea,   no vomiting,   no constipation,   no diarrhea  : no dysuria,   no frequency  NEURO: no headache,   no dizziness  PSYCH: no depression,   not anxious  Derm : no rash    MEDICATIONS  (STANDING):  aspirin enteric coated 81 milliGRAM(s) Oral daily  atorvastatin 40 milliGRAM(s) Oral at bedtime  docusate sodium 100 milliGRAM(s) Oral three times a day  furosemide    Tablet 20 milliGRAM(s) Oral daily  heparin  Injectable 5000 Unit(s) SubCutaneous every 12 hours  influenza   Vaccine 0.5 milliLiter(s) IntraMuscular once  metoprolol succinate ER 50 milliGRAM(s) Oral daily  pantoprazole  Injectable 40 milliGRAM(s) IV Push two times a day  tamsulosin 0.4 milliGRAM(s) Oral at bedtime    MEDICATIONS  (PRN):      Vital Signs Last 24 Hrs  T(C): 36.6 (16 Feb 2019 04:27), Max: 36.8 (15 Feb 2019 11:28)  T(F): 97.8 (16 Feb 2019 04:27), Max: 98.2 (15 Feb 2019 11:28)  HR: 62 (16 Feb 2019 05:09) (62 - 70)  BP: 166/68 (16 Feb 2019 05:09) (127/63 - 166/68)  BP(mean): 101 (15 Feb 2019 23:18) (101 - 101)  RR: 18 (16 Feb 2019 04:27) (18 - 18)  SpO2: 98% (16 Feb 2019 04:27) (97% - 99%)  CAPILLARY BLOOD GLUCOSE        I&O's Summary    15 Feb 2019 07:01  -  16 Feb 2019 07:00  --------------------------------------------------------  IN: 940 mL / OUT: 850 mL / NET: 90 mL        PHYSICAL EXAM:  HEAD:  Atraumatic, Normocephalic  NECK: Supple, No   JVD  CHEST/LUNG:   no     rales,     no,    rhonchi  HEART: Regular rate and rhythm;         murmur  ABDOMEN: Soft, Nontender, ;   EXTREMITIES:   no    edema  NEUROLOGY:  alert    LABS:                        9.3    5.68  )-----------( 340      ( 15 Feb 2019 08:02 )             29.0     02-16    142  |  105  |  24<H>  ----------------------------<  94  3.9   |  27  |  1.69<H>    Ca    8.7      16 Feb 2019 06:59                    Hemoglobin A1C, Whole Blood: 5.0 % (02-15 @ 08:02)            Consultant(s) Notes Reviewed:      Care Discussed with Consultants/Other Providers:

## 2019-02-16 NOTE — PROGRESS NOTE ADULT - ASSESSMENT
The patient is an 89-year-old gentleman with the past medical history of hypertension, diastolic congestive heart failure, chronic kidney disease stage IV presents with falling and had an inability of strength in his legs.  The patient recently had an endovascular aneurysm repair done and he was unable to urinate without the Munoz.  The patient has a history of L4-L5 severe spinal stenosis.  I wonder if this is playing a role in his inability to stand.      1.  Renal:  at present, he is at his baseline renal function: Renal fnx  slightly declined today though stable overall                  -   No need for a renal sonogram.   2.   s/p off munoz yes: tUOP 850mL/24 hr and 600 mL this AM  3.   Cardiovascular: s/p AAA stent- monitor orthostasis.  consider BP meds per Cardiology   4. Neurosurg - Will discuss with primary care regarding getting neurosurgery to evaluate him again.  5.   GI/ Guaiac positive stools- EGD/Colon possibly Wednesday due to holiday weekend per GI

## 2019-02-16 NOTE — PROGRESS NOTE ADULT - SUBJECTIVE AND OBJECTIVE BOX
KHUSHI SNOWDEN:[id number],   89yMale followed for:  penicillin (Angioedema)    PAST MEDICAL & SURGICAL HISTORY:  CAD (coronary artery disease)  HLD (hyperlipidemia)  CKD (chronic kidney disease)  HTN (hypertension)  Stented coronary artery    FAMILY HISTORY:    MEDICATIONS  (STANDING):  aspirin enteric coated 81 milliGRAM(s) Oral daily  atorvastatin 40 milliGRAM(s) Oral at bedtime  docusate sodium 100 milliGRAM(s) Oral three times a day  furosemide    Tablet 20 milliGRAM(s) Oral daily  gabapentin 300 milliGRAM(s) Oral three times a day  heparin  Injectable 5000 Unit(s) SubCutaneous every 12 hours  influenza   Vaccine 0.5 milliLiter(s) IntraMuscular once  metoprolol succinate ER 50 milliGRAM(s) Oral daily  pantoprazole  Injectable 40 milliGRAM(s) IV Push two times a day  tamsulosin 0.4 milliGRAM(s) Oral at bedtime    MEDICATIONS  (PRN):      Vital Signs Last 24 Hrs  T(C): 36.6 (16 Feb 2019 04:27), Max: 36.7 (15 Feb 2019 20:50)  T(F): 97.8 (16 Feb 2019 04:27), Max: 98 (15 Feb 2019 20:50)  HR: 62 (16 Feb 2019 05:09) (62 - 70)  BP: 166/68 (16 Feb 2019 05:09) (127/63 - 166/68)  BP(mean): 101 (15 Feb 2019 23:18) (101 - 101)  RR: 18 (16 Feb 2019 04:27) (18 - 18)  SpO2: 98% (16 Feb 2019 04:27) (97% - 99%)  nc/at  s1s2  cta  soft, nt, nd no guarding or rebound  no c/c/e    CBC Full  -  ( 16 Feb 2019 09:20 )  WBC Count : 4.61 K/uL  Hemoglobin : 9.4 g/dL  Hematocrit : 30.6 %  Platelet Count - Automated : 355 K/uL  Mean Cell Volume : 92.4 fl  Mean Cell Hemoglobin : 28.4 pg  Mean Cell Hemoglobin Concentration : 30.7 gm/dL  Auto Neutrophil # : x  Auto Lymphocyte # : x  Auto Monocyte # : x  Auto Eosinophil # : x  Auto Basophil # : x  Auto Neutrophil % : x  Auto Lymphocyte % : x  Auto Monocyte % : x  Auto Eosinophil % : x  Auto Basophil % : x    02-16    142  |  105  |  24<H>  ----------------------------<  94  3.9   |  27  |  1.69<H>    Ca    8.7      16 Feb 2019 06:59

## 2019-02-16 NOTE — PROGRESS NOTE ADULT - ASSESSMENT
88 year old M with    PMH of CAD  ,   (w/ PCI to LAD and Cx    in 2/2018),   on asa/ plavix/ statin    CKDIII, and HTN ,  hld.  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  and   on   d/c  , needed  munoz  tov, on 2/15  guaiac  positive/ gi   w/p,  next  week per  GI  h/o falls, PT eval  hb stable/  awaiting  mri head      < from: MR Lumbar Spine No Cont (12.10.18 @ 22:57) >  IMPRESSION: Degenerative changes of the lumbar spine with severe spinal   stenosis at L4-5 due to a combination of degenerative facet changes and   grade 1anterolisthesis L4 on L5. Large abdominal aortic aneurys  < end of copied text >

## 2019-02-16 NOTE — PROGRESS NOTE ADULT - SUBJECTIVE AND OBJECTIVE BOX
Patient seen and examined.  Denies difficulty urination s/p off munoz: voids to urinal. No complaints.    REVIEW OF SYSTEMS:  As per HPI, otherwise 8 full 10 ROS were unremarkable    MEDICATIONS  (STANDING):  aspirin enteric coated 81 milliGRAM(s) Oral daily  atorvastatin 40 milliGRAM(s) Oral at bedtime  docusate sodium 100 milliGRAM(s) Oral three times a day  furosemide    Tablet 20 milliGRAM(s) Oral daily  gabapentin 300 milliGRAM(s) Oral three times a day  heparin  Injectable 5000 Unit(s) SubCutaneous every 12 hours  influenza   Vaccine 0.5 milliLiter(s) IntraMuscular once  metoprolol succinate ER 50 milliGRAM(s) Oral daily  pantoprazole  Injectable 40 milliGRAM(s) IV Push two times a day  tamsulosin 0.4 milliGRAM(s) Oral at bedtime      VITAL:  T(C): , Max: 36.7 (02-15-19 @ 20:50)  T(F): , Max: 98 (02-15-19 @ 20:50)  HR: 62 (02-16-19 @ 05:09)  BP: 166/68 (02-16-19 @ 05:09)  BP(mean): 101 (02-15-19 @ 23:18)  RR: 18 (02-16-19 @ 04:27)  SpO2: 98% (02-16-19 @ 04:27)  Wt(kg): --    I and O's:    02-15 @ 07:01  -  02-16 @ 07:00  --------------------------------------------------------  IN: 940 mL / OUT: 850 mL / NET: 90 mL    02-16 @ 07:01  -  02-16 @ 12:29  --------------------------------------------------------  IN: 0 mL / OUT: 600 mL / NET: -600 mL          PHYSICAL EXAM:    Constitutional: NAD  Neck:  No JVD  Respiratory: CTAB/L  Cardiovascular: S1 and S2  Gastrointestinal: BS+, soft, NT/ND  Extremities: No peripheral edema  Neurological: A/O x 3,   Psychiatric: Normal mood, normal affect  : no Munoz  Skin: No rashes  Access: Not applicable    LABS:                        9.4    4.61  )-----------( 355      ( 16 Feb 2019 09:20 )             30.6     02-16    142  |  105  |  24<H>  ----------------------------<  94  3.9   |  27  |  1.69<H>    Ca    8.7      16 Feb 2019 06:59

## 2019-02-17 LAB
ANION GAP SERPL CALC-SCNC: 13 MMOL/L — SIGNIFICANT CHANGE UP (ref 5–17)
BUN SERPL-MCNC: 25 MG/DL — HIGH (ref 7–23)
CALCIUM SERPL-MCNC: 8.9 MG/DL — SIGNIFICANT CHANGE UP (ref 8.4–10.5)
CHLORIDE SERPL-SCNC: 106 MMOL/L — SIGNIFICANT CHANGE UP (ref 96–108)
CO2 SERPL-SCNC: 26 MMOL/L — SIGNIFICANT CHANGE UP (ref 22–31)
CREAT SERPL-MCNC: 1.61 MG/DL — HIGH (ref 0.5–1.3)
GLUCOSE SERPL-MCNC: 89 MG/DL — SIGNIFICANT CHANGE UP (ref 70–99)
HCT VFR BLD CALC: 33.9 % — LOW (ref 39–50)
HGB BLD-MCNC: 10.3 G/DL — LOW (ref 13–17)
MCHC RBC-ENTMCNC: 28.1 PG — SIGNIFICANT CHANGE UP (ref 27–34)
MCHC RBC-ENTMCNC: 30.4 GM/DL — LOW (ref 32–36)
MCV RBC AUTO: 92.6 FL — SIGNIFICANT CHANGE UP (ref 80–100)
PLATELET # BLD AUTO: 383 K/UL — SIGNIFICANT CHANGE UP (ref 150–400)
POTASSIUM SERPL-MCNC: 4.1 MMOL/L — SIGNIFICANT CHANGE UP (ref 3.5–5.3)
POTASSIUM SERPL-SCNC: 4.1 MMOL/L — SIGNIFICANT CHANGE UP (ref 3.5–5.3)
RBC # BLD: 3.66 M/UL — LOW (ref 4.2–5.8)
RBC # FLD: 15.9 % — HIGH (ref 10.3–14.5)
SODIUM SERPL-SCNC: 145 MMOL/L — SIGNIFICANT CHANGE UP (ref 135–145)
WBC # BLD: 8.77 K/UL — SIGNIFICANT CHANGE UP (ref 3.8–10.5)
WBC # FLD AUTO: 8.77 K/UL — SIGNIFICANT CHANGE UP (ref 3.8–10.5)

## 2019-02-17 RX ORDER — AMLODIPINE BESYLATE 2.5 MG/1
2.5 TABLET ORAL DAILY
Refills: 0 | Status: DISCONTINUED | OUTPATIENT
Start: 2019-02-17 | End: 2019-02-22

## 2019-02-17 RX ADMIN — Medication 81 MILLIGRAM(S): at 10:53

## 2019-02-17 RX ADMIN — GABAPENTIN 300 MILLIGRAM(S): 400 CAPSULE ORAL at 22:25

## 2019-02-17 RX ADMIN — PANTOPRAZOLE SODIUM 40 MILLIGRAM(S): 20 TABLET, DELAYED RELEASE ORAL at 17:04

## 2019-02-17 RX ADMIN — AMLODIPINE BESYLATE 2.5 MILLIGRAM(S): 2.5 TABLET ORAL at 10:53

## 2019-02-17 RX ADMIN — GABAPENTIN 300 MILLIGRAM(S): 400 CAPSULE ORAL at 05:13

## 2019-02-17 RX ADMIN — ATORVASTATIN CALCIUM 40 MILLIGRAM(S): 80 TABLET, FILM COATED ORAL at 22:25

## 2019-02-17 RX ADMIN — HEPARIN SODIUM 5000 UNIT(S): 5000 INJECTION INTRAVENOUS; SUBCUTANEOUS at 17:04

## 2019-02-17 RX ADMIN — HEPARIN SODIUM 5000 UNIT(S): 5000 INJECTION INTRAVENOUS; SUBCUTANEOUS at 05:13

## 2019-02-17 RX ADMIN — Medication 50 MILLIGRAM(S): at 05:13

## 2019-02-17 RX ADMIN — PANTOPRAZOLE SODIUM 40 MILLIGRAM(S): 20 TABLET, DELAYED RELEASE ORAL at 05:13

## 2019-02-17 RX ADMIN — TAMSULOSIN HYDROCHLORIDE 0.4 MILLIGRAM(S): 0.4 CAPSULE ORAL at 22:25

## 2019-02-17 RX ADMIN — Medication 100 MILLIGRAM(S): at 22:25

## 2019-02-17 RX ADMIN — GABAPENTIN 300 MILLIGRAM(S): 400 CAPSULE ORAL at 13:07

## 2019-02-17 RX ADMIN — Medication 20 MILLIGRAM(S): at 05:13

## 2019-02-17 NOTE — PROGRESS NOTE ADULT - SUBJECTIVE AND OBJECTIVE BOX
no  compalints    REVIEW OF SYSTEMS:  GEN: no fever,    no chills  RESP: no SOB,   no cough  CVS: no chest pain,   no palpitations  GI: no abdominal pain,   no nausea,   no vomiting,   no constipation,   no diarrhea  : no dysuria,   no frequency  NEURO: no headache,   no dizziness  PSYCH: no depression,   not anxious  Derm : no rash    MEDICATIONS  (STANDING):  aspirin enteric coated 81 milliGRAM(s) Oral daily  atorvastatin 40 milliGRAM(s) Oral at bedtime  docusate sodium 100 milliGRAM(s) Oral three times a day  furosemide    Tablet 20 milliGRAM(s) Oral daily  gabapentin 300 milliGRAM(s) Oral three times a day  heparin  Injectable 5000 Unit(s) SubCutaneous every 12 hours  influenza   Vaccine 0.5 milliLiter(s) IntraMuscular once  metoprolol succinate ER 50 milliGRAM(s) Oral daily  pantoprazole  Injectable 40 milliGRAM(s) IV Push two times a day  tamsulosin 0.4 milliGRAM(s) Oral at bedtime    MEDICATIONS  (PRN):      Vital Signs Last 24 Hrs  T(C): 36.7 (17 Feb 2019 04:27), Max: 36.8 (16 Feb 2019 20:04)  T(F): 98.1 (17 Feb 2019 04:27), Max: 98.2 (16 Feb 2019 20:04)  HR: 66 (17 Feb 2019 04:27) (60 - 66)  BP: 167/70 (17 Feb 2019 04:27) (132/70 - 167/70)  BP(mean): --  RR: 18 (17 Feb 2019 04:27) (18 - 18)  SpO2: 97% (17 Feb 2019 04:27) (97% - 98%)  CAPILLARY BLOOD GLUCOSE        I&O's Summary    16 Feb 2019 07:01  -  17 Feb 2019 07:00  --------------------------------------------------------  IN: 300 mL / OUT: 1550 mL / NET: -1250 mL        PHYSICAL EXAM:  HEAD:  Atraumatic, Normocephalic  NECK: Supple, No   JVD  CHEST/LUNG:   no     rales,     no,    rhonchi  HEART: Regular rate and rhythm;         murmur  ABDOMEN: Soft, Nontender, ;   EXTREMITIES:     no   edema  NEUROLOGY:  alert    LABS:                        9.4    4.61  )-----------( 355      ( 16 Feb 2019 09:20 )             30.6     02-17    145  |  106  |  25<H>  ----------------------------<  89  4.1   |  26  |  1.61<H>    Ca    8.9      17 Feb 2019 06:42                    Hemoglobin A1C, Whole Blood: 5.0 % (02-15 @ 08:02)            Consultant(s) Notes Reviewed:      Care Discussed with Consultants/Other Providers:

## 2019-02-17 NOTE — PROGRESS NOTE ADULT - ASSESSMENT
astrid, likely pan endosocpy wed if can get endo time.  closed today and tomorrow.  emergencies likely boooked tuesday

## 2019-02-17 NOTE — PROGRESS NOTE ADULT - SUBJECTIVE AND OBJECTIVE BOX
CARDIOLOGY     PROGRESS  NOTE   ________________________________________________    CHIEF COMPLAINT:Patient is a 89y old  Male who presents with a chief complaint of fall ?syncope (17 Feb 2019 08:58)    	  REVIEW OF SYSTEMS:  CONSTITUTIONAL: No fever, weight loss, or fatigue  EYES: No eye pain, visual disturbances, or discharge  ENT:  No difficulty hearing, tinnitus, vertigo; No sinus or throat pain  NECK: No pain or stiffness  RESPIRATORY: No cough, wheezing, chills or hemoptysis; No Shortness of Breath  CARDIOVASCULAR: No chest pain, palpitations, passing out, dizziness, or leg swelling  GASTROINTESTINAL: No abdominal or epigastric pain. No nausea, vomiting, or hematemesis; No diarrhea or constipation. No melena or hematochezia.  GENITOURINARY: No dysuria, frequency, hematuria, or incontinence  NEUROLOGICAL: No headaches, memory loss, loss of strength, numbness, or tremors  SKIN: No itching, burning, rashes, or lesions   LYMPH Nodes: No enlarged glands  ENDOCRINE: No heat or cold intolerance; No hair loss  MUSCULOSKELETAL: No joint pain or swelling; No muscle, back, or extremity pain  PSYCHIATRIC: No depression, anxiety, mood swings, or difficulty sleeping  HEME/LYMPH: No easy bruising, or bleeding gums  ALLERGY AND IMMUNOLOGIC: No hives or eczema	    [ ] All others negative	  [ ] Unable to obtain    PHYSICAL EXAM:  T(C): 36.7 (02-17-19 @ 04:27), Max: 36.8 (02-16-19 @ 20:04)  HR: 66 (02-17-19 @ 04:27) (60 - 66)  BP: 167/70 (02-17-19 @ 04:27) (132/70 - 167/70)  RR: 18 (02-17-19 @ 04:27) (18 - 18)  SpO2: 97% (02-17-19 @ 04:27) (97% - 98%)  Wt(kg): --  I&O's Summary    16 Feb 2019 07:01  -  17 Feb 2019 07:00  --------------------------------------------------------  IN: 300 mL / OUT: 1550 mL / NET: -1250 mL        Appearance: Normal	  HEENT:   Normal oral mucosa, PERRL, EOMI	  Lymphatic: No lymphadenopathy  Cardiovascular: Normal S1 S2, No JVD, =murmurs, No edema  Respiratory: Lungs clear to auscultation	  Psychiatry: A & O x 3, Mood & affect appropriate  Gastrointestinal:  Soft, Non-tender, + BS	  Skin: No rashes, No ecchymoses, No cyanosis	  Neurologic: Non-focal  Extremities: Normal range of motion, No clubbing, cyanosis or edema  Vascular: Peripheral pulses palpable 2+ bilaterally    MEDICATIONS  (STANDING):  amLODIPine   Tablet 2.5 milliGRAM(s) Oral daily  aspirin enteric coated 81 milliGRAM(s) Oral daily  atorvastatin 40 milliGRAM(s) Oral at bedtime  docusate sodium 100 milliGRAM(s) Oral three times a day  furosemide    Tablet 20 milliGRAM(s) Oral daily  gabapentin 300 milliGRAM(s) Oral three times a day  heparin  Injectable 5000 Unit(s) SubCutaneous every 12 hours  influenza   Vaccine 0.5 milliLiter(s) IntraMuscular once  metoprolol succinate ER 50 milliGRAM(s) Oral daily  pantoprazole  Injectable 40 milliGRAM(s) IV Push two times a day  tamsulosin 0.4 milliGRAM(s) Oral at bedtime      TELEMETRY: 	    ECG:  	  RADIOLOGY:  OTHER: 	  	  LABS:	 	    CARDIAC MARKERS:                                9.4    4.61  )-----------( 355      ( 16 Feb 2019 09:20 )             30.6     02-17    145  |  106  |  25<H>  ----------------------------<  89  4.1   |  26  |  1.61<H>    Ca    8.9      17 Feb 2019 06:42      proBNP: Serum Pro-Brain Natriuretic Peptide: 1820 pg/mL (02-13 @ 18:36)    Lipid Profile: Cholesterol 120  LDL 67  HDL 37  TG 78    HgA1c: Hemoglobin A1C, Whole Blood: 5.0 % (02-15 @ 08:02)    TSH:         Assessment and plan  ---------------------------  AAA s/p stent  increase bp norvasc added  gi work up  continue cardiac meds

## 2019-02-17 NOTE — PROGRESS NOTE ADULT - SUBJECTIVE AND OBJECTIVE BOX
KHUSHI SNOWDEN:[id number],   89yMale followed for:  penicillin (Angioedema)    PAST MEDICAL & SURGICAL HISTORY:  CAD (coronary artery disease)  HLD (hyperlipidemia)  CKD (chronic kidney disease)  HTN (hypertension)  Stented coronary artery    FAMILY HISTORY:    MEDICATIONS  (STANDING):  amLODIPine   Tablet 2.5 milliGRAM(s) Oral daily  aspirin enteric coated 81 milliGRAM(s) Oral daily  atorvastatin 40 milliGRAM(s) Oral at bedtime  docusate sodium 100 milliGRAM(s) Oral three times a day  furosemide    Tablet 20 milliGRAM(s) Oral daily  gabapentin 300 milliGRAM(s) Oral three times a day  heparin  Injectable 5000 Unit(s) SubCutaneous every 12 hours  influenza   Vaccine 0.5 milliLiter(s) IntraMuscular once  metoprolol succinate ER 50 milliGRAM(s) Oral daily  pantoprazole  Injectable 40 milliGRAM(s) IV Push two times a day  tamsulosin 0.4 milliGRAM(s) Oral at bedtime    MEDICATIONS  (PRN):      Vital Signs Last 24 Hrs  T(C): 36.7 (17 Feb 2019 04:27), Max: 36.8 (16 Feb 2019 20:04)  T(F): 98.1 (17 Feb 2019 04:27), Max: 98.2 (16 Feb 2019 20:04)  HR: 66 (17 Feb 2019 04:27) (60 - 66)  BP: 167/70 (17 Feb 2019 04:27) (132/70 - 167/70)  BP(mean): --  RR: 18 (17 Feb 2019 04:27) (18 - 18)  SpO2: 97% (17 Feb 2019 04:27) (97% - 98%)  nc/at  s1s2  cta  soft, nt, nd no guarding or rebound  no c/c/e    CBC Full  -  ( 17 Feb 2019 09:06 )  WBC Count : 8.77 K/uL  Hemoglobin : 10.3 g/dL  Hematocrit : 33.9 %  Platelet Count - Automated : 383 K/uL  Mean Cell Volume : 92.6 fl  Mean Cell Hemoglobin : 28.1 pg  Mean Cell Hemoglobin Concentration : 30.4 gm/dL  Auto Neutrophil # : x  Auto Lymphocyte # : x  Auto Monocyte # : x  Auto Eosinophil # : x  Auto Basophil # : x  Auto Neutrophil % : x  Auto Lymphocyte % : x  Auto Monocyte % : x  Auto Eosinophil % : x  Auto Basophil % : x    02-17    145  |  106  |  25<H>  ----------------------------<  89  4.1   |  26  |  1.61<H>    Ca    8.9      17 Feb 2019 06:42

## 2019-02-17 NOTE — PROGRESS NOTE ADULT - ASSESSMENT
88 year old M with    PMH of CAD  ,   (w/ PCI to LAD and Cx    in 2/2018),   on asa/ plavix/ statin    CKDIII, and HTN ,  hld.  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  and   on   d/c  , needed  munoz  tov, on 2/15,  voiding  well  guaiac  positive/ gi   w/p,  next  week per  GI  h/o falls, PT eval   mri head, no  cva      < from: MR Lumbar Spine No Cont (12.10.18 @ 22:57) >  IMPRESSION: Degenerative changes of the lumbar spine with severe spinal   stenosis at L4-5 due to a combination of degenerative facet changes and   grade 1anterolisthesis L4 on L5. Large abdominal aortic aneurys  < end of copied text > 88 year old M with    PMH of CAD  ,   (w/ PCI to LAD and Cx    in 2/2018),   on asa/ plavix/ statin    CKDIII, and HTN ,  hld.  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  and   on   d/c  , needed  munoz  tov, on 2/15,  voiding  well  guaiac  positive/ gi   w/p,  next  week per  GI  h/o falls, PT eval recommended  rehab   mri head, no  cva      < from: MR Lumbar Spine No Cont (12.10.18 @ 22:57) >  IMPRESSION: Degenerative changes of the lumbar spine with severe spinal   stenosis at L4-5 due to a combination of degenerative facet changes and   grade 1anterolisthesis L4 on L5. Large abdominal aortic aneurys  < end of copied text >

## 2019-02-18 LAB
ANION GAP SERPL CALC-SCNC: 14 MMOL/L — SIGNIFICANT CHANGE UP (ref 5–17)
APPEARANCE UR: ABNORMAL
BACTERIA # UR AUTO: ABNORMAL
BILIRUB UR-MCNC: NEGATIVE — SIGNIFICANT CHANGE UP
BUN SERPL-MCNC: 25 MG/DL — HIGH (ref 7–23)
CALCIUM SERPL-MCNC: 8.9 MG/DL — SIGNIFICANT CHANGE UP (ref 8.4–10.5)
CHLORIDE SERPL-SCNC: 101 MMOL/L — SIGNIFICANT CHANGE UP (ref 96–108)
CO2 SERPL-SCNC: 26 MMOL/L — SIGNIFICANT CHANGE UP (ref 22–31)
COLOR SPEC: SIGNIFICANT CHANGE UP
CREAT SERPL-MCNC: 1.67 MG/DL — HIGH (ref 0.5–1.3)
DIFF PNL FLD: ABNORMAL
EPI CELLS # UR: 0 /HPF — SIGNIFICANT CHANGE UP (ref 0–5)
GLUCOSE SERPL-MCNC: 91 MG/DL — SIGNIFICANT CHANGE UP (ref 70–99)
GLUCOSE UR QL: NEGATIVE — SIGNIFICANT CHANGE UP
HCT VFR BLD CALC: 31.9 % — LOW (ref 39–50)
HGB BLD-MCNC: 9.9 G/DL — LOW (ref 13–17)
HYALINE CASTS # UR AUTO: 2 /LPF — SIGNIFICANT CHANGE UP (ref 0–7)
KETONES UR-MCNC: NEGATIVE — SIGNIFICANT CHANGE UP
LEUKOCYTE ESTERASE UR-ACNC: ABNORMAL
MCHC RBC-ENTMCNC: 28.4 PG — SIGNIFICANT CHANGE UP (ref 27–34)
MCHC RBC-ENTMCNC: 31 GM/DL — LOW (ref 32–36)
MCV RBC AUTO: 91.7 FL — SIGNIFICANT CHANGE UP (ref 80–100)
NITRITE UR-MCNC: NEGATIVE — SIGNIFICANT CHANGE UP
PH UR: 6.5 — SIGNIFICANT CHANGE UP (ref 5–8)
PLATELET # BLD AUTO: 367 K/UL — SIGNIFICANT CHANGE UP (ref 150–400)
POTASSIUM SERPL-MCNC: 4.1 MMOL/L — SIGNIFICANT CHANGE UP (ref 3.5–5.3)
POTASSIUM SERPL-SCNC: 4.1 MMOL/L — SIGNIFICANT CHANGE UP (ref 3.5–5.3)
PROT UR-MCNC: SIGNIFICANT CHANGE UP
RBC # BLD: 3.48 M/UL — LOW (ref 4.2–5.8)
RBC # FLD: 15.4 % — HIGH (ref 10.3–14.5)
RBC CASTS # UR COMP ASSIST: 3 /HPF — SIGNIFICANT CHANGE UP (ref 0–4)
SODIUM SERPL-SCNC: 141 MMOL/L — SIGNIFICANT CHANGE UP (ref 135–145)
SP GR SPEC: 1.01 — SIGNIFICANT CHANGE UP (ref 1.01–1.02)
UROBILINOGEN FLD QL: SIGNIFICANT CHANGE UP
WBC # BLD: 7.47 K/UL — SIGNIFICANT CHANGE UP (ref 3.8–10.5)
WBC # FLD AUTO: 7.47 K/UL — SIGNIFICANT CHANGE UP (ref 3.8–10.5)
WBC UR QL: 284 /HPF — HIGH (ref 0–5)

## 2019-02-18 RX ORDER — SOD SULF/SODIUM/NAHCO3/KCL/PEG
500 SOLUTION, RECONSTITUTED, ORAL ORAL ONCE
Refills: 0 | Status: COMPLETED | OUTPATIENT
Start: 2019-02-18 | End: 2019-02-18

## 2019-02-18 RX ORDER — SOD SULF/SODIUM/NAHCO3/KCL/PEG
500 SOLUTION, RECONSTITUTED, ORAL ORAL ONCE
Refills: 0 | Status: COMPLETED | OUTPATIENT
Start: 2019-02-19 | End: 2019-02-19

## 2019-02-18 RX ADMIN — Medication 20 MILLIGRAM(S): at 05:14

## 2019-02-18 RX ADMIN — Medication 100 MILLIGRAM(S): at 05:14

## 2019-02-18 RX ADMIN — TAMSULOSIN HYDROCHLORIDE 0.4 MILLIGRAM(S): 0.4 CAPSULE ORAL at 21:14

## 2019-02-18 RX ADMIN — HEPARIN SODIUM 5000 UNIT(S): 5000 INJECTION INTRAVENOUS; SUBCUTANEOUS at 05:13

## 2019-02-18 RX ADMIN — PANTOPRAZOLE SODIUM 40 MILLIGRAM(S): 20 TABLET, DELAYED RELEASE ORAL at 05:13

## 2019-02-18 RX ADMIN — Medication 50 MILLIGRAM(S): at 05:14

## 2019-02-18 RX ADMIN — ATORVASTATIN CALCIUM 40 MILLIGRAM(S): 80 TABLET, FILM COATED ORAL at 21:14

## 2019-02-18 RX ADMIN — HEPARIN SODIUM 5000 UNIT(S): 5000 INJECTION INTRAVENOUS; SUBCUTANEOUS at 17:28

## 2019-02-18 RX ADMIN — GABAPENTIN 300 MILLIGRAM(S): 400 CAPSULE ORAL at 21:14

## 2019-02-18 RX ADMIN — PANTOPRAZOLE SODIUM 40 MILLIGRAM(S): 20 TABLET, DELAYED RELEASE ORAL at 17:27

## 2019-02-18 RX ADMIN — AMLODIPINE BESYLATE 2.5 MILLIGRAM(S): 2.5 TABLET ORAL at 05:14

## 2019-02-18 RX ADMIN — Medication 100 MILLIGRAM(S): at 21:14

## 2019-02-18 RX ADMIN — GABAPENTIN 300 MILLIGRAM(S): 400 CAPSULE ORAL at 13:00

## 2019-02-18 RX ADMIN — Medication 81 MILLIGRAM(S): at 13:00

## 2019-02-18 RX ADMIN — GABAPENTIN 300 MILLIGRAM(S): 400 CAPSULE ORAL at 05:14

## 2019-02-18 NOTE — PROGRESS NOTE ADULT - SUBJECTIVE AND OBJECTIVE BOX
no  compalints    REVIEW OF SYSTEMS:  GEN: no fever,    no chills  RESP: no SOB,   no cough  CVS: no chest pain,   no palpitations  GI: no abdominal pain,   no nausea,   no vomiting,   no constipation,   no diarrhea  : no dysuria,   no frequency  NEURO: no headache,   no dizziness  PSYCH: no depression,   not anxious  Derm : no rash    MEDICATIONS  (STANDING):  amLODIPine   Tablet 2.5 milliGRAM(s) Oral daily  aspirin enteric coated 81 milliGRAM(s) Oral daily  atorvastatin 40 milliGRAM(s) Oral at bedtime  docusate sodium 100 milliGRAM(s) Oral three times a day  furosemide    Tablet 20 milliGRAM(s) Oral daily  gabapentin 300 milliGRAM(s) Oral three times a day  heparin  Injectable 5000 Unit(s) SubCutaneous every 12 hours  influenza   Vaccine 0.5 milliLiter(s) IntraMuscular once  metoprolol succinate ER 50 milliGRAM(s) Oral daily  pantoprazole  Injectable 40 milliGRAM(s) IV Push two times a day  tamsulosin 0.4 milliGRAM(s) Oral at bedtime    MEDICATIONS  (PRN):      Vital Signs Last 24 Hrs  T(C): 36.6 (18 Feb 2019 05:12), Max: 36.8 (17 Feb 2019 10:51)  T(F): 97.9 (18 Feb 2019 05:12), Max: 98.2 (17 Feb 2019 10:51)  HR: 61 (18 Feb 2019 05:12) (61 - 61)  BP: 131/73 (18 Feb 2019 05:12) (118/61 - 135/72)  BP(mean): --  RR: 18 (18 Feb 2019 05:12) (18 - 18)  SpO2: 97% (18 Feb 2019 05:12) (97% - 98%)  CAPILLARY BLOOD GLUCOSE        I&O's Summary    17 Feb 2019 07:01  -  18 Feb 2019 07:00  --------------------------------------------------------  IN: 320 mL / OUT: 550 mL / NET: -230 mL        PHYSICAL EXAM:  HEAD:  Atraumatic, Normocephalic  NECK: Supple, No   JVD  CHEST/LUNG:   no     rales,     no,    rhonchi  HEART: Regular rate and rhythm;         murmur  ABDOMEN: Soft, Nontender, ;   EXTREMITIES:    no    edema  NEUROLOGY:  alert    LABS:                        10.3   8.77  )-----------( 383      ( 17 Feb 2019 09:06 )             33.9     02-18    141  |  101  |  25<H>  ----------------------------<  91  4.1   |  26  |  1.67<H>    Ca    8.9      18 Feb 2019 06:36                    Hemoglobin A1C, Whole Blood: 5.0 % (02-15 @ 08:02)            Consultant(s) Notes Reviewed:      Care Discussed with Consultants/Other Providers:

## 2019-02-18 NOTE — PROGRESS NOTE ADULT - ASSESSMENT
88 year old M with    PMH of CAD  ,   (w/ PCI to LAD and Cx    in 2/2018),   on asa/ plavix/ statin    CKDIII, and HTN ,  hld.  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  and   on   d/c  , needed  munoz  tov, on 2/15,  voiding  well  guaiac  positive/ gi   w/p,  next  week per  GI  h/o falls, PT eval recommended  rehab   mri head, no  cva  per  gi/  w/p  on  wed      < from: MR Lumbar Spine No Cont (12.10.18 @ 22:57) >  IMPRESSION: Degenerative changes of the lumbar spine with severe spinal   stenosis at L4-5 due to a combination of degenerative facet changes and   grade 1anterolisthesis L4 on L5. Large abdominal aortic aneurys  < end of copied text >

## 2019-02-18 NOTE — PROGRESS NOTE ADULT - SUBJECTIVE AND OBJECTIVE BOX
Patient seen and examined  No complaints     REVIEW OF SYSTEMS:  As per HPI, otherwise 8 full 10 ROS were unremarkable    MEDICATIONS  (STANDING):  amLODIPine   Tablet 2.5 milliGRAM(s) Oral daily  aspirin enteric coated 81 milliGRAM(s) Oral daily  atorvastatin 40 milliGRAM(s) Oral at bedtime  docusate sodium 100 milliGRAM(s) Oral three times a day  furosemide    Tablet 20 milliGRAM(s) Oral daily  gabapentin 300 milliGRAM(s) Oral three times a day  heparin  Injectable 5000 Unit(s) SubCutaneous every 12 hours  influenza   Vaccine 0.5 milliLiter(s) IntraMuscular once  metoprolol succinate ER 50 milliGRAM(s) Oral daily  pantoprazole  Injectable 40 milliGRAM(s) IV Push two times a day  tamsulosin 0.4 milliGRAM(s) Oral at bedtime      VITAL:  T(C): , Max: 36.6 (02-18-19 @ 05:12)  T(F): , Max: 97.9 (02-18-19 @ 05:12)  HR: 61 (02-18-19 @ 05:12)  BP: 131/73 (02-18-19 @ 05:12)  BP(mean): --  RR: 18 (02-18-19 @ 05:12)  SpO2: 97% (02-18-19 @ 05:12)  Wt(kg): --    I and O's:    02-17 @ 07:01  -  02-18 @ 07:00  --------------------------------------------------------  IN: 320 mL / OUT: 550 mL / NET: -230 mL          PHYSICAL EXAM:    Constitutional: NAD  HEENT: PERRLA, EOMI,  MMM  Neck: No LAD, No JVD  Respiratory: CTAB  Cardiovascular: S1 and S2  Gastrointestinal: BS+, soft, NT/ND  Extremities: No peripheral edema  Neurological: A/O x 3, no focal deficits  Psychiatric: Normal mood, normal affect  : No Flores    LABS:                        9.9    7.47  )-----------( 367      ( 18 Feb 2019 09:52 )             31.9     02-18    141  |  101  |  25<H>  ----------------------------<  91  4.1   |  26  |  1.67<H>    Ca    8.9      18 Feb 2019 06:36        Assessment and Plan:   · Assessment      The patient is an 89-year-old gentleman with the past medical history of hypertension, diastolic congestive heart failure, chronic kidney disease stage IV presents with falling and had an inability of strength in his legs.  The patient recently had an endovascular aneurysm repair done and he was unable to urinate without the Flores.  The patient has a history of L4-L5 severe spinal stenosis.       - CKD III with baseline s.cr of 1.6 mg/dl - stable  - Euvolemic- c/w lasix 20 mg po daily   - BP is acceptable  - Anemia- GI work up underway     Arsalan Blackmon MD  [facility]   [phone]

## 2019-02-18 NOTE — CONSULT NOTE ADULT - ASSESSMENT
89 yo male with CAD,spinal stenosis of lumbar region,recent EVAR, admitted after a fall/syncope, now undergoing w/u for GI bleed, who has a urine culture with enterococcus.  His munoz was pulled 2/15 and he is voiding without dysuria.  He has no local cystitis symptoms or systemic signs of infection.  While he may have asymptomatic bactiuria there are times when this is treated in setting or munoz removal.  Suggest:  1.will hold off on antibiotics at this point  2.Will repeat UA and culture  3.Will have to clarify his PCN allergy which he denies to me.Normally po amoxacillin would be given if we wanted to treat positive culture  4.Blood culture any fever spikes  5.no ID objection to endoscopies  6.PVR's should be monitored

## 2019-02-18 NOTE — PROGRESS NOTE ADULT - SUBJECTIVE AND OBJECTIVE BOX
KHUSHI SNOWDEN:[id number],   89yMale followed for:  penicillin (Angioedema)    PAST MEDICAL & SURGICAL HISTORY:  CAD (coronary artery disease)  HLD (hyperlipidemia)  CKD (chronic kidney disease)  HTN (hypertension)  Stented coronary artery    FAMILY HISTORY:    MEDICATIONS  (STANDING):  amLODIPine   Tablet 2.5 milliGRAM(s) Oral daily  aspirin enteric coated 81 milliGRAM(s) Oral daily  atorvastatin 40 milliGRAM(s) Oral at bedtime  docusate sodium 100 milliGRAM(s) Oral three times a day  furosemide    Tablet 20 milliGRAM(s) Oral daily  gabapentin 300 milliGRAM(s) Oral three times a day  heparin  Injectable 5000 Unit(s) SubCutaneous every 12 hours  influenza   Vaccine 0.5 milliLiter(s) IntraMuscular once  metoprolol succinate ER 50 milliGRAM(s) Oral daily  pantoprazole  Injectable 40 milliGRAM(s) IV Push two times a day  tamsulosin 0.4 milliGRAM(s) Oral at bedtime    MEDICATIONS  (PRN):      Vital Signs Last 24 Hrs  T(C): 36.6 (18 Feb 2019 05:12), Max: 36.8 (17 Feb 2019 10:51)  T(F): 97.9 (18 Feb 2019 05:12), Max: 98.2 (17 Feb 2019 10:51)  HR: 61 (18 Feb 2019 05:12) (61 - 61)  BP: 131/73 (18 Feb 2019 05:12) (118/61 - 135/72)  BP(mean): --  RR: 18 (18 Feb 2019 05:12) (18 - 18)  SpO2: 97% (18 Feb 2019 05:12) (97% - 98%)  nc/at  s1s2  cta  soft, nt, nd no guarding or rebound  no c/c/e    CBC Full  -  ( 17 Feb 2019 09:06 )  WBC Count : 8.77 K/uL  Hemoglobin : 10.3 g/dL  Hematocrit : 33.9 %  Platelet Count - Automated : 383 K/uL  Mean Cell Volume : 92.6 fl  Mean Cell Hemoglobin : 28.1 pg  Mean Cell Hemoglobin Concentration : 30.4 gm/dL  Auto Neutrophil # : x  Auto Lymphocyte # : x  Auto Monocyte # : x  Auto Eosinophil # : x  Auto Basophil # : x  Auto Neutrophil % : x  Auto Lymphocyte % : x  Auto Monocyte % : x  Auto Eosinophil % : x  Auto Basophil % : x    02-18    141  |  101  |  25<H>  ----------------------------<  91  4.1   |  26  |  1.67<H>    Ca    8.9      18 Feb 2019 06:36

## 2019-02-18 NOTE — CONSULT NOTE ADULT - SUBJECTIVE AND OBJECTIVE BOX
HPI:   Patient is a 89y male with a past history of CAD,CKD stage 4,L4-L5 spinal stenosis, recent EVAR, urinary retention requiring munoz placement and recent removal, who was admitted 2./14 after a fall at home.He was noted to be OB positive in stool, GI w/u in progress.He had his munoz removed, he is voiding well.No fever or chills.He has limited mobility due to lower extremity weakness.He is awaiting colonoscopy and EGD.No dysuria or frequency.He denies any stomach pains or abdominal pains.His EVR was in past month.His munoz was removed 2/15.    REVIEW OF SYSTEMS:  All other review of systems negative (Comprehensive ROS)    PAST MEDICAL & SURGICAL HISTORY:  CAD (coronary artery disease)  HLD (hyperlipidemia)  CKD (chronic kidney disease)  HTN (hypertension)  Stented coronary artery      Allergies    penicillin (Angioedema)    Intolerances        Antimicrobials Day #  :off    Other Medications:  amLODIPine   Tablet 2.5 milliGRAM(s) Oral daily  aspirin enteric coated 81 milliGRAM(s) Oral daily  atorvastatin 40 milliGRAM(s) Oral at bedtime  docusate sodium 100 milliGRAM(s) Oral three times a day  furosemide    Tablet 20 milliGRAM(s) Oral daily  gabapentin 300 milliGRAM(s) Oral three times a day  heparin  Injectable 5000 Unit(s) SubCutaneous every 12 hours  influenza   Vaccine 0.5 milliLiter(s) IntraMuscular once  metoprolol succinate ER 50 milliGRAM(s) Oral daily  pantoprazole  Injectable 40 milliGRAM(s) IV Push two times a day  tamsulosin 0.4 milliGRAM(s) Oral at bedtime      FAMILY HISTORY:NC      SOCIAL HISTORY:  Smoking:  no   ETOH:no     Drug Use: no    Single     T(F): 97.9 (02-18-19 @ 05:12), Max: 97.9 (02-18-19 @ 05:12)  HR: 61 (02-18-19 @ 05:12)  BP: 131/73 (02-18-19 @ 05:12)  RR: 18 (02-18-19 @ 05:12)  SpO2: 97% (02-18-19 @ 05:12)  Wt(kg): --    PHYSICAL EXAM:  General: alert, no acute distress  Eyes:  anicteric, no conjunctival injection, no discharge  Oropharynx: no lesions or injection 	  Neck: supple, without adenopathy  Lungs: clear to auscultation  Heart: regular rate and rhythm; no murmur, rubs or gallops  Abdomen: soft, nondistended, nontender, without mass or organomegaly  Skin: no lesions  Extremities: no clubbing, cyanosis, trace edema  Neurologic: alert, oriented, moves all extremities, lower extremities are weak.  Rt groin EVAR incision well healed    LAB RESULTS:                        9.9    7.47  )-----------( 367      ( 18 Feb 2019 09:52 )             31.9     02-18    141  |  101  |  25<H>  ----------------------------<  91  4.1   |  26  |  1.67<H>    Ca    8.9      18 Feb 2019 06:36            MICROBIOLOGY:  RECENT CULTURES:  02-14 @ 00:55 .Urine Clean Catch (Midstream) Enterococcus faecalis    >100,000 CFU/ml Enterococcus faecalis            RADIOLOGY REVIEWED:  < from: CT Abdomen and Pelvis No Cont (02.13.19 @ 23:03) >  INTERPRETATION:  Clinical information: Abdominal aortic aneurysm    COMPARISON: December 10, 2018    PROCEDURE:   CT of the abdomen and pelvis was performed.  IV contrast:  None  Oral contrast:  None  Coronal and sagittal reformatted images were obtained    FINDINGS:    LOWER CHEST: Trace left pleural effusion. Coronary artery calcifications.    LIVER: Within normal limits.  SPLEEN: Within normal limits.  PANCREAS: Within normal limits.  GALLBLADDER: Within normal limits.  BILE DUCTS: Normal caliber.  ADRENALS: Within normal limits.  KIDNEYS/URETERS: No mass, stone or hydronephrosis. Left renal cyst.      RETROPERITONEUM: No lymphadenopathy.    VESSELS:  Status post interval aortobiiliac endograft placement with no   change in size of the sac which measures 5.4 cm in maximal AP dimension.    BOWEL: No bowel obstruction, wall thickening or inflammatory change.   Appendix normal. Colonic diverticulosis without diverticulitis. Large   hiatal hernia.  PERITONEUM: No ascites or pneumoperitoneum.    REPRODUCTIVE ORGANS: The prostate is enlarged.  BLADDER: Collapsed around a Munoz catheter balloon.    ABDOMINAL WALL: Small amount of hemorrhage inthe right groin   subcutaneous tissues.  BONES: No acute bony abnormality. Spinal and left hip degenerative   arthritic changes.    IMPRESSION:   No acute pathology.  Status post aortobiiliac endograft with no change in size of aneurysm sac   as compared with December 10, 2018.    < end of copied text >  < from: MR Head No Cont (02.16.19 @ 13:19) >  IMPRESSION: Age-appropriate involutional and ischemic gliotic changes.   Small vessel white matter and basal ganglia ischemic changes. No acute   infarcts. No change from 2/13/2019.    < end of copied text >  < from: Xray Femur 2 Views, Left (02.13.19 @ 19:38) >    INTERPRETATION:  CLINICAL INFORMATION: Status post fall, left hip pain.    EXAM: 2 views of the left femur.    COMPARISON: No similar prior studies available for comparison.    FINDINGS:  No acute fracture or dislocation there is mild to moderate narrowing of   the medial knee joint space.. Severe left hip arthrosis, sclerosis, and   subchondral cyst formation.  Vascular calcifications.    IMPRESSION:  No acute fracture or dislocation.    Severe left hip arthrosis.    < end of copied text >

## 2019-02-18 NOTE — PROGRESS NOTE ADULT - SUBJECTIVE AND OBJECTIVE BOX
CARDIOLOGY     PROGRESS  NOTE   ________________________________________________    CHIEF COMPLAINT:Patient is a 89y old  Male who presents with a chief complaint of fall ?syncope (18 Feb 2019 08:38)  no complain.  	  REVIEW OF SYSTEMS:  CONSTITUTIONAL: No fever, weight loss, or fatigue  EYES: No eye pain, visual disturbances, or discharge  ENT:  No difficulty hearing, tinnitus, vertigo; No sinus or throat pain  NECK: No pain or stiffness  RESPIRATORY: No cough, wheezing, chills or hemoptysis; No Shortness of Breath  CARDIOVASCULAR: No chest pain, palpitations, passing out, dizziness, or leg swelling  GASTROINTESTINAL: No abdominal or epigastric pain. No nausea, vomiting, or hematemesis; No diarrhea or constipation. No melena or hematochezia.  GENITOURINARY: No dysuria, frequency, hematuria, or incontinence  NEUROLOGICAL: No headaches, memory loss, loss of strength, numbness, or tremors  SKIN: No itching, burning, rashes, or lesions   LYMPH Nodes: No enlarged glands  ENDOCRINE: No heat or cold intolerance; No hair loss  MUSCULOSKELETAL: No joint pain or swelling; No muscle, back, or extremity pain  PSYCHIATRIC: No depression, anxiety, mood swings, or difficulty sleeping  HEME/LYMPH: No easy bruising, or bleeding gums  ALLERGY AND IMMUNOLOGIC: No hives or eczema	    [ ] All others negative	  [ ] Unable to obtain    PHYSICAL EXAM:  T(C): 36.6 (02-18-19 @ 05:12), Max: 36.8 (02-17-19 @ 10:51)  HR: 61 (02-18-19 @ 05:12) (61 - 61)  BP: 131/73 (02-18-19 @ 05:12) (118/61 - 135/72)  RR: 18 (02-18-19 @ 05:12) (18 - 18)  SpO2: 97% (02-18-19 @ 05:12) (97% - 98%)  Wt(kg): --  I&O's Summary    17 Feb 2019 07:01  -  18 Feb 2019 07:00  --------------------------------------------------------  IN: 320 mL / OUT: 550 mL / NET: -230 mL        Appearance: Normal	  HEENT:   Normal oral mucosa, PERRL, EOMI	  Lymphatic: No lymphadenopathy  Cardiovascular: Normal S1 S2, No JVD, No murmurs, No edema  Respiratory: Lungs clear to auscultation	  Psychiatry: A & O x 3, Mood & affect appropriate  Gastrointestinal:  Soft, Non-tender, + BS	  Skin: No rashes, No ecchymoses, No cyanosis	  Neurologic: Non-focal  Extremities: Normal range of motion, No clubbing, cyanosis or edema  Vascular: Peripheral pulses palpable 2+ bilaterally    MEDICATIONS  (STANDING):  amLODIPine   Tablet 2.5 milliGRAM(s) Oral daily  aspirin enteric coated 81 milliGRAM(s) Oral daily  atorvastatin 40 milliGRAM(s) Oral at bedtime  docusate sodium 100 milliGRAM(s) Oral three times a day  furosemide    Tablet 20 milliGRAM(s) Oral daily  gabapentin 300 milliGRAM(s) Oral three times a day  heparin  Injectable 5000 Unit(s) SubCutaneous every 12 hours  influenza   Vaccine 0.5 milliLiter(s) IntraMuscular once  metoprolol succinate ER 50 milliGRAM(s) Oral daily  pantoprazole  Injectable 40 milliGRAM(s) IV Push two times a day  tamsulosin 0.4 milliGRAM(s) Oral at bedtime      TELEMETRY: 	    ECG:  	  RADIOLOGY:  OTHER: 	  	  LABS:	 	    CARDIAC MARKERS:                                10.3   8.77  )-----------( 383      ( 17 Feb 2019 09:06 )             33.9     02-18    141  |  101  |  25<H>  ----------------------------<  91  4.1   |  26  |  1.67<H>    Ca    8.9      18 Feb 2019 06:36      proBNP: Serum Pro-Brain Natriuretic Peptide: 1820 pg/mL (02-13 @ 18:36)    Lipid Profile: Cholesterol 120  LDL 67  HDL 37  TG 78    HgA1c: Hemoglobin A1C, Whole Blood: 5.0 % (02-15 @ 08:02)    TSH:         Assessment and plan  ---------------------------  cad/s/p stent  aortic stenosis  aaa s/p endovascular stent  bp is well controlled  gi work up

## 2019-02-18 NOTE — CONSULT NOTE ADULT - REASON FOR ADMISSION
Date of visit: 7/6/2017  Patient Name: Anabel Gallardo  Medical Record Number: [id number]  YOB: 1997  Primary Physician: Nick Fisher DO     CHIEF COMPLAINT:    Chief Complaint   Patient presents with   • MEDICATION ISSUE     1 week follow up       SUBJECTIVE:  Anabel Gallardo is a 19 year old female who presented requesting evaluation for     Patient presents to the clinic with her mother for 1 week follow-up    When patient initially stopped 100 mg of Zoloft she had significant difficulties with increased depression, she actually might a felt suicidal during the first 3 days but now as the days have progressed she has had some improvement in her depressed feelings. Even the mother had noticed increased depression when she stopped the Zoloft. Patient states that the myoclonic jerks have significantly resolved, she's only had one in the past 24 hours area patient is here for follow-up, no current suicidal or homicidal ideations, patient is feeling increased depression and anxiety but even this has somewhat improved since he initially stopped the Zoloft cold turkey last week.    Review of systems:  Pertinent positive from HPI with below Positives and Negatives  Constitutional: [x] no fever  [x] no chills  [] no weakness  [] no fatigue    Skin: [] no rash  [] no bruising  [] no wound  [] no lesion  Head:  [] no head injury   [] no head ache   [] no dizziness  Eyes:   [] no vision changes   [] wears glasses or contacts   [] no redness   [] no drainage  Ears:   [] no tinnitus   [] no earache   [] heading aides   [] no hearing changes  Nose:   [] no stuffiness   [] no nosebleeds   [] no drainage  Throat:   [x] no soreness   [] no dry mouth   [] no hoarseness  Neck:   [] no swollen glands   [] no pain   [] no stiffness  Respiratory: [x] no cough  [] no wheezing  [] no shortness of breath [] no hemoptysis   Cardiovascular:  [x] no chest pain  [] no chest pressure  [] no palpitations  [] diaphoresis.    Gastrointestinal: [] no nausea [] no vomiting  [] no diarrhea [x] no abdominal pain.   [] no heartburn  Genitourinary:  [] no urgency  [] no frequency  [] no hematuria  []no flank pain.  Extremities: [] no joint swelling  [] no joint pain.  Neurologic: [x] no change in sensory  [] no change in motor function  [] no headache.   [] no change in speech  Endocrine: [] no heat or cold intolerance  [] no abnormal weight loss or gain.   [] no excessive sweating  Hematological: [] no bleeding  [] no bruising  [] no adenopathy.  Psychiatric: [x]no change in affect  [] no change in mentation  [] no sleep disturbance.     History reviewed. No pertinent past medical history.    History reviewed. No pertinent surgical history.    Medications:  Current Outpatient Prescriptions   Medication   • sertraline (ZOLOFT) 25 MG tablet   • albuterol 108 (90 Base) MCG/ACT inhaler     No current facility-administered medications for this visit.        ALLERGIES:  No Known Allergies    History reviewed. No pertinent family history.    Social History:  Social History   Substance Use Topics   • Smoking status: Never Smoker   • Smokeless tobacco: Never Used   • Alcohol use Not on file       Objective:  Today's Vitals:  Visit Vitals  /58   Pulse 60   Resp 14   Ht 5' 6\" (1.676 m)   Wt 57.6 kg   LMP 06/28/2017 (Exact Date)   BMI 20.50 kg/m²       BP Readings from Last 3 Encounters:   07/06/17 100/58   06/29/17 104/66     Wt Readings from Last 3 Encounters:   07/06/17 57.6 kg (48 %, Z= -0.04)*   06/29/17 58.5 kg (52 %, Z= 0.05)*     * Growth percentiles are based on CDC 2-20 Years data.       Physical Exam:    Vital Signs:    Visit Vitals  /58   Pulse 60   Resp 14   Ht 5' 6\" (1.676 m)   Wt 57.6 kg   LMP 06/28/2017 (Exact Date)   BMI 20.50 kg/m²      General:   Alert, cooperative, conversational   Eyes:  Normal conjunctivae and sclera. Wears glasses  ENT:  Mucous membranes are moist.  Respiratory:  Normal respiratory effort.     Musculoskeletal:  No edema b/l LE, gait is normal in the hallway  Neuro:   Oriented x4. 2/4 patellar reflexes bilaterally  Psychiatric:   Cooperative.  Appropriate mood and affect.    Assessment & Plan   Anabel was seen today for medication issue.    Diagnoses and all orders for this visit:    Serotonin syndrome  This occurred when patient was increased from 50 up to 100 mg of the sertraline daily.  Plan is for patient to stay off the medication until the jerking sensation has gone away completely and then after 24-48 hours of the myoclonic jerks resolution patient will restart low-dose sertraline    Depression with anxiety  -     sertraline (ZOLOFT) 25 MG tablet; Take 1 tablet by mouth daily.  Patient will follow-up with her psychiatrist, Dr. Hilton  Plan at this time is to restart Zoloft 25 mg daily once all the myoclonic jerks have gone away, we discussed that restarting the SSRI may cause return of the symptoms at which case she would just simply stopped the Zoloft completely  I wrote down options for patient of things that she could use including Effexor, an SNRI, gabapentin or even Lamictal for treatment of the anxiety or depression. Patient might also benefit from Wellbutrin therapy    We discussed that patient has suicidal thoughts she is to call, if she feels she is going to commit suicide she should go to the emergency room. Mother is aware and is a part of this discussion as well. Daughter feels/patient feels comfortable discussing these things with the mother in the room.    We discussed not using alcohol and not using marijuana, patient does not currently use these things but we discussed that avoidance of these things given her history of anxiety and depression will help be helpful for her in the future    Patient to return at her convenience for whenever she would like for her primary care needs, if she would like a female provider we can get her a female provider in the office for her  gynecological needs or I can refer her to gynecology     Patient Care Team:  Nick Fisher DO as PCP - General (Family Practice)    Nick Fisher DO  [facility]: [facility]  [address] [place] [address]  7/6/2017     fall ?syncope

## 2019-02-19 LAB
ANION GAP SERPL CALC-SCNC: 10 MMOL/L — SIGNIFICANT CHANGE UP (ref 5–17)
BASOPHILS # BLD AUTO: 0.03 K/UL — SIGNIFICANT CHANGE UP (ref 0–0.2)
BASOPHILS NFR BLD AUTO: 0.5 % — SIGNIFICANT CHANGE UP (ref 0–2)
BUN SERPL-MCNC: 24 MG/DL — HIGH (ref 7–23)
CALCIUM SERPL-MCNC: 9 MG/DL — SIGNIFICANT CHANGE UP (ref 8.4–10.5)
CHLORIDE SERPL-SCNC: 102 MMOL/L — SIGNIFICANT CHANGE UP (ref 96–108)
CO2 SERPL-SCNC: 29 MMOL/L — SIGNIFICANT CHANGE UP (ref 22–31)
CREAT SERPL-MCNC: 1.79 MG/DL — HIGH (ref 0.5–1.3)
EOSINOPHIL # BLD AUTO: 0.19 K/UL — SIGNIFICANT CHANGE UP (ref 0–0.5)
EOSINOPHIL NFR BLD AUTO: 3.4 % — SIGNIFICANT CHANGE UP (ref 0–6)
GLUCOSE SERPL-MCNC: 92 MG/DL — SIGNIFICANT CHANGE UP (ref 70–99)
HCT VFR BLD CALC: 31.1 % — LOW (ref 39–50)
HGB BLD-MCNC: 9.5 G/DL — LOW (ref 13–17)
IMM GRANULOCYTES NFR BLD AUTO: 0.2 % — SIGNIFICANT CHANGE UP (ref 0–1.5)
LYMPHOCYTES # BLD AUTO: 0.98 K/UL — LOW (ref 1–3.3)
LYMPHOCYTES # BLD AUTO: 17.3 % — SIGNIFICANT CHANGE UP (ref 13–44)
MCHC RBC-ENTMCNC: 28.5 PG — SIGNIFICANT CHANGE UP (ref 27–34)
MCHC RBC-ENTMCNC: 30.5 GM/DL — LOW (ref 32–36)
MCV RBC AUTO: 93.4 FL — SIGNIFICANT CHANGE UP (ref 80–100)
MONOCYTES # BLD AUTO: 0.42 K/UL — SIGNIFICANT CHANGE UP (ref 0–0.9)
MONOCYTES NFR BLD AUTO: 7.4 % — SIGNIFICANT CHANGE UP (ref 2–14)
NEUTROPHILS # BLD AUTO: 4.04 K/UL — SIGNIFICANT CHANGE UP (ref 1.8–7.4)
NEUTROPHILS NFR BLD AUTO: 71.2 % — SIGNIFICANT CHANGE UP (ref 43–77)
PLATELET # BLD AUTO: 367 K/UL — SIGNIFICANT CHANGE UP (ref 150–400)
POTASSIUM SERPL-MCNC: 4.3 MMOL/L — SIGNIFICANT CHANGE UP (ref 3.5–5.3)
POTASSIUM SERPL-SCNC: 4.3 MMOL/L — SIGNIFICANT CHANGE UP (ref 3.5–5.3)
RBC # BLD: 3.33 M/UL — LOW (ref 4.2–5.8)
RBC # FLD: 15.2 % — HIGH (ref 10.3–14.5)
SODIUM SERPL-SCNC: 141 MMOL/L — SIGNIFICANT CHANGE UP (ref 135–145)
WBC # BLD: 5.67 K/UL — SIGNIFICANT CHANGE UP (ref 3.8–10.5)
WBC # FLD AUTO: 5.67 K/UL — SIGNIFICANT CHANGE UP (ref 3.8–10.5)

## 2019-02-19 RX ORDER — SOD SULF/SODIUM/NAHCO3/KCL/PEG
1000 SOLUTION, RECONSTITUTED, ORAL ORAL ONCE
Refills: 0 | Status: COMPLETED | OUTPATIENT
Start: 2019-02-19 | End: 2019-02-19

## 2019-02-19 RX ADMIN — GABAPENTIN 300 MILLIGRAM(S): 400 CAPSULE ORAL at 14:51

## 2019-02-19 RX ADMIN — GABAPENTIN 300 MILLIGRAM(S): 400 CAPSULE ORAL at 06:17

## 2019-02-19 RX ADMIN — PANTOPRAZOLE SODIUM 40 MILLIGRAM(S): 20 TABLET, DELAYED RELEASE ORAL at 06:17

## 2019-02-19 RX ADMIN — Medication 100 MILLIGRAM(S): at 06:17

## 2019-02-19 RX ADMIN — TAMSULOSIN HYDROCHLORIDE 0.4 MILLIGRAM(S): 0.4 CAPSULE ORAL at 21:39

## 2019-02-19 RX ADMIN — Medication 50 MILLIGRAM(S): at 06:17

## 2019-02-19 RX ADMIN — PANTOPRAZOLE SODIUM 40 MILLIGRAM(S): 20 TABLET, DELAYED RELEASE ORAL at 17:15

## 2019-02-19 RX ADMIN — ATORVASTATIN CALCIUM 40 MILLIGRAM(S): 80 TABLET, FILM COATED ORAL at 21:39

## 2019-02-19 RX ADMIN — GABAPENTIN 300 MILLIGRAM(S): 400 CAPSULE ORAL at 21:39

## 2019-02-19 RX ADMIN — HEPARIN SODIUM 5000 UNIT(S): 5000 INJECTION INTRAVENOUS; SUBCUTANEOUS at 17:15

## 2019-02-19 RX ADMIN — AMLODIPINE BESYLATE 2.5 MILLIGRAM(S): 2.5 TABLET ORAL at 06:17

## 2019-02-19 RX ADMIN — Medication 81 MILLIGRAM(S): at 14:51

## 2019-02-19 RX ADMIN — HEPARIN SODIUM 5000 UNIT(S): 5000 INJECTION INTRAVENOUS; SUBCUTANEOUS at 06:17

## 2019-02-19 RX ADMIN — Medication 500 MILLILITER(S): at 00:45

## 2019-02-19 RX ADMIN — Medication 20 MILLIGRAM(S): at 06:17

## 2019-02-19 RX ADMIN — Medication 1000 MILLILITER(S): at 17:15

## 2019-02-19 RX ADMIN — Medication 100 MILLIGRAM(S): at 14:51

## 2019-02-19 NOTE — PROGRESS NOTE ADULT - SUBJECTIVE AND OBJECTIVE BOX
no comapliants    REVIEW OF SYSTEMS:  GEN: no fever,    no chills  RESP: no SOB,   no cough  CVS: no chest pain,   no palpitations  GI: no abdominal pain,   no nausea,   no vomiting,   no constipation,   no diarrhea  : no dysuria,   no frequency  NEURO: no headache,   no dizziness  PSYCH: no depression,   not anxious  Derm : no rash    MEDICATIONS  (STANDING):  amLODIPine   Tablet 2.5 milliGRAM(s) Oral daily  aspirin enteric coated 81 milliGRAM(s) Oral daily  atorvastatin 40 milliGRAM(s) Oral at bedtime  docusate sodium 100 milliGRAM(s) Oral three times a day  furosemide    Tablet 20 milliGRAM(s) Oral daily  gabapentin 300 milliGRAM(s) Oral three times a day  heparin  Injectable 5000 Unit(s) SubCutaneous every 12 hours  influenza   Vaccine 0.5 milliLiter(s) IntraMuscular once  metoprolol succinate ER 50 milliGRAM(s) Oral daily  pantoprazole  Injectable 40 milliGRAM(s) IV Push two times a day  polyethylene glycol/electrolyte Solution 500 milliLiter(s) Oral once  tamsulosin 0.4 milliGRAM(s) Oral at bedtime    MEDICATIONS  (PRN):      Vital Signs Last 24 Hrs  T(C): 36.8 (2019 04:29), Max: 36.8 (2019 20:06)  T(F): 98.2 (2019 04:29), Max: 98.2 (2019 20:06)  HR: 62 (2019 04:29) (60 - 63)  BP: 117/67 (2019 04:29) (109/63 - 117/67)  BP(mean): --  RR: 18 (2019 04:29) (18 - 18)  SpO2: 97% (2019 04:29) (97% - 97%)  CAPILLARY BLOOD GLUCOSE        I&O's Summary    2019 07:01  -  2019 07:00  --------------------------------------------------------  IN: 815 mL / OUT: 1500 mL / NET: -685 mL        PHYSICAL EXAM:  HEAD:  Atraumatic, Normocephalic  NECK: Supple, No   JVD  CHEST/LUNG:   no     rales,     no,    rhonchi  HEART: Regular rate and rhythm;         murmur  ABDOMEN: Soft, Nontender, ;   EXTREMITIES:   no     edema  NEUROLOGY:  alert    LABS:                        9.9    7.47  )-----------( 367      ( 2019 09:52 )             31.9     02-19    141  |  102  |  24<H>  ----------------------------<  92  4.3   |  29  |  1.79<H>    Ca    9.0      2019 07:22            Urinalysis Basic - ( 2019 16:00 )    Color: Light Yellow / Appearance: Slightly Turbid / S.012 / pH: x  Gluc: x / Ketone: Negative  / Bili: Negative / Urobili: <2 mg/dL   Blood: x / Protein: Trace / Nitrite: Negative   Leuk Esterase: Large / RBC: 3 /HPF /  /HPF   Sq Epi: x / Non Sq Epi: 0 /HPF / Bacteria: Occasional            Hemoglobin A1C, Whole Blood: 5.0 % (02-15 @ 08:02)            Consultant(s) Notes Reviewed:      Care Discussed with Consultants/Other Providers:

## 2019-02-19 NOTE — PROGRESS NOTE ADULT - ASSESSMENT
88 year old M with    PMH of CAD  ,   (w/ PCI to LAD and Cx    in 2/2018),   on asa/ plavix/ statin    CKDIII, and HTN ,  hld.  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  and   on   d/c  , needed  munoz  tov, on 2/15,  voiding  well  guaiac  positive/ gi   w/p,  next  week per  GI  h/o falls, PT eval recommended  rehab   mri head, no  cva  per  gi/  w/p  on  wed/  d/c  after gi  w/p      < from: MR Lumbar Spine No Cont (12.10.18 @ 22:57) >  IMPRESSION: Degenerative changes of the lumbar spine with severe spinal   stenosis at L4-5 due to a combination of degenerative facet changes and   grade 1anterolisthesis L4 on L5. Large abdominal aortic aneurys  < end of copied text >

## 2019-02-19 NOTE — PROGRESS NOTE ADULT - SUBJECTIVE AND OBJECTIVE BOX
NEPHROLOGY - NSN    Patient seen and examined.    MEDICATIONS  (STANDING):  amLODIPine   Tablet 2.5 milliGRAM(s) Oral daily  aspirin enteric coated 81 milliGRAM(s) Oral daily  atorvastatin 40 milliGRAM(s) Oral at bedtime  docusate sodium 100 milliGRAM(s) Oral three times a day  furosemide    Tablet 20 milliGRAM(s) Oral daily  gabapentin 300 milliGRAM(s) Oral three times a day  heparin  Injectable 5000 Unit(s) SubCutaneous every 12 hours  influenza   Vaccine 0.5 milliLiter(s) IntraMuscular once  metoprolol succinate ER 50 milliGRAM(s) Oral daily  pantoprazole  Injectable 40 milliGRAM(s) IV Push two times a day  tamsulosin 0.4 milliGRAM(s) Oral at bedtime    VITALS:  T(C): , Max: 36.8 (19 @ 20:06)  T(F): , Max: 98.2 (19 @ 20:06)  HR: 67 (19 @ 13:35)  BP: 122/66 (19 @ 13:35)  BP(mean): --  RR: 18 (19 @ 13:35)  SpO2: 96% (19 @ 13:35)  Wt(kg): --  I and O's:     @ 07:01  -   @ 07:00  --------------------------------------------------------  IN: 815 mL / OUT: 1500 mL / NET: -685 mL     @ 07:01  -   @ 14:42  --------------------------------------------------------  IN: 555 mL / OUT: 1000 mL / NET: -445 mL          REVIEW OF SYSTEMS:  Full ROS done and were negative unless otherwise indicated in HPI/assessment.     PHYSICAL EXAM:  Constitutional: NAD  Respiratory: CTA B/L  Cardiovascular: S1 and S2, RRR  Gastrointestinal: + BS, soft, NT, ND  Extremities: no peripheral edema  Neurological: AAO x 3  : no munoz  Access: HD tunneled catheter, temp HD catheter, AVG/AVF    LABS:                        9.5    5.67  )-----------( 367      ( 2019 09:54 )             31.1     02-19    141  |  102  |  24<H>  ----------------------------<  92  4.3   |  29  |  1.79<H>    Ca    9.0      2019 07:22        Urine Studies:  Urinalysis Basic - ( 2019 16:00 )    Color: Light Yellow / Appearance: Slightly Turbid / S.012 / pH: x  Gluc: x / Ketone: Negative  / Bili: Negative / Urobili: <2 mg/dL   Blood: x / Protein: Trace / Nitrite: Negative   Leuk Esterase: Large / RBC: 3 /HPF /  /HPF   Sq Epi: x / Non Sq Epi: 0 /HPF / Bacteria: Occasional          RADIOLOGY & ADDITIONAL STUDIES:      ASSESSMENT / RECOMMEND    Smiley Oshea NP  [facility]  [phone] NEPHROLOGY - NSN    Patient seen and examined.  No complaints, no significant events noted    MEDICATIONS  (STANDING):  amLODIPine   Tablet 2.5 milliGRAM(s) Oral daily  aspirin enteric coated 81 milliGRAM(s) Oral daily  atorvastatin 40 milliGRAM(s) Oral at bedtime  docusate sodium 100 milliGRAM(s) Oral three times a day  furosemide    Tablet 20 milliGRAM(s) Oral daily  gabapentin 300 milliGRAM(s) Oral three times a day  heparin  Injectable 5000 Unit(s) SubCutaneous every 12 hours  influenza   Vaccine 0.5 milliLiter(s) IntraMuscular once  metoprolol succinate ER 50 milliGRAM(s) Oral daily  pantoprazole  Injectable 40 milliGRAM(s) IV Push two times a day  tamsulosin 0.4 milliGRAM(s) Oral at bedtime    VITALS:  T(C): , Max: 36.8 (19 @ 20:06)  T(F): , Max: 98.2 (19 @ 20:06)  HR: 67 (19 @ 13:35)  BP: 122/66 (19 @ 13:35)  BP(mean): --  RR: 18 (19 @ 13:35)  SpO2: 96% (19 @ 13:35)  Wt(kg): --  I and O's:     @ 07:01  -   @ 07:00  --------------------------------------------------------  IN: 815 mL / OUT: 1500 mL / NET: -685 mL     @ 07:01  -   @ 14:42  --------------------------------------------------------  IN: 555 mL / OUT: 1000 mL / NET: -445 mL    REVIEW OF SYSTEMS:  Full ROS done and were negative unless otherwise indicated in HPI/assessment.     PHYSICAL EXAM:  Constitutional: NAD  Respiratory: CTA B/L  Cardiovascular: S1 and S2, RRR  Gastrointestinal: + BS, soft, NT, ND  Extremities: no peripheral edema  Neurological: AAO x 3  : no munoz    LABS:                        9.5    5.67  )-----------( 367      ( 2019 09:54 )             31.1     02-19    141  |  102  |  24<H>  ----------------------------<  92  4.3   |  29  |  1.79<H>    Ca    9.0      2019 07:22    Urine Studies:  Urinalysis Basic - ( 2019 16:00 )    Color: Light Yellow / Appearance: Slightly Turbid / S.012 / pH: x  Gluc: x / Ketone: Negative  / Bili: Negative / Urobili: <2 mg/dL   Blood: x / Protein: Trace / Nitrite: Negative   Leuk Esterase: Large / RBC: 3 /HPF /  /HPF   Sq Epi: x / Non Sq Epi: 0 /HPF / Bacteria: Occasional    ASSESSMENT / RECOMMEND   89-year-old gentleman with the past medical history of hypertension, diastolic congestive heart failure, chronic kidney disease stage IV presents with falling and had an inability of strength in his legs.  The patient recently had an endovascular aneurysm repair done and he was unable to urinate without the Munoz.  The patient has a history of L4-L5 severe spinal stenosis.   - CKD III with baseline s.cr of 1.6 mg/dl - slight rise in azotemia noted   - Euvolemic on exam- hold lasix in AM in setting of mild RASHAUN and will be NPO / bowel prepping for GI procedure   - BP is acceptable  - Anemia- EGD/colonoscopy in AM per GI    Smiley Oshea NP  [facility]  [phone]

## 2019-02-19 NOTE — PROGRESS NOTE ADULT - SUBJECTIVE AND OBJECTIVE BOX
CARDIOLOGY     PROGRESS  NOTE   ________________________________________________    CHIEF COMPLAINT:Patient is a 89y old  Male who presents with a chief complaint of fall ?syncope (18 Feb 2019 11:17)  no complain.  	  REVIEW OF SYSTEMS:  CONSTITUTIONAL: No fever, weight loss, or fatigue  EYES: No eye pain, visual disturbances, or discharge  ENT:  No difficulty hearing, tinnitus, vertigo; No sinus or throat pain  NECK: No pain or stiffness  RESPIRATORY: No cough, wheezing, chills or hemoptysis; No Shortness of Breath  CARDIOVASCULAR: No chest pain, palpitations, passing out, dizziness, or leg swelling  GASTROINTESTINAL: No abdominal or epigastric pain. No nausea, vomiting, or hematemesis; No diarrhea or constipation. No melena or hematochezia.  GENITOURINARY: No dysuria, frequency, hematuria, or incontinence  NEUROLOGICAL: No headaches, memory loss, loss of strength, numbness, or tremors  SKIN: No itching, burning, rashes, or lesions   LYMPH Nodes: No enlarged glands  ENDOCRINE: No heat or cold intolerance; No hair loss  MUSCULOSKELETAL: No joint pain or swelling; No muscle, back, or extremity pain  PSYCHIATRIC: No depression, anxiety, mood swings, or difficulty sleeping  HEME/LYMPH: No easy bruising, or bleeding gums  ALLERGY AND IMMUNOLOGIC: No hives or eczema	    [ ] All others negative	  [ ] Unable to obtain    PHYSICAL EXAM:  T(C): 36.8 (02-19-19 @ 04:29), Max: 36.8 (02-18-19 @ 20:06)  HR: 62 (02-19-19 @ 04:29) (60 - 63)  BP: 117/67 (02-19-19 @ 04:29) (109/63 - 117/67)  RR: 18 (02-19-19 @ 04:29) (18 - 18)  SpO2: 97% (02-19-19 @ 04:29) (97% - 97%)  Wt(kg): --  I&O's Summary    18 Feb 2019 07:01  -  19 Feb 2019 07:00  --------------------------------------------------------  IN: 815 mL / OUT: 1500 mL / NET: -685 mL        Appearance: Normal	  HEENT:   Normal oral mucosa, PERRL, EOMI	  Lymphatic: No lymphadenopathy  Cardiovascular: Normal S1 S2, No JVD, + murmurs, No edema  Respiratory: Lungs clear to auscultation	  Psychiatry: A & O x 3, Mood & affect appropriate  Gastrointestinal:  Soft, Non-tender, + BS	  Skin: No rashes, No ecchymoses, No cyanosis	  Neurologic: Non-focal  Extremities: Normal range of motion, No clubbing, cyanosis or edema  Vascular: Peripheral pulses palpable 2+ bilaterally    MEDICATIONS  (STANDING):  amLODIPine   Tablet 2.5 milliGRAM(s) Oral daily  aspirin enteric coated 81 milliGRAM(s) Oral daily  atorvastatin 40 milliGRAM(s) Oral at bedtime  docusate sodium 100 milliGRAM(s) Oral three times a day  furosemide    Tablet 20 milliGRAM(s) Oral daily  gabapentin 300 milliGRAM(s) Oral three times a day  heparin  Injectable 5000 Unit(s) SubCutaneous every 12 hours  influenza   Vaccine 0.5 milliLiter(s) IntraMuscular once  metoprolol succinate ER 50 milliGRAM(s) Oral daily  pantoprazole  Injectable 40 milliGRAM(s) IV Push two times a day  polyethylene glycol/electrolyte Solution 500 milliLiter(s) Oral once  tamsulosin 0.4 milliGRAM(s) Oral at bedtime      TELEMETRY: 	    ECG:  	  RADIOLOGY:  OTHER: 	  	  LABS:	 	    CARDIAC MARKERS:                                9.9    7.47  )-----------( 367      ( 18 Feb 2019 09:52 )             31.9     02-19    141  |  102  |  24<H>  ----------------------------<  92  4.3   |  29  |  1.79<H>    Ca    9.0      19 Feb 2019 07:22      proBNP: Serum Pro-Brain Natriuretic Peptide: 1820 pg/mL (02-13 @ 18:36)    Lipid Profile: Cholesterol 120  LDL 67  HDL 37  TG 78    HgA1c: Hemoglobin A1C, Whole Blood: 5.0 % (02-15 @ 08:02)    TSH:     < from: TTE with Doppler (w/Cont) (12.11.18 @ 13:23) >  1. Mild concentric left ventricular hypertrophy.  2. Endocardial visualization enhanced with intravenous  injection of Ultrasonic Enhancing Agent (Definity).  Hyperdynamic left ventricle. No obvious wall motion  abnormalities.  3. Reversal of the E-A  waves of the mitral inflow pattern  is consistent with diastolicLV dysfunction.  4. Normal right ventricular size and function.    < end of copied text >      Assessment and plan  ---------------------------  cad  s/p AAA stent  gi procedure, pt is clear cardiac wise for procedure with mod risk  continue beta blocker

## 2019-02-19 NOTE — PROGRESS NOTE ADULT - SUBJECTIVE AND OBJECTIVE BOX
KHUSHI SNOWDEN:[id number],   89yMale followed for:  penicillin (Angioedema)    PAST MEDICAL & SURGICAL HISTORY:  CAD (coronary artery disease)  HLD (hyperlipidemia)  CKD (chronic kidney disease)  HTN (hypertension)  Stented coronary artery    FAMILY HISTORY:    MEDICATIONS  (STANDING):  amLODIPine   Tablet 2.5 milliGRAM(s) Oral daily  aspirin enteric coated 81 milliGRAM(s) Oral daily  atorvastatin 40 milliGRAM(s) Oral at bedtime  docusate sodium 100 milliGRAM(s) Oral three times a day  furosemide    Tablet 20 milliGRAM(s) Oral daily  gabapentin 300 milliGRAM(s) Oral three times a day  heparin  Injectable 5000 Unit(s) SubCutaneous every 12 hours  influenza   Vaccine 0.5 milliLiter(s) IntraMuscular once  metoprolol succinate ER 50 milliGRAM(s) Oral daily  pantoprazole  Injectable 40 milliGRAM(s) IV Push two times a day  polyethylene glycol/electrolyte Solution 500 milliLiter(s) Oral once  tamsulosin 0.4 milliGRAM(s) Oral at bedtime    MEDICATIONS  (PRN):      Vital Signs Last 24 Hrs  T(C): 36.8 (19 Feb 2019 04:29), Max: 36.8 (18 Feb 2019 20:06)  T(F): 98.2 (19 Feb 2019 04:29), Max: 98.2 (18 Feb 2019 20:06)  HR: 62 (19 Feb 2019 04:29) (60 - 63)  BP: 117/67 (19 Feb 2019 04:29) (109/63 - 117/67)  BP(mean): --  RR: 18 (19 Feb 2019 04:29) (18 - 18)  SpO2: 97% (19 Feb 2019 04:29) (97% - 97%)  nc/at  s1s2  cta  soft, nt, nd no guarding or rebound  no c/c/e    CBC Full  -  ( 18 Feb 2019 09:52 )  WBC Count : 7.47 K/uL  Hemoglobin : 9.9 g/dL  Hematocrit : 31.9 %  Platelet Count - Automated : 367 K/uL  Mean Cell Volume : 91.7 fl  Mean Cell Hemoglobin : 28.4 pg  Mean Cell Hemoglobin Concentration : 31.0 gm/dL  Auto Neutrophil # : x  Auto Lymphocyte # : x  Auto Monocyte # : x  Auto Eosinophil # : x  Auto Basophil # : x  Auto Neutrophil % : x  Auto Lymphocyte % : x  Auto Monocyte % : x  Auto Eosinophil % : x  Auto Basophil % : x    02-19    141  |  102  |  24<H>  ----------------------------<  92  4.3   |  29  |  1.79<H>    Ca    9.0      19 Feb 2019 07:22

## 2019-02-19 NOTE — PROGRESS NOTE ADULT - SUBJECTIVE AND OBJECTIVE BOX
CC: f/u for bacteriuria    Patient reports comfortable; denies  Sxs    REVIEW OF SYSTEMS:  All other review of systems negative (Comprehensive ROS)    Antimicrobials off  Medications Reviewed    T(F): 98.1 (19 @ 13:35), Max: 98.2 (19 @ 20:06)  HR: 67 (19 @ 13:35)  BP: 122/66 (19 @ 13:35)  RR: 18 (19 @ 13:35)  SpO2: 96% (19 @ 13:35)  Wt(kg): --    PHYSICAL EXAM:  General: alert, no acute distress  Eyes:  anicteric, no conjunctival injection, no discharge  Oropharynx: no lesions or injection 	  Neck: supple, without adenopathy  Lungs: clear to auscultation  Heart: regular rate and rhythm; no murmur, rubs or gallops  Abdomen: soft, nondistended, nontender, without mass or organomegaly  Skin: no lesions; R groin incision healed  Extremities: no clubbing, cyanosis, or edema  Neurologic: alert, moves all extremities    LAB RESULTS:                        9.5    5.67  )-----------( 367      ( 2019 09:54 )             31.1         141  |  102  |  24<H>  ----------------------------<  92  4.3   |  29  |  1.79<H>    Ca    9.0      2019 07:22      Urinalysis Basic - ( 2019 16:00 )    Color: Light Yellow / Appearance: Slightly Turbid / S.012 / pH: x  Gluc: x / Ketone: Negative  / Bili: Negative / Urobili: <2 mg/dL   Blood: x / Protein: Trace / Nitrite: Negative   Leuk Esterase: Large / RBC: 3 /HPF /  /HPF   Sq Epi: x / Non Sq Epi: 0 /HPF / Bacteria: Occasional      MICROBIOLOGY:  RECENT CULTURES:  Culture - Urine (19 @ 00:55)    -  Ampicillin: S <=2 Predicts results to ampicillin/sulbactam, amoxacillin-clavulanate and  piperacillin-tazobactam.    -  Ciprofloxacin: S <=1    -  Levofloxacin: S <=1    -  Nitrofurantoin: S <=32 Should not be used to treat pyelonephritis.    -  Tetra/Doxy: R >8    -  Vancomycin: S 4    Specimen Source: .Urine Clean Catch (Midstream)    Culture Results:   >100,000 CFU/ml Enterococcus faecalis    Organism Identification: Enterococcus faecalis    Organism: Enterococcus faecalis    Method Type: MAULIK    RADIOLOGY REVIEWED:  CT Abdomen and Pelvis No Cont (19 @ 23:03) >  No acute pathology.  Status post aortobiiliac endograft with no change in size of aneurysm sac   as compared with December 10, 2018.

## 2019-02-19 NOTE — PROGRESS NOTE ADULT - ASSESSMENT
89 yo male with CAD, spinal stenosis of lumbar region, recent EVAR, admitted after fall/syncope, undergoing w/u for GI bleed, asked to evaluate urine culture with enterococcus.  Flores pulled 2/15, voiding without dysuria.  He has no local symptoms or systemic signs of infection- remains afebrile, normal WBC  Repeat UA still with pyuria    Suggest:  Observe off antibiotics for now  Await repeat urine Cx result  If Enterococcus again, will consider Cipro  PVR's should be monitored  No contraindication to endoscopies

## 2019-02-20 ENCOUNTER — RESULT REVIEW (OUTPATIENT)
Age: 84
End: 2019-02-20

## 2019-02-20 LAB
ANION GAP SERPL CALC-SCNC: 11 MMOL/L — SIGNIFICANT CHANGE UP (ref 5–17)
BUN SERPL-MCNC: 22 MG/DL — SIGNIFICANT CHANGE UP (ref 7–23)
CALCIUM SERPL-MCNC: 9.1 MG/DL — SIGNIFICANT CHANGE UP (ref 8.4–10.5)
CHLORIDE SERPL-SCNC: 103 MMOL/L — SIGNIFICANT CHANGE UP (ref 96–108)
CO2 SERPL-SCNC: 30 MMOL/L — SIGNIFICANT CHANGE UP (ref 22–31)
CREAT SERPL-MCNC: 1.83 MG/DL — HIGH (ref 0.5–1.3)
GLUCOSE SERPL-MCNC: 99 MG/DL — SIGNIFICANT CHANGE UP (ref 70–99)
MAGNESIUM SERPL-MCNC: 2.1 MG/DL — SIGNIFICANT CHANGE UP (ref 1.6–2.6)
POTASSIUM SERPL-MCNC: 4.3 MMOL/L — SIGNIFICANT CHANGE UP (ref 3.5–5.3)
POTASSIUM SERPL-SCNC: 4.3 MMOL/L — SIGNIFICANT CHANGE UP (ref 3.5–5.3)
SODIUM SERPL-SCNC: 144 MMOL/L — SIGNIFICANT CHANGE UP (ref 135–145)

## 2019-02-20 PROCEDURE — 88305 TISSUE EXAM BY PATHOLOGIST: CPT | Mod: 26

## 2019-02-20 RX ORDER — SODIUM CHLORIDE 9 MG/ML
1000 INJECTION, SOLUTION INTRAVENOUS
Refills: 0 | Status: DISCONTINUED | OUTPATIENT
Start: 2019-02-20 | End: 2019-02-22

## 2019-02-20 RX ADMIN — PANTOPRAZOLE SODIUM 40 MILLIGRAM(S): 20 TABLET, DELAYED RELEASE ORAL at 05:01

## 2019-02-20 RX ADMIN — GABAPENTIN 300 MILLIGRAM(S): 400 CAPSULE ORAL at 21:06

## 2019-02-20 RX ADMIN — AMLODIPINE BESYLATE 2.5 MILLIGRAM(S): 2.5 TABLET ORAL at 13:27

## 2019-02-20 RX ADMIN — Medication 100 MILLIGRAM(S): at 05:01

## 2019-02-20 RX ADMIN — Medication 100 MILLIGRAM(S): at 13:27

## 2019-02-20 RX ADMIN — HEPARIN SODIUM 5000 UNIT(S): 5000 INJECTION INTRAVENOUS; SUBCUTANEOUS at 17:34

## 2019-02-20 RX ADMIN — Medication 50 MILLIGRAM(S): at 05:02

## 2019-02-20 RX ADMIN — GABAPENTIN 300 MILLIGRAM(S): 400 CAPSULE ORAL at 13:27

## 2019-02-20 RX ADMIN — SODIUM CHLORIDE 50 MILLILITER(S): 9 INJECTION, SOLUTION INTRAVENOUS at 13:27

## 2019-02-20 RX ADMIN — HEPARIN SODIUM 5000 UNIT(S): 5000 INJECTION INTRAVENOUS; SUBCUTANEOUS at 05:04

## 2019-02-20 RX ADMIN — TAMSULOSIN HYDROCHLORIDE 0.4 MILLIGRAM(S): 0.4 CAPSULE ORAL at 21:06

## 2019-02-20 RX ADMIN — ATORVASTATIN CALCIUM 40 MILLIGRAM(S): 80 TABLET, FILM COATED ORAL at 21:06

## 2019-02-20 RX ADMIN — PANTOPRAZOLE SODIUM 40 MILLIGRAM(S): 20 TABLET, DELAYED RELEASE ORAL at 17:34

## 2019-02-20 RX ADMIN — GABAPENTIN 300 MILLIGRAM(S): 400 CAPSULE ORAL at 05:01

## 2019-02-20 RX ADMIN — Medication 81 MILLIGRAM(S): at 13:26

## 2019-02-20 NOTE — DIETITIAN INITIAL EVALUATION ADULT. - NS AS NUTRI INTERV MEALS SNACK
General/healthful diet/Recommend advance diet as feasible to low sodium to promote BP control. Defer DASH/TLC diet as pt already c/o institutional foods and doesn't want restrictive diet. Continue to obtain/provide food preferences as feasible.

## 2019-02-20 NOTE — DIETITIAN INITIAL EVALUATION ADULT. - PHYSICAL APPEARANCE
BMI 28.6, Pt visually appears well nourished overall with some visible muscle wasting to temporal likely in setting of age related changes./overweight/other (specify)

## 2019-02-20 NOTE — DIETITIAN INITIAL EVALUATION ADULT. - ENERGY NEEDS
Height: 69 inches, Weight: 194 pounds (dosing, stated)  BMI: 28.6 kg/m2 IBW: 160 pounds (+/-10%), %IBW: 121%  Pertinent Info: 1+ edema to R hip, 2+ edema to R/L ankles noted 2/19, no pressure injuries noted at this time in nursing flow sheet.  Other pertinent info: Pt is 89yoM with PMH significant for CAD s/p PCI to LAD, CKD, HTN, HLD, spinal stenosis, AAA repair, presenting s/p fall at home. Guaiac positive, s/p EGD today and pending capsule study tomorrow 2/21.

## 2019-02-20 NOTE — CHART NOTE - NSCHARTNOTEFT_GEN_A_CORE
Post EGD and Colonoscopy - discussed with Dr. Conti, placed pt on clear liquid diet today and NPO after 12Mn for capsule study in am    [id number]

## 2019-02-20 NOTE — PROGRESS NOTE ADULT - ASSESSMENT
88 year old M with    PMH of CAD  ,   (w/ PCI to LAD and Cx    in 2/2018),   on asa/ plavix/ statin    CKDIII, and HTN ,  hld.  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  and   on   d/c  , needed  munoz  tov, on 2/15,  voiding  well  guaiac  positive/ gi   w/p,  next  week per  GI  h/o falls, PT eval recommended  rehab   mri head, no  cva  per  gi/  w/p  on  wed/  d/c  after gi  w/p  d/c  planning to rehab, after   gi  w/p      < from: MR Lumbar Spine No Cont (12.10.18 @ 22:57) >  IMPRESSION: Degenerative changes of the lumbar spine with severe spinal   stenosis at L4-5 due to a combination of degenerative facet changes and   grade 1anterolisthesis L4 on L5. Large abdominal aortic aneurys  < end of copied text > 88 year old M with    PMH of CAD  ,   (w/ PCI to LAD and Cx    in 2/2018),   on asa/ plavix/ statin    CKDIII, and HTN ,  hld.  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  and   on   d/c  , needed  munoz  tov, on 2/15,  voiding  well  guaiac  positive/ gi   w/p,   pending  h/o falls, PT eval recommended  rehab   mri head, no  cva  per  gi/  w/p  on  wed/  d/c  after gi  w/p  d/c  planning to rehab, after   gi  w/p      < from: MR Lumbar Spine No Cont (12.10.18 @ 22:57) >  IMPRESSION: Degenerative changes of the lumbar spine with severe spinal   stenosis at L4-5 due to a combination of degenerative facet changes and   grade 1anterolisthesis L4 on L5. Large abdominal aortic aneurys  < end of copied text >

## 2019-02-20 NOTE — DIETITIAN INITIAL EVALUATION ADULT. - ADHERENCE
no therapeutic diet followed PTA, pt states he "eats a balanced diet," he does cook with salt. He does the food preparation at home and says he fairs just fine, declines providing detailed diet recall./poor

## 2019-02-20 NOTE — PROGRESS NOTE ADULT - SUBJECTIVE AND OBJECTIVE BOX
KHUSHI SNOWDEN:[id number],   89yMale followed for:  penicillin (Angioedema)    PAST MEDICAL & SURGICAL HISTORY:  CAD (coronary artery disease)  HLD (hyperlipidemia)  CKD (chronic kidney disease)  HTN (hypertension)  Stented coronary artery    FAMILY HISTORY:    MEDICATIONS  (STANDING):  amLODIPine   Tablet 2.5 milliGRAM(s) Oral daily  aspirin enteric coated 81 milliGRAM(s) Oral daily  atorvastatin 40 milliGRAM(s) Oral at bedtime  docusate sodium 100 milliGRAM(s) Oral three times a day  gabapentin 300 milliGRAM(s) Oral three times a day  heparin  Injectable 5000 Unit(s) SubCutaneous every 12 hours  influenza   Vaccine 0.5 milliLiter(s) IntraMuscular once  metoprolol succinate ER 50 milliGRAM(s) Oral daily  pantoprazole  Injectable 40 milliGRAM(s) IV Push two times a day  tamsulosin 0.4 milliGRAM(s) Oral at bedtime    MEDICATIONS  (PRN):      Vital Signs Last 24 Hrs  T(C): 36.4 (20 Feb 2019 08:26), Max: 36.8 (19 Feb 2019 20:04)  T(F): 97.6 (20 Feb 2019 08:26), Max: 98.2 (19 Feb 2019 20:04)  HR: 65 (20 Feb 2019 08:26) (61 - 67)  BP: 157/70 (20 Feb 2019 08:26) (122/66 - 157/70)  BP(mean): --  RR: 18 (20 Feb 2019 08:26) (16 - 18)  SpO2: 97% (20 Feb 2019 08:26) (95% - 98%)  nc/at  s1s2  cta  soft, nt, nd no guarding or rebound  no c/c/e    CBC Full  -  ( 19 Feb 2019 09:54 )  WBC Count : 5.67 K/uL  Hemoglobin : 9.5 g/dL  Hematocrit : 31.1 %  Platelet Count - Automated : 367 K/uL  Mean Cell Volume : 93.4 fl  Mean Cell Hemoglobin : 28.5 pg  Mean Cell Hemoglobin Concentration : 30.5 gm/dL  Auto Neutrophil # : 4.04 K/uL  Auto Lymphocyte # : 0.98 K/uL  Auto Monocyte # : 0.42 K/uL  Auto Eosinophil # : 0.19 K/uL  Auto Basophil # : 0.03 K/uL  Auto Neutrophil % : 71.2 %  Auto Lymphocyte % : 17.3 %  Auto Monocyte % : 7.4 %  Auto Eosinophil % : 3.4 %  Auto Basophil % : 0.5 %    02-20    144  |  103  |  22  ----------------------------<  99  4.3   |  30  |  1.83<H>    Ca    9.1      20 Feb 2019 07:06  Mg     2.1     02-20

## 2019-02-20 NOTE — PROGRESS NOTE ADULT - SUBJECTIVE AND OBJECTIVE BOX
no comaplints    REVIEW OF SYSTEMS:  GEN: no fever,    no chills  RESP: no SOB,   no cough  CVS: no chest pain,   no palpitations  GI: no abdominal pain,   no nausea,   no vomiting,   no constipation,   no diarrhea  : no dysuria,   no frequency  NEURO: no headache,   no dizziness  PSYCH: no depression,   not anxious  Derm : no rash    MEDICATIONS  (STANDING):  amLODIPine   Tablet 2.5 milliGRAM(s) Oral daily  aspirin enteric coated 81 milliGRAM(s) Oral daily  atorvastatin 40 milliGRAM(s) Oral at bedtime  docusate sodium 100 milliGRAM(s) Oral three times a day  gabapentin 300 milliGRAM(s) Oral three times a day  heparin  Injectable 5000 Unit(s) SubCutaneous every 12 hours  influenza   Vaccine 0.5 milliLiter(s) IntraMuscular once  metoprolol succinate ER 50 milliGRAM(s) Oral daily  pantoprazole  Injectable 40 milliGRAM(s) IV Push two times a day  tamsulosin 0.4 milliGRAM(s) Oral at bedtime    MEDICATIONS  (PRN):      Vital Signs Last 24 Hrs  T(C): 36.7 (2019 04:07), Max: 36.8 (2019 20:04)  T(F): 98 (2019 04:07), Max: 98.2 (2019 20:04)  HR: 64 (2019 04:07) (61 - 67)  BP: 122/68 (2019 04:07) (122/66 - 131/70)  BP(mean): --  RR: 16 (2019 04:07) (16 - 18)  SpO2: 95% (2019 04:07) (95% - 98%)  CAPILLARY BLOOD GLUCOSE        I&O's Summary    2019 07:01  -  2019 07:00  --------------------------------------------------------  IN: 795 mL / OUT: 1300 mL / NET: -505 mL        PHYSICAL EXAM:  HEAD:  Atraumatic, Normocephalic  NECK: Supple, No   JVD  CHEST/LUNG:   no     rales,     no,    rhonchi  HEART: Regular rate and rhythm;         murmur  ABDOMEN: Soft, Nontender, ;   EXTREMITIES:    no    edema  NEUROLOGY:  alert    LABS:                        9.5    5.67  )-----------( 367      ( 2019 09:54 )             31.1     02-    144  |  103  |  22  ----------------------------<  99  4.3   |  30  |  1.83<H>    Ca    9.1      2019 07:06  Mg     2.1                 Urinalysis Basic - ( 2019 16:00 )    Color: Light Yellow / Appearance: Slightly Turbid / S.012 / pH: x  Gluc: x / Ketone: Negative  / Bili: Negative / Urobili: <2 mg/dL   Blood: x / Protein: Trace / Nitrite: Negative   Leuk Esterase: Large / RBC: 3 /HPF /  /HPF   Sq Epi: x / Non Sq Epi: 0 /HPF / Bacteria: Occasional            Hemoglobin A1C, Whole Blood: 5.0 % (02-15 @ 08:02)            Consultant(s) Notes Reviewed:      Care Discussed with Consultants/Other Providers:

## 2019-02-20 NOTE — PROGRESS NOTE ADULT - ASSESSMENT
pt with egd, hiatal hernia with [place] ulcer, colon with polyp, tatoo from prior resetion, polypectomy.  capsule will be done tomorrow to complete workukp

## 2019-02-20 NOTE — PROGRESS NOTE ADULT - SUBJECTIVE AND OBJECTIVE BOX
CARDIOLOGY     PROGRESS  NOTE   ________________________________________________    CHIEF COMPLAINT:Patient is a 89y old  Male who presents with a chief complaint of fall ?syncope (20 Feb 2019 08:15)  no complain.  	  REVIEW OF SYSTEMS:  CONSTITUTIONAL: No fever, weight loss, or fatigue  EYES: No eye pain, visual disturbances, or discharge  ENT:  No difficulty hearing, tinnitus, vertigo; No sinus or throat pain  NECK: No pain or stiffness  RESPIRATORY: No cough, wheezing, chills or hemoptysis; No Shortness of Breath  CARDIOVASCULAR: No chest pain, palpitations, passing out, dizziness, or leg swelling  GASTROINTESTINAL: No abdominal or epigastric pain. No nausea, vomiting, or hematemesis; No diarrhea or constipation. No melena or hematochezia.  GENITOURINARY: No dysuria, frequency, hematuria, or incontinence  NEUROLOGICAL: No headaches, memory loss, loss of strength, numbness, or tremors  SKIN: No itching, burning, rashes, or lesions   LYMPH Nodes: No enlarged glands  ENDOCRINE: No heat or cold intolerance; No hair loss  MUSCULOSKELETAL: No joint pain or swelling; No muscle, back, or extremity pain  PSYCHIATRIC: No depression, anxiety, mood swings, or difficulty sleeping  HEME/LYMPH: No easy bruising, or bleeding gums  ALLERGY AND IMMUNOLOGIC: No hives or eczema	    [ ] All others negative	  [ ] Unable to obtain    PHYSICAL EXAM:  T(C): 36.4 (02-20-19 @ 08:26), Max: 36.8 (02-19-19 @ 20:04)  HR: 65 (02-20-19 @ 08:26) (61 - 67)  BP: 157/70 (02-20-19 @ 08:26) (122/66 - 157/70)  RR: 18 (02-20-19 @ 08:26) (16 - 18)  SpO2: 97% (02-20-19 @ 08:26) (95% - 98%)  Wt(kg): --  I&O's Summary    19 Feb 2019 07:01  -  20 Feb 2019 07:00  --------------------------------------------------------  IN: 795 mL / OUT: 1300 mL / NET: -505 mL        Appearance: Normal	  HEENT:   Normal oral mucosa, PERRL, EOMI	  Lymphatic: No lymphadenopathy  Cardiovascular: Normal S1 S2, No JVD, + murmurs, + edema  Respiratory: rhonchi  Psychiatry: A & O x 3, Mood & affect appropriate  Gastrointestinal:  Soft, Non-tender, + BS	  Skin: No rashes, No ecchymoses, No cyanosis	  Neurologic: Non-focal  Extremities: Normal range of motion, No clubbing, cyanosis or edema  Vascular: Peripheral pulses palpable 2+ bilaterally    MEDICATIONS  (STANDING):  amLODIPine   Tablet 2.5 milliGRAM(s) Oral daily  aspirin enteric coated 81 milliGRAM(s) Oral daily  atorvastatin 40 milliGRAM(s) Oral at bedtime  docusate sodium 100 milliGRAM(s) Oral three times a day  gabapentin 300 milliGRAM(s) Oral three times a day  heparin  Injectable 5000 Unit(s) SubCutaneous every 12 hours  influenza   Vaccine 0.5 milliLiter(s) IntraMuscular once  metoprolol succinate ER 50 milliGRAM(s) Oral daily  pantoprazole  Injectable 40 milliGRAM(s) IV Push two times a day  tamsulosin 0.4 milliGRAM(s) Oral at bedtime      TELEMETRY: 	    ECG:  	  RADIOLOGY:  OTHER: 	  	  LABS:	 	    CARDIAC MARKERS:                                9.5    5.67  )-----------( 367      ( 19 Feb 2019 09:54 )             31.1     02-20    144  |  103  |  22  ----------------------------<  99  4.3   |  30  |  1.83<H>    Ca    9.1      20 Feb 2019 07:06  Mg     2.1     02-20      proBNP: Serum Pro-Brain Natriuretic Peptide: 1820 pg/mL (02-13 @ 18:36)    Lipid Profile: Cholesterol 120  LDL 67  HDL 37  TG 78    HgA1c: Hemoglobin A1C, Whole Blood: 5.0 % (02-15 @ 08:02)    TSH:         Assessment and plan  ---------------------------  will increase amlodipine if increase bp  awaiting gi work up  dvt prophylaxis

## 2019-02-20 NOTE — DIETITIAN INITIAL EVALUATION ADULT. - OTHER INFO
Pt seen for length of stay on 6TOW. Pt reports fair po in-patient as he dislikes institutional foods, despite previously receiving a diet without therapeutic restrictions. Pt currently receiving clear liquids diet x 2 days pending further GI work up. Pt has no c/o nausea, vomiting, diarrhea, or constipation and denies chewing/swallowing difficulties. Pt reports a UBW of ~194 lbs, as reflected by dosing wt. Pt denies any recent wt changes PTA. Declines nutrition education, no nutrition related questions at this time.

## 2019-02-21 LAB
ANION GAP SERPL CALC-SCNC: 10 MMOL/L — SIGNIFICANT CHANGE UP (ref 5–17)
BUN SERPL-MCNC: 18 MG/DL — SIGNIFICANT CHANGE UP (ref 7–23)
CALCIUM SERPL-MCNC: 9 MG/DL — SIGNIFICANT CHANGE UP (ref 8.4–10.5)
CHLORIDE SERPL-SCNC: 100 MMOL/L — SIGNIFICANT CHANGE UP (ref 96–108)
CO2 SERPL-SCNC: 27 MMOL/L — SIGNIFICANT CHANGE UP (ref 22–31)
CREAT SERPL-MCNC: 1.56 MG/DL — HIGH (ref 0.5–1.3)
GLUCOSE SERPL-MCNC: 96 MG/DL — SIGNIFICANT CHANGE UP (ref 70–99)
HCT VFR BLD CALC: 30.3 % — LOW (ref 39–50)
HGB BLD-MCNC: 9.4 G/DL — LOW (ref 13–17)
MCHC RBC-ENTMCNC: 28.7 PG — SIGNIFICANT CHANGE UP (ref 27–34)
MCHC RBC-ENTMCNC: 31 GM/DL — LOW (ref 32–36)
MCV RBC AUTO: 92.4 FL — SIGNIFICANT CHANGE UP (ref 80–100)
PLATELET # BLD AUTO: 341 K/UL — SIGNIFICANT CHANGE UP (ref 150–400)
POTASSIUM SERPL-MCNC: 4.2 MMOL/L — SIGNIFICANT CHANGE UP (ref 3.5–5.3)
POTASSIUM SERPL-SCNC: 4.2 MMOL/L — SIGNIFICANT CHANGE UP (ref 3.5–5.3)
RBC # BLD: 3.28 M/UL — LOW (ref 4.2–5.8)
RBC # FLD: 15 % — HIGH (ref 10.3–14.5)
SODIUM SERPL-SCNC: 137 MMOL/L — SIGNIFICANT CHANGE UP (ref 135–145)
WBC # BLD: 4.69 K/UL — SIGNIFICANT CHANGE UP (ref 3.8–10.5)
WBC # FLD AUTO: 4.69 K/UL — SIGNIFICANT CHANGE UP (ref 3.8–10.5)

## 2019-02-21 RX ORDER — ACETAMINOPHEN 500 MG
650 TABLET ORAL ONCE
Refills: 0 | Status: COMPLETED | OUTPATIENT
Start: 2019-02-21 | End: 2019-02-21

## 2019-02-21 RX ADMIN — Medication 650 MILLIGRAM(S): at 14:00

## 2019-02-21 RX ADMIN — AMLODIPINE BESYLATE 2.5 MILLIGRAM(S): 2.5 TABLET ORAL at 05:02

## 2019-02-21 RX ADMIN — GABAPENTIN 300 MILLIGRAM(S): 400 CAPSULE ORAL at 13:07

## 2019-02-21 RX ADMIN — PANTOPRAZOLE SODIUM 40 MILLIGRAM(S): 20 TABLET, DELAYED RELEASE ORAL at 05:01

## 2019-02-21 RX ADMIN — Medication 50 MILLIGRAM(S): at 05:02

## 2019-02-21 RX ADMIN — Medication 650 MILLIGRAM(S): at 13:04

## 2019-02-21 RX ADMIN — ATORVASTATIN CALCIUM 40 MILLIGRAM(S): 80 TABLET, FILM COATED ORAL at 22:45

## 2019-02-21 RX ADMIN — GABAPENTIN 300 MILLIGRAM(S): 400 CAPSULE ORAL at 22:45

## 2019-02-21 RX ADMIN — PANTOPRAZOLE SODIUM 40 MILLIGRAM(S): 20 TABLET, DELAYED RELEASE ORAL at 17:22

## 2019-02-21 RX ADMIN — HEPARIN SODIUM 5000 UNIT(S): 5000 INJECTION INTRAVENOUS; SUBCUTANEOUS at 05:02

## 2019-02-21 RX ADMIN — HEPARIN SODIUM 5000 UNIT(S): 5000 INJECTION INTRAVENOUS; SUBCUTANEOUS at 17:21

## 2019-02-21 RX ADMIN — TAMSULOSIN HYDROCHLORIDE 0.4 MILLIGRAM(S): 0.4 CAPSULE ORAL at 22:45

## 2019-02-21 RX ADMIN — Medication 81 MILLIGRAM(S): at 13:07

## 2019-02-21 RX ADMIN — GABAPENTIN 300 MILLIGRAM(S): 400 CAPSULE ORAL at 05:02

## 2019-02-21 NOTE — PROGRESS NOTE ADULT - SUBJECTIVE AND OBJECTIVE BOX
CARDIOLOGY     PROGRESS  NOTE   ________________________________________________    CHIEF COMPLAINT:Patient is a 89y old  Male who presents with a chief complaint of fall ?syncope (21 Feb 2019 08:27)  no complain.  	  REVIEW OF SYSTEMS:  CONSTITUTIONAL: No fever, weight loss, or fatigue  EYES: No eye pain, visual disturbances, or discharge  ENT:  No difficulty hearing, tinnitus, vertigo; No sinus or throat pain  NECK: No pain or stiffness  RESPIRATORY: No cough, wheezing, chills or hemoptysis; No Shortness of Breath  CARDIOVASCULAR: No chest pain, palpitations, passing out, dizziness, or leg swelling  GASTROINTESTINAL: No abdominal or epigastric pain. No nausea, vomiting, or hematemesis; No diarrhea or constipation. No melena or hematochezia.  GENITOURINARY: No dysuria, frequency, hematuria, or incontinence  NEUROLOGICAL: No headaches, memory loss, loss of strength, numbness, or tremors  SKIN: No itching, burning, rashes, or lesions   LYMPH Nodes: No enlarged glands  ENDOCRINE: No heat or cold intolerance; No hair loss  MUSCULOSKELETAL: No joint pain or swelling; No muscle, back, or extremity pain  PSYCHIATRIC: No depression, anxiety, mood swings, or difficulty sleeping  HEME/LYMPH: No easy bruising, or bleeding gums  ALLERGY AND IMMUNOLOGIC: No hives or eczema	    [ ] All others negative	  [ ] Unable to obtain    PHYSICAL EXAM:  T(C): 36.6 (02-21-19 @ 04:10), Max: 36.7 (02-21-19 @ 00:20)  HR: 64 (02-21-19 @ 04:56) (61 - 76)  BP: 114/61 (02-21-19 @ 04:10) (114/61 - 154/72)  RR: 18 (02-21-19 @ 04:56) (18 - 18)  SpO2: 99% (02-21-19 @ 04:56) (97% - 99%)  Wt(kg): --  I&O's Summary    20 Feb 2019 07:01  -  21 Feb 2019 07:00  --------------------------------------------------------  IN: 1290 mL / OUT: 100 mL / NET: 1190 mL        Appearance: Normal	  HEENT:   Normal oral mucosa, PERRL, EOMI	  Lymphatic: No lymphadenopathy  Cardiovascular: Normal S1 S2, No JVD,+ murmurs, No edema  Respiratory: Lungs clear to auscultation	  Psychiatry: A & O x 3, Mood & affect appropriate  Gastrointestinal:  Soft, Non-tender, + BS	  Skin: No rashes, No ecchymoses, No cyanosis	  Neurologic: Non-focal  Extremities: Normal range of motion, No clubbing, cyanosis or edema  Vascular: Peripheral pulses palpable 2+ bilaterally    MEDICATIONS  (STANDING):  amLODIPine   Tablet 2.5 milliGRAM(s) Oral daily  aspirin enteric coated 81 milliGRAM(s) Oral daily  atorvastatin 40 milliGRAM(s) Oral at bedtime  docusate sodium 100 milliGRAM(s) Oral three times a day  gabapentin 300 milliGRAM(s) Oral three times a day  heparin  Injectable 5000 Unit(s) SubCutaneous every 12 hours  influenza   Vaccine 0.5 milliLiter(s) IntraMuscular once  metoprolol succinate ER 50 milliGRAM(s) Oral daily  pantoprazole  Injectable 40 milliGRAM(s) IV Push two times a day  sodium chloride 0.45%. 1000 milliLiter(s) (50 mL/Hr) IV Continuous <Continuous>  tamsulosin 0.4 milliGRAM(s) Oral at bedtime      TELEMETRY: 	    ECG:  	  RADIOLOGY:  OTHER: 	  	  LABS:	 	    CARDIAC MARKERS:                                9.5    5.67  )-----------( 367      ( 19 Feb 2019 09:54 )             31.1     02-21    137  |  100  |  18  ----------------------------<  96  4.2   |  27  |  1.56<H>    Ca    9.0      21 Feb 2019 06:04  Mg     2.1     02-20      proBNP: Serum Pro-Brain Natriuretic Peptide: 1820 pg/mL (02-13 @ 18:36)    Lipid Profile: Cholesterol 120  LDL 67  HDL 37  TG 78    HgA1c: Hemoglobin A1C, Whole Blood: 5.0 % (02-15 @ 08:02)    TSH:   < from: Colonoscopy (02.20.19 @ 09:53) >  Impression:          - The entire examined colon is normal.           - Diverticulosis in the sigmoid colon.                       - One 10 mm polyp in the transverse colon, removed with a cold snare.                        Resected. anoterh polyp adjacent pedunculated snare and clip.  Recommendation:      - Await pathology results.                                                                                                          < end of copied text >        Assessment and plan  ---------------------------  CAD  HTN  AAA  gi work up done continue current meds  dc planning

## 2019-02-21 NOTE — PROGRESS NOTE ADULT - SUBJECTIVE AND OBJECTIVE BOX
Patient seen and examined  No complaints     REVIEW OF SYSTEMS:  As per HPI, otherwise 8 full 10 ROS were unremarkable    MEDICATIONS  (STANDING):  amLODIPine   Tablet 2.5 milliGRAM(s) Oral daily  aspirin enteric coated 81 milliGRAM(s) Oral daily  atorvastatin 40 milliGRAM(s) Oral at bedtime  docusate sodium 100 milliGRAM(s) Oral three times a day  gabapentin 300 milliGRAM(s) Oral three times a day  heparin  Injectable 5000 Unit(s) SubCutaneous every 12 hours  influenza   Vaccine 0.5 milliLiter(s) IntraMuscular once  metoprolol succinate ER 50 milliGRAM(s) Oral daily  pantoprazole  Injectable 40 milliGRAM(s) IV Push two times a day  sodium chloride 0.45%. 1000 milliLiter(s) (50 mL/Hr) IV Continuous <Continuous>  tamsulosin 0.4 milliGRAM(s) Oral at bedtime      VITAL:  T(C): , Max: 36.7 (02-21-19 @ 00:20)  T(F): , Max: 98 (02-21-19 @ 00:20)  HR: 72 (02-21-19 @ 12:27)  BP: 124/73 (02-21-19 @ 12:27)  BP(mean): --  RR: 18 (02-21-19 @ 12:27)  SpO2: 98% (02-21-19 @ 12:27)  Wt(kg): --    I and O's:    02-20 @ 07:01  -  02-21 @ 07:00  --------------------------------------------------------  IN: 1290 mL / OUT: 100 mL / NET: 1190 mL    02-21 @ 07:01  -  02-21 @ 13:41  --------------------------------------------------------  IN: 300 mL / OUT: 500 mL / NET: -200 mL          PHYSICAL EXAM:    Constitutional: NAD  HEENT: MYA FALCON,  MMM  Neck: No LAD, No JVD  Respiratory: CTAB  Cardiovascular: S1 and S2  Gastrointestinal: BS+, soft, NT/ND  Extremities: No peripheral edema  Neurological: A/O x 3, no focal deficits    LABS:                        9.4    4.69  )-----------( 341      ( 21 Feb 2019 08:56 )             30.3     02-21    137  |  100  |  18  ----------------------------<  96  4.2   |  27  |  1.56<H>    Ca    9.0      21 Feb 2019 06:04  Mg     2.1     02-20        ASSESSMENT    89-year-old gentleman with the past medical history of hypertension, diastolic congestive heart failure, chronic kidney disease stage IV presents with falling and had an inability of strength in his legs.  The patient recently had an endovascular aneurysm repair done and he was unable to urinate without the Flores.  The patient has a history of L4-L5 severe spinal stenosis.     - CKD III with baseline s.cr of 1.6 mg/dl   - Euvolemic on exam  - Mild hypernatremia resolved  - BP is acceptable  - Anemia- s/p EGD/colonoscopy    RECOMMENDATIONS  - Defer further IVF  - Trend renal function  - renal dosing of meds to creatinine clearance 20-25 ml/min     Arsalan Blackmon MD  [facility]  [phone]

## 2019-02-21 NOTE — PROGRESS NOTE ADULT - SUBJECTIVE AND OBJECTIVE BOX
CC: f/u for bacteriuria    Patient still denies  Sxs; only c/o L foot pain with standing    REVIEW OF SYSTEMS:  All other review of systems negative (Comprehensive ROS)    Antimicrobials off  Medications Reviewed    Vital Signs Last 24 Hrs  T(F): 97.9 (2019 04:10), Max: 98 (2019 00:20)  HR: 87 (2019 10:26) (61 - 87)  BP: 118/67 (2019 10:26) (114/61 - 154/72)  BP(mean): --  RR: 18 (2019 04:56) (18 - 18)  SpO2: 96% (2019 10:26) (96% - 99%)    PHYSICAL EXAM:  General: alert, no acute distress  Eyes:  anicteric, no conjunctival injection, no discharge  Oropharynx: no lesions or injection 	  Neck: supple, without adenopathy  Lungs: clear to auscultation  Heart: regular rate and rhythm; no murmur, rubs or gallops  Abdomen: soft, nondistended, nontender, without mass or organomegaly  Skin: no lesions; R groin incision healed  Extremities: no edema or synovitis  Neurologic: alert, moves all extremities    LAB RESULTS:                        9.4    4.69  )-----------( 341      ( 2019 08:56 )             30.3   02-21    137  |  100  |  18  ----------------------------<  96  4.2   |  27  |  1.56<H>    Ca    9.0      2019 06:04  Mg     2.1     02-20    Urinalysis Basic - ( 2019 16:00 )    Color: Light Yellow / Appearance: Slightly Turbid / S.012 / pH: x  Gluc: x / Ketone: Negative  / Bili: Negative / Urobili: <2 mg/dL   Blood: x / Protein: Trace / Nitrite: Negative   Leuk Esterase: Large / RBC: 3 /HPF /  /HPF   Sq Epi: x / Non Sq Epi: 0 /HPF / Bacteria: Occasional      MICROBIOLOGY:  RECENT CULTURES:  Culture - Urine (19 @ 00:55)    -  Ampicillin: S <=2 Predicts results to ampicillin/sulbactam, amoxacillin-clavulanate and  piperacillin-tazobactam.    -  Ciprofloxacin: S <=1    -  Levofloxacin: S <=1    -  Nitrofurantoin: S <=32 Should not be used to treat pyelonephritis.    -  Tetra/Doxy: R >8    -  Vancomycin: S 4    Specimen Source: .Urine Clean Catch (Midstream)    Culture Results:   >100,000 CFU/ml Enterococcus faecalis    Organism Identification: Enterococcus faecalis    Organism: Enterococcus faecalis    Method Type: MAULIK    RADIOLOGY REVIEWED:  CT Abdomen and Pelvis No Cont (19 @ 23:03) >  No acute pathology.  Status post aortobiiliac endograft with no change in size of aneurysm sac   as compared with December 10, 2018.

## 2019-02-21 NOTE — PROGRESS NOTE ADULT - ASSESSMENT
88 year old M with    PMH of CAD  ,   (w/ PCI to LAD and Cx    in 2/2018),   on asa/ plavix/ statin    CKDIII, and HTN ,  hld.  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  and   on   d/c  , needed  munoz  tov, on 2/15,  voiding  well  guaiac  positive/ gi   w/p,   pending  h/o falls, PT eval recommended  rehab   mri head, no  cvaegd normal/  colonoscopy, polyp removed  d/c  planning to rehab, / son  awrae      < from: MR Lumbar Spine No Cont (12.10.18 @ 22:57) >  IMPRESSION: Degenerative changes of the lumbar spine with severe spinal   stenosis at L4-5 due to a combination of degenerative facet changes and   grade 1anterolisthesis L4 on L5. Large abdominal aortic aneurys  < end of copied text > 88 year old M with    PMH of CAD  ,   (w/ PCI to LAD and Cx    in 2/2018),   on asa/ plavix/ statin    CKDIII, and HTN ,  hld.  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  and   on   d/c  , needed  munoz  tov, on 2/15,  voiding  well  guaiac  positive/   h/o falls, PT eval recommended  rehab   mri head, no  cvaegd normal/  colonoscopy, polyp removed  capsule  endoscopy as  an   op  d/c  planning to rehab, / son  aware      < from: MR Lumbar Spine No Cont (12.10.18 @ 22:57) >  IMPRESSION: Degenerative changes of the lumbar spine with severe spinal   stenosis at L4-5 due to a combination of degenerative facet changes and   grade 1anterolisthesis L4 on L5. Large abdominal aortic aneurys  < end of copied text >

## 2019-02-21 NOTE — PROGRESS NOTE ADULT - ASSESSMENT
89 yo male with CAD, spinal stenosis of lumbar region, recent EVAR, admitted after fall/syncope, w/u for GI bleed, asked to evaluate urine culture with enterococcus.  Flores pulled 2/15, voiding without dysuria- no  sxs  He has no local symptoms or systemic signs of infection- remains afebrile, normal WBC  Repeat UA still with pyuria but no repeat urine cx sent    Suggest:  With stable course, off abx, no Sxs or signs of infection, will view Enterococcus as c/w asymptomatic bacteriuria  Observe off antibiotics   D/c planning as noted

## 2019-02-21 NOTE — PROGRESS NOTE ADULT - SUBJECTIVE AND OBJECTIVE BOX
no  comapliants    REVIEW OF SYSTEMS:  GEN: no fever,    no chills  RESP: no SOB,   no cough  CVS: no chest pain,   no palpitations  GI: no abdominal pain,   no nausea,   no vomiting,   no constipation,   no diarrhea  : no dysuria,   no frequency  NEURO: no headache,   no dizziness  PSYCH: no depression,   not anxious  Derm : no rash    MEDICATIONS  (STANDING):  amLODIPine   Tablet 2.5 milliGRAM(s) Oral daily  aspirin enteric coated 81 milliGRAM(s) Oral daily  atorvastatin 40 milliGRAM(s) Oral at bedtime  docusate sodium 100 milliGRAM(s) Oral three times a day  gabapentin 300 milliGRAM(s) Oral three times a day  heparin  Injectable 5000 Unit(s) SubCutaneous every 12 hours  influenza   Vaccine 0.5 milliLiter(s) IntraMuscular once  metoprolol succinate ER 50 milliGRAM(s) Oral daily  pantoprazole  Injectable 40 milliGRAM(s) IV Push two times a day  sodium chloride 0.45%. 1000 milliLiter(s) (50 mL/Hr) IV Continuous <Continuous>  tamsulosin 0.4 milliGRAM(s) Oral at bedtime    MEDICATIONS  (PRN):      Vital Signs Last 24 Hrs  T(C): 36.6 (21 Feb 2019 04:10), Max: 36.7 (21 Feb 2019 00:20)  T(F): 97.9 (21 Feb 2019 04:10), Max: 98 (21 Feb 2019 00:20)  HR: 64 (21 Feb 2019 04:56) (61 - 76)  BP: 114/61 (21 Feb 2019 04:10) (114/61 - 154/72)  BP(mean): --  RR: 18 (21 Feb 2019 04:56) (18 - 18)  SpO2: 99% (21 Feb 2019 04:56) (97% - 99%)  CAPILLARY BLOOD GLUCOSE        I&O's Summary    20 Feb 2019 07:01  -  21 Feb 2019 07:00  --------------------------------------------------------  IN: 1290 mL / OUT: 100 mL / NET: 1190 mL        PHYSICAL EXAM:  HEAD:  Atraumatic, Normocephalic  NECK: Supple, No   JVD  CHEST/LUNG:   no     rales,     no,    rhonchi  HEART: Regular rate and rhythm;         murmur  ABDOMEN: Soft, Nontender, ;   EXTREMITIES:  no      edema  NEUROLOGY:  alert    LABS:                        9.5    5.67  )-----------( 367      ( 19 Feb 2019 09:54 )             31.1     02-21    137  |  100  |  18  ----------------------------<  96  4.2   |  27  |  1.56<H>    Ca    9.0      21 Feb 2019 06:04  Mg     2.1     02-20                    Hemoglobin A1C, Whole Blood: 5.0 % (02-15 @ 08:02)            Consultant(s) Notes Reviewed:      Care Discussed with Consultants/Other Providers:

## 2019-02-21 NOTE — PROGRESS NOTE ADULT - SUBJECTIVE AND OBJECTIVE BOX
KHUSHI SNOWDEN:[id number],   89yMale followed for:  penicillin (Angioedema)    PAST MEDICAL & SURGICAL HISTORY:  CAD (coronary artery disease)  HLD (hyperlipidemia)  CKD (chronic kidney disease)  HTN (hypertension)  Stented coronary artery    FAMILY HISTORY:    MEDICATIONS  (STANDING):  amLODIPine   Tablet 2.5 milliGRAM(s) Oral daily  aspirin enteric coated 81 milliGRAM(s) Oral daily  atorvastatin 40 milliGRAM(s) Oral at bedtime  docusate sodium 100 milliGRAM(s) Oral three times a day  gabapentin 300 milliGRAM(s) Oral three times a day  heparin  Injectable 5000 Unit(s) SubCutaneous every 12 hours  influenza   Vaccine 0.5 milliLiter(s) IntraMuscular once  metoprolol succinate ER 50 milliGRAM(s) Oral daily  pantoprazole  Injectable 40 milliGRAM(s) IV Push two times a day  sodium chloride 0.45%. 1000 milliLiter(s) (50 mL/Hr) IV Continuous <Continuous>  tamsulosin 0.4 milliGRAM(s) Oral at bedtime    MEDICATIONS  (PRN):      Vital Signs Last 24 Hrs  T(C): 36.6 (21 Feb 2019 04:10), Max: 36.7 (21 Feb 2019 00:20)  T(F): 97.9 (21 Feb 2019 04:10), Max: 98 (21 Feb 2019 00:20)  HR: 64 (21 Feb 2019 04:56) (61 - 76)  BP: 114/61 (21 Feb 2019 04:10) (114/61 - 157/70)  BP(mean): --  RR: 18 (21 Feb 2019 04:56) (18 - 18)  SpO2: 99% (21 Feb 2019 04:56) (97% - 99%)  nc/at  s1s2  cta  soft, nt, nd no guarding or rebound  no c/c/e    CBC Full  -  ( 19 Feb 2019 09:54 )  WBC Count : 5.67 K/uL  Hemoglobin : 9.5 g/dL  Hematocrit : 31.1 %  Platelet Count - Automated : 367 K/uL  Mean Cell Volume : 93.4 fl  Mean Cell Hemoglobin : 28.5 pg  Mean Cell Hemoglobin Concentration : 30.5 gm/dL  Auto Neutrophil # : 4.04 K/uL  Auto Lymphocyte # : 0.98 K/uL  Auto Monocyte # : 0.42 K/uL  Auto Eosinophil # : 0.19 K/uL  Auto Basophil # : 0.03 K/uL  Auto Neutrophil % : 71.2 %  Auto Lymphocyte % : 17.3 %  Auto Monocyte % : 7.4 %  Auto Eosinophil % : 3.4 %  Auto Basophil % : 0.5 %    02-21    137  |  100  |  18  ----------------------------<  96  4.2   |  27  |  1.56<H>    Ca    9.0      21 Feb 2019 06:04  Mg     2.1     02-20

## 2019-02-22 VITALS
DIASTOLIC BLOOD PRESSURE: 51 MMHG | TEMPERATURE: 98 F | OXYGEN SATURATION: 98 % | SYSTOLIC BLOOD PRESSURE: 95 MMHG | RESPIRATION RATE: 18 BRPM | HEART RATE: 60 BPM

## 2019-02-22 LAB — SURGICAL PATHOLOGY STUDY: SIGNIFICANT CHANGE UP

## 2019-02-22 RX ORDER — AMLODIPINE BESYLATE 2.5 MG/1
1 TABLET ORAL
Qty: 0 | Refills: 0 | DISCHARGE
Start: 2019-02-22

## 2019-02-22 RX ORDER — HEPARIN SODIUM 5000 [USP'U]/ML
5000 INJECTION INTRAVENOUS; SUBCUTANEOUS
Qty: 0 | Refills: 0 | DISCHARGE
Start: 2019-02-22

## 2019-02-22 RX ORDER — METOPROLOL TARTRATE 50 MG
1 TABLET ORAL
Qty: 0 | Refills: 0 | DISCHARGE
Start: 2019-02-22

## 2019-02-22 RX ORDER — DOCUSATE SODIUM 100 MG
1 CAPSULE ORAL
Qty: 0 | Refills: 0 | DISCHARGE
Start: 2019-02-22

## 2019-02-22 RX ORDER — GABAPENTIN 400 MG/1
1 CAPSULE ORAL
Qty: 0 | Refills: 0 | DISCHARGE
Start: 2019-02-22

## 2019-02-22 RX ORDER — METOPROLOL TARTRATE 50 MG
1.5 TABLET ORAL
Qty: 0 | Refills: 0 | DISCHARGE
Start: 2019-02-22

## 2019-02-22 RX ORDER — ACETAMINOPHEN 500 MG
650 TABLET ORAL ONCE
Refills: 0 | Status: COMPLETED | OUTPATIENT
Start: 2019-02-22 | End: 2019-02-22

## 2019-02-22 RX ADMIN — Medication 81 MILLIGRAM(S): at 11:37

## 2019-02-22 RX ADMIN — Medication 650 MILLIGRAM(S): at 15:37

## 2019-02-22 RX ADMIN — GABAPENTIN 300 MILLIGRAM(S): 400 CAPSULE ORAL at 13:18

## 2019-02-22 RX ADMIN — GABAPENTIN 300 MILLIGRAM(S): 400 CAPSULE ORAL at 06:21

## 2019-02-22 RX ADMIN — HEPARIN SODIUM 5000 UNIT(S): 5000 INJECTION INTRAVENOUS; SUBCUTANEOUS at 06:22

## 2019-02-22 RX ADMIN — PANTOPRAZOLE SODIUM 40 MILLIGRAM(S): 20 TABLET, DELAYED RELEASE ORAL at 06:22

## 2019-02-22 RX ADMIN — Medication 50 MILLIGRAM(S): at 06:21

## 2019-02-22 RX ADMIN — AMLODIPINE BESYLATE 2.5 MILLIGRAM(S): 2.5 TABLET ORAL at 06:21

## 2019-02-22 NOTE — PROGRESS NOTE ADULT - SUBJECTIVE AND OBJECTIVE BOX
KHUSHI SNOWDEN:[id number],   89yMale followed for:  penicillin (Angioedema)    PAST MEDICAL & SURGICAL HISTORY:  CAD (coronary artery disease)  HLD (hyperlipidemia)  CKD (chronic kidney disease)  HTN (hypertension)  Stented coronary artery    FAMILY HISTORY:    MEDICATIONS  (STANDING):  amLODIPine   Tablet 2.5 milliGRAM(s) Oral daily  aspirin enteric coated 81 milliGRAM(s) Oral daily  atorvastatin 40 milliGRAM(s) Oral at bedtime  docusate sodium 100 milliGRAM(s) Oral three times a day  gabapentin 300 milliGRAM(s) Oral three times a day  heparin  Injectable 5000 Unit(s) SubCutaneous every 12 hours  influenza   Vaccine 0.5 milliLiter(s) IntraMuscular once  metoprolol succinate ER 50 milliGRAM(s) Oral daily  pantoprazole  Injectable 40 milliGRAM(s) IV Push two times a day  sodium chloride 0.45%. 1000 milliLiter(s) (50 mL/Hr) IV Continuous <Continuous>  tamsulosin 0.4 milliGRAM(s) Oral at bedtime    MEDICATIONS  (PRN):      Vital Signs Last 24 Hrs  T(C): 36.6 (22 Feb 2019 04:28), Max: 36.7 (21 Feb 2019 12:27)  T(F): 97.9 (22 Feb 2019 04:28), Max: 98 (21 Feb 2019 12:27)  HR: 63 (22 Feb 2019 04:28) (63 - 87)  BP: 122/69 (22 Feb 2019 04:28) (94/49 - 124/73)  BP(mean): --  RR: 18 (22 Feb 2019 04:28) (18 - 18)  SpO2: 98% (22 Feb 2019 04:28) (96% - 99%)  nc/at  s1s2  cta  soft, nt, nd no guarding or rebound  no c/c/e    CBC Full  -  ( 21 Feb 2019 08:56 )  WBC Count : 4.69 K/uL  Hemoglobin : 9.4 g/dL  Hematocrit : 30.3 %  Platelet Count - Automated : 341 K/uL  Mean Cell Volume : 92.4 fl  Mean Cell Hemoglobin : 28.7 pg  Mean Cell Hemoglobin Concentration : 31.0 gm/dL  Auto Neutrophil # : x  Auto Lymphocyte # : x  Auto Monocyte # : x  Auto Eosinophil # : x  Auto Basophil # : x  Auto Neutrophil % : x  Auto Lymphocyte % : x  Auto Monocyte % : x  Auto Eosinophil % : x  Auto Basophil % : x    02-21    137  |  100  |  18  ----------------------------<  96  4.2   |  27  |  1.56<H>    Ca    9.0      21 Feb 2019 06:04

## 2019-02-22 NOTE — PROGRESS NOTE ADULT - SUBJECTIVE AND OBJECTIVE BOX
no complaints    REVIEW OF SYSTEMS:  GEN: no fever,    no chills  RESP: no SOB,   no cough  CVS: no chest pain,   no palpitations  GI: no abdominal pain,   no nausea,   no vomiting,   no constipation,   no diarrhea  : no dysuria,   no frequency  NEURO: no headache,   no dizziness  PSYCH: no depression,   not anxious  Derm : no rash    MEDICATIONS  (STANDING):  amLODIPine   Tablet 2.5 milliGRAM(s) Oral daily  aspirin enteric coated 81 milliGRAM(s) Oral daily  atorvastatin 40 milliGRAM(s) Oral at bedtime  docusate sodium 100 milliGRAM(s) Oral three times a day  gabapentin 300 milliGRAM(s) Oral three times a day  heparin  Injectable 5000 Unit(s) SubCutaneous every 12 hours  influenza   Vaccine 0.5 milliLiter(s) IntraMuscular once  metoprolol succinate ER 50 milliGRAM(s) Oral daily  pantoprazole  Injectable 40 milliGRAM(s) IV Push two times a day  sodium chloride 0.45%. 1000 milliLiter(s) (50 mL/Hr) IV Continuous <Continuous>  tamsulosin 0.4 milliGRAM(s) Oral at bedtime    MEDICATIONS  (PRN):      Vital Signs Last 24 Hrs  T(C): 36.6 (22 Feb 2019 04:28), Max: 36.7 (21 Feb 2019 12:27)  T(F): 97.9 (22 Feb 2019 04:28), Max: 98 (21 Feb 2019 12:27)  HR: 63 (22 Feb 2019 04:28) (63 - 87)  BP: 122/69 (22 Feb 2019 04:28) (94/49 - 124/73)  BP(mean): --  RR: 18 (22 Feb 2019 04:28) (18 - 18)  SpO2: 98% (22 Feb 2019 04:28) (96% - 99%)  CAPILLARY BLOOD GLUCOSE        I&O's Summary    21 Feb 2019 07:01  -  22 Feb 2019 07:00  --------------------------------------------------------  IN: 820 mL / OUT: 1020 mL / NET: -200 mL        PHYSICAL EXAM:  HEAD:  Atraumatic, Normocephalic  NECK: Supple, No   JVD  CHEST/LUNG:   no     rales,     no,    rhonchi  HEART: Regular rate and rhythm;         murmur  ABDOMEN: Soft, Nontender, ;   EXTREMITIES:   no    edema  NEUROLOGY:  alert    LABS:                        9.4    4.69  )-----------( 341      ( 21 Feb 2019 08:56 )             30.3     02-21    137  |  100  |  18  ----------------------------<  96  4.2   |  27  |  1.56<H>    Ca    9.0      21 Feb 2019 06:04                    Hemoglobin A1C, Whole Blood: 5.0 % (02-15 @ 08:02)            Consultant(s) Notes Reviewed:      Care Discussed with Consultants/Other Providers:

## 2019-02-22 NOTE — PROGRESS NOTE ADULT - ASSESSMENT
88 year old M with    PMH of CAD  ,   (w/ PCI to LAD and Cx    in 2/2018),   on asa/ plavix/ statin    CKDIII, and HTN ,  hld.  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  and   on   d/c  , needed  munoz  tov, on 2/15,  voiding  well  guaiac  positive/   h/o falls, PT eval recommended  rehab   mri head, no  cvaegd normal/  colonoscopy, polyp removed  capsule  endoscopy as  an   op  d/c  planning to rehab, / son  aware  dc today      < from: MR Lumbar Spine No Cont (12.10.18 @ 22:57) >  IMPRESSION: Degenerative changes of the lumbar spine with severe spinal   stenosis at L4-5 due to a combination of degenerative facet changes and   grade 1anterolisthesis L4 on L5. Large abdominal aortic aneurys  < end of copied text >

## 2019-02-22 NOTE — PROGRESS NOTE ADULT - REASON FOR ADMISSION
fall ?syncope

## 2019-02-22 NOTE — PROGRESS NOTE ADULT - SUBJECTIVE AND OBJECTIVE BOX
CARDIOLOGY     PROGRESS  NOTE   ________________________________________________    CHIEF COMPLAINT:Patient is a 89y old  Male who presents with a chief complaint of fall ?syncope (22 Feb 2019 08:29)  no complain.  	  REVIEW OF SYSTEMS:  CONSTITUTIONAL: No fever, weight loss, or fatigue  EYES: No eye pain, visual disturbances, or discharge  ENT:  No difficulty hearing, tinnitus, vertigo; No sinus or throat pain  NECK: No pain or stiffness  RESPIRATORY: No cough, wheezing, chills or hemoptysis; No Shortness of Breath  CARDIOVASCULAR: No chest pain, palpitations, passing out, dizziness, or leg swelling  GASTROINTESTINAL: No abdominal or epigastric pain. No nausea, vomiting, or hematemesis; No diarrhea or constipation. No melena or hematochezia.  GENITOURINARY: No dysuria, frequency, hematuria, or incontinence  NEUROLOGICAL: No headaches, memory loss, loss of strength, numbness, or tremors  SKIN: No itching, burning, rashes, or lesions   LYMPH Nodes: No enlarged glands  ENDOCRINE: No heat or cold intolerance; No hair loss  MUSCULOSKELETAL: No joint pain or swelling; No muscle, back, or extremity pain  PSYCHIATRIC: No depression, anxiety, mood swings, or difficulty sleeping  HEME/LYMPH: No easy bruising, or bleeding gums  ALLERGY AND IMMUNOLOGIC: No hives or eczema	    [ ] All others negative	  [ ] Unable to obtain    PHYSICAL EXAM:  T(C): 36.6 (02-22-19 @ 04:28), Max: 36.7 (02-21-19 @ 12:27)  HR: 63 (02-22-19 @ 04:28) (63 - 87)  BP: 122/69 (02-22-19 @ 04:28) (94/49 - 124/73)  RR: 18 (02-22-19 @ 04:28) (18 - 18)  SpO2: 98% (02-22-19 @ 04:28) (96% - 99%)  Wt(kg): --  I&O's Summary    21 Feb 2019 07:01  -  22 Feb 2019 07:00  --------------------------------------------------------  IN: 820 mL / OUT: 1020 mL / NET: -200 mL        Appearance: Normal	  HEENT:   Normal oral mucosa, PERRL, EOMI	  Lymphatic: No lymphadenopathy  Cardiovascular: Normal S1 S2, No JVD,+ murmurs, No edema  Respiratory: Lungs clear to auscultation	  Psychiatry: A & O x 3, Mood & affect appropriate  Gastrointestinal:  Soft, Non-tender, + BS	  Skin: No rashes, No ecchymoses, No cyanosis	  Neurologic: Non-focal  Extremities: Normal range of motion, No clubbing, cyanosis or edema  Vascular: Peripheral pulses palpable 2+ bilaterally    MEDICATIONS  (STANDING):  amLODIPine   Tablet 2.5 milliGRAM(s) Oral daily  aspirin enteric coated 81 milliGRAM(s) Oral daily  atorvastatin 40 milliGRAM(s) Oral at bedtime  docusate sodium 100 milliGRAM(s) Oral three times a day  gabapentin 300 milliGRAM(s) Oral three times a day  heparin  Injectable 5000 Unit(s) SubCutaneous every 12 hours  influenza   Vaccine 0.5 milliLiter(s) IntraMuscular once  metoprolol succinate ER 50 milliGRAM(s) Oral daily  pantoprazole  Injectable 40 milliGRAM(s) IV Push two times a day  sodium chloride 0.45%. 1000 milliLiter(s) (50 mL/Hr) IV Continuous <Continuous>  tamsulosin 0.4 milliGRAM(s) Oral at bedtime      TELEMETRY: 	    ECG:  	  RADIOLOGY:  OTHER: 	  	  LABS:	 	    CARDIAC MARKERS:                                9.4    4.69  )-----------( 341      ( 21 Feb 2019 08:56 )             30.3     02-21    137  |  100  |  18  ----------------------------<  96  4.2   |  27  |  1.56<H>    Ca    9.0      21 Feb 2019 06:04      proBNP: Serum Pro-Brain Natriuretic Peptide: 1820 pg/mL (02-13 @ 18:36)    Lipid Profile: Cholesterol 120  LDL 67  HDL 37  TG 78    HgA1c: Hemoglobin A1C, Whole Blood: 5.0 % (02-15 @ 08:02)    TSH:         Assessment and plan  ---------------------------  cad/aortic stenosis  AAA s/p stent  continue current meds  dc planning

## 2019-02-22 NOTE — PROGRESS NOTE ADULT - PROVIDER SPECIALTY LIST ADULT
Cardiology
Gastroenterology
Infectious Disease
Infectious Disease
Internal Medicine
Nephrology
Vascular Surgery
Cardiology
Internal Medicine

## 2019-02-26 ENCOUNTER — APPOINTMENT (OUTPATIENT)
Dept: VASCULAR SURGERY | Facility: CLINIC | Age: 84
End: 2019-02-26

## 2019-05-06 ENCOUNTER — TRANSCRIPTION ENCOUNTER (OUTPATIENT)
Age: 84
End: 2019-05-06

## 2019-05-07 ENCOUNTER — APPOINTMENT (OUTPATIENT)
Dept: VASCULAR SURGERY | Facility: CLINIC | Age: 84
End: 2019-05-07
Payer: MEDICARE

## 2019-05-07 VITALS
WEIGHT: 190 LBS | SYSTOLIC BLOOD PRESSURE: 94 MMHG | TEMPERATURE: 98.1 F | HEART RATE: 64 BPM | HEIGHT: 69 IN | BODY MASS INDEX: 28.14 KG/M2 | DIASTOLIC BLOOD PRESSURE: 55 MMHG

## 2019-05-07 PROCEDURE — 99213 OFFICE O/P EST LOW 20 MIN: CPT

## 2019-05-07 PROCEDURE — 93923 UPR/LXTR ART STDY 3+ LVLS: CPT

## 2019-05-07 PROCEDURE — 93978 VASCULAR STUDY: CPT

## 2019-05-08 NOTE — REASON FOR VISIT
[Follow-Up: _____] : a [unfilled] follow-up visit [FreeTextEntry1] : post EVAR [Family Member] : family member

## 2019-05-08 NOTE — PHYSICAL EXAM
[Normal Heart Sounds] : normal heart sounds [2+] : left 2+ [0] : left 0 [Ankle Swelling (On Exam)] : present [Ankle Swelling Bilaterally] : bilaterally  [Ankle Swelling On The Right] : mild [] : bilaterally [de-identified] : No acute distress [de-identified] : unlabored breathing [FreeTextEntry1] : \par Well-healed R groin cut-down scar\par \par R heel shallow ulcer

## 2019-05-08 NOTE — ASSESSMENT
[Foot care/Footwear] : foot care/footwear [FreeTextEntry1] : 89 yo M three months s/p EVAR.\par \par Aortoiliac Duplex:\par EVAR - no enoleaks\par Exluded AAA sac 5.5 cm\par \par SHAHRZAD/PVR:\par SHAHRZAD R 0.77, L 0.59\par TBI R 0.39, L 0.26 Toe  pressure  is  adquate  for  possible  wound  healing   Hyperbaric  oxygen  should  be  considered  as well \par \par - Stable SHAHRZAD/PVR \par - Consider hyperbaric oxygen therapy for poorly healing foot wound\par - F/U in 1 month to re-assess foot wound and progress with walking\par \par \par Eber Rondon MD\par Vascular surgery Fellow [Arterial/Venous Disease] : arterial/venous disease [Ulcer Care] : ulcer care

## 2019-05-08 NOTE — HISTORY OF PRESENT ILLNESS
[de-identified] : 89 yo male with h/o AAA s/p EVAR on 2/4/19 here for follow-up visit. Reports some troubles with ambulation. Uses walker for walking. Being followed by podiatrist Dr. Martino for poorly healing B/L feet ulcer. Reports burnning of bilateral soles upon walking and occasionally with rest.\par Pre-operatively patine did not palpable pedal pulses. Only pedal signals were present with SHAHRZAD s of 0.6\par Postoperatively he had dopplerable pedal signals

## 2019-06-26 ENCOUNTER — APPOINTMENT (OUTPATIENT)
Dept: WOUND CARE | Facility: CLINIC | Age: 84
End: 2019-06-26

## 2019-07-01 ENCOUNTER — INPATIENT (INPATIENT)
Facility: HOSPITAL | Age: 84
LOS: 6 days | Discharge: NURSING, REHAB OR LONG-TERM CARE | End: 2019-07-08
Attending: HOSPITALIST | Admitting: HOSPITALIST
Payer: MEDICARE

## 2019-07-01 VITALS
HEART RATE: 85 BPM | WEIGHT: 139.99 LBS | TEMPERATURE: 100 F | SYSTOLIC BLOOD PRESSURE: 132 MMHG | DIASTOLIC BLOOD PRESSURE: 57 MMHG | RESPIRATION RATE: 18 BRPM | HEIGHT: 66 IN | OXYGEN SATURATION: 100 %

## 2019-07-01 DIAGNOSIS — Z98.89 OTHER SPECIFIED POSTPROCEDURAL STATES: Chronic | ICD-10-CM

## 2019-07-01 DIAGNOSIS — Z98.890 OTHER SPECIFIED POSTPROCEDURAL STATES: Chronic | ICD-10-CM

## 2019-07-01 DIAGNOSIS — Z95.5 PRESENCE OF CORONARY ANGIOPLASTY IMPLANT AND GRAFT: Chronic | ICD-10-CM

## 2019-07-01 DIAGNOSIS — Z87.19 PERSONAL HISTORY OF OTHER DISEASES OF THE DIGESTIVE SYSTEM: Chronic | ICD-10-CM

## 2019-07-01 LAB
ANION GAP SERPL CALC-SCNC: 5 MMOL/L — SIGNIFICANT CHANGE UP (ref 5–17)
BUN SERPL-MCNC: 30 MG/DL — HIGH (ref 7–23)
CALCIUM SERPL-MCNC: 8.4 MG/DL — LOW (ref 8.5–10.1)
CHLORIDE SERPL-SCNC: 103 MMOL/L — SIGNIFICANT CHANGE UP (ref 96–108)
CO2 SERPL-SCNC: 32 MMOL/L — HIGH (ref 22–31)
CREAT SERPL-MCNC: 2.17 MG/DL — HIGH (ref 0.5–1.3)
GLUCOSE SERPL-MCNC: 110 MG/DL — HIGH (ref 70–99)
HCT VFR BLD CALC: 35 % — LOW (ref 39–50)
HGB BLD-MCNC: 11 G/DL — LOW (ref 13–17)
MCHC RBC-ENTMCNC: 30 PG — SIGNIFICANT CHANGE UP (ref 27–34)
MCHC RBC-ENTMCNC: 31.4 GM/DL — LOW (ref 32–36)
MCV RBC AUTO: 95.4 FL — SIGNIFICANT CHANGE UP (ref 80–100)
PLATELET # BLD AUTO: 240 K/UL — SIGNIFICANT CHANGE UP (ref 150–400)
POTASSIUM SERPL-MCNC: 4.2 MMOL/L — SIGNIFICANT CHANGE UP (ref 3.5–5.3)
POTASSIUM SERPL-SCNC: 4.2 MMOL/L — SIGNIFICANT CHANGE UP (ref 3.5–5.3)
RBC # BLD: 3.67 M/UL — LOW (ref 4.2–5.8)
RBC # FLD: 14.3 % — SIGNIFICANT CHANGE UP (ref 10.3–14.5)
SODIUM SERPL-SCNC: 140 MMOL/L — SIGNIFICANT CHANGE UP (ref 135–145)
WBC # BLD: 14.9 K/UL — HIGH (ref 3.8–10.5)
WBC # FLD AUTO: 14.9 K/UL — HIGH (ref 3.8–10.5)

## 2019-07-01 PROCEDURE — 70450 CT HEAD/BRAIN W/O DYE: CPT | Mod: 26

## 2019-07-01 PROCEDURE — 93010 ELECTROCARDIOGRAM REPORT: CPT

## 2019-07-01 PROCEDURE — 72192 CT PELVIS W/O DYE: CPT | Mod: 26

## 2019-07-01 PROCEDURE — 72125 CT NECK SPINE W/O DYE: CPT | Mod: 26

## 2019-07-01 PROCEDURE — 76376 3D RENDER W/INTRP POSTPROCES: CPT | Mod: 26

## 2019-07-01 PROCEDURE — 73502 X-RAY EXAM HIP UNI 2-3 VIEWS: CPT | Mod: 26,LT

## 2019-07-01 PROCEDURE — 99053 MED SERV 10PM-8AM 24 HR FAC: CPT

## 2019-07-01 PROCEDURE — 99285 EMERGENCY DEPT VISIT HI MDM: CPT

## 2019-07-01 NOTE — ED ADULT NURSE NOTE - NSIMPLEMENTINTERV_GEN_ALL_ED
Implemented All Fall with Harm Risk Interventions:  [place] to call system. Call bell, personal items and telephone within reach. Instruct patient to call for assistance. Room bathroom lighting operational. Non-slip footwear when patient is off stretcher. Physically safe environment: no spills, clutter or unnecessary equipment. Stretcher in lowest position, wheels locked, appropriate side rails in place. Provide visual cue, wrist band, yellow gown, etc. Monitor gait and stability. Monitor for mental status changes and reorient to person, place, and time. Review medications for side effects contributing to fall risk. Reinforce activity limits and safety measures with patient and family. Provide visual clues: red socks.

## 2019-07-01 NOTE — ED ADULT NURSE NOTE - OBJECTIVE STATEMENT
pt tried to stand and fell, denies LOC. c/o neck pain, left hip pain and left knee pain. A&0x4. pt states he has neuropathy and usually uses a walker to ambulate. states he was in his chair for a longer time then normal and was more stiff. pt unable to lift left leg. taking ASA & plavix. [place]. family at bedside

## 2019-07-02 LAB
ALBUMIN SERPL ELPH-MCNC: 2.9 G/DL — LOW (ref 3.3–5)
ALP SERPL-CCNC: 77 U/L — SIGNIFICANT CHANGE UP (ref 40–120)
ALT FLD-CCNC: 16 U/L — SIGNIFICANT CHANGE UP (ref 12–78)
ANION GAP SERPL CALC-SCNC: 4 MMOL/L — LOW (ref 5–17)
APPEARANCE UR: CLEAR — SIGNIFICANT CHANGE UP
AST SERPL-CCNC: 20 U/L — SIGNIFICANT CHANGE UP (ref 15–37)
BACTERIA # UR AUTO: NEGATIVE — SIGNIFICANT CHANGE UP
BASOPHILS # BLD AUTO: 0 K/UL — SIGNIFICANT CHANGE UP (ref 0–0.2)
BASOPHILS # BLD AUTO: 0.01 K/UL — SIGNIFICANT CHANGE UP (ref 0–0.2)
BASOPHILS NFR BLD AUTO: 0 % — SIGNIFICANT CHANGE UP (ref 0–2)
BASOPHILS NFR BLD AUTO: 0.1 % — SIGNIFICANT CHANGE UP (ref 0–2)
BILIRUB SERPL-MCNC: 0.5 MG/DL — SIGNIFICANT CHANGE UP (ref 0.2–1.2)
BILIRUB UR-MCNC: NEGATIVE — SIGNIFICANT CHANGE UP
BUN SERPL-MCNC: 32 MG/DL — HIGH (ref 7–23)
CALCIUM SERPL-MCNC: 8.5 MG/DL — SIGNIFICANT CHANGE UP (ref 8.5–10.1)
CHLORIDE SERPL-SCNC: 107 MMOL/L — SIGNIFICANT CHANGE UP (ref 96–108)
CO2 SERPL-SCNC: 32 MMOL/L — HIGH (ref 22–31)
COLOR SPEC: YELLOW — SIGNIFICANT CHANGE UP
CREAT SERPL-MCNC: 2.38 MG/DL — HIGH (ref 0.5–1.3)
DIFF PNL FLD: NEGATIVE — SIGNIFICANT CHANGE UP
EOSINOPHIL # BLD AUTO: 0 K/UL — SIGNIFICANT CHANGE UP (ref 0–0.5)
EOSINOPHIL # BLD AUTO: 0.01 K/UL — SIGNIFICANT CHANGE UP (ref 0–0.5)
EOSINOPHIL NFR BLD AUTO: 0 % — SIGNIFICANT CHANGE UP (ref 0–6)
EOSINOPHIL NFR BLD AUTO: 0.1 % — SIGNIFICANT CHANGE UP (ref 0–6)
GLUCOSE SERPL-MCNC: 103 MG/DL — HIGH (ref 70–99)
GLUCOSE UR QL: NEGATIVE MG/DL — SIGNIFICANT CHANGE UP
HCT VFR BLD CALC: 30.2 % — LOW (ref 39–50)
HGB BLD-MCNC: 9.7 G/DL — LOW (ref 13–17)
IMM GRANULOCYTES NFR BLD AUTO: 0.5 % — SIGNIFICANT CHANGE UP (ref 0–1.5)
KETONES UR-MCNC: NEGATIVE — SIGNIFICANT CHANGE UP
LACTATE SERPL-SCNC: 1.9 MMOL/L — SIGNIFICANT CHANGE UP (ref 0.7–2)
LACTATE SERPL-SCNC: 2.4 MMOL/L — HIGH (ref 0.7–2)
LEUKOCYTE ESTERASE UR-ACNC: ABNORMAL
LYMPHOCYTES # BLD AUTO: 0.3 K/UL — LOW (ref 1–3.3)
LYMPHOCYTES # BLD AUTO: 0.9 K/UL — LOW (ref 1–3.3)
LYMPHOCYTES # BLD AUTO: 2 % — LOW (ref 13–44)
LYMPHOCYTES # BLD AUTO: 8.3 % — LOW (ref 13–44)
MANUAL SMEAR VERIFICATION: SIGNIFICANT CHANGE UP
MCHC RBC-ENTMCNC: 30.8 PG — SIGNIFICANT CHANGE UP (ref 27–34)
MCHC RBC-ENTMCNC: 32.1 GM/DL — SIGNIFICANT CHANGE UP (ref 32–36)
MCV RBC AUTO: 95.9 FL — SIGNIFICANT CHANGE UP (ref 80–100)
MONOCYTES # BLD AUTO: 0.56 K/UL — SIGNIFICANT CHANGE UP (ref 0–0.9)
MONOCYTES # BLD AUTO: 1.04 K/UL — HIGH (ref 0–0.9)
MONOCYTES NFR BLD AUTO: 5.2 % — SIGNIFICANT CHANGE UP (ref 2–14)
MONOCYTES NFR BLD AUTO: 7 % — SIGNIFICANT CHANGE UP (ref 2–14)
NEUTROPHILS # BLD AUTO: 13.56 K/UL — HIGH (ref 1.8–7.4)
NEUTROPHILS # BLD AUTO: 9.33 K/UL — HIGH (ref 1.8–7.4)
NEUTROPHILS NFR BLD AUTO: 85.8 % — HIGH (ref 43–77)
NEUTROPHILS NFR BLD AUTO: 91 % — HIGH (ref 43–77)
NITRITE UR-MCNC: NEGATIVE — SIGNIFICANT CHANGE UP
NRBC # BLD: 0 /100 — SIGNIFICANT CHANGE UP (ref 0–0)
NRBC # BLD: SIGNIFICANT CHANGE UP /100 WBCS (ref 0–0)
PH UR: 5 — SIGNIFICANT CHANGE UP (ref 5–8)
PLAT MORPH BLD: NORMAL — SIGNIFICANT CHANGE UP
PLATELET # BLD AUTO: 186 K/UL — SIGNIFICANT CHANGE UP (ref 150–400)
POTASSIUM SERPL-MCNC: 4.5 MMOL/L — SIGNIFICANT CHANGE UP (ref 3.5–5.3)
POTASSIUM SERPL-SCNC: 4.5 MMOL/L — SIGNIFICANT CHANGE UP (ref 3.5–5.3)
PROT SERPL-MCNC: 6.2 GM/DL — SIGNIFICANT CHANGE UP (ref 6–8.3)
PROT UR-MCNC: 30 MG/DL
RBC # BLD: 3.15 M/UL — LOW (ref 4.2–5.8)
RBC # FLD: 14.4 % — SIGNIFICANT CHANGE UP (ref 10.3–14.5)
RBC BLD AUTO: NORMAL — SIGNIFICANT CHANGE UP
RBC CASTS # UR COMP ASSIST: SIGNIFICANT CHANGE UP /HPF (ref 0–4)
SODIUM SERPL-SCNC: 143 MMOL/L — SIGNIFICANT CHANGE UP (ref 135–145)
SP GR SPEC: 1.01 — SIGNIFICANT CHANGE UP (ref 1.01–1.02)
UROBILINOGEN FLD QL: NEGATIVE MG/DL — SIGNIFICANT CHANGE UP
WBC # BLD: 10.86 K/UL — HIGH (ref 3.8–10.5)
WBC # FLD AUTO: 10.86 K/UL — HIGH (ref 3.8–10.5)
WBC UR QL: SIGNIFICANT CHANGE UP

## 2019-07-02 PROCEDURE — 73590 X-RAY EXAM OF LOWER LEG: CPT | Mod: 26,LT

## 2019-07-02 PROCEDURE — 72195 MRI PELVIS W/O DYE: CPT | Mod: 26

## 2019-07-02 PROCEDURE — 73564 X-RAY EXAM KNEE 4 OR MORE: CPT | Mod: 26,LT

## 2019-07-02 RX ORDER — DULOXETINE HYDROCHLORIDE 30 MG/1
1 CAPSULE, DELAYED RELEASE ORAL
Qty: 0 | Refills: 0 | DISCHARGE

## 2019-07-02 RX ORDER — ATORVASTATIN CALCIUM 80 MG/1
40 TABLET, FILM COATED ORAL AT BEDTIME
Refills: 0 | Status: DISCONTINUED | OUTPATIENT
Start: 2019-07-02 | End: 2019-07-08

## 2019-07-02 RX ORDER — CIPROFLOXACIN LACTATE 400MG/40ML
500 VIAL (ML) INTRAVENOUS ONCE
Refills: 0 | Status: COMPLETED | OUTPATIENT
Start: 2019-07-02 | End: 2019-07-02

## 2019-07-02 RX ORDER — DULOXETINE HYDROCHLORIDE 30 MG/1
20 CAPSULE, DELAYED RELEASE ORAL DAILY
Refills: 0 | Status: DISCONTINUED | OUTPATIENT
Start: 2019-07-02 | End: 2019-07-08

## 2019-07-02 RX ORDER — FAMOTIDINE 10 MG/ML
20 INJECTION INTRAVENOUS
Refills: 0 | Status: DISCONTINUED | OUTPATIENT
Start: 2019-07-02 | End: 2019-07-08

## 2019-07-02 RX ORDER — HEPARIN SODIUM 5000 [USP'U]/ML
5000 INJECTION INTRAVENOUS; SUBCUTANEOUS EVERY 12 HOURS
Refills: 0 | Status: DISCONTINUED | OUTPATIENT
Start: 2019-07-02 | End: 2019-07-08

## 2019-07-02 RX ORDER — GABAPENTIN 400 MG/1
400 CAPSULE ORAL THREE TIMES A DAY
Refills: 0 | Status: DISCONTINUED | OUTPATIENT
Start: 2019-07-02 | End: 2019-07-08

## 2019-07-02 RX ORDER — SODIUM CHLORIDE 9 MG/ML
1000 INJECTION INTRAMUSCULAR; INTRAVENOUS; SUBCUTANEOUS
Refills: 0 | Status: DISCONTINUED | OUTPATIENT
Start: 2019-07-02 | End: 2019-07-03

## 2019-07-02 RX ORDER — ASPIRIN/CALCIUM CARB/MAGNESIUM 324 MG
81 TABLET ORAL DAILY
Refills: 0 | Status: DISCONTINUED | OUTPATIENT
Start: 2019-07-02 | End: 2019-07-08

## 2019-07-02 RX ORDER — CLOPIDOGREL BISULFATE 75 MG/1
75 TABLET, FILM COATED ORAL DAILY
Refills: 0 | Status: DISCONTINUED | OUTPATIENT
Start: 2019-07-02 | End: 2019-07-08

## 2019-07-02 RX ORDER — FERROUS SULFATE 325(65) MG
325 TABLET ORAL DAILY
Refills: 0 | Status: DISCONTINUED | OUTPATIENT
Start: 2019-07-02 | End: 2019-07-08

## 2019-07-02 RX ORDER — METOPROLOL TARTRATE 50 MG
50 TABLET ORAL DAILY
Refills: 0 | Status: DISCONTINUED | OUTPATIENT
Start: 2019-07-02 | End: 2019-07-08

## 2019-07-02 RX ORDER — CEFTRIAXONE 500 MG/1
1000 INJECTION, POWDER, FOR SOLUTION INTRAMUSCULAR; INTRAVENOUS EVERY 24 HOURS
Refills: 0 | Status: COMPLETED | OUTPATIENT
Start: 2019-07-02 | End: 2019-07-06

## 2019-07-02 RX ORDER — TAMSULOSIN HYDROCHLORIDE 0.4 MG/1
0.4 CAPSULE ORAL AT BEDTIME
Refills: 0 | Status: DISCONTINUED | OUTPATIENT
Start: 2019-07-02 | End: 2019-07-08

## 2019-07-02 RX ORDER — CEFTRIAXONE 500 MG/1
1 INJECTION, POWDER, FOR SOLUTION INTRAMUSCULAR; INTRAVENOUS EVERY 24 HOURS
Refills: 0 | Status: DISCONTINUED | OUTPATIENT
Start: 2019-07-02 | End: 2019-07-02

## 2019-07-02 RX ADMIN — Medication 500 MILLIGRAM(S): at 01:06

## 2019-07-02 RX ADMIN — TAMSULOSIN HYDROCHLORIDE 0.4 MILLIGRAM(S): 0.4 CAPSULE ORAL at 22:13

## 2019-07-02 RX ADMIN — HEPARIN SODIUM 5000 UNIT(S): 5000 INJECTION INTRAVENOUS; SUBCUTANEOUS at 18:40

## 2019-07-02 RX ADMIN — Medication 325 MILLIGRAM(S): at 11:33

## 2019-07-02 RX ADMIN — CLOPIDOGREL BISULFATE 75 MILLIGRAM(S): 75 TABLET, FILM COATED ORAL at 11:33

## 2019-07-02 RX ADMIN — Medication 81 MILLIGRAM(S): at 11:33

## 2019-07-02 RX ADMIN — GABAPENTIN 400 MILLIGRAM(S): 400 CAPSULE ORAL at 22:14

## 2019-07-02 RX ADMIN — SODIUM CHLORIDE 60 MILLILITER(S): 9 INJECTION INTRAMUSCULAR; INTRAVENOUS; SUBCUTANEOUS at 22:12

## 2019-07-02 RX ADMIN — CEFTRIAXONE 1000 MILLIGRAM(S): 500 INJECTION, POWDER, FOR SOLUTION INTRAMUSCULAR; INTRAVENOUS at 18:38

## 2019-07-02 RX ADMIN — GABAPENTIN 400 MILLIGRAM(S): 400 CAPSULE ORAL at 13:38

## 2019-07-02 RX ADMIN — SODIUM CHLORIDE 75 MILLILITER(S): 9 INJECTION INTRAMUSCULAR; INTRAVENOUS; SUBCUTANEOUS at 08:06

## 2019-07-02 RX ADMIN — ATORVASTATIN CALCIUM 40 MILLIGRAM(S): 80 TABLET, FILM COATED ORAL at 22:13

## 2019-07-02 RX ADMIN — FAMOTIDINE 20 MILLIGRAM(S): 10 INJECTION INTRAVENOUS at 18:39

## 2019-07-02 RX ADMIN — Medication 100 MILLIGRAM(S): at 18:39

## 2019-07-02 RX ADMIN — DULOXETINE HYDROCHLORIDE 20 MILLIGRAM(S): 30 CAPSULE, DELAYED RELEASE ORAL at 11:34

## 2019-07-02 NOTE — H&P ADULT - NSHPREVIEWOFSYSTEMS_GEN_ALL_CORE
General: No weakness, fevers, chills  HEENT: No HA, neck pain, no visual changes, no hearing loss   Resp: No cough, wheezing, hemoptysis; No shortness of breath  CV: No chest pain or palpitations  GI: No abdominal pain, no n/v/d, no blood in stool  : No f/u/d  Neuro: No weakness or numbness  MSK: LLE pain knee to ankle; denies hip pain at present  SKIN: No itching, rashes, lesions  All other ROS negative unless indicated above.

## 2019-07-02 NOTE — PATIENT PROFILE ADULT - FALL HARM RISK
bones(Osteoporosis,prev fx,steroid use,metastatic bone ca)/age(85 years old or older)/s/p fall this admission/other

## 2019-07-02 NOTE — H&P ADULT - NSICDXPASTMEDICALHX_GEN_ALL_CORE_FT
PAST MEDICAL HISTORY:  AAA (abdominal aortic aneurysm) without rupture     BPH (benign prostatic hyperplasia)     Cellulitis BL    CKD (chronic kidney disease) stage 3, GFR 30-59 ml/min     Colon polyp     Leg swelling related to cellulitis of LLE    No significant past medical history     OA (osteoarthritis)     PAD (peripheral artery disease)

## 2019-07-02 NOTE — PHYSICAL THERAPY INITIAL EVALUATION ADULT - ACTIVE RANGE OF MOTION EXAMINATION, REHAB EVAL
"I can't" move LLE in supine except ltd DF/PF, able to sit/dangle w/ knee flex to ~80 deg L, L knee ext -50 deg seated/bilateral upper extremity Active ROM was WFL (within functional limits)/Right LE Active ROM was WFL (within functional limits)

## 2019-07-02 NOTE — H&P ADULT - ASSESSMENT
89y male w/     1. LLE pain, fall  - due to chronic pain and chronic peripheral neuropathy; severe left hip arthrosis, pt reports chronic hip pain and needs repair   - imaging no fracture.   - xray left knee  - MRI left hip  - PT eval  - continue gabapentin      2. leukocytosis, low grade temp  - cellulitis LLE, start abx  - check lactate, check UA    3. RASHAUN on CKD3  - baseline Cr 1.8  - hold lasix, start gentle ivf     4. CAD s/p stents 2/2018  chronic diastolic CHF  AAA s/p EAVR 2/2019  - stable, continue aspirin, plavix, statin     5. dvt px  - heparin sc

## 2019-07-02 NOTE — PHYSICAL THERAPY INITIAL EVALUATION ADULT - GENERAL OBSERVATIONS, REHAB EVAL
pt rec'd sleeping in bed on 5E, aroused w/ verbal stim, +IV. Pt insisting he has a "bad wound" to L heel and hallux-no wound present, + edema B lower legs and erythema L lower leg and hallux. nsg in to assess. Pt anticipates inability to get OOB/amb.

## 2019-07-02 NOTE — H&P ADULT - NSHPLABSRESULTS_GEN_ALL_CORE
LABS: All Labs Reviewed:                        9.7    10.86 )-----------( 186      ( 02 Jul 2019 08:14 )             30.2     07-02    143  |  107  |  32<H>  ----------------------------<  103<H>  4.5   |  32<H>  |  2.38<H>    Ca    8.5      02 Jul 2019 08:14    TPro  6.2  /  Alb  2.9<L>  /  TBili  0.5  /  DBili  x   /  AST  20  /  ALT  16  /  AlkPhos  77  07-02          < from: Xray Hip w/ Pelvis 2 or 3 Views, Left (07.01.19 @ 21:06) >      Moderate right and severe left hip arthrosis. Aortoiliac stent graft.     IMPRESSION:    No acute fracture or dislocation.    < end of copied text > LABS: All Labs Reviewed:                        9.7    10.86 )-----------( 186      ( 02 Jul 2019 08:14 )             30.2     07-02    143  |  107  |  32<H>  ----------------------------<  103<H>  4.5   |  32<H>  |  2.38<H>    Ca    8.5      02 Jul 2019 08:14    TPro  6.2  /  Alb  2.9<L>  /  TBili  0.5  /  DBili  x   /  AST  20  /  ALT  16  /  AlkPhos  77  07-02          < from: Xray Hip w/ Pelvis 2 or 3 Views, Left (07.01.19 @ 21:06) >      Moderate right and severe left hip arthrosis. Aortoiliac stent graft.     IMPRESSION:    No acute fracture or dislocation.    < end of copied text >  < from: CT Pelvis Bony Only No Cont (07.01.19 @ 21:29) >    Impression:    Osteopenia without obvious fracture or dislocation. If there is continued   clinical suspicion, MRI may be obtained for further evaluation.    Nonspecific stranding in the right groin soft tissue, which may reflect   age-indeterminate postprocedural/surgical changes. Recommend clinical   correlation.    A moderate amount of stool in the rectum with perirectal stranding.   Recommend clinical correlation to assess proctitis/stercoral colitis.    Additional findings as described.      < end of copied text >    MEDICATIONS  (STANDING):  aspirin enteric coated 81 milliGRAM(s) Oral daily  atorvastatin 40 milliGRAM(s) Oral at bedtime  clopidogrel Tablet 75 milliGRAM(s) Oral daily  doxycycline hyclate Capsule 100 milliGRAM(s) Oral every 12 hours  DULoxetine 20 milliGRAM(s) Oral daily  famotidine    Tablet 20 milliGRAM(s) Oral two times a day  ferrous    sulfate 325 milliGRAM(s) Oral daily  gabapentin 400 milliGRAM(s) Oral three times a day  heparin  Injectable 5000 Unit(s) SubCutaneous every 12 hours  metoprolol succinate ER 50 milliGRAM(s) Oral daily  sodium chloride 0.9%. 1000 milliLiter(s) (60 mL/Hr) IV Continuous <Continuous>  tamsulosin 0.4 milliGRAM(s) Oral at bedtime    MEDICATIONS  (PRN):

## 2019-07-02 NOTE — PROVIDER CONTACT NOTE (OTHER) - DATE AND TIME:
[facility]  History & Physical    Nursery    Patient Name:  Zeb Hartmann Chi  MRN: [id number]  Admission Date: 2018    Subjective:     Chief Complaint/Reason for Admission:  Infant is a 1 days  Boy Mary Kate Palacios born at 40w2d  Infant was born on 2018 at 9:44 PM via Vaginal, Spontaneous Delivery,         Maternal History:  The mother is a 43 y.o.   . She  has no past medical history on file.     Prenatal Labs Review:  ABO/Rh:   Lab Results   Component Value Date/Time    GROUPTRH AB POS 2018 10:15 AM     Group B Beta Strep:   Lab Results   Component Value Date/Time    STREPBCULT No Group B Streptococcus isolated 2018 12:07 PM     HIV: 2018: HIV 1/2 Ag/Ab Negative (Ref range: Negative)  RPR:   Lab Results   Component Value Date/Time    RPR Non-reactive 2018 03:45 PM     Hepatitis B Surface Antigen:   Lab Results   Component Value Date/Time    HEPBSAG Negative 2017 04:57 PM     Rubella Immune Status:   Lab Results   Component Value Date/Time    RUBELLAIMMUN Reactive 2017 04:57 PM       Pregnancy/Delivery Course:  The pregnancy was complicated by am and gbs  uti. Prenatal ultrasound revealed normal anatomy. Prenatal care was good. Mother received Vancomycin. Membranes ruptured on 2018 12:00:00  by ARM (Artificial Rupture) . The delivery was uncomplicated. Apgar scores    Assessment:     1 Minute:   Skin color:     Muscle tone:     Heart rate:     Breathing:     Grimace:     Total:  9          5 Minute:   Skin color:     Muscle tone:     Heart rate:     Breathing:     Grimace:     Total:  9          10 Minute:   Skin color:     Muscle tone:     Heart rate:     Breathing:     Grimace:     Total:           Living Status:       .    Review of Systems    Objective:     Vital Signs (Most Recent)  Temp: 97.3 °F (36.3 °C) (18)  Pulse: 120 (18)  Resp: 44 (18)    Most Recent Weight: 3940 g (8 lb 11 oz)  02-Jul-2019 11:54 "(Filed from Delivery Summary) (18)  Admission Weight: 3940 g (8 lb 11 oz) (Filed from Delivery Summary) (18)  Admission  Head Circumference: 37.5 cm (Filed from Delivery Summary)   Admission Length: Height: 53.3 cm (21") (Filed from Delivery Summary)    Physical Exam       General Appearance:  Healthy-appearing, vigorous infant, no dysmorphic features  Head:  Normocephalic,molding, caput, anterior fontanelle open soft and flat  Eyes:  PERRL, red reflex present bilaterally, anicteric sclera, no discharge  Ears:  Well-positioned, well-formed pinnae                            Nose:  nares patent, no rhinorrhea  Throat:  oropharynx clear, non-erythematous, mucous membranes moist, palate intact  Neck:  Supple, symmetrical, no torticollis  Chest:  Lungs clear to auscultation, respirations unlabored   Heart:  Regular rate & rhythm, normal S1/S2, no murmurs, rubs, or gallops                     Abdomen:  positive bowel sounds, soft, non-tender, non-distended, no masses, umbilical stump clean  Pulses:  Strong equal femoral and brachial pulses, brisk capillary refill  Hips:  Negative Pace & Ortolani, gluteal creases equal  :  Normal Dorian I male genitalia, anus patent, testes descended  Musculosketal: no geovani or dimples, no scoliosis or masses, clavicles intact  Extremities:  Well-perfused, warm and dry, no cyanosis  Skin: e tox rash, no jaundice, dermal melanosis buttock  Neuro:  strong cry, good symmetric tone and strength; positive alec, root and suck    No results found for this or any previous visit (from the past 168 hour(s)).      Assessment and Plan:     * Brant affected by maternal group B Streptococcus infection, mother treated prophylactically    Observe x 48 hours         Single liveborn, born in hospital, delivered by vaginal delivery    Term   aga    Well appearing    Special  care as noted            Ban Trujillo MD  Pediatrics  [facility]  "

## 2019-07-02 NOTE — H&P ADULT - NSICDXPASTSURGICALHX_GEN_ALL_CORE_FT
PAST SURGICAL HISTORY:  H/O inguinal hernia repair    History of colonoscopy     History of coronary artery stent placement     S/P hemorrhoidectomy

## 2019-07-02 NOTE — H&P ADULT - HISTORY OF PRESENT ILLNESS
89y male w/ PMH htn, hld, CAD s/p stents ~2yrs ago, diastolic chf, ckd3, peripheral neuropathy, depression sent to ED after fall at [facility].  Pt states made him walk w/ walker and he had pain in left leg causing him to fall.  Son states pt has had chronic intermittent hip pain for past few yrs and was seen by Ortho yrs ago but surgery postponed b/c of CAD/stents.  Pt currently denies hip pain but complains of left leg pain from knee to ankle.  Son also states that pt is unsteady on feet b/c neuropathy.  Pt denies cp, sob, palp, cough, abd pain, n/v/d, no changes in urination, no f/u/d. 89y male w/ PMH htn, hld, CAD s/p stents Feb 2018, diastolic chf, ckd3, peripheral neuropathy, AAA s/p EAVR 2/2019, depression sent to ED after fall at [facility].  Pt states made him walk w/ walker and he had pain in left leg causing him to fall.  Son states pt has had chronic intermittent hip pain for past few yrs and was seen by Ortho yrs ago but surgery postponed b/c of CAD/stents.  Pt currently denies hip pain but complains of left leg pain from knee to ankle.  Son also states that pt is unsteady on feet b/c neuropathy.  Pt denies cp, sob, palp, cough, abd pain, n/v/d, no changes in urination, no f/u/d.

## 2019-07-02 NOTE — ED PROVIDER NOTE - PROGRESS NOTE DETAILS
Patient unable to ambulate with walker. Will treat for rectoproctitis.  Case discussed with Dr. Lee who will admit pt. -Paul Padilla PA-C

## 2019-07-02 NOTE — ED PROVIDER NOTE - OBJECTIVE STATEMENT
89 y/o M presents s/p fall. Pt states he was unable to move his L hip, when he tried to get up to ambulate with his walker he fell forward. Denies hitting head, LOC, n/v, CP, SOB, abd pain, or other complaints at this time. -Paul Padilla PA-C

## 2019-07-02 NOTE — H&P ADULT - NSHPPHYSICALEXAM_GEN_ALL_CORE
Vital Signs Last 24 Hrs  T(C): 37.2 (02 Jul 2019 05:15), Max: 37.8 (01 Jul 2019 20:13)  T(F): 99 (02 Jul 2019 05:15), Max: 100 (01 Jul 2019 20:13)  HR: 73 (02 Jul 2019 05:15) (73 - 85)  BP: 116/41 (02 Jul 2019 05:15) (103/88 - 132/57)  BP(mean): --  RR: 17 (02 Jul 2019 05:15) (16 - 18)  SpO2: 100% (02 Jul 2019 05:15) (95% - 100%) Vital Signs Last 24 Hrs  T(C): 37.2 (02 Jul 2019 05:15), Max: 37.8 (01 Jul 2019 20:13)  T(F): 99 (02 Jul 2019 05:15), Max: 100 (01 Jul 2019 20:13)  HR: 73 (02 Jul 2019 05:15) (73 - 85)  BP: 116/41 (02 Jul 2019 05:15) (103/88 - 132/57)  BP(mean): --  RR: 17 (02 Jul 2019 05:15) (16 - 18)  SpO2: 100% (02 Jul 2019 05:15) (95% - 100%)      PHYSICAL EXAM:  General:  pleasant elderly male, NAD, comfortable  Neuro: AAOx3, no focal deficits  HEENT: NCAT, EOMI, dry MM  Neck: Soft and supple  Respiratory: CTA b/l, no w/r/r  Cardiovascular: S1 and S2, RRR   Gastrointestinal: +BS, soft, NTND  Extremities: chronic venous stasis b/l;  but increased erythema/warmth LLE shin to ankle  Vascular: 2+ peripheral pulses  Musculoskeletal: b/l 5/5 UE strength;  RLE 5/5;  LLE pt states can't move b/c pain

## 2019-07-02 NOTE — PHYSICAL THERAPY INITIAL EVALUATION ADULT - PERTINENT HX OF CURRENT PROBLEM, REHAB EVAL
presents s/p fall. Pt states he was unable to move his L hip, when he tried to get up to ambulate with his walker he fell forward. CTH (-) ac. findings, CT pelvis (-) ac. fx (+mod OA R hip, severe left hip osteoarthrosis), CT CS (-) ac. findings presents s/p fall. Pt states he was unable to move his L hip, when he tried to get up to ambulate with his walker he fell forward. CTH (-) ac. findings, XR/CT pelvis/hip (-) ac. fx (+mod OA R hip, severe left hip osteoarthrosis), CT CS (-) ac. findings

## 2019-07-02 NOTE — ED PROVIDER NOTE - PHYSICAL EXAMINATION
**GEN - NAD; well appearing; A+O x3. Head: NCAT, Neck: No midline tenderness. TTp L trapezius, Ext: No swelling/echhymosis. Dec ROM of L hip.   **PULMONARY - CTA b/l, symmetric breath sounds. ***CARDIAC -s1s2, RRR, no M,G,R  **ABDOMEN - ND, NT, soft, no guarding, no rebound, no brien's  **SKIN - no rash or bruising   **NEUROLOGIC - alert and oriented, follows commands, sensation nl, motor nl, **PSYCH - insight and judgment nl, memory nl, affect nl, thought nl

## 2019-07-03 LAB
ALBUMIN SERPL ELPH-MCNC: 2.8 G/DL — LOW (ref 3.3–5)
ALP SERPL-CCNC: 74 U/L — SIGNIFICANT CHANGE UP (ref 40–120)
ALT FLD-CCNC: 16 U/L — SIGNIFICANT CHANGE UP (ref 12–78)
ANION GAP SERPL CALC-SCNC: 7 MMOL/L — SIGNIFICANT CHANGE UP (ref 5–17)
AST SERPL-CCNC: 17 U/L — SIGNIFICANT CHANGE UP (ref 15–37)
BASOPHILS # BLD AUTO: 0.01 K/UL — SIGNIFICANT CHANGE UP (ref 0–0.2)
BASOPHILS NFR BLD AUTO: 0.2 % — SIGNIFICANT CHANGE UP (ref 0–2)
BILIRUB SERPL-MCNC: 0.3 MG/DL — SIGNIFICANT CHANGE UP (ref 0.2–1.2)
BUN SERPL-MCNC: 31 MG/DL — HIGH (ref 7–23)
CALCIUM SERPL-MCNC: 8.1 MG/DL — LOW (ref 8.5–10.1)
CHLORIDE SERPL-SCNC: 108 MMOL/L — SIGNIFICANT CHANGE UP (ref 96–108)
CO2 SERPL-SCNC: 28 MMOL/L — SIGNIFICANT CHANGE UP (ref 22–31)
CREAT SERPL-MCNC: 2.02 MG/DL — HIGH (ref 0.5–1.3)
EOSINOPHIL # BLD AUTO: 0.13 K/UL — SIGNIFICANT CHANGE UP (ref 0–0.5)
EOSINOPHIL NFR BLD AUTO: 2.2 % — SIGNIFICANT CHANGE UP (ref 0–6)
GLUCOSE SERPL-MCNC: 123 MG/DL — HIGH (ref 70–99)
HBA1C BLD-MCNC: 4.8 % — SIGNIFICANT CHANGE UP (ref 4–5.6)
HCT VFR BLD CALC: 29.7 % — LOW (ref 39–50)
HGB BLD-MCNC: 9.4 G/DL — LOW (ref 13–17)
IMM GRANULOCYTES NFR BLD AUTO: 0.3 % — SIGNIFICANT CHANGE UP (ref 0–1.5)
LYMPHOCYTES # BLD AUTO: 1 K/UL — SIGNIFICANT CHANGE UP (ref 1–3.3)
LYMPHOCYTES # BLD AUTO: 17.1 % — SIGNIFICANT CHANGE UP (ref 13–44)
MCHC RBC-ENTMCNC: 30.7 PG — SIGNIFICANT CHANGE UP (ref 27–34)
MCHC RBC-ENTMCNC: 31.6 GM/DL — LOW (ref 32–36)
MCV RBC AUTO: 97.1 FL — SIGNIFICANT CHANGE UP (ref 80–100)
MONOCYTES # BLD AUTO: 0.44 K/UL — SIGNIFICANT CHANGE UP (ref 0–0.9)
MONOCYTES NFR BLD AUTO: 7.5 % — SIGNIFICANT CHANGE UP (ref 2–14)
NEUTROPHILS # BLD AUTO: 4.24 K/UL — SIGNIFICANT CHANGE UP (ref 1.8–7.4)
NEUTROPHILS NFR BLD AUTO: 72.7 % — SIGNIFICANT CHANGE UP (ref 43–77)
PLATELET # BLD AUTO: 176 K/UL — SIGNIFICANT CHANGE UP (ref 150–400)
POTASSIUM SERPL-MCNC: 4.3 MMOL/L — SIGNIFICANT CHANGE UP (ref 3.5–5.3)
POTASSIUM SERPL-SCNC: 4.3 MMOL/L — SIGNIFICANT CHANGE UP (ref 3.5–5.3)
PROT SERPL-MCNC: 6.4 GM/DL — SIGNIFICANT CHANGE UP (ref 6–8.3)
RBC # BLD: 3.06 M/UL — LOW (ref 4.2–5.8)
RBC # FLD: 14.3 % — SIGNIFICANT CHANGE UP (ref 10.3–14.5)
SODIUM SERPL-SCNC: 143 MMOL/L — SIGNIFICANT CHANGE UP (ref 135–145)
WBC # BLD: 5.84 K/UL — SIGNIFICANT CHANGE UP (ref 3.8–10.5)
WBC # FLD AUTO: 5.84 K/UL — SIGNIFICANT CHANGE UP (ref 3.8–10.5)

## 2019-07-03 RX ADMIN — Medication 325 MILLIGRAM(S): at 11:56

## 2019-07-03 RX ADMIN — CLOPIDOGREL BISULFATE 75 MILLIGRAM(S): 75 TABLET, FILM COATED ORAL at 11:56

## 2019-07-03 RX ADMIN — Medication 50 MILLIGRAM(S): at 05:01

## 2019-07-03 RX ADMIN — Medication 100 MILLIGRAM(S): at 05:01

## 2019-07-03 RX ADMIN — CEFTRIAXONE 1000 MILLIGRAM(S): 500 INJECTION, POWDER, FOR SOLUTION INTRAMUSCULAR; INTRAVENOUS at 17:41

## 2019-07-03 RX ADMIN — ATORVASTATIN CALCIUM 40 MILLIGRAM(S): 80 TABLET, FILM COATED ORAL at 22:07

## 2019-07-03 RX ADMIN — DULOXETINE HYDROCHLORIDE 20 MILLIGRAM(S): 30 CAPSULE, DELAYED RELEASE ORAL at 11:56

## 2019-07-03 RX ADMIN — Medication 81 MILLIGRAM(S): at 11:56

## 2019-07-03 RX ADMIN — GABAPENTIN 400 MILLIGRAM(S): 400 CAPSULE ORAL at 22:07

## 2019-07-03 RX ADMIN — GABAPENTIN 400 MILLIGRAM(S): 400 CAPSULE ORAL at 14:38

## 2019-07-03 RX ADMIN — TAMSULOSIN HYDROCHLORIDE 0.4 MILLIGRAM(S): 0.4 CAPSULE ORAL at 22:07

## 2019-07-03 RX ADMIN — HEPARIN SODIUM 5000 UNIT(S): 5000 INJECTION INTRAVENOUS; SUBCUTANEOUS at 05:01

## 2019-07-03 RX ADMIN — FAMOTIDINE 20 MILLIGRAM(S): 10 INJECTION INTRAVENOUS at 17:33

## 2019-07-03 RX ADMIN — GABAPENTIN 400 MILLIGRAM(S): 400 CAPSULE ORAL at 05:01

## 2019-07-03 RX ADMIN — FAMOTIDINE 20 MILLIGRAM(S): 10 INJECTION INTRAVENOUS at 05:01

## 2019-07-03 RX ADMIN — Medication 100 MILLIGRAM(S): at 17:32

## 2019-07-03 RX ADMIN — HEPARIN SODIUM 5000 UNIT(S): 5000 INJECTION INTRAVENOUS; SUBCUTANEOUS at 17:35

## 2019-07-03 NOTE — DIETITIAN INITIAL EVALUATION ADULT. - OTHER INFO
Pt is a 89 yo M w/ PMH htn, hld, CAD s/p stents Feb 2018, diastolic chf, ckd3, peripheral neuropathy, AAA s/p EAVR 2/2019, depression sent to ED after fall at [facility]. Upon visit pt OOB sitting in chair. Pt c/o poor intake during while at [facility] d/t dislike of food. Pt reports he has been living at [facility] for 2 and a half weeks. Pt states that he dislikes most of the foods and usually only has 2 meals/day  and often resorts to a grilled cheese. During this time pt likely not meeting at least 75% of needs. Pt unsure of wt change however noted in EMR wt hx of 88.9kg revealing wt loss of 18# over 7 months (9# BW), not clinically significant however noted w/ +1/+2 edema possibly making further wt loss. NFPE performed and significant for muscle wasting: moderate: temporal, clavicle, deltoid and fat depletion: mild: occipital and buccal. Pt reports enjoying food at  so his intake has improved. Noted intake x2 meals >75%. If intake <75% recommend adding nutritional supplement to diet order. Based on above pt meets criteria for moderate malnutrition in the context of social/environmental circumstances. No noted skin breakdown; albert 17. No c/o n/v/c/d; last BM 7/1. No c/o chewing/swallowing difficulties.

## 2019-07-03 NOTE — DIETITIAN INITIAL EVALUATION ADULT. - PERTINENT LABORATORY DATA
07-03 Na143 mmol/L Glu 123 mg/dL<H> K+ 4.3 mmol/L Cr  2.02 mg/dL<H> BUN 31 mg/dL<H> Phos n/a   Alb 2.8 g/dL<L> PAB n/a

## 2019-07-03 NOTE — DIETITIAN INITIAL EVALUATION ADULT. - PERTINENT MEDS FT
MEDICATIONS  (STANDING):  aspirin enteric coated 81 milliGRAM(s) Oral daily  atorvastatin 40 milliGRAM(s) Oral at bedtime  cefTRIAXone Injectable. 1000 milliGRAM(s) IV Push every 24 hours  clopidogrel Tablet 75 milliGRAM(s) Oral daily  doxycycline hyclate Capsule 100 milliGRAM(s) Oral every 12 hours  DULoxetine 20 milliGRAM(s) Oral daily  famotidine    Tablet 20 milliGRAM(s) Oral two times a day  ferrous    sulfate 325 milliGRAM(s) Oral daily  gabapentin 400 milliGRAM(s) Oral three times a day  heparin  Injectable 5000 Unit(s) SubCutaneous every 12 hours  metoprolol succinate ER 50 milliGRAM(s) Oral daily  sodium chloride 0.9%. 1000 milliLiter(s) (60 mL/Hr) IV Continuous <Continuous>  tamsulosin 0.4 milliGRAM(s) Oral at bedtime    MEDICATIONS  (PRN):

## 2019-07-03 NOTE — DIETITIAN INITIAL EVALUATION ADULT. - PHYSICAL APPEARANCE
BMI 28.6/other (specify) NFPE performed and significant for muscle wasting: moderate: temporal, clavicle, deltoid and fat depletion: mild: occipital and buccal.

## 2019-07-03 NOTE — CHART NOTE - NSCHARTNOTEFT_GEN_A_CORE
Upon Nutritional Assessment by the Registered Dietitian your patient was determined to meet criteria / has evidence of the following diagnosis/diagnoses:          [ ]  Mild Protein Calorie Malnutrition        [ x]  Moderate Protein Calorie Malnutrition        [ ] Severe Protein Calorie Malnutrition        [ ] Unspecified Protein Calorie Malnutrition        [ ] Underweight / BMI <19        [ ] Morbid Obesity / BMI > 40      Findings:    Pt c/o poor intake during while at [facility] d/t dislike of food. [facility] Pt states that he dislikes most of the foods and usually only has 2 meals/day  and often resorts to a grilled cheese. During this time pt likely not meeting at least 75% of needs. Pt unsure of wt change however noted in EMR wt hx of 88.9kg revealing wt loss of 18# over 7 months (9# BW), not clinically significant however noted w/ +1/+2 edema possibly making further wt loss. NFPE performed and significant for muscle wasting: moderate: temporal, clavicle, deltoid and fat depletion: mild: occipital and buccal. Pt reports enjoying food at  so his intake has improved. Noted intake x2 meals >75%. If intake <75% recommend adding nutritional supplement to diet order. Based on above pt meets criteria for moderate malnutrition in the context of social/environmental circumstances. No noted skin breakdown; albert 17. No c/o n/v/c/d; last BM 7/1. No c/o chewing/swallowing difficulties.    Findings as based on:  •  Comprehensive nutrition assessment and consultation  •  Calorie counts (nutrient intake analysis)  •  Food acceptance and intake status from observations by staff  •  Follow up  •  Patient education  •  Intervention secondary to interdisciplinary rounds  •   concerns      Treatment:      1. c/w current diet rx   2. monitor intake and document in EMR   3. if intake <75% recommend add nutritional supplement like ensure.   4. Add MVI w/ minerals   5. weekly wt.    The following diet has been recommended:      PROVIDER Section:     By signing this assessment you are acknowledging and agree with the diagnosis/diagnoses assigned by the Registered Dietitian    Comments:

## 2019-07-03 NOTE — PROGRESS NOTE ADULT - SUBJECTIVE AND OBJECTIVE BOX
cc: fall  HPI: 89y male w/ PMH htn, hld, CAD s/p stents Feb 2018, diastolic chf, ckd3, peripheral neuropathy, AAA s/p EAVR 2/2019, depression sent to ED after fall at [facility].  Pt states made him walk w/ walker and he had pain in left leg causing him to fall.  Son states pt has had chronic intermittent hip pain for past few yrs and was seen by Ortho yrs ago but surgery postponed b/c of CAD/stents.  Pt currently denies hip pain but complains of left leg pain from knee to ankle.  Son also states that pt is unsteady on feet b/c neuropathy.  Pt denies cp, sob, palp, cough, abd pain, n/v/d, no changes in urination, no f/u/d.     7/3- feeling well today, denies leg pain.  Wants to go.  Discussed plan w/ pt and daughter - iv abx.  Daughter states pt just followed up with Podiatry recently for toe wound and was told no problems.    ros- as per hpi other 10 point ros neg    Vital Signs Last 24 Hrs  T(C): 36.7 (03 Jul 2019 10:30), Max: 36.7 (02 Jul 2019 16:46)  T(F): 98.1 (03 Jul 2019 10:30), Max: 98.1 (02 Jul 2019 21:02)  HR: 67 (03 Jul 2019 10:30) (67 - 82)  BP: 103/52 (03 Jul 2019 10:30) (103/52 - 140/64)  BP(mean): --  RR: 17 (03 Jul 2019 10:30) (17 - 17)  SpO2: 100% (03 Jul 2019 10:30) (95% - 100%)    general:  pleasant elderly male, NAD, comfortable- seated in chair  	Neuro: AAOx3, no focal deficits  	HEENT: NCAT, EOMI, dry MM  	Neck: Soft and supple  	Respiratory: CTA b/l, no w/r/r  	Cardiovascular: S1 and S2, RRR   	Gastrointestinal: +BS, soft, NTND  	Extremities: chronic venous stasis b/l;  but increased erythema/warmth LLE shin to ankle; small lac left great toe/nail   	Vascular: 2+ peripheral pulses  Musculoskeletal: b/l 5/5 UE strength;  RLE 5/5;  LLE pt states can't move b/c pain        LABS: All Labs Reviewed:                        9.4    5.84  )-----------( 176      ( 03 Jul 2019 09:22 )             29.7     07-03    143  |  108  |  31<H>  ----------------------------<  123<H>  4.3   |  28  |  2.02<H>    Ca    8.1<L>      03 Jul 2019 09:22    TPro  6.4  /  Alb  2.8<L>  /  TBili  0.3  /  DBili  x   /  AST  17  /  ALT  16  /  AlkPhos  74  07-03          < from: MR Pelvis Bony Only No Cont (07.02.19 @ 18:15) >  IMPRESSION:   1. Severe left hip osteoarthritis. No evidence of acute fracture.   2. Nonloculated edema in the superior aspect of the left gluteus medius   muscle.   Consider muscle injury or strain.    < end of copied text >    MEDICATIONS  (STANDING):  aspirin enteric coated 81 milliGRAM(s) Oral daily  atorvastatin 40 milliGRAM(s) Oral at bedtime  cefTRIAXone Injectable. 1000 milliGRAM(s) IV Push every 24 hours  clopidogrel Tablet 75 milliGRAM(s) Oral daily  doxycycline hyclate Capsule 100 milliGRAM(s) Oral every 12 hours  DULoxetine 20 milliGRAM(s) Oral daily  famotidine    Tablet 20 milliGRAM(s) Oral two times a day  ferrous    sulfate 325 milliGRAM(s) Oral daily  gabapentin 400 milliGRAM(s) Oral three times a day  heparin  Injectable 5000 Unit(s) SubCutaneous every 12 hours  metoprolol succinate ER 50 milliGRAM(s) Oral daily  tamsulosin 0.4 milliGRAM(s) Oral at bedtime    MEDICATIONS  (PRN):        Assessment and Plan:   89y male w/     1. LLE pain, fall  - due to chronic pain and chronic peripheral neuropathy; severe left hip arthrosis, pt reports chronic hip pain and needs repair   - imaging no fracture.   - xray left knee  - MRI left hip no fracture  - PT eval  - continue gabapentin  - discussed w/ pt and daughter- states he will f/u w/ his Ortho outpatient and doesn't want surgery      2. leukocytosis, low grade temp- resolved  - cellulitis LLE, start abx - follow up cultures  - elevated lactate no acidosis due to infection but does not meet sepsis criteria  - cxr and ua neg    3. RASHAUN on CKD3  - baseline Cr 1.8  - hold lasix, start gentle ivf   7/3- Cr 2,  d/c ivf     4. CAD s/p stents 2/2018  chronic diastolic CHF  AAA s/p EAVR 2/2019  - stable, continue aspirin, plavix, statin     5. dvt px  - heparin sc

## 2019-07-03 NOTE — DIETITIAN INITIAL EVALUATION ADULT. - ADD RECOMMEND
1. c/w current diet rx 2. monitor intake and document in EMR 3. if intake <75% recommend add nutritional supplement like ensure. 4. Add MVI w/ minerals 5. weekly wt.

## 2019-07-04 LAB
ADD ON TEST-SPECIMEN IN LAB: SIGNIFICANT CHANGE UP
ALBUMIN SERPL ELPH-MCNC: 3 G/DL — LOW (ref 3.3–5)
ALP SERPL-CCNC: 82 U/L — SIGNIFICANT CHANGE UP (ref 40–120)
ALT FLD-CCNC: 19 U/L — SIGNIFICANT CHANGE UP (ref 12–78)
ANION GAP SERPL CALC-SCNC: 6 MMOL/L — SIGNIFICANT CHANGE UP (ref 5–17)
AST SERPL-CCNC: 22 U/L — SIGNIFICANT CHANGE UP (ref 15–37)
BILIRUB SERPL-MCNC: 0.2 MG/DL — SIGNIFICANT CHANGE UP (ref 0.2–1.2)
BUN SERPL-MCNC: 30 MG/DL — HIGH (ref 7–23)
CALCIUM SERPL-MCNC: 8.6 MG/DL — SIGNIFICANT CHANGE UP (ref 8.5–10.1)
CHLORIDE SERPL-SCNC: 107 MMOL/L — SIGNIFICANT CHANGE UP (ref 96–108)
CK SERPL-CCNC: 139 U/L — SIGNIFICANT CHANGE UP (ref 26–308)
CO2 SERPL-SCNC: 29 MMOL/L — SIGNIFICANT CHANGE UP (ref 22–31)
CREAT SERPL-MCNC: 1.77 MG/DL — HIGH (ref 0.5–1.3)
GLUCOSE SERPL-MCNC: 87 MG/DL — SIGNIFICANT CHANGE UP (ref 70–99)
POTASSIUM SERPL-MCNC: 4.7 MMOL/L — SIGNIFICANT CHANGE UP (ref 3.5–5.3)
POTASSIUM SERPL-SCNC: 4.7 MMOL/L — SIGNIFICANT CHANGE UP (ref 3.5–5.3)
PROT SERPL-MCNC: 6.9 GM/DL — SIGNIFICANT CHANGE UP (ref 6–8.3)
SODIUM SERPL-SCNC: 142 MMOL/L — SIGNIFICANT CHANGE UP (ref 135–145)

## 2019-07-04 RX ORDER — LIDOCAINE 4 G/100G
1 CREAM TOPICAL DAILY
Refills: 0 | Status: DISCONTINUED | OUTPATIENT
Start: 2019-07-04 | End: 2019-07-08

## 2019-07-04 RX ADMIN — GABAPENTIN 400 MILLIGRAM(S): 400 CAPSULE ORAL at 21:25

## 2019-07-04 RX ADMIN — Medication 81 MILLIGRAM(S): at 12:54

## 2019-07-04 RX ADMIN — LIDOCAINE 1 PATCH: 4 CREAM TOPICAL at 12:55

## 2019-07-04 RX ADMIN — GABAPENTIN 400 MILLIGRAM(S): 400 CAPSULE ORAL at 05:48

## 2019-07-04 RX ADMIN — Medication 325 MILLIGRAM(S): at 12:54

## 2019-07-04 RX ADMIN — CEFTRIAXONE 1000 MILLIGRAM(S): 500 INJECTION, POWDER, FOR SOLUTION INTRAMUSCULAR; INTRAVENOUS at 18:08

## 2019-07-04 RX ADMIN — ATORVASTATIN CALCIUM 40 MILLIGRAM(S): 80 TABLET, FILM COATED ORAL at 21:25

## 2019-07-04 RX ADMIN — FAMOTIDINE 20 MILLIGRAM(S): 10 INJECTION INTRAVENOUS at 18:08

## 2019-07-04 RX ADMIN — Medication 50 MILLIGRAM(S): at 05:48

## 2019-07-04 RX ADMIN — Medication 100 MILLIGRAM(S): at 05:48

## 2019-07-04 RX ADMIN — HEPARIN SODIUM 5000 UNIT(S): 5000 INJECTION INTRAVENOUS; SUBCUTANEOUS at 18:08

## 2019-07-04 RX ADMIN — HEPARIN SODIUM 5000 UNIT(S): 5000 INJECTION INTRAVENOUS; SUBCUTANEOUS at 05:48

## 2019-07-04 RX ADMIN — LIDOCAINE 1 PATCH: 4 CREAM TOPICAL at 19:53

## 2019-07-04 RX ADMIN — TAMSULOSIN HYDROCHLORIDE 0.4 MILLIGRAM(S): 0.4 CAPSULE ORAL at 21:25

## 2019-07-04 RX ADMIN — CLOPIDOGREL BISULFATE 75 MILLIGRAM(S): 75 TABLET, FILM COATED ORAL at 12:54

## 2019-07-04 RX ADMIN — FAMOTIDINE 20 MILLIGRAM(S): 10 INJECTION INTRAVENOUS at 05:48

## 2019-07-04 RX ADMIN — GABAPENTIN 400 MILLIGRAM(S): 400 CAPSULE ORAL at 13:45

## 2019-07-04 RX ADMIN — DULOXETINE HYDROCHLORIDE 20 MILLIGRAM(S): 30 CAPSULE, DELAYED RELEASE ORAL at 12:55

## 2019-07-04 RX ADMIN — Medication 100 MILLIGRAM(S): at 18:09

## 2019-07-04 NOTE — PROGRESS NOTE ADULT - SUBJECTIVE AND OBJECTIVE BOX
cc: fall  HPI: 89y male w/ PMH htn, hld, CAD s/p stents Feb 2018, diastolic chf, ckd3, peripheral neuropathy, AAA s/p EAVR 2/2019, depression sent to ED after fall at [facility].  Pt states made him walk w/ walker and he had pain in left leg causing him to fall.  Son states pt has had chronic intermittent hip pain for past few yrs and was seen by Ortho yrs ago but surgery postponed b/c of CAD/stents.  Pt currently denies hip pain but complains of left leg pain from knee to ankle.  Son also states that pt is unsteady on feet b/c neuropathy.  Pt denies cp, sob, palp, cough, abd pain, n/v/d, no changes in urination, no f/u/d.     7/3- feeling well today, denies leg pain.  Wants to go.  Discussed plan w/ pt and daughter - iv abx.  Daughter states pt just followed up with Podiatry recently for toe wound and was told no problems.  7/4- feeling ok. leg pain intermittent.  States he has neuropathic pain which feels worse some days than other- discussed increasing neurontin and he will think about. AMbulating w/ assist.    ros- as per hpi other 10 point ros neg      Vital Signs Last 24 Hrs  T(C): 36.4 (04 Jul 2019 04:50), Max: 36.8 (03 Jul 2019 22:04)  T(F): 97.5 (04 Jul 2019 04:50), Max: 98.3 (03 Jul 2019 22:04)  HR: 70 (04 Jul 2019 04:50) (65 - 70)  BP: 117/44 (04 Jul 2019 04:50) (103/52 - 148/60)  BP(mean): --  RR: 18 (04 Jul 2019 04:50) (17 - 18)  SpO2: 98% (04 Jul 2019 04:50) (98% - 100%)      general:  pleasant elderly male, NAD, comfortable  	Neuro: AAOx3, no focal deficits  	HEENT: NCAT  	Neck: Soft and supple  	Respiratory: CTA b/l, no w/r/r  	Cardiovascular: S1 and S2, RRR   	Gastrointestinal: +BS, soft, NTND  	Extremities: chronic venous stasis b/l;  but increased erythema/warmth LLE shin to ankle improving; small lac left great toe/nail   	Vascular: 2+ peripheral pulses  Musculoskeletal: b/l 5/5 UE strength;  RLE 5/5;  LLE 4/5        LABS: All Labs Reviewed:                        9.4    5.84  )-----------( 176      ( 03 Jul 2019 09:22 )             29.7     07-03    143  |  108  |  31<H>  ----------------------------<  123<H>  4.3   |  28  |  2.02<H>    Ca    8.1<L>      03 Jul 2019 09:22    TPro  6.4  /  Alb  2.8<L>  /  TBili  0.3  /  DBili  x   /  AST  17  /  ALT  16  /  AlkPhos  74  07-03          < from: MR Pelvis Bony Only No Cont (07.02.19 @ 18:15) >  IMPRESSION:   1. Severe left hip osteoarthritis. No evidence of acute fracture.   2. Nonloculated edema in the superior aspect of the left gluteus medius   muscle.   Consider muscle injury or strain.    < end of copied text >    MEDICATIONS  (STANDING):  aspirin enteric coated 81 milliGRAM(s) Oral daily  atorvastatin 40 milliGRAM(s) Oral at bedtime  cefTRIAXone Injectable. 1000 milliGRAM(s) IV Push every 24 hours  clopidogrel Tablet 75 milliGRAM(s) Oral daily  doxycycline hyclate Capsule 100 milliGRAM(s) Oral every 12 hours  DULoxetine 20 milliGRAM(s) Oral daily  famotidine    Tablet 20 milliGRAM(s) Oral two times a day  ferrous    sulfate 325 milliGRAM(s) Oral daily  gabapentin 400 milliGRAM(s) Oral three times a day  heparin  Injectable 5000 Unit(s) SubCutaneous every 12 hours  metoprolol succinate ER 50 milliGRAM(s) Oral daily  tamsulosin 0.4 milliGRAM(s) Oral at bedtime    MEDICATIONS  (PRN):        Assessment and Plan:   89y male w/     1. LLE pain, fall  - due to chronic pain and chronic peripheral neuropathy; severe left hip arthrosis, pt reports chronic hip pain and needs repair   - imaging no fracture.   - xray left knee  - MRI left hip no fracture  - PT eval appreciated- rec SHERWIN  - continue gabapentin- consider increasing- pt will discuss w/ his Neuro   - discussed w/ pt and daughter- states he will f/u w/ his Ortho outpatient and doesn't want surgery      2. LLE cellulitis  - elevated lactate no acidosis due to infection but does not meet sepsis criteria  - cxr and ua neg, urine cx 10-49,000 cfu and patient asymptomatic doubt uti; blood cx no growth    3. RASHAUN on CKD3  - baseline Cr 1.8  - hold lasix, start gentle ivf   7/3- Cr 2,  d/c ivf   7/4- repeat bmp;  pt refusing resuming lasix    4. CAD s/p stents 2/2018  chronic diastolic CHF  AAA s/p EAVR 2/2019  - stable, continue aspirin, plavix, statin     5. dvt px  - heparin sc

## 2019-07-05 LAB
-  AMIKACIN: SIGNIFICANT CHANGE UP
-  AMPICILLIN/SULBACTAM: SIGNIFICANT CHANGE UP
-  AMPICILLIN: SIGNIFICANT CHANGE UP
-  AZTREONAM: SIGNIFICANT CHANGE UP
-  CEFAZOLIN: SIGNIFICANT CHANGE UP
-  CEFEPIME: SIGNIFICANT CHANGE UP
-  CEFOXITIN: SIGNIFICANT CHANGE UP
-  CEFTRIAXONE: SIGNIFICANT CHANGE UP
-  CIPROFLOXACIN: SIGNIFICANT CHANGE UP
-  ERTAPENEM: SIGNIFICANT CHANGE UP
-  GENTAMICIN: SIGNIFICANT CHANGE UP
-  IMIPENEM: SIGNIFICANT CHANGE UP
-  LEVOFLOXACIN: SIGNIFICANT CHANGE UP
-  MEROPENEM: SIGNIFICANT CHANGE UP
-  NITROFURANTOIN: SIGNIFICANT CHANGE UP
-  PIPERACILLIN/TAZOBACTAM: SIGNIFICANT CHANGE UP
-  TIGECYCLINE: SIGNIFICANT CHANGE UP
-  TOBRAMYCIN: SIGNIFICANT CHANGE UP
-  TRIMETHOPRIM/SULFAMETHOXAZOLE: SIGNIFICANT CHANGE UP
ANION GAP SERPL CALC-SCNC: 5 MMOL/L — SIGNIFICANT CHANGE UP (ref 5–17)
BUN SERPL-MCNC: 31 MG/DL — HIGH (ref 7–23)
CALCIUM SERPL-MCNC: 8.6 MG/DL — SIGNIFICANT CHANGE UP (ref 8.5–10.1)
CHLORIDE SERPL-SCNC: 109 MMOL/L — HIGH (ref 96–108)
CO2 SERPL-SCNC: 29 MMOL/L — SIGNIFICANT CHANGE UP (ref 22–31)
CREAT SERPL-MCNC: 1.63 MG/DL — HIGH (ref 0.5–1.3)
CULTURE RESULTS: SIGNIFICANT CHANGE UP
GLUCOSE SERPL-MCNC: 101 MG/DL — HIGH (ref 70–99)
HCT VFR BLD CALC: 27.6 % — LOW (ref 39–50)
HCT VFR BLD CALC: 28.1 % — LOW (ref 39–50)
HGB BLD-MCNC: 8.8 G/DL — LOW (ref 13–17)
HGB BLD-MCNC: 9 G/DL — LOW (ref 13–17)
MCHC RBC-ENTMCNC: 30.2 PG — SIGNIFICANT CHANGE UP (ref 27–34)
MCHC RBC-ENTMCNC: 31.9 GM/DL — LOW (ref 32–36)
MCV RBC AUTO: 94.8 FL — SIGNIFICANT CHANGE UP (ref 80–100)
METHOD TYPE: SIGNIFICANT CHANGE UP
ORGANISM # SPEC MICROSCOPIC CNT: SIGNIFICANT CHANGE UP
ORGANISM # SPEC MICROSCOPIC CNT: SIGNIFICANT CHANGE UP
PLATELET # BLD AUTO: 186 K/UL — SIGNIFICANT CHANGE UP (ref 150–400)
POTASSIUM SERPL-MCNC: 4.9 MMOL/L — SIGNIFICANT CHANGE UP (ref 3.5–5.3)
POTASSIUM SERPL-SCNC: 4.9 MMOL/L — SIGNIFICANT CHANGE UP (ref 3.5–5.3)
RBC # BLD: 2.91 M/UL — LOW (ref 4.2–5.8)
RBC # FLD: 13.8 % — SIGNIFICANT CHANGE UP (ref 10.3–14.5)
SODIUM SERPL-SCNC: 143 MMOL/L — SIGNIFICANT CHANGE UP (ref 135–145)
SPECIMEN SOURCE: SIGNIFICANT CHANGE UP
WBC # BLD: 4.41 K/UL — SIGNIFICANT CHANGE UP (ref 3.8–10.5)
WBC # FLD AUTO: 4.41 K/UL — SIGNIFICANT CHANGE UP (ref 3.8–10.5)

## 2019-07-05 RX ORDER — GABAPENTIN 400 MG/1
0 CAPSULE ORAL
Qty: 0 | Refills: 0 | DISCHARGE

## 2019-07-05 RX ORDER — ASPIRIN/CALCIUM CARB/MAGNESIUM 324 MG
1 TABLET ORAL
Qty: 0 | Refills: 0 | DISCHARGE

## 2019-07-05 RX ORDER — GABAPENTIN 400 MG/1
1 CAPSULE ORAL
Qty: 0 | Refills: 0 | DISCHARGE
Start: 2019-07-05

## 2019-07-05 RX ORDER — CLOPIDOGREL BISULFATE 75 MG/1
1 TABLET, FILM COATED ORAL
Qty: 0 | Refills: 0 | DISCHARGE

## 2019-07-05 RX ORDER — METOPROLOL TARTRATE 50 MG
1 TABLET ORAL
Qty: 0 | Refills: 0 | DISCHARGE

## 2019-07-05 RX ORDER — FAMOTIDINE 10 MG/ML
1 INJECTION INTRAVENOUS
Qty: 0 | Refills: 0 | DISCHARGE

## 2019-07-05 RX ORDER — CEFUROXIME AXETIL 250 MG
1 TABLET ORAL
Qty: 12 | Refills: 0
Start: 2019-07-05 | End: 2019-07-10

## 2019-07-05 RX ORDER — FUROSEMIDE 40 MG
1 TABLET ORAL
Qty: 0 | Refills: 0 | DISCHARGE

## 2019-07-05 RX ORDER — FUROSEMIDE 40 MG
40 TABLET ORAL DAILY
Refills: 0 | Status: DISCONTINUED | OUTPATIENT
Start: 2019-07-05 | End: 2019-07-08

## 2019-07-05 RX ADMIN — FAMOTIDINE 20 MILLIGRAM(S): 10 INJECTION INTRAVENOUS at 18:10

## 2019-07-05 RX ADMIN — TAMSULOSIN HYDROCHLORIDE 0.4 MILLIGRAM(S): 0.4 CAPSULE ORAL at 21:26

## 2019-07-05 RX ADMIN — CLOPIDOGREL BISULFATE 75 MILLIGRAM(S): 75 TABLET, FILM COATED ORAL at 12:24

## 2019-07-05 RX ADMIN — Medication 100 MILLIGRAM(S): at 18:10

## 2019-07-05 RX ADMIN — Medication 40 MILLIGRAM(S): at 18:10

## 2019-07-05 RX ADMIN — Medication 100 MILLIGRAM(S): at 05:06

## 2019-07-05 RX ADMIN — HEPARIN SODIUM 5000 UNIT(S): 5000 INJECTION INTRAVENOUS; SUBCUTANEOUS at 18:10

## 2019-07-05 RX ADMIN — DULOXETINE HYDROCHLORIDE 20 MILLIGRAM(S): 30 CAPSULE, DELAYED RELEASE ORAL at 12:24

## 2019-07-05 RX ADMIN — Medication 325 MILLIGRAM(S): at 12:24

## 2019-07-05 RX ADMIN — Medication 81 MILLIGRAM(S): at 12:24

## 2019-07-05 RX ADMIN — HEPARIN SODIUM 5000 UNIT(S): 5000 INJECTION INTRAVENOUS; SUBCUTANEOUS at 05:06

## 2019-07-05 RX ADMIN — LIDOCAINE 1 PATCH: 4 CREAM TOPICAL at 20:04

## 2019-07-05 RX ADMIN — FAMOTIDINE 20 MILLIGRAM(S): 10 INJECTION INTRAVENOUS at 05:06

## 2019-07-05 RX ADMIN — LIDOCAINE 1 PATCH: 4 CREAM TOPICAL at 00:02

## 2019-07-05 RX ADMIN — GABAPENTIN 400 MILLIGRAM(S): 400 CAPSULE ORAL at 13:07

## 2019-07-05 RX ADMIN — GABAPENTIN 400 MILLIGRAM(S): 400 CAPSULE ORAL at 05:05

## 2019-07-05 RX ADMIN — Medication 50 MILLIGRAM(S): at 05:06

## 2019-07-05 RX ADMIN — LIDOCAINE 1 PATCH: 4 CREAM TOPICAL at 23:39

## 2019-07-05 RX ADMIN — GABAPENTIN 400 MILLIGRAM(S): 400 CAPSULE ORAL at 21:26

## 2019-07-05 RX ADMIN — CEFTRIAXONE 1000 MILLIGRAM(S): 500 INJECTION, POWDER, FOR SOLUTION INTRAMUSCULAR; INTRAVENOUS at 18:10

## 2019-07-05 RX ADMIN — ATORVASTATIN CALCIUM 40 MILLIGRAM(S): 80 TABLET, FILM COATED ORAL at 21:26

## 2019-07-05 RX ADMIN — LIDOCAINE 1 PATCH: 4 CREAM TOPICAL at 12:25

## 2019-07-05 NOTE — PROGRESS NOTE ADULT - SUBJECTIVE AND OBJECTIVE BOX
cc: fall  HPI: 89y male w/ PMH htn, hld, CAD s/p stents Feb 2018, diastolic chf, ckd3, peripheral neuropathy, AAA s/p EAVR 2/2019, depression sent to ED after fall at [facility].  Pt states made him walk w/ walker and he had pain in left leg causing him to fall.  Son states pt has had chronic intermittent hip pain for past few yrs and was seen by Ortho yrs ago but surgery postponed b/c of CAD/stents.  Pt currently denies hip pain but complains of left leg pain from knee to ankle.  Son also states that pt is unsteady on feet b/c neuropathy.  Pt denies cp, sob, palp, cough, abd pain, n/v/d, no changes in urination, no f/u/d.     7/3- feeling well today, denies leg pain.  Wants to go.  Discussed plan w/ pt and daughter - iv abx.  Daughter states pt just followed up with Podiatry recently for toe wound and was told no problems.  7/4- feeling ok. leg pain intermittent.  States he has neuropathic pain which feels worse some days than other- discussed increasing neurontin and he will think about. AMbulating w/ assist.  7/5-    ros- as per hpi other 10 point ros neg      Vital Signs Last 24 Hrs  T(C): 36.7 (05 Jul 2019 04:23), Max: 36.8 (04 Jul 2019 16:49)  T(F): 98.1 (05 Jul 2019 04:23), Max: 98.2 (04 Jul 2019 16:49)  HR: 69 (05 Jul 2019 04:23) (60 - 72)  BP: 110/44 (05 Jul 2019 04:23) (110/44 - 146/65)  BP(mean): --  RR: 18 (05 Jul 2019 04:23) (17 - 18)  SpO2: 100% (05 Jul 2019 04:23) (100% - 100%)      general:  pleasant elderly male, NAD, comfortable  	Neuro: AAOx3, no focal deficits  	HEENT: NCAT  	Neck: Soft and supple  	Respiratory: CTA b/l, no w/r/r  	Cardiovascular: S1 and S2, RRR   	Gastrointestinal: +BS, soft, NTND  	Extremities: chronic venous stasis b/l;  but increased erythema/warmth LLE shin to ankle improving; small lac left great toe/nail   	Vascular: 2+ peripheral pulses  Musculoskeletal: b/l 5/5 UE strength;  RLE 5/5;  LLE 4/5            LABS: All Labs Reviewed:                        8.8    4.41  )-----------( 186      ( 05 Jul 2019 08:18 )             27.6     07-04    142  |  107  |  30<H>  ----------------------------<  87  4.7   |  29  |  1.77<H>    Ca    8.6      04 Jul 2019 11:36    TPro  6.9  /  Alb  3.0<L>  /  TBili  0.2  /  DBili  x   /  AST  22  /  ALT  19  /  AlkPhos  82  07-04      CARDIAC MARKERS ( 04 Jul 2019 11:36 )  x     / x     / 139 U/L / x     / x            Culture Results:   10,000 - 49,000 CFU/mL Gram Negative Rods  <10,000 CFU/ml Normal Urogenital philly present (07.02.19 @ 19:35)            < from: MR Pelvis Bony Only No Cont (07.02.19 @ 18:15) >  IMPRESSION:   1. Severe left hip osteoarthritis. No evidence of acute fracture.   2. Nonloculated edema in the superior aspect of the left gluteus medius   muscle.   Consider muscle injury or strain.    < end of copied text >    MEDICATIONS  (STANDING):  aspirin enteric coated 81 milliGRAM(s) Oral daily  atorvastatin 40 milliGRAM(s) Oral at bedtime  cefTRIAXone Injectable. 1000 milliGRAM(s) IV Push every 24 hours  clopidogrel Tablet 75 milliGRAM(s) Oral daily  doxycycline hyclate Capsule 100 milliGRAM(s) Oral every 12 hours  DULoxetine 20 milliGRAM(s) Oral daily  famotidine    Tablet 20 milliGRAM(s) Oral two times a day  ferrous    sulfate 325 milliGRAM(s) Oral daily  gabapentin 400 milliGRAM(s) Oral three times a day  heparin  Injectable 5000 Unit(s) SubCutaneous every 12 hours  metoprolol succinate ER 50 milliGRAM(s) Oral daily  tamsulosin 0.4 milliGRAM(s) Oral at bedtime    MEDICATIONS  (PRN):        Assessment and Plan:   89y male w/     1. LLE pain, fall  - due to chronic pain and chronic peripheral neuropathy; severe left hip arthrosis, pt reports chronic hip pain and needs repair   - imaging no fracture.   - xray left knee  - MRI left hip no fracture  - PT eval appreciated- rec SHERWIN  - continue gabapentin- consider increasing- pt will discuss w/ his Neuro   - discussed w/ pt and daughter- states he will f/u w/ his Ortho outpatient and doesn't want surgery      2. mild LLE cellulitis- improving   - elevated lactate no acidosis due to infection but does not meet sepsis criteria  - cxr and ua neg, urine cx 10-49,000 cfu and patient asymptomatic doubt uti; blood cx no growth    3. RASHAUN on CKD3  - baseline Cr 1.8  - hold lasix, start gentle ivf   7/3- Cr 2,  d/c ivf   7/4- repeat bmp;  pt refusing resuming lasix  7/5- Cr at baseline, resume lasix, pt and family agree    4. CAD s/p stents 2/2018  chronic diastolic CHF  AAA s/p EAVR 2/2019  - stable, continue aspirin, plavix, statin     5. dvt px  - heparin sc      6. normocytic anemia  - repeat h/h, if stable d/c planning   Hg  9.8 in few months ago and in Jan.  - outpatient f/u  w/ PMH cc: fall  HPI: 89y male w/ PMH htn, hld, CAD s/p stents Feb 2018, diastolic chf, ckd3, peripheral neuropathy, AAA s/p EAVR 2/2019, depression sent to ED after fall at [facility].  Pt states made him walk w/ walker and he had pain in left leg causing him to fall.  Son states pt has had chronic intermittent hip pain for past few yrs and was seen by Ortho yrs ago but surgery postponed b/c of CAD/stents.  Pt currently denies hip pain but complains of left leg pain from knee to ankle.  Son also states that pt is unsteady on feet b/c neuropathy.  Pt denies cp, sob, palp, cough, abd pain, n/v/d, no changes in urination, no f/u/d.     7/3- feeling well today, denies leg pain.  Wants to go.  Discussed plan w/ pt and daughter - iv abx.  Daughter states pt just followed up with Podiatry recently for toe wound and was told no problems.  7/4- feeling ok. leg pain intermittent.  States he has neuropathic pain which feels worse some days than other- discussed increasing neurontin and he will think about. AMbulating w/ assist.  7/5- feels better overall.  Family requesting rehab placement.      ros- as per hpi other 10 point ros neg      Vital Signs Last 24 Hrs  T(C): 36.7 (05 Jul 2019 04:23), Max: 36.8 (04 Jul 2019 16:49)  T(F): 98.1 (05 Jul 2019 04:23), Max: 98.2 (04 Jul 2019 16:49)  HR: 69 (05 Jul 2019 04:23) (60 - 72)  BP: 110/44 (05 Jul 2019 04:23) (110/44 - 146/65)  BP(mean): --  RR: 18 (05 Jul 2019 04:23) (17 - 18)  SpO2: 100% (05 Jul 2019 04:23) (100% - 100%)      general:  pleasant elderly male, NAD  	Neuro: AAOx3  	HEENT: NCAT  	Neck: Soft and supple  	Respiratory: CTA b/l, no w/r/r  	Cardiovascular: S1 and S2, RRR   	Gastrointestinal: +BS, soft, NTND  	Extremities: chronic venous stasis b/l;  but increased erythema/warmth LLE shin to ankle improving; small lac left great toe/nail   	Vascular: 2+ peripheral pulses  Musculoskeletal: b/l 5/5 UE strength;  RLE 5/5;  LLE 4/5            LABS: All Labs Reviewed:                        8.8    4.41  )-----------( 186      ( 05 Jul 2019 08:18 )             27.6     07-04    142  |  107  |  30<H>  ----------------------------<  87  4.7   |  29  |  1.77<H>    Ca    8.6      04 Jul 2019 11:36    TPro  6.9  /  Alb  3.0<L>  /  TBili  0.2  /  DBili  x   /  AST  22  /  ALT  19  /  AlkPhos  82  07-04      CARDIAC MARKERS ( 04 Jul 2019 11:36 )  x     / x     / 139 U/L / x     / x            Culture Results:   10,000 - 49,000 CFU/mL Gram Negative Rods  <10,000 CFU/ml Normal Urogenital philly present (07.02.19 @ 19:35)            < from: MR Pelvis Bony Only No Cont (07.02.19 @ 18:15) >  IMPRESSION:   1. Severe left hip osteoarthritis. No evidence of acute fracture.   2. Nonloculated edema in the superior aspect of the left gluteus medius   muscle.   Consider muscle injury or strain.    < end of copied text >    MEDICATIONS  (STANDING):  aspirin enteric coated 81 milliGRAM(s) Oral daily  atorvastatin 40 milliGRAM(s) Oral at bedtime  cefTRIAXone Injectable. 1000 milliGRAM(s) IV Push every 24 hours  clopidogrel Tablet 75 milliGRAM(s) Oral daily  doxycycline hyclate Capsule 100 milliGRAM(s) Oral every 12 hours  DULoxetine 20 milliGRAM(s) Oral daily  famotidine    Tablet 20 milliGRAM(s) Oral two times a day  ferrous    sulfate 325 milliGRAM(s) Oral daily  gabapentin 400 milliGRAM(s) Oral three times a day  heparin  Injectable 5000 Unit(s) SubCutaneous every 12 hours  metoprolol succinate ER 50 milliGRAM(s) Oral daily  tamsulosin 0.4 milliGRAM(s) Oral at bedtime    MEDICATIONS  (PRN):        Assessment and Plan:   89y male w/     1. LLE pain, fall  - due to chronic pain and chronic peripheral neuropathy; severe left hip arthrosis, pt reports chronic hip pain and needs repair   - imaging no fracture.   - xray left knee  - MRI left hip no fracture  - PT eval appreciated- rec SHERWIN  - continue gabapentin- consider increasing- pt will discuss w/ his Neuro   - discussed w/ pt and daughter- states he will f/u w/ his Ortho outpatient and doesn't want surgery      2. mild LLE cellulitis- improving   - elevated lactate no acidosis due to infection but does not meet sepsis criteria  - cxr and ua neg, urine cx 10-49,000 cfu and patient asymptomatic doubt uti; blood cx no growth    3. RASHAUN on CKD3  - baseline Cr 1.8  - hold lasix, start gentle ivf   7/3- Cr 2,  d/c ivf   7/4- repeat bmp;  pt refusing resuming lasix  7/5- Cr at baseline, resume lasix, pt and family agree    4. CAD s/p stents 2/2018  chronic diastolic CHF  AAA s/p EAVR 2/2019  - stable, continue aspirin, plavix, statin     5. dvt px  - heparin sc      6. normocytic anemia  - repeat h/h, if stable d/c planning   Hg  9.8 in few months ago and in Jan.  - outpatient f/u  w/ PMD    dispo: rehab placement pending availability

## 2019-07-06 RX ORDER — CEFUROXIME AXETIL 250 MG
500 TABLET ORAL EVERY 12 HOURS
Refills: 0 | Status: DISCONTINUED | OUTPATIENT
Start: 2019-07-07 | End: 2019-07-08

## 2019-07-06 RX ORDER — IBUPROFEN 200 MG
400 TABLET ORAL ONCE
Refills: 0 | Status: DISCONTINUED | OUTPATIENT
Start: 2019-07-06 | End: 2019-07-06

## 2019-07-06 RX ADMIN — Medication 325 MILLIGRAM(S): at 11:35

## 2019-07-06 RX ADMIN — Medication 100 MILLIGRAM(S): at 06:03

## 2019-07-06 RX ADMIN — Medication 81 MILLIGRAM(S): at 11:35

## 2019-07-06 RX ADMIN — Medication 100 MILLIGRAM(S): at 17:51

## 2019-07-06 RX ADMIN — GABAPENTIN 400 MILLIGRAM(S): 400 CAPSULE ORAL at 14:23

## 2019-07-06 RX ADMIN — DULOXETINE HYDROCHLORIDE 20 MILLIGRAM(S): 30 CAPSULE, DELAYED RELEASE ORAL at 11:35

## 2019-07-06 RX ADMIN — ATORVASTATIN CALCIUM 40 MILLIGRAM(S): 80 TABLET, FILM COATED ORAL at 21:43

## 2019-07-06 RX ADMIN — Medication 40 MILLIGRAM(S): at 11:35

## 2019-07-06 RX ADMIN — GABAPENTIN 400 MILLIGRAM(S): 400 CAPSULE ORAL at 06:03

## 2019-07-06 RX ADMIN — LIDOCAINE 1 PATCH: 4 CREAM TOPICAL at 23:06

## 2019-07-06 RX ADMIN — GABAPENTIN 400 MILLIGRAM(S): 400 CAPSULE ORAL at 21:41

## 2019-07-06 RX ADMIN — FAMOTIDINE 20 MILLIGRAM(S): 10 INJECTION INTRAVENOUS at 06:03

## 2019-07-06 RX ADMIN — CLOPIDOGREL BISULFATE 75 MILLIGRAM(S): 75 TABLET, FILM COATED ORAL at 11:35

## 2019-07-06 RX ADMIN — LIDOCAINE 1 PATCH: 4 CREAM TOPICAL at 11:35

## 2019-07-06 RX ADMIN — TAMSULOSIN HYDROCHLORIDE 0.4 MILLIGRAM(S): 0.4 CAPSULE ORAL at 21:41

## 2019-07-06 RX ADMIN — Medication 50 MILLIGRAM(S): at 06:03

## 2019-07-06 RX ADMIN — HEPARIN SODIUM 5000 UNIT(S): 5000 INJECTION INTRAVENOUS; SUBCUTANEOUS at 06:02

## 2019-07-06 RX ADMIN — FAMOTIDINE 20 MILLIGRAM(S): 10 INJECTION INTRAVENOUS at 17:52

## 2019-07-06 RX ADMIN — HEPARIN SODIUM 5000 UNIT(S): 5000 INJECTION INTRAVENOUS; SUBCUTANEOUS at 17:52

## 2019-07-06 RX ADMIN — CEFTRIAXONE 1000 MILLIGRAM(S): 500 INJECTION, POWDER, FOR SOLUTION INTRAMUSCULAR; INTRAVENOUS at 17:51

## 2019-07-06 RX ADMIN — LIDOCAINE 1 PATCH: 4 CREAM TOPICAL at 16:32

## 2019-07-06 NOTE — PROGRESS NOTE ADULT - SUBJECTIVE AND OBJECTIVE BOX
cc: fall  HPI: 89y male w/ PMH htn, hld, CAD s/p stents Feb 2018, diastolic chf, ckd3, peripheral neuropathy, AAA s/p EAVR 2/2019, depression sent to ED after fall at [facility].  Pt states made him walk w/ walker and he had pain in left leg causing him to fall.  Son states pt has had chronic intermittent hip pain for past few yrs and was seen by Ortho yrs ago but surgery postponed b/c of CAD/stents.  Pt currently denies hip pain but complains of left leg pain from knee to ankle.  Son also states that pt is unsteady on feet b/c neuropathy.  Pt denies cp, sob, palp, cough, abd pain, n/v/d, no changes in urination, no f/u/d.     7/3- feeling well today, denies leg pain.  Wants to go.  Discussed plan w/ pt and daughter - iv abx.  Daughter states pt just followed up with Podiatry recently for toe wound and was told no problems.  7/4- feeling ok. leg pain intermittent.  States he has neuropathic pain which feels worse some days than other- discussed increasing neurontin and he will think about. AMbulating w/ assist.  7/5- feels better overall.  Family requesting rehab placement.    7/6- no complaints.  Understands plan for rehab Monday.    ros- as per hpi other 10 point ros neg      Vital Signs Last 24 Hrs  T(C): 36.7 (06 Jul 2019 11:07), Max: 36.7 (06 Jul 2019 11:07)  T(F): 98 (06 Jul 2019 11:07), Max: 98 (06 Jul 2019 11:07)  HR: 68 (06 Jul 2019 11:07) (63 - 76)  BP: 102/40 (06 Jul 2019 11:07) (102/40 - 148/49)  BP(mean): --  RR: 20 (06 Jul 2019 11:07) (17 - 20)  SpO2: 100% (06 Jul 2019 11:07) (97% - 100%)  T(C): 36.7 (05 Jul 2019 04:23), Max: 36.8 (04 Jul 2019 16:49)      general:  pleasant elderly male, NAD- seated in chair  	Neuro: AAOx3  	HEENT: NCAT  	Neck: Soft and supple  	Respiratory: CTA b/l, no w/r/r  	Cardiovascular: S1 and S2, RRR   	Gastrointestinal: +BS, soft, NTND  	Extremities: chronic venous stasis b/l;  but increased erythema/warmth LLE shin to ankle improving; small lac left great toe/nail   	Vascular: 2+ peripheral pulses  Musculoskeletal: b/l 5/5 UE strength;  RLE 5/5;  LLE 4/5            LABS: All Labs Reviewed:                        8.8    4.41  )-----------( 186      ( 05 Jul 2019 08:18 )             27.6     07-04    142  |  107  |  30<H>  ----------------------------<  87  4.7   |  29  |  1.77<H>    Ca    8.6      04 Jul 2019 11:36    TPro  6.9  /  Alb  3.0<L>  /  TBili  0.2  /  DBili  x   /  AST  22  /  ALT  19  /  AlkPhos  82  07-04      CARDIAC MARKERS ( 04 Jul 2019 11:36 )  x     / x     / 139 U/L / x     / x            Culture Results:   10,000 - 49,000 CFU/mL Gram Negative Rods  <10,000 CFU/ml Normal Urogenital philly present (07.02.19 @ 19:35)            < from: MR Pelvis Bony Only No Cont (07.02.19 @ 18:15) >  IMPRESSION:   1. Severe left hip osteoarthritis. No evidence of acute fracture.   2. Nonloculated edema in the superior aspect of the left gluteus medius   muscle.   Consider muscle injury or strain.    < end of copied text >    MEDICATIONS  (STANDING):  aspirin enteric coated 81 milliGRAM(s) Oral daily  atorvastatin 40 milliGRAM(s) Oral at bedtime  cefTRIAXone Injectable. 1000 milliGRAM(s) IV Push every 24 hours  clopidogrel Tablet 75 milliGRAM(s) Oral daily  doxycycline hyclate Capsule 100 milliGRAM(s) Oral every 12 hours  DULoxetine 20 milliGRAM(s) Oral daily  famotidine    Tablet 20 milliGRAM(s) Oral two times a day  ferrous    sulfate 325 milliGRAM(s) Oral daily  gabapentin 400 milliGRAM(s) Oral three times a day  heparin  Injectable 5000 Unit(s) SubCutaneous every 12 hours  metoprolol succinate ER 50 milliGRAM(s) Oral daily  tamsulosin 0.4 milliGRAM(s) Oral at bedtime    MEDICATIONS  (PRN):        Assessment and Plan:   89y male w/     1. LLE pain, fall  - due to chronic pain and chronic peripheral neuropathy; severe left hip arthrosis, pt reports chronic hip pain and needs repair   - imaging no fracture.   - xray left knee  - MRI left hip no fracture  - PT eval appreciated- rec SHERWIN  - continue gabapentin- consider increasing- pt will discuss w/ his Neuro   - discussed w/ pt and daughter- states he will f/u w/ his Ortho outpatient and doesn't want surgery      2. mild LLE cellulitis- improving   - elevated lactate no acidosis due to infection but does not meet sepsis criteria  - cxr and ua neg, urine cx 10-49,000 cfu and patient asymptomatic doubt uti; blood cx no growth    3. RASHAUN on CKD3  - baseline Cr 1.8  - hold lasix, start gentle ivf   7/3- Cr 2,  d/c ivf   7/4- repeat bmp;  pt refusing resuming lasix  7/5- Cr at baseline, resume lasix, pt and family agree    4. CAD s/p stents 2/2018  chronic diastolic CHF  AAA s/p EAVR 2/2019  - stable, continue aspirin, plavix, statin     5. dvt px  - heparin sc      6. normocytic anemia  - repeat h/h, if stable d/c planning   Hg  9.8 in few months ago and in Jan.  - outpatient f/u  w/ PMD    dispo: rehab placement pending availability

## 2019-07-06 NOTE — PROVIDER CONTACT NOTE (CRITICAL VALUE NOTIFICATION) - TEST AND RESULT REPORTED:
Lactate 2.4
Urine Cuture - 10-49,000 Klebsiella Pneumoniae  less than 10,000 urogenital philly present

## 2019-07-07 RX ADMIN — GABAPENTIN 400 MILLIGRAM(S): 400 CAPSULE ORAL at 05:09

## 2019-07-07 RX ADMIN — Medication 50 MILLIGRAM(S): at 05:09

## 2019-07-07 RX ADMIN — Medication 100 MILLIGRAM(S): at 05:09

## 2019-07-07 RX ADMIN — CLOPIDOGREL BISULFATE 75 MILLIGRAM(S): 75 TABLET, FILM COATED ORAL at 11:41

## 2019-07-07 RX ADMIN — Medication 100 MILLIGRAM(S): at 17:42

## 2019-07-07 RX ADMIN — Medication 81 MILLIGRAM(S): at 11:41

## 2019-07-07 RX ADMIN — Medication 40 MILLIGRAM(S): at 14:14

## 2019-07-07 RX ADMIN — ATORVASTATIN CALCIUM 40 MILLIGRAM(S): 80 TABLET, FILM COATED ORAL at 21:41

## 2019-07-07 RX ADMIN — FAMOTIDINE 20 MILLIGRAM(S): 10 INJECTION INTRAVENOUS at 17:42

## 2019-07-07 RX ADMIN — GABAPENTIN 400 MILLIGRAM(S): 400 CAPSULE ORAL at 21:42

## 2019-07-07 RX ADMIN — HEPARIN SODIUM 5000 UNIT(S): 5000 INJECTION INTRAVENOUS; SUBCUTANEOUS at 05:09

## 2019-07-07 RX ADMIN — DULOXETINE HYDROCHLORIDE 20 MILLIGRAM(S): 30 CAPSULE, DELAYED RELEASE ORAL at 11:42

## 2019-07-07 RX ADMIN — HEPARIN SODIUM 5000 UNIT(S): 5000 INJECTION INTRAVENOUS; SUBCUTANEOUS at 17:43

## 2019-07-07 RX ADMIN — TAMSULOSIN HYDROCHLORIDE 0.4 MILLIGRAM(S): 0.4 CAPSULE ORAL at 21:41

## 2019-07-07 RX ADMIN — Medication 500 MILLIGRAM(S): at 05:09

## 2019-07-07 RX ADMIN — Medication 500 MILLIGRAM(S): at 17:42

## 2019-07-07 RX ADMIN — GABAPENTIN 400 MILLIGRAM(S): 400 CAPSULE ORAL at 14:14

## 2019-07-07 RX ADMIN — FAMOTIDINE 20 MILLIGRAM(S): 10 INJECTION INTRAVENOUS at 05:09

## 2019-07-07 RX ADMIN — Medication 325 MILLIGRAM(S): at 11:42

## 2019-07-07 NOTE — PROGRESS NOTE ADULT - SUBJECTIVE AND OBJECTIVE BOX
cc: fall  HPI: 89y male w/ PMH htn, hld, CAD s/p stents 2018, diastolic chf, ckd3, peripheral neuropathy, AAA s/p EAVR 2019, depression sent to ED after fall at [facility].  Pt states made him walk w/ walker and he had pain in left leg causing him to fall.  Son states pt has had chronic intermittent hip pain for past few yrs and was seen by Ortho yrs ago but surgery postponed b/c of CAD/stents.  Pt currently denies hip pain but complains of left leg pain from knee to ankle.  Son also states that pt is unsteady on feet b/c neuropathy.  Pt denies cp, sob, palp, cough, abd pain, n/v/d, no changes in urination, no f/u/d.     7/3- feeling well today, denies leg pain.  Wants to go.  Discussed plan w/ pt and daughter - iv abx.  Daughter states pt just followed up with Podiatry recently for toe wound and was told no problems.  - feeling ok. leg pain intermittent.  States he has neuropathic pain which feels worse some days than other- discussed increasing neurontin and he will think about. AMbulating w/ assist.  - feels better overall.  Family requesting rehab placement.    - no complaints.  Understands plan for rehab Monday.  -     ros- as per hpi other 10 point ros neg      Vital Signs Last 24 Hrs  T(C): 36.2 (2019 04:24), Max: 36.7 (2019 11:07)  T(F): 97.2 (2019 04:24), Max: 98 (2019 11:07)  HR: 66 (2019 04:24) (66 - 70)  BP: 155/66 (2019 04:24) (102/40 - 155/66)  BP(mean): --  RR: 17 (2019 04:24) (16 - 20)  SpO2: 99% (2019 04:24) (99% - 100%)  T(C): 36.7 (2019 11:07), Max: 36.7 (2019 11:07)        general:  pleasant elderly male, NAD- seated in chair  	Neuro: AAOx3  	HEENT: NCAT  	Neck: Soft and supple  	Respiratory: CTA b/l, no w/r/r  	Cardiovascular: S1 and S2, RRR   	Gastrointestinal: +BS, soft, NTND  	Extremities: chronic venous stasis b/l;  but increased erythema/warmth LLE shin to ankle improving; small lac left great toe/nail   	Vascular: 2+ peripheral pulses  Musculoskeletal: b/l 5/5 UE strength;  RLE 5/5;  LLE 4/5              LABS: All Labs Reviewed:                        9.0    x     )-----------( x        ( 2019 11:09 )             28.1           Culture - Urine (19 @ 19:35)    -  Amikacin: S <=16    -  Ampicillin: R >16 These ampicillin results predict results for amoxicillin    -  Ampicillin/Sulbactam: R >16/8 Enterobacter, Citrobacter, and Serratia may develop resistance during prolonged therapy (3-4 days)    -  Aztreonam: R >16    -  Cefazolin: R >16 (MIC_CL_COM_ENTERIC_CEFAZU) For uncomplicated UTI with K. pneumoniae, E. coli, or P. mirablis: MAULIK <=16 is sensitive and MAULIK >=32 is resistant. This also predicts results for oral agents cefaclor, cefdinir, cefpodoxime, cefprozil, cefuroxime axetil, cephalexin and locarbef for uncomplicated UTI. Note that some isolates may be susceptible to these agents while testing resistant to cefazolin.    -  Cefepime: R >16    -  Cefoxitin: I 16    -  Ceftriaxone: R >32 Enterobacter, Citrobacter, and Serratia may develop resistance during prolonged therapy    -  Ciprofloxacin: R >2    -  Gentamicin: R >8    -  Imipenem: S <=1    -  Piperacillin/Tazobactam: R <=16    -  Tigecycline: S <=2    -  Tobramycin: R >8    -  Trimethoprim/Sulfamethoxazole: R >2/38    -  Levofloxacin: R >4    -  Meropenem: S <=1    -  Nitrofurantoin: R >64 Should not be used to treat pyelonephritis    -  Ertapenem: S <=1    Specimen Source: .Urine Clean Catch (Midstream)    Culture Results:   10,000 - 49,000 CFU/mL Klebsiella pneumoniae ESBL  <10,000 CFU/ml Normal Urogenital philly present    Organism Identification: Klebsiella pneumoniae ESBL    Organism: Klebsiella pneumoniae ESBL    Method Type: MAULIK            RADIOLOGY/EK.8    4.41  )-----------( 186      ( 2019 08:18 )             27.6     07-04    142  |  107  |  30<H>  ----------------------------<  87  4.7   |  29  |  1.77<H>    Ca    8.6      2019 11:36    TPro  6.9  /  Alb  3.0<L>  /  TBili  0.2  /  DBili  x   /  AST  22  /  ALT  19  /  AlkPhos  82  07-04      CARDIAC MARKERS ( 2019 11:36 )  x     / x     / 139 U/L / x     / x                  < from: MR Pelvis Bony Only No Cont (19 @ 18:15) >  IMPRESSION:   1. Severe left hip osteoarthritis. No evidence of acute fracture.   2. Nonloculated edema in the superior aspect of the left gluteus medius   muscle.   Consider muscle injury or strain.    < end of copied text >    MEDICATIONS  (STANDING):  aspirin enteric coated 81 milliGRAM(s) Oral daily  atorvastatin 40 milliGRAM(s) Oral at bedtime  cefuroxime   Tablet 500 milliGRAM(s) Oral every 12 hours  clopidogrel Tablet 75 milliGRAM(s) Oral daily  doxycycline hyclate Capsule 100 milliGRAM(s) Oral every 12 hours  DULoxetine 20 milliGRAM(s) Oral daily  famotidine    Tablet 20 milliGRAM(s) Oral two times a day  ferrous    sulfate 325 milliGRAM(s) Oral daily  furosemide    Tablet 40 milliGRAM(s) Oral daily  gabapentin 400 milliGRAM(s) Oral three times a day  heparin  Injectable 5000 Unit(s) SubCutaneous every 12 hours  lidocaine   Patch 1 Patch Transdermal daily  metoprolol succinate ER 50 milliGRAM(s) Oral daily  tamsulosin 0.4 milliGRAM(s) Oral at bedtime    MEDICATIONS  (PRN):        Assessment and Plan:   89y male w/     1. LLE pain, fall  - due to chronic pain and chronic peripheral neuropathy; severe left hip arthrosis, pt reports chronic hip pain and needs repair   - imaging no fracture.   - xray left knee  - MRI left hip no fracture  - PT eval appreciated- rec SHERWIN  - continue gabapentin- consider increasing- pt will discuss w/ his Neuro   - discussed w/ pt and daughter- states he will f/u w/ his Ortho outpatient and doesn't want surgery      2. mild LLE cellulitis- improving   - elevated lactate no acidosis due to infection but does not meet sepsis criteria  - cxr and ua neg, urine cx 10-49,000 cfu and patient asymptomatic doubt uti; blood cx no growth    3. RASHAUN on CKD3  - baseline Cr 1.8  - hold lasix, start gentle ivf   7/3- Cr 2,  d/c ivf   - repeat bmp;  pt refusing resuming lasix  - Cr at baseline, resume lasix, pt and family agree    4. CAD s/p stents 2018  chronic diastolic CHF  AAA s/p EAVR 2019  - stable, continue aspirin, plavix, statin     5. dvt px  - heparin sc      6. normocytic anemia  - repeat h/h, if stable d/c planning   Hg  9.8 in few months ago and in .  - outpatient f/u  w/ PMD    7. urine cx +10-49,000 esbl kleb.  UA was negative and patient asymptomatic- will discuss w/ ID     dispo: rehab placement pending availability cc: fall  HPI: 89y male w/ PMH htn, hld, CAD s/p stents 2018, diastolic chf, ckd3, peripheral neuropathy, AAA s/p EAVR 2019, depression sent to ED after fall at [facility].  Pt states made him walk w/ walker and he had pain in left leg causing him to fall.  Son states pt has had chronic intermittent hip pain for past few yrs and was seen by Ortho yrs ago but surgery postponed b/c of CAD/stents.  Pt currently denies hip pain but complains of left leg pain from knee to ankle.  Son also states that pt is unsteady on feet b/c neuropathy.  Pt denies cp, sob, palp, cough, abd pain, n/v/d, no changes in urination, no f/u/d.     7/3- feeling well today, denies leg pain.  Wants to go.  Discussed plan w/ pt and daughter - iv abx.  Daughter states pt just followed up with Podiatry recently for toe wound and was told no problems.  - feeling ok. leg pain intermittent.  States he has neuropathic pain which feels worse some days than other- discussed increasing neurontin and he will think about. AMbulating w/ assist.  - feels better overall.  Family requesting rehab placement.    - no complaints.  Understands plan for rehab Monday.  -  feeling well. no complaints. Discussed urine cx w/ pt and ID. UA negative and pt w/ no abd pain, no flank pain, no urinary f/u/d.      ros- as per hpi other 10 point ros neg      Vital Signs Last 24 Hrs  T(C): 36.2 (2019 04:24), Max: 36.7 (2019 11:07)  T(F): 97.2 (2019 04:24), Max: 98 (2019 11:07)  HR: 66 (2019 04:24) (66 - 70)  BP: 155/66 (2019 04:24) (102/40 - 155/66)  BP(mean): --  RR: 17 (2019 04:24) (16 - 20)  SpO2: 99% (2019 04:24) (99% - 100%)  T(C): 36.7 (2019 11:07), Max: 36.7 (2019 11:07)        general:  pleasant elderly male, NAD- seated in chair  	Neuro: AAOx3  	HEENT: NCAT  	Neck: Soft and supple  	Respiratory: CTA b/l, no w/r/r  	Cardiovascular: S1 and S2, RRR   	Gastrointestinal: +BS, soft, NTND  	Extremities: chronic venous stasis b/l;  but increased erythema/warmth LLE shin to ankle improving; small lac left great toe/nail   	Vascular: 2+ peripheral pulses  Musculoskeletal: b/l 5/5 UE strength;  RLE 5/5;  LLE 4/5              LABS: All Labs Reviewed:                        9.0    x     )-----------( x        ( 2019 11:09 )             28.1           Culture - Urine (19 @ 19:35)    -  Amikacin: S <=16    -  Ampicillin: R >16 These ampicillin results predict results for amoxicillin    -  Ampicillin/Sulbactam: R >16/8 Enterobacter, Citrobacter, and Serratia may develop resistance during prolonged therapy (3-4 days)    -  Aztreonam: R >16    -  Cefazolin: R >16 (MIC_CL_COM_ENTERIC_CEFAZU) For uncomplicated UTI with K. pneumoniae, E. coli, or P. mirablis: MAULIK <=16 is sensitive and MAULIK >=32 is resistant. This also predicts results for oral agents cefaclor, cefdinir, cefpodoxime, cefprozil, cefuroxime axetil, cephalexin and locarbef for uncomplicated UTI. Note that some isolates may be susceptible to these agents while testing resistant to cefazolin.    -  Cefepime: R >16    -  Cefoxitin: I 16    -  Ceftriaxone: R >32 Enterobacter, Citrobacter, and Serratia may develop resistance during prolonged therapy    -  Ciprofloxacin: R >2    -  Gentamicin: R >8    -  Imipenem: S <=1    -  Piperacillin/Tazobactam: R <=16    -  Tigecycline: S <=2    -  Tobramycin: R >8    -  Trimethoprim/Sulfamethoxazole: R >2/38    -  Levofloxacin: R >4    -  Meropenem: S <=1    -  Nitrofurantoin: R >64 Should not be used to treat pyelonephritis    -  Ertapenem: S <=1    Specimen Source: .Urine Clean Catch (Midstream)    Culture Results:   10,000 - 49,000 CFU/mL Klebsiella pneumoniae ESBL  <10,000 CFU/ml Normal Urogenital philly present    Organism Identification: Klebsiella pneumoniae ESBL    Organism: Klebsiella pneumoniae ESBL    Method Type: MAULIK            RADIOLOGY/EK.8    4.41  )-----------( 186      ( 2019 08:18 )             27.6     07-04    142  |  107  |  30<H>  ----------------------------<  87  4.7   |  29  |  1.77<H>    Ca    8.6      2019 11:36    TPro  6.9  /  Alb  3.0<L>  /  TBili  0.2  /  DBili  x   /  AST  22  /  ALT  19  /  AlkPhos  82  07-04      CARDIAC MARKERS ( 2019 11:36 )  x     / x     / 139 U/L / x     / x              < from: MR Pelvis Bony Only No Cont (19 @ 18:15) >  IMPRESSION:   1. Severe left hip osteoarthritis. No evidence of acute fracture.   2. Nonloculated edema in the superior aspect of the left gluteus medius   muscle.   Consider muscle injury or strain.    < end of copied text >    MEDICATIONS  (STANDING):  aspirin enteric coated 81 milliGRAM(s) Oral daily  atorvastatin 40 milliGRAM(s) Oral at bedtime  cefuroxime   Tablet 500 milliGRAM(s) Oral every 12 hours  clopidogrel Tablet 75 milliGRAM(s) Oral daily  doxycycline hyclate Capsule 100 milliGRAM(s) Oral every 12 hours  DULoxetine 20 milliGRAM(s) Oral daily  famotidine    Tablet 20 milliGRAM(s) Oral two times a day  ferrous    sulfate 325 milliGRAM(s) Oral daily  furosemide    Tablet 40 milliGRAM(s) Oral daily  gabapentin 400 milliGRAM(s) Oral three times a day  heparin  Injectable 5000 Unit(s) SubCutaneous every 12 hours  lidocaine   Patch 1 Patch Transdermal daily  metoprolol succinate ER 50 milliGRAM(s) Oral daily  tamsulosin 0.4 milliGRAM(s) Oral at bedtime    MEDICATIONS  (PRN):        Assessment and Plan:   89y male w/     1. LLE pain, fall  - due to chronic pain and chronic peripheral neuropathy; severe left hip arthrosis, pt reports chronic hip pain and needs repair   - imaging no fracture.   - xray left knee  - MRI left hip no fracture  - PT eval appreciated- rec SHERWIN  - continue gabapentin- consider increasing- pt will discuss w/ his Neuro   - discussed w/ pt and daughter- states he will f/u w/ his Ortho outpatient and doesn't want surgery      2. mild LLE cellulitis- improving   - elevated lactate no acidosis due to infection but does not meet sepsis criteria  - blood cx no growth      3. RASHAUN on CKD3  - baseline Cr 1.8  - hold lasix, start gentle ivf   7/3- Cr 2,  d/c ivf   - repeat bmp;  pt refusing resuming lasix  - Cr at baseline, resume lasix, pt and family agree    4. CAD s/p stents 2018  chronic diastolic CHF  AAA s/p EAVR 2019  - stable, continue aspirin, plavix, statin     5. dvt px  - heparin sc      6. normocytic anemia  - repeat h/h, if stable d/c planning   Hg  9.8 in few months ago and in .  - outpatient f/u  w/ PMD    7. urine cx +10-49,000 esbl kleb.  UA was negative and patient asymptomatic- will discuss w/ ID   - cxr and ua neg, urine cx 10-49,000 cfu and patient asymptomatic No uti; blood cx no growth--> as per HPI above, likely contaminant, no indication for treatement    dispo: rehab placement pending availability

## 2019-07-08 ENCOUNTER — TRANSCRIPTION ENCOUNTER (OUTPATIENT)
Age: 84
End: 2019-07-08

## 2019-07-08 VITALS
DIASTOLIC BLOOD PRESSURE: 57 MMHG | RESPIRATION RATE: 18 BRPM | SYSTOLIC BLOOD PRESSURE: 93 MMHG | HEART RATE: 66 BPM | OXYGEN SATURATION: 100 % | TEMPERATURE: 98 F

## 2019-07-08 LAB
CULTURE RESULTS: SIGNIFICANT CHANGE UP
CULTURE RESULTS: SIGNIFICANT CHANGE UP
SPECIMEN SOURCE: SIGNIFICANT CHANGE UP
SPECIMEN SOURCE: SIGNIFICANT CHANGE UP

## 2019-07-08 RX ORDER — POTASSIUM CHLORIDE 20 MEQ
1 PACKET (EA) ORAL
Qty: 0 | Refills: 0 | DISCHARGE

## 2019-07-08 RX ORDER — TAMSULOSIN HYDROCHLORIDE 0.4 MG/1
1 CAPSULE ORAL
Qty: 0 | Refills: 0 | DISCHARGE

## 2019-07-08 RX ORDER — ACETAMINOPHEN 500 MG
2 TABLET ORAL
Qty: 0 | Refills: 0 | DISCHARGE

## 2019-07-08 RX ORDER — ATORVASTATIN CALCIUM 80 MG/1
1 TABLET, FILM COATED ORAL
Qty: 0 | Refills: 0 | DISCHARGE

## 2019-07-08 RX ORDER — ASPIRIN/CALCIUM CARB/MAGNESIUM 324 MG
1 TABLET ORAL
Qty: 0 | Refills: 0 | DISCHARGE

## 2019-07-08 RX ORDER — CEFUROXIME AXETIL 250 MG
1 TABLET ORAL
Qty: 6 | Refills: 0
Start: 2019-07-08 | End: 2019-07-10

## 2019-07-08 RX ORDER — METOPROLOL TARTRATE 50 MG
1 TABLET ORAL
Qty: 0 | Refills: 0 | DISCHARGE

## 2019-07-08 RX ORDER — ISOSORBIDE MONONITRATE 60 MG/1
1 TABLET, EXTENDED RELEASE ORAL
Qty: 0 | Refills: 0 | DISCHARGE

## 2019-07-08 RX ORDER — CLOPIDOGREL BISULFATE 75 MG/1
1 TABLET, FILM COATED ORAL
Qty: 0 | Refills: 0 | DISCHARGE

## 2019-07-08 RX ORDER — FUROSEMIDE 40 MG
2 TABLET ORAL
Qty: 0 | Refills: 0 | DISCHARGE

## 2019-07-08 RX ORDER — FAMOTIDINE 10 MG/ML
1 INJECTION INTRAVENOUS
Qty: 0 | Refills: 0 | DISCHARGE

## 2019-07-08 RX ADMIN — HEPARIN SODIUM 5000 UNIT(S): 5000 INJECTION INTRAVENOUS; SUBCUTANEOUS at 06:07

## 2019-07-08 RX ADMIN — DULOXETINE HYDROCHLORIDE 20 MILLIGRAM(S): 30 CAPSULE, DELAYED RELEASE ORAL at 11:58

## 2019-07-08 RX ADMIN — Medication 81 MILLIGRAM(S): at 11:57

## 2019-07-08 RX ADMIN — GABAPENTIN 400 MILLIGRAM(S): 400 CAPSULE ORAL at 06:08

## 2019-07-08 RX ADMIN — CLOPIDOGREL BISULFATE 75 MILLIGRAM(S): 75 TABLET, FILM COATED ORAL at 11:57

## 2019-07-08 RX ADMIN — Medication 50 MILLIGRAM(S): at 06:08

## 2019-07-08 RX ADMIN — Medication 325 MILLIGRAM(S): at 11:57

## 2019-07-08 RX ADMIN — Medication 500 MILLIGRAM(S): at 06:08

## 2019-07-08 RX ADMIN — FAMOTIDINE 20 MILLIGRAM(S): 10 INJECTION INTRAVENOUS at 06:08

## 2019-07-08 RX ADMIN — Medication 40 MILLIGRAM(S): at 11:58

## 2019-07-08 RX ADMIN — Medication 100 MILLIGRAM(S): at 06:08

## 2019-07-08 NOTE — DISCHARGE NOTE NURSING/CASE MANAGEMENT/SOCIAL WORK - NSDCVIVACCINE_GEN_ALL_CORE_FT
Here today for NBUVB for psoriasis  Treatment # 15  No issues   photoproection on eyes, lips, nipples  1159 mj 2 minute 48 seconds today  Mineral oil applied  Goal 3 times weekly  
No Vaccines Administered.

## 2019-07-08 NOTE — DISCHARGE NOTE PROVIDER - CARE PROVIDER_API CALL
Dr. Morales,   Phone: (   )    -  Fax: (   )    -  Follow Up Time:     Dr. Navin Huang- Cardio, Mar  Phone: (   )    -  Fax: (   )    -  Follow Up Time:

## 2019-07-08 NOTE — DISCHARGE NOTE PROVIDER - PROVIDER TOKENS
FREE:[LAST:[Dr. Morales],PHONE:[(   )    -],FAX:[(   )    -]],FREE:[LAST:[Dr. Navin Huang- Cardio],FIRST:[Mar],PHONE:[(   )    -],FAX:[(   )    -]]

## 2019-07-08 NOTE — DISCHARGE NOTE NURSING/CASE MANAGEMENT/SOCIAL WORK - NSDCDPATPORTLINK_GEN_ALL_CORE
You can access the [facility] Portal, offered by [facility], by registering with the following website: [email or web address]

## 2019-07-08 NOTE — DISCHARGE NOTE PROVIDER - NSDCCPCAREPLAN_GEN_ALL_CORE_FT
PRINCIPAL DISCHARGE DIAGNOSIS  Diagnosis: Cellulitis  Assessment and Plan of Treatment: Improved w/ antibiotics.  Take ceftin twice daily and doxycycline twice daily for 3 more days (start tonight).   - Follow up with your primary physician.  **Follow up with your Orthopedic doctor for your chronic hip pain.  - physical therapy at rehab

## 2019-07-08 NOTE — DISCHARGE NOTE PROVIDER - HOSPITAL COURSE
Assessment and Plan:     89y male w/         1. LLE pain, fall    - due to chronic pain and chronic peripheral neuropathy; severe left hip arthrosis, pt reports chronic hip pain and needs repair     - imaging no fracture.     - xray left knee    - MRI left hip no fracture    - PT eval appreciated- rec SHERWIN    - continue gabapentin- consider increasing- pt will discuss w/ his Neuro     - discussed w/ pt and daughter- states he will f/u w/ his Ortho outpatient and doesn't want surgery              2. mild LLE cellulitis- improving     - elevated lactate no acidosis due to infection but does not meet sepsis criteria    - blood cx no growth            3. RASHAUN on CKD3    - baseline Cr 1.8    - hold lasix, start gentle ivf     7/3- Cr 2,  d/c ivf     7/4- repeat bmp;  pt refusing resuming lasix    7/5- Cr at baseline, resume lasix, pt and family agree        4. CAD s/p stents 2/2018    chronic diastolic CHF    AAA s/p EAVR 2/2019    - stable, continue aspirin, plavix, statin         5. dvt px    - heparin sc          6. normocytic anemia    - repeat h/h, if stable d/c planning     Hg  9.8 in few months ago and in Jan.  - outpatient f/u  w/ PMD        7. urine cx +10-49,000 esbl kleb.    UA was negative and patient asymptomatic- will discuss w/ ID     - cxr and ua neg, urine cx 10-49,000 cfu and patient asymptomatic No uti; blood cx no growth--> as per HPI above, likely contaminant, no indication for treatement        dispo: rehab placement pending availability cc: fall    hpi: 89y male w/ multiple med comorbidities (see prior progress note 7/7) admitted w/ leg pain after fall.  no fractures, ambulating w/ walker, PT but will benefit from rehab b/c unsteadiness.  Chronic venous stasis changes.  Cellulitis improved w/ abx.     ros- no complaints, 10 pt ros neg    Vital Signs Last 24 Hrs    T(C): 36.6 (08 Jul 2019 05:26), Max: 36.9 (07 Jul 2019 15:55)    T(F): 97.9 (08 Jul 2019 05:26), Max: 98.5 (07 Jul 2019 15:55)    HR: 63 (08 Jul 2019 05:26) (63 - 75)    BP: 115/53 (08 Jul 2019 05:26) (93/46 - 139/54)    BP(mean): --    RR: 18 (08 Jul 2019 05:26) (16 - 18)    SpO2: 97% (08 Jul 2019 05:26) (97% - 100%)    general:  pleasant elderly male, NAD    	Neuro: AAOx3    	HEENT: NCAT    	Neck: Soft and supple    	Respiratory: CTA b/l, no w/r/r    	Cardiovascular: S1 and S2, RRR     	Gastrointestinal: +BS, soft, NTND    	Extremities: chronic venous stasis b/l; erythema significantly improved; small lac left great toe no discharge, healing      	Vascular: 2+ peripheral pulses    Musculoskeletal: b/l 5/5 UE strength;  RLE 5/5;  LLE 4/5            Assessment and Plan:     89y male w/         1. LLE pain, fall    - due to chronic pain and chronic peripheral neuropathy; severe left hip arthrosis, pt reports chronic hip pain and needs repair     - imaging no fracture.     - xray left knee    - MRI left hip no fracture    - PT eval appreciated- rec SHERWIN    - continue gabapentin- consider increasing- pt will discuss w/ his Neuro     - discussed w/ pt and daughter- states he will f/u w/ his Ortho outpatient and doesn't want surgery              2. chronic venous stasis b/l, w/ superimposed mild LLE cellulitis- improving     - elevated lactate no acidosis due to infection but does not meet sepsis criteria    - blood cx no growth            3. RASHAUN on CKD3    - baseline Cr 1.8    - hold lasix, start gentle ivf     7/3- Cr 2,  d/c ivf     7/4- repeat bmp;  pt refusing resuming lasix    7/5- Cr at baseline, resume lasix, pt and family agree        4. CAD s/p stents 2/2018    chronic diastolic CHF    AAA s/p EAVR 2/2019    - stable, continue aspirin, plavix, statin         5. dvt px    - heparin sc          6. normocytic anemia    - repeat h/h, if stable d/c planning     Hg  9.8 in few months ago and in Jan.  - outpatient f/u  w/ PMD        7. urine cx +10-49,000 esbl kleb.    UA was negative and patient asymptomatic- will discuss w/ ID     - cxr and ua neg, urine cx 10-49,000 cfu and patient asymptomatic No uti; blood cx no growth--> as per HPI above, likely contaminant, no indication for treatement        dispo: rehab placement pending availability

## 2019-07-11 DIAGNOSIS — E11.40 TYPE 2 DIABETES MELLITUS WITH DIABETIC NEUROPATHY, UNSPECIFIED: ICD-10-CM

## 2019-07-11 DIAGNOSIS — N40.0 BENIGN PROSTATIC HYPERPLASIA WITHOUT LOWER URINARY TRACT SYMPTOMS: ICD-10-CM

## 2019-07-11 DIAGNOSIS — I13.0 HYPERTENSIVE HEART AND CHRONIC KIDNEY DISEASE WITH HEART FAILURE AND STAGE 1 THROUGH STAGE 4 CHRONIC KIDNEY DISEASE, OR UNSPECIFIED CHRONIC KIDNEY DISEASE: ICD-10-CM

## 2019-07-11 DIAGNOSIS — E78.5 HYPERLIPIDEMIA, UNSPECIFIED: ICD-10-CM

## 2019-07-11 DIAGNOSIS — E11.22 TYPE 2 DIABETES MELLITUS WITH DIABETIC CHRONIC KIDNEY DISEASE: ICD-10-CM

## 2019-07-11 DIAGNOSIS — N17.9 ACUTE KIDNEY FAILURE, UNSPECIFIED: ICD-10-CM

## 2019-07-11 DIAGNOSIS — I50.32 CHRONIC DIASTOLIC (CONGESTIVE) HEART FAILURE: ICD-10-CM

## 2019-07-11 DIAGNOSIS — N18.3 CHRONIC KIDNEY DISEASE, STAGE 3 (MODERATE): ICD-10-CM

## 2019-07-11 DIAGNOSIS — I25.10 ATHEROSCLEROTIC HEART DISEASE OF NATIVE CORONARY ARTERY WITHOUT ANGINA PECTORIS: ICD-10-CM

## 2019-07-11 DIAGNOSIS — Z79.4 LONG TERM (CURRENT) USE OF INSULIN: ICD-10-CM

## 2019-07-11 DIAGNOSIS — L03.116 CELLULITIS OF LEFT LOWER LIMB: ICD-10-CM

## 2019-07-11 DIAGNOSIS — I87.8 OTHER SPECIFIED DISORDERS OF VEINS: ICD-10-CM

## 2019-07-11 DIAGNOSIS — M16.12 UNILATERAL PRIMARY OSTEOARTHRITIS, LEFT HIP: ICD-10-CM

## 2019-07-11 DIAGNOSIS — E44.0 MODERATE PROTEIN-CALORIE MALNUTRITION: ICD-10-CM

## 2019-07-11 DIAGNOSIS — D64.9 ANEMIA, UNSPECIFIED: ICD-10-CM

## 2019-12-05 ENCOUNTER — INPATIENT (INPATIENT)
Facility: HOSPITAL | Age: 84
LOS: 6 days | Discharge: NURSING, REHAB OR LONG-TERM CARE | DRG: 291 | End: 2019-12-12
Attending: INTERNAL MEDICINE | Admitting: INTERNAL MEDICINE
Payer: MEDICARE

## 2019-12-05 VITALS
OXYGEN SATURATION: 99 % | SYSTOLIC BLOOD PRESSURE: 116 MMHG | DIASTOLIC BLOOD PRESSURE: 75 MMHG | HEIGHT: 70 IN | HEART RATE: 85 BPM | WEIGHT: 190.04 LBS | RESPIRATION RATE: 18 BRPM | TEMPERATURE: 98 F

## 2019-12-05 DIAGNOSIS — Z98.890 OTHER SPECIFIED POSTPROCEDURAL STATES: Chronic | ICD-10-CM

## 2019-12-05 DIAGNOSIS — Z87.19 PERSONAL HISTORY OF OTHER DISEASES OF THE DIGESTIVE SYSTEM: Chronic | ICD-10-CM

## 2019-12-05 DIAGNOSIS — Z98.89 OTHER SPECIFIED POSTPROCEDURAL STATES: Chronic | ICD-10-CM

## 2019-12-05 DIAGNOSIS — Z95.5 PRESENCE OF CORONARY ANGIOPLASTY IMPLANT AND GRAFT: Chronic | ICD-10-CM

## 2019-12-05 DIAGNOSIS — R06.02 SHORTNESS OF BREATH: ICD-10-CM

## 2019-12-05 PROBLEM — E78.5 HYPERLIPIDEMIA, UNSPECIFIED: Chronic | Status: ACTIVE | Noted: 2018-12-10

## 2019-12-05 PROBLEM — I25.10 ATHEROSCLEROTIC HEART DISEASE OF NATIVE CORONARY ARTERY WITHOUT ANGINA PECTORIS: Chronic | Status: ACTIVE | Noted: 2018-12-10

## 2019-12-05 PROBLEM — I10 ESSENTIAL (PRIMARY) HYPERTENSION: Chronic | Status: ACTIVE | Noted: 2018-12-10

## 2019-12-05 LAB
ALBUMIN SERPL ELPH-MCNC: 4.3 G/DL — SIGNIFICANT CHANGE UP (ref 3.3–5)
ALP SERPL-CCNC: 108 U/L — SIGNIFICANT CHANGE UP (ref 40–120)
ALT FLD-CCNC: 12 U/L — SIGNIFICANT CHANGE UP (ref 10–45)
ANION GAP SERPL CALC-SCNC: 14 MMOL/L — SIGNIFICANT CHANGE UP (ref 5–17)
APTT BLD: 28.8 SEC — SIGNIFICANT CHANGE UP (ref 27.5–36.3)
AST SERPL-CCNC: 20 U/L — SIGNIFICANT CHANGE UP (ref 10–40)
BASOPHILS # BLD AUTO: 0.02 K/UL — SIGNIFICANT CHANGE UP (ref 0–0.2)
BASOPHILS NFR BLD AUTO: 0.4 % — SIGNIFICANT CHANGE UP (ref 0–2)
BILIRUB SERPL-MCNC: 0.3 MG/DL — SIGNIFICANT CHANGE UP (ref 0.2–1.2)
BUN SERPL-MCNC: 31 MG/DL — HIGH (ref 7–23)
CALCIUM SERPL-MCNC: 9.9 MG/DL — SIGNIFICANT CHANGE UP (ref 8.4–10.5)
CHLORIDE SERPL-SCNC: 99 MMOL/L — SIGNIFICANT CHANGE UP (ref 96–108)
CO2 SERPL-SCNC: 29 MMOL/L — SIGNIFICANT CHANGE UP (ref 22–31)
CREAT SERPL-MCNC: 2.04 MG/DL — HIGH (ref 0.5–1.3)
EOSINOPHIL # BLD AUTO: 0.34 K/UL — SIGNIFICANT CHANGE UP (ref 0–0.5)
EOSINOPHIL NFR BLD AUTO: 6 % — SIGNIFICANT CHANGE UP (ref 0–6)
GLUCOSE SERPL-MCNC: 126 MG/DL — HIGH (ref 70–99)
HCT VFR BLD CALC: 34.1 % — LOW (ref 39–50)
HGB BLD-MCNC: 10.9 G/DL — LOW (ref 13–17)
IMM GRANULOCYTES NFR BLD AUTO: 0.2 % — SIGNIFICANT CHANGE UP (ref 0–1.5)
INR BLD: 0.99 RATIO — SIGNIFICANT CHANGE UP (ref 0.88–1.16)
LYMPHOCYTES # BLD AUTO: 1.34 K/UL — SIGNIFICANT CHANGE UP (ref 1–3.3)
LYMPHOCYTES # BLD AUTO: 23.6 % — SIGNIFICANT CHANGE UP (ref 13–44)
MCHC RBC-ENTMCNC: 30.1 PG — SIGNIFICANT CHANGE UP (ref 27–34)
MCHC RBC-ENTMCNC: 32 GM/DL — SIGNIFICANT CHANGE UP (ref 32–36)
MCV RBC AUTO: 94.2 FL — SIGNIFICANT CHANGE UP (ref 80–100)
MONOCYTES # BLD AUTO: 0.53 K/UL — SIGNIFICANT CHANGE UP (ref 0–0.9)
MONOCYTES NFR BLD AUTO: 9.3 % — SIGNIFICANT CHANGE UP (ref 2–14)
NEUTROPHILS # BLD AUTO: 3.45 K/UL — SIGNIFICANT CHANGE UP (ref 1.8–7.4)
NEUTROPHILS NFR BLD AUTO: 60.5 % — SIGNIFICANT CHANGE UP (ref 43–77)
NRBC # BLD: 0 /100 WBCS — SIGNIFICANT CHANGE UP (ref 0–0)
NT-PROBNP SERPL-SCNC: 1690 PG/ML — HIGH (ref 0–300)
PLATELET # BLD AUTO: 228 K/UL — SIGNIFICANT CHANGE UP (ref 150–400)
POTASSIUM SERPL-MCNC: 4.6 MMOL/L — SIGNIFICANT CHANGE UP (ref 3.5–5.3)
POTASSIUM SERPL-SCNC: 4.6 MMOL/L — SIGNIFICANT CHANGE UP (ref 3.5–5.3)
PROT SERPL-MCNC: 7.9 G/DL — SIGNIFICANT CHANGE UP (ref 6–8.3)
PROTHROM AB SERPL-ACNC: 11.3 SEC — SIGNIFICANT CHANGE UP (ref 10–12.9)
RBC # BLD: 3.62 M/UL — LOW (ref 4.2–5.8)
RBC # FLD: 14.4 % — SIGNIFICANT CHANGE UP (ref 10.3–14.5)
SODIUM SERPL-SCNC: 142 MMOL/L — SIGNIFICANT CHANGE UP (ref 135–145)
TROPONIN T, HIGH SENSITIVITY RESULT: 66 NG/L — HIGH (ref 0–51)
WBC # BLD: 5.69 K/UL — SIGNIFICANT CHANGE UP (ref 3.8–10.5)
WBC # FLD AUTO: 5.69 K/UL — SIGNIFICANT CHANGE UP (ref 3.8–10.5)

## 2019-12-05 PROCEDURE — 93010 ELECTROCARDIOGRAM REPORT: CPT

## 2019-12-05 PROCEDURE — 71045 X-RAY EXAM CHEST 1 VIEW: CPT | Mod: 26

## 2019-12-05 PROCEDURE — 99285 EMERGENCY DEPT VISIT HI MDM: CPT

## 2019-12-05 RX ORDER — HEPARIN SODIUM 5000 [USP'U]/ML
5000 INJECTION INTRAVENOUS; SUBCUTANEOUS EVERY 12 HOURS
Refills: 0 | Status: DISCONTINUED | OUTPATIENT
Start: 2019-12-05 | End: 2019-12-12

## 2019-12-05 RX ORDER — TAMSULOSIN HYDROCHLORIDE 0.4 MG/1
0.4 CAPSULE ORAL AT BEDTIME
Refills: 0 | Status: DISCONTINUED | OUTPATIENT
Start: 2019-12-05 | End: 2019-12-12

## 2019-12-05 RX ORDER — ASPIRIN/CALCIUM CARB/MAGNESIUM 324 MG
243 TABLET ORAL ONCE
Refills: 0 | Status: COMPLETED | OUTPATIENT
Start: 2019-12-05 | End: 2019-12-05

## 2019-12-05 RX ORDER — ASPIRIN/CALCIUM CARB/MAGNESIUM 324 MG
81 TABLET ORAL DAILY
Refills: 0 | Status: DISCONTINUED | OUTPATIENT
Start: 2019-12-05 | End: 2019-12-12

## 2019-12-05 RX ORDER — FERROUS SULFATE 325(65) MG
325 TABLET ORAL DAILY
Refills: 0 | Status: DISCONTINUED | OUTPATIENT
Start: 2019-12-05 | End: 2019-12-12

## 2019-12-05 RX ORDER — CLOPIDOGREL BISULFATE 75 MG/1
75 TABLET, FILM COATED ORAL DAILY
Refills: 0 | Status: DISCONTINUED | OUTPATIENT
Start: 2019-12-05 | End: 2019-12-12

## 2019-12-05 RX ORDER — FUROSEMIDE 40 MG
20 TABLET ORAL DAILY
Refills: 0 | Status: DISCONTINUED | OUTPATIENT
Start: 2019-12-05 | End: 2019-12-05

## 2019-12-05 RX ORDER — AMLODIPINE BESYLATE 2.5 MG/1
2.5 TABLET ORAL DAILY
Refills: 0 | Status: DISCONTINUED | OUTPATIENT
Start: 2019-12-05 | End: 2019-12-06

## 2019-12-05 RX ORDER — GABAPENTIN 400 MG/1
300 CAPSULE ORAL THREE TIMES A DAY
Refills: 0 | Status: DISCONTINUED | OUTPATIENT
Start: 2019-12-05 | End: 2019-12-12

## 2019-12-05 RX ORDER — FUROSEMIDE 40 MG
20 TABLET ORAL DAILY
Refills: 0 | Status: DISCONTINUED | OUTPATIENT
Start: 2019-12-05 | End: 2019-12-08

## 2019-12-05 RX ORDER — ISOSORBIDE MONONITRATE 60 MG/1
30 TABLET, EXTENDED RELEASE ORAL DAILY
Refills: 0 | Status: DISCONTINUED | OUTPATIENT
Start: 2019-12-05 | End: 2019-12-12

## 2019-12-05 RX ORDER — DULOXETINE HYDROCHLORIDE 30 MG/1
20 CAPSULE, DELAYED RELEASE ORAL DAILY
Refills: 0 | Status: DISCONTINUED | OUTPATIENT
Start: 2019-12-05 | End: 2019-12-12

## 2019-12-05 RX ORDER — FAMOTIDINE 10 MG/ML
20 INJECTION INTRAVENOUS DAILY
Refills: 0 | Status: DISCONTINUED | OUTPATIENT
Start: 2019-12-05 | End: 2019-12-12

## 2019-12-05 RX ORDER — ATORVASTATIN CALCIUM 80 MG/1
40 TABLET, FILM COATED ORAL AT BEDTIME
Refills: 0 | Status: DISCONTINUED | OUTPATIENT
Start: 2019-12-05 | End: 2019-12-12

## 2019-12-05 RX ORDER — METOPROLOL TARTRATE 50 MG
50 TABLET ORAL DAILY
Refills: 0 | Status: DISCONTINUED | OUTPATIENT
Start: 2019-12-05 | End: 2019-12-12

## 2019-12-05 RX ADMIN — TAMSULOSIN HYDROCHLORIDE 0.4 MILLIGRAM(S): 0.4 CAPSULE ORAL at 21:32

## 2019-12-05 RX ADMIN — Medication 243 MILLIGRAM(S): at 20:16

## 2019-12-05 RX ADMIN — ATORVASTATIN CALCIUM 40 MILLIGRAM(S): 80 TABLET, FILM COATED ORAL at 21:33

## 2019-12-05 RX ADMIN — GABAPENTIN 300 MILLIGRAM(S): 400 CAPSULE ORAL at 21:33

## 2019-12-05 NOTE — ED PROVIDER NOTE - PHYSICAL EXAMINATION
Gen: NAD, non-toxic, conversational  Eyes: PERRLA, EOMI   HENT: Normocephalic, atraumatic. External ears normal, no rhinorrhea, dry mucous membranes.   CV: RRR, +LUSB murmur 2/6, 1+ pitting edema to the bilateral ankles  Resp: non-labored, speaking without difficulty on room air  Abd: soft, non tender, non rigid, no guarding or rebound tenderness  Back: No CVAT bilaterally, no midline ttp  Skin: dry, wwp   Neuro: AOx3, speech is fluent and appropriate  Psych: Mood good, affect euthymic

## 2019-12-05 NOTE — ED PROVIDER NOTE - PMH
AAA (abdominal aortic aneurysm) without rupture    BPH (benign prostatic hyperplasia)    CAD (coronary artery disease)    Cellulitis  BL  CKD (chronic kidney disease)    CKD (chronic kidney disease) stage 3, GFR 30-59 ml/min    Colon polyp    HLD (hyperlipidemia)    HTN (hypertension)    Leg swelling  related to cellulitis of LLE  OA (osteoarthritis)    PAD (peripheral artery disease)    Stented coronary artery

## 2019-12-05 NOTE — ED ADULT NURSE NOTE - CHPI ED NUR SYMPTOMS NEG
no edema/no wheezing/no hemoptysis/no chest pain/no chills/no cough/no diaphoresis/no fever/no headache/no body aches

## 2019-12-05 NOTE — ED PROVIDER NOTE - CLINICAL SUMMARY MEDICAL DECISION MAKING FREE TEXT BOX
89M hx of CAD with stents on daily 81mg asa, chf on lasix, presenting for evaluation of shortness of breath x4 days, similar to previous cardiac events, on exam not in distress, has cardiac murmur and 1+ pitting edema, saw his cardiologist today and was sent here for further testing, is high risk would benefit from provocative testing, will check labs, cxr, follow up studies, reassess, dispo.

## 2019-12-05 NOTE — H&P ADULT - ASSESSMENT
4 days of shortness of breath, worse with exertion, resolves somewhat with rest, usually sleeps with 2 pillows but has been using 3 lately "to watch tv", has a hx of CHF does have some increased lower extremity swelling, no fevers or chills. Has not been having much chest pain with this, however last time he needed cardiac stenting he did not have chest pain either. Took a baby ASA today. Follows with Dr. Snow / Dr. Perez.  Saw Dr. Iggy Snow today and had an EKG which was less concerning, but referred here for further testing given his hx and risk factors.  ching pt s/p recent cath no sig cad ? chf /hfpef acute on chronic  check pro bnp  iv lasix  cardiac enzyme   stress test  continue all cardiac meds  will adjust meds

## 2019-12-05 NOTE — H&P ADULT - HISTORY OF PRESENT ILLNESS
CHIEF COMPLAINT:Patient is a 89y old  Male who presents with a chief complaint of ching    HPI:  4 days of shortness of breath, worse with exertion, resolves somewhat with rest, usually sleeps with 2 pillows but has been using 3 lately "to watch tv", has a hx of CHF does have some increased lower extremity swelling, no fevers or chills. Has not been having much chest pain with this, however last time he needed cardiac stenting he did not have chest pain either. Took a baby ASA today. Follows with Dr. Snow / Dr. Perez.  Saw Dr. Igyg Snow today and had an EKG which was less concerning, but referred here for further testing given his hx and risk factors.    PAST MEDICAL & SURGICAL HISTORY:  AAA (abdominal aortic aneurysm) without rupture  PAD (peripheral artery disease)  OA (osteoarthritis)  CAD (coronary artery disease)  HLD (hyperlipidemia)  CKD (chronic kidney disease)  HTN (hypertension)  Stented coronary artery  Colon polyp  BPH (benign prostatic hyperplasia)  Cellulitis: BL  CKD (chronic kidney disease) stage 3, GFR 30-59 ml/min  Leg swelling: related to cellulitis of LLE  No significant past medical history  History of coronary artery stent placement  History of colonoscopy  H/O inguinal hernia: repair  S/P hemorrhoidectomy      MEDICATIONS  (STANDING):    MEDICATIONS  (PRN):      FAMILY HISTORY:  No pertinent family history in first degree relatives      SOCIAL HISTORY:    [ ] Non-smoker  [ ] Smoker  [ ] Alcohol    Allergies    penicillin (Angioedema)  penicillins (Unknown)    Intolerances    	    REVIEW OF SYSTEMS:  CONSTITUTIONAL: No fever, weight loss, or fatigue  EYES: No eye pain, visual disturbances, or discharge  ENT:  No difficulty hearing, tinnitus, vertigo; No sinus or throat pain  NECK: No pain or stiffness  RESPIRATORY: No cough, wheezing, chills or hemoptysis; + exertional  Shortness of Breath  CARDIOVASCULAR: No chest pain, palpitations, passing out, dizziness, or leg swelling  GASTROINTESTINAL: No abdominal or epigastric pain. No nausea, vomiting, or hematemesis; No diarrhea or constipation. No melena or hematochezia.  GENITOURINARY: No dysuria, frequency, hematuria, or incontinence  NEUROLOGICAL: No headaches, memory loss, loss of strength, numbness, or tremors  SKIN: No itching, burning, rashes, or lesions   LYMPH Nodes: No enlarged glands  ENDOCRINE: No heat or cold intolerance; No hair loss  MUSCULOSKELETAL: No joint pain or swelling; No muscle, back, or extremity pain  PSYCHIATRIC: No depression, anxiety, mood swings, or difficulty sleeping  HEME/LYMPH: No easy bruising, or bleeding gums  ALLERGY AND IMMUNOLOGIC: No hives or eczema	    [ ] All others negative	  [ ] Unable to obtain    PHYSICAL EXAM:  T(C): 36.6 (12-05-19 @ 20:16), Max: 36.6 (12-05-19 @ 20:16)  HR: 77 (12-05-19 @ 20:16) (77 - 85)  BP: 159/82 (12-05-19 @ 20:16) (116/75 - 159/82)  RR: 18 (12-05-19 @ 20:16) (18 - 18)  SpO2: 99% (12-05-19 @ 20:16) (99% - 99%)  Wt(kg): --  I&O's Summary      Appearance: Normal	  HEENT:   Normal oral mucosa, PERRL, EOMI	  Lymphatic: No lymphadenopathy  Cardiovascular: Normal S1 S2, No JVD, + murmurs, No edema  Respiratory: Lungs clear to auscultation	  Psychiatry: A & O x 3, Mood & affect appropriate  Gastrointestinal:  Soft, Non-tender, + BS	  Skin: No rashes, No ecchymoses, No cyanosis	  Neurologic: Non-focal  Extremities: Normal range of motion, No clubbing, cyanosis or edema  Vascular: Peripheral pulses palpable 2+ bilaterally    TELEMETRY: 	    ECG:  	  RADIOLOGY:  OTHER: 	  	  LABS:	 	    CARDIAC MARKERS:                proBNP:   Lipid Profile:   HgA1c:   TSH:       PREVIOUS DIAGNOSTIC TESTING:      < from: 12 Lead ECG (07.01.19 @ 21:38) >  Diagnosis Line Sinus rhythm with 1st degree A-V block  Right bundle branch block  Left anterior fascicular block  Anterolateral infarct , age undetermined  Abnormal ECG  No previous ECGs available  Confirmed by Mehrdad Boland ([id number]) on 7/3/2019 10:40:16 AM    < from: TTE with Doppler (w/Cont) (12.11.18 @ 13:23) >  1. Mild concentric left ventricular hypertrophy.  2. Endocardial visualization enhanced with intravenous  injection of Ultrasonic Enhancing Agent (Definity).  Hyperdynamic left ventricle. No obvious wall motion  abnormalities.  3. Reversal of the E-A  waves of the mitral inflow pattern  is consistent with diastolicLV dysfunction.  4. Normal right ventricular size and function.    < from: Cardiac Cath Lab - Adult (02.15.18 @ 10:19) >  CORONARY VESSELS: The coronary circulation is right dominant.  LM:   --  LM: Angiography showed minor luminal irregularities with no flow  limiting lesions.  LAD:   --  Ostial LAD: There was a 30 % stenosis.  --  Mid LAD: There was a 10 % stenosis at the site of a prior stent.  CX:   --  Mid circumflex: There was a 90 % stenosis.  RCA:   --  RCA: Angiography showed moderate atherosclerosis.  COMPLICATIONS: There were no complications.  DIAGNOSTIC RECOMMENDATIONS: IFR was 0.89  ASA and Plavix for 1 year      < from: Xray Chest 1 View AP/PA (02.13.19 @ 19:37) >  Lungs are clear. No pleural effusion or pneumothorax.   Cardiac size cannot be accurately assessed in this projection.     IMPRESSION:   Clear lungs      < from: TTE with Doppler (w/Cont) (12.11.18 @ 13:23) >  Conclusions:  1. Mild concentric left ventricular hypertrophy.  2. Endocardial visualization enhanced with intravenous  injection of Ultrasonic Enhancing Agent (Definity).  Hyperdynamic left ventricle. No obvious wall motion  abnormalities.  3. Reversal of the E-A  waves of the mitral inflow pattern  is consistent with diastolicLV dysfunction.  4. Normal right ventricular size and function.    < end of copied text >

## 2019-12-05 NOTE — ED PROVIDER NOTE - OBJECTIVE STATEMENT
4 days of shortness of breath, worse with exertion, resolves somewhat with rest, usually sleeps with 2 pillows but has been using 3 lately "to watch tv", has a hx of CHF does have some increased lower extremity swelling, no fevers or chills. Has not been having much chest pain with this, however last time he needed cardiac stenting he did not have chest pain either. Took a baby ASA today. Follows with Dr. Snow / Dr. Morales.    Saw Dr. Iggy Snow today and had an EKG which was less concerning, but referred here for further testing given his hx and risk factors.

## 2019-12-05 NOTE — ED PROVIDER NOTE - PSH
H/O inguinal hernia  repair  History of colonoscopy    History of coronary artery stent placement    S/P hemorrhoidectomy

## 2019-12-05 NOTE — H&P ADULT - NSICDXPASTMEDICALHX_GEN_ALL_CORE_FT
PAST MEDICAL HISTORY:  AAA (abdominal aortic aneurysm) without rupture     BPH (benign prostatic hyperplasia)     CAD (coronary artery disease)     Cellulitis BL    CKD (chronic kidney disease)     CKD (chronic kidney disease) stage 3, GFR 30-59 ml/min     Colon polyp     HLD (hyperlipidemia)     HTN (hypertension)     Leg swelling related to cellulitis of LLE    No significant past medical history     OA (osteoarthritis)     PAD (peripheral artery disease)     Stented coronary artery

## 2019-12-05 NOTE — ED ADULT NURSE NOTE - OBJECTIVE STATEMENT
89 Yo male PMHx CAD s/p stents on plavix, AAA repair 2018, BPH, HTN, HLD, CKD c/o new LOPEZ and SOB X4 days. Patient reports that he experiences SOB more frequently than normal, notices that he becomes SOB when performing ADL's which is not patients baseline. Patient reports he had similar symptoms when he needed stents placed. Patient is A&OX3, airway patent, breathing spontaneous, equal and symmetric chest rise and fall, skin warm, dry and pink. Lungs clear b/l. denies chest pain, HA, N/V/D, abdominal pain, fever/chills, urinary symptoms, hematuria, weakness, dizziness, numbness, tinging. Peripheral pulses present b/l. Skin warm, dry and pink. Pt placed in position of comfort. Pt educated on call bell system and provided call bell. Bed in lowest position, wheels locked, appropriate side rails raised. Pt denies needs at this time.

## 2019-12-05 NOTE — ED PROVIDER NOTE - ATTENDING CONTRIBUTION TO CARE
attending Dennis: 89yM h/o CHF, CAD with prior stents p/w several days worsening LOPEZ. Denies chest pain, orthopnea, new LE edema. Similar to symptoms prior to needing stents in the past. Last stress test years ago, sent in by cardiologist for inpatient stress. Will obtain ekg, place on tele, labs including trop, cxr, ASA, to be admitted

## 2019-12-06 LAB
CK MB BLD-MCNC: 5.7 % — HIGH (ref 0–3.5)
CK MB CFR SERPL CALC: 7.9 NG/ML — HIGH (ref 0–6.7)
CK SERPL-CCNC: 139 U/L — SIGNIFICANT CHANGE UP (ref 30–200)
NT-PROBNP SERPL-SCNC: 1565 PG/ML — HIGH (ref 0–300)
TROPONIN T, HIGH SENSITIVITY RESULT: 70 NG/L — HIGH (ref 0–51)
TROPONIN T, HIGH SENSITIVITY RESULT: 71 NG/L — HIGH (ref 0–51)

## 2019-12-06 PROCEDURE — 93018 CV STRESS TEST I&R ONLY: CPT

## 2019-12-06 PROCEDURE — 93016 CV STRESS TEST SUPVJ ONLY: CPT

## 2019-12-06 PROCEDURE — 78452 HT MUSCLE IMAGE SPECT MULT: CPT | Mod: 26

## 2019-12-06 RX ADMIN — CLOPIDOGREL BISULFATE 75 MILLIGRAM(S): 75 TABLET, FILM COATED ORAL at 15:19

## 2019-12-06 RX ADMIN — ATORVASTATIN CALCIUM 40 MILLIGRAM(S): 80 TABLET, FILM COATED ORAL at 21:04

## 2019-12-06 RX ADMIN — TAMSULOSIN HYDROCHLORIDE 0.4 MILLIGRAM(S): 0.4 CAPSULE ORAL at 21:03

## 2019-12-06 RX ADMIN — Medication 325 MILLIGRAM(S): at 15:19

## 2019-12-06 RX ADMIN — GABAPENTIN 300 MILLIGRAM(S): 400 CAPSULE ORAL at 05:02

## 2019-12-06 RX ADMIN — GABAPENTIN 300 MILLIGRAM(S): 400 CAPSULE ORAL at 15:20

## 2019-12-06 RX ADMIN — Medication 20 MILLIGRAM(S): at 05:02

## 2019-12-06 RX ADMIN — Medication 81 MILLIGRAM(S): at 15:18

## 2019-12-06 RX ADMIN — DULOXETINE HYDROCHLORIDE 20 MILLIGRAM(S): 30 CAPSULE, DELAYED RELEASE ORAL at 19:00

## 2019-12-06 RX ADMIN — GABAPENTIN 300 MILLIGRAM(S): 400 CAPSULE ORAL at 21:03

## 2019-12-06 RX ADMIN — HEPARIN SODIUM 5000 UNIT(S): 5000 INJECTION INTRAVENOUS; SUBCUTANEOUS at 18:38

## 2019-12-06 RX ADMIN — AMLODIPINE BESYLATE 2.5 MILLIGRAM(S): 2.5 TABLET ORAL at 05:03

## 2019-12-06 RX ADMIN — Medication 50 MILLIGRAM(S): at 05:02

## 2019-12-06 RX ADMIN — ISOSORBIDE MONONITRATE 30 MILLIGRAM(S): 60 TABLET, EXTENDED RELEASE ORAL at 15:20

## 2019-12-06 RX ADMIN — HEPARIN SODIUM 5000 UNIT(S): 5000 INJECTION INTRAVENOUS; SUBCUTANEOUS at 05:02

## 2019-12-06 RX ADMIN — FAMOTIDINE 20 MILLIGRAM(S): 10 INJECTION INTRAVENOUS at 15:21

## 2019-12-06 NOTE — CONSULT NOTE ADULT - SUBJECTIVE AND OBJECTIVE BOX
NEPHROLOGY - NSN    Patient seen and examined.    HPI:  CHIEF COMPLAINT:Patient is a 89y old  Male who presents with a chief complaint of ching    HPI:  4 days of shortness of breath, worse with exertion, resolves somewhat with rest, usually sleeps with 2 pillows but has been using 3 lately "to watch tv", has a hx of CHF does have some increased lower extremity swelling, no fevers or chills. Has not been having much chest pain with this, however last time he needed cardiac stenting he did not have chest pain either. Took a baby ASA today. Follows with Dr. Snow / Dr. Perez.  Saw Dr. Iggy Snow today and had an EKG which was less concerning, but referred here for further testing given his hx and risk factors.  The patient is an 89-year-old gentleman with the past medical history of coronary artery disease, increased cholesterol, arthritis.  The patient has had this AAA  sp EVAR.    The patient does have a history of abnormal kidney function.  Baseline creatinine is around 1.7 to 1.8.  He does urinate frequently at night time.   He urinates multiple times at night and his stream is relatively weak.  He denies any hematuria or bubbles in his urine.  He does not take any NSAIDs or any herbal medications.  The patient does have a history of arthritis and did entertain  hip surgery;.   He is not a diabetic.  He has nonproteinuric renal disease.  He does have a history of high blood pressure, but it is only maintained on one medication and the blood pressure really has not been elevated.  There is no TIA or stroke-like symptoms.  There is no hematuria or bubbles in the urine.  No history of NSAIDS or nephrolithisis.  There is no incontinence.  No family hx or renal disease or back pain.    No recent abx use.  No alleviating or aggravating factors with respect to the kidneys.  He states the BP was low     PAST MEDICAL & SURGICAL HISTORY:  AAA (abdominal aortic aneurysm) without rupture  PAD (peripheral artery disease)  OA (osteoarthritis)  CAD (coronary artery disease)  HLD (hyperlipidemia)  CKD (chronic kidney disease)  HTN (hypertension)  Stented coronary artery  Colon polyp  BPH (benign prostatic hyperplasia)  Cellulitis: BL  CKD (chronic kidney disease) stage 3, GFR 30-59 ml/min  Leg swelling: related to cellulitis of LLE  No significant past medical history  History of coronary artery stent placement  History of colonoscopy  H/O inguinal hernia: repair  S/P hemorrhoidectomy           FAMILY HISTORY:  No pertinent family history in first degree relatives      SOCIAL HISTORY:    [ ] Non-smoker  [ ] Smoker  [ ] Alcohol    Allergies    penicillin (Angioedema)  penicillins (Unknown)    Intolerances    	    REVIEW OF SYSTEMS:  CONSTITUTIONAL: No fever, weight loss, or fatigue  EYES: No eye pain, visual disturbances, or discharge  ENT:  No difficulty hearing, tinnitus, vertigo; No sinus or throat pain  NECK: No pain or stiffness  RESPIRATORY: No cough, wheezing, chills or hemoptysis; + exertional  Shortness of Breath  CARDIOVASCULAR: No chest pain, palpitations, passing out, dizziness, or leg swelling  GASTROINTESTINAL: No abdominal or epigastric pain. No nausea, vomiting, or hematemesis; No diarrhea or constipation. No melena or hematochezia.  GENITOURINARY: No dysuria, frequency, hematuria, or incontinence  NEUROLOGICAL: No headaches, memory loss, loss of strength, numbness, or tremors  SKIN: No itching, burning, rashes, or lesions   LYMPH Nodes: No enlarged glands  ENDOCRINE: No heat or cold intolerance; No hair loss  MUSCULOSKELETAL: No joint pain or swelling; No muscle, back, or extremity pain  PSYCHIATRIC: No depression, anxiety, mood swings, or difficulty sleeping  HEME/LYMPH: No easy bruising, or bleeding gums  ALLERGY AND IMMUNOLOGIC: No hives or eczema	    [ ] All others negative	  [ ] Unable to obtain    PHYSICAL EXAM:  T(C): 36.6 (12-05-19 @ 20:16), Max: 36.6 (12-05-19 @ 20:16)  HR: 77 (12-05-19 @ 20:16) (77 - 85)  BP: 159/82 (12-05-19 @ 20:16) (116/75 - 159/82)  RR: 18 (12-05-19 @ 20:16) (18 - 18)  SpO2: 99% (12-05-19 @ 20:16) (99% - 99%)  Wt(kg): --  I&O's Summary             < from: 12 Lead ECG (07.01.19 @ 21:38) >  Diagnosis Line Sinus rhythm with 1st degree A-V block  Right bundle branch block  Left anterior fascicular block  Anterolateral infarct , age undetermined  Abnormal ECG  No previous ECGs available  Confirmed by Mehrdad Boland ([id number]) on 7/3/2019 10:40:16 AM    < from: TTE with Doppler (w/Cont) (12.11.18 @ 13:23) >  1. Mild concentric left ventricular hypertrophy.  2. Endocardial visualization enhanced with intravenous  injection of Ultrasonic Enhancing Agent (Definity).  Hyperdynamic left ventricle. No obvious wall motion  abnormalities.  3. Reversal of the E-A  waves of the mitral inflow pattern  is consistent with diastolicLV dysfunction.  4. Normal right ventricular size and function.    < from: Cardiac Cath Lab - Adult (02.15.18 @ 10:19) >  CORONARY VESSELS: The coronary circulation is right dominant.  LM:   --  LM: Angiography showed minor luminal irregularities with no flow  limiting lesions.  LAD:   --  Ostial LAD: There was a 30 % stenosis.  --  Mid LAD: There was a 10 % stenosis at the site of a prior stent.  CX:   --  Mid circumflex: There was a 90 % stenosis.  RCA:   --  RCA: Angiography showed moderate atherosclerosis.  COMPLICATIONS: There were no complications.  DIAGNOSTIC RECOMMENDATIONS: IFR was 0.89  ASA and Plavix for 1 year      < from: Xray Chest 1 View AP/PA (02.13.19 @ 19:37) >  Lungs are clear. No pleural effusion or pneumothorax.   Cardiac size cannot be accurately assessed in this projection.     IMPRESSION:   Clear lungs      < from: TTE with Doppler (w/Cont) (12.11.18 @ 13:23) >  Conclusions:  1. Mild concentric left ventricular hypertrophy.  2. Endocardial visualization enhanced with intravenous  injection of Ultrasonic Enhancing Agent (Definity).  Hyperdynamic left ventricle. No obvious wall motion  abnormalities.  3. Reversal of the E-A  waves of the mitral inflow pattern  is consistent with diastolicLV dysfunction.  4. Normal right ventricular size and function.    < end of copied text > (05 Dec 2019 20:21)      PAST MEDICAL & SURGICAL HISTORY:  AAA (abdominal aortic aneurysm) without rupture  PAD (peripheral artery disease)  OA (osteoarthritis)  CAD (coronary artery disease)  HLD (hyperlipidemia)  CKD (chronic kidney disease)  HTN (hypertension)  Stented coronary artery  Colon polyp  BPH (benign prostatic hyperplasia)  Cellulitis: BL  CKD (chronic kidney disease) stage 3, GFR 30-59 ml/min  Leg swelling: related to cellulitis of LLE  No significant past medical history  History of coronary artery stent placement  History of colonoscopy  H/O inguinal hernia: repair  S/P hemorrhoidectomy      MEDICATIONS  (STANDING):  amLODIPine   Tablet 2.5 milliGRAM(s) Oral daily  aspirin enteric coated 81 milliGRAM(s) Oral daily  atorvastatin 40 milliGRAM(s) Oral at bedtime  clopidogrel Tablet 75 milliGRAM(s) Oral daily  DULoxetine 20 milliGRAM(s) Oral daily  famotidine    Tablet 20 milliGRAM(s) Oral daily  ferrous    sulfate 325 milliGRAM(s) Oral daily  furosemide   Injectable 20 milliGRAM(s) IV Push daily  gabapentin 300 milliGRAM(s) Oral three times a day  heparin  Injectable 5000 Unit(s) SubCutaneous every 12 hours  isosorbide   mononitrate ER Tablet (IMDUR) 30 milliGRAM(s) Oral daily  metoprolol succinate ER 50 milliGRAM(s) Oral daily  tamsulosin 0.4 milliGRAM(s) Oral at bedtime      Allergies    penicillin (Angioedema)  penicillins (Unknown)    Intolerances        SOCIAL HISTORY:  Denies alcohol abuse, drug abuse or tobacco usage.     FAMILY HISTORY:  No pertinent family history in first degree relatives      VITALS:  T(C): 36.4 (12-06-19 @ 07:52), Max: 36.7 (12-05-19 @ 22:30)  HR: 71 (12-06-19 @ 14:07) (68 - 88)  BP: 118/74 (12-06-19 @ 14:07) (96/59 - 159/82)  RR: 18 (12-06-19 @ 07:52) (18 - 18)  SpO2: 97% (12-06-19 @ 07:52) (96% - 100%)    REVIEW OF SYSTEMS:  Denies any nausea, vomiting, diarrhea, fever or chills.   Good oral intake and denies fatigue or weakness. All other pertinent systems are reviewed and are negative.    PHYSICAL EXAM:  Constitutional: NAD  HEENT: EOMI  Neck:  No JVD, supple   Respiratory: CTA B/L  Cardiovascular: S1 and S2, RRR  Gastrointestinal: + BS, soft, NT, ND  Extremities: No peripheral edema, + peripheral pulses  Neurological: A/O x 3, CN2-12 intact  Psychiatric: Normal mood, normal affect  : No Flores  Skin: No rashes, C/D/I  Access: Not applicable    I and O's:    Height (cm): 177.8 (12-05 @ 15:34)  Weight (kg): 86.2 (12-05 @ 15:34)  BMI (kg/m2): 27.3 (12-05 @ 15:34)  BSA (m2): 2.04 (12-05 @ 15:34)    LABS:                        10.9   5.69  )-----------( 228      ( 05 Dec 2019 20:23 )             34.1     12-05    142  |  99  |  31<H>  ----------------------------<  126<H>  4.6   |  29  |  2.04<H>    Ca    9.9      05 Dec 2019 20:23    TPro  7.9  /  Alb  4.3  /  TBili  0.3  /  DBili  x   /  AST  20  /  ALT  12  /  AlkPhos  108  12-05       RADIOLOGY & ADDITIONAL STUDIES:  < from: Xray Chest 1 View AP/PA (12.05.19 @ 21:18) >    EXAM:  XR CHEST AP OR PA 1V                            PROCEDURE DATE:  12/05/2019            INTERPRETATION:  HISTORY: Shortness of breath    TECHNIQUE: A single AP view of the chest was obtained.    COMPARISON: 2/13/2019    FINDINGS:  The cardiacsilhouette is normal in size. There are no focal   consolidations or pleural effusions. The hilar and mediastinal structures   appear unremarkable. The osseous structures are intact.    IMPRESSION: Clear lungs.                    LAZARO HINDS M.D., ATTENDING RADIOLOGIST  This document has been electronically signed. Dec  6 2019 10:15AM

## 2019-12-06 NOTE — PROGRESS NOTE ADULT - SUBJECTIVE AND OBJECTIVE BOX
no  cp/ sob at  rest    REVIEW OF SYSTEMS:  GEN: no fever,    no chills  RESP: no SOB,   no cough  CVS: no chest pain,   no palpitations  GI: no abdominal pain,   no nausea,   no vomiting,   no constipation,   no diarrhea  : no dysuria,   no frequency  NEURO: no headache,   no dizziness  PSYCH: no depression,   not anxious  Derm : no rash    MEDICATIONS  (STANDING):  amLODIPine   Tablet 2.5 milliGRAM(s) Oral daily  aspirin enteric coated 81 milliGRAM(s) Oral daily  atorvastatin 40 milliGRAM(s) Oral at bedtime  clopidogrel Tablet 75 milliGRAM(s) Oral daily  DULoxetine 20 milliGRAM(s) Oral daily  famotidine    Tablet 20 milliGRAM(s) Oral daily  ferrous    sulfate 325 milliGRAM(s) Oral daily  furosemide   Injectable 20 milliGRAM(s) IV Push daily  gabapentin 300 milliGRAM(s) Oral three times a day  heparin  Injectable 5000 Unit(s) SubCutaneous every 12 hours  isosorbide   mononitrate ER Tablet (IMDUR) 30 milliGRAM(s) Oral daily  metoprolol succinate ER 50 milliGRAM(s) Oral daily  tamsulosin 0.4 milliGRAM(s) Oral at bedtime    MEDICATIONS  (PRN):      Vital Signs Last 24 Hrs  T(C): 36.4 (06 Dec 2019 07:52), Max: 36.7 (05 Dec 2019 22:30)  T(F): 97.5 (06 Dec 2019 07:52), Max: 98 (05 Dec 2019 22:30)  HR: 68 (06 Dec 2019 07:52) (68 - 88)  BP: 96/59 (06 Dec 2019 07:52) (96/59 - 159/82)  BP(mean): --  RR: 18 (06 Dec 2019 07:52) (18 - 18)  SpO2: 97% (06 Dec 2019 07:52) (96% - 100%)  CAPILLARY BLOOD GLUCOSE        I&O's Summary      PHYSICAL EXAM:  HEAD:  Atraumatic, Normocephalic  NECK: Supple, No   JVD  CHEST/LUNG:   no     rales,     no,    rhonchi  HEART: Regular rate and rhythm;         murmur  ABDOMEN: Soft, Nontender, ;   EXTREMITIES:   no     edema  NEUROLOGY:  alert    LABS:                        10.9   5.69  )-----------( 228      ( 05 Dec 2019 20:23 )             34.1     12-05    142  |  99  |  31<H>  ----------------------------<  126<H>  4.6   |  29  |  2.04<H>    Ca    9.9      05 Dec 2019 20:23    TPro  7.9  /  Alb  4.3  /  TBili  0.3  /  DBili  x   /  AST  20  /  ALT  12  /  AlkPhos  108  12-05    PT/INR - ( 05 Dec 2019 20:23 )   PT: 11.3 sec;   INR: 0.99 ratio         PTT - ( 05 Dec 2019 20:23 )  PTT:28.8 sec  CARDIAC MARKERS ( 05 Dec 2019 23:57 )  x     / x     / 139 U/L / x     / 7.9 ng/mL                        Consultant(s) Notes Reviewed:      Care Discussed with Consultants/Other Providers:

## 2019-12-06 NOTE — PROGRESS NOTE ADULT - ASSESSMENT
89   year old M with    PMH of CAD  ,  PCI to LAD and Cx    in 2/2018,     on asa/ plavix/ statin    CKDIII, and HTN ,  hld.  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  h/o falls   ct head, infarct /  anemia/ s/p polup removed      admitted with sob  and positive troponin  not  mi  per  card   from acute  diastolic  chf  on lasix/  asa/ plavix/ norvasc/ toprol/ imdur  awaiting stress test     < from: TTE with Doppler (w/Cont) (12.11.18 @ 13:23) >  Conclusions:  1. Mild concentric left ventricular hypertrophy.  2. Endocardial visualization enhanced with intravenous  injection of Ultrasonic Enhancing Agent (Definity).  Hyperdynamic left ventricle. No obvious wall motion  abnormalities.  3. Reversal of the E-A  waves of the mitral inflow pattern  is consistent with diastolicLV dysfunction.  4. Normal right ventricular size and function.  < end of copied text >       < from: MR Lumbar Spine No Cont (12.10.18 @ 22:57) >  IMPRESSION: Degenerative changes of the lumbar spine with severe spinal   stenosis at L4-5 due to a combination of degenerative facet changes and   grade 1anterolisthesis L4 on L5. Large abdominal aortic aneurys  < end of copied text >     < from: Cardiac Cath Lab - Adult (02.15.18 @ 10:19) >  -  Intervention on mid circumflex: drug-eluting stent.  < end of copied text >

## 2019-12-06 NOTE — CONSULT NOTE ADULT - ASSESSMENT
The patient is an 89-year-old gentleman with the past medical history of coronary artery disease, increased cholesterol, arthritis and CKD stage 4 and AAA pw LOPEZ  At present object data does not suggest CHF and he has no chest pain.  Last echo with preserved EF    1 CVS-Stress test ordered.  For his age is the BP too low?  Based on Hyvet SBP can be higher.  DC Norvasc   2 Renal CKD stage 4 at baseline;  No need for renal sono;  WIll check PTH and Phos once on the floor   3 Anemia-Can check iron stores and see if he still needs to be on PO iron

## 2019-12-06 NOTE — PROGRESS NOTE ADULT - SUBJECTIVE AND OBJECTIVE BOX
CARDIOLOGY     PROGRESS  NOTE   ________________________________________________    CHIEF COMPLAINT:Patient is a 89y old  Male who presents with a chief complaint of LOPEZ (06 Dec 2019 08:50)  doing better.  	  REVIEW OF SYSTEMS:  CONSTITUTIONAL: No fever, weight loss, or fatigue  EYES: No eye pain, visual disturbances, or discharge  ENT:  No difficulty hearing, tinnitus, vertigo; No sinus or throat pain  NECK: No pain or stiffness  RESPIRATORY: No cough, wheezing, chills or hemoptysis; No Shortness of Breath  CARDIOVASCULAR: No chest pain, palpitations, passing out, dizziness, or leg swelling  GASTROINTESTINAL: No abdominal or epigastric pain. No nausea, vomiting, or hematemesis; No diarrhea or constipation. No melena or hematochezia.  GENITOURINARY: No dysuria, frequency, hematuria, or incontinence  NEUROLOGICAL: No headaches, memory loss, loss of strength, numbness, or tremors  SKIN: No itching, burning, rashes, or lesions   LYMPH Nodes: No enlarged glands  ENDOCRINE: No heat or cold intolerance; No hair loss  MUSCULOSKELETAL: No joint pain or swelling; No muscle, back, or extremity pain  PSYCHIATRIC: No depression, anxiety, mood swings, or difficulty sleeping  HEME/LYMPH: No easy bruising, or bleeding gums  ALLERGY AND IMMUNOLOGIC: No hives or eczema	    [ ] All others negative	  [ ] Unable to obtain    PHYSICAL EXAM:  T(C): 36.4 (12-06-19 @ 07:52), Max: 36.7 (12-05-19 @ 22:30)  HR: 68 (12-06-19 @ 07:52) (68 - 88)  BP: 96/59 (12-06-19 @ 07:52) (96/59 - 159/82)  RR: 18 (12-06-19 @ 07:52) (18 - 18)  SpO2: 97% (12-06-19 @ 07:52) (96% - 100%)  Wt(kg): --  I&O's Summary      Appearance: Normal	  HEENT:   Normal oral mucosa, PERRL, EOMI	  Lymphatic: No lymphadenopathy  Cardiovascular: Normal S1 S2, No JVD, + murmurs, No edema  Respiratory: Lungs clear to auscultation	  Psychiatry: A & O x 3, Mood & affect appropriate  Gastrointestinal:  Soft, Non-tender, + BS	  Skin: No rashes, No ecchymoses, No cyanosis	  Neurologic: Non-focal  Extremities: Normal range of motion, No clubbing, cyanosis or edema  Vascular: Peripheral pulses palpable 2+ bilaterally    MEDICATIONS  (STANDING):  amLODIPine   Tablet 2.5 milliGRAM(s) Oral daily  aspirin enteric coated 81 milliGRAM(s) Oral daily  atorvastatin 40 milliGRAM(s) Oral at bedtime  clopidogrel Tablet 75 milliGRAM(s) Oral daily  DULoxetine 20 milliGRAM(s) Oral daily  famotidine    Tablet 20 milliGRAM(s) Oral daily  ferrous    sulfate 325 milliGRAM(s) Oral daily  furosemide   Injectable 20 milliGRAM(s) IV Push daily  gabapentin 300 milliGRAM(s) Oral three times a day  heparin  Injectable 5000 Unit(s) SubCutaneous every 12 hours  isosorbide   mononitrate ER Tablet (IMDUR) 30 milliGRAM(s) Oral daily  metoprolol succinate ER 50 milliGRAM(s) Oral daily  tamsulosin 0.4 milliGRAM(s) Oral at bedtime      TELEMETRY: 	    ECG:  	  RADIOLOGY:  OTHER: 	  	  LABS:	 	    CARDIAC MARKERS:  CARDIAC MARKERS ( 05 Dec 2019 23:57 )  x     / x     / 139 U/L / x     / 7.9 ng/mL                                10.9   5.69  )-----------( 228      ( 05 Dec 2019 20:23 )             34.1     12-05    142  |  99  |  31<H>  ----------------------------<  126<H>  4.6   |  29  |  2.04<H>    Ca    9.9      05 Dec 2019 20:23    TPro  7.9  /  Alb  4.3  /  TBili  0.3  /  DBili  x   /  AST  20  /  ALT  12  /  AlkPhos  108  12-05    proBNP: Serum Pro-Brain Natriuretic Peptide: 1565 pg/mL (12-05 @ 23:57)  Serum Pro-Brain Natriuretic Peptide: 1690 pg/mL (12-05 @ 20:23)    Lipid Profile:   HgA1c:   TSH:   PT/INR - ( 05 Dec 2019 20:23 )   PT: 11.3 sec;   INR: 0.99 ratio         PTT - ( 05 Dec 2019 20:23 )  PTT:28.8 sec  < from: TTE with Doppler (w/Cont) (12.11.18 @ 13:23) >  1. Mild concentric left ventricular hypertrophy.  2. Endocardial visualization enhanced with intravenous  injection of Ultrasonic Enhancing Agent (Definity).  Hyperdynamic left ventricle. No obvious wall motion  abnormalities.  3. Reversal of the E-A  waves of the mitral inflow pattern  is consistent with diastolicLV dysfunction.  4. Normal right ventricular size and function.  < from: Xray Chest 1 View AP/PA (12.05.19 @ 21:18) >  INTERPRETATION:  no urgent/emergent findings.    Troponin T, High Sensitivity (12.06.19 @ 05:53)    Troponin T, High Sensitivity Result: 70: Rapid upward or downward changes in high-sensitivity troponin levels  suggest acute myocardial injury. Renal impairment may cause sustained  troponin elevations.  Normal: <6 - 14 ng/L  Indeterminate: 15-51 ng/L  Elevated: > 51 ng/L  See http://labs/test/[email or web address] on the [facility] intranet for more  information ng/L        Assessment and plan  ---------------------------    4 days of shortness of breath, worse with exertion, resolves somewhat with rest, usually sleeps with 2 pillows but has been using 3 lately "to watch tv", has a hx of CHF does have some increased lower extremity swelling, no fevers or chills. Has not been having much chest pain with this, however last time he needed cardiac stenting he did not have chest pain either. Took a baby ASA today. Follows with Dr. Snow / Dr. Perez.  Saw Dr. Iggy Snow today and had an EKG which was less concerning, but referred here for further testing given his hx and risk factors.  lopez pt s/p recent cath no sig cad ? chf /hfpef acute on chronic  check pro bnp  iv lasix  cardiac enzyme   stress test  continue all cardiac meds  will adjust meds  dc norvasc if decrease bp

## 2019-12-07 LAB
ALBUMIN SERPL ELPH-MCNC: 4 G/DL — SIGNIFICANT CHANGE UP (ref 3.3–5)
ALP SERPL-CCNC: 97 U/L — SIGNIFICANT CHANGE UP (ref 40–120)
ALT FLD-CCNC: 12 U/L — SIGNIFICANT CHANGE UP (ref 10–45)
ANION GAP SERPL CALC-SCNC: 13 MMOL/L — SIGNIFICANT CHANGE UP (ref 5–17)
AST SERPL-CCNC: 20 U/L — SIGNIFICANT CHANGE UP (ref 10–40)
BASOPHILS # BLD AUTO: 0.02 K/UL — SIGNIFICANT CHANGE UP (ref 0–0.2)
BASOPHILS NFR BLD AUTO: 0.5 % — SIGNIFICANT CHANGE UP (ref 0–2)
BILIRUB SERPL-MCNC: 0.3 MG/DL — SIGNIFICANT CHANGE UP (ref 0.2–1.2)
BUN SERPL-MCNC: 37 MG/DL — HIGH (ref 7–23)
CALCIUM SERPL-MCNC: 9.2 MG/DL — SIGNIFICANT CHANGE UP (ref 8.4–10.5)
CHLORIDE SERPL-SCNC: 101 MMOL/L — SIGNIFICANT CHANGE UP (ref 96–108)
CO2 SERPL-SCNC: 31 MMOL/L — SIGNIFICANT CHANGE UP (ref 22–31)
CREAT SERPL-MCNC: 1.97 MG/DL — HIGH (ref 0.5–1.3)
EOSINOPHIL # BLD AUTO: 0.29 K/UL — SIGNIFICANT CHANGE UP (ref 0–0.5)
EOSINOPHIL NFR BLD AUTO: 6.6 % — HIGH (ref 0–6)
GLUCOSE SERPL-MCNC: 93 MG/DL — SIGNIFICANT CHANGE UP (ref 70–99)
HCT VFR BLD CALC: 32 % — LOW (ref 39–50)
HGB BLD-MCNC: 9.9 G/DL — LOW (ref 13–17)
IMM GRANULOCYTES NFR BLD AUTO: 0.5 % — SIGNIFICANT CHANGE UP (ref 0–1.5)
LYMPHOCYTES # BLD AUTO: 0.92 K/UL — LOW (ref 1–3.3)
LYMPHOCYTES # BLD AUTO: 20.9 % — SIGNIFICANT CHANGE UP (ref 13–44)
MAGNESIUM SERPL-MCNC: 2.4 MG/DL — SIGNIFICANT CHANGE UP (ref 1.6–2.6)
MCHC RBC-ENTMCNC: 30 PG — SIGNIFICANT CHANGE UP (ref 27–34)
MCHC RBC-ENTMCNC: 30.9 GM/DL — LOW (ref 32–36)
MCV RBC AUTO: 97 FL — SIGNIFICANT CHANGE UP (ref 80–100)
MONOCYTES # BLD AUTO: 0.42 K/UL — SIGNIFICANT CHANGE UP (ref 0–0.9)
MONOCYTES NFR BLD AUTO: 9.5 % — SIGNIFICANT CHANGE UP (ref 2–14)
NEUTROPHILS # BLD AUTO: 2.74 K/UL — SIGNIFICANT CHANGE UP (ref 1.8–7.4)
NEUTROPHILS NFR BLD AUTO: 62 % — SIGNIFICANT CHANGE UP (ref 43–77)
NRBC # BLD: 0 /100 WBCS — SIGNIFICANT CHANGE UP (ref 0–0)
PLATELET # BLD AUTO: 212 K/UL — SIGNIFICANT CHANGE UP (ref 150–400)
POTASSIUM SERPL-MCNC: 4 MMOL/L — SIGNIFICANT CHANGE UP (ref 3.5–5.3)
POTASSIUM SERPL-SCNC: 4 MMOL/L — SIGNIFICANT CHANGE UP (ref 3.5–5.3)
PROT SERPL-MCNC: 6.8 G/DL — SIGNIFICANT CHANGE UP (ref 6–8.3)
RBC # BLD: 3.3 M/UL — LOW (ref 4.2–5.8)
RBC # FLD: 14.4 % — SIGNIFICANT CHANGE UP (ref 10.3–14.5)
SODIUM SERPL-SCNC: 145 MMOL/L — SIGNIFICANT CHANGE UP (ref 135–145)
WBC # BLD: 4.41 K/UL — SIGNIFICANT CHANGE UP (ref 3.8–10.5)
WBC # FLD AUTO: 4.41 K/UL — SIGNIFICANT CHANGE UP (ref 3.8–10.5)

## 2019-12-07 RX ADMIN — Medication 20 MILLIGRAM(S): at 05:14

## 2019-12-07 RX ADMIN — Medication 325 MILLIGRAM(S): at 13:33

## 2019-12-07 RX ADMIN — HEPARIN SODIUM 5000 UNIT(S): 5000 INJECTION INTRAVENOUS; SUBCUTANEOUS at 18:51

## 2019-12-07 RX ADMIN — DULOXETINE HYDROCHLORIDE 20 MILLIGRAM(S): 30 CAPSULE, DELAYED RELEASE ORAL at 13:33

## 2019-12-07 RX ADMIN — ISOSORBIDE MONONITRATE 30 MILLIGRAM(S): 60 TABLET, EXTENDED RELEASE ORAL at 13:33

## 2019-12-07 RX ADMIN — Medication 81 MILLIGRAM(S): at 13:33

## 2019-12-07 RX ADMIN — ATORVASTATIN CALCIUM 40 MILLIGRAM(S): 80 TABLET, FILM COATED ORAL at 21:24

## 2019-12-07 RX ADMIN — FAMOTIDINE 20 MILLIGRAM(S): 10 INJECTION INTRAVENOUS at 13:34

## 2019-12-07 RX ADMIN — GABAPENTIN 300 MILLIGRAM(S): 400 CAPSULE ORAL at 13:33

## 2019-12-07 RX ADMIN — CLOPIDOGREL BISULFATE 75 MILLIGRAM(S): 75 TABLET, FILM COATED ORAL at 13:33

## 2019-12-07 RX ADMIN — TAMSULOSIN HYDROCHLORIDE 0.4 MILLIGRAM(S): 0.4 CAPSULE ORAL at 21:24

## 2019-12-07 RX ADMIN — GABAPENTIN 300 MILLIGRAM(S): 400 CAPSULE ORAL at 05:14

## 2019-12-07 RX ADMIN — HEPARIN SODIUM 5000 UNIT(S): 5000 INJECTION INTRAVENOUS; SUBCUTANEOUS at 05:15

## 2019-12-07 RX ADMIN — GABAPENTIN 300 MILLIGRAM(S): 400 CAPSULE ORAL at 21:24

## 2019-12-07 RX ADMIN — Medication 50 MILLIGRAM(S): at 05:14

## 2019-12-07 NOTE — PROGRESS NOTE ADULT - SUBJECTIVE AND OBJECTIVE BOX
CARDIOLOGY     PROGRESS  NOTE   ________________________________________________    CHIEF COMPLAINT:Patient is a 89y old  Male who presents with a chief complaint of CHING (06 Dec 2019 15:14)  doing well, no complain.  	  REVIEW OF SYSTEMS:  CONSTITUTIONAL: No fever, weight loss, or fatigue  EYES: No eye pain, visual disturbances, or discharge  ENT:  No difficulty hearing, tinnitus, vertigo; No sinus or throat pain  NECK: No pain or stiffness  RESPIRATORY: No cough, wheezing, chills or hemoptysis; No Shortness of Breath  CARDIOVASCULAR: No chest pain, palpitations, passing out, dizziness, or leg swelling  GASTROINTESTINAL: No abdominal or epigastric pain. No nausea, vomiting, or hematemesis; No diarrhea or constipation. No melena or hematochezia.  GENITOURINARY: No dysuria, frequency, hematuria, or incontinence  NEUROLOGICAL: No headaches, memory loss, loss of strength, numbness, or tremors  SKIN: No itching, burning, rashes, or lesions   LYMPH Nodes: No enlarged glands  ENDOCRINE: No heat or cold intolerance; No hair loss  MUSCULOSKELETAL: No joint pain or swelling; No muscle, back, or extremity pain  PSYCHIATRIC: No depression, anxiety, mood swings, or difficulty sleeping  HEME/LYMPH: No easy bruising, or bleeding gums  ALLERGY AND IMMUNOLOGIC: No hives or eczema	    [ ] All others negative	  [ ] Unable to obtain    PHYSICAL EXAM:  T(C): 36.3 (12-07-19 @ 04:59), Max: 36.5 (12-06-19 @ 18:14)  HR: 69 (12-07-19 @ 04:59) (69 - 71)  BP: 101/57 (12-07-19 @ 04:59) (101/57 - 118/74)  RR: 18 (12-07-19 @ 04:59) (18 - 18)  SpO2: 96% (12-07-19 @ 04:59) (96% - 96%)  Wt(kg): --  I&O's Summary    06 Dec 2019 07:01  -  07 Dec 2019 07:00  --------------------------------------------------------  IN: 230 mL / OUT: 200 mL / NET: 30 mL    07 Dec 2019 07:01  -  07 Dec 2019 09:35  --------------------------------------------------------  IN: 180 mL / OUT: 0 mL / NET: 180 mL        Appearance: Normal	  HEENT:   Normal oral mucosa, PERRL, EOMI	  Lymphatic: No lymphadenopathy  Cardiovascular: Normal S1 S2, No JVD, + murmurs, No edema  Respiratory: Lungs clear to auscultation	  Psychiatry: A & O x 3, Mood & affect appropriate  Gastrointestinal:  Soft, Non-tender, + BS	  Skin: No rashes, No ecchymoses, No cyanosis	  Neurologic: Non-focal  Extremities: Normal range of motion, No clubbing, cyanosis or edema  Vascular: Peripheral pulses palpable 2+ bilaterally    MEDICATIONS  (STANDING):  aspirin enteric coated 81 milliGRAM(s) Oral daily  atorvastatin 40 milliGRAM(s) Oral at bedtime  clopidogrel Tablet 75 milliGRAM(s) Oral daily  DULoxetine 20 milliGRAM(s) Oral daily  famotidine    Tablet 20 milliGRAM(s) Oral daily  ferrous    sulfate 325 milliGRAM(s) Oral daily  furosemide   Injectable 20 milliGRAM(s) IV Push daily  gabapentin 300 milliGRAM(s) Oral three times a day  heparin  Injectable 5000 Unit(s) SubCutaneous every 12 hours  isosorbide   mononitrate ER Tablet (IMDUR) 30 milliGRAM(s) Oral daily  metoprolol succinate ER 50 milliGRAM(s) Oral daily  tamsulosin 0.4 milliGRAM(s) Oral at bedtime      TELEMETRY: 	    ECG:  	  RADIOLOGY:  OTHER: 	  	  LABS:	 	    CARDIAC MARKERS:  CARDIAC MARKERS ( 05 Dec 2019 23:57 )  x     / x     / 139 U/L / x     / 7.9 ng/mL                                9.9    4.41  )-----------( 212      ( 07 Dec 2019 07:18 )             32.0     12-07    145  |  101  |  37<H>  ----------------------------<  93  4.0   |  31  |  1.97<H>    Ca    9.2      07 Dec 2019 07:16    TPro  6.8  /  Alb  4.0  /  TBili  0.3  /  DBili  x   /  AST  20  /  ALT  12  /  AlkPhos  97  12-07    proBNP: Serum Pro-Brain Natriuretic Peptide: 1565 pg/mL (12-05 @ 23:57)  Serum Pro-Brain Natriuretic Peptide: 1690 pg/mL (12-05 @ 20:23)    Lipid Profile:   HgA1c:   TSH:   PT/INR - ( 05 Dec 2019 20:23 )   PT: 11.3 sec;   INR: 0.99 ratio         PTT - ( 05 Dec 2019 20:23 )  PTT:28.8 sec  < from: Xray Chest 1 View AP/PA (12.05.19 @ 21:18) >    FINDINGS:  The cardiacsilhouette is normal in size. There are no focal   consolidations or pleural effusions. The hilar and mediastinal structures   appear unremarkable. The osseous structures are intact.    IMPRESSION: Clear lungs.    < from: Nuclear Stress Test-Pharmacologic (12.06.19 @ 12:33) >  * Chest Pain: No chest pain with administration of  Regadenoson.  * Symptom: Nausea.  * HR Response: Appropriate.  * BP Response: Appropriate.  * Heart Rhythm: Sinus Rhythm - 71 BPM.  * Conduction defects: RBBB + LAFB, 1 degree AV block.  * Q Waves: I  , aVL, V3, V4, V5, V6.  * Baseline ECG: LVH with repolarization abnormalities.  * ECG Abnormalities: No significant ST changes.  * Arrhythmia: None.  * The left ventricle was normal in size. There are large,  moderate to severe defects in apical, inferior and  inferolateral walls that are partially reversible,  suggestive of infarction with at least moderate  long-infarct ischemia.  * There is a large, moderate defect in mid to distal  anteroseptal wall that is fixed, suggestive of infarct.  * TID is calculated at 1.35 which is upper limit of normal  for this protocol.  * Unable to perform prone imaging  * Post-stress gated wall motion analysis was performed  (LVEF = 57 %;LVEDV = 79 ml.), revealing hypokinesis in  anteroseptal, inferior, and inferolateral wall(s).The RV  appears enlarged.    < end of copied text >    < from: Cardiac Cath Lab - Adult (02.15.18 @ 10:19) >  MEDICATIONS GIVEN: Midazolam, 1 mg, IV. Fentanyl, 25 mcg, IV. Fentanyl, 25  mcg, IV. Verapamil (Isoptin, Calan, Covera), 2.4 mg, IA. Nitroglycerin,  200 mcg, intracoronary. Heparin, 2000 units, IA. Heparin, 6000 units, IV.  Normal Saline .9%, infusion rate of 50, IV.  CORONARY VESSELS: The coronary circulation is right dominant.  LM:   --  LM: Angiography showed minor luminal irregularities with no flow  limiting lesions.  LAD:   --  Ostial LAD: There was a 30 % stenosis.  --  Mid LAD: There was a 10 % stenosis at the site of a prior stent.  CX:   --  Mid circumflex: There was a 90 % stenosis.  RCA:   --  RCA: Angiography showed moderate atherosclerosis.  COMPLICATIONS: There were no complications.    < end of copied text >      Assessment and plan  ---------------------------  4 days of shortness of breath, worse with exertion, resolves somewhat with rest, usually sleeps with 2 pillows but has been using 3 lately "to watch tv", has a hx of CHF does have some increased lower extremity swelling, no fevers or chills. Has not been having much chest pain with this, however last time he needed cardiac stenting he did not have chest pain either. Took a baby ASA today. Follows with Dr. Snow / Dr. Perez.  Saw Dr. Iggy Snow today and had an EKG which was less concerning, but referred here for further testing given his hx and risk factors.  ching pt s/p recent cath no sig cad ? chf /hfpef acute on chronic  check pro bnp  iv lasix  cardiac enzyme   stress test result noted the cath in 2018 with 90 % mid cx disease .will disdcuss with dr Lanza if the lesion can be intervened.   continue all cardiac meds  will adjust meds  dc norvasc if decrease bp

## 2019-12-07 NOTE — PROGRESS NOTE ADULT - ASSESSMENT
< from: Nuclear Stress Test-Pharmacologic (12.06.19 @ 12:33) >  IMPRESSIONS:Abnormal Study  * Chest Pain: No chest pain with administration of  Regadenoson.  * Symptom: Nausea.  * HR Response: Appropriate.  * BP Response: Appropriate.  * Heart Rhythm: Sinus Rhythm - 71 BPM.  * Conduction defects: RBBB + LAFB, 1 degree AV block.  * Q Waves: I  , aVL, V3, V4, V5, V6.  * Baseline ECG: LVH with repolarization abnormalities.  * ECG Abnormalities: No significant ST changes.  * Arrhythmia: None.  * The left ventricle was normal in size. There are large,  moderate to severe defects in apical, inferior and  inferolateral walls that are partially reversible,  suggestive of infarction with at least moderate  long-infarct ischemia.  * There is a large, moderate defect in mid to distal  anteroseptal wall that is fixed, suggestive of infarct.  * TID is calculated at 1.35 which is upper limit of normal  for this protocol.  * Unable to perform prone imaging  * Post-stress gated wall motion analysis was performed  (LVEF = 57 %;LVEDV = 79 ml.), revealing hypokinesis in  anteroseptal, inferior, and inferolateral wall(s).The RV  appears enlarged.    < end of copied text >      · Assessment      The patient is an 89-year-old gentleman with the past medical history of coronary artery disease, increased cholesterol, arthritis and CKD stage 4 and AAA pw LOPEZ  At present object data does not suggest CHF and he has no chest pain.  Last echo with preserved EF. now s/p nuclear stress test    1 CVS-s/p nuclear stress test.  For his age is the BP too low?  Based on Hyvet SBP can be higher.    2 Renal CKD stage 4 at baseline;  f/u PTH and Phos levels  3 Anemia-f/u  iron stores. does patient still need iron?

## 2019-12-07 NOTE — PROGRESS NOTE ADULT - SUBJECTIVE AND OBJECTIVE BOX
NEPHROLOGY-[facility] [phone]        NO new complaints. s/p nuclear stress test. stress test result noted the cath in 2018 with 90 % mid cx disease .      MEDICATIONS  (STANDING):  aspirin enteric coated 81 milliGRAM(s) Oral daily  atorvastatin 40 milliGRAM(s) Oral at bedtime  clopidogrel Tablet 75 milliGRAM(s) Oral daily  DULoxetine 20 milliGRAM(s) Oral daily  famotidine    Tablet 20 milliGRAM(s) Oral daily  ferrous    sulfate 325 milliGRAM(s) Oral daily  furosemide   Injectable 20 milliGRAM(s) IV Push daily  gabapentin 300 milliGRAM(s) Oral three times a day  heparin  Injectable 5000 Unit(s) SubCutaneous every 12 hours  isosorbide   mononitrate ER Tablet (IMDUR) 30 milliGRAM(s) Oral daily  metoprolol succinate ER 50 milliGRAM(s) Oral daily  tamsulosin 0.4 milliGRAM(s) Oral at bedtime      VITAL:  T(C): , Max: 36.5 (12-06-19 @ 18:14)  T(F): , Max: 97.7 (12-06-19 @ 18:14)  HR: 78 (12-07-19 @ 13:31)  BP: 99/56 (12-07-19 @ 13:31)  RR: 18 (12-07-19 @ 12:00)  SpO2: 98% (12-07-19 @ 12:00)      I and O's:    12-06 @ 07:01  -  12-07 @ 07:00  --------------------------------------------------------  IN: 230 mL / OUT: 200 mL / NET: 30 mL    12-07 @ 07:01  -  12-07 @ 15:56  --------------------------------------------------------  IN: 360 mL / OUT: 0 mL / NET: 360 mL          PHYSICAL EXAM:    Constitutional: NAD  Neck:  No JVD  Respiratory: CTAB/L  Cardiovascular: S1 and S2  Gastrointestinal: BS+, soft, NT/ND  Extremities: No peripheral edema  Neurological: A/O x 3, no focal deficits  Psychiatric: Normal mood, normal affect  : No Flores  Skin: No rashes  Access: Not applicable    LABS:                        9.9    4.41  )-----------( 212      ( 07 Dec 2019 07:18 )             32.0     12-07    145  |  101  |  37<H>  ----------------------------<  93  4.0   |  31  |  1.97<H>    Ca    9.2      07 Dec 2019 07:16    TPro  6.8  /  Alb  4.0  /  TBili  0.3  /  DBili  x   /  AST  20  /  ALT  12  /  AlkPhos  97  12-07          Urine Studies:          RADIOLOGY & ADDITIONAL STUDIES:

## 2019-12-07 NOTE — PROVIDER CONTACT NOTE (OTHER) - ASSESSMENT
Patient A&Ox4, VSS. HR 91, /42, RR 18, O2 saturation 93% on room air. Patient denies chest pain, lightheadedness, or shortness of breath. Patient is asymptomatic at this time.

## 2019-12-07 NOTE — PROGRESS NOTE ADULT - ASSESSMENT
89   year old M with    PMH of CAD  ,  PCI to LAD and Cx    in 2/2018,     on asa/ plavix/ statin    CKDIII, and HTN ,  hld.  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  h/o falls   ct head, infarct /  anemia/ s/p polup removed      admitted with sob  and positive troponin  not  mi  per  card   from acute  diastolic  chf  on lasix/  asa/ plavix/ norvasc/ toprol/ imdur   stress test  noted/  awaiting eval from martin mobley, if  intervention is  needed?/  as  pt had   cath  in 2018     < from: Nuclear Stress Test-Pharmacologic (12.06.19 @ 12:33) >   The left ventricle was normal in size. There are large,  moderate to severe defects in apical, inferior and  inferolateral walls that are partially reversible,  suggestive of infarction with at least moderate  long-infarct ischemia.  * There is a large, moderate defect in mid to distal  anteroseptal wall that is fixed, suggestive of infarct.  * TID is calculated at 1.35 which is upper limit of normal  for this protocol.  * Unable to perform prone imaging  * Post-stress gated wall motion analysis was performed  (LVEF = 57 %;LVEDV = 79 ml.), revealing hypokinesis in  anteroseptal, inferior, and inferolateral wall(s).The RV  appears enlarged.  < end of copied text >       < from: TTE with Doppler (w/Cont) (12.11.18 @ 13:23) >  Conclusions:  1. Mild concentric left ventricular hypertrophy.  2. Endocardial visualization enhanced with intravenous  injection of Ultrasonic Enhancing Agent (Definity).  Hyperdynamic left ventricle. No obvious wall motion  abnormalities.  3. Reversal of the E-A  waves of the mitral inflow pattern  is consistent with diastolicLV dysfunction.  4. Normal right ventricular size and function.  < end of copied text >       < from: MR Lumbar Spine No Cont (12.10.18 @ 22:57) >  IMPRESSION: Degenerative changes of the lumbar spine with severe spinal   stenosis at L4-5 due to a combination of degenerative facet changes and   grade 1anterolisthesis L4 on L5. Large abdominal aortic aneurys  < end of copied text >     < from: Cardiac Cath Lab - Adult (02.15.18 @ 10:19) >  -  Intervention on mid circumflex: drug-eluting stent.  < end of copied text >

## 2019-12-07 NOTE — PROGRESS NOTE ADULT - SUBJECTIVE AND OBJECTIVE BOX
no cp/sob    REVIEW OF SYSTEMS:  GEN: no fever,    no chills  RESP: no SOB,   no cough  CVS: no chest pain,   no palpitations  GI: no abdominal pain,   no nausea,   no vomiting,   no constipation,   no diarrhea  : no dysuria,   no frequency  NEURO: no headache,   no dizziness  PSYCH: no depression,   not anxious  Derm : no rash    MEDICATIONS  (STANDING):  aspirin enteric coated 81 milliGRAM(s) Oral daily  atorvastatin 40 milliGRAM(s) Oral at bedtime  clopidogrel Tablet 75 milliGRAM(s) Oral daily  DULoxetine 20 milliGRAM(s) Oral daily  famotidine    Tablet 20 milliGRAM(s) Oral daily  ferrous    sulfate 325 milliGRAM(s) Oral daily  furosemide   Injectable 20 milliGRAM(s) IV Push daily  gabapentin 300 milliGRAM(s) Oral three times a day  heparin  Injectable 5000 Unit(s) SubCutaneous every 12 hours  isosorbide   mononitrate ER Tablet (IMDUR) 30 milliGRAM(s) Oral daily  metoprolol succinate ER 50 milliGRAM(s) Oral daily  tamsulosin 0.4 milliGRAM(s) Oral at bedtime    MEDICATIONS  (PRN):      Vital Signs Last 24 Hrs  T(C): 36.3 (07 Dec 2019 04:59), Max: 36.5 (06 Dec 2019 18:14)  T(F): 97.3 (07 Dec 2019 04:59), Max: 97.7 (06 Dec 2019 18:14)  HR: 69 (07 Dec 2019 04:59) (69 - 71)  BP: 101/57 (07 Dec 2019 04:59) (101/57 - 118/74)  BP(mean): --  RR: 18 (07 Dec 2019 04:59) (18 - 18)  SpO2: 96% (07 Dec 2019 04:59) (96% - 96%)  CAPILLARY BLOOD GLUCOSE        I&O's Summary    06 Dec 2019 07:01  -  07 Dec 2019 07:00  --------------------------------------------------------  IN: 230 mL / OUT: 200 mL / NET: 30 mL    07 Dec 2019 07:01  -  07 Dec 2019 09:57  --------------------------------------------------------  IN: 180 mL / OUT: 0 mL / NET: 180 mL        PHYSICAL EXAM:  HEAD:  Atraumatic, Normocephalic  NECK: Supple, No   JVD  CHEST/LUNG:   no     rales,     no,    rhonchi  HEART: Regular rate and rhythm;         murmur  ABDOMEN: Soft, Nontender, ;   EXTREMITIES:   no     edema  NEUROLOGY:  alert    LABS:                        9.9    4.41  )-----------( 212      ( 07 Dec 2019 07:18 )             32.0     12-07    145  |  101  |  37<H>  ----------------------------<  93  4.0   |  31  |  1.97<H>    Ca    9.2      07 Dec 2019 07:16    TPro  6.8  /  Alb  4.0  /  TBili  0.3  /  DBili  x   /  AST  20  /  ALT  12  /  AlkPhos  97  12-07    PT/INR - ( 05 Dec 2019 20:23 )   PT: 11.3 sec;   INR: 0.99 ratio         PTT - ( 05 Dec 2019 20:23 )  PTT:28.8 sec  CARDIAC MARKERS ( 05 Dec 2019 23:57 )  x     / x     / 139 U/L / x     / 7.9 ng/mL                        Consultant(s) Notes Reviewed:      Care Discussed with Consultants/Other Providers:

## 2019-12-08 LAB
ANION GAP SERPL CALC-SCNC: 14 MMOL/L — SIGNIFICANT CHANGE UP (ref 5–17)
BUN SERPL-MCNC: 36 MG/DL — HIGH (ref 7–23)
CALCIUM SERPL-MCNC: 8.8 MG/DL — SIGNIFICANT CHANGE UP (ref 8.4–10.5)
CALCIUM SERPL-MCNC: 8.9 MG/DL — SIGNIFICANT CHANGE UP (ref 8.4–10.5)
CHLORIDE SERPL-SCNC: 101 MMOL/L — SIGNIFICANT CHANGE UP (ref 96–108)
CO2 SERPL-SCNC: 28 MMOL/L — SIGNIFICANT CHANGE UP (ref 22–31)
CREAT SERPL-MCNC: 2.08 MG/DL — HIGH (ref 0.5–1.3)
GLUCOSE SERPL-MCNC: 96 MG/DL — SIGNIFICANT CHANGE UP (ref 70–99)
MAGNESIUM SERPL-MCNC: 2.4 MG/DL — SIGNIFICANT CHANGE UP (ref 1.6–2.6)
PHOSPHATE SERPL-MCNC: 3.3 MG/DL — SIGNIFICANT CHANGE UP (ref 2.5–4.5)
POTASSIUM SERPL-MCNC: 4 MMOL/L — SIGNIFICANT CHANGE UP (ref 3.5–5.3)
POTASSIUM SERPL-SCNC: 4 MMOL/L — SIGNIFICANT CHANGE UP (ref 3.5–5.3)
PTH-INTACT FLD-MCNC: 69 PG/ML — HIGH (ref 15–65)
SODIUM SERPL-SCNC: 143 MMOL/L — SIGNIFICANT CHANGE UP (ref 135–145)

## 2019-12-08 RX ORDER — SODIUM CHLORIDE 9 MG/ML
1000 INJECTION INTRAMUSCULAR; INTRAVENOUS; SUBCUTANEOUS
Refills: 0 | Status: DISCONTINUED | OUTPATIENT
Start: 2019-12-08 | End: 2019-12-11

## 2019-12-08 RX ORDER — ACETAMINOPHEN 500 MG
650 TABLET ORAL ONCE
Refills: 0 | Status: DISCONTINUED | OUTPATIENT
Start: 2019-12-08 | End: 2019-12-08

## 2019-12-08 RX ADMIN — FAMOTIDINE 20 MILLIGRAM(S): 10 INJECTION INTRAVENOUS at 13:03

## 2019-12-08 RX ADMIN — GABAPENTIN 300 MILLIGRAM(S): 400 CAPSULE ORAL at 21:16

## 2019-12-08 RX ADMIN — TAMSULOSIN HYDROCHLORIDE 0.4 MILLIGRAM(S): 0.4 CAPSULE ORAL at 21:16

## 2019-12-08 RX ADMIN — Medication 50 MILLIGRAM(S): at 05:26

## 2019-12-08 RX ADMIN — HEPARIN SODIUM 5000 UNIT(S): 5000 INJECTION INTRAVENOUS; SUBCUTANEOUS at 05:26

## 2019-12-08 RX ADMIN — SODIUM CHLORIDE 50 MILLILITER(S): 9 INJECTION INTRAMUSCULAR; INTRAVENOUS; SUBCUTANEOUS at 10:06

## 2019-12-08 RX ADMIN — Medication 20 MILLIGRAM(S): at 05:26

## 2019-12-08 RX ADMIN — HEPARIN SODIUM 5000 UNIT(S): 5000 INJECTION INTRAVENOUS; SUBCUTANEOUS at 17:29

## 2019-12-08 RX ADMIN — Medication 81 MILLIGRAM(S): at 13:03

## 2019-12-08 RX ADMIN — GABAPENTIN 300 MILLIGRAM(S): 400 CAPSULE ORAL at 13:04

## 2019-12-08 RX ADMIN — DULOXETINE HYDROCHLORIDE 20 MILLIGRAM(S): 30 CAPSULE, DELAYED RELEASE ORAL at 13:04

## 2019-12-08 RX ADMIN — Medication 325 MILLIGRAM(S): at 13:04

## 2019-12-08 RX ADMIN — ISOSORBIDE MONONITRATE 30 MILLIGRAM(S): 60 TABLET, EXTENDED RELEASE ORAL at 13:04

## 2019-12-08 RX ADMIN — GABAPENTIN 300 MILLIGRAM(S): 400 CAPSULE ORAL at 05:26

## 2019-12-08 RX ADMIN — ATORVASTATIN CALCIUM 40 MILLIGRAM(S): 80 TABLET, FILM COATED ORAL at 21:16

## 2019-12-08 RX ADMIN — CLOPIDOGREL BISULFATE 75 MILLIGRAM(S): 75 TABLET, FILM COATED ORAL at 13:04

## 2019-12-08 NOTE — PROGRESS NOTE ADULT - ASSESSMENT
89   year old M with    PMH of CAD  ,  PCI to LAD and Cx    in 2/2018,     on asa/ plavix/ statin    CKDIII, and HTN ,  hld.  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  h/o falls   ct head, infarct /  anemia/ s/p polup removed      admitted with sob  and positive troponin  not  mi  per  card   from acute  diastolic  chf  on lasix/  asa/ plavix/ norvasc/ toprol/ imdur   stress test  noted/  awaiting eval from martin mobley, if  intervention is  needed?/  as  pt had   cath  in 2018  pt ha s no complaints now     < from: Nuclear Stress Test-Pharmacologic (12.06.19 @ 12:33) >   The left ventricle was normal in size. There are large,  moderate to severe defects in apical, inferior and  inferolateral walls that are partially reversible,  suggestive of infarction with at least moderate  long-infarct ischemia.  * There is a large, moderate defect in mid to distal  anteroseptal wall that is fixed, suggestive of infarct.  * TID is calculated at 1.35 which is upper limit of normal  for this protocol.  * Unable to perform prone imaging  * Post-stress gated wall motion analysis was performed  (LVEF = 57 %;LVEDV = 79 ml.), revealing hypokinesis in  anteroseptal, inferior, and inferolateral wall(s).The RV  appears enlarged.  < end of copied text >       < from: TTE with Doppler (w/Cont) (12.11.18 @ 13:23) >  Conclusions:  1. Mild concentric left ventricular hypertrophy.  2. Endocardial visualization enhanced with intravenous  injection of Ultrasonic Enhancing Agent (Definity).  Hyperdynamic left ventricle. No obvious wall motion  abnormalities.  3. Reversal of the E-A  waves of the mitral inflow pattern  is consistent with diastolicLV dysfunction.  4. Normal right ventricular size and function.  < end of copied text >       < from: MR Lumbar Spine No Cont (12.10.18 @ 22:57) >  IMPRESSION: Degenerative changes of the lumbar spine with severe spinal   stenosis at L4-5 due to a combination of degenerative facet changes and   grade 1anterolisthesis L4 on L5. Large abdominal aortic aneurys  < end of copied text >     < from: Cardiac Cath Lab - Adult (02.15.18 @ 10:19) >  -  Intervention on mid circumflex: drug-eluting stent.  < end of copied text >

## 2019-12-08 NOTE — PROGRESS NOTE ADULT - SUBJECTIVE AND OBJECTIVE BOX
no  cp/sob  REVIEW OF SYSTEMS:  GEN: no fever,    no chills  RESP: no SOB,   no cough  CVS: no chest pain,   no palpitations  GI: no abdominal pain,   no nausea,   no vomiting,   no constipation,   no diarrhea  : no dysuria,   no frequency  NEURO: no headache,   no dizziness  PSYCH: no depression,   not anxious  Derm : no rash    MEDICATIONS  (STANDING):  aspirin enteric coated 81 milliGRAM(s) Oral daily  atorvastatin 40 milliGRAM(s) Oral at bedtime  clopidogrel Tablet 75 milliGRAM(s) Oral daily  DULoxetine 20 milliGRAM(s) Oral daily  famotidine    Tablet 20 milliGRAM(s) Oral daily  ferrous    sulfate 325 milliGRAM(s) Oral daily  furosemide   Injectable 20 milliGRAM(s) IV Push daily  gabapentin 300 milliGRAM(s) Oral three times a day  heparin  Injectable 5000 Unit(s) SubCutaneous every 12 hours  isosorbide   mononitrate ER Tablet (IMDUR) 30 milliGRAM(s) Oral daily  metoprolol succinate ER 50 milliGRAM(s) Oral daily  tamsulosin 0.4 milliGRAM(s) Oral at bedtime    MEDICATIONS  (PRN):      Vital Signs Last 24 Hrs  T(C): 36.3 (08 Dec 2019 05:04), Max: 36.4 (07 Dec 2019 21:13)  T(F): 97.3 (08 Dec 2019 05:04), Max: 97.6 (07 Dec 2019 21:13)  HR: 65 (08 Dec 2019 05:04) (63 - 91)  BP: 120/64 (08 Dec 2019 05:04) (99/56 - 120/64)  BP(mean): --  RR: 18 (08 Dec 2019 05:04) (18 - 18)  SpO2: 96% (08 Dec 2019 05:04) (93% - 98%)  CAPILLARY BLOOD GLUCOSE        I&O's Summary    07 Dec 2019 07:01  -  08 Dec 2019 07:00  --------------------------------------------------------  IN: 360 mL / OUT: 350 mL / NET: 10 mL    08 Dec 2019 07:01  -  08 Dec 2019 09:38  --------------------------------------------------------  IN: 180 mL / OUT: 300 mL / NET: -120 mL        PHYSICAL EXAM:  HEAD:  Atraumatic, Normocephalic  NECK: Supple, No   JVD  CHEST/LUNG:   no     rales,     no,    rhonchi  HEART: Regular rate and rhythm;         murmur  ABDOMEN: Soft, Nontender, ;   EXTREMITIES:    no    edema  NEUROLOGY:  alert    LABS:                        9.9    4.41  )-----------( 212      ( 07 Dec 2019 07:18 )             32.0     12-08    143  |  101  |  36<H>  ----------------------------<  96  4.0   |  28  |  2.08<H>    Ca    8.9      08 Dec 2019 07:00  Phos  3.3     12-08  Mg     2.4     12-08    TPro  6.8  /  Alb  4.0  /  TBili  0.3  /  DBili  x   /  AST  20  /  ALT  12  /  AlkPhos  97  12-07                            Consultant(s) Notes Reviewed:      Care Discussed with Consultants/Other Providers:

## 2019-12-08 NOTE — PROGRESS NOTE ADULT - SUBJECTIVE AND OBJECTIVE BOX
CARDIOLOGY     PROGRESS  NOTE   ________________________________________________    CHIEF COMPLAINT:Patient is a 89y old  Male who presents with a chief complaint of CHING (08 Dec 2019 09:38)  doing better.  	  REVIEW OF SYSTEMS:  CONSTITUTIONAL: No fever, weight loss, or fatigue  EYES: No eye pain, visual disturbances, or discharge  ENT:  No difficulty hearing, tinnitus, vertigo; No sinus or throat pain  NECK: No pain or stiffness  RESPIRATORY: No cough, wheezing, chills or hemoptysis; + Shortness of Breath  CARDIOVASCULAR: No chest pain, palpitations, passing out, dizziness, or leg swelling  GASTROINTESTINAL: No abdominal or epigastric pain. No nausea, vomiting, or hematemesis; No diarrhea or constipation. No melena or hematochezia.  GENITOURINARY: No dysuria, frequency, hematuria, or incontinence  NEUROLOGICAL: No headaches, memory loss, loss of strength, numbness, or tremors  SKIN: No itching, burning, rashes, or lesions   LYMPH Nodes: No enlarged glands  ENDOCRINE: No heat or cold intolerance; No hair loss  MUSCULOSKELETAL: No joint pain or swelling; No muscle, back, or extremity pain  PSYCHIATRIC: No depression, anxiety, mood swings, or difficulty sleeping  HEME/LYMPH: No easy bruising, or bleeding gums  ALLERGY AND IMMUNOLOGIC: No hives or eczema	    [ ] All others negative	  [ ] Unable to obtain    PHYSICAL EXAM:  T(C): 36.3 (12-08-19 @ 05:04), Max: 36.4 (12-07-19 @ 21:13)  HR: 65 (12-08-19 @ 05:04) (63 - 91)  BP: 120/64 (12-08-19 @ 05:04) (99/56 - 120/64)  RR: 18 (12-08-19 @ 05:04) (18 - 18)  SpO2: 96% (12-08-19 @ 05:04) (93% - 98%)  Wt(kg): --  I&O's Summary    07 Dec 2019 07:01  -  08 Dec 2019 07:00  --------------------------------------------------------  IN: 360 mL / OUT: 350 mL / NET: 10 mL    08 Dec 2019 07:01  -  08 Dec 2019 09:40  --------------------------------------------------------  IN: 180 mL / OUT: 300 mL / NET: -120 mL        Appearance: Normal	  HEENT:   Normal oral mucosa, PERRL, EOMI	  Lymphatic: No lymphadenopathy  Cardiovascular: Normal S1 S2, No JVD, + murmurs, No edema  Respiratory: Lungs clear to auscultation	  Psychiatry: A & O x 3, Mood & affect appropriate  Gastrointestinal:  Soft, Non-tender, + BS	  Skin: No rashes, No ecchymoses, No cyanosis	  Neurologic: Non-focal  Extremities: Normal range of motion, No clubbing, cyanosis or edema  Vascular: Peripheral pulses palpable 2+ bilaterally    MEDICATIONS  (STANDING):  aspirin enteric coated 81 milliGRAM(s) Oral daily  atorvastatin 40 milliGRAM(s) Oral at bedtime  clopidogrel Tablet 75 milliGRAM(s) Oral daily  DULoxetine 20 milliGRAM(s) Oral daily  famotidine    Tablet 20 milliGRAM(s) Oral daily  ferrous    sulfate 325 milliGRAM(s) Oral daily  furosemide   Injectable 20 milliGRAM(s) IV Push daily  gabapentin 300 milliGRAM(s) Oral three times a day  heparin  Injectable 5000 Unit(s) SubCutaneous every 12 hours  isosorbide   mononitrate ER Tablet (IMDUR) 30 milliGRAM(s) Oral daily  metoprolol succinate ER 50 milliGRAM(s) Oral daily  tamsulosin 0.4 milliGRAM(s) Oral at bedtime      TELEMETRY: 	    ECG:  	  RADIOLOGY:  OTHER: 	  	  LABS:	 	    CARDIAC MARKERS:                                9.9    4.41  )-----------( 212      ( 07 Dec 2019 07:18 )             32.0     12-08    143  |  101  |  36<H>  ----------------------------<  96  4.0   |  28  |  2.08<H>    Ca    8.9      08 Dec 2019 07:00  Phos  3.3     12-08  Mg     2.4     12-08    TPro  6.8  /  Alb  4.0  /  TBili  0.3  /  DBili  x   /  AST  20  /  ALT  12  /  AlkPhos  97  12-07    proBNP: Serum Pro-Brain Natriuretic Peptide: 1565 pg/mL (12-05 @ 23:57)  Serum Pro-Brain Natriuretic Peptide: 1690 pg/mL (12-05 @ 20:23)    Lipid Profile:   HgA1c:   TSH:   < from: Nuclear Stress Test-Pharmacologic (12.06.19 @ 12:33) >  * Chest Pain: No chest pain with administration of  Regadenoson.  * Symptom: Nausea.  * HR Response: Appropriate.  * BP Response: Appropriate.  * Heart Rhythm: Sinus Rhythm - 71 BPM.  * Conduction defects: RBBB + LAFB, 1 degree AV block.  * Q Waves: I  , aVL, V3, V4, V5, V6.  * Baseline ECG: LVH with repolarization abnormalities.  * ECG Abnormalities: No significant ST changes.  * Arrhythmia: None.  * The left ventricle was normal in size. There are large,  moderate to severe defects in apical, inferior and  inferolateral walls that are partially reversible,  suggestive of infarction with at least moderate  long-infarct ischemia.  * There is a large, moderate defect in mid to distal  anteroseptal wall that is fixed, suggestive of infarct.  * TID is calculated at 1.35 which is upper limit of normal  for this protocol.  * Unable to perform prone imaging  * Post-stress gated wall motion analysis was performed  (LVEF = 57 %;LVEDV = 79 ml.), revealing hypokinesis in  anteroseptal, inferior, and inferolateral wall(s).The RV  appears enlarged.    < end of copied text >        Assessment and plan  ---------------------------  4 days of shortness of breath, worse with exertion, resolves somewhat with rest, usually sleeps with 2 pillows but has been using 3 lately "to watch tv", has a hx of CHF does have some increased lower extremity swelling, no fevers or chills. Has not been having much chest pain with this, however last time he needed cardiac stenting he did not have chest pain either. Took a baby ASA today. Follows with Dr. Snow / Dr. Perez.  Saw Dr. Iggy Snow today and had an EKG which was less concerning, but referred here for further testing given his hx and risk factors.  ching pt s/p recent cath no sig cad ? chf /hfpef acute on chronic  check pro bnp  hold lasix, increase renal function  cardiac enzyme   stress test result noted the cath in 2018 with 90 % mid cx disease .will discuss with dr Lanza if the lesion can be intervened.   continue all cardiac meds  will adjust meds  dc norvasc if decrease bp  discussed with pt he feels this is his angina

## 2019-12-09 LAB
ANION GAP SERPL CALC-SCNC: 12 MMOL/L — SIGNIFICANT CHANGE UP (ref 5–17)
BUN SERPL-MCNC: 32 MG/DL — HIGH (ref 7–23)
CALCIUM SERPL-MCNC: 9 MG/DL — SIGNIFICANT CHANGE UP (ref 8.4–10.5)
CHLORIDE SERPL-SCNC: 102 MMOL/L — SIGNIFICANT CHANGE UP (ref 96–108)
CO2 SERPL-SCNC: 29 MMOL/L — SIGNIFICANT CHANGE UP (ref 22–31)
CREAT SERPL-MCNC: 1.97 MG/DL — HIGH (ref 0.5–1.3)
GLUCOSE SERPL-MCNC: 105 MG/DL — HIGH (ref 70–99)
HCT VFR BLD CALC: 28.4 % — LOW (ref 39–50)
HGB BLD-MCNC: 8.9 G/DL — LOW (ref 13–17)
MCHC RBC-ENTMCNC: 29.7 PG — SIGNIFICANT CHANGE UP (ref 27–34)
MCHC RBC-ENTMCNC: 31.3 GM/DL — LOW (ref 32–36)
MCV RBC AUTO: 94.7 FL — SIGNIFICANT CHANGE UP (ref 80–100)
NRBC # BLD: 0 /100 WBCS — SIGNIFICANT CHANGE UP (ref 0–0)
PLATELET # BLD AUTO: 203 K/UL — SIGNIFICANT CHANGE UP (ref 150–400)
POTASSIUM SERPL-MCNC: 3.7 MMOL/L — SIGNIFICANT CHANGE UP (ref 3.5–5.3)
POTASSIUM SERPL-SCNC: 3.7 MMOL/L — SIGNIFICANT CHANGE UP (ref 3.5–5.3)
RBC # BLD: 3 M/UL — LOW (ref 4.2–5.8)
RBC # FLD: 14 % — SIGNIFICANT CHANGE UP (ref 10.3–14.5)
SODIUM SERPL-SCNC: 143 MMOL/L — SIGNIFICANT CHANGE UP (ref 135–145)
WBC # BLD: 3.73 K/UL — LOW (ref 3.8–10.5)
WBC # FLD AUTO: 3.73 K/UL — LOW (ref 3.8–10.5)

## 2019-12-09 PROCEDURE — 93454 CORONARY ARTERY ANGIO S&I: CPT | Mod: 26

## 2019-12-09 PROCEDURE — 99152 MOD SED SAME PHYS/QHP 5/>YRS: CPT

## 2019-12-09 RX ORDER — SODIUM CHLORIDE 9 MG/ML
1000 INJECTION INTRAMUSCULAR; INTRAVENOUS; SUBCUTANEOUS
Refills: 0 | Status: DISCONTINUED | OUTPATIENT
Start: 2019-12-09 | End: 2019-12-10

## 2019-12-09 RX ORDER — ERYTHROPOIETIN 10000 [IU]/ML
10000 INJECTION, SOLUTION INTRAVENOUS; SUBCUTANEOUS ONCE
Refills: 0 | Status: COMPLETED | OUTPATIENT
Start: 2019-12-09 | End: 2019-12-09

## 2019-12-09 RX ADMIN — Medication 50 MILLIGRAM(S): at 05:40

## 2019-12-09 RX ADMIN — SODIUM CHLORIDE 60 MILLILITER(S): 9 INJECTION INTRAMUSCULAR; INTRAVENOUS; SUBCUTANEOUS at 08:30

## 2019-12-09 RX ADMIN — GABAPENTIN 300 MILLIGRAM(S): 400 CAPSULE ORAL at 21:16

## 2019-12-09 RX ADMIN — HEPARIN SODIUM 5000 UNIT(S): 5000 INJECTION INTRAVENOUS; SUBCUTANEOUS at 05:40

## 2019-12-09 RX ADMIN — Medication 325 MILLIGRAM(S): at 11:22

## 2019-12-09 RX ADMIN — Medication 81 MILLIGRAM(S): at 11:22

## 2019-12-09 RX ADMIN — CLOPIDOGREL BISULFATE 75 MILLIGRAM(S): 75 TABLET, FILM COATED ORAL at 11:22

## 2019-12-09 RX ADMIN — ISOSORBIDE MONONITRATE 30 MILLIGRAM(S): 60 TABLET, EXTENDED RELEASE ORAL at 11:22

## 2019-12-09 RX ADMIN — ERYTHROPOIETIN 10000 UNIT(S): 10000 INJECTION, SOLUTION INTRAVENOUS; SUBCUTANEOUS at 18:16

## 2019-12-09 RX ADMIN — TAMSULOSIN HYDROCHLORIDE 0.4 MILLIGRAM(S): 0.4 CAPSULE ORAL at 21:16

## 2019-12-09 RX ADMIN — ATORVASTATIN CALCIUM 40 MILLIGRAM(S): 80 TABLET, FILM COATED ORAL at 21:16

## 2019-12-09 RX ADMIN — FAMOTIDINE 20 MILLIGRAM(S): 10 INJECTION INTRAVENOUS at 11:22

## 2019-12-09 RX ADMIN — GABAPENTIN 300 MILLIGRAM(S): 400 CAPSULE ORAL at 05:40

## 2019-12-09 RX ADMIN — DULOXETINE HYDROCHLORIDE 20 MILLIGRAM(S): 30 CAPSULE, DELAYED RELEASE ORAL at 11:22

## 2019-12-09 RX ADMIN — GABAPENTIN 300 MILLIGRAM(S): 400 CAPSULE ORAL at 13:25

## 2019-12-09 NOTE — PROGRESS NOTE ADULT - SUBJECTIVE AND OBJECTIVE BOX
CARDIOLOGY     PROGRESS  NOTE   ________________________________________________    CHIEF COMPLAINT:Patient is a 89y old  Male who presents with a chief complaint of CHING (08 Dec 2019 09:40)    	  REVIEW OF SYSTEMS:  CONSTITUTIONAL: No fever, weight loss, or fatigue  EYES: No eye pain, visual disturbances, or discharge  ENT:  No difficulty hearing, tinnitus, vertigo; No sinus or throat pain  NECK: No pain or stiffness  RESPIRATORY: No cough, wheezing, chills or hemoptysis; No Shortness of Breath  CARDIOVASCULAR: No chest pain, palpitations, passing out, dizziness, or leg swelling  GASTROINTESTINAL: No abdominal or epigastric pain. No nausea, vomiting, or hematemesis; No diarrhea or constipation. No melena or hematochezia.  GENITOURINARY: No dysuria, frequency, hematuria, or incontinence  NEUROLOGICAL: No headaches, memory loss, loss of strength, numbness, or tremors  SKIN: No itching, burning, rashes, or lesions   LYMPH Nodes: No enlarged glands  ENDOCRINE: No heat or cold intolerance; No hair loss  MUSCULOSKELETAL: No joint pain or swelling; No muscle, back, or extremity pain  PSYCHIATRIC: No depression, anxiety, mood swings, or difficulty sleeping  HEME/LYMPH: No easy bruising, or bleeding gums  ALLERGY AND IMMUNOLOGIC: No hives or eczema	    [ ] All others negative	  [ ] Unable to obtain    PHYSICAL EXAM:  T(C): 37 (12-09-19 @ 04:50), Max: 37 (12-09-19 @ 04:50)  HR: 71 (12-09-19 @ 04:50) (57 - 71)  BP: 108/56 (12-09-19 @ 04:50) (108/56 - 150/76)  RR: 18 (12-09-19 @ 04:50) (18 - 18)  SpO2: 96% (12-09-19 @ 04:50) (96% - 97%)  Wt(kg): --  I&O's Summary    08 Dec 2019 07:01  -  09 Dec 2019 07:00  --------------------------------------------------------  IN: 1500 mL / OUT: 770 mL / NET: 730 mL        Appearance: Normal	  HEENT:   Normal oral mucosa, PERRL, EOMI	  Lymphatic: No lymphadenopathy  Cardiovascular: Normal S1 S2, No JVD, + murmurs, No edema  Respiratory: Lungs clear to auscultation	  Psychiatry: A & O x 3, Mood & affect appropriate  Gastrointestinal:  Soft, Non-tender, + BS	  Skin: No rashes, No ecchymoses, No cyanosis	  Neurologic: Non-focal  Extremities: Normal range of motion, No clubbing, cyanosis or edema  Vascular: Peripheral pulses palpable 2+ bilaterally    MEDICATIONS  (STANDING):  aspirin enteric coated 81 milliGRAM(s) Oral daily  atorvastatin 40 milliGRAM(s) Oral at bedtime  clopidogrel Tablet 75 milliGRAM(s) Oral daily  DULoxetine 20 milliGRAM(s) Oral daily  famotidine    Tablet 20 milliGRAM(s) Oral daily  ferrous    sulfate 325 milliGRAM(s) Oral daily  gabapentin 300 milliGRAM(s) Oral three times a day  heparin  Injectable 5000 Unit(s) SubCutaneous every 12 hours  isosorbide   mononitrate ER Tablet (IMDUR) 30 milliGRAM(s) Oral daily  metoprolol succinate ER 50 milliGRAM(s) Oral daily  sodium chloride 0.9%. 1000 milliLiter(s) (50 mL/Hr) IV Continuous <Continuous>  tamsulosin 0.4 milliGRAM(s) Oral at bedtime      TELEMETRY: 	    ECG:  	  RADIOLOGY:  OTHER: 	  	  LABS:	 	    CARDIAC MARKERS:                                8.9    3.73  )-----------( 203      ( 09 Dec 2019 06:50 )             28.4     12-09    143  |  102  |  32<H>  ----------------------------<  105<H>  3.7   |  29  |  1.97<H>    Ca    9.0      09 Dec 2019 06:44  Phos  3.3     12-08  Mg     2.4     12-08      proBNP: Serum Pro-Brain Natriuretic Peptide: 1565 pg/mL (12-05 @ 23:57)  Serum Pro-Brain Natriuretic Peptide: 1690 pg/mL (12-05 @ 20:23)    Lipid Profile:   HgA1c:   TSH:         Assessment and plan  ---------------------------  4 days of shortness of breath, worse with exertion, resolves somewhat with rest, usually sleeps with 2 pillows but has been using 3 lately "to watch tv", has a hx of CHF does have some increased lower extremity swelling, no fevers or chills. Has not been having much chest pain with this, however last time he needed cardiac stenting he did not have chest pain either. Took a baby ASA today. Follows with Dr. Snow / Dr. Perez.  Saw Dr. Iggy Snow today and had an EKG which was less concerning, but referred here for further testing given his hx and risk factors.  ching pt s/p recent cath no sig cad ? chf /hfpef acute on chronic  check pro bnp  hold lasix, increase renal function  cardiac enzyme   stress test result noted the cath in 2018 with 90 % mid cx disease .will discuss with dr Lanza if the lesion can be intervened.   continue all cardiac meds  will adjust meds  dc norvasc if decrease bp  discussed with pt he feels this is his angina, spoke to Dr Lanza, cath today  will start iv hydration sec to renal insuffincy

## 2019-12-09 NOTE — PROGRESS NOTE ADULT - SUBJECTIVE AND OBJECTIVE BOX
Male  Patient is a 89y old  Male who presents with a chief complaint of CHING (09 Dec 2019 07:32)      HPI:  CHIEF COMPLAINT:Patient is a 89y old  Male who presents with a chief complaint of ching    HPI:  4 days of shortness of breath, worse with exertion, resolves somewhat with rest, usually sleeps with 2 pillows but has been using 3 lately "to watch tv", has a hx of CHF does have some increased lower extremity swelling, no fevers or chills. Has not been having much chest pain with this, however last time he needed cardiac stenting he did not have chest pain either. Took a baby ASA today. Follows with Dr. Snow / Dr. Perez.  Saw Dr. Iggy Snow today and had an EKG which was less concerning, but referred here for further testing given his hx and risk factors.    Medicine attending    Appreciate Dr. Oswald's care.  Sleeping quietly w/o complaint or distress    PAST MEDICAL & SURGICAL HISTORY:  AAA (abdominal aortic aneurysm) without rupture  PAD (peripheral artery disease)  OA (osteoarthritis)  CAD (coronary artery disease)  HLD (hyperlipidemia)  CKD (chronic kidney disease)  HTN (hypertension)  Stented coronary artery  Colon polyp  BPH (benign prostatic hyperplasia)  Cellulitis: BL  CKD (chronic kidney disease) stage 3, GFR 30-59 ml/min  Leg swelling: related to cellulitis of LLE  No significant past medical history  History of coronary artery stent placement  History of colonoscopy  H/O inguinal hernia: repair  S/P hemorrhoidectomy      MEDICATIONS  (LAURA    	        [ ] All others negative	  [ ] Unable to obtain    PHYSICAL EXAM:  T(C): 36.6 (19 @ 20:16), Max: 36.6 (19 @ 20:16)  HR: 77 (19 @ 20:16) (77 - 85)  BP: 159/82 (19 @ 20:16) (116/75 - 159/82)  RR: 18 (19 @ 20:16) (18 - 18)  SpO2: 99% (19 @ 20:16) (99% - 99%)  Wt(kg): --  I&O's Summary      Appearance: Normal	  HEENT:   Normal oral mucosa, PERRL, EOMI	  Lymphatic: No lymphadenopathy  Cardiovascular: Normal S1 S2, No JVD, + murmurs, No edema  Respiratory: Lungs clear to auscultation	  Psychiatry: A & O x 3, Mood & affect appropriate  Gastrointestinal:  Soft, Non-tender, + BS	  Skin: No rashes, No ecchymoses, No cyanosis	  Neurologic: Non-focal  Extremities: Normal range of motion, No clubbing, cyanosis or edema  Vascular: Peripheral pulses palpable 2+ bilateral	                PREVIOUS DIAGNOSTIC TESTING:      < from: 12 Lead ECG (19 @ 21:38) >  Diagnosis Line Sinus rhythm with 1st degree A-V block  Right bundle branch block  Left anterior fascicular block  Anterolateral infarct , age undetermined  Abnormal ECG  No previous ECGs available  Confirmed by Mehrdad Boland ([id number]) on 7/3/2019 10:40:16 AM    < from: TTE with Doppler (w/Cont) (18 @ 13:23) >  1. Mild concentric left ventricular hypertrophy.  2. Endocardial visualization enhanced with intravenous  injection of Ultrasonic Enhancing Agent (Definity).  Hyperdynamic left ventricle. No obvious wall motion  abnormalities.  3. Reversal of the E-A  waves of the mitral inflow pattern  is consistent with diastolicLV dysfunction.  4. Normal right ventricular size and function.    < from: Cardiac Cath Lab - Adult (02.15.18 @ 10:19) >  CORONARY VESSELS: The coronary circulation is right dominant.  LM:   --  LM: Angiography showed minor luminal irregularities with no flow  limiting lesions.  LAD:   --  Ostial LAD: There was a 30 % stenosis.  --  Mid LAD: There was a 10 % stenosis at the site of a prior stent.  CX:   --  Mid circumflex: There was a 90 % stenosis.  RCA:   --  RCA: Angiography showed moderate atherosclerosis.  COMPLICATIONS: There were no complications.  DIAGNOSTIC RECOMMENDATIONS: IFR was 0.89  ASA and Plavix for 1 year      < from: Xray Chest 1 View AP/PA (19 @ 19:37) >  Lungs are clear. No pleural effusion or pneumothorax.   Cardiac size cannot be accurately assessed in this projection.     IMPRESSION:   Clear lungs      < from: TTE with Doppler (w/Cont) (18 @ 13:23) >  Conclusions:  1. Mild concentric left ventricular hypertrophy.  2. Endocardial visualization enhanced with intravenous  injection of Ultrasonic Enhancing Agent (Definity).  Hyperdynamic left ventricle. No obvious wall motion  abnormalities.  3. Reversal of the E-A  waves of the mitral inflow pattern  is consistent with diastolicLV dysfunction.  4. Normal right ventricular size and function.    < end of copied text > (05 Dec 2019 20:21)          Vital Signs Last 24 Hrs  T(C): 37 (09 Dec 2019 04:50), Max: 37 (09 Dec 2019 04:50)  T(F): 98.6 (09 Dec 2019 04:50), Max: 98.6 (09 Dec 2019 04:50)  HR: 71 (09 Dec 2019 04:50) (57 - 71)  BP: 108/56 (09 Dec 2019 04:50) (108/56 - 150/76)  BP(mean): --  RR: 18 (09 Dec 2019 04:50) (18 - 18)  SpO2: 96% (09 Dec 2019 04:50) (96% - 97%)  Daily     Daily Weight in k.4 (09 Dec 2019 04:50)     @ 07:01  -   @ 07:00  --------------------------------------------------------  IN: 1500 mL / OUT: 770 mL / NET: 730 mL                                        8.9    3.73  )-----------( 203      ( 09 Dec 2019 06:50 )             28.4         143  |  102  |  32<H>  ----------------------------<  105<H>  3.7   |  29  |  1.97<H>    Ca    9.0      09 Dec 2019 06:44  Phos  3.3       Mg     2.4                 MEDICATIONS  (STANDING):  aspirin enteric coated 81 milliGRAM(s) Oral daily  atorvastatin 40 milliGRAM(s) Oral at bedtime  clopidogrel Tablet 75 milliGRAM(s) Oral daily  DULoxetine 20 milliGRAM(s) Oral daily  famotidine    Tablet 20 milliGRAM(s) Oral daily  ferrous    sulfate 325 milliGRAM(s) Oral daily  gabapentin 300 milliGRAM(s) Oral three times a day  heparin  Injectable 5000 Unit(s) SubCutaneous every 12 hours  isosorbide   mononitrate ER Tablet (IMDUR) 30 milliGRAM(s) Oral daily  metoprolol succinate ER 50 milliGRAM(s) Oral daily  sodium chloride 0.9%. 1000 milliLiter(s) (50 mL/Hr) IV Continuous <Continuous>  sodium chloride 0.9%. 1000 milliLiter(s) (60 mL/Hr) IV Continuous <Continuous>  tamsulosin 0.4 milliGRAM(s) Oral at bedtime    Assessment and plan  ---------------------------  4 days of shortness of breath, worse with exertion, resolves somewhat with rest, usually sleeps with 2 pillows but has been using 3 lately "to watch tv", has a hx of CHF does have some increased lower extremity swelling, no fevers or chills. Has not been having much chest pain with this, however last time he needed cardiac stenting he did not have chest pain either. Took a baby ASA today. Follows with Dr. Snow / Dr. Perez.  Saw Dr. Iggy Snow today and had an EKG which was less concerning, but referred here for further testing given his hx and risk factors.  ching pt s/p recent cath no sig cad ? chf /hfpef acute on chronic  check pro bnp  hold lasix, increase renal function  cardiac enzyme   stress test result noted the cath in 2018 with 90 % mid cx disease .will discuss with dr Lanza if the lesion can be intervened.   continue all cardiac meds  will adjust meds  dc norvasc if decrease bp  discussed with pt he feels this is his angina, spoke to Dr Lanza, cath today  will start iv hydration sec to renal insuffincy        Deon Perez MD  [phone]

## 2019-12-09 NOTE — PROGRESS NOTE ADULT - SUBJECTIVE AND OBJECTIVE BOX
[facility] [phone]        Patient seen and examined in bed.  He stated that he felt well  Stress test had reversible areas          MEDICATIONS  (STANDING):  aspirin enteric coated 81 milliGRAM(s) Oral daily  atorvastatin 40 milliGRAM(s) Oral at bedtime  clopidogrel Tablet 75 milliGRAM(s) Oral daily  DULoxetine 20 milliGRAM(s) Oral daily  famotidine    Tablet 20 milliGRAM(s) Oral daily  ferrous    sulfate 325 milliGRAM(s) Oral daily  gabapentin 300 milliGRAM(s) Oral three times a day  heparin  Injectable 5000 Unit(s) SubCutaneous every 12 hours  isosorbide   mononitrate ER Tablet (IMDUR) 30 milliGRAM(s) Oral daily  metoprolol succinate ER 50 milliGRAM(s) Oral daily  sodium chloride 0.9%. 1000 milliLiter(s) (50 mL/Hr) IV Continuous <Continuous>  sodium chloride 0.9%. 1000 milliLiter(s) (60 mL/Hr) IV Continuous <Continuous>  tamsulosin 0.4 milliGRAM(s) Oral at bedtime      VITAL:  T(C): , Max: 37 (12-09-19 @ 04:50)  T(F): , Max: 98.6 (12-09-19 @ 04:50)  HR: 71 (12-09-19 @ 12:20)  BP: 114/62 (12-09-19 @ 12:20)  BP(mean): --  RR: 18 (12-09-19 @ 12:20)  SpO2: 96% (12-09-19 @ 12:20)  Wt(kg): --    I and O's:    12-08 @ 07:01  -  12-09 @ 07:00  --------------------------------------------------------  IN: 1500 mL / OUT: 770 mL / NET: 730 mL    12-09 @ 07:01  -  12-09 @ 14:29  --------------------------------------------------------  IN: 140 mL / OUT: 200 mL / NET: -60 mL          PHYSICAL EXAM:    Constitutional: NAD  Neck:  No JVD  Respiratory: CTAB/L  Cardiovascular: S1 and S2  Gastrointestinal: BS+, soft, NT/ND  Extremities: No peripheral edema  Neurological: A/O x 3, no focal deficits  Psychiatric: Normal mood, normal affect  : No Flores  Skin: No rashes  Access: Not applicable    LABS:                        8.9    3.73  )-----------( 203      ( 09 Dec 2019 06:50 )             28.4     12-09    143  |  102  |  32<H>  ----------------------------<  105<H>  3.7   |  29  |  1.97<H>    Ca    9.0      09 Dec 2019 06:44  Phos  3.3     12-08  Mg     2.4     12-08            Urine Studies:          RADIOLOGY & ADDITIONAL STUDIES:

## 2019-12-09 NOTE — PROGRESS NOTE ADULT - ASSESSMENT
< from: Nuclear Stress Test-Pharmacologic (12.06.19 @ 12:33) >  IMPRESSIONS:Abnormal Study  * Chest Pain: No chest pain with administration of  Regadenoson.  * Symptom: Nausea.  * HR Response: Appropriate.  * BP Response: Appropriate.  * Heart Rhythm: Sinus Rhythm - 71 BPM.  * Conduction defects: RBBB + LAFB, 1 degree AV block.  * Q Waves: I  , aVL, V3, V4, V5, V6.  * Baseline ECG: LVH with repolarization abnormalities.  * ECG Abnormalities: No significant ST changes.  * Arrhythmia: None.  * The left ventricle was normal in size. There are large,  moderate to severe defects in apical, inferior and  inferolateral walls that are partially reversible,  suggestive of infarction with at least moderate  long-infarct ischemia.  * There is a large, moderate defect in mid to distal  anteroseptal wall that is fixed, suggestive of infarct.  * TID is calculated at 1.35 which is upper limit of normal  for this protocol.  * Unable to perform prone imaging  * Post-stress gated wall motion analysis was performed  (LVEF = 57 %;LVEDV = 79 ml.), revealing hypokinesis in  anteroseptal, inferior, and inferolateral wall(s).The RV  appears enlarged.    < end of copied text >      · Assessment      The patient is an 89-year-old gentleman with the past medical history of coronary artery disease, increased cholesterol, arthritis and CKD stage 4 and AAA pw LOPEZ  At present object data does not suggest CHF and he has no chest pain.  Last echo with preserved EF. now s/p nuclear stress test  Secondary hyperparathyroidism     1 CVS-s/p nuclear stress test.  For cath today  2 Renal CKD stage 4 at baseline;  Start IVF given the dye load  3 Anemia-f/u  iron stores.   Procrit [id number] unit x 1

## 2019-12-10 LAB
ANION GAP SERPL CALC-SCNC: 13 MMOL/L — SIGNIFICANT CHANGE UP (ref 5–17)
BUN SERPL-MCNC: 24 MG/DL — HIGH (ref 7–23)
CALCIUM SERPL-MCNC: 8.9 MG/DL — SIGNIFICANT CHANGE UP (ref 8.4–10.5)
CHLORIDE SERPL-SCNC: 103 MMOL/L — SIGNIFICANT CHANGE UP (ref 96–108)
CO2 SERPL-SCNC: 27 MMOL/L — SIGNIFICANT CHANGE UP (ref 22–31)
CREAT SERPL-MCNC: 1.71 MG/DL — HIGH (ref 0.5–1.3)
GLUCOSE SERPL-MCNC: 96 MG/DL — SIGNIFICANT CHANGE UP (ref 70–99)
HCT VFR BLD CALC: 29.4 % — LOW (ref 39–50)
HGB BLD-MCNC: 9.1 G/DL — LOW (ref 13–17)
MCHC RBC-ENTMCNC: 30 PG — SIGNIFICANT CHANGE UP (ref 27–34)
MCHC RBC-ENTMCNC: 31 GM/DL — LOW (ref 32–36)
MCV RBC AUTO: 97 FL — SIGNIFICANT CHANGE UP (ref 80–100)
NRBC # BLD: 0 /100 WBCS — SIGNIFICANT CHANGE UP (ref 0–0)
PLATELET # BLD AUTO: 189 K/UL — SIGNIFICANT CHANGE UP (ref 150–400)
POTASSIUM SERPL-MCNC: 4 MMOL/L — SIGNIFICANT CHANGE UP (ref 3.5–5.3)
POTASSIUM SERPL-SCNC: 4 MMOL/L — SIGNIFICANT CHANGE UP (ref 3.5–5.3)
RBC # BLD: 3.03 M/UL — LOW (ref 4.2–5.8)
RBC # FLD: 14.2 % — SIGNIFICANT CHANGE UP (ref 10.3–14.5)
SODIUM SERPL-SCNC: 143 MMOL/L — SIGNIFICANT CHANGE UP (ref 135–145)
WBC # BLD: 5.04 K/UL — SIGNIFICANT CHANGE UP (ref 3.8–10.5)
WBC # FLD AUTO: 5.04 K/UL — SIGNIFICANT CHANGE UP (ref 3.8–10.5)

## 2019-12-10 RX ORDER — METOPROLOL TARTRATE 50 MG
1 TABLET ORAL
Qty: 0 | Refills: 0 | DISCHARGE

## 2019-12-10 RX ORDER — ACETAMINOPHEN 500 MG
2 TABLET ORAL
Qty: 0 | Refills: 0 | DISCHARGE

## 2019-12-10 RX ORDER — SENNA PLUS 8.6 MG/1
2 TABLET ORAL ONCE
Refills: 0 | Status: COMPLETED | OUTPATIENT
Start: 2019-12-10 | End: 2019-12-11

## 2019-12-10 RX ORDER — CLOPIDOGREL BISULFATE 75 MG/1
1 TABLET, FILM COATED ORAL
Qty: 0 | Refills: 0 | DISCHARGE
Start: 2019-12-10

## 2019-12-10 RX ORDER — FERROUS SULFATE 325(65) MG
1 TABLET ORAL
Qty: 0 | Refills: 0 | DISCHARGE

## 2019-12-10 RX ORDER — ISOSORBIDE MONONITRATE 60 MG/1
1 TABLET, EXTENDED RELEASE ORAL
Qty: 0 | Refills: 0 | DISCHARGE

## 2019-12-10 RX ORDER — TAMSULOSIN HYDROCHLORIDE 0.4 MG/1
1 CAPSULE ORAL
Qty: 0 | Refills: 0 | DISCHARGE
Start: 2019-12-10

## 2019-12-10 RX ORDER — GABAPENTIN 400 MG/1
1 CAPSULE ORAL
Qty: 0 | Refills: 0 | DISCHARGE
Start: 2019-12-10

## 2019-12-10 RX ORDER — DULOXETINE HYDROCHLORIDE 30 MG/1
0 CAPSULE, DELAYED RELEASE ORAL
Qty: 0 | Refills: 0 | DISCHARGE

## 2019-12-10 RX ADMIN — ISOSORBIDE MONONITRATE 30 MILLIGRAM(S): 60 TABLET, EXTENDED RELEASE ORAL at 11:52

## 2019-12-10 RX ADMIN — FAMOTIDINE 20 MILLIGRAM(S): 10 INJECTION INTRAVENOUS at 11:52

## 2019-12-10 RX ADMIN — GABAPENTIN 300 MILLIGRAM(S): 400 CAPSULE ORAL at 13:04

## 2019-12-10 RX ADMIN — CLOPIDOGREL BISULFATE 75 MILLIGRAM(S): 75 TABLET, FILM COATED ORAL at 11:52

## 2019-12-10 RX ADMIN — Medication 325 MILLIGRAM(S): at 11:52

## 2019-12-10 RX ADMIN — TAMSULOSIN HYDROCHLORIDE 0.4 MILLIGRAM(S): 0.4 CAPSULE ORAL at 21:13

## 2019-12-10 RX ADMIN — Medication 50 MILLIGRAM(S): at 05:27

## 2019-12-10 RX ADMIN — GABAPENTIN 300 MILLIGRAM(S): 400 CAPSULE ORAL at 21:13

## 2019-12-10 RX ADMIN — HEPARIN SODIUM 5000 UNIT(S): 5000 INJECTION INTRAVENOUS; SUBCUTANEOUS at 17:11

## 2019-12-10 RX ADMIN — DULOXETINE HYDROCHLORIDE 20 MILLIGRAM(S): 30 CAPSULE, DELAYED RELEASE ORAL at 11:52

## 2019-12-10 RX ADMIN — Medication 81 MILLIGRAM(S): at 11:52

## 2019-12-10 RX ADMIN — ATORVASTATIN CALCIUM 40 MILLIGRAM(S): 80 TABLET, FILM COATED ORAL at 21:13

## 2019-12-10 RX ADMIN — HEPARIN SODIUM 5000 UNIT(S): 5000 INJECTION INTRAVENOUS; SUBCUTANEOUS at 05:27

## 2019-12-10 RX ADMIN — GABAPENTIN 300 MILLIGRAM(S): 400 CAPSULE ORAL at 05:27

## 2019-12-10 NOTE — PROGRESS NOTE ADULT - ASSESSMENT
<       · Assessment      The patient is an 89-year-old gentleman with the past medical history of coronary artery disease, increased cholesterol, arthritis and CKD stage 4 and AAA pw LOPEZ  At present object data does not suggest CHF and he has no chest pain.  Last echo with preserved EF. now s/p nuclear stress test  Secondary hyperparathyroidism     1 CVS-SP cath and there is no evidence for KAYLYN at present   2 Renal CKD stage 4 at baseline; Off IVF at present;  Keep off of Norvasc and BP is better as well   3 Anemia-SP    Procrit 01370 unit x 1   Physical therapy and dc planning

## 2019-12-10 NOTE — PROGRESS NOTE ADULT - SUBJECTIVE AND OBJECTIVE BOX
CARDIOLOGY     PROGRESS  NOTE   ________________________________________________    CHIEF COMPLAINT:Patient is a 89y old  Male who presents with a chief complaint of CHING (10 Dec 2019 07:42)  doing better.  	  REVIEW OF SYSTEMS:  CONSTITUTIONAL: No fever, weight loss, or fatigue  EYES: No eye pain, visual disturbances, or discharge  ENT:  No difficulty hearing, tinnitus, vertigo; No sinus or throat pain  NECK: No pain or stiffness  RESPIRATORY: No cough, wheezing, chills or hemoptysis; No Shortness of Breath  CARDIOVASCULAR: No chest pain, palpitations, passing out, dizziness, or leg swelling  GASTROINTESTINAL: No abdominal or epigastric pain. No nausea, vomiting, or hematemesis; No diarrhea or constipation. No melena or hematochezia.  GENITOURINARY: No dysuria, frequency, hematuria, or incontinence  NEUROLOGICAL: No headaches, memory loss, loss of strength, numbness, or tremors  SKIN: No itching, burning, rashes, or lesions   LYMPH Nodes: No enlarged glands  ENDOCRINE: No heat or cold intolerance; No hair loss  MUSCULOSKELETAL: No joint pain or swelling; No muscle, back, or extremity pain  PSYCHIATRIC: No depression, anxiety, mood swings, or difficulty sleeping  HEME/LYMPH: No easy bruising, or bleeding gums  ALLERGY AND IMMUNOLOGIC: No hives or eczema	    [ ] All others negative	  [ ] Unable to obtain    PHYSICAL EXAM:  T(C): 36.6 (12-10-19 @ 07:40), Max: 36.6 (12-09-19 @ 12:20)  HR: 70 (12-10-19 @ 07:40) (61 - 74)  BP: 110/61 (12-10-19 @ 07:40) (94/54 - 145/72)  RR: 18 (12-10-19 @ 07:40) (18 - 18)  SpO2: 96% (12-10-19 @ 07:40) (95% - 96%)  Wt(kg): --  I&O's Summary    09 Dec 2019 07:01  -  10 Dec 2019 07:00  --------------------------------------------------------  IN: 860 mL / OUT: 1200 mL / NET: -340 mL        Appearance: Normal	  HEENT:   Normal oral mucosa, PERRL, EOMI	  Lymphatic: No lymphadenopathy  Cardiovascular: Normal S1 S2, No JVD, + murmurs, No edema  Respiratory: Lungs clear to auscultation	  Psychiatry: A & O x 3, Mood & affect appropriate  Gastrointestinal:  Soft, Non-tender, + BS	  Skin: No rashes, No ecchymoses, No cyanosis	  Neurologic: Non-focal  Extremities: Normal range of motion, No clubbing, cyanosis or edema  Vascular: Peripheral pulses palpable 2+ bilaterally    MEDICATIONS  (STANDING):  aspirin enteric coated 81 milliGRAM(s) Oral daily  atorvastatin 40 milliGRAM(s) Oral at bedtime  clopidogrel Tablet 75 milliGRAM(s) Oral daily  DULoxetine 20 milliGRAM(s) Oral daily  famotidine    Tablet 20 milliGRAM(s) Oral daily  ferrous    sulfate 325 milliGRAM(s) Oral daily  gabapentin 300 milliGRAM(s) Oral three times a day  heparin  Injectable 5000 Unit(s) SubCutaneous every 12 hours  isosorbide   mononitrate ER Tablet (IMDUR) 30 milliGRAM(s) Oral daily  metoprolol succinate ER 50 milliGRAM(s) Oral daily  sodium chloride 0.9%. 1000 milliLiter(s) (50 mL/Hr) IV Continuous <Continuous>  sodium chloride 0.9%. 1000 milliLiter(s) (60 mL/Hr) IV Continuous <Continuous>  tamsulosin 0.4 milliGRAM(s) Oral at bedtime      TELEMETRY: 	    ECG:  	  RADIOLOGY:  OTHER: 	  	  LABS:	 	    CARDIAC MARKERS:                                9.1    5.04  )-----------( 189      ( 10 Dec 2019 06:44 )             29.4     12-10    143  |  103  |  24<H>  ----------------------------<  96  4.0   |  27  |  1.71<H>    Ca    8.9      10 Dec 2019 06:44      proBNP: Serum Pro-Brain Natriuretic Peptide: 1565 pg/mL (12-05 @ 23:57)  Serum Pro-Brain Natriuretic Peptide: 1690 pg/mL (12-05 @ 20:23)    Lipid Profile:   HgA1c:   TSH:     < from: Cardiac Cath Lab - Adult (12.09.19 @ 15:40) >  LM:   --  LM: Angiography showed minor luminal irregularities with no flow  limiting lesions.  LAD:   --  Proximal LAD: There was a 30 % stenosis.  CX:   --Circumflex: Angiography showed minor luminal irregularities with  no flow limiting lesions.  RCA:   --  RCA: Angiography showed moderate atherosclerosis.  COMPLICATIONS: There were no complications.  DIAGNOSTIC RECOMMENDATIONS: The patient should continue with the present    < end of copied text >      Assessment and plan  ---------------------------  4 days of shortness of breath, worse with exertion, resolves somewhat with rest, usually sleeps with 2 pillows but has been using 3 lately "to watch tv", has a hx of CHF does have some increased lower extremity swelling, no fevers or chills. Has not been having much chest pain with this, however last time he needed cardiac stenting he did not have chest pain either. Took a baby ASA today. Follows with Dr. Snow / Dr. Perez.  Saw Dr. Iggy Snow today and had an EKG which was less concerning, but referred here for further testing given his hx and risk factors.  ching pt s/p recent cath no sig cad ? chf /hfpef acute on chronic  check pro bnp  hold lasix, increase renal function  cardiac enzyme   stress test result noted the cath in 2018 with 90 % mid cx disease .will discuss with dr Lanza if the lesion can be intervened.   continue all cardiac meds  will adjust meds  dc norvasc if decrease bp  discussed with pt he feels this is his angina, spoke to Dr Lanza, cath today  will start iv hydration sec to renal insuffincy  cath results noted  physical therapy/increase ambulation  dc ivf

## 2019-12-10 NOTE — PHYSICAL THERAPY INITIAL EVALUATION ADULT - ADDITIONAL COMMENTS
Pt lives at a senior living facility. Pt reports prior to this admission he ambulated and transferred independently with use of a RW and that he requires assistance from [facility] for completion of ADLs.

## 2019-12-10 NOTE — PHYSICAL THERAPY INITIAL EVALUATION ADULT - PERTINENT HX OF CURRENT PROBLEM, REHAB EVAL
89 y/o M with PMH of coronary artery disease, increased cholesterol, arthritis and CKD stage 4 and AAA. A/W LOPEZ and SOB. Last echo with preserved EF. Now s/p nuclear stress test.

## 2019-12-10 NOTE — PHYSICAL THERAPY INITIAL EVALUATION ADULT - IMPAIRMENTS FOUND, PT EVAL
joint integrity and mobility/gait, locomotion, and balance gait, locomotion, and balance/muscle strength

## 2019-12-10 NOTE — PHYSICAL THERAPY INITIAL EVALUATION ADULT - GENERAL OBSERVATIONS, REHAB EVAL
Pt shila 40 min eval well. Pt flagged for D/C plan. Pt agreeable to session. Pt rec'd in bed, +tele, +bed alarm. Pt shila 40 min eval well. Pt flagged for D/C plan. Pt agreeable to session. Pt rec'd in bed, +tele, +bed alarm. Pt reports feeling weaker from not walking in 5 days.

## 2019-12-10 NOTE — PHYSICAL THERAPY INITIAL EVALUATION ADULT - DISCHARGE DISPOSITION, PT EVAL
Recommends [facility]. If pt returns to senior living facility, home PT and assist with ADLs and functional mobility. Recommend subacute rehab. Pt wishes to return to [facility]. *IF pt returns to facility, home PT and assist with ADLs and functional mobility. NP Nancy aware.

## 2019-12-10 NOTE — PROGRESS NOTE ADULT - SUBJECTIVE AND OBJECTIVE BOX
Male  Patient is a 89y old  Male who presents with a chief complaint of CHING (09 Dec 2019 14:29)      w/o c/o.  No SOB  Catheterization showed normal LCX ( previous cath 90% mid stenosis.  Anemia stable    PAST MEDICAL & SURGICAL HISTORY:  AAA (abdominal aortic aneurysm) without rupture  PAD (peripheral artery disease)  OA (osteoarthritis)  CAD (coronary artery disease)  HLD (hyperlipidemia)  CKD (chronic kidney disease)  HTN (hypertension)  Stented coronary artery  Colon polyp  BPH (benign prostatic hyperplasia)  Cellulitis: BL  CKD (chronic kidney disease) stage 3, GFR 30-59 ml/min  Leg swelling: related to cellulitis of LLE  No significant past medical history  History of coronary artery stent placement  History of colonoscopy  H/O inguinal hernia: repair  S/P hemorrhoidectomy        	    REVIEW OF SYSTEMS:  CONSTITUTIONAL: No fever, weight loss, or fatigue  EYES: No eye pain, visual disturbances, or discharge  ENT:  No difficulty hearing, tinnitus, vertigo; No sinus or throat pain  NECK: No pain or stiffness  RESPIRATORY: No cough, wheezing, chills or hemoptysis; + exertional  Shortness of Breath  CARDIOVASCULAR: No chest pain, palpitations, passing out, dizziness, or leg swelling  GASTROINTESTINAL: No abdominal or epigastric pain. No nausea, vomiting, or hematemesis; No diarrhea or constipation. No melena or hematochezia.  GENITOURINARY: No dysuria, frequency, hematuria, or incontinence  NEUROLOGICAL: No headaches, memory loss, loss of strength, numbness, or tremors  SKIN: No itching, burning, rashes, or lesions   LYMPH Nodes: No enlarged glands  ENDOCRINE: No heat or cold intolerance; No hair loss  MUSCULOSKELETAL: No joint pain or swelling; No muscle, back, or extremity pain  PSYCHIATRIC: No depression, anxiety, mood swings, or difficulty sleeping  HEME/LYMPH: No easy bruising, or bleeding gums  ALLERGY AND IMMUNOLOGIC: No hives or eczema	    [ ] All others negative	  [ ] Unable to obtain    PHYSICAL EXAM:  T(C): 36.6 (19 @ 20:16), Max: 36.6 (19 @ 20:16)  HR: 77 (19 @ 20:16) (77 - 85)  BP: 159/82 (19 @ 20:16) (116/75 - 159/82)  RR: 18 (19 @ 20:16) (18 - 18)  SpO2: 99% (19 @ 20:16) (99% - 99%)  Wt(kg): --  I&O's Summar      Appearance: Normal	  HEENT:   Normal oral mucosa, PERRL, EOMI	  Lymphatic: No lymphadenopathy  Cardiovascular: Normal S1 S2, No JVD, + murmurs, No edema  Respiratory: Lungs clear to auscultation	  Psychiatry: A & O x 3, Mood & affect appropriate  Gastrointestinal:  Soft, Non-tender, + BS	  Skin: No rashes, No ecchymoses, No cyanosis	  Neurologic: Non-focal  Extremities: Normal range of motion, No clubbing, cyanosis or edema  Vascular: Peripheral pulses palpable 2+ bilaterally              < from: 12 Lead ECG (19 @ 21:38) >  Diagnosis Line Sinus rhythm with 1st degree A-V block  Right bundle branch block  Left anterior fascicular block  Anterolateral infarct , age undetermined  Abnormal ECG  No previous ECGs available  Confirmed by Mehrdad Boland ([id number]) on 7/3/2019 10:40:16 AM    < from: TTE with Doppler (w/Cont) (18 @ 13:23) >  1. Mild concentric left ventricular hypertrophy.  2. Endocardial visualization enhanced with intravenous  injection of Ultrasonic Enhancing Agent (Definity).  Hyperdynamic left ventricle. No obvious wall motion  abnormalities.  3. Reversal of the E-A  waves of the mitral inflow pattern  is consistent with diastolicLV dysfunction.  4. Normal right ventricular size and function.    < from: Cardiac Cath Lab - Adult (02.15.18 @ 10:19) >  CORONARY VESSELS: The coronary circulation is right dominant.  LM:   --  LM: Angiography showed minor luminal irregularities with no flow  limiting lesions.  LAD:   --  Ostial LAD: There was a 30 % stenosis.  --  Mid LAD: There was a 10 % stenosis at the site of a prior stent.  CX:   --  Mid circumflex: There was a 90 % stenosis.  RCA:   --  RCA: Angiography showed moderate atherosclerosis.  COMPLICATIONS: There were no complications.  DIAGNOSTIC RECOMMENDATIONS: IFR was 0.89  ASA and Plavix for 1 year      < from: Xray Chest 1 View AP/PA (19 @ 19:37) >  Lungs are clear. No pleural effusion or pneumothorax.   Cardiac size cannot be accurately assessed in this projection.     IMPRESSION:   Clear lungs      < from: TTE with Doppler (w/Cont) (12.11.18 @ 13:23) >  Conclusions:  1. Mild concentric left ventricular hypertrophy.  2. Endocardial visualization enhanced with intravenous  injection of Ultrasonic Enhancing Agent (Definity).  Hyperdynamic left ventricle. No obvious wall motion  abnormalities.  3. Reversal of the E-A  waves of the mitral inflow pattern  is consistent with diastolicLV dysfunction.  4. Normal right ventricular size and function.    	  Vital Signs Last 24 Hrs  T(C): 36.6 (10 Dec 2019 04:01), Max: 36.6 (09 Dec 2019 12:20)  T(F): 97.8 (10 Dec 2019 04:01), Max: 97.9 (09 Dec 2019 12:20)  HR: 74 (10 Dec 2019 05:23) (61 - 74)  BP: 118/65 (10 Dec 2019 05:23) (94/54 - 145/72)  BP(mean): --  RR: 18 (10 Dec 2019 04:) (18 - 18)  SpO2: 95% (10 Dec 2019 04:01) (95% - 96%)  Daily     Daily Weight in k.2 (10 Dec 2019 04:01)     @ 07:01  -  12-10 @ 07:00  --------------------------------------------------------  IN: 860 mL / OUT: 1200 mL / NET: -340 mL                                      9.1    5.04  )-----------( 189      ( 10 Dec 2019 06:44 )             29.4     12-10    143  |  103  |  24<H>  ----------------------------<  96  4.0   |  27  |  1.71<H>    Ca    8.9      10 Dec 2019 06:44            MEDICATIONS  (STANDING):  aspirin enteric coated 81 milliGRAM(s) Oral daily  atorvastatin 40 milliGRAM(s) Oral at bedtime  clopidogrel Tablet 75 milliGRAM(s) Oral daily  DULoxetine 20 milliGRAM(s) Oral daily  famotidine    Tablet 20 milliGRAM(s) Oral daily  ferrous    sulfate 325 milliGRAM(s) Oral daily  gabapentin 300 milliGRAM(s) Oral three times a day  heparin  Injectable 5000 Unit(s) SubCutaneous every 12 hours  isosorbide   mononitrate ER Tablet (IMDUR) 30 milliGRAM(s) Oral daily  metoprolol succinate ER 50 milliGRAM(s) Oral daily  sodium chloride 0.9%. 1000 milliLiter(s) (50 mL/Hr) IV Continuous <Continuous>  sodium chloride 0.9%. 1000 milliLiter(s) (60 mL/Hr) IV Continuous <Continuous>  tamsulosin 0.4 milliGRAM(s) Oral at bedtime  < from: Cardiac Cath Lab - Adult (19 @ 15:40) >  Health system  Department of Cardiology  [address] [place], [place] [address] [phone]  Cath Lab Report -- Comprehensive Report  Patient: KHUSHI SNOWDEN  Study date: 2019  Account number: [id number]  MR number: [id number]  : 1930  Gender: Male  Race: W  Case Physician(s):  Jeremiah Lanza M.D.  Fellow:  Kurtis Quintero M.D.  Referring Physician:  COSTA Cook M.D.  INDICATIONS: Unstable angina - CCS4.  HISTORY: The patient has a history of coronary artery disease. The patient  has hypertension and medication-treated dyslipidemia.  PROCEDURE:  --  Left coronary angiography.  --  Right coronary angiography.  TECHNIQUE: The risks and alternatives of the procedures and conscious  sedation were explained to the patient and informed consent was obtained.  Cardiac catheterization performed electively.  Local anesthetic given. Right radial artery access. Left coronary artery  angiography. The vessel was injected utilizing a catheter. Right coronary  artery angiography. The vessel was injected utilizing a catheter.  RADIATION EXPOSURE: 3.1 min.  CONTRAST GIVEN: Omnipaque 54 ml.  MEDICATIONS GIVEN: Fentanyl, 25 mcg, IV. Midazolam, 1 mg, IV. Verapamil  (Isoptin, Calan, Covera), 2.5 mg, IA. Heparin, 3000 units, IA.  CORONARY VESSELS: The coronary circulation is left dominant.  LM:   --  LM: Angiography showed minor luminal irregularities with no flow  limiting lesions.  LAD:   --  Proximal LAD: There was a 30 % stenosis.  CX:   --Circumflex: Angiography showed minor luminal irregularities with  no flow limiting lesions.  RCA:   --  RCA: Angiography showed moderate atherosclerosis.  COMPLICATIONS: There were no complications.  DIAGNOSTIC RECOMMENDATIONS: The patient should continue with the present  medications.  Prepared and signed by  Jeremiah Lanza M.D.  Signed 2019 17:33:14  HEMODYNAMIC TABLES  Pressures:  Baseline  Pressures:  - HR: 69  Pressures:  - Rhythm:  Pressures:  -- Aortic Pressure (S/D/M): 124/55/65  Outputs:  Baseline  Outputs:  -- CALCULATIONS[age]  Outputs:  -- CALCULATIONS: Body Surface Area: 2.01  Outputs:  -- CALCULATIONS: Height in cm: 175.00  Outputs:  -- CALCULATIONS: Sex: Male  Outputs:  -- CALCULATIONS: Weight in k.70  Outputs:  -- OUTPUTS: O2 consumption: 251.77  Outputs:  -- OUTPUTS: Vo2 Indexed: 125.00    < end of copied text >    Assessment and plan  ---------------------------  4 days of shortness of breath, worse with exertion, resolves somewhat with rest, usually sleeps with 2 pillows but has been using 3 lately "to watch tv", has a hx of CHF does have some increased lower extremity swelling, no fevers or chills. Has not been having much chest pain with this, however last time he needed cardiac stenting he did not have chest pain either. Took a baby ASA today. Follows with Dr. Snow / Dr. Perez.  Saw Dr. Iggy Snow today and had an EKG which was less concerning, but referred here for further testing given his hx and risk factors.  ching pt s/p recent cath no sig cad ? chf /hfpef acute on chronic  check pro bnp  hold lasix, increase renal function  cardiac enzyme   stress test result noted the cath in 2018 with 90 % mid cx disease .will discuss with dr Lanza if the lesion can be intervened.   continue all cardiac meds  Cath w/o LCX lesion!  No overt HF  PT  OP anemia w/u as planned  CKD stable      Deon Perez MD  [phone]

## 2019-12-10 NOTE — PROGRESS NOTE ADULT - SUBJECTIVE AND OBJECTIVE BOX
[facility] [phone]        Patient seen and examined in bed.  He was in good spirits  Felt good         MEDICATIONS  (STANDING):  aspirin enteric coated 81 milliGRAM(s) Oral daily  atorvastatin 40 milliGRAM(s) Oral at bedtime  clopidogrel Tablet 75 milliGRAM(s) Oral daily  DULoxetine 20 milliGRAM(s) Oral daily  famotidine    Tablet 20 milliGRAM(s) Oral daily  ferrous    sulfate 325 milliGRAM(s) Oral daily  gabapentin 300 milliGRAM(s) Oral three times a day  heparin  Injectable 5000 Unit(s) SubCutaneous every 12 hours  isosorbide   mononitrate ER Tablet (IMDUR) 30 milliGRAM(s) Oral daily  metoprolol succinate ER 50 milliGRAM(s) Oral daily  sodium chloride 0.9%. 1000 milliLiter(s) (50 mL/Hr) IV Continuous <Continuous>  tamsulosin 0.4 milliGRAM(s) Oral at bedtime      VITAL:  T(C): , Max: 36.7 (12-10-19 @ 10:49)  T(F): , Max: 98 (12-10-19 @ 10:49)  HR: 96 (12-10-19 @ 11:43)  BP: 119/64 (12-10-19 @ 11:43)  BP(mean): --  RR: 18 (12-10-19 @ 11:43)  SpO2: 96% (12-10-19 @ 11:43)  Wt(kg): --    I and O's:    12-09 @ 07:01  -  12-10 @ 07:00  --------------------------------------------------------  IN: 860 mL / OUT: 1200 mL / NET: -340 mL    12-10 @ 07:01  -  12-10 @ 13:49  --------------------------------------------------------  IN: 120 mL / OUT: 0 mL / NET: 120 mL          PHYSICAL EXAM:    Constitutional: NAD  Neck:  No JVD  Respiratory: CTAB/L  Cardiovascular: S1 and S2  Gastrointestinal: BS+, soft, NT/ND  Extremities: No peripheral edema  Neurological: A/O x 3, no focal deficits  Psychiatric: Normal mood, normal affect  : No Flores  Skin: No rashes  Access: Not applicable    LABS:                        9.1    5.04  )-----------( 189      ( 10 Dec 2019 06:44 )             29.4     12-10    143  |  103  |  24<H>  ----------------------------<  96  4.0   |  27  |  1.71<H>    Ca    8.9      10 Dec 2019 06:44            Urine Studies:          RADIOLOGY & ADDITIONAL STUDIES:

## 2019-12-10 NOTE — PHYSICAL THERAPY INITIAL EVALUATION ADULT - BED MOBILITY TRAINING, PT EVAL
GOAL: Pt will perform all bed mobility, independent, within 2 weeks GOAL: Pt will perform all bed mobility, independently, within 2 weeks

## 2019-12-11 ENCOUNTER — TRANSCRIPTION ENCOUNTER (OUTPATIENT)
Age: 84
End: 2019-12-11

## 2019-12-11 LAB
ANION GAP SERPL CALC-SCNC: 14 MMOL/L — SIGNIFICANT CHANGE UP (ref 5–17)
BUN SERPL-MCNC: 27 MG/DL — HIGH (ref 7–23)
CALCIUM SERPL-MCNC: 9 MG/DL — SIGNIFICANT CHANGE UP (ref 8.4–10.5)
CHLORIDE SERPL-SCNC: 102 MMOL/L — SIGNIFICANT CHANGE UP (ref 96–108)
CO2 SERPL-SCNC: 26 MMOL/L — SIGNIFICANT CHANGE UP (ref 22–31)
CREAT SERPL-MCNC: 1.71 MG/DL — HIGH (ref 0.5–1.3)
GLUCOSE SERPL-MCNC: 97 MG/DL — SIGNIFICANT CHANGE UP (ref 70–99)
POTASSIUM SERPL-MCNC: 4 MMOL/L — SIGNIFICANT CHANGE UP (ref 3.5–5.3)
POTASSIUM SERPL-SCNC: 4 MMOL/L — SIGNIFICANT CHANGE UP (ref 3.5–5.3)
SODIUM SERPL-SCNC: 142 MMOL/L — SIGNIFICANT CHANGE UP (ref 135–145)

## 2019-12-11 RX ORDER — ERYTHROPOIETIN 10000 [IU]/ML
10000 INJECTION, SOLUTION INTRAVENOUS; SUBCUTANEOUS ONCE
Refills: 0 | Status: DISCONTINUED | OUTPATIENT
Start: 2019-12-11 | End: 2019-12-12

## 2019-12-11 RX ORDER — FUROSEMIDE 40 MG
20 TABLET ORAL DAILY
Refills: 0 | Status: DISCONTINUED | OUTPATIENT
Start: 2019-12-11 | End: 2019-12-12

## 2019-12-11 RX ADMIN — GABAPENTIN 300 MILLIGRAM(S): 400 CAPSULE ORAL at 22:06

## 2019-12-11 RX ADMIN — Medication 325 MILLIGRAM(S): at 11:48

## 2019-12-11 RX ADMIN — Medication 81 MILLIGRAM(S): at 11:48

## 2019-12-11 RX ADMIN — FAMOTIDINE 20 MILLIGRAM(S): 10 INJECTION INTRAVENOUS at 11:48

## 2019-12-11 RX ADMIN — Medication 50 MILLIGRAM(S): at 05:18

## 2019-12-11 RX ADMIN — GABAPENTIN 300 MILLIGRAM(S): 400 CAPSULE ORAL at 05:18

## 2019-12-11 RX ADMIN — ATORVASTATIN CALCIUM 40 MILLIGRAM(S): 80 TABLET, FILM COATED ORAL at 22:06

## 2019-12-11 RX ADMIN — HEPARIN SODIUM 5000 UNIT(S): 5000 INJECTION INTRAVENOUS; SUBCUTANEOUS at 17:42

## 2019-12-11 RX ADMIN — ISOSORBIDE MONONITRATE 30 MILLIGRAM(S): 60 TABLET, EXTENDED RELEASE ORAL at 11:48

## 2019-12-11 RX ADMIN — Medication 20 MILLIGRAM(S): at 11:48

## 2019-12-11 RX ADMIN — DULOXETINE HYDROCHLORIDE 20 MILLIGRAM(S): 30 CAPSULE, DELAYED RELEASE ORAL at 11:48

## 2019-12-11 RX ADMIN — TAMSULOSIN HYDROCHLORIDE 0.4 MILLIGRAM(S): 0.4 CAPSULE ORAL at 22:06

## 2019-12-11 RX ADMIN — SENNA PLUS 2 TABLET(S): 8.6 TABLET ORAL at 05:18

## 2019-12-11 RX ADMIN — CLOPIDOGREL BISULFATE 75 MILLIGRAM(S): 75 TABLET, FILM COATED ORAL at 11:48

## 2019-12-11 RX ADMIN — GABAPENTIN 300 MILLIGRAM(S): 400 CAPSULE ORAL at 13:11

## 2019-12-11 RX ADMIN — HEPARIN SODIUM 5000 UNIT(S): 5000 INJECTION INTRAVENOUS; SUBCUTANEOUS at 05:19

## 2019-12-11 NOTE — PROGRESS NOTE ADULT - SUBJECTIVE AND OBJECTIVE BOX
[facility] [phone]        Patient seen and examined in bed.  He was in good spirits and felt better         MEDICATIONS  (STANDING):  aspirin enteric coated 81 milliGRAM(s) Oral daily  atorvastatin 40 milliGRAM(s) Oral at bedtime  clopidogrel Tablet 75 milliGRAM(s) Oral daily  DULoxetine 20 milliGRAM(s) Oral daily  famotidine    Tablet 20 milliGRAM(s) Oral daily  ferrous    sulfate 325 milliGRAM(s) Oral daily  furosemide    Tablet 20 milliGRAM(s) Oral daily  gabapentin 300 milliGRAM(s) Oral three times a day  heparin  Injectable 5000 Unit(s) SubCutaneous every 12 hours  isosorbide   mononitrate ER Tablet (IMDUR) 30 milliGRAM(s) Oral daily  metoprolol succinate ER 50 milliGRAM(s) Oral daily  tamsulosin 0.4 milliGRAM(s) Oral at bedtime      VITAL:  T(C): , Max: 36.7 (12-10-19 @ 20:39)  T(F): , Max: 98.1 (12-10-19 @ 20:39)  HR: 66 (12-11-19 @ 05:06)  BP: 123/66 (12-11-19 @ 05:14)  BP(mean): --  RR: 18 (12-11-19 @ 05:06)  SpO2: 95% (12-11-19 @ 05:06)  Wt(kg): --    I and O's:    12-10 @ 07:01  -  12-11 @ 07:00  --------------------------------------------------------  IN: 780 mL / OUT: 775 mL / NET: 5 mL    12-11 @ 07:01  -  12-11 @ 11:00  --------------------------------------------------------  IN: 120 mL / OUT: 0 mL / NET: 120 mL          PHYSICAL EXAM:    Constitutional: NAD  Neck:  No JVD  Respiratory: CTAB/L  Cardiovascular: S1 and S2  Gastrointestinal: BS+, soft, NT/ND  Extremities: No peripheral edema  Neurological: A/O x 3, no focal deficits  Psychiatric: Normal mood, normal affect  : No Flores  Skin: No rashes  Access: Not applicable    LABS:                        9.1    5.04  )-----------( 189      ( 10 Dec 2019 06:44 )             29.4     12-11    142  |  102  |  27<H>  ----------------------------<  97  4.0   |  26  |  1.71<H>    Ca    9.0      11 Dec 2019 07:15            Urine Studies:          RADIOLOGY & ADDITIONAL STUDIES:

## 2019-12-11 NOTE — DISCHARGE NOTE PROVIDER - NSDCCPCAREPLAN_GEN_ALL_CORE_FT
PRINCIPAL DISCHARGE DIAGNOSIS  Diagnosis: Shortness of breath  Assessment and Plan of Treatment:       SECONDARY DISCHARGE DIAGNOSES  Diagnosis: Coronary artery disease  Assessment and Plan of Treatment: PRINCIPAL DISCHARGE DIAGNOSIS  Diagnosis: CHF exacerbation  Assessment and Plan of Treatment:   Weigh yourself daily.  If you gain 3lbs in 3 days, or 5lbs in a week call your Health Care Provider.  Do not eat or drink foods containing more than 2000mg of salt (sodium) in your diet every day.  Call your Health Care Provider if you have any swelling or increased swelling in your feet, ankles, and/or stomach.  Take all of your medication as directed.  If you become dizzy call your Health Care Provider.        SECONDARY DISCHARGE DIAGNOSES  Diagnosis: Coronary artery disease  Assessment and Plan of Treatment: Cardiac catheterization or coronary angiogram is done to understand how the heart is working and to look at the coronary arteries which supply oxygen to the heart muscles, to look at the heart chambers and the valves in the heart.  The doctor uses your groin or your arm to do the procedure  Your doctor will let you know when you can drive and do your usual physical activities  You will need to see your doctor within one week after discharge  Call your doctor if the insertion site bleeds a lot, if you get a fever or have pain, swelling, or redness where the tube went in, or if your leg feels weak, numb, or cold  Coronary artery disease is a condition where the arteries the supply the heart muscle get clogges with fatty deposits & puts you at risk for a heart attack  Call your doctor if you have any new pain, pressure, or discomfort in the center of your chest, pain, tingling or discomfort in arms, back, neck, jaw, or stomach, shortness of breath, nausea, vomiting, burping or heartburn, sweating, cold and clammy skin, racing or abnormal heartbeat for more than 10 minutes or if they keep coming & going.  Call 911 and do not tr to get to hospital by care  You can help yourself with lefestyle changes (quitting smoking if you smoke), eat lots of fruits & vegetables & low fat dairy products, not a lot of meat & fatty foods, walk or some form of physical activity most days of the week, lose weight if you are overweight  Take your cardiac medication as prescribed to lower cholesterol, to lower blood pressure, aspirin to prevent blood clots, and diabetes control  Make sure to keep appointments with doctor for cardiac follow up care

## 2019-12-11 NOTE — DISCHARGE NOTE PROVIDER - CARE PROVIDER_API CALL
Iggy Snow)  Cardiovascular Disease; Internal Medicine  [address] [place] [address]  Phone: [phone]  Fax: [phone]  Follow Up Time:     Deon Perez)  Internal Medicine  [address] [place] [address]  Phone: [phone]  Fax: [phone]  Follow Up Time:

## 2019-12-11 NOTE — PROGRESS NOTE ADULT - ASSESSMENT
<       · Assessment      The patient is an 89-year-old gentleman with the past medical history of coronary artery disease, increased cholesterol, arthritis and CKD stage 4 and AAA pw LOPEZ  At present object data does not suggest CHF and he has no chest pain.  Last echo with preserved EF. now s/p nuclear stress test  Secondary hyperparathyroidism     1 CVS-SP cath and there is no evidence for KAYLYN at present   2 Renal CKD stage 4 at baseline; Off IVF at present;  Keep off of Norvasc and BP is better as well   3 Anemia-  Procrit 42017 unit x 1     Physical therapy and dc planning to rehab

## 2019-12-11 NOTE — PROGRESS NOTE ADULT - SUBJECTIVE AND OBJECTIVE BOX
no complaints  PT eval noted.  recommended [facility], but pt would like to return to [place] with services.    PAST MEDICAL & SURGICAL HISTORY:  AAA (abdominal aortic aneurysm) without rupture  PAD (peripheral artery disease)  OA (osteoarthritis)  CAD (coronary artery disease)  HLD (hyperlipidemia)  CKD (chronic kidney disease)  HTN (hypertension)  Stented coronary artery  Colon polyp  BPH (benign prostatic hyperplasia)  Cellulitis: BL  CKD (chronic kidney disease) stage 3, GFR 30-59 ml/min  Leg swelling: related to cellulitis of LLE  No significant past medical history  History of coronary artery stent placement  History of colonoscopy  H/O inguinal hernia: repair  S/P hemorrhoidectomy        	        PHYSICAL EXAM:  T(C): 36.6 (19 @ 20:16), Max: 36.6 (19 @ 20:16)  HR: 77 (19 @ 20:16) (77 - 85)  BP: 159/82 (19 @ 20:16) (116/75 - 159/82)  RR: 18 (19 @ 20:16) (18 - 18)  SpO2: 99% (19 @ 20:16) (99% - 99%)  Wt(kg): --  I&O's Summary      Appearance: Normal	  HEENT:   Normal oral mucosa, PERRL, EOMI	  Lymphatic: No lymphadenopathy  Cardiovascular: Normal S1 S2, No JVD, + murmurs, No edema  Respiratory: Lungs clear to auscultation	  Psychiatry: A & O x 3, Mood & affect appropriate  Gastrointestinal:  Soft, Non-tender, + BS	  Skin: No rashes, No ecchymoses, No cyanosis	  Neurologic: Non-focal  Extremities: Normal range of motion, No clubbing, cyanosis or edema  Vascular: Peripheral pulses palpable 2+ bilateral                      PREVIOUS DIAGNOSTIC TESTING:      < from: 12 Lead ECG (19 @ 21:38) >  Diagnosis Line Sinus rhythm with 1st degree A-V block  Right bundle branch block  Left anterior fascicular block  Anterolateral infarct , age undetermined  Abnormal ECG  No previous ECGs available  Confirmed by Mehrdad Boland ([id number]) on 7/3/2019 10:40:16 AM    < from: TTE with Doppler (w/Cont) (18 @ 13:23) >  1. Mild concentric left ventricular hypertrophy.  2. Endocardial visualization enhanced with intravenous  injection of Ultrasonic Enhancing Agent (Definity).  Hyperdynamic left ventricle. No obvious wall motion  abnormalities.  3. Reversal of the E-A  waves of the mitral inflow pattern  is consistent with diastolicLV dysfunction.  4. Normal right ventricular size and function.    < from: Cardiac Cath Lab - Adult (02.15.18 @ 10:19) >  CORONARY VESSELS: The coronary circulation is right dominant.  LM:   --  LM: Angiography showed minor luminal irregularities with no flow  limiting lesions.  LAD:   --  Ostial LAD: There was a 30 % stenosis.  --  Mid LAD: There was a 10 % stenosis at the site of a prior stent.  CX:   --  Mid circumflex: There was a 90 % stenosis.  RCA:   --  RCA: Angiography showed moderate atherosclerosis.  COMPLICATIONS: There were no complications.  DIAGNOSTIC RECOMMENDATIONS: IFR was 0.89  ASA and Plavix for 1 year      < from: Xray Chest 1 View AP/PA (19 @ 19:37) >  Lungs are clear. No pleural effusion or pneumothorax.   Cardiac size cannot be accurately assessed in this projection.     IMPRESSION:   Clear lungs      < from: TTE with Doppler (w/Cont) (18 @ 13:23) >  Conclusions:  1. Mild concentric left ventricular hypertrophy.  2. Endocardial visualization enhanced with intravenous  injection of Ultrasonic Enhancing Agent (Definity).  Hyperdynamic left ventricle. No obvious wall motion  abnormalities.  3. Reversal of the E-A  waves of the mitral inflow pattern  is consistent with diastolicLV dysfunction.  4. Normal right ventricular size and function.    < end of copied text > (05 Dec 2019 20:21)      Vital Signs Last 24 Hrs  T(C): 36.4 (11 Dec 2019 05:06), Max: 36.7 (10 Dec 2019 10:49)  T(F): 97.6 (11 Dec 2019 05:06), Max: 98.1 (10 Dec 2019 20:39)  HR: 66 (11 Dec 2019 05:06) (66 - 96)  BP: 123/66 (11 Dec 2019 05:14) (96/54 - 123/66)  BP(mean): --  RR: 18 (11 Dec 2019 05:06) (18 - 18)  SpO2: 95% (11 Dec 2019 05:06) (95% - 96%)  Daily     Daily Weight in k.5 (11 Dec 2019 05:09)    12-10 @ 07:01  -  11 @ 07:00  --------------------------------------------------------  IN: 780 mL / OUT: 775 mL / NET: 5 mL                                        9.1    5.04  )-----------( 189      ( 10 Dec 2019 06:44 )             29.4     12-10    143  |  103  |  24<H>  ----------------------------<  96  4.0   |  27  |  1.71<H>    Ca    8.9      10 Dec 2019 06:44            MEDICATIONS  (STANDING):  aspirin enteric coated 81 milliGRAM(s) Oral daily  atorvastatin 40 milliGRAM(s) Oral at bedtime  clopidogrel Tablet 75 milliGRAM(s) Oral daily  DULoxetine 20 milliGRAM(s) Oral daily  famotidine    Tablet 20 milliGRAM(s) Oral daily  ferrous    sulfate 325 milliGRAM(s) Oral daily  gabapentin 300 milliGRAM(s) Oral three times a day  heparin  Injectable 5000 Unit(s) SubCutaneous every 12 hours  isosorbide   mononitrate ER Tablet (IMDUR) 30 milliGRAM(s) Oral daily  metoprolol succinate ER 50 milliGRAM(s) Oral daily  sodium chloride 0.9%. 1000 milliLiter(s) (50 mL/Hr) IV Continuous <Continuous>  tamsulosin 0.4 milliGRAM(s) Oral at bedtime    MEDICATIONS  (PRN):  < from: Cardiac Cath Lab - Adult (19 @ 15:40) >  [facility]  Department of Cardiology  [address] [place] [address]  [phone]  Cath Lab Report -- Comprehensive Report  Patient: KHUSHI SNOWDEN  Study date: 2019  Account number: [id number]  MR number: [id number]  : 1930  Gender: Male  Race: W  Case Physician(s):  Jeremiah Lanza M.D.  Fellow:  Kurtis Quintero M.D.  Referring Physician:  COSTA Cook, M.D.  INDICATIONS: Unstable angina - CCS4.  HISTORY: The patient has a history of coronary artery disease. The patient  has hypertension and medication-treated dyslipidemia.  PROCEDURE:  --  Left coronary angiography.  --  Right coronary angiography.  TECHNIQUE: The risks and alternatives of the procedures and conscious  sedation were explained to the patient and informed consent was obtained.  Cardiac catheterization performed electively.  Local anesthetic given. Right radial artery access. Left coronary artery  angiography. The vessel was injected utilizing a catheter. Right coronary  artery angiography. The vessel was injected utilizing a catheter.  RADIATION EXPOSURE: 3.1 min.  CONTRAST GIVEN: Omnipaque 54 ml.  MEDICATIONS GIVEN: Fentanyl, 25 mcg, IV. Midazolam, 1 mg, IV. Verapamil  (Isoptin, Calan, Covera), 2.5 mg, IA. Heparin, 3000 units, IA.  CORONARY VESSELS: The coronary circulation is left dominant.  LM:   --  LM: Angiography showed minor luminal irregularities with no flow  limiting lesions.  LAD:   --  Proximal LAD: There was a 30 % stenosis.  CX:   --Circumflex: Angiography showed minor luminal irregularities with  no flow limiting lesions.  RCA:   --  RCA: Angiography showed moderate atherosclerosis.  COMPLICATIONS: There were no complications.  DIAGNOSTIC RECOMMENDATIONS: The patient should continue with the present  medications.  Prepared and signed by  Jeremiah Lanza M.D.  Signed 2019 17:33:14  HEMODYNAMIC TABLES  Pressures:  Baseline  Pressures:  - HR: 69  Pressures:  - Rhythm:  Pressures:  -- Aortic Pressure (S/D/M): 124/55/65  Outputs:  Baseline  Outputs:  -- CALCULATIONS[age]  Outputs:  -- CALCULATIONS: Body Surface Area: 2.01  Outputs:  -- CALCULATIONS: Height in cm: 175.00  Outputs:  -- CALCULATIONS: Sex: Male  Outputs:  -- CALCULATIONS: Weight in k.70  Outputs:  -- OUTPUTS: O2 consumption: 251.77    < end of copied text >      Assessment and plan  ---------------------------  4 days of shortness of breath, worse with exertion, resolves somewhat with rest, usually sleeps with 2 pillows but has been using 3 lately "to watch tv", has a hx of CHF does have some increased lower extremity swelling, no fevers or chills. Has not been having much chest pain with this, however last time he needed cardiac stenting he did not have chest pain either. Took a baby ASA today. Follows with Dr. Snow / Dr. Perez.  Saw Dr. Iggy Snow today and had an EKG which was less concerning, but referred here for further testing given his hx and risk factors.  ching pt s/p recent cath no sig cad ? chf /hfpef acute on chronic  check pro bnp  hold lasix, increase renal function  cardiac enzyme   stress test result noted the cath in 2018 with 90 % mid cx disease .will discuss with dr Lanza if the lesion can be intervened.   continue all cardiac meds  Cath w/o LCX lesion post previous stent  No overt HF  PT  OP anemia w/u as planned  CKD stable  OK for D/C to [place] with services.  reviewed w/ RNP yesterday.  Deon Perez MD  [phone]

## 2019-12-11 NOTE — DISCHARGE NOTE PROVIDER - CARE PROVIDERS DIRECT ADDRESSES
[email or web address]

## 2019-12-11 NOTE — DISCHARGE NOTE PROVIDER - NSDCMRMEDTOKEN_GEN_ALL_CORE_FT
aspirin 81 mg oral delayed release tablet: 1 tab(s) orally once a day  atorvastatin 40 mg oral tablet: 1 tab(s) orally once a day (at bedtime)  clopidogrel 75 mg oral tablet: 1 tab(s) orally once a day  Cymbalta 60 mg oral delayed release capsule: 1 cap(s) orally once a day  famotidine 20 mg oral tablet: 1 tab(s) orally 2 times a day  furosemide 40 mg oral tablet: 1 tab(s) orally 2 times a day  gabapentin 400 mg oral capsule: 1 cap(s) orally 3 times a day  metoprolol succinate 50 mg oral tablet, extended release: 1 tab(s) orally once a day  potassium chloride 10 mEq oral tablet, extended release: 1 tab(s) orally 2 times a day  tamsulosin 0.4 mg oral capsule: 1 cap(s) orally once a day (at bedtime) aspirin 81 mg oral delayed release tablet: 1 tab(s) orally once a day  atorvastatin 40 mg oral tablet: 1 tab(s) orally once a day (at bedtime)  clopidogrel 75 mg oral tablet: 1 tab(s) orally once a day  DULoxetine 20 mg oral delayed release capsule: 1 cap(s) orally once a day  famotidine 20 mg oral tablet: 1 tab(s) orally 2 times a day  ferrous sulfate 325 mg (65 mg elemental iron) oral tablet: 1 tab(s) orally once a day  furosemide 20 mg oral tablet: 1 tab(s) orally once a day  gabapentin 300 mg oral capsule: 1 cap(s) orally 3 times a day  metoprolol succinate 50 mg oral tablet, extended release: 1 tab(s) orally once a day  tamsulosin 0.4 mg oral capsule: 1 cap(s) orally once a day (at bedtime)

## 2019-12-11 NOTE — DISCHARGE NOTE PROVIDER - HOSPITAL COURSE
89-year-old gentleman with the past medical history of coronary artery disease, increased cholesterol, arthritis and CKD stage 4 and AAA pw LOPEZ, Saw Dr. Iggy Snow today and had an EKG which was less concerning, but referred here for further testing given his hx and risk factors.        HsTronopin's elevated. PT underwent Abnormal stress testing. Seen by cardiology who recommended Cath. S/p Cardiac cath w/o LCX lesion post previous stent. No overt HF noted. Cardiac medications adjusted. Planned for OP anemia w/u as planned. CKD stable and nephrology input was appreciated. OK for D/C by Medical Attending and Consultants.

## 2019-12-12 ENCOUNTER — TRANSCRIPTION ENCOUNTER (OUTPATIENT)
Age: 84
End: 2019-12-12

## 2019-12-12 VITALS
TEMPERATURE: 98 F | HEART RATE: 62 BPM | RESPIRATION RATE: 18 BRPM | SYSTOLIC BLOOD PRESSURE: 113 MMHG | DIASTOLIC BLOOD PRESSURE: 71 MMHG | OXYGEN SATURATION: 98 %

## 2019-12-12 LAB
ANION GAP SERPL CALC-SCNC: 14 MMOL/L — SIGNIFICANT CHANGE UP (ref 5–17)
BUN SERPL-MCNC: 27 MG/DL — HIGH (ref 7–23)
CALCIUM SERPL-MCNC: 9.1 MG/DL — SIGNIFICANT CHANGE UP (ref 8.4–10.5)
CHLORIDE SERPL-SCNC: 100 MMOL/L — SIGNIFICANT CHANGE UP (ref 96–108)
CO2 SERPL-SCNC: 26 MMOL/L — SIGNIFICANT CHANGE UP (ref 22–31)
CREAT SERPL-MCNC: 1.7 MG/DL — HIGH (ref 0.5–1.3)
GLUCOSE SERPL-MCNC: 101 MG/DL — HIGH (ref 70–99)
POTASSIUM SERPL-MCNC: 4 MMOL/L — SIGNIFICANT CHANGE UP (ref 3.5–5.3)
POTASSIUM SERPL-SCNC: 4 MMOL/L — SIGNIFICANT CHANGE UP (ref 3.5–5.3)
SODIUM SERPL-SCNC: 140 MMOL/L — SIGNIFICANT CHANGE UP (ref 135–145)

## 2019-12-12 PROCEDURE — 99152 MOD SED SAME PHYS/QHP 5/>YRS: CPT

## 2019-12-12 PROCEDURE — 78452 HT MUSCLE IMAGE SPECT MULT: CPT

## 2019-12-12 PROCEDURE — 82550 ASSAY OF CK (CPK): CPT

## 2019-12-12 PROCEDURE — C1894: CPT

## 2019-12-12 PROCEDURE — C1887: CPT

## 2019-12-12 PROCEDURE — 99285 EMERGENCY DEPT VISIT HI MDM: CPT | Mod: 25

## 2019-12-12 PROCEDURE — 97161 PT EVAL LOW COMPLEX 20 MIN: CPT

## 2019-12-12 PROCEDURE — 85730 THROMBOPLASTIN TIME PARTIAL: CPT

## 2019-12-12 PROCEDURE — 84484 ASSAY OF TROPONIN QUANT: CPT

## 2019-12-12 PROCEDURE — 83970 ASSAY OF PARATHORMONE: CPT

## 2019-12-12 PROCEDURE — 82553 CREATINE MB FRACTION: CPT

## 2019-12-12 PROCEDURE — 85027 COMPLETE CBC AUTOMATED: CPT

## 2019-12-12 PROCEDURE — A9500: CPT

## 2019-12-12 PROCEDURE — 83880 ASSAY OF NATRIURETIC PEPTIDE: CPT

## 2019-12-12 PROCEDURE — 71045 X-RAY EXAM CHEST 1 VIEW: CPT

## 2019-12-12 PROCEDURE — A9505: CPT

## 2019-12-12 PROCEDURE — 93017 CV STRESS TEST TRACING ONLY: CPT

## 2019-12-12 PROCEDURE — 82310 ASSAY OF CALCIUM: CPT

## 2019-12-12 PROCEDURE — 85610 PROTHROMBIN TIME: CPT

## 2019-12-12 PROCEDURE — C1769: CPT

## 2019-12-12 PROCEDURE — 93005 ELECTROCARDIOGRAM TRACING: CPT

## 2019-12-12 PROCEDURE — 84100 ASSAY OF PHOSPHORUS: CPT

## 2019-12-12 PROCEDURE — 93454 CORONARY ARTERY ANGIO S&I: CPT

## 2019-12-12 PROCEDURE — 80048 BASIC METABOLIC PNL TOTAL CA: CPT

## 2019-12-12 PROCEDURE — 83735 ASSAY OF MAGNESIUM: CPT

## 2019-12-12 PROCEDURE — 80053 COMPREHEN METABOLIC PANEL: CPT

## 2019-12-12 RX ORDER — GABAPENTIN 400 MG/1
1 CAPSULE ORAL
Qty: 0 | Refills: 0 | DISCHARGE
Start: 2019-12-12

## 2019-12-12 RX ORDER — DULOXETINE HYDROCHLORIDE 30 MG/1
1 CAPSULE, DELAYED RELEASE ORAL
Qty: 0 | Refills: 0 | DISCHARGE
Start: 2019-12-12

## 2019-12-12 RX ORDER — FUROSEMIDE 40 MG
1 TABLET ORAL
Qty: 0 | Refills: 0 | DISCHARGE

## 2019-12-12 RX ORDER — POTASSIUM CHLORIDE 20 MEQ
1 PACKET (EA) ORAL
Qty: 0 | Refills: 0 | DISCHARGE

## 2019-12-12 RX ORDER — ASPIRIN/CALCIUM CARB/MAGNESIUM 324 MG
1 TABLET ORAL
Qty: 0 | Refills: 0 | DISCHARGE
Start: 2019-12-12

## 2019-12-12 RX ORDER — FUROSEMIDE 40 MG
1 TABLET ORAL
Qty: 0 | Refills: 0 | DISCHARGE
Start: 2019-12-12

## 2019-12-12 RX ORDER — DULOXETINE HYDROCHLORIDE 30 MG/1
1 CAPSULE, DELAYED RELEASE ORAL
Qty: 0 | Refills: 0 | DISCHARGE

## 2019-12-12 RX ORDER — FERROUS SULFATE 325(65) MG
1 TABLET ORAL
Qty: 0 | Refills: 0 | DISCHARGE
Start: 2019-12-12

## 2019-12-12 RX ADMIN — FAMOTIDINE 20 MILLIGRAM(S): 10 INJECTION INTRAVENOUS at 11:13

## 2019-12-12 RX ADMIN — GABAPENTIN 300 MILLIGRAM(S): 400 CAPSULE ORAL at 05:20

## 2019-12-12 RX ADMIN — CLOPIDOGREL BISULFATE 75 MILLIGRAM(S): 75 TABLET, FILM COATED ORAL at 11:14

## 2019-12-12 RX ADMIN — Medication 325 MILLIGRAM(S): at 11:14

## 2019-12-12 RX ADMIN — Medication 20 MILLIGRAM(S): at 05:20

## 2019-12-12 RX ADMIN — HEPARIN SODIUM 5000 UNIT(S): 5000 INJECTION INTRAVENOUS; SUBCUTANEOUS at 18:57

## 2019-12-12 RX ADMIN — ISOSORBIDE MONONITRATE 30 MILLIGRAM(S): 60 TABLET, EXTENDED RELEASE ORAL at 11:14

## 2019-12-12 RX ADMIN — HEPARIN SODIUM 5000 UNIT(S): 5000 INJECTION INTRAVENOUS; SUBCUTANEOUS at 05:20

## 2019-12-12 RX ADMIN — Medication 81 MILLIGRAM(S): at 11:14

## 2019-12-12 RX ADMIN — Medication 50 MILLIGRAM(S): at 05:20

## 2019-12-12 RX ADMIN — DULOXETINE HYDROCHLORIDE 20 MILLIGRAM(S): 30 CAPSULE, DELAYED RELEASE ORAL at 11:13

## 2019-12-12 NOTE — DISCHARGE NOTE NURSING/CASE MANAGEMENT/SOCIAL WORK - PATIENT PORTAL LINK FT
You can access the FollowMyHealth Patient Portal offered by [facility] by registering at the following website: [email or web address]. By joining "[email or web address]’s FollowMyHealth portal, you will also be able to view your health information using other applications (apps) compatible with our system.

## 2019-12-12 NOTE — PROGRESS NOTE ADULT - SUBJECTIVE AND OBJECTIVE BOX
CARDIOLOGY     PROGRESS  NOTE   ________________________________________________    CHIEF COMPLAINT:Patient is a 89y old  Male who presents with a chief complaint of CHING (12 Dec 2019 07:37)  no complain.  	  REVIEW OF SYSTEMS:  CONSTITUTIONAL: No fever, weight loss, or fatigue  EYES: No eye pain, visual disturbances, or discharge  ENT:  No difficulty hearing, tinnitus, vertigo; No sinus or throat pain  NECK: No pain or stiffness  RESPIRATORY: No cough, wheezing, chills or hemoptysis; No Shortness of Breath  CARDIOVASCULAR: No chest pain, palpitations, passing out, dizziness, or leg swelling  GASTROINTESTINAL: No abdominal or epigastric pain. No nausea, vomiting, or hematemesis; No diarrhea or constipation. No melena or hematochezia.  GENITOURINARY: No dysuria, frequency, hematuria, or incontinence  NEUROLOGICAL: No headaches, memory loss, loss of strength, numbness, or tremors  SKIN: No itching, burning, rashes, or lesions   LYMPH Nodes: No enlarged glands  ENDOCRINE: No heat or cold intolerance; No hair loss  MUSCULOSKELETAL: No joint pain or swelling; No muscle, back, or extremity pain  PSYCHIATRIC: No depression, anxiety, mood swings, or difficulty sleeping  HEME/LYMPH: No easy bruising, or bleeding gums  ALLERGY AND IMMUNOLOGIC: No hives or eczema	    [ ] All others negative	  [ ] Unable to obtain    PHYSICAL EXAM:  T(C): 36.8 (12-12-19 @ 04:15), Max: 36.8 (12-12-19 @ 04:15)  HR: 80 (12-12-19 @ 04:15) (68 - 80)  BP: 127/72 (12-12-19 @ 04:15) (119/73 - 129/71)  RR: 18 (12-12-19 @ 04:15) (18 - 18)  SpO2: 95% (12-12-19 @ 04:15) (95% - 97%)  Wt(kg): --  I&O's Summary    11 Dec 2019 07:01  -  12 Dec 2019 07:00  --------------------------------------------------------  IN: 400 mL / OUT: 100 mL / NET: 300 mL        Appearance: Normal	  HEENT:   Normal oral mucosa, PERRL, EOMI	  Lymphatic: No lymphadenopathy  Cardiovascular: Normal S1 S2, No JVD, + murmurs, No edema  Respiratory: Lungs clear to auscultation	  Psychiatry: A & O x 3, Mood & affect appropriate  Gastrointestinal:  Soft, Non-tender, + BS	  Skin: No rashes, No ecchymoses, No cyanosis	  Neurologic: Non-focal  Extremities: Normal range of motion, No clubbing, cyanosis or edema  Vascular: Peripheral pulses palpable 2+ bilaterally    MEDICATIONS  (STANDING):  aspirin enteric coated 81 milliGRAM(s) Oral daily  atorvastatin 40 milliGRAM(s) Oral at bedtime  clopidogrel Tablet 75 milliGRAM(s) Oral daily  DULoxetine 20 milliGRAM(s) Oral daily  epoetin les Injectable 64958 Unit(s) SubCutaneous once  famotidine    Tablet 20 milliGRAM(s) Oral daily  ferrous    sulfate 325 milliGRAM(s) Oral daily  furosemide    Tablet 20 milliGRAM(s) Oral daily  gabapentin 300 milliGRAM(s) Oral three times a day  heparin  Injectable 5000 Unit(s) SubCutaneous every 12 hours  isosorbide   mononitrate ER Tablet (IMDUR) 30 milliGRAM(s) Oral daily  metoprolol succinate ER 50 milliGRAM(s) Oral daily  tamsulosin 0.4 milliGRAM(s) Oral at bedtime      TELEMETRY: 	    ECG:  	  RADIOLOGY:  OTHER: 	  	  LABS:	 	    CARDIAC MARKERS:            12-12    140  |  100  |  27<H>  ----------------------------<  101<H>  4.0   |  26  |  1.70<H>    Ca    9.1      12 Dec 2019 07:03      proBNP: Serum Pro-Brain Natriuretic Peptide: 1565 pg/mL (12-05 @ 23:57)  Serum Pro-Brain Natriuretic Peptide: 1690 pg/mL (12-05 @ 20:23)    Lipid Profile:   HgA1c:   TSH:         Assessment and plan  ---------------------------  4 days of shortness of breath, worse with exertion, resolves somewhat with rest, usually sleeps with 2 pillows but has been using 3 lately "to watch tv", has a hx of CHF does have some increased lower extremity swelling, no fevers or chills. Has not been having much chest pain with this, however last time he needed cardiac stenting he did not have chest pain either. Took a baby ASA today. Follows with Dr. Snow / Dr. Perez.  Saw Dr. Iggy Snow today and had an EKG which was less concerning, but referred here for further testing given his hx and risk factors.  ching pt s/p recent cath no sig cad ? chf /hfpef acute on chronic  check pro bnp  restarted on lasix with hx of hfpef  cardiac enzyme   stress test result noted the cath in 2018 with 90 % mid cx disease .will discuss with dr Lanza if the lesion can be intervened.   continue all cardiac meds  will adjust meds  dc norvasc   dc ivf, check renal function, stable  dc planning

## 2019-12-12 NOTE — PROGRESS NOTE ADULT - SUBJECTIVE AND OBJECTIVE BOX
NEPHROLOGY-[facility] [phone]        Patient seen and examined in bed.  He was in good spirits and offered no complaints         MEDICATIONS  (STANDING):  aspirin enteric coated 81 milliGRAM(s) Oral daily  atorvastatin 40 milliGRAM(s) Oral at bedtime  clopidogrel Tablet 75 milliGRAM(s) Oral daily  DULoxetine 20 milliGRAM(s) Oral daily  epoetin les Injectable 55350 Unit(s) SubCutaneous once  famotidine    Tablet 20 milliGRAM(s) Oral daily  ferrous    sulfate 325 milliGRAM(s) Oral daily  furosemide    Tablet 20 milliGRAM(s) Oral daily  gabapentin 300 milliGRAM(s) Oral three times a day  heparin  Injectable 5000 Unit(s) SubCutaneous every 12 hours  isosorbide   mononitrate ER Tablet (IMDUR) 30 milliGRAM(s) Oral daily  metoprolol succinate ER 50 milliGRAM(s) Oral daily  tamsulosin 0.4 milliGRAM(s) Oral at bedtime      VITAL:  T(C): , Max: 36.8 (12-12-19 @ 04:15)  T(F): , Max: 98.3 (12-12-19 @ 04:15)  HR: 80 (12-12-19 @ 04:15)  BP: 127/72 (12-12-19 @ 04:15)  BP(mean): --  RR: 18 (12-12-19 @ 04:15)  SpO2: 95% (12-12-19 @ 04:15)  Wt(kg): --    I and O's:    12-11 @ 07:01  -  12-12 @ 07:00  --------------------------------------------------------  IN: 400 mL / OUT: 100 mL / NET: 300 mL          PHYSICAL EXAM:    Constitutional: NAD  Neck:  No JVD  Respiratory: CTAB/L  Cardiovascular: S1 and S2  Gastrointestinal: BS+, soft, NT/ND  Extremities: No peripheral edema  Neurological: A/O x 3, no focal deficits  Psychiatric: Normal mood, normal affect  : No Flores  Skin: No rashes  Access: Not applicable    LABS:    12-12    140  |  100  |  27<H>  ----------------------------<  101<H>  4.0   |  26  |  1.70<H>    Ca    9.1      12 Dec 2019 07:03            Urine Studies:          RADIOLOGY & ADDITIONAL STUDIES:

## 2019-12-12 NOTE — PROGRESS NOTE ADULT - SUBJECTIVE AND OBJECTIVE BOX
feels well.  No SOB, c/p  difficulty walking.  Pt to be discharged to [facility]  Bed available per  notes.    PAST MEDICAL & SURGICAL HISTORY:  AAA (abdominal aortic aneurysm) without rupture  PAD (peripheral artery disease)  OA (osteoarthritis)  CAD (coronary artery disease)  HLD (hyperlipidemia)  CKD (chronic kidney disease)  HTN (hypertension)  Stented coronary artery  Colon polyp  BPH (benign prostatic hyperplasia)  Cellulitis: BL  CKD (chronic kidney disease) stage 3, GFR 30-59 ml/min  Leg swelling: related to cellulitis of LLE  No significant past medical history  History of coronary artery stent placement  History of colonoscopy  H/O inguinal hernia: repair  S/P hemorrhoidectom    	    PHYSICAL EXAM:  T(C): 36.6 (12-05-19 @ 20:16), Max: 36.6 (12-05-19 @ 20:16)  HR: 77 (12-05-19 @ 20:16) (77 - 85)  BP: 159/82 (12-05-19 @ 20:16) (116/75 - 159/82)  RR: 18 (12-05-19 @ 20:16) (18 - 18)  SpO2: 99% (12-05-19 @ 20:16) (99% - 99%)  Wt(kg): --  I&O's Summary      Appearance: Normal	  HEENT:   Normal oral mucosa, PERRL, EOMI	  Lymphatic: No lymphadenopathy  Cardiovascular: Normal S1 S2, No JVD, + murmurs, No edema  Respiratory: Lungs clear to auscultation	  Psychiatry: A & O x 3, Mood & affect appropriate  Gastrointestinal:  Soft, Non-tender, + BS	  Skin: No rashes, No ecchymoses, No cyanosis	  Neurologic: Non-focal  Extremities: Normal range of motion, No clubbing, cyanosis or edema  Vascular: Peripheral pulses palpable 2+ bilaterally        PREVIOUS DIAGNOSTIC TESTING:      < from: 12 Lead ECG (07.01.19 @ 21:38) >  Diagnosis Line Sinus rhythm with 1st degree A-V block  Right bundle branch block  Left anterior fascicular block  Anterolateral infarct , age undetermined  Abnormal ECG  No previous ECGs available  Confirmed by Mehrdad Boland ([id number]) on 7/3/2019 10:40:16 AM    < from: TTE with Doppler (w/Cont) (12.11.18 @ 13:23) >  1. Mild concentric left ventricular hypertrophy.  2. Endocardial visualization enhanced with intravenous  injection of Ultrasonic Enhancing Agent (Definity).  Hyperdynamic left ventricle. No obvious wall motion  abnormalities.  3. Reversal of the E-A  waves of the mitral inflow pattern  is consistent with diastolicLV dysfunction.  4. Normal right ventricular size and function.    < from: Cardiac Cath Lab - Adult (02.15.18 @ 10:19) >  CORONARY VESSELS: The coronary circulation is right dominant.  LM:   --  LM: Angiography showed minor luminal irregularities with no flow  limiting lesions.  LAD:   --  Ostial LAD: There was a 30 % stenosis.  --  Mid LAD: There was a 10 % stenosis at the site of a prior stent.  CX:   --  Mid circumflex: There was a 90 % stenosis.  RCA:   --  RCA: Angiography showed moderate atherosclerosis.  COMPLICATIONS: There were no complications.  DIAGNOSTIC RECOMMENDATIONS: IFR was 0.89  ASA and Plavix for 1 year      < from: Xray Chest 1 View AP/PA (02.13.19 @ 19:37) >  Lungs are clear. No pleural effusion or pneumothorax.   Cardiac size cannot be accurately assessed in this projection.     IMPRESSION:   Clear lungs      < from: TTE with Doppler (w/Cont) (12.11.18 @ 13:23) >  Conclusions:  1. Mild concentric left ventricular hypertrophy.  2. Endocardial visualization enhanced with intravenous  injection of Ultrasonic Enhancing Agent (Definity).  Hyperdynamic left ventricle. No obvious wall motion  abnormalities.  3. Reversal of the E-A  waves of the mitral inflow pattern  is consistent with diastolicLV dysfunction.  4. Normal right ventricular size and functio                  12-11    142  |  102  |  27<H>  ----------------------------<  97  4.0   |  26  |  1.71<H>    Ca    9.0      11 Dec 2019 07:15            MEDICATIONS  (STANDING):  aspirin enteric coated 81 milliGRAM(s) Oral daily  atorvastatin 40 milliGRAM(s) Oral at bedtime  clopidogrel Tablet 75 milliGRAM(s) Oral daily  DULoxetine 20 milliGRAM(s) Oral daily  epoetin les Injectable 80072 Unit(s) SubCutaneous once  famotidine    Tablet 20 milliGRAM(s) Oral daily  ferrous    sulfate 325 milliGRAM(s) Oral daily  furosemide    Tablet 20 milliGRAM(s) Oral daily  gabapentin 300 milliGRAM(s) Oral three times a day  heparin  Injectable 5000 Unit(s) SubCutaneous every 12 hours  isosorbide   mononitrate ER Tablet (IMDUR) 30 milliGRAM(s) Oral daily  metoprolol succinate ER 50 milliGRAM(s) Oral daily  tamsulosin 0.4 milliGRAM(s) Oral at bedtime    MEDICATIONS  (PRN):    Assessment and plan  ---------------------------  4 days of shortness of breath, worse with exertion, resolves somewhat with rest, usually sleeps with 2 pillows but has been using 3 lately "to watch tv", has a hx of CHF does have some increased lower extremity swelling, no fevers or chills. Has not been having much chest pain with this, however last time he needed cardiac stenting he did not have chest pain either. Took a baby ASA today. Follows with Dr. Snow / Dr. Perez.  Saw Dr. Iggy Snow today and had an EKG which was less concerning, but referred here for further testing given his hx and risk factors.  ching pt s/p recent cath no sig cad ? chf /hfpef acute on chronic  check pro bnp  hold lasix, increase renal function  cardiac enzyme   stress test result noted the cath in 2018 with 90 % mid cx disease .will discuss with dr Lanza if the lesion can be intervened.   continue all cardiac meds  Cath w/o LCX lesion post previous stent  No overt HF  PT  OP anemia w/u as planned  CKD stable  OK for D/C to [facility].  d/w son last night  Deon Perez MD  [phone]      Deon Perez MD  [phone]

## 2019-12-12 NOTE — PROGRESS NOTE ADULT - ASSESSMENT
<       · Assessment      The patient is an 89-year-old gentleman with the past medical history of coronary artery disease, increased cholesterol, arthritis and CKD stage 4 and AAA pw LOPEZ  At present object data does not suggest CHF and he has no chest pain.  Last echo with preserved EF. now s/p nuclear stress test  Secondary hyperparathyroidism     1 CVS-SP cath and there is no evidence for KAYLYN at present   2 Renal CKD stage 4 at baseline; Off IVF at present;  Keep off of Norvasc and BP is better as well   3 Anemia-  Procrit 24467 unit x 1 today     Physical therapy and dc planning to rehab

## 2019-12-12 NOTE — PROGRESS NOTE ADULT - REASON FOR ADMISSION
LOPEZ

## 2019-12-12 NOTE — DIETITIAN INITIAL EVALUATION ADULT. - OTHER INFO
Reason for admission: shortness of breath, dyspnea on exertion   Diet PTA: Pt resides at the [facility] (assisted living facility), states he is not on a specific diet there. Typically eats a muffin and cereal with juice for breakfast, grilled cheese for lunch, will eat the dinner served by the living facility if he likes it otherwise will have grilled cheese. Eats ice cream as a snack after dinner. Denies any supplement use.  Intake: pt reports he is not a good eater in general and has been eating ~50% during admission; this is the same amount he was eating PTA   Denies nausea/vomit   Denies difficulty chewing /swallow   Last BM: 12/11  NKFA  IBW +/- 10% = 166 pounds  Ht: 70 In  Usual Weight PTA: pt reports usual weight of 190 pounds, current weight is 192 pounds. Per previous RD note, admission weight in July 2019 was 177 pound with reported weight loss r/t pt not eating much when he first moved in to the [facility], pt did not like the food served. Weight is back to pt usual weight and has been stable, no reports from pt of weight gain/weight loss.  BMI: 28, using current weight (12/12) 192 pounds  skin: no pressure ulcer  edema: non pitting right and left ankle Reason for admission: shortness of breath, dyspnea on exertion   Diet PTA: Pt resides at the [facility] (assisted living facility), states he is not on a specific diet there. Typically eats a muffin and cereal with juice for breakfast, grilled cheese for lunch, will eat the dinner served by the living facility if he likes it otherwise will have grilled cheese. Eats ice cream as a snack after dinner. Denies any supplement use.  Intake: pt reports he is not a good eater in general and has been eating ~50% during admission; this is the same amount he was eating PTA   Denies nausea/vomit   Denies difficulty chewing /swallow   Last BM: 12/11  NKFA  IBW +/- 10% = 166 pounds  Ht: 70 In  Usual Weight PTA: pt reports usual weight of 190 pounds, current weight is 192 pounds. Per previous RD note, admission weight in July 2019 was 177 pound with reported weight loss r/t pt not eating much when he first moved in to the [facility], pt did not like the food served. Weight is back to pt usual weight and has been stable, no reports from pt of weight gain/weight loss.  BMI: 28, using current weight (12/12) 192 pounds  Education provided: due to pt elevated creatinine levels, discussed protein restriction with pt and provided education on protein content of foods as well as how to read a food label, pt was appreciative of information   skin: no pressure ulcer  edema: non pitting right and left ankle Reason for admission: shortness of breath, dyspnea on exertion   Diet PTA: Pt resides at the [facility] (assisted living facility), states he is not on a specific diet there. Typically eats a muffin and cereal with juice for breakfast, grilled cheese for lunch, will eat the dinner served by the living facility if he likes it otherwise will have grilled cheese. Eats ice cream as a snack after dinner. Denies any supplement use.  Intake: pt reports he is not a good eater in general and has been eating ~50% during admission; this is the same amount he was eating PTA   Denies nausea/vomit   Denies difficulty chewing /swallow   Last BM: 12/11  NKFA  IBW +/- 10% = 166 pounds  Ht: 70 In  Usual Weight PTA: pt reports usual weight of 190 pounds, current weight is 192 pounds. Per previous RD note, admission weight in July 2019 was 177 pound with reported weight loss related to pt not eating much when he first moved in to the [facility], pt did not like the food served. Weight is back to pt usual weight and has been stable, no reports from pt of weight gain/weight loss.  BMI: 28, using current weight (12/12) 192 pounds  Education provided: due to pt elevated creatinine levels, discussed protein restriction with pt and provided education on protein content of foods as well as how to read a food label, pt was appreciative of information   skin: no pressure ulcer  edema: non pitting right and left ankle

## 2019-12-12 NOTE — DIETITIAN INITIAL EVALUATION ADULT. - ADD RECOMMEND
1) Monitor po intake to prevent weight loss 2) Obtain daily weights 3) Monitor nutrition related lab values 4) 1) Recommend liberalizing diet to low sodium to increase meal options, 60 Gm protein due to elevated Creatinine levels 2) Monitor po intake to prevent weight loss 3) Obtain daily weights 4) Monitor nutrition related lab values

## 2020-01-30 ENCOUNTER — EMERGENCY (EMERGENCY)
Facility: HOSPITAL | Age: 85
LOS: 0 days | Discharge: HOME | End: 2020-01-30
Attending: EMERGENCY MEDICINE
Payer: MEDICARE

## 2020-01-30 VITALS
OXYGEN SATURATION: 97 % | TEMPERATURE: 99 F | DIASTOLIC BLOOD PRESSURE: 46 MMHG | SYSTOLIC BLOOD PRESSURE: 106 MMHG | RESPIRATION RATE: 18 BRPM | HEART RATE: 76 BPM | WEIGHT: 184.09 LBS

## 2020-01-30 DIAGNOSIS — I73.9 PERIPHERAL VASCULAR DISEASE, UNSPECIFIED: ICD-10-CM

## 2020-01-30 DIAGNOSIS — Z95.5 PRESENCE OF CORONARY ANGIOPLASTY IMPLANT AND GRAFT: ICD-10-CM

## 2020-01-30 DIAGNOSIS — Z87.19 PERSONAL HISTORY OF OTHER DISEASES OF THE DIGESTIVE SYSTEM: Chronic | ICD-10-CM

## 2020-01-30 DIAGNOSIS — I25.10 ATHEROSCLEROTIC HEART DISEASE OF NATIVE CORONARY ARTERY WITHOUT ANGINA PECTORIS: ICD-10-CM

## 2020-01-30 DIAGNOSIS — S81.811A LACERATION WITHOUT FOREIGN BODY, RIGHT LOWER LEG, INITIAL ENCOUNTER: ICD-10-CM

## 2020-01-30 DIAGNOSIS — Z79.82 LONG TERM (CURRENT) USE OF ASPIRIN: ICD-10-CM

## 2020-01-30 DIAGNOSIS — M19.90 UNSPECIFIED OSTEOARTHRITIS, UNSPECIFIED SITE: ICD-10-CM

## 2020-01-30 DIAGNOSIS — Z88.0 ALLERGY STATUS TO PENICILLIN: ICD-10-CM
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
## 2020-01-30 DIAGNOSIS — I71.4 ABDOMINAL AORTIC ANEURYSM, WITHOUT RUPTURE: ICD-10-CM

## 2020-01-30 DIAGNOSIS — Z95.5 PRESENCE OF CORONARY ANGIOPLASTY IMPLANT AND GRAFT: Chronic | ICD-10-CM

## 2020-01-30 DIAGNOSIS — N18.9 CHRONIC KIDNEY DISEASE, UNSPECIFIED: ICD-10-CM

## 2020-01-30 DIAGNOSIS — Z98.890 OTHER SPECIFIED POSTPROCEDURAL STATES: Chronic | ICD-10-CM

## 2020-01-30 DIAGNOSIS — E78.5 HYPERLIPIDEMIA, UNSPECIFIED: ICD-10-CM

## 2020-01-30 DIAGNOSIS — Z98.89 OTHER SPECIFIED POSTPROCEDURAL STATES: Chronic | ICD-10-CM

## 2020-01-30 DIAGNOSIS — N40.0 BENIGN PROSTATIC HYPERPLASIA WITHOUT LOWER URINARY TRACT SYMPTOMS: ICD-10-CM

## 2020-01-30 DIAGNOSIS — I12.9 HYPERTENSIVE CHRONIC KIDNEY DISEASE WITH STAGE 1 THROUGH STAGE 4 CHRONIC KIDNEY DISEASE, OR UNSPECIFIED CHRONIC KIDNEY DISEASE: ICD-10-CM

## 2020-01-30 PROCEDURE — 99282 EMERGENCY DEPT VISIT SF MDM: CPT

## 2020-01-30 NOTE — ED ADULT NURSE NOTE - OBJECTIVE STATEMENT
Pt was brought from [facility]. pt injured him self yesterday from a hitting his leg with a door. lac to his right lower leg. wound was evaluated by Bev FRIED. no suture advised.pt heaving difficulty standing on his own.

## 2020-01-30 NOTE — ED STATDOCS - PATIENT PORTAL LINK FT
You can access the FollowMyHealth Patient Portal offered by [facility] by registering at the following website: [email or web address]. By joining [employer]’s FollowMyHealth portal, you will also be able to view your health information using other applications (apps) compatible with our system.

## 2020-01-30 NOTE — ED STATDOCS - ATTENDING CONTRIBUTION TO CARE
I, Deana Walden MD,  performed the initial face to face bedside interview with this patient regarding history of present illness, review of symptoms and relevant past medical, social and family history.  I completed an independent physical examination.  I was the initial provider who evaluated this patient. I have signed out the follow up of any pending tests (i.e. labs, radiological studies) to the ACP.  I have communicated the patient’s plan of care and disposition with the ACP.  The history, relevant review of systems, past medical and surgical history, medical decision making, and physical examination was documented by the scribe in my presence and I attest to the accuracy of the documentation.

## 2020-01-30 NOTE — ED ADULT TRIAGE NOTE - CHIEF COMPLAINT QUOTE
Patient comes in with laceration to his right leg that occurred yesterday. As per patient, he caught his leg on a door while walking. Denies any other symptoms. Bleeding controlled at triage, wrapped by EMS.

## 2020-01-30 NOTE — ED STATDOCS - SKIN, MLM
skin normal color for race, warm, dry. +right leg with 4cm deep skin tear. No redness, no drainage. Old skin tear left elbow.

## 2020-01-30 NOTE — ED STATDOCS - NSFOLLOWUPINSTRUCTIONS_ED_ALL_ED_FT
Nonsutured Laceration Care  A laceration is a cut that may go through all layers of the skin and extend into the tissue that is right under the skin. This type of cut is usually stitched up (sutured) or closed with tape (adhesive strips) or skin glue shortly after the injury happens.  However, if the wound is dirty or if several hours pass before medical treatment is provided, it is likely that germs (bacteria) will enter the wound. Closing a laceration after bacteria have entered it increases the risk of infection. In these cases, your health care provider may leave the laceration open (nonsutured) and cover it with a bandage. This type of treatment helps prevent infection and allows the wound to heal from the deepest layer of tissue damage up to the surface.  An open fracture is a type of injury that may involve nonsutured lacerations. An open fracture is a break in a bone that happens along with lacerations through the skin at the fracture site.  What are the risks?  Caring for a nonsutured laceration is safe. However, problems may occur, including a higher risk for:  Scarring.Infection.Slow healing.Supplies needed:  Soap.Hand .Sterile water or irrigation solution.Bandages (dressings).Clean towel.Antibiotic ointment.How to care for your nonsutured laceration  Follow instructions from your health care provider about how to take care of your wound.  Keep the wound clean and dry.Change any dressings as told by your health care provider. This includes changing the dressing when it starts to smell, or when it gets wet or dirty.Clean the wound one time each day, or as often as told by your health care provider. To clean your wound:  Wash your hands with soap and water. If soap and water are not available, use hand .Remove any dressing as told by your health care provider.Clean the wound with sterile water or irrigation solution as told by your health care provider.Pat the wound dry with a clean towel. Do not rub the wound.Apply a thin layer of antibiotic ointment to the wound as told by your health care provider. This will prevent infection and keep the dressing from sticking to the wound.Apply a new dressing as told by your health care provider.Check your wound every day for signs of infection. Watch for:  Redness, swelling, or pain.Fluid, blood, or pus.Bad smell on the wound or dressing.Warmth.Do not take baths, swim, or do anything that puts your wound underwater until your health care provider approves.Do not scratch or pick at the wound.Follow these instructions at home:  Take or apply over-the-counter and prescription medicines only as told by your health care provider.If you were prescribed an antibiotic medicine, take or apply it as told by your health care provider. Do not stop using the antibiotic even if your condition improves.Do not inject anything into the wound unless directed by your health care provider.Raise (elevate) the injured area above the level of your heart while you are sitting or lying down, if possible.If directed, put ice on the affected area:  Put ice in a plastic bag.Place a towel between your skin and the bag.Leave the ice on for 20 minutes, 2–3 times a day.Keep all follow-up visits as told by your health care provider. This is important.Contact a health care provider if:  You received a tetanus shot and you have swelling, severe pain, redness, or bleeding at the injection site.You have a fever.Your pain is not controlled with medicine.You have increased redness, swelling, or pain at the site of your wound.You have fluid, blood, or pus coming from your wound.You notice a bad smell coming from your wound or your dressing.You notice something coming out of the wound, such as wood or glass.You notice a change in the color of your skin near your wound.You develop a new rash.You need to change the dressing frequently due to fluid, blood, or pus draining from the wound.You develop numbness around your wound.Get help right away if:  Your pain suddenly increases and is severe.You develop severe swelling around the wound.The wound is on your hand or foot and you cannot properly move a finger or toe.The wound is on your hand or foot, and you notice that your fingers or toes look pale or bluish.You have a red streak going away from your wound.Summary  A laceration is a cut that may go through all layers of the skin and extend into the tissue that is right under the skin. It is usually closed with stitches, tape, or skin glue shortly after the injury happens.If a wound is dirty or if several hours pass before medical treatment is provided, the laceration may be kept open (nonsutured) and covered with a bandage.This type of treatment helps prevent infection and allows the wound to heal from the deepest layer of tissue damage up to the surface.Follow instructions from your health care provider about how to take care of your wound.This information is not intended to replace advice given to you by your health care provider. Make sure you discuss any questions you have with your health care provider.     HAVE DAILY WOUND CARE, KEEP THE WOUND CLEAN AND COVERED. ASK YOUR DOCTOR IF YOUR TETANUS SHOT IS UP TO DATE. RETURN TO ER FOR ANY WORSENING SYMPTOMS OR NEW CONCERNS. SEE YOUR DOCTOR IN 2-3 DAYS FOR WOUND EVALUATION.

## 2020-02-15 ENCOUNTER — INPATIENT (INPATIENT)
Facility: HOSPITAL | Age: 85
LOS: 2 days | Discharge: HOME | DRG: 378 | End: 2020-02-18
Attending: HOSPITALIST | Admitting: HOSPITALIST
Payer: MEDICARE

## 2020-02-15 VITALS — WEIGHT: 184.09 LBS | HEIGHT: 70 IN

## 2020-02-15 DIAGNOSIS — Z29.9 ENCOUNTER FOR PROPHYLACTIC MEASURES, UNSPECIFIED: ICD-10-CM

## 2020-02-15 DIAGNOSIS — Z98.890 OTHER SPECIFIED POSTPROCEDURAL STATES: Chronic | ICD-10-CM

## 2020-02-15 DIAGNOSIS — Z98.89 OTHER SPECIFIED POSTPROCEDURAL STATES: Chronic | ICD-10-CM

## 2020-02-15 DIAGNOSIS — K92.2 GASTROINTESTINAL HEMORRHAGE, UNSPECIFIED: ICD-10-CM

## 2020-02-15 DIAGNOSIS — Z87.19 PERSONAL HISTORY OF OTHER DISEASES OF THE DIGESTIVE SYSTEM: Chronic | ICD-10-CM

## 2020-02-15 DIAGNOSIS — D64.9 ANEMIA, UNSPECIFIED: ICD-10-CM

## 2020-02-15 DIAGNOSIS — L03.115 CELLULITIS OF RIGHT LOWER LIMB: ICD-10-CM

## 2020-02-15 DIAGNOSIS — I25.10 ATHEROSCLEROTIC HEART DISEASE OF NATIVE CORONARY ARTERY WITHOUT ANGINA PECTORIS: ICD-10-CM

## 2020-02-15 DIAGNOSIS — N17.9 ACUTE KIDNEY FAILURE, UNSPECIFIED: ICD-10-CM

## 2020-02-15 DIAGNOSIS — Z95.5 PRESENCE OF CORONARY ANGIOPLASTY IMPLANT AND GRAFT: Chronic | ICD-10-CM

## 2020-02-15 LAB
ALBUMIN SERPL ELPH-MCNC: 3.1 G/DL — LOW (ref 3.3–5)
ALP SERPL-CCNC: 97 U/L — SIGNIFICANT CHANGE UP (ref 40–120)
ALT FLD-CCNC: 18 U/L — SIGNIFICANT CHANGE UP (ref 12–78)
ANION GAP SERPL CALC-SCNC: 3 MMOL/L — LOW (ref 5–17)
APTT BLD: 30.2 SEC — SIGNIFICANT CHANGE UP (ref 27.5–36.3)
AST SERPL-CCNC: 15 U/L — SIGNIFICANT CHANGE UP (ref 15–37)
BASOPHILS # BLD AUTO: 0.02 K/UL — SIGNIFICANT CHANGE UP (ref 0–0.2)
BASOPHILS NFR BLD AUTO: 0.3 % — SIGNIFICANT CHANGE UP (ref 0–2)
BILIRUB SERPL-MCNC: 0.2 MG/DL — SIGNIFICANT CHANGE UP (ref 0.2–1.2)
BUN SERPL-MCNC: 23 MG/DL — SIGNIFICANT CHANGE UP (ref 7–23)
CALCIUM SERPL-MCNC: 9 MG/DL — SIGNIFICANT CHANGE UP (ref 8.5–10.1)
CHLORIDE SERPL-SCNC: 110 MMOL/L — HIGH (ref 96–108)
CO2 SERPL-SCNC: 30 MMOL/L — SIGNIFICANT CHANGE UP (ref 22–31)
CREAT SERPL-MCNC: 2.03 MG/DL — HIGH (ref 0.5–1.3)
EOSINOPHIL # BLD AUTO: 0.15 K/UL — SIGNIFICANT CHANGE UP (ref 0–0.5)
EOSINOPHIL NFR BLD AUTO: 2.1 % — SIGNIFICANT CHANGE UP (ref 0–6)
GLUCOSE SERPL-MCNC: 98 MG/DL — SIGNIFICANT CHANGE UP (ref 70–99)
HCT VFR BLD CALC: 25.2 % — LOW (ref 39–50)
HGB BLD-MCNC: 7.5 G/DL — LOW (ref 13–17)
IMM GRANULOCYTES NFR BLD AUTO: 0.3 % — SIGNIFICANT CHANGE UP (ref 0–1.5)
INR BLD: 1.09 RATIO — SIGNIFICANT CHANGE UP (ref 0.88–1.16)
LYMPHOCYTES # BLD AUTO: 0.89 K/UL — LOW (ref 1–3.3)
LYMPHOCYTES # BLD AUTO: 12.4 % — LOW (ref 13–44)
MCHC RBC-ENTMCNC: 27.5 PG — SIGNIFICANT CHANGE UP (ref 27–34)
MCHC RBC-ENTMCNC: 29.8 GM/DL — LOW (ref 32–36)
MCV RBC AUTO: 92.3 FL — SIGNIFICANT CHANGE UP (ref 80–100)
MONOCYTES # BLD AUTO: 0.51 K/UL — SIGNIFICANT CHANGE UP (ref 0–0.9)
MONOCYTES NFR BLD AUTO: 7.1 % — SIGNIFICANT CHANGE UP (ref 2–14)
NEUTROPHILS # BLD AUTO: 5.56 K/UL — SIGNIFICANT CHANGE UP (ref 1.8–7.4)
NEUTROPHILS NFR BLD AUTO: 77.8 % — HIGH (ref 43–77)
PLATELET # BLD AUTO: 302 K/UL — SIGNIFICANT CHANGE UP (ref 150–400)
POTASSIUM SERPL-MCNC: 5.1 MMOL/L — SIGNIFICANT CHANGE UP (ref 3.5–5.3)
POTASSIUM SERPL-SCNC: 5.1 MMOL/L — SIGNIFICANT CHANGE UP (ref 3.5–5.3)
PROT SERPL-MCNC: 7.1 GM/DL — SIGNIFICANT CHANGE UP (ref 6–8.3)
PROTHROM AB SERPL-ACNC: 12.1 SEC — SIGNIFICANT CHANGE UP (ref 10–12.9)
RBC # BLD: 2.73 M/UL — LOW (ref 4.2–5.8)
RBC # FLD: 15.6 % — HIGH (ref 10.3–14.5)
SODIUM SERPL-SCNC: 143 MMOL/L — SIGNIFICANT CHANGE UP (ref 135–145)
WBC # BLD: 7.15 K/UL — SIGNIFICANT CHANGE UP (ref 3.8–10.5)
WBC # FLD AUTO: 7.15 K/UL — SIGNIFICANT CHANGE UP (ref 3.8–10.5)

## 2020-02-15 PROCEDURE — 84100 ASSAY OF PHOSPHORUS: CPT

## 2020-02-15 PROCEDURE — 83735 ASSAY OF MAGNESIUM: CPT

## 2020-02-15 PROCEDURE — 85027 COMPLETE CBC AUTOMATED: CPT

## 2020-02-15 PROCEDURE — 71045 X-RAY EXAM CHEST 1 VIEW: CPT | Mod: 26

## 2020-02-15 PROCEDURE — 36430 TRANSFUSION BLD/BLD COMPNT: CPT

## 2020-02-15 PROCEDURE — 90714 TD VACC NO PRESV 7 YRS+ IM: CPT

## 2020-02-15 PROCEDURE — 36415 COLL VENOUS BLD VENIPUNCTURE: CPT

## 2020-02-15 PROCEDURE — 99223 1ST HOSP IP/OBS HIGH 75: CPT | Mod: AI

## 2020-02-15 PROCEDURE — 93010 ELECTROCARDIOGRAM REPORT: CPT

## 2020-02-15 PROCEDURE — 97530 THERAPEUTIC ACTIVITIES: CPT | Mod: GP

## 2020-02-15 PROCEDURE — 80048 BASIC METABOLIC PNL TOTAL CA: CPT

## 2020-02-15 PROCEDURE — C9113: CPT

## 2020-02-15 PROCEDURE — 71045 X-RAY EXAM CHEST 1 VIEW: CPT

## 2020-02-15 PROCEDURE — 97162 PT EVAL MOD COMPLEX 30 MIN: CPT | Mod: GP

## 2020-02-15 PROCEDURE — P9016: CPT

## 2020-02-15 PROCEDURE — 93005 ELECTROCARDIOGRAM TRACING: CPT

## 2020-02-15 PROCEDURE — 97116 GAIT TRAINING THERAPY: CPT | Mod: GP

## 2020-02-15 RX ORDER — ATORVASTATIN CALCIUM 80 MG/1
40 TABLET, FILM COATED ORAL AT BEDTIME
Refills: 0 | Status: DISCONTINUED | OUTPATIENT
Start: 2020-02-15 | End: 2020-02-18

## 2020-02-15 RX ORDER — SODIUM CHLORIDE 9 MG/ML
500 INJECTION INTRAMUSCULAR; INTRAVENOUS; SUBCUTANEOUS
Refills: 0 | Status: DISCONTINUED | OUTPATIENT
Start: 2020-02-15 | End: 2020-02-18

## 2020-02-15 RX ORDER — TAMSULOSIN HYDROCHLORIDE 0.4 MG/1
0.4 CAPSULE ORAL AT BEDTIME
Refills: 0 | Status: DISCONTINUED | OUTPATIENT
Start: 2020-02-15 | End: 2020-02-18

## 2020-02-15 RX ORDER — PANTOPRAZOLE SODIUM 20 MG/1
80 TABLET, DELAYED RELEASE ORAL ONCE
Refills: 0 | Status: COMPLETED | OUTPATIENT
Start: 2020-02-15 | End: 2020-02-15

## 2020-02-15 RX ORDER — FUROSEMIDE 40 MG
20 TABLET ORAL DAILY
Refills: 0 | Status: DISCONTINUED | OUTPATIENT
Start: 2020-02-15 | End: 2020-02-16

## 2020-02-15 RX ORDER — ONDANSETRON 8 MG/1
4 TABLET, FILM COATED ORAL EVERY 6 HOURS
Refills: 0 | Status: DISCONTINUED | OUTPATIENT
Start: 2020-02-15 | End: 2020-02-18

## 2020-02-15 RX ORDER — GABAPENTIN 400 MG/1
400 CAPSULE ORAL THREE TIMES A DAY
Refills: 0 | Status: DISCONTINUED | OUTPATIENT
Start: 2020-02-15 | End: 2020-02-18

## 2020-02-15 RX ORDER — DULOXETINE HYDROCHLORIDE 30 MG/1
30 CAPSULE, DELAYED RELEASE ORAL DAILY
Refills: 0 | Status: DISCONTINUED | OUTPATIENT
Start: 2020-02-15 | End: 2020-02-18

## 2020-02-15 RX ORDER — METOPROLOL TARTRATE 50 MG
50 TABLET ORAL DAILY
Refills: 0 | Status: DISCONTINUED | OUTPATIENT
Start: 2020-02-15 | End: 2020-02-18

## 2020-02-15 RX ORDER — TRAMADOL HYDROCHLORIDE 50 MG/1
25 TABLET ORAL EVERY 12 HOURS
Refills: 0 | Status: DISCONTINUED | OUTPATIENT
Start: 2020-02-15 | End: 2020-02-18

## 2020-02-15 RX ORDER — PANTOPRAZOLE SODIUM 20 MG/1
40 TABLET, DELAYED RELEASE ORAL EVERY 12 HOURS
Refills: 0 | Status: DISCONTINUED | OUTPATIENT
Start: 2020-02-15 | End: 2020-02-16

## 2020-02-15 RX ORDER — CEFTRIAXONE 500 MG/1
1000 INJECTION, POWDER, FOR SOLUTION INTRAMUSCULAR; INTRAVENOUS EVERY 24 HOURS
Refills: 0 | Status: COMPLETED | OUTPATIENT
Start: 2020-02-15 | End: 2020-02-16

## 2020-02-15 RX ORDER — ACETAMINOPHEN 500 MG
650 TABLET ORAL EVERY 6 HOURS
Refills: 0 | Status: DISCONTINUED | OUTPATIENT
Start: 2020-02-15 | End: 2020-02-18

## 2020-02-15 RX ORDER — CLOPIDOGREL BISULFATE 75 MG/1
75 TABLET, FILM COATED ORAL DAILY
Refills: 0 | Status: DISCONTINUED | OUTPATIENT
Start: 2020-02-16 | End: 2020-02-18

## 2020-02-15 RX ADMIN — Medication 20 MILLIGRAM(S): at 19:02

## 2020-02-15 RX ADMIN — Medication 100 MILLIGRAM(S): at 13:05

## 2020-02-15 RX ADMIN — PANTOPRAZOLE SODIUM 80 MILLIGRAM(S): 20 TABLET, DELAYED RELEASE ORAL at 15:55

## 2020-02-15 RX ADMIN — TAMSULOSIN HYDROCHLORIDE 0.4 MILLIGRAM(S): 0.4 CAPSULE ORAL at 21:10

## 2020-02-15 RX ADMIN — ATORVASTATIN CALCIUM 40 MILLIGRAM(S): 80 TABLET, FILM COATED ORAL at 21:10

## 2020-02-15 RX ADMIN — CEFTRIAXONE 1000 MILLIGRAM(S): 500 INJECTION, POWDER, FOR SOLUTION INTRAMUSCULAR; INTRAVENOUS at 21:10

## 2020-02-15 RX ADMIN — Medication 50 MILLIGRAM(S): at 21:23

## 2020-02-15 RX ADMIN — PANTOPRAZOLE SODIUM 40 MILLIGRAM(S): 20 TABLET, DELAYED RELEASE ORAL at 21:10

## 2020-02-15 RX ADMIN — GABAPENTIN 400 MILLIGRAM(S): 400 CAPSULE ORAL at 21:10

## 2020-02-15 NOTE — H&P ADULT - PROBLEM SELECTOR PLAN 2
possibly GI bleed- guaiac pos in ED, but no gross blood  transfuse 1 units PRBC  check h/h and transfuse as indicated  pt denies any symptoms  known to have iron def- on supplementation  GI cs for input   monitor vitals  IV PPI bid

## 2020-02-15 NOTE — ED STATDOCS - OBJECTIVE STATEMENT
89 YOF PMHx of CAD s/p coronary stents, HTN, HLD, AAA, PAD, CKD, BPH, OA, PSHx of inguinal hernia repair presents to ED c/o wound to RLE for past 2 weeks. Evaluated at ED 2 weeks ago for RLE laceration. Family at bedside, say staff at [facility] told pt this RLE laceration has been worsening in appearance for a "couple of days." Denies fever. Currently pt feels pain with palpation of wound area with swelling, reports history of cellulitis. Takes Lasix. Allergic to Penicillin.

## 2020-02-15 NOTE — ED STATDOCS - CLINICAL SUMMARY MEDICAL DECISION MAKING FREE TEXT BOX
Pt with likely upper GI bleed, with anemia; cellulitis of RLE.  No signs of sepsis; hemodynamically stable while in ED.  Given IV antibiotics, protonix, PRBC infusion.  Admit to medicine service.

## 2020-02-15 NOTE — ED ADULT NURSE NOTE - OBJECTIVE STATEMENT
pt presents to ED from home, pt alert and orietnedx4, VSS afebrule, pt c/o wound to right lower leg for the apst few weeks that is red, pt denies paina nd fever but has concerns it may be infected. safety maintained will continue to monitor

## 2020-02-15 NOTE — H&P ADULT - PROBLEM SELECTOR PLAN 1
2/2 open wound  start IV rocephin  check blood cultures  ID cs   wound care cs   may need debridement- defer to ID recs

## 2020-02-15 NOTE — ED ADULT NURSE REASSESSMENT NOTE - NS ED NURSE REASSESS COMMENT FT1
Transfuse PRBC for 3 hours as per Carson Vigil PA-C
Assumed care of patient from ANGELICA Winters at 1645. Patient AxOx3, forgetful. Patient resting comfortably in bed, denies pain at this time. Patient in no acute signs of distress. Patient has PRBC infusing to left AC #20 with no signs of infiltrate. Patient noted to have dressing to RLE, swollen and reddness around the dressing. Dressing C,D,I. All needs addressed at this time. Safety and comfort maintained. Will continue to monitor.

## 2020-02-15 NOTE — H&P ADULT - ASSESSMENT
89M w/PMH CAD s/p stents, HTN, AAA, PAD, CKD3, had recent laceration of RLE 2 weeks ago (no abx), presents today from [place] for worsening surrounding erythema of same wound.   In ED, Hg 7.5, G +, 1 unit PRBC ordered, protonix 80mg IV x1, and iv clinda for RLE wound, blood cultures ordered. Cr 2 (baseline appears to be about 1.7).     GOC d/w pt regarding code status.  Pt wishes to be FULL code.

## 2020-02-15 NOTE — H&P ADULT - PROBLEM SELECTOR PLAN 3
possibly d/t anemia, hypovolemia, progression of renal disease  will give IVF- 500cc and PRBC  check AM labs  consider renal eval if indicated

## 2020-02-15 NOTE — ED STATDOCS - SKIN, MLM
+4 cm laceration, incompletely healed, no discharge, no bleeding. +RLE extremity edema and erythema, however, no warmth.

## 2020-02-15 NOTE — H&P ADULT - HISTORY OF PRESENT ILLNESS
HPI:      PAST MEDICAL & SURGICAL HISTORY:  AAA (abdominal aortic aneurysm) without rupture  PAD (peripheral artery disease)  OA (osteoarthritis)  CAD (coronary artery disease)  HLD (hyperlipidemia)  CKD (chronic kidney disease)  HTN (hypertension)  Stented coronary artery  Colon polyp  BPH (benign prostatic hyperplasia)  Cellulitis: BL  CKD (chronic kidney disease) stage 3, GFR 30-59 ml/min  Leg swelling: related to cellulitis of LLE  No significant past medical history  History of coronary artery stent placement  History of colonoscopy  H/O inguinal hernia: repair  S/P hemorrhoidectomy      Review of Systems:   CONSTITUTIONAL: No fever.  EYES: No eye pain or discharge.  ENMT:  No sinus or throat pain  NECK: No pain or stiffness  RESPIRATORY: No cough, wheezing, chills or hemoptysis; No shortness of breath  CARDIOVASCULAR: No chest pain, palpitations, dizziness, or leg swelling  GASTROINTESTINAL: No abdominal or epigastric pain. No nausea, vomiting, or hematemesis; No diarrhea or constipation. No melena or hematochezia.  GENITOURINARY: No dysuria or incontinence  NEUROLOGICAL: No headaches, memory loss, loss of strength, numbness, or tremors  SKIN: No rashes.  LYMPH NODES: No enlarged glands  ENDOCRINE: No heat or cold intolerance; No hair loss  MUSCULOSKELETAL: No joint pain or swelling; No muscle, back, or extremity pain  PSYCHIATRIC: No depression, anxiety, mood swings, or difficulty sleeping  HEME/LYMPH: No easy bruising, or bleeding gums  ALLERY AND IMMUNOLOGIC: No hives or eczema    Allergies    penicillin (Angioedema)  penicillins (Unknown)    Intolerances        Social History:     FAMILY HISTORY:  No pertinent family history in first degree relatives      Home Medications:  aspirin 81 mg oral delayed release tablet: 1 tab(s) orally once a day (15 Feb 2020 17:31)  clopidogrel 75 mg oral tablet: 1 tab(s) orally once a day (15 Feb 2020 17:31)  Colace 100 mg oral capsule: 2 cap(s) orally once a day (at bedtime), As Needed (15 Feb 2020 17:31)  DULoxetine 30 mg oral delayed release capsule: 1 cap(s) orally once a day (15 Feb 2020 17:31)  famotidine 20 mg oral tablet: 1 tab(s) orally 2 times a day (15 Feb 2020 17:31)  ferrous sulfate 325 mg (65 mg elemental iron) oral tablet: 1 tab(s) orally once a day (15 Feb 2020 17:31)  furosemide 20 mg oral tablet: 1 tab(s) orally once a day (15 Feb 2020 17:31)  gabapentin 400 mg oral capsule: 1 cap(s) orally 3 times a day (15 Feb 2020 17:31)  metoprolol succinate 50 mg oral tablet, extended release: 1 tab(s) orally once a day (15 Feb 2020 17:31)  Multiple Vitamins oral tablet: 1 tab(s) orally once a day (15 Feb 2020 17:31)  tamsulosin 0.4 mg oral capsule: 1 cap(s) orally once a day (at bedtime) (15 Feb 2020 17:31)  traMADol 50 mg oral tablet: 1/2 tablet by mouth every 12 hours as needed for pain (15 Feb 2020 17:31)  Vitamin B-12 1000 mcg oral tablet: 1 tab(s) orally once a day (15 Feb 2020 17:31)      MEDICATIONS  (STANDING):  atorvastatin 40 milliGRAM(s) Oral at bedtime  cefTRIAXone Injectable. 1000 milliGRAM(s) IV Push every 24 hours  DULoxetine 30 milliGRAM(s) Oral daily  furosemide    Tablet 20 milliGRAM(s) Oral daily  gabapentin 400 milliGRAM(s) Oral three times a day  metoprolol succinate ER 50 milliGRAM(s) Oral daily  pantoprazole  Injectable 40 milliGRAM(s) IV Push every 12 hours  sodium chloride 0.9%. 500 milliLiter(s) (100 mL/Hr) IV Continuous <Continuous>  tamsulosin 0.4 milliGRAM(s) Oral at bedtime    MEDICATIONS  (PRN):  acetaminophen   Tablet .. 650 milliGRAM(s) Oral every 6 hours PRN Mild Pain (1 - 3)  ondansetron Injectable 4 milliGRAM(s) IV Push every 6 hours PRN Nausea  traMADol 25 milliGRAM(s) Oral every 12 hours PRN Severe Pain (7 - 10)      CAPILLARY BLOOD GLUCOSE        I&O's Summary      PHYSICAL EXAM:  Vital Signs Last 24 Hrs  T(C): 36.5 (15 Feb 2020 17:48), Max: 36.5 (15 Feb 2020 17:48)  T(F): 97.7 (15 Feb 2020 17:48), Max: 97.7 (15 Feb 2020 17:48)  HR: 65 (15 Feb 2020 17:48) (63 - 69)  BP: 143/63 (15 Feb 2020 17:48) (113/49 - 143/63)  BP(mean): 69 (15 Feb 2020 15:12) (68 - 69)  RR: 18 (15 Feb 2020 17:48) (18 - 19)  SpO2: 100% (15 Feb 2020 17:48) (100% - 100%)  GENERAL: NAD, well-developed  HEAD:  Atraumatic, Normocephalic  EYES: EOMI, PERRLA, conjunctiva and sclera clear  ENT: normal hearing, no nasal discharge, throat clear, dentition normal  NECK: Supple, No JVD, no LAD, no thyromegaly   CHEST/LUNG: Clear to auscultation bilaterally; No wheeze, respirations unlabored  HEART: Regular rate and rhythm; No murmurs, rubs, or gallops  ABDOMEN: Soft, Nontender, Nondistended; Bowel sounds present, no HSM  EXTREMITIES:  2+ Peripheral Pulses, No clubbing, cyanosis, or edema  PSYCH: AAOx3, normal behavior  NEUROLOGY: non-focal, sensory and cn 2-12 intact, speech/language intact  SKIN: No visible rashes or lesions    LABS:                        7.5    7.15  )-----------( 302      ( 15 Feb 2020 12:37 )             25.2     02-15    143  |  110<H>  |  23  ----------------------------<  98  5.1   |  30  |  2.03<H>    Ca    9.0      15 Feb 2020 12:37    TPro  7.1  /  Alb  3.1<L>  /  TBili  0.2  /  DBili  x   /  AST  15  /  ALT  18  /  AlkPhos  97  02-15    PT/INR - ( 15 Feb 2020 13:38 )   PT: 12.1 sec;   INR: 1.09 ratio         PTT - ( 15 Feb 2020 13:38 )  PTT:30.2 sec          RADIOLOGY & ADDITIONAL TESTS:    Imaging Personally Reviewed:    EKG Personally Reviewed:    Consultant(s) Notes Reviewed:      Care Discussed with Consultants/Other Providers: HPI:  89M w/PMH CAD s/p stents, HTN, AAA, PAD, CKD3, had recent laceration of RLE 2 weeks ago (no abx), [place] today [place] for worsening surrounding erythema of same wound.  Pt denies any worsening, states "that's what I'm told", denies any worsening pain, fever/chills.  Incidentally, in ED pt found w/ hg 7.5 and guaiac pos.  Pt endorses black bm as he's on iron.  He denies any melena, hematochezia, denies any dizziness, sob, cp.      In ED, Hg 7.5, G +, 1 unit PRBC ordered, protonix 80mg IV x1, and iv clinda for RLE wound, blood cultures ordered. Cr 2 (baseline appears to be about 1.7).     GOC d/w pt regarding code status.  Pt wishes to be FULL code.      PAST MEDICAL & SURGICAL HISTORY:  AAA (abdominal aortic aneurysm) without rupture  PAD (peripheral artery disease)  OA (osteoarthritis)  CAD (coronary artery disease)  HLD (hyperlipidemia)  CKD (chronic kidney disease)  HTN (hypertension)  Stented coronary artery  Colon polyp  BPH (benign prostatic hyperplasia)  Cellulitis: BL  CKD (chronic kidney disease) stage 3, GFR 30-59 ml/min  History of coronary artery stent placement  History of colonoscopy  H/O inguinal hernia: repair  S/P hemorrhoidectomy      Review of Systems:   CONSTITUTIONAL: No fever.  EYES: No eye pain or discharge.  ENMT:  No sinus or throat pain  NECK: No pain or stiffness  RESPIRATORY: No cough, wheezing, chills or hemoptysis; No shortness of breath  CARDIOVASCULAR: No chest pain, palpitations, dizziness, or leg swelling  GASTROINTESTINAL: No abdominal or epigastric pain. No nausea, vomiting, or hematemesis; No diarrhea or constipation. No melena or hematochezia.  GENITOURINARY: No dysuria or incontinence  NEUROLOGICAL: No headaches, memory loss, loss of strength, numbness, or tremors  SKIN: No rashes.   MUSCULOSKELETAL: LE b/l pain   PSYCHIATRIC: No depression, anxiety, mood swings, or difficulty sleeping    Allergies  penicillin (Angioedema)      Social History:   [place] in [place]  requires some assistance    FAMILY HISTORY:  unknown to this elderly gentleman       Home Medications:  aspirin 81 mg oral delayed release tablet: 1 tab(s) orally once a day (15 Feb 2020 17:31)  clopidogrel 75 mg oral tablet: 1 tab(s) orally once a day (15 Feb 2020 17:31)  Colace 100 mg oral capsule: 2 cap(s) orally once a day (at bedtime), As Needed (15 Feb 2020 17:31)  DULoxetine 30 mg oral delayed release capsule: 1 cap(s) orally once a day (15 Feb 2020 17:31)  famotidine 20 mg oral tablet: 1 tab(s) orally 2 times a day (15 Feb 2020 17:31)  ferrous sulfate 325 mg (65 mg elemental iron) oral tablet: 1 tab(s) orally once a day (15 Feb 2020 17:31)  furosemide 20 mg oral tablet: 1 tab(s) orally once a day (15 Feb 2020 17:31)  gabapentin 400 mg oral capsule: 1 cap(s) orally 3 times a day (15 Feb 2020 17:31)  metoprolol succinate 50 mg oral tablet, extended release: 1 tab(s) orally once a day (15 Feb 2020 17:31)  Multiple Vitamins oral tablet: 1 tab(s) orally once a day (15 Feb 2020 17:31)  tamsulosin 0.4 mg oral capsule: 1 cap(s) orally once a day (at bedtime) (15 Feb 2020 17:31)  traMADol 50 mg oral tablet: 1/2 tablet by mouth every 12 hours as needed for pain (15 Feb 2020 17:31)  Vitamin B-12 1000 mcg oral tablet: 1 tab(s) orally once a day (15 Feb 2020 17:31)      MEDICATIONS  (STANDING):  atorvastatin 40 milliGRAM(s) Oral at bedtime  cefTRIAXone Injectable. 1000 milliGRAM(s) IV Push every 24 hours  DULoxetine 30 milliGRAM(s) Oral daily  furosemide    Tablet 20 milliGRAM(s) Oral daily  gabapentin 400 milliGRAM(s) Oral three times a day  metoprolol succinate ER 50 milliGRAM(s) Oral daily  pantoprazole  Injectable 40 milliGRAM(s) IV Push every 12 hours  sodium chloride 0.9%. 500 milliLiter(s) (100 mL/Hr) IV Continuous <Continuous>  tamsulosin 0.4 milliGRAM(s) Oral at bedtime    MEDICATIONS  (PRN):  acetaminophen   Tablet .. 650 milliGRAM(s) Oral every 6 hours PRN Mild Pain (1 - 3)  ondansetron Injectable 4 milliGRAM(s) IV Push every 6 hours PRN Nausea  traMADol 25 milliGRAM(s) Oral every 12 hours PRN Severe Pain (7 - 10)        PHYSICAL EXAM:  Vital Signs Last 24 Hrs  T(C): 36.5 (15 Feb 2020 17:48), Max: 36.5 (15 Feb 2020 17:48)  T(F): 97.7 (15 Feb 2020 17:48), Max: 97.7 (15 Feb 2020 17:48)  HR: 65 (15 Feb 2020 17:48) (63 - 69)  BP: 143/63 (15 Feb 2020 17:48) (113/49 - 143/63)  BP(mean): 69 (15 Feb 2020 15:12) (68 - 69)  RR: 18 (15 Feb 2020 17:48) (18 - 19)  SpO2: 100% (15 Feb 2020 17:48) (100% - 100%)  GENERAL: NAD, well-developed, appears younger than stated age  HEAD:  Atraumatic, Normocephalic  EYES: EOMI, PERRLA, conjunctiva and sclera clear  ENT: normal hearing, no nasal discharge, throat clear, dentition normal  NECK: Supple, No JVD, no LAD, no thyromegaly   CHEST/LUNG: Clear to auscultation bilaterally; No wheeze, respirations unlabored  HEART: Regular rate and rhythm; No murmurs, rubs, or gallops  ABDOMEN: Soft, Nontender, Nondistended; Bowel sounds present, no HSM  EXTREMITIES:  2+ Peripheral Pulses, No clubbing, cyanosis, 2+b/l LE pitting edema, RLE anterior ~3-4 cm long laceration, no discharge, no fluctuance, ++surround erythema and TTP  PSYCH: AAOx3, normal behavior  NEUROLOGY: non-focal, sensory and cn 2-12 intact, speech/language intact  SKIN: No visible rashes     LABS:                        7.5    7.15  )-----------( 302      ( 15 Feb 2020 12:37 )             25.2     02-15    143  |  110<H>  |  23  ----------------------------<  98  5.1   |  30  |  2.03<H>    Ca    9.0      15 Feb 2020 12:37    TPro  7.1  /  Alb  3.1<L>  /  TBili  0.2  /  DBili  x   /  AST  15  /  ALT  18  /  AlkPhos  97  02-15    PT/INR - ( 15 Feb 2020 13:38 )   PT: 12.1 sec;   INR: 1.09 ratio         PTT - ( 15 Feb 2020 13:38 )  PTT:30.2 sec          RADIOLOGY & ADDITIONAL TESTS:    Imaging Personally Reviewed:    EKG Personally Reviewed:    Consultant(s) Notes Reviewed:      Care Discussed with Consultants/Other Providers:

## 2020-02-15 NOTE — PATIENT PROFILE ADULT - BRADEN SENSORY
Osteomyelitis of toe of right foot
(3) slightly limited

## 2020-02-15 NOTE — H&P ADULT - PROBLEM SELECTOR PLAN 4
stable,   c/w bb, statin, plavix  hold asa in setting of possible GIB- resume when cleared by GI.   if max bleeding occurs or h/h drops, hold plavix also- resume

## 2020-02-15 NOTE — ED STATDOCS - PROGRESS NOTE DETAILS
Patient initially seen and evaluated with ED attending at intake.  Presented for eval of RLE wound and possible infection as it is erythematous and tender.  Initial plan was screening blood work to assess for leukocytosis and likely d/c on oral abx.  Blood work showed worsening anemia, patient is on aspirin and plavix.  Stool guiac lot 163 exp 8/31/3030 qc positive and exam positive for occult blood.  Blood transfusion ordered.  Recommended patient be admitted for further evaluation of GI bleed requiring blood transfusion.  Patient agreeable.  First call made to hospitalist admit phone at [phone], repeat call now. -Carson Vigil PA-C Patient initially seen and evaluated with ED attending at intake.  Presented for eval of RLE wound and possible infection as it is erythematous and tender.  Initial plan was screening blood work to assess for leukocytosis and likely d/c on oral abx.  Blood work showed worsening anemia, patient is on aspirin and plavix.  Stool guiac lot 163 exp 8/31/2020 qc positive and exam positive for occult blood.  Blood transfusion ordered.  Recommended patient be admitted for further evaluation of GI bleed requiring blood transfusion.  Patient agreeable.  First call made to hospitalist admit phone at [phone], repeat call now. -Carson Vigil PA-C Case endorsed to Dr. Marianne Vigil PA-C

## 2020-02-15 NOTE — ED STATDOCS - ADMIT DISPOSITION PRESENT ON ADMISSION SEPSIS
It looks like she has CT set up which will give more info. If worsening symptoms or severe pain ,she should be seen again.    No

## 2020-02-16 LAB
ANION GAP SERPL CALC-SCNC: 1 MMOL/L — LOW (ref 5–17)
BUN SERPL-MCNC: 21 MG/DL — SIGNIFICANT CHANGE UP (ref 7–23)
CALCIUM SERPL-MCNC: 8.5 MG/DL — SIGNIFICANT CHANGE UP (ref 8.5–10.1)
CHLORIDE SERPL-SCNC: 107 MMOL/L — SIGNIFICANT CHANGE UP (ref 96–108)
CO2 SERPL-SCNC: 34 MMOL/L — HIGH (ref 22–31)
CREAT SERPL-MCNC: 2.01 MG/DL — HIGH (ref 0.5–1.3)
GLUCOSE SERPL-MCNC: 95 MG/DL — SIGNIFICANT CHANGE UP (ref 70–99)
HCT VFR BLD CALC: 27.6 % — LOW (ref 39–50)
HGB BLD-MCNC: 8.8 G/DL — LOW (ref 13–17)
MCHC RBC-ENTMCNC: 29 PG — SIGNIFICANT CHANGE UP (ref 27–34)
MCHC RBC-ENTMCNC: 31.9 GM/DL — LOW (ref 32–36)
MCV RBC AUTO: 91.1 FL — SIGNIFICANT CHANGE UP (ref 80–100)
PLATELET # BLD AUTO: 261 K/UL — SIGNIFICANT CHANGE UP (ref 150–400)
POTASSIUM SERPL-MCNC: 4.2 MMOL/L — SIGNIFICANT CHANGE UP (ref 3.5–5.3)
POTASSIUM SERPL-SCNC: 4.2 MMOL/L — SIGNIFICANT CHANGE UP (ref 3.5–5.3)
RBC # BLD: 3.03 M/UL — LOW (ref 4.2–5.8)
RBC # FLD: 15.4 % — HIGH (ref 10.3–14.5)
SODIUM SERPL-SCNC: 142 MMOL/L — SIGNIFICANT CHANGE UP (ref 135–145)
WBC # BLD: 5.55 K/UL — SIGNIFICANT CHANGE UP (ref 3.8–10.5)
WBC # FLD AUTO: 5.55 K/UL — SIGNIFICANT CHANGE UP (ref 3.8–10.5)

## 2020-02-16 PROCEDURE — 99233 SBSQ HOSP IP/OBS HIGH 50: CPT

## 2020-02-16 RX ORDER — TETANUS IMMUNE GLOBULIN 250 UNIT
250 SYRINGE (EA) INTRAMUSCULAR ONCE
Refills: 0 | Status: COMPLETED | OUTPATIENT
Start: 2020-02-16 | End: 2020-02-16

## 2020-02-16 RX ORDER — PANTOPRAZOLE SODIUM 20 MG/1
40 TABLET, DELAYED RELEASE ORAL
Refills: 0 | Status: DISCONTINUED | OUTPATIENT
Start: 2020-02-16 | End: 2020-02-18

## 2020-02-16 RX ORDER — TETANUS AND DIPHTHERIA TOXOIDS ADSORBED 2; 2 [LF]/.5ML; [LF]/.5ML
0.5 INJECTION INTRAMUSCULAR ONCE
Refills: 0 | Status: COMPLETED | OUTPATIENT
Start: 2020-02-16 | End: 2020-02-16

## 2020-02-16 RX ADMIN — GABAPENTIN 400 MILLIGRAM(S): 400 CAPSULE ORAL at 13:24

## 2020-02-16 RX ADMIN — Medication 250 UNIT(S): at 18:25

## 2020-02-16 RX ADMIN — Medication 50 MILLIGRAM(S): at 10:17

## 2020-02-16 RX ADMIN — TETANUS AND DIPHTHERIA TOXOIDS ADSORBED 0.5 MILLILITER(S): 2; 2 INJECTION INTRAMUSCULAR at 18:27

## 2020-02-16 RX ADMIN — CLOPIDOGREL BISULFATE 75 MILLIGRAM(S): 75 TABLET, FILM COATED ORAL at 10:17

## 2020-02-16 RX ADMIN — DULOXETINE HYDROCHLORIDE 30 MILLIGRAM(S): 30 CAPSULE, DELAYED RELEASE ORAL at 10:17

## 2020-02-16 RX ADMIN — TAMSULOSIN HYDROCHLORIDE 0.4 MILLIGRAM(S): 0.4 CAPSULE ORAL at 21:26

## 2020-02-16 RX ADMIN — ATORVASTATIN CALCIUM 40 MILLIGRAM(S): 80 TABLET, FILM COATED ORAL at 21:26

## 2020-02-16 RX ADMIN — CEFTRIAXONE 1000 MILLIGRAM(S): 500 INJECTION, POWDER, FOR SOLUTION INTRAMUSCULAR; INTRAVENOUS at 21:26

## 2020-02-16 RX ADMIN — GABAPENTIN 400 MILLIGRAM(S): 400 CAPSULE ORAL at 05:21

## 2020-02-16 RX ADMIN — PANTOPRAZOLE SODIUM 40 MILLIGRAM(S): 20 TABLET, DELAYED RELEASE ORAL at 10:17

## 2020-02-16 RX ADMIN — GABAPENTIN 400 MILLIGRAM(S): 400 CAPSULE ORAL at 21:26

## 2020-02-16 NOTE — CONSULT NOTE ADULT - ASSESSMENT
right leg wound
89M w/PMH CAD s/p stents, HTN, AAA, PAD, CKD3, had recent laceration of RLE 2 weeks ago (no abx), admitted on 2/15 for evaluation of right lower extremity redness surrounding a laceration he sustained from trauma on chair lift. History per medical record as patient unable to provide history.     1. Patient admitted with right lower extremity cellulitis secondary to trauma to leg  - follow up cultures   - serial cbc and monitor temperature   - wound care  - reviewed prior medical records to evaluate for resistant or atypical pathogens   - agree with ceftriaxone as ordered  2. other issues: per medicine
89M with CKD, on ASA/plavix, found to have FOBT+ stool and anemia.   UGIB or LGIB sources are in ddx.  No localizing symptoms. Patient not interested in endoscopic testing at the moment.    Recommend:  -resume diet  -start PPI once daily given risk factors for UGIB of ASA/plavix/age/CKD  -monitor hgb  -cont oral iron  -if patient wishes to have workup he can follow up as outpatient to arrange endoscopic testing  -d/w RN  -d/w pt in detail

## 2020-02-16 NOTE — CONSULT NOTE ADULT - SUBJECTIVE AND OBJECTIVE BOX
Pt is a 89 yr old man that was at his doctors office 2 weeks ago when he had to take a stair chair because the elevator was broken. He hit his leg on an exposed screw.   He was getting visiting nurses to care for this wound when one thought it looked red and sent him in for evaluation.    He has no fever or white count    Obj: he has a small laceration on his anterior tibial area that is still open. It was dressed with xeroform when i removed it to see. It does violate the skin and dermis but does not look infected.    There is no surrounding erythema
GI consult    HPI:  89M w/PMH CAD s/p stents, HTN, AAA, PAD, CKD3, had recent laceration of RLE 2 weeks ago (no abx), [place] today from [place] for worsening surrounding erythema of same wound.  Pt denies any worsening, states "that's what I'm told", denies any worsening pain, fever/chills.  Incidentally, in ED pt found w/ hg 7.5 and guaiac pos.  Pt endorses black bm as he's on iron.  He denies any melena, hematochezia, denies any dizziness, sob, cp.  In ED, Hg 7.5, G +, 1 unit PRBC ordered, protonix 80mg IV x1, and iv clinda for RLE wound, blood cultures ordered. Cr 2 (baseline appears to be about 1.7).  He has known anemia for at least a year, and follows with a hematologist. States last EGD/colonoscopy was over 10 years ago. States he is not interested in these "because I am 89."  Denies heartburn, dysphagia, early satiety.    PAST MEDICAL & SURGICAL HISTORY:  AAA (abdominal aortic aneurysm) without rupture  PAD (peripheral artery disease)  OA (osteoarthritis)  CAD (coronary artery disease)  HLD (hyperlipidemia)  CKD (chronic kidney disease)  HTN (hypertension)  Stented coronary artery  Colon polyp  BPH (benign prostatic hyperplasia)  Cellulitis: BL  CKD (chronic kidney disease) stage 3, GFR 30-59 ml/min  Leg swelling: related to cellulitis of LLE  No significant past medical history  History of coronary artery stent placement  History of colonoscopy  H/O inguinal hernia: repair  S/P hemorrhoidectomy      Home Medications:  aspirin 81 mg oral delayed release tablet: 1 tab(s) orally once a day (15 Feb 2020 17:31)  clopidogrel 75 mg oral tablet: 1 tab(s) orally once a day (15 Feb 2020 17:31)  Colace 100 mg oral capsule: 2 cap(s) orally once a day (at bedtime), As Needed (15 Feb 2020 17:31)  DULoxetine 30 mg oral delayed release capsule: 1 cap(s) orally once a day (15 Feb 2020 17:31)  famotidine 20 mg oral tablet: 1 tab(s) orally 2 times a day (15 Feb 2020 17:31)  ferrous sulfate 325 mg (65 mg elemental iron) oral tablet: 1 tab(s) orally once a day (15 Feb 2020 17:31)  furosemide 20 mg oral tablet: 1 tab(s) orally once a day (15 Feb 2020 17:31)  gabapentin 400 mg oral capsule: 1 cap(s) orally 3 times a day (15 Feb 2020 17:31)  metoprolol succinate 50 mg oral tablet, extended release: 1 tab(s) orally once a day (15 Feb 2020 17:31)  Multiple Vitamins oral tablet: 1 tab(s) orally once a day (15 Feb 2020 17:31)  tamsulosin 0.4 mg oral capsule: 1 cap(s) orally once a day (at bedtime) (15 Feb 2020 17:31)  traMADol 50 mg oral tablet: 1/2 tablet by mouth every 12 hours as needed for pain (15 Feb 2020 17:31)  Vitamin B-12 1000 mcg oral tablet: 1 tab(s) orally once a day (15 Feb 2020 17:31)      MEDICATIONS  (STANDING):  atorvastatin 40 milliGRAM(s) Oral at bedtime  cefTRIAXone Injectable. 1000 milliGRAM(s) IV Push every 24 hours  clopidogrel Tablet 75 milliGRAM(s) Oral daily  DULoxetine 30 milliGRAM(s) Oral daily  gabapentin 400 milliGRAM(s) Oral three times a day  metoprolol succinate ER 50 milliGRAM(s) Oral daily  pantoprazole    Tablet 40 milliGRAM(s) Oral before breakfast  sodium chloride 0.9%. 500 milliLiter(s) (100 mL/Hr) IV Continuous <Continuous>  tamsulosin 0.4 milliGRAM(s) Oral at bedtime    MEDICATIONS  (PRN):  acetaminophen   Tablet .. 650 milliGRAM(s) Oral every 6 hours PRN Mild Pain (1 - 3)  ondansetron Injectable 4 milliGRAM(s) IV Push every 6 hours PRN Nausea  traMADol 25 milliGRAM(s) Oral every 12 hours PRN Severe Pain (7 - 10)      Allergies    penicillin (Angioedema)  penicillins (Unknown)    Intolerances    Social History:   [place] in [place]  requires some assistance    FAMILY HISTORY:  unknown to this elderly gentleman     ROS  As above  Otherwise unremarkable, all systems reviewed    PE:  Vital Signs Last 24 Hrs  T(C): 36.5 (16 Feb 2020 11:45), Max: 37.3 (15 Feb 2020 20:59)  T(F): 97.7 (16 Feb 2020 11:45), Max: 99.1 (15 Feb 2020 20:59)  HR: 57 (16 Feb 2020 11:45) (57 - 69)  BP: 154/61 (16 Feb 2020 11:45) (122/53 - 178/66)  BP(mean): 69 (15 Feb 2020 15:12) (69 - 69)  RR: 17 (16 Feb 2020 11:45) (17 - 19)  SpO2: 100% (16 Feb 2020 11:45) (99% - 100%)    Constitutional: NAD, well-developed, A+Ox3  Anicteric   Respiratory: CTABL, breathing comfortably  Cardiovascular: S1 and S2, RRR  Gastrointestinal: +BS, soft, non tender, non distended, no mass  Extremities: warm, well perfused, 1+ edema, RLE wrapped wound  Psychiatric: Normal mood, normal affect  Neuro: moves all extremities, grossly intact  Skin: No rashes or lesions    LABS:                        8.8    5.55  )-----------( 261      ( 16 Feb 2020 07:36 )             27.6     02-16    142  |  107  |  21  ----------------------------<  95  4.2   |  34<H>  |  2.01<H>    Ca    8.5      16 Feb 2020 07:36    TPro  7.1  /  Alb  3.1<L>  /  TBili  0.2  /  DBili  x   /  AST  15  /  ALT  18  /  AlkPhos  97  02-15    PT/INR - ( 15 Feb 2020 13:38 )   PT: 12.1 sec;   INR: 1.09 ratio         PTT - ( 15 Feb 2020 13:38 )  PTT:30.2 sec  LIVER FUNCTIONS - ( 15 Feb 2020 12:37 )  Alb: 3.1 g/dL / Pro: 7.1 gm/dL / ALK PHOS: 97 U/L / ALT: 18 U/L / AST: 15 U/L / GGT: x             RADIOLOGY & ADDITIONAL STUDIES:
Patient is a 89y old  Male who presents with a chief complaint of Leg wound (15 Feb 2020 18:08)    HPI:  HPI:  89M w/PMH CAD s/p stents, HTN, AAA, PAD, CKD3, had recent laceration of RLE 2 weeks ago (no abx), admitted on 2/15 for evaluation of right lower extremity redness surrounding a laceration he sustained from trauma on chair lift. History per medical record as patient unable to provide history.         PAST MEDICAL & SURGICAL HISTORY:  AAA (abdominal aortic aneurysm) without rupture  PAD (peripheral artery disease)  OA (osteoarthritis)  CAD (coronary artery disease)  HLD (hyperlipidemia)  CKD (chronic kidney disease)  HTN (hypertension)  Stented coronary artery  Colon polyp  BPH (benign prostatic hyperplasia)  Cellulitis: BL  CKD (chronic kidney disease) stage 3, GFR 30-59 ml/min  History of coronary artery stent placement  History of colonoscopy  H/O inguinal hernia: repair  S/P hemorrhoidectomy      PMH: as above  PSH: as above  Meds: per reconciliation sheet, noted below  MEDICATIONS  (STANDING):  atorvastatin 40 milliGRAM(s) Oral at bedtime  cefTRIAXone Injectable. 1000 milliGRAM(s) IV Push every 24 hours  clopidogrel Tablet 75 milliGRAM(s) Oral daily  DULoxetine 30 milliGRAM(s) Oral daily  furosemide    Tablet 20 milliGRAM(s) Oral daily  gabapentin 400 milliGRAM(s) Oral three times a day  metoprolol succinate ER 50 milliGRAM(s) Oral daily  pantoprazole  Injectable 40 milliGRAM(s) IV Push every 12 hours  sodium chloride 0.9%. 500 milliLiter(s) (100 mL/Hr) IV Continuous <Continuous>  tamsulosin 0.4 milliGRAM(s) Oral at bedtime    MEDICATIONS  (PRN):  acetaminophen   Tablet .. 650 milliGRAM(s) Oral every 6 hours PRN Mild Pain (1 - 3)  ondansetron Injectable 4 milliGRAM(s) IV Push every 6 hours PRN Nausea  traMADol 25 milliGRAM(s) Oral every 12 hours PRN Severe Pain (7 - 10)    Allergies    penicillin (Angioedema)  penicillins (Unknown)    Intolerances      Social: no smoking, no alcohol, no illegal drugs; no recent travel, no exposure to TB  FAMILY HISTORY:  No pertinent family history in first degree relatives     no history of premature cardiovascular disease in first degree relatives  ROS: unable to obtain secondary to patient medical condition     Vital Signs Last 24 Hrs  T(C): 36.7 (16 Feb 2020 08:12), Max: 37.3 (15 Feb 2020 20:59)  T(F): 98.1 (16 Feb 2020 08:12), Max: 99.1 (15 Feb 2020 20:59)  HR: 61 (16 Feb 2020 08:12) (61 - 69)  BP: 137/55 (16 Feb 2020 08:12) (113/49 - 178/66)  BP(mean): 69 (15 Feb 2020 15:12) (68 - 69)  RR: 18 (16 Feb 2020 08:12) (18 - 19)  SpO2: 99% (16 Feb 2020 08:12) (99% - 100%)  Daily Height in cm: 177.8 (15 Feb 2020 12:07)    Daily     PE:    Constitutional: frail looking  HEENT: NC/AT  Neck: supple; thyroid not palpable  Back: no tenderness  Respiratory: respiratory effort normal; clear to auscultation  Cardiovascular: S1S2 regular, no murmurs  Abdomen: soft, not tender, not distended, positive BS; no liver or spleen organomegaly  Genitourinary: no suprapubic tenderness  Musculoskeletal: no muscle tenderness, right lower leg with laceration and surrounding erythema  Neurological/ Psychiatric: lethargic moving all extremities  Skin: no rashes; no palpable lesions    Labs: all available labs reviewed                        8.8    5.55  )-----------( 261      ( 16 Feb 2020 07:36 )             27.6     02-16    142  |  107  |  21  ----------------------------<  95  4.2   |  34<H>  |  2.01<H>    Ca    8.5      16 Feb 2020 07:36    TPro  7.1  /  Alb  3.1<L>  /  TBili  0.2  /  DBili  x   /  AST  15  /  ALT  18  /  AlkPhos  97  02-15     LIVER FUNCTIONS - ( 15 Feb 2020 12:37 )  Alb: 3.1 g/dL / Pro: 7.1 gm/dL / ALK PHOS: 97 U/L / ALT: 18 U/L / AST: 15 U/L / GGT: x                 Radiology: all available radiological tests reviewed    Advanced directives addressed: full resuscitation

## 2020-02-16 NOTE — PROGRESS NOTE ADULT - SUBJECTIVE AND OBJECTIVE BOX
c/c: leg wound with surrounding redness.    HPI:  89M w/PMH CAD s/p stents, HTN, AAA, PAD, CKD3, HLD, BPH, had recent laceration of RLE 2 weeks ago (no abx), presents today from AL for worsening surrounding erythema of same wound.   Incidentally, in ED pt found w/ hg 7.5 and guaiac pos.  Pt endorses black bm as he's on iron.    In ED, Hg 7.5, G +, 1 unit PRBC ordered, protonix 80mg IV x1, and iv clinda for RLE wound, blood cultures ordered. Cr 2 (baseline appears to be about 1.7).     2/16: Pt seen and examined this am. Felt ok.  No sob/chest pain. no f/c/r.   wasn't sure if his leg looked better or not.    ROS: all 10 systems reviewed and is as above otherwise negative.       Vital Signs Last 24 Hrs  T(C): 36.5 (16 Feb 2020 11:45), Max: 37.3 (15 Feb 2020 20:59)  T(F): 97.7 (16 Feb 2020 11:45), Max: 99.1 (15 Feb 2020 20:59)  HR: 57 (16 Feb 2020 11:45) (57 - 69)  BP: 154/61 (16 Feb 2020 11:45) (122/53 - 178/66)  BP(mean): 69 (15 Feb 2020 15:12) (69 - 69)  RR: 17 (16 Feb 2020 11:45) (17 - 19)  SpO2: 100% (16 Feb 2020 11:45) (99% - 100%)    PHYSICAL EXAM:  GENERAL: Comfortable, no acute distress   HEAD:  Normocephalic, atraumatic  EYES: EOMI, PERRLA  HEENT: dry mucous membranes  NECK: Supple, No JVD  NERVOUS SYSTEM: forgetful, non focal.  CHEST/LUNG: Clear to auscultation bilaterally  HEART: Regular rate and rhythm  ABDOMEN: Soft, Nontender, Nondistended, Bowel sounds present  GENITOURINARY: Voiding, no palpable bladder  EXTREMITIES:   No clubbing, cyanosis. b/l le edema.  MUSCULOSKELETAL- No muscle tenderness, no joint tenderness  SKIN-RLE with 4cm laceration with some gaping, no discharge with surrounding erythema.      LABS:                        8.8    5.55  )-----------( 261      ( 16 Feb 2020 07:36 )             27.6     02-16    142  |  107  |  21  ----------------------------<  95  4.2   |  34<H>  |  2.01<H>    Ca    8.5      16 Feb 2020 07:36    TPro  7.1  /  Alb  3.1<L>  /  TBili  0.2  /  DBili  x   /  AST  15  /  ALT  18  /  AlkPhos  97  02-15    PT/INR - ( 15 Feb 2020 13:38 )   PT: 12.1 sec;   INR: 1.09 ratio         PTT - ( 15 Feb 2020 13:38 )  PTT:30.2 sec      MEDS  acetaminophen   Tablet .. 650 milliGRAM(s) Oral every 6 hours PRN  atorvastatin 40 milliGRAM(s) Oral at bedtime  cefTRIAXone Injectable. 1000 milliGRAM(s) IV Push every 24 hours  clopidogrel Tablet 75 milliGRAM(s) Oral daily  DULoxetine 30 milliGRAM(s) Oral daily  furosemide    Tablet 20 milliGRAM(s) Oral daily  gabapentin 400 milliGRAM(s) Oral three times a day  metoprolol succinate ER 50 milliGRAM(s) Oral daily  ondansetron Injectable 4 milliGRAM(s) IV Push every 6 hours PRN  pantoprazole  Injectable 40 milliGRAM(s) IV Push every 12 hours  sodium chloride 0.9%. 500 milliLiter(s) IV Continuous <Continuous>  tamsulosin 0.4 milliGRAM(s) Oral at bedtime  traMADol 25 milliGRAM(s) Oral every 12 hours PRN    ASSESSMENT AND PLAN:  89M w/PMH CAD s/p stents, HTN, AAA, PAD, CKD3, had recent laceration of RLE 2 weeks ago (no abx), presents today from AL for worsening surrounding erythema of same wound.   In ED, Hg 7.5, G +, 1 unit PRBC ordered, protonix 80mg IV x1, and iv clinda for RLE wound, blood cultures ordered. Cr 2 (baseline appears to be about 1.7).     1. Recent RLE laceration now with surrounding cellulitis:  -ID eval appreciated.   -continue iv abx.   -plastic surgery consult for non healing wound.     2. Acute on chronic anemia with guiac pos stools:  -r/o gi bleed  -transfused 1 unit prbcs.   -f/u GI recs.    3. RASHAUN on CKD III:  -hold lasix.  -clinically appears hypovolemia  -IVF  -repeat labs in am.     4. Diastolic CHF:  -stable.  -hold lasix today    5. CAD/Stent/PAD  -last cath in dec 2019 with non obstructive disease  -on dual antiplatelet pta  -asa on hold for now.   -continue plavix/statin/bb    6. BPH:  -flomax.    7. AAA:  -outpt f/u    8. DVT px c/c: leg wound with surrounding redness.    HPI:  89M w/PMH CAD s/p stents, HTN, AAA, PAD, CKD3, HLD, BPH, had recent laceration of RLE 2 weeks ago (no abx), presents today from AL for worsening surrounding erythema of same wound.   Incidentally, in ED pt found w/ hg 7.5 and guaiac pos.  Pt endorses black bm as he's on iron.    In ED, Hg 7.5, G +, 1 unit PRBC ordered, protonix 80mg IV x1, and iv clinda for RLE wound, blood cultures ordered. Cr 2 (baseline appears to be about 1.7).     2/16: Pt seen and examined this am. Felt ok.  No sob/chest pain. no f/c/r.   wasn't sure if his leg looked better or not.    ROS: all 10 systems reviewed and is as above otherwise negative.       Vital Signs Last 24 Hrs  T(C): 36.5 (16 Feb 2020 11:45), Max: 37.3 (15 Feb 2020 20:59)  T(F): 97.7 (16 Feb 2020 11:45), Max: 99.1 (15 Feb 2020 20:59)  HR: 57 (16 Feb 2020 11:45) (57 - 69)  BP: 154/61 (16 Feb 2020 11:45) (122/53 - 178/66)  BP(mean): 69 (15 Feb 2020 15:12) (69 - 69)  RR: 17 (16 Feb 2020 11:45) (17 - 19)  SpO2: 100% (16 Feb 2020 11:45) (99% - 100%)    PHYSICAL EXAM:  GENERAL: Comfortable, no acute distress   HEAD:  Normocephalic, atraumatic  EYES: EOMI, PERRLA  HEENT: dry mucous membranes  NECK: Supple, No JVD  NERVOUS SYSTEM: forgetful, non focal.  CHEST/LUNG: Clear to auscultation bilaterally  HEART: Regular rate and rhythm  ABDOMEN: Soft, Nontender, Nondistended, Bowel sounds present  GENITOURINARY: Voiding, no palpable bladder  EXTREMITIES:   No clubbing, cyanosis. b/l le edema.  MUSCULOSKELETAL- No muscle tenderness, no joint tenderness  SKIN-RLE with 4cm laceration with some gaping, no discharge with surrounding erythema.      LABS:                        8.8    5.55  )-----------( 261      ( 16 Feb 2020 07:36 )             27.6     02-16    142  |  107  |  21  ----------------------------<  95  4.2   |  34<H>  |  2.01<H>    Ca    8.5      16 Feb 2020 07:36    TPro  7.1  /  Alb  3.1<L>  /  TBili  0.2  /  DBili  x   /  AST  15  /  ALT  18  /  AlkPhos  97  02-15    PT/INR - ( 15 Feb 2020 13:38 )   PT: 12.1 sec;   INR: 1.09 ratio         PTT - ( 15 Feb 2020 13:38 )  PTT:30.2 sec      MEDS  acetaminophen   Tablet .. 650 milliGRAM(s) Oral every 6 hours PRN  atorvastatin 40 milliGRAM(s) Oral at bedtime  cefTRIAXone Injectable. 1000 milliGRAM(s) IV Push every 24 hours  clopidogrel Tablet 75 milliGRAM(s) Oral daily  DULoxetine 30 milliGRAM(s) Oral daily  furosemide    Tablet 20 milliGRAM(s) Oral daily  gabapentin 400 milliGRAM(s) Oral three times a day  metoprolol succinate ER 50 milliGRAM(s) Oral daily  ondansetron Injectable 4 milliGRAM(s) IV Push every 6 hours PRN  pantoprazole  Injectable 40 milliGRAM(s) IV Push every 12 hours  sodium chloride 0.9%. 500 milliLiter(s) IV Continuous <Continuous>  tamsulosin 0.4 milliGRAM(s) Oral at bedtime  traMADol 25 milliGRAM(s) Oral every 12 hours PRN    ASSESSMENT AND PLAN:  89M w/PMH CAD s/p stents, HTN, AAA, PAD, CKD3, had recent laceration of RLE 2 weeks ago (no abx), presents today from AL for worsening surrounding erythema of same wound.   In ED, Hg 7.5, G +, 1 unit PRBC ordered, protonix 80mg IV x1, and iv clinda for RLE wound, blood cultures ordered. Cr 2 (baseline appears to be about 1.7).     1. Recent RLE laceration now with surrounding cellulitis:  -ID eval appreciated.   -continue iv abx.   -plastic surgery consult for non healing wound.     2. Acute on chronic anemia with guiac pos stools:  -r/o gi bleed  -transfused 1 unit prbcs.   -f/u GI recs.    3. CKDIII:  -appears to be at or near baseline  -hold lasix for dehydration  -clinically appears hypovolemic  -IVF  -repeat labs in am.     4. Diastolic CHF:  -stable.  -hold lasix today    5. CAD/Stent/PAD  -last cath in dec 2019 with non obstructive disease  -on dual antiplatelet pta  -asa on hold for now.   -continue plavix/statin/bb    6. BPH:  -flomax.    7. AAA:  -outpt f/u    8. DVT px c/c: leg wound with surrounding redness.    HPI:  89M w/PMH CAD s/p stents, HTN, AAA, PAD, CKDIV, HLD, BPH, had recent laceration of RLE 2 weeks ago (no abx), presents today from AL for worsening surrounding erythema of same wound.   Incidentally, in ED pt found w/ hg 7.5 and guaiac pos.  Pt endorses black bm as he's on iron.    In ED, Hg 7.5, G +, 1 unit PRBC ordered, protonix 80mg IV x1, and iv clinda for RLE wound, blood cultures ordered. Cr 2 (baseline appears to be about 1.7).     2/16: Pt seen and examined this am. Felt ok.  No sob/chest pain. no f/c/r.   wasn't sure if his leg looked better or not.    ROS: all 10 systems reviewed and is as above otherwise negative.       Vital Signs Last 24 Hrs  T(C): 36.5 (16 Feb 2020 11:45), Max: 37.3 (15 Feb 2020 20:59)  T(F): 97.7 (16 Feb 2020 11:45), Max: 99.1 (15 Feb 2020 20:59)  HR: 57 (16 Feb 2020 11:45) (57 - 69)  BP: 154/61 (16 Feb 2020 11:45) (122/53 - 178/66)  BP(mean): 69 (15 Feb 2020 15:12) (69 - 69)  RR: 17 (16 Feb 2020 11:45) (17 - 19)  SpO2: 100% (16 Feb 2020 11:45) (99% - 100%)    PHYSICAL EXAM:  GENERAL: Comfortable, no acute distress   HEAD:  Normocephalic, atraumatic  EYES: EOMI, PERRLA  HEENT: dry mucous membranes  NECK: Supple, No JVD  NERVOUS SYSTEM: forgetful, non focal.  CHEST/LUNG: Clear to auscultation bilaterally  HEART: Regular rate and rhythm  ABDOMEN: Soft, Nontender, Nondistended, Bowel sounds present  GENITOURINARY: Voiding, no palpable bladder  EXTREMITIES:   No clubbing, cyanosis. b/l le edema.  MUSCULOSKELETAL- No muscle tenderness, no joint tenderness  SKIN-RLE with 4cm laceration with some gaping, no discharge with surrounding erythema.      LABS:                        8.8    5.55  )-----------( 261      ( 16 Feb 2020 07:36 )             27.6     02-16    142  |  107  |  21  ----------------------------<  95  4.2   |  34<H>  |  2.01<H>    Ca    8.5      16 Feb 2020 07:36    TPro  7.1  /  Alb  3.1<L>  /  TBili  0.2  /  DBili  x   /  AST  15  /  ALT  18  /  AlkPhos  97  02-15    PT/INR - ( 15 Feb 2020 13:38 )   PT: 12.1 sec;   INR: 1.09 ratio         PTT - ( 15 Feb 2020 13:38 )  PTT:30.2 sec      MEDS  acetaminophen   Tablet .. 650 milliGRAM(s) Oral every 6 hours PRN  atorvastatin 40 milliGRAM(s) Oral at bedtime  cefTRIAXone Injectable. 1000 milliGRAM(s) IV Push every 24 hours  clopidogrel Tablet 75 milliGRAM(s) Oral daily  DULoxetine 30 milliGRAM(s) Oral daily  furosemide    Tablet 20 milliGRAM(s) Oral daily  gabapentin 400 milliGRAM(s) Oral three times a day  metoprolol succinate ER 50 milliGRAM(s) Oral daily  ondansetron Injectable 4 milliGRAM(s) IV Push every 6 hours PRN  pantoprazole  Injectable 40 milliGRAM(s) IV Push every 12 hours  sodium chloride 0.9%. 500 milliLiter(s) IV Continuous <Continuous>  tamsulosin 0.4 milliGRAM(s) Oral at bedtime  traMADol 25 milliGRAM(s) Oral every 12 hours PRN    ASSESSMENT AND PLAN:  89M w/PMH CAD s/p stents, HTN, AAA, PAD, CKD, had recent laceration of RLE 2 weeks ago (no abx), presents today from AL for worsening surrounding erythema of same wound.   In ED, Hg 7.5, G +, 1 unit PRBC ordered, protonix 80mg IV x1, and iv clinda for RLE wound, blood cultures ordered. Cr 2 (baseline appears to be about 1.7).     1. Recent RLE laceration now with surrounding cellulitis:  -ID eval appreciated.   -continue iv abx.   -plastic surgery consult for non healing wound.     2. Acute on chronic anemia with guiac pos stools:  -r/o gi bleed  -transfused 1 unit prbcs.   -f/u GI recs.    3. CKD IV:  -appears to be at or near baseline  -hold lasix for dehydration  -clinically appears hypovolemic  -IVF  -repeat labs in am.     4. Diastolic CHF:  -stable.  -hold lasix today    5. CAD/Stent/PAD  -last cath in dec 2019 with non obstructive disease  -on dual antiplatelet pta  -asa on hold for now.   -continue plavix/statin/bb    6. BPH:  -flomax.    7. AAA:  -outpt f/u    8. DVT px

## 2020-02-17 LAB
ANION GAP SERPL CALC-SCNC: 1 MMOL/L — LOW (ref 5–17)
BUN SERPL-MCNC: 22 MG/DL — SIGNIFICANT CHANGE UP (ref 7–23)
CALCIUM SERPL-MCNC: 8.8 MG/DL — SIGNIFICANT CHANGE UP (ref 8.5–10.1)
CHLORIDE SERPL-SCNC: 108 MMOL/L — SIGNIFICANT CHANGE UP (ref 96–108)
CO2 SERPL-SCNC: 34 MMOL/L — HIGH (ref 22–31)
CREAT SERPL-MCNC: 1.73 MG/DL — HIGH (ref 0.5–1.3)
GLUCOSE SERPL-MCNC: 93 MG/DL — SIGNIFICANT CHANGE UP (ref 70–99)
HCT VFR BLD CALC: 30.3 % — LOW (ref 39–50)
HGB BLD-MCNC: 9.4 G/DL — LOW (ref 13–17)
MAGNESIUM SERPL-MCNC: 2.3 MG/DL — SIGNIFICANT CHANGE UP (ref 1.6–2.6)
MCHC RBC-ENTMCNC: 28.7 PG — SIGNIFICANT CHANGE UP (ref 27–34)
MCHC RBC-ENTMCNC: 31 GM/DL — LOW (ref 32–36)
MCV RBC AUTO: 92.7 FL — SIGNIFICANT CHANGE UP (ref 80–100)
PHOSPHATE SERPL-MCNC: 3.6 MG/DL — SIGNIFICANT CHANGE UP (ref 2.5–4.5)
PLATELET # BLD AUTO: 255 K/UL — SIGNIFICANT CHANGE UP (ref 150–400)
POTASSIUM SERPL-MCNC: 4.3 MMOL/L — SIGNIFICANT CHANGE UP (ref 3.5–5.3)
POTASSIUM SERPL-SCNC: 4.3 MMOL/L — SIGNIFICANT CHANGE UP (ref 3.5–5.3)
RBC # BLD: 3.27 M/UL — LOW (ref 4.2–5.8)
RBC # FLD: 15.8 % — HIGH (ref 10.3–14.5)
SODIUM SERPL-SCNC: 143 MMOL/L — SIGNIFICANT CHANGE UP (ref 135–145)
WBC # BLD: 7.25 K/UL — SIGNIFICANT CHANGE UP (ref 3.8–10.5)
WBC # FLD AUTO: 7.25 K/UL — SIGNIFICANT CHANGE UP (ref 3.8–10.5)

## 2020-02-17 PROCEDURE — 99232 SBSQ HOSP IP/OBS MODERATE 35: CPT

## 2020-02-17 RX ORDER — ASPIRIN/CALCIUM CARB/MAGNESIUM 324 MG
81 TABLET ORAL DAILY
Refills: 0 | Status: DISCONTINUED | OUTPATIENT
Start: 2020-02-17 | End: 2020-02-18

## 2020-02-17 RX ORDER — CEFTRIAXONE 500 MG/1
1000 INJECTION, POWDER, FOR SOLUTION INTRAMUSCULAR; INTRAVENOUS
Refills: 0 | Status: DISCONTINUED | OUTPATIENT
Start: 2020-02-17 | End: 2020-02-18

## 2020-02-17 RX ADMIN — Medication 650 MILLIGRAM(S): at 16:25

## 2020-02-17 RX ADMIN — GABAPENTIN 400 MILLIGRAM(S): 400 CAPSULE ORAL at 13:22

## 2020-02-17 RX ADMIN — DULOXETINE HYDROCHLORIDE 30 MILLIGRAM(S): 30 CAPSULE, DELAYED RELEASE ORAL at 10:05

## 2020-02-17 RX ADMIN — GABAPENTIN 400 MILLIGRAM(S): 400 CAPSULE ORAL at 06:04

## 2020-02-17 RX ADMIN — Medication 50 MILLIGRAM(S): at 10:05

## 2020-02-17 RX ADMIN — GABAPENTIN 400 MILLIGRAM(S): 400 CAPSULE ORAL at 21:57

## 2020-02-17 RX ADMIN — CEFTRIAXONE 1000 MILLIGRAM(S): 500 INJECTION, POWDER, FOR SOLUTION INTRAMUSCULAR; INTRAVENOUS at 21:56

## 2020-02-17 RX ADMIN — ATORVASTATIN CALCIUM 40 MILLIGRAM(S): 80 TABLET, FILM COATED ORAL at 21:57

## 2020-02-17 RX ADMIN — CLOPIDOGREL BISULFATE 75 MILLIGRAM(S): 75 TABLET, FILM COATED ORAL at 10:05

## 2020-02-17 RX ADMIN — TAMSULOSIN HYDROCHLORIDE 0.4 MILLIGRAM(S): 0.4 CAPSULE ORAL at 21:57

## 2020-02-17 RX ADMIN — PANTOPRAZOLE SODIUM 40 MILLIGRAM(S): 20 TABLET, DELAYED RELEASE ORAL at 10:05

## 2020-02-17 RX ADMIN — Medication 650 MILLIGRAM(S): at 17:10

## 2020-02-17 NOTE — PHYSICAL THERAPY INITIAL EVALUATION ADULT - RANGE OF MOTION EXAMINATION, REHAB EVAL
bilateral upper extremity ROM was WFL (within functional limits)/bilateral lower extremity ROM was WFL (within functional limits)/except left knee limited 0-70 deg, b/l DF lacking 10 deg to neutral

## 2020-02-17 NOTE — PROGRESS NOTE ADULT - ASSESSMENT
89M with CKD, on ASA/plavix, found to have FOBT+ stool and anemia.   UGIB or LGIB sources are in ddx.  No localizing symptoms. Patient not interested in endoscopic testing at the moment.    Recommend:  -recommend continuing once daily PPI given risk factors for UGIB of ASA/plavix/age/CKD  -monitor hgb as outpatient  -cont oral iron  -if patient wishes to have workup he can follow up as outpatient to arrange endoscopic testing  -d/w RN  -d/w pt in detail  -will follow prn, no objection to d/c

## 2020-02-17 NOTE — PROGRESS NOTE ADULT - SUBJECTIVE AND OBJECTIVE BOX
Date of service: 02-17-20 @ 12:26      Patient lying in bed; mild pain in right leg; afebrile      ROS unable to obtain secondary to patient medical condition     MEDICATIONS  (STANDING):  atorvastatin 40 milliGRAM(s) Oral at bedtime  clopidogrel Tablet 75 milliGRAM(s) Oral daily  DULoxetine 30 milliGRAM(s) Oral daily  gabapentin 400 milliGRAM(s) Oral three times a day  metoprolol succinate ER 50 milliGRAM(s) Oral daily  pantoprazole    Tablet 40 milliGRAM(s) Oral before breakfast  sodium chloride 0.9%. 500 milliLiter(s) (100 mL/Hr) IV Continuous <Continuous>  tamsulosin 0.4 milliGRAM(s) Oral at bedtime    MEDICATIONS  (PRN):  acetaminophen   Tablet .. 650 milliGRAM(s) Oral every 6 hours PRN Mild Pain (1 - 3)  ondansetron Injectable 4 milliGRAM(s) IV Push every 6 hours PRN Nausea  traMADol 25 milliGRAM(s) Oral every 12 hours PRN Severe Pain (7 - 10)      Vital Signs Last 24 Hrs  T(C): 36.9 (17 Feb 2020 07:56), Max: 36.9 (17 Feb 2020 07:56)  T(F): 98.4 (17 Feb 2020 07:56), Max: 98.4 (17 Feb 2020 07:56)  HR: 67 (17 Feb 2020 07:56) (63 - 67)  BP: 120/58 (17 Feb 2020 07:56) (120/58 - 149/59)  BP(mean): --  RR: 17 (17 Feb 2020 07:56) (17 - 18)  SpO2: 99% (17 Feb 2020 07:56) (97% - 100%)    Physical Exam:        Constitutional: frail looking  HEENT: NC/AT  Neck: supple; thyroid not palpable  Back: no tenderness  Respiratory: respiratory effort normal; clear to auscultation  Cardiovascular: S1S2 regular, no murmurs  Abdomen: soft, not tender, not distended, positive BS; no liver or spleen organomegaly  Genitourinary: no suprapubic tenderness  Musculoskeletal: no muscle tenderness, right lower leg with laceration and surrounding erythema improving  Neurological/ Psychiatric: lethargic moving all extremities  Skin: no rashes; no palpable lesions    Labs: all available labs reviewed                          Labs:                        8.8    5.55  )-----------( 261      ( 16 Feb 2020 07:36 )             27.6     02-17    143  |  108  |  22  ----------------------------<  93  4.3   |  34<H>  |  1.73<H>    Ca    8.8      17 Feb 2020 08:06  Phos  3.6     02-17  Mg     2.3     02-17    TPro  7.1  /  Alb  3.1<L>  /  TBili  0.2  /  DBili  x   /  AST  15  /  ALT  18  /  AlkPhos  97  02-15           Cultures:       Culture - Blood (collected 02-15-20 @ 12:37)  Source: .Blood None  Preliminary Report (02-16-20 @ 17:01):    No growth to date.    Culture - Blood (collected 02-15-20 @ 12:37)  Source: .Blood None  Preliminary Report (02-16-20 @ 17:01):    No growth to date.          Radiology: all available radiological tests reviewed    Advanced directives addressed: full resuscitation

## 2020-02-17 NOTE — PROGRESS NOTE ADULT - ASSESSMENT
89M w/PMH CAD s/p stents, HTN, AAA, PAD, CKD3, had recent laceration of RLE 2 weeks ago (no abx), admitted on 2/15 for evaluation of right lower extremity redness surrounding a laceration he sustained from trauma on chair lift. History per medical record as patient unable to provide history.     1. Patient admitted with right lower extremity cellulitis secondary to trauma to leg  - follow up cultures   - serial cbc and monitor temperature   - wound care  - reviewed prior medical records to evaluate for resistant or atypical pathogens   - day #2 ceftriaxone  - tolerating antibiotics without rashes or side effects   - was given Tdap  2. other issues: per medicine

## 2020-02-17 NOTE — PHYSICAL THERAPY INITIAL EVALUATION ADULT - PERTINENT HX OF CURRENT PROBLEM, REHAB EVAL
89M w/PMH CAD s/p stents, HTN, AAA, PAD, CKDIV, HLD, BPH, had recent laceration of RLE 2 weeks ago (no abx), presents today from AL for worsening surrounding erythema of same wound.   Incidentally, in ED pt found w/ hg 7.5 and guaiac pos.  Pt endorses black bm as he's on iron.

## 2020-02-17 NOTE — PROGRESS NOTE ADULT - SUBJECTIVE AND OBJECTIVE BOX
c/c: leg wound with surrounding redness.    HPI:  89M w/PMH CAD s/p stents, HTN, AAA, PAD, CKDIV, HLD, BPH, had recent laceration of RLE 2 weeks ago (no abx), presents today from AL for worsening surrounding erythema of same wound.   Incidentally, in ED pt found w/ hg 7.5 and guaiac pos.  Pt endorses black bm as he's on iron.    In ED, Hg 7.5, G +, 1 unit PRBC ordered, protonix 80mg IV x1, and iv clinda for RLE wound, blood cultures ordered. Cr 2 (baseline appears to be about 1.7).     2/17: pt seen and examined. c/o pain on the bottom of his feet.    ROS: all 10 systems reviewed and is as above otherwise negative.       Vital Signs Last 24 Hrs  T(C): 36.9 (17 Feb 2020 07:56), Max: 36.9 (17 Feb 2020 07:56)  T(F): 98.4 (17 Feb 2020 07:56), Max: 98.4 (17 Feb 2020 07:56)  HR: 67 (17 Feb 2020 07:56) (63 - 67)  BP: 120/58 (17 Feb 2020 07:56) (120/58 - 134/55)  RR: 17 (17 Feb 2020 07:56) (17 - 17)  SpO2: 99% (17 Feb 2020 07:56) (97% - 99%)    PHYSICAL EXAM:  GENERAL: Comfortable, no acute distress   HEAD:  Normocephalic, atraumatic  EYES: EOMI, PERRLA  HEENT: dry mucous membranes  NECK: Supple, No JVD  NERVOUS SYSTEM: forgetful, non focal.  CHEST/LUNG: Clear to auscultation bilaterally  HEART: Regular rate and rhythm  ABDOMEN: Soft, Nontender, Nondistended, Bowel sounds present  GENITOURINARY: Voiding, no palpable bladder  EXTREMITIES:   No clubbing, cyanosis. b/l le edema.  MUSCULOSKELETAL- No muscle tenderness, no joint tenderness  SKIN-RLE with 4cm laceration with some gaping, no discharge with surrounding erythema.      LABS:                        9.4    7.25  )-----------( 255      ( 17 Feb 2020 12:39 )             30.3     02-17    143  |  108  |  22  ----------------------------<  93  4.3   |  34<H>  |  1.73<H>    Ca    8.8      17 Feb 2020 08:06  Phos  3.6     02-17  Mg     2.3     02-17    MEDICATIONS  (STANDING):  atorvastatin 40 milliGRAM(s) Oral at bedtime  cefTRIAXone Injectable. 1000 milliGRAM(s) IV Push <User Schedule>  clopidogrel Tablet 75 milliGRAM(s) Oral daily  DULoxetine 30 milliGRAM(s) Oral daily  gabapentin 400 milliGRAM(s) Oral three times a day  metoprolol succinate ER 50 milliGRAM(s) Oral daily  pantoprazole    Tablet 40 milliGRAM(s) Oral before breakfast  sodium chloride 0.9%. 500 milliLiter(s) (100 mL/Hr) IV Continuous <Continuous>  tamsulosin 0.4 milliGRAM(s) Oral at bedtime    MEDICATIONS  (PRN):  acetaminophen   Tablet .. 650 milliGRAM(s) Oral every 6 hours PRN Mild Pain (1 - 3)  ondansetron Injectable 4 milliGRAM(s) IV Push every 6 hours PRN Nausea  traMADol 25 milliGRAM(s) Oral every 12 hours PRN Severe Pain (7 - 10)    ASSESSMENT AND PLAN:  89M w/PMH CAD s/p stents, HTN, AAA, PAD, CKD, had recent laceration of RLE 2 weeks ago (no abx), presents today from AL for worsening surrounding erythema of same wound.   In ED, Hg 7.5, G +, 1 unit PRBC ordered, protonix 80mg IV x1, and iv clinda for RLE wound, blood cultures ordered. Cr 2 (baseline appears to be about 1.7).     1. Recent RLE laceration now with surrounding cellulitis:  -ID eval appreciated.   -continue iv abx.   -plastic surgery consult appreciated, no surgical intervention, outpt f/u.     2. Acute on chronic anemia with guiac pos stools:  -r/o gi bleed  -transfused 1 unit prbcs.   -outpt gi f/u.    3. CKD IV:  -appears to be at or near baseline  -hold lasix for dehydration  -clinically appears hypovolemic  -IVF  -repeat labs in am.     4. Diastolic CHF:  -stable.  -resume lasix tomorrow.    5. CAD/Stent/PAD  -last cath in dec 2019 with non obstructive disease  -on dual antiplatelet pta  continue statin/bb    6. BPH:  -flomax.    7. AAA:  -outpt f/u    8. DVT px    9. dispo:  dc planning for tomorrow.

## 2020-02-17 NOTE — PHYSICAL THERAPY INITIAL EVALUATION ADULT - GENERAL OBSERVATIONS, REHAB EVAL
Pt rec'd supine in bed, cooperative with PT, however wishing to defer OOBTC due to b/l foot pain, likely neuropathic.

## 2020-02-17 NOTE — PHYSICAL THERAPY INITIAL EVALUATION ADULT - ASR WT BEARING STATUS EVAL
Pt called to check on her appt this month. I let her know her next appt is 6/28 10:00. She expressed understanding.
no weight-bearing restrictions

## 2020-02-17 NOTE — PHYSICAL THERAPY INITIAL EVALUATION ADULT - ADDITIONAL COMMENTS
Pt is non-ambulatory and transfers from bed to w/c at baseline with assistance. Pt also requires assistance for ADLs.

## 2020-02-17 NOTE — PROGRESS NOTE ADULT - SUBJECTIVE AND OBJECTIVE BOX
GI    no events  no BM  no abd pain  denies heartburn or dysphagia  no hgb drawn today    ROS  As above  Otherwise unremarkable, all systems reviewed    PE:  Vital Signs Last 24 Hrs  T(C): 36.9 (17 Feb 2020 07:56), Max: 36.9 (17 Feb 2020 07:56)  T(F): 98.4 (17 Feb 2020 07:56), Max: 98.4 (17 Feb 2020 07:56)  HR: 67 (17 Feb 2020 07:56) (57 - 67)  BP: 120/58 (17 Feb 2020 07:56) (120/58 - 154/61)  BP(mean): --  RR: 17 (17 Feb 2020 07:56) (17 - 18)  SpO2: 99% (17 Feb 2020 07:56) (97% - 100%)    Constitutional: NAD, well-developed, A+Ox3  Anicteric   Respiratory: CTABL, breathing comfortably  Cardiovascular: S1 and S2, RRR  Gastrointestinal: +BS, soft, non tender, non distended, no mass  Extremities: warm, well perfused, 1+ edema, RLE wrapped wound  Psychiatric: Normal mood, normal affect  Neuro: moves all extremities, grossly intact  Skin: No rashes or lesions    LABS:          no hgb

## 2020-02-18 ENCOUNTER — TRANSCRIPTION ENCOUNTER (OUTPATIENT)
Age: 85
End: 2020-02-18

## 2020-02-18 VITALS
HEART RATE: 60 BPM | RESPIRATION RATE: 18 BRPM | OXYGEN SATURATION: 100 % | TEMPERATURE: 98 F | DIASTOLIC BLOOD PRESSURE: 48 MMHG | SYSTOLIC BLOOD PRESSURE: 116 MMHG

## 2020-02-18 PROCEDURE — 99239 HOSP IP/OBS DSCHRG MGMT >30: CPT

## 2020-02-18 RX ORDER — OMEPRAZOLE 10 MG/1
1 CAPSULE, DELAYED RELEASE ORAL
Qty: 30 | Refills: 0
Start: 2020-02-18

## 2020-02-18 RX ORDER — CEFUROXIME AXETIL 250 MG
1 TABLET ORAL
Qty: 8 | Refills: 0
Start: 2020-02-18 | End: 2020-02-21

## 2020-02-18 RX ORDER — TRAMADOL HYDROCHLORIDE 50 MG/1
1 TABLET ORAL
Qty: 0 | Refills: 0 | DISCHARGE

## 2020-02-18 RX ORDER — TRAMADOL HYDROCHLORIDE 50 MG/1
0.5 TABLET ORAL
Qty: 0 | Refills: 0 | DISCHARGE
Start: 2020-02-18

## 2020-02-18 RX ADMIN — Medication 650 MILLIGRAM(S): at 09:40

## 2020-02-18 RX ADMIN — GABAPENTIN 400 MILLIGRAM(S): 400 CAPSULE ORAL at 05:50

## 2020-02-18 RX ADMIN — GABAPENTIN 400 MILLIGRAM(S): 400 CAPSULE ORAL at 13:02

## 2020-02-18 RX ADMIN — DULOXETINE HYDROCHLORIDE 30 MILLIGRAM(S): 30 CAPSULE, DELAYED RELEASE ORAL at 08:15

## 2020-02-18 RX ADMIN — PANTOPRAZOLE SODIUM 40 MILLIGRAM(S): 20 TABLET, DELAYED RELEASE ORAL at 08:14

## 2020-02-18 RX ADMIN — Medication 81 MILLIGRAM(S): at 08:15

## 2020-02-18 RX ADMIN — CLOPIDOGREL BISULFATE 75 MILLIGRAM(S): 75 TABLET, FILM COATED ORAL at 08:15

## 2020-02-18 RX ADMIN — Medication 50 MILLIGRAM(S): at 08:15

## 2020-02-18 RX ADMIN — Medication 650 MILLIGRAM(S): at 08:14

## 2020-02-18 NOTE — DISCHARGE NOTE PROVIDER - NSDCCPCAREPLAN_GEN_ALL_CORE_FT
PRINCIPAL DISCHARGE DIAGNOSIS  Diagnosis: Anemia  Assessment and Plan of Treatment: take iron and omeprazole as prescribed  follow up with dr. pal from gi.      SECONDARY DISCHARGE DIAGNOSES  Diagnosis: AAA (abdominal aortic aneurysm)  Assessment and Plan of Treatment: follow up with your pcp/cardiologist for further management.    Diagnosis: Lower extremity cellulitis  Assessment and Plan of Treatment: take ceftin as prescribed.  follow up with Dr. jeffrey from plastic surgery if wound does not heal

## 2020-02-18 NOTE — PROGRESS NOTE ADULT - ASSESSMENT
89M w/PMH CAD s/p stents, HTN, AAA, PAD, CKD3, had recent laceration of RLE 2 weeks ago (no abx), admitted on 2/15 for evaluation of right lower extremity redness surrounding a laceration he sustained from trauma on chair lift. History per medical record as patient unable to provide history.     1. Patient admitted with right lower extremity cellulitis secondary to trauma to leg  - follow up cultures   - serial cbc and monitor temperature   - wound care  - reviewed prior medical records to evaluate for resistant or atypical pathogens   - day #3 ceftriaxone; okay from id standpoint to discharge on ceftin 250 mg po q 12 hours for 4 more days  - tolerating antibiotics without rashes or side effects   - was given Tdap  2. other issues: per medicine

## 2020-02-18 NOTE — DISCHARGE NOTE PROVIDER - NSDCMRMEDTOKEN_GEN_ALL_CORE_FT
aspirin 81 mg oral delayed release tablet: 1 tab(s) orally once a day  atorvastatin 40 mg oral tablet: 1 tab(s) orally once a day (at bedtime)  cefuroxime 250 mg oral tablet: 1 tab(s) orally every 12 hours   clopidogrel 75 mg oral tablet: 1 tab(s) orally once a day  Colace 100 mg oral capsule: 2 cap(s) orally once a day (at bedtime), As Needed  DULoxetine 30 mg oral delayed release capsule: 1 cap(s) orally once a day  famotidine 20 mg oral tablet: 1 tab(s) orally 2 times a day  ferrous sulfate 325 mg (65 mg elemental iron) oral tablet: 1 tab(s) orally once a day  furosemide 20 mg oral tablet: 1 tab(s) orally once a day  gabapentin 400 mg oral capsule: 1 cap(s) orally 3 times a day  metoprolol succinate 50 mg oral tablet, extended release: 1 tab(s) orally once a day  Multiple Vitamins oral tablet: 1 tab(s) orally once a day  potassium chloride 10 mEq oral tablet, extended release: 1 tab(s) orally once a day  tamsulosin 0.4 mg oral capsule: 1 cap(s) orally once a day (at bedtime)  traMADol 50 mg oral tablet: 0.5 tab(s) orally every 12 hours, As needed, Severe Pain (7 - 10)  Vitamin B-12 1000 mcg oral tablet: 1 tab(s) orally once a day

## 2020-02-18 NOTE — DISCHARGE NOTE NURSING/CASE MANAGEMENT/SOCIAL WORK - PATIENT PORTAL LINK FT
You can access the FollowMyHealth Patient Portal offered by [facility] by registering at the following website: [email or web address]. By joining [employer]’s FollowMyHealth portal, you will also be able to view your health information using other applications (apps) compatible with our system.

## 2020-02-18 NOTE — DISCHARGE NOTE PROVIDER - HOSPITAL COURSE
89M w/PMH CAD s/p stents, HTN, AAA, PAD, CKDIV, HLD, BPH, had recent laceration of RLE 2 weeks ago (no abx), presents today from [place] for worsening surrounding erythema of same wound.   Incidentally, in ED pt found w/ hg 7.5 and guaiac pos.  Pt endorses black bm as he's on iron.      In ED, Hg 7.5, G +, 1 unit PRBC ordered, protonix 80mg IV x1, and iv clinda for RLE wound, blood cultures ordered. Cr 2 (baseline appears to be about 1.7). He was admitted for further mx. hgb responded appropriately to transfusion. Was seen by GI recommended ppi and was recommended outpt f/u as there were no s/o active bleeding while here. He was seen by ID while here and treated for cellulitis with iv rocephin. was also seen by plastics for his wound rle and was recommended no acute intervention, outpt f/u if necessary.     He is medically stable for dc today. he will be discharged on 4 more days of abx (po ceftin).        Vital Signs Last 24 Hrs    T(C): 36.6 (18 Feb 2020 07:46), Max: 37.1 (17 Feb 2020 15:30)    T(F): 97.9 (18 Feb 2020 07:46), Max: 98.7 (17 Feb 2020 15:30)    HR: 63 (18 Feb 2020 07:46) (63 - 70)    BP: 133/56 (18 Feb 2020 07:46) (133/54 - 144/55)    RR: 18 (18 Feb 2020 07:46) (17 - 18)    SpO2: 99% (18 Feb 2020 07:46) (97% - 99%)            PHYSICAL EXAM:    GENERAL: Comfortable, no acute distress     HEAD:  Normocephalic, atraumatic    EYES: EOMI, PERRLA    HEENT: dry mucous membranes    NECK: Supple, No JVD    NERVOUS SYSTEM: forgetful, non focal.    CHEST/LUNG: Clear to auscultation bilaterally    HEART: Regular rate and rhythm    ABDOMEN: Soft, Nontender, Nondistended, Bowel sounds present    GENITOURINARY: Voiding, no palpable bladder    EXTREMITIES:   No clubbing, cyanosis. b/l le edema.    MUSCULOSKELETAL- No muscle tenderness, no joint tenderness    SKIN-RLE with 4cm laceration with some gaping, no discharge with surrounding erythema.        LABS:                            9.4      7.25  )-----------( 255      ( 17 Feb 2020 12:39 )               30.3         02-17        143  |  108  |  22    ----------------------------<  93    4.3   |  34<H>  |  1.73<H>        Ca    8.8      17 Feb 2020 08:06    Phos  3.6     02-17    Mg     2.3     02-17        FINAL DIAGNOSIS:    1. Recent RLE laceration with surrounding cellulitis    2. Acute on chronic anemia with guiac pos stools:    3. CKD IV stable. althea ruled out    4. Diastolic CHF    5. CAD/Stent/PAD    6. BPH    7. AAA        time taken for dc 47 min    d/w pt    summary to be faxed to pcp

## 2020-02-18 NOTE — DISCHARGE NOTE PROVIDER - CARE PROVIDER_API CALL
Valente Denis)  Gastroenterology; Internal Medicine  [address], [address]  [place] [address]  Phone: [phone]  Fax: [phone]  Follow Up Time:     Airam Davis)  Plastic Surgery  [address] [place] [address], [address]  [place] [address]  Phone: [phone]  Fax: [phone]  Follow Up Time:

## 2020-02-18 NOTE — PROGRESS NOTE ADULT - SUBJECTIVE AND OBJECTIVE BOX
Date of service: 02-18-20 @ 11:18      Patient lying in bed; wants to go home; afebrile      ROS: no fever or chills; denies dizziness, no HA, no SOB or cough, no abdominal pain, no diarrhea or constipation; no dysuria, no urinary frequency, no legs pain, no rashes    MEDICATIONS  (STANDING):  aspirin  chewable 81 milliGRAM(s) Oral daily  atorvastatin 40 milliGRAM(s) Oral at bedtime  cefTRIAXone Injectable. 1000 milliGRAM(s) IV Push <User Schedule>  clopidogrel Tablet 75 milliGRAM(s) Oral daily  DULoxetine 30 milliGRAM(s) Oral daily  gabapentin 400 milliGRAM(s) Oral three times a day  metoprolol succinate ER 50 milliGRAM(s) Oral daily  pantoprazole    Tablet 40 milliGRAM(s) Oral before breakfast  sodium chloride 0.9%. 500 milliLiter(s) (100 mL/Hr) IV Continuous <Continuous>  tamsulosin 0.4 milliGRAM(s) Oral at bedtime    MEDICATIONS  (PRN):  acetaminophen   Tablet .. 650 milliGRAM(s) Oral every 6 hours PRN Mild Pain (1 - 3)  ondansetron Injectable 4 milliGRAM(s) IV Push every 6 hours PRN Nausea  traMADol 25 milliGRAM(s) Oral every 12 hours PRN Severe Pain (7 - 10)      Vital Signs Last 24 Hrs  T(C): 36.6 (18 Feb 2020 07:46), Max: 37.1 (17 Feb 2020 15:30)  T(F): 97.9 (18 Feb 2020 07:46), Max: 98.7 (17 Feb 2020 15:30)  HR: 63 (18 Feb 2020 07:46) (63 - 70)  BP: 133/56 (18 Feb 2020 07:46) (133/54 - 144/55)  BP(mean): --  RR: 18 (18 Feb 2020 07:46) (17 - 18)  SpO2: 99% (18 Feb 2020 07:46) (97% - 99%)    Physical Exam:        Constitutional: frail looking  HEENT: NC/AT  Neck: supple; thyroid not palpable  Back: no tenderness  Respiratory: respiratory effort normal; clear to auscultation  Cardiovascular: S1S2 regular, no murmurs  Abdomen: soft, not tender, not distended, positive BS; no liver or spleen organomegaly  Genitourinary: no suprapubic tenderness  Musculoskeletal: no muscle tenderness, right lower leg with laceration and surrounding erythema improving  Neurological/ Psychiatric: lethargic moving all extremities  Skin: no rashes; no palpable lesions    Labs: all available labs reviewed                 Labs:                        9.4    7.25  )-----------( 255      ( 17 Feb 2020 12:39 )             30.3     02-17    143  |  108  |  22  ----------------------------<  93  4.3   |  34<H>  |  1.73<H>    Ca    8.8      17 Feb 2020 08:06  Phos  3.6     02-17  Mg     2.3     02-17             Cultures:       Culture - Blood (collected 02-15-20 @ 12:37)  Source: .Blood None  Preliminary Report (02-16-20 @ 17:01):    No growth to date.    Culture - Blood (collected 02-15-20 @ 12:37)  Source: .Blood None  Preliminary Report (02-16-20 @ 17:01):    No growth to date.          Radiology: all available radiological tests reviewed    Advanced directives addressed: full resuscitation

## 2020-02-23 DIAGNOSIS — N18.4 CHRONIC KIDNEY DISEASE, STAGE 4 (SEVERE): ICD-10-CM

## 2020-02-23 DIAGNOSIS — L03.115 CELLULITIS OF RIGHT LOWER LIMB: ICD-10-CM

## 2020-02-23 DIAGNOSIS — K92.2 GASTROINTESTINAL HEMORRHAGE, UNSPECIFIED: ICD-10-CM

## 2020-02-23 DIAGNOSIS — I25.10 ATHEROSCLEROTIC HEART DISEASE OF NATIVE CORONARY ARTERY WITHOUT ANGINA PECTORIS: ICD-10-CM

## 2020-02-23 DIAGNOSIS — Z23 ENCOUNTER FOR IMMUNIZATION: ICD-10-CM

## 2020-02-23 DIAGNOSIS — N40.0 BENIGN PROSTATIC HYPERPLASIA WITHOUT LOWER URINARY TRACT SYMPTOMS: ICD-10-CM

## 2020-02-23 DIAGNOSIS — I71.4 ABDOMINAL AORTIC ANEURYSM, WITHOUT RUPTURE: ICD-10-CM

## 2020-02-23 DIAGNOSIS — I73.9 PERIPHERAL VASCULAR DISEASE, UNSPECIFIED: ICD-10-CM

## 2020-02-23 DIAGNOSIS — I50.30 UNSPECIFIED DIASTOLIC (CONGESTIVE) HEART FAILURE: ICD-10-CM

## 2020-02-23 DIAGNOSIS — I13.0 HYPERTENSIVE HEART AND CHRONIC KIDNEY DISEASE WITH HEART FAILURE AND STAGE 1 THROUGH STAGE 4 CHRONIC KIDNEY DISEASE, OR UNSPECIFIED CHRONIC KIDNEY DISEASE: ICD-10-CM

## 2020-02-23 DIAGNOSIS — D64.9 ANEMIA, UNSPECIFIED: ICD-10-CM

## 2020-09-01 NOTE — PATIENT PROFILE ADULT - NSTRANSFERBELONGINGSDISPO_GEN_A_NUR
2020         RE: Oscar Rodriguez  [address]  [place] [address]        Dear Colleague,    Thank you for referring your patient, Oscar Rodriguez, to the [facility]. Please see a copy of my visit note below.    Date: 2020    PATIENT:  Oscar Rodriguez  :          2014  BRYCE:          2020    Dear Leilani Villa:    I had the pleasure of seeing your patient, Oscar Rodriguez, for a follow-up visit in the [facility] on 2020 at the [facility] in [place].  Oscar was last seen in this clinic 2020.  Please see below for my assessment and plan of care.    Interval History:    Oscar was accompanied to this appointment by her mother.  As you may recall, Oscar is a 5 year old girl with a history of class 3 pediatric obesity who is here for follow up.      The first recorded weight that we have for her is 3/3/20, at 89.4 pounds. Her first visit to clinic was 20, which was a virtual visit and the family did not have a scale and, therefore, no weight known. Seen again for a virtual visit on 20, and no weight available at that time either. Seen in clinic on 20, and her weight at that time was 98 pounds and her weight at her clinic visit on 20 was 100 pounds.     Initial consult weight was unknown on 20.  Weight change since last seen on 20 is down 1 pounds.   Total gain since 3/3/20 (of note, first visit was 20 and no weight available at that time) is 10 pounds.    After the last visit, the family was able to  the prescription for vyvanse, however, mother did not realize that this could be picked up because she never got a call from the pharmacy stating that the prescription was ready. We again called the pharmacy today, who said that they could get the prescription ready by late this afternoon for the mother to  on her way home today. Therefore, has  not yet started vyvanse.     Dietary Recall:  Breakfast: she will either have 1/2 a bowl of cereal or 2 eggs. Occasionally will have pancakes.She then asks for a snack about 2 hours after breakfast, but mother tries to hold off on this.  Lunch: Either 1/2 of a sandwich or leftovers from dinner.   Dinner: similar to before; meats, fruits, and vegetables  Snacks: mother still working on minimizing snacks.  As dessert, she will have a fruit bar about 1-2 times a week; on the other days she will have a piece of fruit for dessert. Generally, snacks consist of a piece of fruit or a yogurt. She is having about 2-3 snacks a day.   Drinks: no longer having orange juice, instead having milk with breakfast.      For physical activity, has been outside playing, riding her bike, or walking. She is going outside a lot.     She will be starting  soon, where she will be getting free lunch and likely a snack during the day. She will be in school Monday through Friday from 8:30 to 2:30. She will be going outside for recess.         Current Medications:  Current Outpatient Rx   Medication Sig Dispense Refill     Pediatric Multiple Vit-C-FA (CHILDRENS CHEWABLE MULTI VITS PO)        lisdexamfetamine (VYVANSE) 20 MG capsule Take 1 capsule (20 mg) by mouth daily (Patient not taking: Reported on 2020) 30 capsule 0     [START ON 2020] lisdexamfetamine (VYVANSE) 20 MG capsule Take 1 capsule (20 mg) by mouth daily (Patient not taking: Reported on 2020) 30 capsule 0       Physical Exam:    Vitals:  B/P: 91/51, P: 76, R: Data Unavailable   BP:  Blood pressure percentiles are 37 % systolic and 29 % diastolic based on the 2017 AAP Clinical Practice Guideline. Blood pressure percentile targets: 90: 108/69, 95: 111/73, 95 + 12 mmH/85. This reading is in the normal blood pressure range.  Measured Weights:  Wt Readings from Last 4 Encounters:   20 45.1 kg (99 lb 6.8 oz) (>99 %, Z= 3.39)*   20 45.2 kg (99 lb  "9.6 oz) (>99 %, Z= 3.41)*   08/04/20 45.7 kg (100 lb 12 oz) (>99 %, Z= 3.46)*   07/07/20 45.4 kg (100 lb 1.4 oz) (>99 %, Z= 3.48)*     * Growth percentiles are based on CDC (Girls, 2-20 Years) data.     Height:    Ht Readings from Last 4 Encounters:   09/01/20 1.169 m (3' 10.02\") (75 %, Z= 0.67)*   08/24/20 1.17 m (3' 10.06\") (76 %, Z= 0.72)*   08/04/20 1.165 m (3' 9.87\") (76 %, Z= 0.70)*   07/07/20 1.162 m (3' 9.75\") (77 %, Z= 0.75)*     * Growth percentiles are based on [facility] (Girls, 2-20 Years) data.     Body Mass Index:  Body mass index is 33.01 kg/m .  Body Mass Index Percentile:  >99 %ile (Z= 3.08) based on CDC (Girls, 2-20 Years) BMI-for-age based on BMI available as of 9/1/2020.     GENERAL: Healthy, alert and no distress  EYES: Eyes grossly normal to inspection.  No discharge or erythema  HENT: Normal cephalic/atraumatic.    RESP: No audible wheeze, cough.  No visible retractions or increased work of breathing.    MS: No gross musculoskeletal defects noted.  Normal range of motion.   ABD: Obese abdomen  SKIN: no rashes appreciated  NEURO: Cranial nerves grossly intact.    PSYCH: appears normal for a 5 year old.     Labs:      Component      Latest Ref Rng & Units 6/2/2020   Cholesterol      <170 mg/dL 147   Triglycerides      <75 mg/dL 328 (H)   HDL Cholesterol      >45 mg/dL 33 (L)   LDL Cholesterol Calculated      <110 mg/dL 48   Non HDL Cholesterol      <120 mg/dL 114   IGF Binding Protein3      1.1 - 5.2 ug/mL 5.1   IGF Binding Protein 3 SD Score       1.9   Hemoglobin A1C      0 - 5.6 % 5.4   Vitamin D Deficiency screening      20 - 75 ug/L 30   Thyroglobulin Antibody      <40 IU/mL <20   Thyroid Peroxidase Antibody      <35 IU/mL <10   T4 Free      0.76 - 1.46 ng/dL 1.16   TSH      0.40 - 4.00 mU/L 6.13 (H)   Glucose      70 - 99 mg/dL 95   Lab Scanned Result       IGF-1 PEDIATRIC-Scanned   ALT      0 - 50 U/L 30   AST      0 - 50 U/L 23       Component      Latest Ref Rng & Units 4/21/2020 6/2/2020 " 8/4/2020   TSH      0.40 - 4.00 mU/L 5.11 (H) 6.13 (H) 4.41 (H)   T4 Free      0.76 - 1.46 ng/dL 1.4 1.16 1.11   Thyroglobulin Antibody      <40 IU/mL  <20 <20   Thyroid Peroxidase Antibody      <35 IU/mL  <10 <10     Assessment:      Oscar is a 5 year old girl with a BMI in the class 3 pediatric obesity category of very early onset, currently at ~1.8 times the 95th percentile, complicated by hypertriglyceridemia, low HDL, and an A1c that is closer towards the upper end of the normal range (possible degree of insulin resistance). The primary contributors to her weight status appear to include genetics (strong family history, and at least some concern for a monogenic form of obesity given history of speech delay, possible developmental delay, and hyperphagia; obesity started at least at the age of 2, if not before then) strong hunger (hyperphagia) which may be due to a disorder in satiety regulation, and issues with impulsiveness. The foundation of treatment is behavioral modification to improve dietary and physical activity patterns.  In certain circumstances, more intensive interventions, such as psychotherapy and/or pharmacotherapy, are needed. Given her weight status, Oscar is at increased risk for developing premature cardiovascular disease, type 2 diabetes and other obesity related co-morbid conditions. Weight management is essential for decreasing these risks. It is notable that her %BMIp95 is finally starting to downtrend.     Given her weight status (BMI currently around 1.8 times the 95th percentile) in conjunction with hyperphagia and impulsiveness, I believe that medications geared towards decreasing impulsiveness are warranted. We will begin with vyvanse. This has not yet been started due to mother's confusion regarding whether or not this medication was available. We called the pharmacy today and this will be available for her to  on the way home. We will start with vyvanse 20 mg daily and plan  "to see her back in clinic in 4-6 weeks to re-evaluate.      Of note, she does have a history of a mildly elevated TSH, which we will continue to follow. Her thyroid antibodies are negative x 2, and her Free T4 continues to remain normal. Suspect most likely etiology for subclinical hypothyroidism picture here is weight status. If this is the case, TSH should decrease with ongoing weight loss/BMI reduction. Regardless, does not require treatment at this time. We will continue to follow and can repeat a TSH and Free T4 in 3-6 months (~11/2020 - 2/2021). Discussed with mother in clinic today.     Additional plans and goals, made through shared decision making, as outlined below.     Oscar s current problem list reviewed today includes:    Encounter Diagnoses   Name Primary?     Elevated TSH      Impulsiveness      Hyperphagia      Severe obesity due to excess calories without serious comorbidity with body mass index (BMI) greater than 99th percentile for age in pediatric patient (H) Yes     Hypercholesterolemia      Hypertriglyceridemia      Insulin resistance         Care Plan:    Using motivational interviewing, Oscar made the following goals:  Patient Instructions     Thank you for choosing [facility]. It was a pleasure to see you for your office visit today.     1. Food Goal:   - Will pack a lunch for school at least 2-3 times a week (instead of the school lunch)  - Continue with no more than 1/2 a sandwich for lunch.  - Continue to have vegetables with lunch and dinner everyday.   - Continue to use \"the plate\" with dinner  - Continue with milk or water instead of juice in the morning for breakfast    2. Activity Goal:  - Will continue to play outside most everyday as she has been doing  - Will go for a bike ride or a walk almost everyday for at least 20 minutes at a time      3. Medications: Will start taking the vyvanse 20 mg daily. The prescription is for 20 mg vyvanse capsules. The vyvanse capsule can be " opened and the contents of the capsule can be dissolved in little bit of water (maybe an oz of water) or put on yogurt. This should be taken in the morning before she goes to school. Can stop by the pharmacy on your way home; the prescription is ready to be picked up. The vyvanse prescriptions are each for one month at a time, however, I have ordered several month-long prescriptions. Therefore, about a week before you are going to run out of the vyvanse, should call the pharmacy to see if they have a new prescription for it available and if they don't, give us a call at [phone] at let us know.      4. Labs: sometime between November, 2020 and February, 2021 we can repeat labs including the thyroid labs, a fasting cholesterol panel, a hemoglobin A1c (marker for diabetes), and a vitamin D level.     5. Should send in the genetic swab when you have a chance.     If you have any questions or scheduling needs during regular office hours, please call our [place] clinic: [phone]   If urgent concerns arise after hours, you can call [phone] and ask to speak to the pediatric specialist on call.   If you need to schedule Radiology tests, please call: [phone]  My Chart messages are for routine communication and questions and are usually answered within 48-72 hours. If you have an urgent concern or require sooner response, please call us.  Outside lab and imaging results should be faxed to [phone].  If you go to a lab outside of [facility] we will not automatically get those results. You will need to ask to have them faxed.     If you had any blood work, imaging or other tests completed today:  Normal test results will be mailed to your home address in a letter.  Abnormal results will be communicated to you via phone call/letter.  Please allow up to 1-2 weeks for processing and interpretation of most lab work.        Time: 30 min spent on evaluation, management, counseling, education, &  motivational interviewing with greater than 50 % of the total time was spent on counseling and coordinating care    We are looking forward to seeing Oscar for a follow-up visit in 4-6 weeks.    Thank you for including me in the care of your patient.  Please do not hesitate to call with questions or concerns.    Sincerely,    MD IRWIN Snyder    Department of Pediatrics  Division of Pediatric Endocrinology  [facility] [place] [phone]  [facility] [phone]            Again, thank you for allowing me to participate in the care of your patient.        Sincerely,        Eugene Arriaza MD     with patient

## 2020-12-28 ENCOUNTER — EMERGENCY (EMERGENCY)
Facility: HOSPITAL | Age: 85
LOS: 0 days | Discharge: HOME | End: 2020-12-28
Attending: EMERGENCY MEDICINE
Payer: MEDICARE

## 2020-12-28 VITALS
WEIGHT: 184.97 LBS | OXYGEN SATURATION: 97 % | HEIGHT: 70 IN | RESPIRATION RATE: 15 BRPM | SYSTOLIC BLOOD PRESSURE: 134 MMHG | HEART RATE: 62 BPM | DIASTOLIC BLOOD PRESSURE: 54 MMHG | TEMPERATURE: 98 F

## 2020-12-28 VITALS
RESPIRATION RATE: 16 BRPM | TEMPERATURE: 98 F | OXYGEN SATURATION: 100 % | SYSTOLIC BLOOD PRESSURE: 147 MMHG | DIASTOLIC BLOOD PRESSURE: 70 MMHG | HEART RATE: 66 BPM

## 2020-12-28 DIAGNOSIS — L29.9 PRURITUS, UNSPECIFIED: ICD-10-CM

## 2020-12-28 DIAGNOSIS — M19.90 UNSPECIFIED OSTEOARTHRITIS, UNSPECIFIED SITE: ICD-10-CM

## 2020-12-28 DIAGNOSIS — Z20.828 CONTACT WITH AND (SUSPECTED) EXPOSURE TO OTHER VIRAL COMMUNICABLE DISEASES: ICD-10-CM

## 2020-12-28 DIAGNOSIS — E78.5 HYPERLIPIDEMIA, UNSPECIFIED: ICD-10-CM

## 2020-12-28 DIAGNOSIS — N40.0 BENIGN PROSTATIC HYPERPLASIA WITHOUT LOWER URINARY TRACT SYMPTOMS: ICD-10-CM

## 2020-12-28 DIAGNOSIS — I12.9 HYPERTENSIVE CHRONIC KIDNEY DISEASE WITH STAGE 1 THROUGH STAGE 4 CHRONIC KIDNEY DISEASE, OR UNSPECIFIED CHRONIC KIDNEY DISEASE: ICD-10-CM

## 2020-12-28 DIAGNOSIS — R21 RASH AND OTHER NONSPECIFIC SKIN ERUPTION: ICD-10-CM

## 2020-12-28 DIAGNOSIS — Z98.890 OTHER SPECIFIED POSTPROCEDURAL STATES: Chronic | ICD-10-CM

## 2020-12-28 DIAGNOSIS — Z79.82 LONG TERM (CURRENT) USE OF ASPIRIN: ICD-10-CM

## 2020-12-28 DIAGNOSIS — I25.10 ATHEROSCLEROTIC HEART DISEASE OF NATIVE CORONARY ARTERY WITHOUT ANGINA PECTORIS: ICD-10-CM

## 2020-12-28 DIAGNOSIS — I73.9 PERIPHERAL VASCULAR DISEASE, UNSPECIFIED: ICD-10-CM

## 2020-12-28 DIAGNOSIS — Z95.5 PRESENCE OF CORONARY ANGIOPLASTY IMPLANT AND GRAFT: Chronic | ICD-10-CM

## 2020-12-28 DIAGNOSIS — Z88.0 ALLERGY STATUS TO PENICILLIN: ICD-10-CM

## 2020-12-28 DIAGNOSIS — Z95.5 PRESENCE OF CORONARY ANGIOPLASTY IMPLANT AND GRAFT: ICD-10-CM

## 2020-12-28 DIAGNOSIS — Z79.02 LONG TERM (CURRENT) USE OF ANTITHROMBOTICS/ANTIPLATELETS: ICD-10-CM

## 2020-12-28 DIAGNOSIS — Z87.19 PERSONAL HISTORY OF OTHER DISEASES OF THE DIGESTIVE SYSTEM: Chronic | ICD-10-CM

## 2020-12-28 DIAGNOSIS — Z98.89 OTHER SPECIFIED POSTPROCEDURAL STATES: Chronic | ICD-10-CM

## 2020-12-28 DIAGNOSIS — I71.4 ABDOMINAL AORTIC ANEURYSM, WITHOUT RUPTURE: ICD-10-CM

## 2020-12-28 DIAGNOSIS — N18.30 CHRONIC KIDNEY DISEASE, STAGE 3 UNSPECIFIED: ICD-10-CM

## 2020-12-28 LAB
ALBUMIN SERPL ELPH-MCNC: 3.3 G/DL — SIGNIFICANT CHANGE UP (ref 3.3–5)
ALP SERPL-CCNC: 88 U/L — SIGNIFICANT CHANGE UP (ref 40–120)
ALT FLD-CCNC: 15 U/L — SIGNIFICANT CHANGE UP (ref 12–78)
ANION GAP SERPL CALC-SCNC: 2 MMOL/L — LOW (ref 5–17)
APTT BLD: 29.8 SEC — SIGNIFICANT CHANGE UP (ref 27.5–35.5)
AST SERPL-CCNC: 13 U/L — LOW (ref 15–37)
BASOPHILS # BLD AUTO: 0.01 K/UL — SIGNIFICANT CHANGE UP (ref 0–0.2)
BASOPHILS NFR BLD AUTO: 0.2 % — SIGNIFICANT CHANGE UP (ref 0–2)
BILIRUB SERPL-MCNC: 0.3 MG/DL — SIGNIFICANT CHANGE UP (ref 0.2–1.2)
BUN SERPL-MCNC: 29 MG/DL — HIGH (ref 7–23)
CALCIUM SERPL-MCNC: 9 MG/DL — SIGNIFICANT CHANGE UP (ref 8.5–10.1)
CHLORIDE SERPL-SCNC: 106 MMOL/L — SIGNIFICANT CHANGE UP (ref 96–108)
CO2 SERPL-SCNC: 34 MMOL/L — HIGH (ref 22–31)
CREAT SERPL-MCNC: 2.24 MG/DL — HIGH (ref 0.5–1.3)
EOSINOPHIL # BLD AUTO: 0.11 K/UL — SIGNIFICANT CHANGE UP (ref 0–0.5)
EOSINOPHIL NFR BLD AUTO: 2 % — SIGNIFICANT CHANGE UP (ref 0–6)
GLUCOSE SERPL-MCNC: 94 MG/DL — SIGNIFICANT CHANGE UP (ref 70–99)
HCT VFR BLD CALC: 28 % — LOW (ref 39–50)
HGB BLD-MCNC: 8.6 G/DL — LOW (ref 13–17)
IMM GRANULOCYTES NFR BLD AUTO: 0.4 % — SIGNIFICANT CHANGE UP (ref 0–1.5)
INR BLD: 1.05 RATIO — SIGNIFICANT CHANGE UP (ref 0.88–1.16)
LYMPHOCYTES # BLD AUTO: 0.82 K/UL — LOW (ref 1–3.3)
LYMPHOCYTES # BLD AUTO: 14.6 % — SIGNIFICANT CHANGE UP (ref 13–44)
MCHC RBC-ENTMCNC: 30.2 PG — SIGNIFICANT CHANGE UP (ref 27–34)
MCHC RBC-ENTMCNC: 30.7 GM/DL — LOW (ref 32–36)
MCV RBC AUTO: 98.2 FL — SIGNIFICANT CHANGE UP (ref 80–100)
MONOCYTES # BLD AUTO: 0.36 K/UL — SIGNIFICANT CHANGE UP (ref 0–0.9)
MONOCYTES NFR BLD AUTO: 6.4 % — SIGNIFICANT CHANGE UP (ref 2–14)
NEUTROPHILS # BLD AUTO: 4.31 K/UL — SIGNIFICANT CHANGE UP (ref 1.8–7.4)
NEUTROPHILS NFR BLD AUTO: 76.4 % — SIGNIFICANT CHANGE UP (ref 43–77)
PLATELET # BLD AUTO: 229 K/UL — SIGNIFICANT CHANGE UP (ref 150–400)
POTASSIUM SERPL-MCNC: 4.2 MMOL/L — SIGNIFICANT CHANGE UP (ref 3.5–5.3)
POTASSIUM SERPL-SCNC: 4.2 MMOL/L — SIGNIFICANT CHANGE UP (ref 3.5–5.3)
PROT SERPL-MCNC: 7.3 GM/DL — SIGNIFICANT CHANGE UP (ref 6–8.3)
PROTHROM AB SERPL-ACNC: 12.2 SEC — SIGNIFICANT CHANGE UP (ref 10.6–13.6)
RBC # BLD: 2.85 M/UL — LOW (ref 4.2–5.8)
RBC # FLD: 16 % — HIGH (ref 10.3–14.5)
SARS-COV-2 RNA SPEC QL NAA+PROBE: SIGNIFICANT CHANGE UP
SODIUM SERPL-SCNC: 142 MMOL/L — SIGNIFICANT CHANGE UP (ref 135–145)
WBC # BLD: 5.63 K/UL — SIGNIFICANT CHANGE UP (ref 3.8–10.5)
WBC # FLD AUTO: 5.63 K/UL — SIGNIFICANT CHANGE UP (ref 3.8–10.5)

## 2020-12-28 PROCEDURE — 85025 COMPLETE CBC W/AUTO DIFF WBC: CPT

## 2020-12-28 PROCEDURE — 85730 THROMBOPLASTIN TIME PARTIAL: CPT

## 2020-12-28 PROCEDURE — 99284 EMERGENCY DEPT VISIT MOD MDM: CPT

## 2020-12-28 PROCEDURE — 99283 EMERGENCY DEPT VISIT LOW MDM: CPT

## 2020-12-28 PROCEDURE — 85610 PROTHROMBIN TIME: CPT

## 2020-12-28 PROCEDURE — 80053 COMPREHEN METABOLIC PANEL: CPT

## 2020-12-28 PROCEDURE — U0003: CPT

## 2020-12-28 PROCEDURE — 36415 COLL VENOUS BLD VENIPUNCTURE: CPT

## 2020-12-28 RX ORDER — DIPHENHYDRAMINE HCL 50 MG
25 CAPSULE ORAL ONCE
Refills: 0 | Status: COMPLETED | OUTPATIENT
Start: 2020-12-28 | End: 2020-12-28

## 2020-12-28 RX ORDER — DEXAMETHASONE 0.5 MG/5ML
10 ELIXIR ORAL ONCE
Refills: 0 | Status: COMPLETED | OUTPATIENT
Start: 2020-12-28 | End: 2020-12-28

## 2020-12-28 RX ADMIN — Medication 25 MILLIGRAM(S): at 12:15

## 2020-12-28 RX ADMIN — Medication 10 MILLIGRAM(S): at 12:15

## 2020-12-28 NOTE — ED PROVIDER NOTE - NSFOLLOWUPCLINICS_GEN_ALL_ED_FT
[facility]  Dermatology  [address] [place] [address]  Phone: [phone]  Fax: [phone]  Follow Up Time:     [facility]  Dermatology  [address] [facility] [address]  [place] [address]  Phone: [phone]  Fax: [phone]  Follow Up Time:

## 2020-12-28 NOTE — ED PROVIDER NOTE - PHYSICAL EXAMINATION
Patient Instructions by Travis Hurtado APN at 07/29/17 12:20 PM     Author:  Travis Hurtado APN Service:  (none) Author Type:  Nurse Practitioner     Filed:  07/29/17 12:22 PM Encounter Date:  7/29/2017 Status:  Signed     :  Travis Hurtado APN (Nurse Practitioner)            PATIENT INFORMATION   1. Use the medication as directed  2. After applying the medication then go over it with an Aquaphor  3. Follow up with Dr. Mcelroy in dermatology if no better in 1 week.  4. Return if worsening symptoms    Follow-Up  -- Follow up with your regular Primary Care Provider.   -- You have been referred to:Dermatology at the [facility] - Phone # is [phone] Please call if you have not heard from that department in 3 days.     Additional Educational Resources:  For additional resources regarding your symptoms, diagnosis, or further health information, please visit the Health Resources section on [email or web address] or the Online Health Resources section in [employer].          Revision History        User Key Date/Time User Provider Type Action    > [N/A] 07/29/17 12:22 PM Travis Hurtado APN Nurse Practitioner Sign             *GEN: No acute distress, well appearing   *HEAD: Normocephalic, Atraumatic  *EYES/NOSE: b/l Pupils symmetric & Reactive to ligth, EOMI b/l  *THROAT: airway patent, moist mucous membranes  *NECK: Neck supple  *PULMONARY: No Respiratory distress, symmetric b/l chest rise  *CARDIAC: s1s2, regular rhythm   *ABDOMEN:  Non Tender, Non Distended, soft, no guarding, no rebound, no masses   *BACK: no CVA tenderness, No midline vertebral tenderness to palpation   *EXTREMITIES: symmetric pulses, 2+ DP & radial pulses, no cyanosis, no edema   *SKIN: +maculopapular rash on head, neck, and on lumbosacral region  *NEUROLOGIC: alert,  full active & passive ROM in all 4 extremities,   *PSYCH: appropriate concern about symptoms, pleasant

## 2020-12-28 NOTE — ED ADULT NURSE NOTE - ALCOHOL PRE SCREEN (AUDIT - C)
[address] [facility]  EMERGENCY DEPARTMENT ENCOUNTER      Pt Name: Bossman Lake  MRN: [id number]  [place] 1944  Date of evaluation: 6/29/2020  Provider: Sheree Garibay       Chief Complaint   Patient presents with    Shortness of Breath         HISTORY OF PRESENT ILLNESS   (Location/Symptom, Timing/Onset,Context/Setting, Quality, Duration, Modifying Factors, Severity)  Note limiting factors. Bossman Lake is a 76 y.o. female who presents to the emergency department with complaints of shortness of breath over the course of the weekend. She and family member at the bedside reports symptoms began late Friday evening/night and have progressively worsened over the course of last several days. She does have a longstanding history of COPD, this is similar to past COPD exacerbations. She has an admitted cough however this is chronic and not recently productive. She does have some wheezing that has not responded to home treatments. She has an associated generalized, global weakness. No headache dizziness or lightheadedness fever sweats or chills chest pain or palpitations. No nausea vomiting diarrhea constipation or abdominal pain      Location/Symptom -shortness of breath and weakness  Onset -3 days  Context/Setting - as above  Quality -short of breath, wheezing and weak  Duration -constant progressively worsening  Modifying Factors -none  Severity -moderate to severe        Nursing Notes were reviewed. REVIEW OF SYSTEMS    (2-9 systems for level 4, 10 or more for level 5)     Review of Systems   Constitutional: Negative for chills, diaphoresis, fatigue and fever. HENT: Negative for congestion, rhinorrhea and sore throat. Eyes: Negative for photophobia and pain. Respiratory: Positive for shortness of breath. Negative for cough. Cardiovascular: Negative for chest pain and palpitations.    Gastrointestinal: Negative for abdominal pain, diarrhea, nausea and vomiting. Genitourinary: Negative for dysuria and flank pain. Musculoskeletal: Negative for back pain. Skin: Negative for rash. Neurological: Positive for weakness. Negative for dizziness, light-headedness and headaches. Psychiatric/Behavioral: Negative. All other systems reviewed and are negative. Except as noted above the remainder of the review of systems was reviewed and negative. PAST MEDICAL HISTORY     Past Medical History:   Diagnosis Date    Anxiety     Asthma     dx 2019 / has smoked since age 15    CHF (congestive heart failure) (HCC)     Chronic back pain     Bilateral L5 S1 Radic on emg--surprisingly worse on the left than the right--pt's symptoms and her MRI show worse on the right    Chronic obstructive pulmonary disease with acute exacerbation ([facility] [address] 10/12/2019    Depression     Fibromyalgia     Hyperlipidemia     meds > 8 yrs    Hypertension     meds > 45 yrs    Osteoarthritis     Type II diabetes mellitus, uncontrolled ([facility] [address]     hx > 8 yrs    Unspecified sleep apnea      Past Surgical History:   Procedure Laterality Date    BACK SURGERY  2017    lumbar disc    CARDIAC CATHETERIZATION  11/3/14     DR. MIRELES / no stents    COLONOSCOPY  08/29/2016    w/polypectomy     DILATION AND CURETTAGE OF UTERUS N/A 10/7/2019    EUA HYSTEROSCOPY DILATATION AND CURETTAGE performed by Roseline Archibald DO at [facility]. [address], COLON, DIAGNOSTIC      EYE SURGERY      [place] with IOL OU / [address]    umbilical hernia repair    MD ESOPHAGOGASTRODUODENOSCOPY TRANSORAL DIAGNOSTIC N/A 3/24/2017    EGD ESOPHAGOGASTRODUODENOSCOPY performed by Michael Caraballo MD at [facility] N/A 2/8/2018    negative findings    MD REVISE MEDIAN N/CARPAL TUNNEL SURG Left 6/5/2017    LEFT  CARPAL TUNNEL RELEASE performed by Azeem Musa MD at [facility] QNM,5+E/V,ASDYS N/A 2/8/2018    TONSILLECTOMY as child    UPPER GASTROINTESTINAL ENDOSCOPY  08/26/2016    w/bx     UPPER GASTROINTESTINAL ENDOSCOPY N/A 2/25/2020    EGD possible biopsy performed by Shelton Islas MD at [address] [place] [address] History     Socioeconomic History    Marital status:      Spouse name: None    Number of children: None    Years of education: None    Highest education level: None   Occupational History    Occupation: Retired-   Social Needs    Financial resource strain: None    Food insecurity     Worry: None     Inability: None    Transportation needs     Medical: None     Non-medical: None   Tobacco Use    Smoking status: Former Smoker     Packs/day: 1.00     Years: 59.00     Pack years: 59.00     Types: Cigarettes     Start date: 11/30/2017    Smokeless tobacco: Never Used    Tobacco comment: quit 2-3 weeks ago    Substance and Sexual Activity    Alcohol use: Not Currently    Drug use: No    Sexual activity: Yes   Lifestyle    Physical activity     Days per week: None     Minutes per session: None    Stress: None   Relationships    Social connections     Talks on phone: None     Gets together: None     Attends Quaker service: None     Active member of club or organization: None     Attends meetings of clubs or organizations: None     Relationship status: None    Intimate partner violence     Fear of current or ex partner: None     Emotionally abused: None     Physically abused: None     Forced sexual activity: None   Other Topics Concern    None   Social History Narrative    None       SCREENINGS    Brook Coma Scale  Eye Opening: Spontaneous  Best Verbal Response: Oriented  Best Motor Response: Obeys commands  Brook Coma Scale Score: 15        PHYSICAL EXAM    (up to 7 for level 4, 8 or more for level 5)     ED Triage Vitals [06/29/20 0701]   BP Temp Temp src Pulse Resp SpO2 Height Weight   (!) 196/93 -- -- 110 26 99 % 4' 11\" (1.499 m) 120 lb (54.4 kg)       Physical Exam  Vitals signs and nursing note reviewed. Constitutional:       General: She is in acute distress. Appearance: Normal appearance. She is well-developed. She is not diaphoretic. HENT:      Head: Normocephalic and atraumatic. Eyes:      General: Lids are normal.      Conjunctiva/sclera: Conjunctivae normal.   Neck:      Musculoskeletal: Normal range of motion and neck supple. Cardiovascular:      Rate and Rhythm: Normal rate and regular rhythm. Pulses: Normal pulses. Heart sounds: Normal heart sounds. Pulmonary:      Effort: Pulmonary effort is normal.      Breath sounds: Normal breath sounds. Abdominal:      General: Bowel sounds are normal.      Palpations: Abdomen is soft. Tenderness: There is no abdominal tenderness. Lymphadenopathy:      Cervical: No cervical adenopathy. Skin:     General: Skin is warm and dry. Capillary Refill: Capillary refill takes less than 2 seconds. Findings: No rash. Neurological:      Mental Status: She is alert and oriented to person, place, and time. Psychiatric:         Thought Content:  Thought content normal.         Judgment: Judgment normal.          RESULTS     EKG: All EKG's are interpreted by the Emergency Department Physician who either signs or Co-signsthis chart in the absence of a cardiologist.    Sinus rhythm 87 no STEMI    RADIOLOGY:   Non-plain filmimages such as CT, Ultrasound and MRI are read by the radiologist. Plain radiographic images are visualized and preliminarily interpreted by the emergency physician with the below findings:    Chest x-ray demonstrates findings consistent with COPD concerning for infiltrate in the right lower lobe no pneumothorax    Interpretation per the Radiologist below, if available at the time ofthis note:    XR CHEST PORTABLE    (Results Pending)         ED BEDSIDE ULTRASOUND:   Performed by ED Physician - none    LABS:  Labs Reviewed   CBC WITH AUTO DIFFERENTIAL - Abnormal; Notable for the following components:       Result Value    RBC 3.09 (*)     Hemoglobin 5.6 (*)     Hematocrit 19.4 (*)     MCV 62.4 (*)     MCH 18.3 (*)     MCHC 29.3 (*)     RDW 20.4 (*)     Platelets 373 (*)     All other components within normal limits    Narrative:     CALL  [facility] tel. [phone],  H/H called to Ally Conley/Janak Otero in E.R., 06/29/2020 07:54, by CHARLENE   TROPONIN - Abnormal; Notable for the following components:    Troponin 0.019 (*)     All other components within normal limits    Narrative:     CALL  [facility] tel. [phone],  Troponin results called to and read back by AKASH Conley, 06/29/2020  07:49, by REGINE   CK - Abnormal; Notable for the following components: Total  (*)     All other components within normal limits   APTT - Abnormal; Notable for the following components:    aPTT <20.0 (*)     All other components within normal limits   COMPREHENSIVE METABOLIC PANEL - Abnormal; Notable for the following components:    Chloride 110 (*)     Glucose 136 (*)     CREATININE 1.12 (*)     GFR Non- 47.3 (*)     GFR  57.2 (*)     AST 44 (*)     All other components within normal limits   CKMB & RELATIVE PERCENT - Abnormal; Notable for the following components:    CK-MB 8.1 (*)     All other components within normal limits    Narrative:     CALL  [facility] tel. [phone],  Troponin results called to and read back by AKASH Conley, 06/29/2020  07:49, by REGINE   POCT ARTERIAL - Abnormal; Notable for the following components:    POC Chloride 112 (*)     POC Glucose 134 (*)     GFR Non- 48 (*)     GFR  58 (*)     pO2, Arterial 259 (*)     O2 Sat, Arterial 100 (*)     Lactate <0.30 (*)     POC Hematocrit 18 (*)     Hemoglobin 6.3 (*)     All other components within normal limits   CULTURE, BLOOD 1   CULTURE, BLOOD 2   MAGNESIUM   PROTIME-INR   BRAIN NATRIURETIC PEPTIDE    Narrative:     CALL  [facility] tel. [phone],  Troponin results called to and read back by AKASH Conley, 06/29/2020  07:49, by REGINE   LACTIC ACID, PLASMA   URINE RT REFLEX TO CULTURE   LACTIC ACID, PLASMA   LACTIC ACID, PLASMA   DIGOXIN LEVEL   POCT EPOC BLOOD GAS, LACTIC ACID, ICA   TYPE AND SCREEN   PREPARE RBC (CROSSMATCH)       All other labs were within normal range or not returned as of this dictation. EMERGENCY DEPARTMENT COURSE and DIFFERENTIAL DIAGNOSIS/MDM:   Vitals:    Vitals:    06/29/20 0701 06/29/20 0715 06/29/20 0800   BP: (!) 196/93  (!) 172/77   Pulse: 110  84   Resp: 26 22 20   SpO2: 99% 100% 100%   Weight: 120 lb (54.4 kg)     Height: 4' 11\" (1.499 m)              MDM evaluation patient is distressed. She has tachypnea and poor air exchange. She will be provided with supportive treatment as well as laboratory and radiology studies will be obtained for evaluation of acute pathology. She will need to be closely monitored. As patient was placed on BiPAP she does demonstrate good continual clinical improvement. Her hemoglobin was abnormally low. We discussed rectal exam, she refused. She reports that she has a history of anemia. She is agreeable to having a unit of blood transfused. We discussed plan of care and she is agreeable to be admitted to the hospital for further evaluation and management. Clinical condition is improved and I do not feel she will require intensive care. Case is discussed with the hospitalist who accepts to their service. CRITICAL CARE TIME   Critical Care: The high probability of sudden, clinically significant deterioration in the patient's condition required the highest level of my preparedness to intervene urgently. The services I provided to this patient were to treat and/or prevent clinically significant deterioration.  Services included the following: chart data review, reviewing nursing notes and/or old charts, documentation time, consultant collaboration regarding findings and treatment options, medication orders and management, direct patient care, vital sign assessments and ordering, interpreting and reviewing diagnostic studies/lab tests. Aggregate critical care time includes only time during which I was engaged in work directly related to the patient's care, as described above, whether at the bedside or elsewhere in the Emergency Department. It did not include time spent performing other reported procedures. Critical Care: 35 minutes. CONSULTS:  IP CONSULT TO HOSPITALIST    PROCEDURES:  Unless otherwise noted below, none     Procedures    FINAL IMPRESSION      1. COPD exacerbation ([facility] [address]    2. Anemia, unspecified type          DISPOSITION/PLAN   DISPOSITION Decision To Admit 06/29/2020 08:42:11 AM      PATIENT REFERRED TO:  No follow-up provider specified. DISCHARGE MEDICATIONS:  New Prescriptions    No medications on file          (Please notethat portions of this note were completed with a voice recognition program.  Efforts were made to edit the dictations but occasionally words are mis-transcribed.)    JOHNATHON Perez CNP (electronically signed)  Attending Emergency Physician         JOHNATHON Perez CNP  06/29/20 5130    Attending Supervisory Note/Shared Visit   I have personally performed a face to face diagnostic evaluation on this patient. I have reviewed the mid-levels findings and agree.   History and Exam by me shows the OPD exacerbation      Shan Gates DO  Attending Emergency Physician         Shan Gates DO  06/29/20 4914 Statement Selected

## 2020-12-28 NOTE — ED PROVIDER NOTE - OBJECTIVE STATEMENT
Pertinent HPI/PMH/PSH/FHx/SHx and Review of Systems contained within  HPI:  Patient BIBEMS from [facility] p/w suspected shingles. Pt c/o pyritic rash on head, neck, and on lumbosacral region x 1 week. Pt states he had Shingles 65 years ago, denies pain on areas of rash but endorses itchiness. Denies any recent medication changes.  PMH/PSH relevant for: CAD s/p stents on Plavix, CHF on Lasix, HTN, AAA, PAD, CKDIV, HLD, BPH  ROS negative for: fever, Chest pain, SOB, Nausea, vomiting, diarrhea, abdominal pain, dysuria    FamilyHx and SocialHx not otherwise contributory Pertinent HPI/PMH/PSH/FHx/SHx and Review of Systems contained within  HPI:  Patient BIBEMS from [facility] p/w with cc rash.. Pt c/o pruritic rash on head, neck, and on lumbosacral region x 1 week. Pt states he had Shingles 65 years ago & this doesn't feel similar. denies pain on areas of rash but endorses itchiness. Denies any recent medication changes.  PMH/PSH relevant for: CAD s/p stents on Plavix, CHF on Lasix, HTN, AAA, PAD, CKDIV, HLD, BPH  ROS negative for: fever, Chest pain, SOB, Nausea, vomiting, diarrhea, abdominal pain, dysuria    FamilyHx and SocialHx not otherwise contributory

## 2020-12-28 NOTE — ED PROVIDER NOTE - PROGRESS NOTE DETAILS
Scribe IN for Dr. Cordoba: creatinine around baseline due to CKD. Pt's rash improved. Will set up transport back to nursing facility

## 2020-12-28 NOTE — ED PROVIDER NOTE - NSFOLLOWUPINSTRUCTIONS_ED_ALL_ED_FT
RASH NOT CONSISTENT WITH SHINGLES    Can take 25mg of Benadryl every 4-6 hours as needed for itch. F/u with dermatologist.     Acute Rash    WHAT YOU NEED TO KNOW:    A rash is irritation, redness, or itchiness in the skin or mucus membranes. Mucus membranes are areas such as the lining of your nose or throat. Acute means the rash starts suddenly, worsens quickly, and lasts a short time. An acute rash may be caused by a disease, such as hepatitis or vasculitis. The rash may be a reaction to something you are allergic to, such as certain foods, or latex. Certain medicines, including antibiotics, NSAIDs, prescription pain medicine, and aspirin can also cause a rash.     DISCHARGE INSTRUCTIONS:    Return to the emergency department if:     You have sudden trouble breathing or chest pain.      You are vomiting, have a headache or muscle aches, and your throat hurts.    Contact your healthcare provider if:     You have a fever.      You get open wounds from scratching your skin, or you have a wound that is red, swollen, or painful.       Your rash lasts longer than 3 months.      You have swelling or pain in your joints.       You have questions or concerns about your condition or care.    Medicines: If your rash does not go away on its own, you may need the following medicines:     Antihistamines may be given to help decrease itching.       Steroids may be given to decrease inflammation.      Antibiotics help fight or prevent a bacterial infection.       Take your medicine as directed. Contact your healthcare provider if you think your medicine is not helping or if you have side effects. Tell him of her if you are allergic to any medicine. Keep a list of the medicines, vitamins, and herbs you take. Include the amounts, and when and why you take them. Bring the list or the pill bottles to follow-up visits. Carry your medicine list with you in case of an emergency.    Prevent a rash or care for your skin when you have a rash: Dry skin can lead to more problems. Do not scratch your skin if it itches. You may cause a skin infection by scratching. The following may prevent dry skin, and help your skin look better:     Use thick cream lotions or petroleum jelly to help soothe your rash. These products work well on areas with thick skin, such as your feet. Cool compresses may also be used to soothe your skin. Apply a cool compress or a cool, wet towel, and then cover it with a dry towel.       Use lukewarm water when you bathe. Hot water may damage your skin more. Pat your skin dry. Do not rub your skin with a towel.       Use detergents, soaps, shampoos, and bubble baths made for sensitive skin. Wear clothes that are made of cotton instead of nylon or wool. Cotton is softer, so it will not hurt your skin as much.    Follow up with your healthcare provider as directed: You may need to see a dermatologist if healthcare providers do not know what is causing your rash. You may also need to see a dermatologist if your rash does not get better even with treatment. You may need to see a dietitian if you have allergies to foods. Write down your questions so you remember to ask them during your visits.

## 2020-12-28 NOTE — ED PROVIDER NOTE - NS_ATTENDINGSCRIBE_ED_ALL_ED
oral
I personally performed the service described in the documentation recorded by the scribe in my presence, and it accurately and completely records my words and actions.

## 2020-12-28 NOTE — ED PROVIDER NOTE - NS ED ROS FT
Review of Systems:  	•	CONSTITUTIONAL: no fever  	•	SKIN: +rash on head, neck, and on lumbosacral region  	•	RESPIRATORY: no shortness of breath  	•	CARDIAC: no chest pain  	•	GI:  no abd pain, no nausea, no vomiting, no diarrhea  	•	GENITO-URINARY:  no dysuria  	•	MUSCULOSKELETAL:  no back pain  	•	NEUROLOGIC: no weakness  	•	ALLERGY: no rhinorrhea  	•	PSYSCHIATRIC: appropriate concern about symptoms

## 2020-12-28 NOTE — ED PROVIDER NOTE - CLINICAL SUMMARY MEDICAL DECISION MAKING FREE TEXT BOX
maculopapular pyritic rash on head, neck, and on lumbosacral region x 1 week. Will check basic labs, symptoms possibly from hives. Will try Decadron and Benadryl. maculopapular pruritic rash on head, neck, and on lumbosacral region x 1 week. Will check basic labs, symptoms possibly from hives. Will try Decadron and Benadryl. No evidence of erythema multiforme, SJS/TEN, Lyme, cellulitis, necrotizing fasciitis, angioedema, meningococcemia, donnie mountain spotted fever.

## 2021-01-02 ENCOUNTER — EMERGENCY (EMERGENCY)
Facility: HOSPITAL | Age: 86
LOS: 0 days | Discharge: HOME | End: 2021-01-02
Attending: EMERGENCY MEDICINE
Payer: MEDICARE

## 2021-01-02 VITALS
DIASTOLIC BLOOD PRESSURE: 61 MMHG | SYSTOLIC BLOOD PRESSURE: 140 MMHG | OXYGEN SATURATION: 100 % | TEMPERATURE: 98 F | HEART RATE: 76 BPM

## 2021-01-02 VITALS
OXYGEN SATURATION: 100 % | TEMPERATURE: 98 F | SYSTOLIC BLOOD PRESSURE: 110 MMHG | DIASTOLIC BLOOD PRESSURE: 44 MMHG | HEART RATE: 80 BPM | RESPIRATION RATE: 24 BRPM

## 2021-01-02 DIAGNOSIS — M19.90 UNSPECIFIED OSTEOARTHRITIS, UNSPECIFIED SITE: ICD-10-CM

## 2021-01-02 DIAGNOSIS — Z95.5 PRESENCE OF CORONARY ANGIOPLASTY IMPLANT AND GRAFT: Chronic | ICD-10-CM

## 2021-01-02 DIAGNOSIS — I73.9 PERIPHERAL VASCULAR DISEASE, UNSPECIFIED: ICD-10-CM

## 2021-01-02 DIAGNOSIS — Z98.89 OTHER SPECIFIED POSTPROCEDURAL STATES: Chronic | ICD-10-CM

## 2021-01-02 DIAGNOSIS — I71.4 ABDOMINAL AORTIC ANEURYSM, WITHOUT RUPTURE: ICD-10-CM

## 2021-01-02 DIAGNOSIS — N18.30 CHRONIC KIDNEY DISEASE, STAGE 3 UNSPECIFIED: ICD-10-CM

## 2021-01-02 DIAGNOSIS — Z88.0 ALLERGY STATUS TO PENICILLIN: ICD-10-CM

## 2021-01-02 DIAGNOSIS — I12.9 HYPERTENSIVE CHRONIC KIDNEY DISEASE WITH STAGE 1 THROUGH STAGE 4 CHRONIC KIDNEY DISEASE, OR UNSPECIFIED CHRONIC KIDNEY DISEASE: ICD-10-CM

## 2021-01-02 DIAGNOSIS — Z87.19 PERSONAL HISTORY OF OTHER DISEASES OF THE DIGESTIVE SYSTEM: Chronic | ICD-10-CM

## 2021-01-02 DIAGNOSIS — R06.02 SHORTNESS OF BREATH: ICD-10-CM

## 2021-01-02 DIAGNOSIS — Z95.5 PRESENCE OF CORONARY ANGIOPLASTY IMPLANT AND GRAFT: ICD-10-CM

## 2021-01-02 DIAGNOSIS — N40.0 BENIGN PROSTATIC HYPERPLASIA WITHOUT LOWER URINARY TRACT SYMPTOMS: ICD-10-CM

## 2021-01-02 DIAGNOSIS — I25.10 ATHEROSCLEROTIC HEART DISEASE OF NATIVE CORONARY ARTERY WITHOUT ANGINA PECTORIS: ICD-10-CM

## 2021-01-02 DIAGNOSIS — Z98.890 OTHER SPECIFIED POSTPROCEDURAL STATES: Chronic | ICD-10-CM

## 2021-01-02 DIAGNOSIS — L50.9 URTICARIA, UNSPECIFIED: ICD-10-CM

## 2021-01-02 DIAGNOSIS — Z20.822 CONTACT WITH AND (SUSPECTED) EXPOSURE TO COVID-19: ICD-10-CM

## 2021-01-02 DIAGNOSIS — R21 RASH AND OTHER NONSPECIFIC SKIN ERUPTION: ICD-10-CM

## 2021-01-02 DIAGNOSIS — E78.5 HYPERLIPIDEMIA, UNSPECIFIED: ICD-10-CM

## 2021-01-02 DIAGNOSIS — Z79.82 LONG TERM (CURRENT) USE OF ASPIRIN: ICD-10-CM

## 2021-01-02 LAB
ALBUMIN SERPL ELPH-MCNC: 3 G/DL — LOW (ref 3.3–5)
ALP SERPL-CCNC: 77 U/L — SIGNIFICANT CHANGE UP (ref 40–120)
ALT FLD-CCNC: 13 U/L — SIGNIFICANT CHANGE UP (ref 12–78)
ANION GAP SERPL CALC-SCNC: 3 MMOL/L — LOW (ref 5–17)
APTT BLD: 26.4 SEC — LOW (ref 27.5–35.5)
AST SERPL-CCNC: 12 U/L — LOW (ref 15–37)
BASOPHILS # BLD AUTO: 0.01 K/UL — SIGNIFICANT CHANGE UP (ref 0–0.2)
BASOPHILS NFR BLD AUTO: 0.1 % — SIGNIFICANT CHANGE UP (ref 0–2)
BILIRUB SERPL-MCNC: 0.7 MG/DL — SIGNIFICANT CHANGE UP (ref 0.2–1.2)
BUN SERPL-MCNC: 47 MG/DL — HIGH (ref 7–23)
CALCIUM SERPL-MCNC: 8.7 MG/DL — SIGNIFICANT CHANGE UP (ref 8.5–10.1)
CHLORIDE SERPL-SCNC: 105 MMOL/L — SIGNIFICANT CHANGE UP (ref 96–108)
CO2 SERPL-SCNC: 33 MMOL/L — HIGH (ref 22–31)
CREAT SERPL-MCNC: 2.41 MG/DL — HIGH (ref 0.5–1.3)
EOSINOPHIL # BLD AUTO: 0.04 K/UL — SIGNIFICANT CHANGE UP (ref 0–0.5)
EOSINOPHIL NFR BLD AUTO: 0.5 % — SIGNIFICANT CHANGE UP (ref 0–6)
GLUCOSE SERPL-MCNC: 109 MG/DL — HIGH (ref 70–99)
HCT VFR BLD CALC: 26.2 % — LOW (ref 39–50)
HGB BLD-MCNC: 8.1 G/DL — LOW (ref 13–17)
IMM GRANULOCYTES NFR BLD AUTO: 0.2 % — SIGNIFICANT CHANGE UP (ref 0–1.5)
INR BLD: 1.03 RATIO — SIGNIFICANT CHANGE UP (ref 0.88–1.16)
LYMPHOCYTES # BLD AUTO: 0.8 K/UL — LOW (ref 1–3.3)
LYMPHOCYTES # BLD AUTO: 9.8 % — LOW (ref 13–44)
MCHC RBC-ENTMCNC: 30.1 PG — SIGNIFICANT CHANGE UP (ref 27–34)
MCHC RBC-ENTMCNC: 30.9 GM/DL — LOW (ref 32–36)
MCV RBC AUTO: 97.4 FL — SIGNIFICANT CHANGE UP (ref 80–100)
MONOCYTES # BLD AUTO: 0.29 K/UL — SIGNIFICANT CHANGE UP (ref 0–0.9)
MONOCYTES NFR BLD AUTO: 3.5 % — SIGNIFICANT CHANGE UP (ref 2–14)
NEUTROPHILS # BLD AUTO: 7.03 K/UL — SIGNIFICANT CHANGE UP (ref 1.8–7.4)
NEUTROPHILS NFR BLD AUTO: 85.9 % — HIGH (ref 43–77)
PLATELET # BLD AUTO: 271 K/UL — SIGNIFICANT CHANGE UP (ref 150–400)
POTASSIUM SERPL-MCNC: 3.7 MMOL/L — SIGNIFICANT CHANGE UP (ref 3.5–5.3)
POTASSIUM SERPL-SCNC: 3.7 MMOL/L — SIGNIFICANT CHANGE UP (ref 3.5–5.3)
PROT SERPL-MCNC: 6.9 GM/DL — SIGNIFICANT CHANGE UP (ref 6–8.3)
PROTHROM AB SERPL-ACNC: 12 SEC — SIGNIFICANT CHANGE UP (ref 10.6–13.6)
RBC # BLD: 2.69 M/UL — LOW (ref 4.2–5.8)
RBC # FLD: 15.9 % — HIGH (ref 10.3–14.5)
SARS-COV-2 RNA SPEC QL NAA+PROBE: SIGNIFICANT CHANGE UP
SODIUM SERPL-SCNC: 141 MMOL/L — SIGNIFICANT CHANGE UP (ref 135–145)
TROPONIN I SERPL-MCNC: 0.02 NG/ML — SIGNIFICANT CHANGE UP (ref 0.01–0.04)
TROPONIN I SERPL-MCNC: <0.015 NG/ML — SIGNIFICANT CHANGE UP (ref 0.01–0.04)
WBC # BLD: 8.19 K/UL — SIGNIFICANT CHANGE UP (ref 3.8–10.5)
WBC # FLD AUTO: 8.19 K/UL — SIGNIFICANT CHANGE UP (ref 3.8–10.5)

## 2021-01-02 PROCEDURE — 71045 X-RAY EXAM CHEST 1 VIEW: CPT

## 2021-01-02 PROCEDURE — U0003: CPT

## 2021-01-02 PROCEDURE — 85025 COMPLETE CBC W/AUTO DIFF WBC: CPT

## 2021-01-02 PROCEDURE — 99284 EMERGENCY DEPT VISIT MOD MDM: CPT | Mod: 25

## 2021-01-02 PROCEDURE — 85730 THROMBOPLASTIN TIME PARTIAL: CPT

## 2021-01-02 PROCEDURE — U0005: CPT

## 2021-01-02 PROCEDURE — 84484 ASSAY OF TROPONIN QUANT: CPT

## 2021-01-02 PROCEDURE — 36415 COLL VENOUS BLD VENIPUNCTURE: CPT

## 2021-01-02 PROCEDURE — 96360 HYDRATION IV INFUSION INIT: CPT

## 2021-01-02 PROCEDURE — 93005 ELECTROCARDIOGRAM TRACING: CPT

## 2021-01-02 PROCEDURE — 99285 EMERGENCY DEPT VISIT HI MDM: CPT

## 2021-01-02 PROCEDURE — 71045 X-RAY EXAM CHEST 1 VIEW: CPT | Mod: 26

## 2021-01-02 PROCEDURE — 85610 PROTHROMBIN TIME: CPT

## 2021-01-02 PROCEDURE — 93010 ELECTROCARDIOGRAM REPORT: CPT | Mod: 76

## 2021-01-02 PROCEDURE — 80053 COMPREHEN METABOLIC PANEL: CPT

## 2021-01-02 RX ORDER — HYDROCORTISONE 1 %
1 OINTMENT (GRAM) TOPICAL ONCE
Refills: 0 | Status: COMPLETED | OUTPATIENT
Start: 2021-01-02 | End: 2021-01-02

## 2021-01-02 RX ORDER — DIPHENHYDRAMINE HCL 50 MG
50 CAPSULE ORAL ONCE
Refills: 0 | Status: COMPLETED | OUTPATIENT
Start: 2021-01-02 | End: 2021-01-02

## 2021-01-02 RX ORDER — SODIUM CHLORIDE 9 MG/ML
500 INJECTION INTRAMUSCULAR; INTRAVENOUS; SUBCUTANEOUS ONCE
Refills: 0 | Status: COMPLETED | OUTPATIENT
Start: 2021-01-02 | End: 2021-01-02

## 2021-01-02 RX ADMIN — Medication 1 APPLICATION(S): at 17:36

## 2021-01-02 RX ADMIN — Medication 40 MILLIGRAM(S): at 14:58

## 2021-01-02 RX ADMIN — SODIUM CHLORIDE 500 MILLILITER(S): 9 INJECTION INTRAMUSCULAR; INTRAVENOUS; SUBCUTANEOUS at 15:30

## 2021-01-02 RX ADMIN — SODIUM CHLORIDE 500 MILLILITER(S): 9 INJECTION INTRAMUSCULAR; INTRAVENOUS; SUBCUTANEOUS at 14:58

## 2021-01-02 RX ADMIN — Medication 50 MILLIGRAM(S): at 14:58

## 2021-01-02 NOTE — ED PROVIDER NOTE - PROGRESS NOTE DETAILS
Pt s/o to me.  Care assumed from Dr. Dangelo.  Pending repeat troponin and likely d/c home.  Pt's ekg appears similar to prior, but suspect lead misplacement to v4-v6 as remainder is unchanged.  Repeat trop negative. I discussed inpatient management vs outpatient management.  Pt has outpatient cardiologist who he just saw 4 days ago and can see again in a few days.  Given levels of covid in hospital, patient would like to avoid.  Has no sob at all currently.  Rash to shoulder appears benign and does not require treatment currently.  Pt prefers outpatient management and understands risks.  D/c home with strict return precautions and prompt outpatient f/u.

## 2021-01-02 NOTE — ED ADULT TRIAGE NOTE - CHIEF COMPLAINT QUOTE
Pt reports that he developed an itchy rash approx two weeks ago that was evaluated at  ED last week, but continues. Pt also reports that he has been experiencing intermittent episodes of dyspnea "in and out" over the course of months.

## 2021-01-02 NOTE — ED ADULT NURSE NOTE - NS_ED_NURSE_TEACHING_TOPIC_ED_A_ED
The surgeon's office should be presenting patient with what they require for her preop testing and she can make an appointment with her preop visit rash - FU dermatology. OTC PRN benadryl and hydrocortisone cream for itching

## 2021-01-02 NOTE — ED PROVIDER NOTE - OBJECTIVE STATEMENT
89 y/o male with a PMHx of AAA, BPH, CAD, cellulitis, CKD, colon polyp, HTN, HLD, leg swelling, OA, PAD, stented coronary artery presents to the ED BIBEMS from [facility] c/o rash, SOB and LOPEZ. Pt reports SOB and LOPEZ started this morning when he got out of bed. +rash x1 week, not improving. No other complaints at this time.

## 2021-01-02 NOTE — ED PROVIDER NOTE - CARE PROVIDER_API CALL
Darren Castro  CARDIOVASCULAR DISEASE  [address], [address]  [place] [address]  Phone: [phone]  Fax: [phone]  Follow Up Time: 1-3 Days

## 2021-01-02 NOTE — ED STATDOCS - PROGRESS NOTE DETAILS
Juan MALIK for ED attending, Dr. Mistry: 89 y/o M with PMHx of AAA. CAD s/p coronary artery stent placement, CKD, HTN, HLD, PAD, OA, and BPH presents to the ED BIB son c/o itchy +rash to back, scalp, and neck x2 weeks. Was seen in ED on 12/28 for the same, was d/c after improvement with Decadron and Benadryl, was advised to continue taking Benadryl at home every 4-6 hours, and to f/u with derm. No fever. Notes he has now also been having some intermittent +difficulty breathing, states his "breathing is erratic. Denies chest pain. Allergic to penicillins. PCP: Dr. Deon Morales. Pt unable to ambulate at baseline, Will send pt to main ED for further evaluation. Juan MALIK for ED attending, Dr. Mistry: 89 y/o M with PMHx of AAA. CAD s/p coronary artery stent placement, CKD, HTN, HLD, PAD, OA, and BPH presents to the ED BIB son c/o itchy +rash to back, scalp, and neck x2 weeks. Was seen in ED on 12/28 for the same, was d/c after improvement with Decadron and Benadryl, was advised to continue taking Benadryl at home every 4-6 hours, and to f/u with derm. No fever. Notes he has now also been having some intermittent +difficulty breathing, states his "breathing is erratic and has been worsening since yesterday. Denies chest pain. Allergic to penicillins. PCP: Dr. Deon Morales. Pt unable to ambulate at baseline, discussed with pt's son Joseph who is in the waiting room, will send pt to main ED for further evaluation.

## 2021-01-02 NOTE — ED ADULT NURSE NOTE - CHPI ED NUR SYMPTOMS NEG
no body aches/no chills/no confusion/no decreased eating/drinking/no fever/no inflammation/no petechia/no scaly patches on skin/no vomiting

## 2021-01-02 NOTE — ED PROVIDER NOTE - NSFOLLOWUPINSTRUCTIONS_ED_ALL_ED_FT
Acute Rash    WHAT YOU NEED TO KNOW:    A rash is irritation, redness, or itchiness in the skin or mucus membranes. Mucus membranes are areas such as the lining of your nose or throat. Acute means the rash starts suddenly, worsens quickly, and lasts a short time. An acute rash may be caused by a disease, such as hepatitis or vasculitis. The rash may be a reaction to something you are allergic to, such as certain foods, or latex. Certain medicines, including antibiotics, NSAIDs, prescription pain medicine, and aspirin can also cause a rash.     DISCHARGE INSTRUCTIONS:    Return to the emergency department if:     You have sudden trouble breathing or chest pain.      You are vomiting, have a headache or muscle aches, and your throat hurts.    Contact your healthcare provider if:     You have a fever.      You get open wounds from scratching your skin, or you have a wound that is red, swollen, or painful.       Your rash lasts longer than 3 months.      You have swelling or pain in your joints.       You have questions or concerns about your condition or care.    Medicines: If your rash does not go away on its own, you may need the following medicines:     Antihistamines may be given to help decrease itching.       Steroids may be given to decrease inflammation.      Antibiotics help fight or prevent a bacterial infection.       Take your medicine as directed. Contact your healthcare provider if you think your medicine is not helping or if you have side effects. Tell him of her if you are allergic to any medicine. Keep a list of the medicines, vitamins, and herbs you take. Include the amounts, and when and why you take them. Bring the list or the pill bottles to follow-up visits. Carry your medicine list with you in case of an emergency.    Prevent a rash or care for your skin when you have a rash: Dry skin can lead to more problems. Do not scratch your skin if it itches. You may cause a skin infection by scratching. The following may prevent dry skin, and help your skin look better:     Use thick cream lotions or petroleum jelly to help soothe your rash. These products work well on areas with thick skin, such as your feet. Cool compresses may also be used to soothe your skin. Apply a cool compress or a cool, wet towel, and then cover it with a dry towel.       Use lukewarm water when you bathe. Hot water may damage your skin more. Pat your skin dry. Do not rub your skin with a towel.       Use detergents, soaps, shampoos, and bubble baths made for sensitive skin. Wear clothes that are made of cotton instead of nylon or wool. Cotton is softer, so it will not hurt your skin as much.    Follow up with your healthcare provider as directed: You may need to see a dermatologist if healthcare providers do not know what is causing your rash. You may also need to see a dermatologist if your rash does not get better even with treatment. You may need to see a dietitian if you have allergies to foods. Write down your questions so you remember to ask them during your visits.  Shortness of Breath    WHAT YOU NEED TO KNOW:    Shortness of breath is a feeling that you cannot get enough air when you breathe in. You may have this feeling only during activity, or all the time. Your symptoms can range from mild to severe. Shortness of breath may be a sign of a serious health condition that needs immediate care.    DISCHARGE INSTRUCTIONS:    Return to the emergency department if:     Your signs and symptoms are the same or worse within 24 hours of treatment.       The skin over your ribs or on your neck sinks in when you breathe.       You feel confused or dizzy.    Contact your healthcare provider if:     You have new or worsening symptoms.      You have questions or concerns about your condition or care.    Medicines:     Medicines may be used to treat the cause of your symptoms. You may need medicine to treat a bacterial infection or reduce anxiety. Other medicines may be used to open your airway, reduce swelling, or remove extra fluid. If you have a heart condition, you may need medicine to help your heart beat more strongly or regularly.      Take your medicine as directed. Contact your healthcare provider if you think your medicine is not helping or if you have side effects. Tell him or her if you are allergic to any medicine. Keep a list of the medicines, vitamins, and herbs you take. Include the amounts, and when and why you take them. Bring the list or the pill bottles to follow-up visits. Carry your medicine list with you in case of an emergency.    Manage shortness of breath:     Create an action plan. You and your healthcare provider can work together to create a plan for how to handle shortness of breath. The plan can include daily activities, treatment changes, and what to do if you have severe breathing problems.      Lean forward on your elbows when you sit. This helps your lungs expand and may make it easier to breathe.      Use pursed-lip breathing any time you feel short of breath. Breathe in through your nose and then slowly breathe out through your mouth with your lips slightly puckered. It should take you twice as long to breathe out as it did to breathe in.Breathe in Breathe out           Do not smoke. Nicotine and other chemicals in cigarettes and cigars can cause lung damage and make shortness of breath worse. Ask your healthcare provider for information if you currently smoke and need help to quit. E-cigarettes or smokeless tobacco still contain nicotine. Talk to your healthcare provider before you use these products.      Reach or maintain a healthy weight. Your healthcare provider can help you create a safe weight loss plan if you are overweight.      Exercise as directed. Exercise can help your lungs work more easily. Exercise can also help you lose weight if needed. Try to get at least 30 minutes of exercise most days of the week.    Follow up with your healthcare provider or specialist as directed: Write down your questions so you remember to ask them during your visits.

## 2021-01-02 NOTE — ED PROVIDER NOTE - PATIENT PORTAL LINK FT
You can access the FollowMyHealth Patient Portal offered by [facility] by registering at the following website: [email or web address]. By joining [employer]’s FollowMyHealth portal, you will also be able to view your health information using other applications (apps) compatible with our system.

## 2021-01-02 NOTE — ED ADULT NURSE NOTE - OBJECTIVE STATEMENT
pt came to ED for evaluation of scabbed rash posterior right shoulder. pt sts itching, burning, and painful. does not cross midline. pt ambulates with walker. unable to stand on his own.

## 2021-01-02 NOTE — ED PROVIDER NOTE - DR. NAME
RAFFY Howard Pain Nurse [facility]             Patient called and stated she wanted to do all of Dr. Smith and Dr. Renee's labs on 06/08. There were no orders in epic for Dr. Smith. Please enter the orders and give the patient a call to confirm they were entered. Thank you.         Elias

## 2021-01-11 NOTE — PHYSICAL THERAPY INITIAL EVALUATION ADULT - ORIENTATION, REHAB EVAL
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 Will need central line removed. oriented to person, place, time and situation

## 2021-01-12 ENCOUNTER — APPOINTMENT (OUTPATIENT)
Dept: DERMATOLOGY | Facility: CLINIC | Age: 86
End: 2021-01-12

## 2021-01-12 NOTE — ED ADULT NURSE NOTE - CAS TRG GENERAL NORM CIRC DET
Claudia Kendall is a 45 year old female presenting with consult for Type 2 DM     Medications reviewed and updated.  Denies known Latex allergy or symptoms of Latex sensitivity.  Social History     Tobacco Use   Smoking Status Never Smoker   Smokeless Tobacco Never Used     Patient would like communication of their results via:        Cell Phone:   Telephone Information:   Mobile [phone]     Okay to leave a message containing results? Yes    Diabetes A1C - 12/2 = 11.3%  Diabetes GFR - not due   Diabetes Foot exam - not due   Diabetes Eye exam - due   Diabetes Urine Micro albumin - not due      Strong peripheral pulses

## 2021-01-13 NOTE — ED ADULT NURSE NOTE - NS ED NURSE RECORD ANOTHER VITAL SIGN
Yes
What Type Of Note Output Would You Prefer (Optional)?: Bullet Format
How Severe Are Your Spot(S)?: mild
Have Your Spot(S) Been Treated In The Past?: has not been treated
Hpi Title: Evaluation of Skin Lesions

## 2021-03-13 ENCOUNTER — TRANSCRIPTION ENCOUNTER (OUTPATIENT)
Age: 86
End: 2021-03-13

## 2021-03-31 ENCOUNTER — INPATIENT (INPATIENT)
Facility: HOSPITAL | Age: 86
LOS: 4 days | Discharge: NURSING, REHAB OR LONG-TERM CARE | DRG: 378 | End: 2021-04-05
Attending: INTERNAL MEDICINE | Admitting: INTERNAL MEDICINE
Payer: MEDICARE

## 2021-03-31 VITALS
TEMPERATURE: 98 F | DIASTOLIC BLOOD PRESSURE: 66 MMHG | SYSTOLIC BLOOD PRESSURE: 122 MMHG | OXYGEN SATURATION: 98 % | RESPIRATION RATE: 16 BRPM | HEART RATE: 80 BPM

## 2021-03-31 DIAGNOSIS — Z95.5 PRESENCE OF CORONARY ANGIOPLASTY IMPLANT AND GRAFT: Chronic | ICD-10-CM

## 2021-03-31 DIAGNOSIS — Z98.890 OTHER SPECIFIED POSTPROCEDURAL STATES: Chronic | ICD-10-CM

## 2021-03-31 DIAGNOSIS — D64.9 ANEMIA, UNSPECIFIED: ICD-10-CM

## 2021-03-31 DIAGNOSIS — Z87.19 PERSONAL HISTORY OF OTHER DISEASES OF THE DIGESTIVE SYSTEM: Chronic | ICD-10-CM

## 2021-03-31 DIAGNOSIS — Z98.89 OTHER SPECIFIED POSTPROCEDURAL STATES: Chronic | ICD-10-CM

## 2021-03-31 LAB
ALBUMIN SERPL ELPH-MCNC: 2.7 G/DL — LOW (ref 3.3–5)
ALP SERPL-CCNC: 70 U/L — SIGNIFICANT CHANGE UP (ref 40–120)
ALT FLD-CCNC: 12 U/L — SIGNIFICANT CHANGE UP (ref 12–78)
ANION GAP SERPL CALC-SCNC: 2 MMOL/L — LOW (ref 5–17)
APTT BLD: 28.4 SEC — SIGNIFICANT CHANGE UP (ref 27.5–35.5)
AST SERPL-CCNC: 7 U/L — LOW (ref 15–37)
BASE EXCESS BLDV CALC-SCNC: 4.3 MMOL/L — HIGH (ref -2–2)
BASOPHILS # BLD AUTO: 0.02 K/UL — SIGNIFICANT CHANGE UP (ref 0–0.2)
BASOPHILS NFR BLD AUTO: 0.3 % — SIGNIFICANT CHANGE UP (ref 0–2)
BILIRUB SERPL-MCNC: 0.2 MG/DL — SIGNIFICANT CHANGE UP (ref 0.2–1.2)
BUN SERPL-MCNC: 49 MG/DL — HIGH (ref 7–23)
CALCIUM SERPL-MCNC: 8.2 MG/DL — LOW (ref 8.5–10.1)
CHLORIDE SERPL-SCNC: 108 MMOL/L — SIGNIFICANT CHANGE UP (ref 96–108)
CK SERPL-CCNC: 47 U/L — SIGNIFICANT CHANGE UP (ref 26–308)
CO2 SERPL-SCNC: 32 MMOL/L — HIGH (ref 22–31)
CREAT SERPL-MCNC: 2.06 MG/DL — HIGH (ref 0.5–1.3)
EOSINOPHIL # BLD AUTO: 0.11 K/UL — SIGNIFICANT CHANGE UP (ref 0–0.5)
EOSINOPHIL NFR BLD AUTO: 1.5 % — SIGNIFICANT CHANGE UP (ref 0–6)
GLUCOSE SERPL-MCNC: 101 MG/DL — HIGH (ref 70–99)
HCO3 BLDV-SCNC: 30 MMOL/L — HIGH (ref 21–29)
HCT VFR BLD CALC: 17.7 % — CRITICAL LOW (ref 39–50)
HGB BLD-MCNC: 5.1 G/DL — CRITICAL LOW (ref 13–17)
IMM GRANULOCYTES NFR BLD AUTO: 0.3 % — SIGNIFICANT CHANGE UP (ref 0–1.5)
INR BLD: 1.16 RATIO — SIGNIFICANT CHANGE UP (ref 0.88–1.16)
LACTATE SERPL-SCNC: 1.4 MMOL/L — SIGNIFICANT CHANGE UP (ref 0.7–2)
LYMPHOCYTES # BLD AUTO: 1.02 K/UL — SIGNIFICANT CHANGE UP (ref 1–3.3)
LYMPHOCYTES # BLD AUTO: 13.5 % — SIGNIFICANT CHANGE UP (ref 13–44)
MCHC RBC-ENTMCNC: 28.7 PG — SIGNIFICANT CHANGE UP (ref 27–34)
MCHC RBC-ENTMCNC: 28.8 GM/DL — LOW (ref 32–36)
MCV RBC AUTO: 99.4 FL — SIGNIFICANT CHANGE UP (ref 80–100)
MONOCYTES # BLD AUTO: 0.45 K/UL — SIGNIFICANT CHANGE UP (ref 0–0.9)
MONOCYTES NFR BLD AUTO: 6 % — SIGNIFICANT CHANGE UP (ref 2–14)
NEUTROPHILS # BLD AUTO: 5.92 K/UL — SIGNIFICANT CHANGE UP (ref 1.8–7.4)
NEUTROPHILS NFR BLD AUTO: 78.4 % — HIGH (ref 43–77)
NT-PROBNP SERPL-SCNC: 2408 PG/ML — HIGH (ref 0–450)
PCO2 BLDV: 58 MMHG — HIGH (ref 42–55)
PH BLDV: 7.33 — LOW (ref 7.35–7.45)
PLATELET # BLD AUTO: 265 K/UL — SIGNIFICANT CHANGE UP (ref 150–400)
PO2 BLDV: 193 MMHG — HIGH (ref 25–45)
POTASSIUM SERPL-MCNC: 4.7 MMOL/L — SIGNIFICANT CHANGE UP (ref 3.5–5.3)
POTASSIUM SERPL-SCNC: 4.7 MMOL/L — SIGNIFICANT CHANGE UP (ref 3.5–5.3)
PROT SERPL-MCNC: 6 GM/DL — SIGNIFICANT CHANGE UP (ref 6–8.3)
PROTHROM AB SERPL-ACNC: 13.5 SEC — SIGNIFICANT CHANGE UP (ref 10.6–13.6)
RBC # BLD: 1.78 M/UL — LOW (ref 4.2–5.8)
RBC # FLD: 18.6 % — HIGH (ref 10.3–14.5)
SAO2 % BLDV: 100 % — HIGH (ref 67–88)
SARS-COV-2 RNA SPEC QL NAA+PROBE: SIGNIFICANT CHANGE UP
SODIUM SERPL-SCNC: 142 MMOL/L — SIGNIFICANT CHANGE UP (ref 135–145)
TROPONIN I SERPL-MCNC: <0.015 NG/ML — SIGNIFICANT CHANGE UP (ref 0.01–0.04)
WBC # BLD: 7.54 K/UL — SIGNIFICANT CHANGE UP (ref 3.8–10.5)
WBC # FLD AUTO: 7.54 K/UL — SIGNIFICANT CHANGE UP (ref 3.8–10.5)

## 2021-03-31 PROCEDURE — P9016: CPT

## 2021-03-31 PROCEDURE — 80048 BASIC METABOLIC PNL TOTAL CA: CPT

## 2021-03-31 PROCEDURE — 99285 EMERGENCY DEPT VISIT HI MDM: CPT | Mod: CS

## 2021-03-31 PROCEDURE — 86850 RBC ANTIBODY SCREEN: CPT

## 2021-03-31 PROCEDURE — 88312 SPECIAL STAINS GROUP 1: CPT

## 2021-03-31 PROCEDURE — 83735 ASSAY OF MAGNESIUM: CPT

## 2021-03-31 PROCEDURE — 85018 HEMOGLOBIN: CPT

## 2021-03-31 PROCEDURE — 72131 CT LUMBAR SPINE W/O DYE: CPT | Mod: 26

## 2021-03-31 PROCEDURE — 71045 X-RAY EXAM CHEST 1 VIEW: CPT | Mod: 26

## 2021-03-31 PROCEDURE — 85027 COMPLETE CBC AUTOMATED: CPT

## 2021-03-31 PROCEDURE — 88305 TISSUE EXAM BY PATHOLOGIST: CPT

## 2021-03-31 PROCEDURE — 86923 COMPATIBILITY TEST ELECTRIC: CPT

## 2021-03-31 PROCEDURE — 36430 TRANSFUSION BLD/BLD COMPNT: CPT

## 2021-03-31 PROCEDURE — 86901 BLOOD TYPING SEROLOGIC RH(D): CPT

## 2021-03-31 PROCEDURE — U0005: CPT

## 2021-03-31 PROCEDURE — 85014 HEMATOCRIT: CPT

## 2021-03-31 PROCEDURE — 86769 SARS-COV-2 COVID-19 ANTIBODY: CPT

## 2021-03-31 PROCEDURE — 93308 TTE F-UP OR LMTD: CPT

## 2021-03-31 PROCEDURE — 97116 GAIT TRAINING THERAPY: CPT | Mod: GP

## 2021-03-31 PROCEDURE — 99222 1ST HOSP IP/OBS MODERATE 55: CPT

## 2021-03-31 PROCEDURE — U0003: CPT

## 2021-03-31 PROCEDURE — C9113: CPT

## 2021-03-31 PROCEDURE — 97530 THERAPEUTIC ACTIVITIES: CPT | Mod: GP

## 2021-03-31 PROCEDURE — 72131 CT LUMBAR SPINE W/O DYE: CPT

## 2021-03-31 PROCEDURE — 36415 COLL VENOUS BLD VENIPUNCTURE: CPT

## 2021-03-31 PROCEDURE — 86900 BLOOD TYPING SEROLOGIC ABO: CPT

## 2021-03-31 RX ORDER — PANTOPRAZOLE SODIUM 20 MG/1
8 TABLET, DELAYED RELEASE ORAL
Qty: 80 | Refills: 0 | Status: DISCONTINUED | OUTPATIENT
Start: 2021-03-31 | End: 2021-04-02

## 2021-03-31 RX ORDER — FUROSEMIDE 40 MG
40 TABLET ORAL DAILY
Refills: 0 | Status: DISCONTINUED | OUTPATIENT
Start: 2021-03-31 | End: 2021-04-05

## 2021-03-31 RX ORDER — GABAPENTIN 400 MG/1
300 CAPSULE ORAL THREE TIMES A DAY
Refills: 0 | Status: DISCONTINUED | OUTPATIENT
Start: 2021-03-31 | End: 2021-04-05

## 2021-03-31 RX ORDER — FERROUS SULFATE 325(65) MG
325 TABLET ORAL DAILY
Refills: 0 | Status: DISCONTINUED | OUTPATIENT
Start: 2021-03-31 | End: 2021-04-05

## 2021-03-31 RX ORDER — PANTOPRAZOLE SODIUM 20 MG/1
80 TABLET, DELAYED RELEASE ORAL ONCE
Refills: 0 | Status: COMPLETED | OUTPATIENT
Start: 2021-03-31 | End: 2021-03-31

## 2021-03-31 RX ORDER — PREGABALIN 225 MG/1
1 CAPSULE ORAL
Qty: 0 | Refills: 0 | DISCHARGE

## 2021-03-31 RX ORDER — FUROSEMIDE 40 MG
20 TABLET ORAL ONCE
Refills: 0 | Status: COMPLETED | OUTPATIENT
Start: 2021-03-31 | End: 2021-03-31

## 2021-03-31 RX ORDER — DULOXETINE HYDROCHLORIDE 30 MG/1
60 CAPSULE, DELAYED RELEASE ORAL DAILY
Refills: 0 | Status: DISCONTINUED | OUTPATIENT
Start: 2021-03-31 | End: 2021-04-05

## 2021-03-31 RX ORDER — ATORVASTATIN CALCIUM 80 MG/1
40 TABLET, FILM COATED ORAL AT BEDTIME
Refills: 0 | Status: DISCONTINUED | OUTPATIENT
Start: 2021-03-31 | End: 2021-04-05

## 2021-03-31 RX ORDER — ACETAMINOPHEN 500 MG
650 TABLET ORAL EVERY 6 HOURS
Refills: 0 | Status: DISCONTINUED | OUTPATIENT
Start: 2021-03-31 | End: 2021-04-05

## 2021-03-31 RX ORDER — SODIUM CHLORIDE 9 MG/ML
1000 INJECTION, SOLUTION INTRAVENOUS
Refills: 0 | Status: DISCONTINUED | OUTPATIENT
Start: 2021-03-31 | End: 2021-04-01

## 2021-03-31 RX ORDER — ONDANSETRON 8 MG/1
4 TABLET, FILM COATED ORAL EVERY 6 HOURS
Refills: 0 | Status: DISCONTINUED | OUTPATIENT
Start: 2021-03-31 | End: 2021-04-05

## 2021-03-31 RX ORDER — METOPROLOL TARTRATE 50 MG
50 TABLET ORAL DAILY
Refills: 0 | Status: DISCONTINUED | OUTPATIENT
Start: 2021-03-31 | End: 2021-04-05

## 2021-03-31 RX ORDER — FUROSEMIDE 40 MG
20 TABLET ORAL
Refills: 0 | Status: COMPLETED | OUTPATIENT
Start: 2021-03-31 | End: 2021-04-01

## 2021-03-31 RX ORDER — POTASSIUM CHLORIDE 20 MEQ
1 PACKET (EA) ORAL
Qty: 0 | Refills: 0 | DISCHARGE

## 2021-03-31 RX ORDER — TAMSULOSIN HYDROCHLORIDE 0.4 MG/1
0.4 CAPSULE ORAL AT BEDTIME
Refills: 0 | Status: DISCONTINUED | OUTPATIENT
Start: 2021-03-31 | End: 2021-04-05

## 2021-03-31 RX ADMIN — Medication 20 MILLIGRAM(S): at 17:36

## 2021-03-31 RX ADMIN — PANTOPRAZOLE SODIUM 80 MILLIGRAM(S): 20 TABLET, DELAYED RELEASE ORAL at 13:23

## 2021-03-31 RX ADMIN — GABAPENTIN 300 MILLIGRAM(S): 400 CAPSULE ORAL at 22:48

## 2021-03-31 RX ADMIN — Medication 20 MILLIGRAM(S): at 22:48

## 2021-03-31 RX ADMIN — TAMSULOSIN HYDROCHLORIDE 0.4 MILLIGRAM(S): 0.4 CAPSULE ORAL at 22:48

## 2021-03-31 RX ADMIN — ATORVASTATIN CALCIUM 40 MILLIGRAM(S): 80 TABLET, FILM COATED ORAL at 22:48

## 2021-03-31 RX ADMIN — PANTOPRAZOLE SODIUM 10 MG/HR: 20 TABLET, DELAYED RELEASE ORAL at 19:23

## 2021-03-31 RX ADMIN — GABAPENTIN 300 MILLIGRAM(S): 400 CAPSULE ORAL at 17:36

## 2021-03-31 NOTE — ED PROVIDER NOTE - NS ED ROS FT
Review of Systems:  	•	CONSTITUTIONAL: no fever  	•	SKIN: no rash  	•	RESPIRATORY: +shortness of breath, +cough  	•	CARDIAC: no chest pain  	•	GI:  no abd pain, no nausea, no vomiting, no diarrhea  	•	GENITO-URINARY:  no dysuria  	•	MUSCULOSKELETAL:  +back pain  	•	NEUROLOGIC: no weakness, no headache   	•	ALLERGY: no rhinorrhea  	•	PSYSCHIATRIC: appropriate concern about symptoms

## 2021-03-31 NOTE — H&P ADULT - NSHPLABSRESULTS_GEN_ALL_CORE
Lab Results:  CBC  CBC Full  -  ( 31 Mar 2021 11:45 )  WBC Count : 7.54 K/uL  RBC Count : 1.78 M/uL  Hemoglobin : 5.1 g/dL  Hematocrit : 17.7 %  Platelet Count - Automated : 265 K/uL  Mean Cell Volume : 99.4 fl  Mean Cell Hemoglobin : 28.7 pg  Mean Cell Hemoglobin Concentration : 28.8 gm/dL  Auto Neutrophil # : 5.92 K/uL  Auto Lymphocyte # : 1.02 K/uL  Auto Monocyte # : 0.45 K/uL  Auto Eosinophil # : 0.11 K/uL  Auto Basophil # : 0.02 K/uL  Auto Neutrophil % : 78.4 %  Auto Lymphocyte % : 13.5 %  Auto Monocyte % : 6.0 %  Auto Eosinophil % : 1.5 %  Auto Basophil % : 0.3 %    .		Differential:	[] Automated		[] Manual  Chemistry                        5.1    7.54  )-----------( 265      ( 31 Mar 2021 11:45 )             17.7     03-31    142  |  108  |  49<H>  ----------------------------<  101<H>  4.7   |  32<H>  |  2.06<H>    Ca    8.2<L>      31 Mar 2021 11:45    TPro  6.0  /  Alb  2.7<L>  /  TBili  0.2  /  DBili  x   /  AST  7<L>  /  ALT  12  /  AlkPhos  70  03-31    LIVER FUNCTIONS - ( 31 Mar 2021 11:45 )  Alb: 2.7 g/dL / Pro: 6.0 gm/dL / ALK PHOS: 70 U/L / ALT: 12 U/L / AST: 7 U/L / GGT: x           PT/INR - ( 31 Mar 2021 11:45 )   PT: 13.5 sec;   INR: 1.16 ratio         PTT - ( 31 Mar 2021 11:45 )  PTT:28.4 sec      RADIOLOGY RESULTS:    < from: Xray Chest 1 View- PORTABLE-Urgent (Xray Chest 1 View- PORTABLE-Urgent .) (03.31.21 @ 11:01) >    IMPRESSION: No acute finding or change.    < end of copied text >

## 2021-03-31 NOTE — ED PROVIDER NOTE - PHYSICAL EXAMINATION
*GEN: No acute distress, well appearing   *HEAD: Normocephalic, Atraumatic  *EYES/NOSE: b/l Pupils symmetric & Reactive to ligth, EOMI b/l  *THROAT: airway patent, moist mucous membranes  *NECK: Neck supple  *PULMONARY: No Respiratory distress, symmetric b/l chest rise  *CARDIAC: s1s2, regular rhythm   *ABDOMEN:  Non Tender, Non Distended, soft, no guarding, no rebound, no masses   *BACK: no CVA tenderness, No midline vertebral tenderness to palpation   *EXTREMITIES: symmetric pulses, 2+ DP & radial pulses, no cyanosis, no edema, +old healing contusion to R shoulder and R lumbosacral  *SKIN: no rash, no bruising   *NEUROLOGIC: A&Ox3 to self place and situation,  full active & passive ROM in all 4 extremities,   *PSYCH: appropriate concern about symptoms, pleasant

## 2021-03-31 NOTE — H&P ADULT - NSHPPHYSICALEXAM_GEN_ALL_CORE
PHYSICAL EXAM:    Daily     Daily     ICU Vital Signs Last 24 Hrs  T(C): 36.9 (31 Mar 2021 13:40), Max: 36.9 (31 Mar 2021 13:26)  T(F): 98.5 (31 Mar 2021 13:40), Max: 98.5 (31 Mar 2021 13:26)  HR: 76 (31 Mar 2021 13:40) (76 - 80)  BP: 121/56 (31 Mar 2021 13:40) (121/56 - 122/66)  BP(mean): 76 (31 Mar 2021 13:40) (76 - 82)  ABP: --  ABP(mean): --  RR: --  SpO2: --      Constitutional: Weak appearing  HEENT: Atraumatic, MADISYN, Normal, No congestion  Respiratory: Breath Sounds normal, no rhonchi/wheeze  Cardiovascular: N S1S2; CLIFF present  Gastrointestinal: Abdomen soft, non tender, Bowel Sounds present  Extremities: No edema, peripheral pulses present  Neurological: AAO x 0, no gross focal motor deficits  Skin: Non cellulitic, no rash, ulcers  Lymph Nodes: No lymphadenopathy noted  Back: No CVA tenderness   Musculoskeletal: non tender  Breasts: Deferred  Genitourinary: deferred  Rectal: Deferred

## 2021-03-31 NOTE — ED PROVIDER NOTE - CARE PLAN
Principal Discharge DX:	Symptomatic anemia  Secondary Diagnosis:	GI bleed  Secondary Diagnosis:	Melena

## 2021-03-31 NOTE — H&P ADULT - ASSESSMENT
Pt is a poor historian. Hx obtained from chart. 89/M with PMHx of Asthma, CAD s/p stents on Plavix and aspirin, HTN, AAA, PAD, CKD IV, HLD, BPH, CHF was sent from [facility] for c/o   difficulty breathing x today, new onset. Pt reports productive cough with brown sputum. Pt fell 1 week ago and hit his head and has been c/o acute back pain since but did not seek medical attention at that time. no ha, No LOC. No other complaints at this time. Ptt was found to have Hb of 5.1 in ED and guaiac was positive with melena as per ER MD. Pt getting PRBC in ED. No c/o abd pain.    Pt admitted with     1) Acute GI Bleed + melena + Acute on chronic anemia sec to anemia of hemorrhage from GI bleed:  admit to med floor  NPO except meds  cont PRBC transfusions, hold iv fluids during transfusions  start Protonix drip  hold asa, plavix  GI consult for EGD/colonoscopy  H/H q 8 hrs    2) Fall + low back pain:  CT LS spine to r/o any fracture   prn pain meds  PT consult    3) CAD s/p stent:  asa, plavix on hold for GI bleed  restart once cleared by GI    4) Hx of CHF: cont lasix  monitor for volume overload    5) DVT PPX: venodynes    Pt is FULL CODE.     poc discussed with pt, team

## 2021-03-31 NOTE — ED PROVIDER NOTE - PROGRESS NOTE DETAILS
Juan Cordoba: Stool guaiac performed by Dr. Cordoba. Lot #: [id number] Exp: 09/30/2021; Result: (positive, black stool); QC- reactive. abena: Discussed need to admit with patient & discussed risk and benefits.  Patient agreed to admission.  Discussed case w/ admitting doctor - agreed to admit to their service. will place bridge orders. Accepting physician said:  DO NOT MOVE prior to inpatient team evaluation abena: pt consented to blood, has decision making capcity

## 2021-03-31 NOTE — ED PROVIDER NOTE - CLINICAL SUMMARY MEDICAL DECISION MAKING FREE TEXT BOX
SOB likely due to symptomatic anemia due to upper GI bleed since pt is on aspirin and plavix will admit. SOB likely due to symptomatic anemia due to upper GI bleed since pt is on aspirin and plavix with melena. will admit.

## 2021-03-31 NOTE — H&P ADULT - HISTORY OF PRESENT ILLNESS
Pt is a poor historian. Hx obtained from chart. 89/M with PMHx of Asthma, CAD s/p stents on Plavix and aspirin, HTN, AAA, PAD, CKD IV, HLD, BPH, CHF was sent from [facility] for c/o   difficulty breathing x today, new onset. Pt reports productive cough with brown sputum. Pt fell 1 week ago and hit his head and has been c/o acute back pain since but did not seek medical attention at that time. no ha, No LOC. No other complaints at this time. Ptt was found to have Hb of 5.1 in ED and guaiac was positive with melena as per ER MD. Pt getting PRBC in ED. No c/o abd pain

## 2021-03-31 NOTE — ED PROVIDER NOTE - OBJECTIVE STATEMENT
Pertinent HPI/PMH/PSH/FHx/SHx and Review of Systems contained within  HPI: 89 y/o male Patient p/w CC difficulty breathing x today, new onset. Pt reports productive cough with brown sputum. Pt fell 1 week ago and hit his head and has been c/o acute back pain since but did not seek medical attention at that time. No LOC. No other complaints at this time.   PMH/PSH relevant for: Asthma, CAD s/p stents on Plavix and aspirin, HTN, AAA, PAD, CKDIV, HLD, BPH, CHF  ROS negative for: fever, headache, Chest pain, Nausea, vomiting, diarrhea, abdominal pain, melena, dysuria    FamilyHx and SocialHx not otherwise contributory Pertinent HPI/PMH/PSH/FHx/SHx and Review of Systems contained within  HPI: 89 y/o male Patient p/w CC difficulty breathing x today, new onset. Pt reports productive cough with brown sputum. Pt fell 1 week ago and hit his head and has been c/o acute back pain since but did not seek medical attention at that time. no ha, No LOC. No other complaints at this time.   PMH/PSH relevant for: Asthma, CAD s/p stents on Plavix and aspirin, HTN, AAA, PAD, CKDIV, HLD, BPH, CHF  ROS negative for: fever, headache, Chest pain, Nausea, vomiting, diarrhea, abdominal pain, melena, dysuria    FamilyHx and SocialHx not otherwise contributory

## 2021-04-01 LAB
COVID-19 SPIKE DOMAIN AB INTERP: POSITIVE
COVID-19 SPIKE DOMAIN ANTIBODY RESULT: 47.3 U/ML — HIGH
HCT VFR BLD CALC: 24.8 % — LOW (ref 39–50)
HCT VFR BLD CALC: 26.3 % — LOW (ref 39–50)
HCT VFR BLD CALC: 26.4 % — LOW (ref 39–50)
HGB BLD-MCNC: 7.8 G/DL — LOW (ref 13–17)
HGB BLD-MCNC: 8.3 G/DL — LOW (ref 13–17)
HGB BLD-MCNC: 8.3 G/DL — LOW (ref 13–17)
MCHC RBC-ENTMCNC: 28.7 PG — SIGNIFICANT CHANGE UP (ref 27–34)
MCHC RBC-ENTMCNC: 31.6 GM/DL — LOW (ref 32–36)
MCV RBC AUTO: 91 FL — SIGNIFICANT CHANGE UP (ref 80–100)
PLATELET # BLD AUTO: 211 K/UL — SIGNIFICANT CHANGE UP (ref 150–400)
RBC # BLD: 2.89 M/UL — LOW (ref 4.2–5.8)
RBC # FLD: 17.2 % — HIGH (ref 10.3–14.5)
SARS-COV-2 IGG+IGM SERPL QL IA: 47.3 U/ML — HIGH
SARS-COV-2 IGG+IGM SERPL QL IA: POSITIVE
WBC # BLD: 5.98 K/UL — SIGNIFICANT CHANGE UP (ref 3.8–10.5)
WBC # FLD AUTO: 5.98 K/UL — SIGNIFICANT CHANGE UP (ref 3.8–10.5)

## 2021-04-01 PROCEDURE — 99233 SBSQ HOSP IP/OBS HIGH 50: CPT

## 2021-04-01 PROCEDURE — 99497 ADVNCD CARE PLAN 30 MIN: CPT

## 2021-04-01 RX ORDER — ASPIRIN/CALCIUM CARB/MAGNESIUM 324 MG
81 TABLET ORAL DAILY
Refills: 0 | Status: DISCONTINUED | OUTPATIENT
Start: 2021-04-01 | End: 2021-04-05

## 2021-04-01 RX ADMIN — Medication 20 MILLIGRAM(S): at 03:13

## 2021-04-01 RX ADMIN — PANTOPRAZOLE SODIUM 10 MG/HR: 20 TABLET, DELAYED RELEASE ORAL at 14:34

## 2021-04-01 RX ADMIN — GABAPENTIN 300 MILLIGRAM(S): 400 CAPSULE ORAL at 13:48

## 2021-04-01 RX ADMIN — GABAPENTIN 300 MILLIGRAM(S): 400 CAPSULE ORAL at 05:48

## 2021-04-01 RX ADMIN — ATORVASTATIN CALCIUM 40 MILLIGRAM(S): 80 TABLET, FILM COATED ORAL at 22:28

## 2021-04-01 RX ADMIN — Medication 650 MILLIGRAM(S): at 06:30

## 2021-04-01 RX ADMIN — TAMSULOSIN HYDROCHLORIDE 0.4 MILLIGRAM(S): 0.4 CAPSULE ORAL at 22:28

## 2021-04-01 RX ADMIN — Medication 81 MILLIGRAM(S): at 16:41

## 2021-04-01 RX ADMIN — SODIUM CHLORIDE 60 MILLILITER(S): 9 INJECTION, SOLUTION INTRAVENOUS at 03:13

## 2021-04-01 RX ADMIN — Medication 650 MILLIGRAM(S): at 05:47

## 2021-04-01 RX ADMIN — GABAPENTIN 300 MILLIGRAM(S): 400 CAPSULE ORAL at 22:28

## 2021-04-01 RX ADMIN — PANTOPRAZOLE SODIUM 10 MG/HR: 20 TABLET, DELAYED RELEASE ORAL at 04:14

## 2021-04-01 NOTE — PROVIDER CONTACT NOTE (CHANGE IN STATUS NOTIFICATION) - SITUATION
At 15 minute vitals and assessment into blood transfusion, patient complains of a little discomfort while breathing and shortness of breath. Patient does not seem dyspneic and respirations are within normal limits. Temp also went from 98.1 --> 99.7 within 15 minutes.

## 2021-04-01 NOTE — PROVIDER CONTACT NOTE (CHANGE IN STATUS NOTIFICATION) - ACTION/TREATMENT ORDERED:
MD in to assess patient. Patient states "panting stopped right as the nurses left the room to get the doctor". OK to continue transfusion. Run at 100mL/hr.

## 2021-04-01 NOTE — PROGRESS NOTE ADULT - SUBJECTIVE AND OBJECTIVE BOX
Pt is a poor historian. Hx obtained from chart. 89/M with PMHx of Asthma, CAD s/p stents on Plavix and aspirin, HTN, AAA, PAD, CKD IV, HLD, BPH, CHF was sent from [facility] for c/o   difficulty breathing x today, new onset. Pt reports productive cough with brown sputum. Pt fell 1 week ago and hit his head and has been c/o acute back pain since but did not seek medical attention at that time. no ha, No LOC. No other complaints at this time. Ptt was found to have Hb of 5.1 in ED and guaiac was positive with melena as per ER MD. Pt getting PRBC in ED. No c/o abd pain    4/1 - no cp palps sob abdo pain tinlgin or numbness apart from chr neuropathy     PHYSICAL EXAM:  GENERAL: NAD, sitting up in bed   HEAD:  Atraumatic, Normocephalic  EYES: EOMI, PERRLA, normal sclera  ENT: Moist mucous membranes  NECK: Supple, No JVD, no nuchal rigidity  CHEST/LUNG: Clear to auscultation bilaterally; No rales, rhonchi, wheezing, or rubs. Unlabored respirations  HEART: Regular rate and rhythm; No murmurs, rubs, or gallops  ABDOMEN: Bowel sounds present; Soft, Nontender, Nondistended. No hepatomegaly  EXTREMITIES:  2 plus pedal edema   NERVOUS SYSTEM:  Alert & Oriented X3, speech clear. No focal motor or sensory deficits  MSK: FROM all 4 extremities, full and equal strength  SKIN: No rashes or lesions    LABS: All Labs Reviewed:                        8.3    x     )-----------( x        ( 01 Apr 2021 11:34 )             26.4     04-01    145  |  112<H>  |  46<H>  ----------------------------<  106<H>  3.6   |  28  |  2.23<H>  Ca    8.6      01 Apr 2021 07:43  TPro  6.0  /  Alb  2.7<L>  /  TBili  0.2  /  DBili  x   /  AST  7<L>  /  ALT  12  /  AlkPhos  70  03-31  PT/INR - ( 31 Mar 2021 11:45 )   PT: 13.5 sec;   INR: 1.16 ratio    PTT - ( 31 Mar 2021 11:45 )  PTT:28.4 sec  CARDIAC MARKERS ( 31 Mar 2021 11:45 )  <0.015 ng/mL / x     / 47 U/L / x     / x          RADIOLOGY/EKG:  < from: CT Lumbar Spine No Cont (03.31.21 @ 15:21) >  No acute fracture.  At L3/L4 through L5/S1, there are posterior disc bulges and bilateral facet arthropathy resulting in marked bilateral foraminalstenosis, most severe on the right side at L4/L5. At L4/L5, there is superimposed ligamentum flavum enfolding resulting in severe central spinal canal stenosis at this level, unchanged since prior exam          acetaminophen   Tablet .. 650 milliGRAM(s) Oral every 6 hours PRN  aspirin enteric coated 81 milliGRAM(s) Oral daily  atorvastatin 40 milliGRAM(s) Oral at bedtime  DULoxetine 60 milliGRAM(s) Oral daily  ferrous    sulfate 325 milliGRAM(s) Oral daily  furosemide    Tablet 40 milliGRAM(s) Oral daily  gabapentin 300 milliGRAM(s) Oral three times a day  metoprolol succinate ER 50 milliGRAM(s) Oral daily  ondansetron Injectable 4 milliGRAM(s) IV Push every 6 hours PRN  pantoprazole Infusion 8 mG/Hr IV Continuous <Continuous>  tamsulosin 0.4 milliGRAM(s) Oral at bedtime

## 2021-04-01 NOTE — CONSULT NOTE ADULT - ASSESSMENT
89M on ASA/plavix with FOBT+ and anemia.  He remains hesitant for colonoscopy but agrees to EGD.    Rec:  -PPI  -EGD tomorrow  -NPO p MN  -if EGD negative son will try to convince pt to allow colonoscopy (pt doesn't want to do prep)  -trend CBC  -OK to cont ASA, holding plavix  -d/w son Joseph [phone]

## 2021-04-01 NOTE — CONSULT NOTE ADULT - SUBJECTIVE AND OBJECTIVE BOX
GI consult    HPI:  Pt is a poor historian. Hx obtained from chart. 89/M with PMHx of Asthma, CAD s/p stents on Plavix and aspirin, HTN, AAA, PAD, CKD IV, HLD, BPH, CHF was sent from [facility] for c/o   difficulty breathing x today, new onset. Pt reports productive cough with brown sputum. Pt fell 1 week ago and hit his head and has been c/o acute back pain since but did not seek medical attention at that time. no ha, No LOC. No other complaints at this time. Ptt was found to have Hb of 5.1 in ED and guaiac was positive with melena as per ER MD. Pt getting PRBC in ED. No c/o abd pain   (31 Mar 2021 14:24)    Discussed with patient and son Joseph. Patient denies abd pain, n/v, hematochezia, dysphagia. Pt still does not want to do colonoscopy but now seems agreeable to EGD.     PAST MEDICAL & SURGICAL HISTORY:  No significant past medical history    Leg swelling  related to cellulitis of LLE    CKD (chronic kidney disease) stage 3, GFR 30-59 ml/min    Cellulitis  BL    BPH (benign prostatic hyperplasia)    Colon polyp    Stented coronary artery    HTN (hypertension)    CKD (chronic kidney disease)    HLD (hyperlipidemia)    CAD (coronary artery disease)    OA (osteoarthritis)    PAD (peripheral artery disease)    AAA (abdominal aortic aneurysm) without rupture    S/P hemorrhoidectomy    H/O inguinal hernia  repair    History of colonoscopy    History of coronary artery stent placement    Home Medications:  acetaminophen 325 mg oral tablet: 2 tab(s) orally every 4 hours, As Needed (31 Mar 2021 14:30)  aspirin 81 mg oral delayed release tablet: 1 tab(s) orally once a day (31 Mar 2021 14:30)  clopidogrel 75 mg oral tablet: 1 tab(s) orally once a day (31 Mar 2021 14:30)  Colace 100 mg oral capsule: 2 cap(s) orally once a day (at bedtime), As Needed (31 Mar 2021 14:30)  cyanocobalamin 1000 mcg oral tablet: 1 tab(s) orally once a day (31 Mar 2021 14:30)  DULoxetine 60 mg oral delayed release capsule: 1 cap(s) orally once a day (31 Mar 2021 14:30)  famotidine 20 mg oral tablet: 1 tab(s) orally 2 times a day (31 Mar 2021 14:30)  ferrous sulfate 325 mg (65 mg elemental iron) oral tablet: 1 tab(s) orally once a day (31 Mar 2021 14:30)  furosemide 40 mg oral tablet: 1 tab(s) orally once a day (31 Mar 2021 14:30)  gabapentin 300 mg oral capsule: 1 cap(s) orally 3 times a day (31 Mar 2021 14:30)  metoprolol succinate 50 mg oral tablet, extended release: 1 tab(s) orally once a day (31 Mar 2021 14:30)  Multiple Vitamins oral tablet: 1 tab(s) orally once a day (31 Mar 2021 14:30)  tamsulosin 0.4 mg oral capsule: 1 cap(s) orally once a day (at bedtime) (31 Mar 2021 14:30)      MEDICATIONS  (STANDING):  atorvastatin 40 milliGRAM(s) Oral at bedtime  dextrose 5% + sodium chloride 0.9%. 1000 milliLiter(s) (60 mL/Hr) IV Continuous <Continuous>  DULoxetine 60 milliGRAM(s) Oral daily  ferrous    sulfate 325 milliGRAM(s) Oral daily  furosemide    Tablet 40 milliGRAM(s) Oral daily  gabapentin 300 milliGRAM(s) Oral three times a day  metoprolol succinate ER 50 milliGRAM(s) Oral daily  pantoprazole Infusion 8 mG/Hr (10 mL/Hr) IV Continuous <Continuous>  tamsulosin 0.4 milliGRAM(s) Oral at bedtime    MEDICATIONS  (PRN):  acetaminophen   Tablet .. 650 milliGRAM(s) Oral every 6 hours PRN Mild Pain (1 - 3)  ondansetron Injectable 4 milliGRAM(s) IV Push every 6 hours PRN Nausea      Allergies    penicillin (Angioedema)  penicillins (Unknown)    Intolerances    SOCIAL HISTORY: NC    FAMILY HISTORY:  Family history unobtainable  d/t dementia    ROS  As above  Otherwise unremarkable, all systems reviewed    PE:  Vital Signs Last 24 Hrs  T(C): 37.3 (01 Apr 2021 09:07), Max: 37.6 (31 Mar 2021 23:30)  T(F): 99.1 (01 Apr 2021 09:07), Max: 99.7 (31 Mar 2021 23:30)  HR: 93 (01 Apr 2021 09:07) (76 - 102)  BP: 123/46 (01 Apr 2021 09:07) (121/56 - 159/57)  BP(mean): 75 (31 Mar 2021 19:30) (75 - 83)  RR: 17 (01 Apr 2021 09:07) (16 - 18)  SpO2: 94% (01 Apr 2021 09:07) (94% - 100%)    Constitutional: NAD, well-developed, A+Ox3  Anicteric   Respiratory: CTABL, breathing comfortably  Cardiovascular: S1 and S2, RRR  Gastrointestinal: +BS, soft, non tender, non distended, no mass  Extremities: warm, well perfused, no edema  Psychiatric: Normal mood, normal affect  Neuro: moves all extremities, grossly intact  Skin: No rashes or lesions    LABS:                        8.3    x     )-----------( x        ( 01 Apr 2021 11:34 )             26.4     04-01    145  |  112<H>  |  46<H>  ----------------------------<  106<H>  3.6   |  28  |  2.23<H>    Ca    8.6      01 Apr 2021 07:43    TPro  6.0  /  Alb  2.7<L>  /  TBili  0.2  /  DBili  x   /  AST  7<L>  /  ALT  12  /  AlkPhos  70  03-31    PT/INR - ( 31 Mar 2021 11:45 )   PT: 13.5 sec;   INR: 1.16 ratio         PTT - ( 31 Mar 2021 11:45 )  PTT:28.4 sec  LIVER FUNCTIONS - ( 31 Mar 2021 11:45 )  Alb: 2.7 g/dL / Pro: 6.0 gm/dL / ALK PHOS: 70 U/L / ALT: 12 U/L / AST: 7 U/L / GGT: x             RADIOLOGY & ADDITIONAL STUDIES:

## 2021-04-01 NOTE — PROGRESS NOTE ADULT - ASSESSMENT
Pt is a poor historian. Hx obtained from chart. 89/M with PMHx of Asthma, CAD s/p stents on Plavix and aspirin, HTN, AAA, PAD, CKD IV, HLD, BPH, CHF was sent from [facility] for c/o   difficulty breathing x today, new onset. Pt reports productive cough with brown sputum. Pt fell 1 week ago and hit his head and has been c/o acute back pain since but did not seek medical attention at that time. no ha, No LOC. No other complaints at this time. Ptt was found to have Hb of 5.1 in ED and guaiac was positive with melena as per ER MD. Pt getting PRBC in ED. No c/o abd pain.    Pt admitted with     1) Acute GI Bleed + melena + Acute on chronic anemia sec to anemia of hemorrhage from GI bleed:  admit to med floor  NPO except meds  cont PRBC transfusions, hold iv fluids during transfusions  start Protonix drip  4/1 - hold plavix cont asa  GI consult for EGD tomorrow, discussed with Dr Denis      2) Fall + low back pain:  CT LS spine to r/o any fracture   prn pain meds  PT consult    3) CAD s/p stent:  asa, plavix on hold for GI bleed  restart once cleared by GI    4) Hx of CHF: cont lasix  monitor for volume overload  4/1 - will get ECHO , pt on lasix, still has pedal edema b/l    5) DVT PPX: venodynes    Pt is FULL CODE.     poc discussed with pt, team      GOC - discussed with patient and his son Joseph at bedside - patient made decision to be DNR DNI and signed the MOLST form  time spent on ACP - 17 mins

## 2021-04-02 ENCOUNTER — RESULT REVIEW (OUTPATIENT)
Age: 86
End: 2021-04-02

## 2021-04-02 LAB
ANION GAP SERPL CALC-SCNC: 4 MMOL/L — LOW (ref 5–17)
BUN SERPL-MCNC: 37 MG/DL — HIGH (ref 7–23)
CALCIUM SERPL-MCNC: 8.2 MG/DL — LOW (ref 8.5–10.1)
CHLORIDE SERPL-SCNC: 112 MMOL/L — HIGH (ref 96–108)
CO2 SERPL-SCNC: 31 MMOL/L — SIGNIFICANT CHANGE UP (ref 22–31)
CREAT SERPL-MCNC: 2.14 MG/DL — HIGH (ref 0.5–1.3)
GLUCOSE SERPL-MCNC: 100 MG/DL — HIGH (ref 70–99)
HCT VFR BLD CALC: 25.3 % — LOW (ref 39–50)
HGB BLD-MCNC: 8.1 G/DL — LOW (ref 13–17)
MAGNESIUM SERPL-MCNC: 2 MG/DL — SIGNIFICANT CHANGE UP (ref 1.6–2.6)
MCHC RBC-ENTMCNC: 29.5 PG — SIGNIFICANT CHANGE UP (ref 27–34)
MCHC RBC-ENTMCNC: 32 GM/DL — SIGNIFICANT CHANGE UP (ref 32–36)
MCV RBC AUTO: 92 FL — SIGNIFICANT CHANGE UP (ref 80–100)
PLATELET # BLD AUTO: 208 K/UL — SIGNIFICANT CHANGE UP (ref 150–400)
POTASSIUM SERPL-MCNC: 3.6 MMOL/L — SIGNIFICANT CHANGE UP (ref 3.5–5.3)
POTASSIUM SERPL-SCNC: 3.6 MMOL/L — SIGNIFICANT CHANGE UP (ref 3.5–5.3)
RBC # BLD: 2.75 M/UL — LOW (ref 4.2–5.8)
RBC # FLD: 17.4 % — HIGH (ref 10.3–14.5)
SODIUM SERPL-SCNC: 147 MMOL/L — HIGH (ref 135–145)
WBC # BLD: 5.11 K/UL — SIGNIFICANT CHANGE UP (ref 3.8–10.5)
WBC # FLD AUTO: 5.11 K/UL — SIGNIFICANT CHANGE UP (ref 3.8–10.5)

## 2021-04-02 PROCEDURE — 93308 TTE F-UP OR LMTD: CPT | Mod: 26

## 2021-04-02 PROCEDURE — 88305 TISSUE EXAM BY PATHOLOGIST: CPT | Mod: 26

## 2021-04-02 PROCEDURE — 88312 SPECIAL STAINS GROUP 1: CPT | Mod: 26

## 2021-04-02 PROCEDURE — 99233 SBSQ HOSP IP/OBS HIGH 50: CPT

## 2021-04-02 RX ORDER — PANTOPRAZOLE SODIUM 20 MG/1
40 TABLET, DELAYED RELEASE ORAL
Refills: 0 | Status: DISCONTINUED | OUTPATIENT
Start: 2021-04-02 | End: 2021-04-05

## 2021-04-02 RX ADMIN — PANTOPRAZOLE SODIUM 40 MILLIGRAM(S): 20 TABLET, DELAYED RELEASE ORAL at 11:45

## 2021-04-02 RX ADMIN — Medication 40 MILLIGRAM(S): at 09:45

## 2021-04-02 RX ADMIN — PANTOPRAZOLE SODIUM 10 MG/HR: 20 TABLET, DELAYED RELEASE ORAL at 01:24

## 2021-04-02 RX ADMIN — PANTOPRAZOLE SODIUM 40 MILLIGRAM(S): 20 TABLET, DELAYED RELEASE ORAL at 21:28

## 2021-04-02 RX ADMIN — TAMSULOSIN HYDROCHLORIDE 0.4 MILLIGRAM(S): 0.4 CAPSULE ORAL at 21:28

## 2021-04-02 RX ADMIN — ATORVASTATIN CALCIUM 40 MILLIGRAM(S): 80 TABLET, FILM COATED ORAL at 21:28

## 2021-04-02 RX ADMIN — Medication 81 MILLIGRAM(S): at 09:45

## 2021-04-02 RX ADMIN — GABAPENTIN 300 MILLIGRAM(S): 400 CAPSULE ORAL at 05:43

## 2021-04-02 RX ADMIN — Medication 325 MILLIGRAM(S): at 09:45

## 2021-04-02 RX ADMIN — GABAPENTIN 300 MILLIGRAM(S): 400 CAPSULE ORAL at 21:28

## 2021-04-02 RX ADMIN — DULOXETINE HYDROCHLORIDE 60 MILLIGRAM(S): 30 CAPSULE, DELAYED RELEASE ORAL at 09:45

## 2021-04-02 RX ADMIN — GABAPENTIN 300 MILLIGRAM(S): 400 CAPSULE ORAL at 13:27

## 2021-04-02 RX ADMIN — Medication 50 MILLIGRAM(S): at 09:45

## 2021-04-02 NOTE — PHYSICAL THERAPY INITIAL EVALUATION ADULT - DIAGNOSIS, PT EVAL
Acute GI Bleed + melena + Acute on chronic anemia sec to anemia of hemorrhage from GI bleed, Fall + low back pain:

## 2021-04-02 NOTE — PHYSICAL THERAPY INITIAL EVALUATION ADULT - ADDITIONAL COMMENTS
Pt states PTA he was able to get from his bed to his WC independently and he has PT 2 x /wk and walks short distances with the RW w/  PT assistance.

## 2021-04-02 NOTE — PROGRESS NOTE ADULT - SUBJECTIVE AND OBJECTIVE BOX
Pt is a poor historian. Hx obtained from chart. 89/M with PMHx of Asthma, CAD s/p stents on Plavix and aspirin, HTN, AAA, PAD, CKD IV, HLD, BPH, CHF was sent from [facility] for c/o   difficulty breathing x today, new onset. Pt reports productive cough with brown sputum. Pt fell 1 week ago and hit his head and has been c/o acute back pain since but did not seek medical attention at that time. no ha, No LOC. No other complaints at this time. Ptt was found to have Hb of 5.1 in ED and guaiac was positive with melena as per ER MD. Pt getting PRBC in ED. No c/o abd pain    4/1 - no cp palps sob abdo pain tinlgin or numbness apart from chr neuropathy   4/2 - s/p EGD, no cp palps sob abdo pain; ECHO done results pending     PHYSICAL EXAM:  GENERAL: NAD, sitting up in bed   HEAD:  Atraumatic, Normocephalic  EYES: EOMI, PERRLA, normal sclera  ENT: Moist mucous membranes  NECK: Supple, No JVD, no nuchal rigidity  CHEST/LUNG: Clear to auscultation bilaterally; No rales, rhonchi, wheezing, or rubs. Unlabored respirations  HEART: Regular rate and rhythm; No murmurs, rubs, or gallops  ABDOMEN: Bowel sounds present; Soft, Nontender, Nondistended. No hepatomegaly  EXTREMITIES:  2 plus pedal edema  - looks better today   NERVOUS SYSTEM:  Alert & Oriented X3, speech clear. No focal motor or sensory deficits  MSK: FROM all 4 extremities, full and equal strength  SKIN: No rashes or lesions      RADIOLOGY/EKG:  < from: CT Lumbar Spine No Cont (03.31.21 @ 15:21) >  No acute fracture.  At L3/L4 through L5/S1, there are posterior disc bulges and bilateral facet arthropathy resulting in marked bilateral foraminalstenosis, most severe on the right side at L4/L5. At L4/L5, there is superimposed ligamentum flavum enfolding resulting in severe central spinal canal stenosis at this level, unchanged since prior exam    LABS: All Labs Reviewed:                        8.1    5.11  )-----------( 208      ( 02 Apr 2021 05:56 )             25.3     04-02    147<H>  |  112<H>  |  37<H>  ----------------------------<  100<H>  3.6   |  31  |  2.14<H>    Ca    8.2<L>      02 Apr 2021 05:56  Mg     2.0     04-02    MEDS:   acetaminophen   Tablet .. 650 milliGRAM(s) Oral every 6 hours PRN  aspirin enteric coated 81 milliGRAM(s) Oral daily  atorvastatin 40 milliGRAM(s) Oral at bedtime  DULoxetine 60 milliGRAM(s) Oral daily  ferrous    sulfate 325 milliGRAM(s) Oral daily  furosemide    Tablet 40 milliGRAM(s) Oral daily  gabapentin 300 milliGRAM(s) Oral three times a day  metoprolol succinate ER 50 milliGRAM(s) Oral daily  ondansetron Injectable 4 milliGRAM(s) IV Push every 6 hours PRN  pantoprazole    Tablet 40 milliGRAM(s) Oral two times a day  tamsulosin 0.4 milliGRAM(s) Oral at bedtime                   Pt is a poor historian. Hx obtained from chart. 89/M with PMHx of Asthma, CAD s/p stents on Plavix and aspirin, HTN, AAA, PAD, CKD IV, HLD, BPH, CHF was sent from [facility] for c/o   difficulty breathing x today, new onset. Pt reports productive cough with brown sputum. Pt fell 1 week ago and hit his head and has been c/o acute back pain since but did not seek medical attention at that time. no ha, No LOC. No other complaints at this time. Ptt was found to have Hb of 5.1 in ED and guaiac was positive with melena as per ER MD. Pt getting PRBC in ED. No c/o abd pain    4/1 - no cp palps sob abdo pain tinlgin or numbness apart from chr neuropathy   4/2 - s/p EGD, no cp palps sob abdo pain; ECHO done results pending     PHYSICAL EXAM:  GENERAL: NAD, sitting up in bed   HEAD:  Atraumatic, Normocephalic  EYES: EOMI, PERRLA, normal sclera  ENT: Moist mucous membranes  NECK: Supple, No JVD, no nuchal rigidity  CHEST/LUNG: Clear to auscultation bilaterally; No rales, rhonchi, wheezing, or rubs. Unlabored respirations  HEART: Regular rate and rhythm; No murmurs, rubs, or gallops  ABDOMEN: Bowel sounds present; Soft, Nontender, Nondistended. No hepatomegaly  EXTREMITIES:  2 plus pedal edema  - looks better today   NERVOUS SYSTEM:  Alert & Oriented X3, speech clear. No focal motor or sensory deficits  MSK: FROM all 4 extremities, full and equal strength  SKIN: No rashes or lesions      RADIOLOGY/EKG:  < from: CT Lumbar Spine No Cont (03.31.21 @ 15:21) >  No acute fracture.  At L3/L4 through L5/S1, there are posterior disc bulges and bilateral facet arthropathy resulting in marked bilateral foraminalstenosis, most severe on the right side at L4/L5. At L4/L5, there is superimposed ligamentum flavum enfolding resulting in severe central spinal canal stenosis at this level, unchanged since prior exam    XRAY hand reviewed -  There is severe erosive osteoarthritis of the second third digit DIP joints. Severe arthrosis is also noted at the thumb MCP joint with moderate arthrosis at the first CMC joint. There is no acute fracture.      LABS: All Labs Reviewed:                        8.1    5.11  )-----------( 208      ( 02 Apr 2021 05:56 )             25.3     04-02    147<H>  |  112<H>  |  37<H>  ----------------------------<  100<H>  3.6   |  31  |  2.14<H>    Ca    8.2<L>      02 Apr 2021 05:56  Mg     2.0     04-02    MEDS:   acetaminophen   Tablet .. 650 milliGRAM(s) Oral every 6 hours PRN  aspirin enteric coated 81 milliGRAM(s) Oral daily  atorvastatin 40 milliGRAM(s) Oral at bedtime  DULoxetine 60 milliGRAM(s) Oral daily  ferrous    sulfate 325 milliGRAM(s) Oral daily  furosemide    Tablet 40 milliGRAM(s) Oral daily  gabapentin 300 milliGRAM(s) Oral three times a day  metoprolol succinate ER 50 milliGRAM(s) Oral daily  ondansetron Injectable 4 milliGRAM(s) IV Push every 6 hours PRN  pantoprazole    Tablet 40 milliGRAM(s) Oral two times a day  tamsulosin 0.4 milliGRAM(s) Oral at bedtime

## 2021-04-02 NOTE — PHYSICAL THERAPY INITIAL EVALUATION ADULT - ACTIVE RANGE OF MOTION EXAMINATION, REHAB EVAL
L LE resting position and active position held in ER, L ankle to neutral, B pronation/bilateral upper extremity Active ROM was WFL (within functional limits)/bilateral  lower extremity Active ROM was WFL (within functional limits)

## 2021-04-02 NOTE — PROGRESS NOTE ADULT - ASSESSMENT
Pt is a poor historian. Hx obtained from chart. 89/M with PMHx of Asthma, CAD s/p stents on Plavix and aspirin, HTN, AAA, PAD, CKD IV, HLD, BPH, CHF was sent from [facility] for c/o   difficulty breathing x today, new onset. Pt reports productive cough with brown sputum. Pt fell 1 week ago and hit his head and has been c/o acute back pain since but did not seek medical attention at that time. no ha, No LOC. No other complaints at this time. Ptt was found to have Hb of 5.1 in ED and guaiac was positive with melena as per ER MD. Pt getting PRBC in ED. No c/o abd pain.    Pt admitted with     1) Acute GI Bleed + melena + Acute on chronic anemia sec to anemia of hemorrhage from GI bleed:  admit to med floor  NPO except meds  cont PRBC transfusions, hold iv fluids during transfusions  start Protonix drip  4/1 - hold plavix cont asa  GI consult for EGD tomorrow, discussed with Dr Denis  4/2 - s/p EGD by Dr Denis shows large gastric ulcers, no active bleeds  will cont to hold Plavix and resume on Sunday   no plans for COL   cont PPI     2) Fall + low back pain:  CT LS spine to r/o any fracture   prn pain meds  PT consult    3) CAD s/p stent:  plavix on hold for GI bleed  cont ASA  see above     4) Hx of CHF: cont lasix  monitor for volume overload  4/1 - will get ECHO , pt on lasix, still has pedal edema b/l - better today     5) DVT PPX: venodynes    Pt is FULL CODE.     poc discussed with pt, team      4/1/21 - GOC - discussed with patient and his son Joseph at bedside - patient made decision to be DNR DNI and signed the MOLST form  time spent on ACP - 17 mins   4/2 - f/u ECHO, monitor H&H ; f/u PT   dc planning 1- 2 days  discussed with team at [facility]

## 2021-04-02 NOTE — PHYSICAL THERAPY INITIAL EVALUATION ADULT - PERTINENT HX OF CURRENT PROBLEM, REHAB EVAL
from chart. 89/M  was sent from [facility] for c/o   difficulty breathing. Found to have-Acute GI Bleed + melena + Acute on chronic anemia sec to anemia of hemorrhage from GI bleed, s/p fall  Fall + low back pain:  PMHx of Asthma, CAD s/p stents on Plavix and aspirin, HTN, AAA, PAD, CKD IV, HLD, BPH, CHF

## 2021-04-02 NOTE — PROGRESS NOTE ADULT - SUBJECTIVE AND OBJECTIVE BOX
EGD performed-see note for details    Multiple large gastric ulcers found    recommend PPI BID and resume plavix in 48 h.  Continue ASA  trend CBC  follow up biopsies (ulcers do not appear malignant)  outpatient follow up in 2 weeks.  d/w son Joseph after procedure.  no colonoscopy    please call with questions

## 2021-04-03 ENCOUNTER — TRANSCRIPTION ENCOUNTER (OUTPATIENT)
Age: 86
End: 2021-04-03

## 2021-04-03 LAB
ANION GAP SERPL CALC-SCNC: 5 MMOL/L — SIGNIFICANT CHANGE UP (ref 5–17)
BUN SERPL-MCNC: 32 MG/DL — HIGH (ref 7–23)
CALCIUM SERPL-MCNC: 8.2 MG/DL — LOW (ref 8.5–10.1)
CHLORIDE SERPL-SCNC: 110 MMOL/L — HIGH (ref 96–108)
CO2 SERPL-SCNC: 31 MMOL/L — SIGNIFICANT CHANGE UP (ref 22–31)
CREAT SERPL-MCNC: 1.98 MG/DL — HIGH (ref 0.5–1.3)
GLUCOSE SERPL-MCNC: 102 MG/DL — HIGH (ref 70–99)
HCT VFR BLD CALC: 26.1 % — LOW (ref 39–50)
HGB BLD-MCNC: 8 G/DL — LOW (ref 13–17)
MAGNESIUM SERPL-MCNC: 2 MG/DL — SIGNIFICANT CHANGE UP (ref 1.6–2.6)
MCHC RBC-ENTMCNC: 28.8 PG — SIGNIFICANT CHANGE UP (ref 27–34)
MCHC RBC-ENTMCNC: 30.7 GM/DL — LOW (ref 32–36)
MCV RBC AUTO: 93.9 FL — SIGNIFICANT CHANGE UP (ref 80–100)
PLATELET # BLD AUTO: 200 K/UL — SIGNIFICANT CHANGE UP (ref 150–400)
POTASSIUM SERPL-MCNC: 3.5 MMOL/L — SIGNIFICANT CHANGE UP (ref 3.5–5.3)
POTASSIUM SERPL-SCNC: 3.5 MMOL/L — SIGNIFICANT CHANGE UP (ref 3.5–5.3)
RBC # BLD: 2.78 M/UL — LOW (ref 4.2–5.8)
RBC # FLD: 16.8 % — HIGH (ref 10.3–14.5)
SARS-COV-2 RNA SPEC QL NAA+PROBE: SIGNIFICANT CHANGE UP
SODIUM SERPL-SCNC: 146 MMOL/L — HIGH (ref 135–145)
WBC # BLD: 5.25 K/UL — SIGNIFICANT CHANGE UP (ref 3.8–10.5)
WBC # FLD AUTO: 5.25 K/UL — SIGNIFICANT CHANGE UP (ref 3.8–10.5)

## 2021-04-03 PROCEDURE — 99233 SBSQ HOSP IP/OBS HIGH 50: CPT

## 2021-04-03 RX ORDER — PANTOPRAZOLE SODIUM 20 MG/1
1 TABLET, DELAYED RELEASE ORAL
Qty: 60 | Refills: 0
Start: 2021-04-03 | End: 2021-05-02

## 2021-04-03 RX ADMIN — Medication 50 MILLIGRAM(S): at 08:52

## 2021-04-03 RX ADMIN — Medication 325 MILLIGRAM(S): at 08:52

## 2021-04-03 RX ADMIN — ATORVASTATIN CALCIUM 40 MILLIGRAM(S): 80 TABLET, FILM COATED ORAL at 21:56

## 2021-04-03 RX ADMIN — TAMSULOSIN HYDROCHLORIDE 0.4 MILLIGRAM(S): 0.4 CAPSULE ORAL at 21:56

## 2021-04-03 RX ADMIN — Medication 40 MILLIGRAM(S): at 08:52

## 2021-04-03 RX ADMIN — GABAPENTIN 300 MILLIGRAM(S): 400 CAPSULE ORAL at 15:01

## 2021-04-03 RX ADMIN — GABAPENTIN 300 MILLIGRAM(S): 400 CAPSULE ORAL at 05:37

## 2021-04-03 RX ADMIN — PANTOPRAZOLE SODIUM 40 MILLIGRAM(S): 20 TABLET, DELAYED RELEASE ORAL at 08:51

## 2021-04-03 RX ADMIN — GABAPENTIN 300 MILLIGRAM(S): 400 CAPSULE ORAL at 21:57

## 2021-04-03 RX ADMIN — DULOXETINE HYDROCHLORIDE 60 MILLIGRAM(S): 30 CAPSULE, DELAYED RELEASE ORAL at 08:57

## 2021-04-03 RX ADMIN — Medication 81 MILLIGRAM(S): at 08:52

## 2021-04-03 RX ADMIN — PANTOPRAZOLE SODIUM 40 MILLIGRAM(S): 20 TABLET, DELAYED RELEASE ORAL at 21:56

## 2021-04-03 NOTE — DISCHARGE NOTE NURSING/CASE MANAGEMENT/SOCIAL WORK - PATIENT PORTAL LINK FT
You can access the FollowMyHealth Patient Portal offered by [facility] by registering at the following website: [email or web address]. By joining [facility]’s FollowMyHealth portal, you will also be able to view your health information using other applications (apps) compatible with our system.

## 2021-04-03 NOTE — DISCHARGE NOTE PROVIDER - CARE PROVIDER_API CALL
Valente Denis)  Gastroenterology; Internal Medicine  [address], [address]  [place] [address]  Phone: [phone]  Fax: [phone]  Follow Up Time: 2 weeks    DR Morales, PCP  Phone: (   )    -  Fax: (   )    -  Follow Up Time:

## 2021-04-03 NOTE — PROGRESS NOTE ADULT - SUBJECTIVE AND OBJECTIVE BOX
Pt is a poor historian. Hx obtained from chart. 89/M with PMHx of Asthma, CAD s/p stents on Plavix and aspirin, HTN, AAA, PAD, CKD IV, HLD, BPH, CHF was [facility]   difficulty breathing x today, new onset. Pt reports productive cough with brown sputum. Pt fell 1 week ago and hit his head and has been c/o acute back pain since but did not seek medical attention at that time. no ha, No LOC. No other complaints at this time. Ptt was found to have Hb of 5.1 in ED and guaiac was positive with melena as per ER MD. Pt getting PRBC in ED. No c/o abd painPt is a poor historian. Hx obtained from chart. 89/M with PMHx of Asthma, CAD s/p stents on Plavix and aspirin, HTN, AAA, PAD, CKD IV, HLD, BPH, CHF [facility]   difficulty breathing x today, new onset. Pt reports productive cough with brown sputum. Pt fell 1 week ago and hit his head and has been c/o acute back pain since but did not seek medical attention at that time. no ha, No LOC. No other complaints at this time. Ptt was found to have Hb of 5.1 in ED and guaiac was positive with melena as per ER MD. Pt getting PRBC in ED. No c/o abd pain      4/3 - no cp palps sob, feels good today     PHYSICAL EXAM:  GENERAL: NAD, able to lie flat in bed  HEAD:  Atraumatic, Normocephalic  EYES: EOMI, PERRLA, normal sclera  ENT: Moist mucous membranes  NECK: Supple, No JVD, no nuchal rigidity  CHEST/LUNG: Clear to auscultation bilaterally; No rales, rhonchi, wheezing, or rubs. Unlabored respirations  HEART: Regular rate and rhythm; No murmurs, rubs, or gallops  ABDOMEN: Bowel sounds present; Soft, Nontender, Nondistended. No hepatomegaly  EXTREMITIES:  pedal edema much better today   NERVOUS SYSTEM:  Alert & Oriented X3, speech clear. No focal motor or sensory deficits  MSK: FROM all 4 extremities, full and equal strength  SKIN: No rashes or lesions    LABS: All Labs Reviewed:                     8.0    5.25  )-----------( 200      ( 03 Apr 2021 06:35 )             26.1   146<H>  |  110<H>  |  32<H>  ----------------------------<  102<H>  3.5   |  31  |  1.98<H>    RADIOLOGY/EKG:  CT L SPINE:  No acute fracture.  At L3/L4 through L5/S1, there are posterior disc bulges and bilateral facet arthropathy resulting in marked bilateral foraminalstenosis, most severe on the right side at L4/L5. At L4/L5, there is superimposed ligamentum flavum enfolding resulting in severe central spinal canal stenosis at this level, unchanged since prior exam    LABS: All Labs Reviewed:                        8.0    5.25  )-----------( 200      ( 03 Apr 2021 06:35 )             26.1     146<H>  |  110<H>  |  32<H>  ----------------------------<  102<H>  3.5   |  31  |  1.98<H>    Ca    8.2<L>      03 Apr 2021 06:35  Mg     2.0     04-03      acetaminophen   Tablet .. 650 milliGRAM(s) Oral every 6 hours PRN  aspirin enteric coated 81 milliGRAM(s) Oral daily  atorvastatin 40 milliGRAM(s) Oral at bedtime  DULoxetine 60 milliGRAM(s) Oral daily  ferrous    sulfate 325 milliGRAM(s) Oral daily  furosemide    Tablet 40 milliGRAM(s) Oral daily  gabapentin 300 milliGRAM(s) Oral three times a day  metoprolol succinate ER 50 milliGRAM(s) Oral daily  ondansetron Injectable 4 milliGRAM(s) IV Push every 6 hours PRN  pantoprazole    Tablet 40 milliGRAM(s) Oral two times a day  tamsulosin 0.4 milliGRAM(s) Oral at bedtime

## 2021-04-03 NOTE — DISCHARGE NOTE PROVIDER - NSDCMRMEDTOKEN_GEN_ALL_CORE_FT
acetaminophen 325 mg oral tablet: 2 tab(s) orally every 4 hours, As Needed  aspirin 81 mg oral delayed release tablet: 1 tab(s) orally once a day  atorvastatin 40 mg oral tablet: 1 tab(s) orally once a day (at bedtime)  clopidogrel 75 mg oral tablet: 1 tab(s) orally once a day; start on 4/4/21  Colace 100 mg oral capsule: 2 cap(s) orally once a day (at bedtime), As Needed  cyanocobalamin 1000 mcg oral tablet: 1 tab(s) orally once a day  DULoxetine 60 mg oral delayed release capsule: 1 cap(s) orally once a day  famotidine 20 mg oral tablet: 1 tab(s) orally 2 times a day  ferrous sulfate 325 mg (65 mg elemental iron) oral tablet: 1 tab(s) orally once a day  furosemide 40 mg oral tablet: 1 tab(s) orally once a day  gabapentin 300 mg oral capsule: 1 cap(s) orally 3 times a day  metoprolol succinate 50 mg oral tablet, extended release: 1 tab(s) orally once a day  Multiple Vitamins oral tablet: 1 tab(s) orally once a day  pantoprazole 40 mg oral delayed release tablet: 1 tab(s) orally 2 times a day  tamsulosin 0.4 mg oral capsule: 1 cap(s) orally once a day (at bedtime)

## 2021-04-03 NOTE — DISCHARGE NOTE PROVIDER - PROVIDER TOKENS
PROVIDER:[TOKEN:[[id number]],FOLLOWUP:[2 weeks]],FREE:[LAST:[DR Morales],FIRST:[PCP],PHONE:[(   )    -],FAX:[(   )    -]]

## 2021-04-03 NOTE — PROGRESS NOTE ADULT - ASSESSMENT
Pt admitted with   1) Acute GI Bleed + melena + Acute on chronic anemia sec to anemia of hemorrhage from GI bleed:  admit to med floor  NPO except meds  cont PRBC transfusions, hold iv fluids during transfusions  start Protonix drip  4/1 - hold plavix cont asa  GI consult for EGD tomorrow, discussed with Dr Denis  4/2 - s/p EGD by Dr Denis shows large gastric ulcers, no active bleeds  will cont to hold Plavix and resume on Sunday   no plans for COL   cont PPI   4/3 - dc planing underway, intially planned for tf to detention, however patient very weak and unsteady       2) Fall + low back pain:  CT LS spine to r/o any fracture - NTD   prn pain meds  PT consult    3) CAD s/p stent:  plavix on hold for GI bleed  cont ASA  see above     4) Hx of CHF: cont lasix  monitor for volume overload  4/2 - will get ECHO , pt on lasix, still has pedal edema b/l - better today   4/3 - ECHO with normal EF, no pedal edema today - pt reports being on lasix prn for pedal edema     GOC - discussed with patient and his son Joseph at bedside - patient made decision to be DNR DNI and signed the MOLST form  time spent on ACP - 17 mins     DISPO - discussed with Joseph at bedside, dc planning underway

## 2021-04-03 NOTE — DISCHARGE NOTE PROVIDER - NSDCCPCAREPLAN_GEN_ALL_CORE_FT
PRINCIPAL DISCHARGE DIAGNOSIS  Diagnosis: Symptomatic anemia  Assessment and Plan of Treatment: s/p EGD: shows gastric ulcers, please follow up with Dr Deleon; cont pantoprazole BID      SECONDARY DISCHARGE DIAGNOSES  Diagnosis: GI bleed  Assessment and Plan of Treatment: as above, can resume Plavix on 4/4/21; cont Aspirin    Diagnosis: Melena  Assessment and Plan of Treatment:

## 2021-04-03 NOTE — DISCHARGE NOTE PROVIDER - HOSPITAL COURSE
Pt is a poor historian. Hx obtained from chart. 89/M with PMHx of Asthma, CAD s/p stents on Plavix and aspirin, HTN, AAA, PAD, CKD IV, HLD, BPH, CHF was sent from [facility] for c/o   difficulty breathing x today, new onset. Pt reports productive cough with brown sputum. Pt fell 1 week ago and hit his head and has been c/o acute back pain since but did not seek medical attention at that time. no ha, No LOC. No other complaints at this time. Ptt was found to have Hb of 5.1 in ED and guaiac was positive with melena as per ER MD. Pt getting PRBC in ED. No c/o abd painPt is a poor historian. Hx obtained from chart. 89/M with PMHx of Asthma, CAD s/p stents on Plavix and aspirin, HTN, AAA, PAD, CKD IV, HLD, BPH, CHF was sent from [facility] for c/o   difficulty breathing x today, new onset. Pt reports productive cough with brown sputum. Pt fell 1 week ago and hit his head and has been c/o acute back pain since but did not seek medical attention at that time. no ha, No LOC. No other complaints at this time. Ptt was found to have Hb of 5.1 in ED and guaiac was positive with melena as per ER MD. Pt getting PRBC in ED. No c/o abd pain    HOSPITAL COURSE:   Pt admitted with   1) Acute GI Bleed + melena + Acute on chronic anemia sec to anemia of hemorrhage from GI bleed:  admit to med floor  NPO except meds  cont PRBC transfusions, hold iv fluids during transfusions  start Protonix drip  4/1 - hold plavix cont asa  GI consult for EGD tomorrow, discussed with Dr Denis  4/2 - s/p EGD by Dr Denis shows large gastric ulcers, no active bleeds  will cont to hold Plavix and resume on Sunday   no plans for COL   cont PPI     2) Fall + low back pain:  CT LS spine to r/o any fracture - NTD   prn pain meds  PT consult    3) CAD s/p stent:  plavix on hold for GI bleed  cont ASA  see above     4) Hx of CHF: cont lasix  monitor for volume overload  4/2 - will get ECHO , pt on lasix, still has pedal edema b/l - better today   4/3 - ECHO with normal EF, no pedal edema today - pt reports being on lasix prn for pedal edema     4/1/21 - GOC - discussed with patient and his son Joseph at bedside - patient made decision to be DNR DNI and signed the MOLST form  time spent on ACP - 17 mins     OTHER DETAILS:  4/3 - no cp palps sob, feels good today   PHYSICAL EXAM:  GENERAL: NAD, able to lie flat in bed  HEAD:  Atraumatic, Normocephalic  EYES: EOMI, PERRLA, normal sclera  ENT: Moist mucous membranes  NECK: Supple, No JVD, no nuchal rigidity  CHEST/LUNG: Clear to auscultation bilaterally; No rales, rhonchi, wheezing, or rubs. Unlabored respirations  HEART: Regular rate and rhythm; No murmurs, rubs, or gallops  ABDOMEN: Bowel sounds present; Soft, Nontender, Nondistended. No hepatomegaly  EXTREMITIES:  pedal edema much better today   NERVOUS SYSTEM:  Alert & Oriented X3, speech clear. No focal motor or sensory deficits  MSK: FROM all 4 extremities, full and equal strength  SKIN: No rashes or lesions    LABS: All Labs Reviewed:                     8.0    5.25  )-----------( 200      ( 03 Apr 2021 06:35 )             26.1   146<H>  |  110<H>  |  32<H>  ----------------------------<  102<H>  3.5   |  31  |  1.98<H>    RADIOLOGY/EKG:  CT L SPINE:  No acute fracture.  At L3/L4 through L5/S1, there are posterior disc bulges and bilateral facet arthropathy resulting in marked bilateral foraminalstenosis, most severe on the right side at L4/L5. At L4/L5, there is superimposed ligamentum flavum enfolding resulting in severe central spinal canal stenosis at this level, unchanged since prior exam    time spent on discharge - 55 mins   will discuss with Joseph/son              Pt is a poor historian. Hx obtained from chart. 89/M with PMHx of Asthma, CAD s/p stents on Plavix and aspirin, HTN, AAA, PAD, CKD IV, HLD, BPH, CHF was sent from [facility] for c/o   difficulty breathing x today, new onset. Pt reports productive cough with brown sputum. Pt fell 1 week ago and hit his head and has been c/o acute back pain since but did not seek medical attention at that time. no ha, No LOC. No other complaints at this time. Ptt was found to have Hb of 5.1 in ED and guaiac was positive with melena as per ER MD. Pt getting PRBC in ED. No c/o abd painPt is a poor historian. Hx obtained from chart. 89/M with PMHx of Asthma, CAD s/p stents on Plavix and aspirin, HTN, AAA, PAD, CKD IV, HLD, BPH, CHF was sent from [facility] for c/o   difficulty breathing x today, new onset. Pt reports productive cough with brown sputum. Pt fell 1 week ago and hit his head and has been c/o acute back pain since but did not seek medical attention at that time. no ha, No LOC. No other complaints at this time. Ptt was found to have Hb of 5.1 in ED and guaiac was positive with melena as per ER MD. Pt getting PRBC in ED. No c/o abd pain    HOSPITAL COURSE:     Pt admitted with   1) Acute GI Bleed + melena + Acute on chronic anemia sec to anemia of hemorrhage from GI bleed:  admit to med floor  NPO except meds  cont PRBC transfusions, hold iv fluids during transfusions  start Protonix drip  4/1 - hold plavix cont asa  GI consult for EGD tomorrow, discussed with Dr Denis  4/2 - s/p EGD by Dr Denis shows large gastric ulcers, no active bleeds  will cont to hold Plavix and resume on Sunday   no plans for COL   cont PPI     2) Fall + low back pain:  CT LS spine to r/o any fracture - NTD   prn pain meds  PT consult    3) CAD s/p stent:  plavix on hold for GI bleed  cont ASA  see above     4) Hx of CHF: cont lasix  monitor for volume overload  4/2 - will get ECHO , pt on lasix, still has pedal edema b/l - better today   4/3 - ECHO with normal EF, no pedal edema today - pt reports being on lasix prn for pedal edema     5) mild Hypernatremia  better with D5W     4/1/21 - GOC - discussed with patient and his son Joseph at bedside - patient made decision to be DNR DNI and signed the MOLST form  time spent on ACP - 17 mins     OTHER DETAILS:  4/5 - no cp palps sob, feels good today   PHYSICAL EXAM:  GENERAL: NAD, sitting up in chair, post-lunch   HEAD:  Atraumatic, Normocephalic  EYES: EOMI, PERRLA, normal sclera  ENT: Moist mucous membranes  NECK: Supple, No JVD, no nuchal rigidity  CHEST/LUNG: Clear to auscultation bilaterally; No rales, rhonchi, wheezing, or rubs. Unlabored respirations  HEART: Regular rate and rhythm; No murmurs, rubs, or gallops  ABDOMEN: Bowel sounds present; Soft, Nontender, Nondistended. No hepatomegaly  EXTREMITIES:  pedal edema much better today   NERVOUS SYSTEM:  Alert & Oriented X3, speech clear. No focal motor or sensory deficits  MSK: FROM all 4 extremities, full and equal strength  SKIN: No rashes or lesions    RADIOLOGY/EKG:  CT L SPINE:  No acute fracture.  At L3/L4 through L5/S1, there are posterior disc bulges and bilateral facet arthropathy resulting in marked bilateral foraminalstenosis, most severe on the right side at L4/L5. At L4/L5, there is superimposed ligamentum flavum enfolding resulting in severe central spinal canal stenosis at this level, unchanged since prior exam      LABS: All Labs Reviewed:  04-05  143  |  106  |  24<H>  ----------------------------<  110<H>  3.5   |  31  |  1.85<H>  Ca    7.9<L>      05 Apr 2021 07:49  Mg     1.9     04-05    time spent on discharge - 55 mins   POC discussed with Joseph/son  yesterday

## 2021-04-04 PROCEDURE — 99232 SBSQ HOSP IP/OBS MODERATE 35: CPT

## 2021-04-04 RX ORDER — SODIUM CHLORIDE 9 MG/ML
1000 INJECTION, SOLUTION INTRAVENOUS
Refills: 0 | Status: DISCONTINUED | OUTPATIENT
Start: 2021-04-04 | End: 2021-04-05

## 2021-04-04 RX ADMIN — GABAPENTIN 300 MILLIGRAM(S): 400 CAPSULE ORAL at 13:30

## 2021-04-04 RX ADMIN — Medication 50 MILLIGRAM(S): at 10:17

## 2021-04-04 RX ADMIN — TAMSULOSIN HYDROCHLORIDE 0.4 MILLIGRAM(S): 0.4 CAPSULE ORAL at 22:42

## 2021-04-04 RX ADMIN — SODIUM CHLORIDE 60 MILLILITER(S): 9 INJECTION, SOLUTION INTRAVENOUS at 17:55

## 2021-04-04 RX ADMIN — ATORVASTATIN CALCIUM 40 MILLIGRAM(S): 80 TABLET, FILM COATED ORAL at 22:42

## 2021-04-04 RX ADMIN — PANTOPRAZOLE SODIUM 40 MILLIGRAM(S): 20 TABLET, DELAYED RELEASE ORAL at 22:43

## 2021-04-04 RX ADMIN — Medication 81 MILLIGRAM(S): at 10:17

## 2021-04-04 RX ADMIN — Medication 325 MILLIGRAM(S): at 10:17

## 2021-04-04 RX ADMIN — GABAPENTIN 300 MILLIGRAM(S): 400 CAPSULE ORAL at 22:42

## 2021-04-04 RX ADMIN — PANTOPRAZOLE SODIUM 40 MILLIGRAM(S): 20 TABLET, DELAYED RELEASE ORAL at 10:17

## 2021-04-04 RX ADMIN — DULOXETINE HYDROCHLORIDE 60 MILLIGRAM(S): 30 CAPSULE, DELAYED RELEASE ORAL at 10:17

## 2021-04-04 RX ADMIN — Medication 40 MILLIGRAM(S): at 10:17

## 2021-04-04 RX ADMIN — GABAPENTIN 300 MILLIGRAM(S): 400 CAPSULE ORAL at 05:54

## 2021-04-04 NOTE — PROGRESS NOTE ADULT - ASSESSMENT
Pt admitted with   1) Acute GI Bleed + melena + Acute on chronic anemia sec to anemia of hemorrhage from GI bleed:  admit to med floor  NPO except meds  cont PRBC transfusions, hold iv fluids during transfusions  start Protonix drip  4/1 - hold plavix cont asa  GI consult for EGD tomorrow, discussed with Dr Denis  4/2 - s/p EGD by Dr Denis shows large gastric ulcers, no active bleeds  will cont to hold Plavix and resume on Sunday   no plans for COL   cont PPI   4/3 - dc planing underway, intially planned for tf to USP, however patient very weak and unsteady       2) Fall + low back pain:  CT LS spine to r/o any fracture - NTD   prn pain meds  PT consult    3) CAD s/p stent:  plavix on hold for GI bleed  cont ASA  see above     4) Hx of CHF: cont lasix  monitor for volume overload  4/2 - will get ECHO , pt on lasix, still has pedal edema b/l - better today   4/3 - ECHO with normal EF, no pedal edema today - pt reports being on lasix prn for pedal edema     GOC - discussed with patient and his son Joseph at bedside - patient made decision to be DNR DNI and signed the MOLST form  time spent on ACP - 17 mins     DISPO 4/4 - dc planning underway for rehab tomorrow   discussed with team at [facility]        Pt admitted with   1) Acute GI Bleed + melena + Acute on chronic anemia sec to anemia of hemorrhage from GI bleed:  admit to med floor  NPO except meds  cont PRBC transfusions, hold iv fluids during transfusions  start Protonix drip  4/1 - hold plavix cont asa  GI consult for EGD tomorrow, discussed with Dr Denis  4/2 - s/p EGD by Dr Denis shows large gastric ulcers, no active bleeds  will cont to hold Plavix and resume on Sunday   no plans for COL   cont PPI       2) Fall + low back pain:  CT LS spine to r/o any fracture - NTD   prn pain meds  PT consult    3) CAD s/p stent:  plavix on hold for GI bleed  cont ASA  see above     4) Hx of CHF: cont lasix  monitor for volume overload  4/2 - will get ECHO , pt on lasix, still has pedal edema b/l - better today   4/3 - ECHO with normal EF, no pedal edema today - pt reports being on lasix prn for pedal edema   4/4 - Hypernatremia, will place on low dose of D5W, encourage po hydration  dc planning underway, initially planned for tf to snf, however patient very weak and unsteady     GOC - discussed with patient and his son Joseph at bedside - patient made decision to be DNR DNI and signed the MOLST form  time spent on ACP - 17 mins     DISPO 4/4 - dc planning underway for rehab tomorrow   discussed with team at [facility]

## 2021-04-04 NOTE — PROGRESS NOTE ADULT - SUBJECTIVE AND OBJECTIVE BOX
Pt is a poor historian. Hx obtained from chart. 89/M with PMHx of Asthma, CAD s/p stents on Plavix and aspirin, HTN, AAA, PAD, CKD IV, HLD, BPH, [facility]   difficulty breathing x today, new onset. Pt reports productive cough with brown sputum. Pt fell 1 week ago and hit his head and has been c/o acute back pain since but did not seek medical attention at that time. no ha, No LOC. No other complaints at this time. Ptt was found to have Hb of 5.1 in ED and guaiac was positive with melena as per ER MD. Pt getting PRBC in ED. No c/o abd painPt is a poor historian. Hx obtained from chart. 89/M with PMHx of Asthma, CAD s/p stents on Plavix and aspirin, HTN, AAA, PAD, CKD IV, HLD, BPH, CHF [facility]   difficulty breathing x today, new onset. Pt reports productive cough with brown sputum. Pt fell 1 week ago and hit his head and has been c/o acute back pain since but did not seek medical attention at that time. no ha, No LOC. No other complaints at this time. Ptt was found to have Hb of 5.1 in ED and guaiac was positive with melena as per ER MD. Pt getting PRBC in ED. No c/o abd pain      4/3 - no cp palps sob, feels good today   4/4 - no cp palps sob, weak when he tries to get OOB otherwise no complaints     PHYSICAL EXAM:  GENERAL: NAD, able to lie flat in bed  HEAD:  Atraumatic, Normocephalic  EYES: EOMI, PERRLA, normal sclera  ENT: Moist mucous membranes  NECK: Supple, No JVD, no nuchal rigidity  CHEST/LUNG: Clear to auscultation bilaterally; No rales, rhonchi, wheezing, or rubs. Unlabored respirations  HEART: Regular rate and rhythm; No murmurs, rubs, or gallops  ABDOMEN: Bowel sounds present; Soft, Nontender, Nondistended. No hepatomegaly  EXTREMITIES:  pedal edema much better today   NERVOUS SYSTEM:  Alert & Oriented X3, speech clear. No focal motor or sensory deficits  MSK: FROM all 4 extremities, full and equal strength  SKIN: No rashes or lesions      RADIOLOGY/EKG:  CT L SPINE:  No acute fracture.  At L3/L4 through L5/S1, there are posterior disc bulges and bilateral facet arthropathy resulting in marked bilateral foraminalstenosis, most severe on the right side at L4/L5. At L4/L5, there is superimposed ligamentum flavum enfolding resulting in severe central spinal canal stenosis at this level, unchanged since prior exam    LABS: All Labs Reviewed:                        8.0    5.25  )-----------( 200      ( 03 Apr 2021 06:35 )             26.1     04-03    146<H>  |  110<H>  |  32<H>  ----------------------------<  102<H>  3.5   |  31  |  1.98<H>    Ca    8.2<L>      03 Apr 2021 06:35  Mg     2.0     04-03      MEDS:   acetaminophen   Tablet .. 650 milliGRAM(s) Oral every 6 hours PRN  aspirin enteric coated 81 milliGRAM(s) Oral daily  atorvastatin 40 milliGRAM(s) Oral at bedtime  DULoxetine 60 milliGRAM(s) Oral daily  ferrous    sulfate 325 milliGRAM(s) Oral daily  furosemide    Tablet 40 milliGRAM(s) Oral daily  gabapentin 300 milliGRAM(s) Oral three times a day  metoprolol succinate ER 50 milliGRAM(s) Oral daily  ondansetron Injectable 4 milliGRAM(s) IV Push every 6 hours PRN  pantoprazole    Tablet 40 milliGRAM(s) Oral two times a day  tamsulosin 0.4 milliGRAM(s) Oral at bedtime               Pt is a poor historian. Hx obtained from chart. 89/M with PMHx of Asthma, CAD s/p stents on Plavix and aspirin, HTN, AAA, PAD, CKD IV, HLD, BPH, [facility]   difficulty breathing x today, new onset. Pt reports productive cough with brown sputum. Pt fell 1 week ago and hit his head and has been c/o acute back pain since but did not seek medical attention at that time. no ha, No LOC. No other complaints at this time. Ptt was found to have Hb of 5.1 in ED and guaiac was positive with melena as per ER MD. Pt getting PRBC in ED. No c/o abd painPt is a poor historian. Hx obtained from chart. 89/M with PMHx of Asthma, CAD s/p stents on Plavix and aspirin, HTN, AAA, PAD, CKD IV, HLD, BPH, CH[facility]   difficulty breathing x today, new onset. Pt reports productive cough with brown sputum. Pt fell 1 week ago and hit his head and has been c/o acute back pain since but did not seek medical attention at that time. no ha, No LOC. No other complaints at this time. Ptt was found to have Hb of 5.1 in ED and guaiac was positive with melena as per ER MD. Pt getting PRBC in ED. No c/o abd pain      4/3 - no cp palps sob, feels good today   4/4 - no cp palps sob, reports poor po intake, weak when he tries to get OOB otherwise no complaints     PHYSICAL EXAM:  GENERAL: NAD, able to lie flat in bed  HEAD:  Atraumatic, Normocephalic  EYES: EOMI, PERRLA, normal sclera  ENT: DRY  mucous membranes  NECK: Supple, No JVD, no nuchal rigidity  CHEST/LUNG: Clear to auscultation bilaterally; No rales, rhonchi, wheezing, or rubs. Unlabored respirations  HEART: Regular rate and rhythm; No murmurs, rubs, or gallops  ABDOMEN: Bowel sounds present; Soft, Nontender, Nondistended. No hepatomegaly  EXTREMITIES:  pedal edema much better today   NERVOUS SYSTEM:  Alert & Oriented X3, speech clear. No focal motor or sensory deficits  MSK: FROM all 4 extremities, full and equal strength  SKIN: No rashes or lesions      RADIOLOGY/EKG:  CT L SPINE:  No acute fracture.  At L3/L4 through L5/S1, there are posterior disc bulges and bilateral facet arthropathy resulting in marked bilateral foraminalstenosis, most severe on the right side at L4/L5. At L4/L5, there is superimposed ligamentum flavum enfolding resulting in severe central spinal canal stenosis at this level, unchanged since prior exam    LABS: All Labs Reviewed:                        8.0    5.25  )-----------( 200      ( 03 Apr 2021 06:35 )             26.1     04-03    146<H>  |  110<H>  |  32<H>  ----------------------------<  102<H>  3.5   |  31  |  1.98<H>    Ca    8.2<L>      03 Apr 2021 06:35  Mg     2.0     04-03      MEDS:   acetaminophen   Tablet .. 650 milliGRAM(s) Oral every 6 hours PRN  aspirin enteric coated 81 milliGRAM(s) Oral daily  atorvastatin 40 milliGRAM(s) Oral at bedtime  DULoxetine 60 milliGRAM(s) Oral daily  ferrous    sulfate 325 milliGRAM(s) Oral daily  furosemide    Tablet 40 milliGRAM(s) Oral daily  gabapentin 300 milliGRAM(s) Oral three times a day  metoprolol succinate ER 50 milliGRAM(s) Oral daily  ondansetron Injectable 4 milliGRAM(s) IV Push every 6 hours PRN  pantoprazole    Tablet 40 milliGRAM(s) Oral two times a day  tamsulosin 0.4 milliGRAM(s) Oral at bedtime

## 2021-04-05 VITALS
DIASTOLIC BLOOD PRESSURE: 42 MMHG | SYSTOLIC BLOOD PRESSURE: 98 MMHG | HEART RATE: 74 BPM | RESPIRATION RATE: 18 BRPM | TEMPERATURE: 98 F | OXYGEN SATURATION: 97 %

## 2021-04-05 LAB
ANION GAP SERPL CALC-SCNC: 6 MMOL/L — SIGNIFICANT CHANGE UP (ref 5–17)
BUN SERPL-MCNC: 24 MG/DL — HIGH (ref 7–23)
CALCIUM SERPL-MCNC: 7.9 MG/DL — LOW (ref 8.5–10.1)
CHLORIDE SERPL-SCNC: 106 MMOL/L — SIGNIFICANT CHANGE UP (ref 96–108)
CO2 SERPL-SCNC: 31 MMOL/L — SIGNIFICANT CHANGE UP (ref 22–31)
CREAT SERPL-MCNC: 1.85 MG/DL — HIGH (ref 0.5–1.3)
GLUCOSE SERPL-MCNC: 110 MG/DL — HIGH (ref 70–99)
MAGNESIUM SERPL-MCNC: 1.9 MG/DL — SIGNIFICANT CHANGE UP (ref 1.6–2.6)
POTASSIUM SERPL-MCNC: 3.5 MMOL/L — SIGNIFICANT CHANGE UP (ref 3.5–5.3)
POTASSIUM SERPL-SCNC: 3.5 MMOL/L — SIGNIFICANT CHANGE UP (ref 3.5–5.3)
SODIUM SERPL-SCNC: 143 MMOL/L — SIGNIFICANT CHANGE UP (ref 135–145)

## 2021-04-05 PROCEDURE — 99239 HOSP IP/OBS DSCHRG MGMT >30: CPT

## 2021-04-05 RX ADMIN — DULOXETINE HYDROCHLORIDE 60 MILLIGRAM(S): 30 CAPSULE, DELAYED RELEASE ORAL at 09:02

## 2021-04-05 RX ADMIN — Medication 325 MILLIGRAM(S): at 09:02

## 2021-04-05 RX ADMIN — Medication 650 MILLIGRAM(S): at 10:01

## 2021-04-05 RX ADMIN — Medication 81 MILLIGRAM(S): at 09:02

## 2021-04-05 RX ADMIN — Medication 40 MILLIGRAM(S): at 09:03

## 2021-04-05 RX ADMIN — GABAPENTIN 300 MILLIGRAM(S): 400 CAPSULE ORAL at 13:38

## 2021-04-05 RX ADMIN — Medication 50 MILLIGRAM(S): at 09:03

## 2021-04-05 RX ADMIN — PANTOPRAZOLE SODIUM 40 MILLIGRAM(S): 20 TABLET, DELAYED RELEASE ORAL at 09:02

## 2021-04-05 RX ADMIN — GABAPENTIN 300 MILLIGRAM(S): 400 CAPSULE ORAL at 05:26

## 2021-04-05 RX ADMIN — Medication 650 MILLIGRAM(S): at 09:04

## 2021-04-05 RX ADMIN — SODIUM CHLORIDE 60 MILLILITER(S): 9 INJECTION, SOLUTION INTRAVENOUS at 05:27

## 2021-04-15 DIAGNOSIS — Z79.02 LONG TERM (CURRENT) USE OF ANTITHROMBOTICS/ANTIPLATELETS: ICD-10-CM

## 2021-04-15 DIAGNOSIS — K25.0 ACUTE GASTRIC ULCER WITH HEMORRHAGE: ICD-10-CM

## 2021-04-15 DIAGNOSIS — Z79.82 LONG TERM (CURRENT) USE OF ASPIRIN: ICD-10-CM

## 2021-04-15 DIAGNOSIS — Z88.0 ALLERGY STATUS TO PENICILLIN: ICD-10-CM

## 2021-04-15 DIAGNOSIS — I50.9 HEART FAILURE, UNSPECIFIED: ICD-10-CM

## 2021-04-15 DIAGNOSIS — K25.4 CHRONIC OR UNSPECIFIED GASTRIC ULCER WITH HEMORRHAGE: ICD-10-CM

## 2021-04-15 DIAGNOSIS — E87.0 HYPEROSMOLALITY AND HYPERNATREMIA: ICD-10-CM

## 2021-04-15 DIAGNOSIS — J45.909 UNSPECIFIED ASTHMA, UNCOMPLICATED: ICD-10-CM

## 2021-04-15 DIAGNOSIS — I13.0 HYPERTENSIVE HEART AND CHRONIC KIDNEY DISEASE WITH HEART FAILURE AND STAGE 1 THROUGH STAGE 4 CHRONIC KIDNEY DISEASE, OR UNSPECIFIED CHRONIC KIDNEY DISEASE: ICD-10-CM

## 2021-04-15 DIAGNOSIS — I73.9 PERIPHERAL VASCULAR DISEASE, UNSPECIFIED: ICD-10-CM

## 2021-04-15 DIAGNOSIS — I25.10 ATHEROSCLEROTIC HEART DISEASE OF NATIVE CORONARY ARTERY WITHOUT ANGINA PECTORIS: ICD-10-CM

## 2021-04-15 DIAGNOSIS — N18.4 CHRONIC KIDNEY DISEASE, STAGE 4 (SEVERE): ICD-10-CM

## 2021-04-15 DIAGNOSIS — Z95.5 PRESENCE OF CORONARY ANGIOPLASTY IMPLANT AND GRAFT: ICD-10-CM

## 2021-05-13 NOTE — ED ADULT TRIAGE NOTE - WEIGHT IN LBS
Quality 137: Melanoma: Continuity Of Care - Recall System: Patient information entered into a recall system that includes: target date for the next exam specified AND a process to follow up with patients regarding missed or unscheduled appointments Detail Level: Detailed Quality 224: Stage 0-Iic Melanoma: Overutilization Of Imaging Studies For Only Stage 0-Iic Melanoma: None of the following diagnostic imaging studies ordered: chest X-ray, CT, Ultrasound, MRI, PET, or nuclear medicine scans (ML) When Should The Patient Follow-Up For Their Next Full-Body Skin Exam?: 6 Months Detail Level: Zone Detail Level: Simple 190

## 2021-09-03 ENCOUNTER — INPATIENT (INPATIENT)
Facility: HOSPITAL | Age: 86
LOS: 6 days | Discharge: NURSING, REHAB OR LONG-TERM CARE | DRG: 871 | End: 2021-09-10
Attending: INTERNAL MEDICINE | Admitting: HOSPITALIST
Payer: MEDICARE

## 2021-09-03 VITALS
SYSTOLIC BLOOD PRESSURE: 140 MMHG | TEMPERATURE: 98 F | HEIGHT: 70 IN | HEART RATE: 109 BPM | DIASTOLIC BLOOD PRESSURE: 65 MMHG | WEIGHT: 166.89 LBS | RESPIRATION RATE: 22 BRPM | OXYGEN SATURATION: 94 %

## 2021-09-03 DIAGNOSIS — R50.9 FEVER, UNSPECIFIED: ICD-10-CM

## 2021-09-03 DIAGNOSIS — Z95.5 PRESENCE OF CORONARY ANGIOPLASTY IMPLANT AND GRAFT: Chronic | ICD-10-CM

## 2021-09-03 DIAGNOSIS — Z87.19 PERSONAL HISTORY OF OTHER DISEASES OF THE DIGESTIVE SYSTEM: Chronic | ICD-10-CM

## 2021-09-03 DIAGNOSIS — Z98.890 OTHER SPECIFIED POSTPROCEDURAL STATES: Chronic | ICD-10-CM

## 2021-09-03 DIAGNOSIS — Z98.89 OTHER SPECIFIED POSTPROCEDURAL STATES: Chronic | ICD-10-CM

## 2021-09-03 LAB
ADD ON TEST-SPECIMEN IN LAB: SIGNIFICANT CHANGE UP
ALBUMIN SERPL ELPH-MCNC: 3.6 G/DL — SIGNIFICANT CHANGE UP (ref 3.3–5)
ALP SERPL-CCNC: 89 U/L — SIGNIFICANT CHANGE UP (ref 40–120)
ALT FLD-CCNC: 15 U/L — SIGNIFICANT CHANGE UP (ref 12–78)
ANION GAP SERPL CALC-SCNC: 3 MMOL/L — LOW (ref 5–17)
APPEARANCE UR: CLEAR — SIGNIFICANT CHANGE UP
APTT BLD: 29.4 SEC — SIGNIFICANT CHANGE UP (ref 27.5–35.5)
AST SERPL-CCNC: 14 U/L — LOW (ref 15–37)
BASOPHILS # BLD AUTO: 0.04 K/UL — SIGNIFICANT CHANGE UP (ref 0–0.2)
BASOPHILS NFR BLD AUTO: 0.3 % — SIGNIFICANT CHANGE UP (ref 0–2)
BILIRUB SERPL-MCNC: 0.5 MG/DL — SIGNIFICANT CHANGE UP (ref 0.2–1.2)
BILIRUB UR-MCNC: NEGATIVE — SIGNIFICANT CHANGE UP
BUN SERPL-MCNC: 39 MG/DL — HIGH (ref 7–23)
CALCIUM SERPL-MCNC: 8.9 MG/DL — SIGNIFICANT CHANGE UP (ref 8.5–10.1)
CHLORIDE SERPL-SCNC: 103 MMOL/L — SIGNIFICANT CHANGE UP (ref 96–108)
CO2 SERPL-SCNC: 32 MMOL/L — HIGH (ref 22–31)
COLOR SPEC: SIGNIFICANT CHANGE UP
CREAT SERPL-MCNC: 2.36 MG/DL — HIGH (ref 0.5–1.3)
DIFF PNL FLD: ABNORMAL
EOSINOPHIL # BLD AUTO: 0.06 K/UL — SIGNIFICANT CHANGE UP (ref 0–0.5)
EOSINOPHIL NFR BLD AUTO: 0.4 % — SIGNIFICANT CHANGE UP (ref 0–6)
GLUCOSE SERPL-MCNC: 111 MG/DL — HIGH (ref 70–99)
GLUCOSE UR QL: NEGATIVE MG/DL — SIGNIFICANT CHANGE UP
HCT VFR BLD CALC: 32.8 % — LOW (ref 39–50)
HGB BLD-MCNC: 10.1 G/DL — LOW (ref 13–17)
IMM GRANULOCYTES NFR BLD AUTO: 0.6 % — SIGNIFICANT CHANGE UP (ref 0–1.5)
INR BLD: 1.12 RATIO — SIGNIFICANT CHANGE UP (ref 0.88–1.16)
KETONES UR-MCNC: NEGATIVE — SIGNIFICANT CHANGE UP
LACTATE SERPL-SCNC: 1.9 MMOL/L — SIGNIFICANT CHANGE UP (ref 0.7–2)
LEUKOCYTE ESTERASE UR-ACNC: ABNORMAL
LYMPHOCYTES # BLD AUTO: 0.9 K/UL — LOW (ref 1–3.3)
LYMPHOCYTES # BLD AUTO: 6.2 % — LOW (ref 13–44)
MCHC RBC-ENTMCNC: 28 PG — SIGNIFICANT CHANGE UP (ref 27–34)
MCHC RBC-ENTMCNC: 30.8 GM/DL — LOW (ref 32–36)
MCV RBC AUTO: 90.9 FL — SIGNIFICANT CHANGE UP (ref 80–100)
MONOCYTES # BLD AUTO: 0.66 K/UL — SIGNIFICANT CHANGE UP (ref 0–0.9)
MONOCYTES NFR BLD AUTO: 4.5 % — SIGNIFICANT CHANGE UP (ref 2–14)
NEUTROPHILS # BLD AUTO: 12.83 K/UL — HIGH (ref 1.8–7.4)
NEUTROPHILS NFR BLD AUTO: 88 % — HIGH (ref 43–77)
NITRITE UR-MCNC: NEGATIVE — SIGNIFICANT CHANGE UP
PH UR: 7 — SIGNIFICANT CHANGE UP (ref 5–8)
PLATELET # BLD AUTO: 250 K/UL — SIGNIFICANT CHANGE UP (ref 150–400)
POTASSIUM SERPL-MCNC: 4.5 MMOL/L — SIGNIFICANT CHANGE UP (ref 3.5–5.3)
POTASSIUM SERPL-SCNC: 4.5 MMOL/L — SIGNIFICANT CHANGE UP (ref 3.5–5.3)
PROT SERPL-MCNC: 7.7 GM/DL — SIGNIFICANT CHANGE UP (ref 6–8.3)
PROT UR-MCNC: 100 MG/DL
PROTHROM AB SERPL-ACNC: 12.9 SEC — SIGNIFICANT CHANGE UP (ref 10.6–13.6)
RAPID RVP RESULT: SIGNIFICANT CHANGE UP
RBC # BLD: 3.61 M/UL — LOW (ref 4.2–5.8)
RBC # FLD: 14.6 % — HIGH (ref 10.3–14.5)
SARS-COV-2 RNA SPEC QL NAA+PROBE: SIGNIFICANT CHANGE UP
SODIUM SERPL-SCNC: 138 MMOL/L — SIGNIFICANT CHANGE UP (ref 135–145)
SP GR SPEC: 1 — LOW (ref 1.01–1.02)
UROBILINOGEN FLD QL: NEGATIVE MG/DL — SIGNIFICANT CHANGE UP
WBC # BLD: 14.58 K/UL — HIGH (ref 3.8–10.5)
WBC # FLD AUTO: 14.58 K/UL — HIGH (ref 3.8–10.5)

## 2021-09-03 PROCEDURE — 80048 BASIC METABOLIC PNL TOTAL CA: CPT

## 2021-09-03 PROCEDURE — 97163 PT EVAL HIGH COMPLEX 45 MIN: CPT | Mod: GP

## 2021-09-03 PROCEDURE — 83880 ASSAY OF NATRIURETIC PEPTIDE: CPT

## 2021-09-03 PROCEDURE — 83735 ASSAY OF MAGNESIUM: CPT

## 2021-09-03 PROCEDURE — 97530 THERAPEUTIC ACTIVITIES: CPT | Mod: GP

## 2021-09-03 PROCEDURE — 83540 ASSAY OF IRON: CPT

## 2021-09-03 PROCEDURE — 97116 GAIT TRAINING THERAPY: CPT | Mod: GP

## 2021-09-03 PROCEDURE — 82668 ASSAY OF ERYTHROPOIETIN: CPT

## 2021-09-03 PROCEDURE — 92610 EVALUATE SWALLOWING FUNCTION: CPT | Mod: GN

## 2021-09-03 PROCEDURE — 76770 US EXAM ABDO BACK WALL COMP: CPT

## 2021-09-03 PROCEDURE — 74176 CT ABD & PELVIS W/O CONTRAST: CPT | Mod: 26,MA

## 2021-09-03 PROCEDURE — 80053 COMPREHEN METABOLIC PANEL: CPT

## 2021-09-03 PROCEDURE — 36415 COLL VENOUS BLD VENIPUNCTURE: CPT

## 2021-09-03 PROCEDURE — 99223 1ST HOSP IP/OBS HIGH 75: CPT

## 2021-09-03 PROCEDURE — 80069 RENAL FUNCTION PANEL: CPT

## 2021-09-03 PROCEDURE — 86769 SARS-COV-2 COVID-19 ANTIBODY: CPT

## 2021-09-03 PROCEDURE — 99285 EMERGENCY DEPT VISIT HI MDM: CPT

## 2021-09-03 PROCEDURE — 83550 IRON BINDING TEST: CPT

## 2021-09-03 PROCEDURE — U0005: CPT

## 2021-09-03 PROCEDURE — U0003: CPT

## 2021-09-03 PROCEDURE — 71250 CT THORAX DX C-: CPT | Mod: 26,MA

## 2021-09-03 PROCEDURE — 76770 US EXAM ABDO BACK WALL COMP: CPT | Mod: 26

## 2021-09-03 PROCEDURE — 93010 ELECTROCARDIOGRAM REPORT: CPT

## 2021-09-03 PROCEDURE — 84100 ASSAY OF PHOSPHORUS: CPT

## 2021-09-03 PROCEDURE — 82728 ASSAY OF FERRITIN: CPT

## 2021-09-03 PROCEDURE — 92523 SPEECH SOUND LANG COMPREHEN: CPT | Mod: GN

## 2021-09-03 PROCEDURE — 85027 COMPLETE CBC AUTOMATED: CPT

## 2021-09-03 RX ORDER — ONDANSETRON 8 MG/1
4 TABLET, FILM COATED ORAL EVERY 8 HOURS
Refills: 0 | Status: DISCONTINUED | OUTPATIENT
Start: 2021-09-03 | End: 2021-09-10

## 2021-09-03 RX ORDER — DOCUSATE SODIUM 100 MG
2 CAPSULE ORAL
Qty: 0 | Refills: 0 | DISCHARGE

## 2021-09-03 RX ORDER — GABAPENTIN 400 MG/1
300 CAPSULE ORAL
Refills: 0 | Status: DISCONTINUED | OUTPATIENT
Start: 2021-09-03 | End: 2021-09-10

## 2021-09-03 RX ORDER — CLOPIDOGREL BISULFATE 75 MG/1
75 TABLET, FILM COATED ORAL DAILY
Refills: 0 | Status: DISCONTINUED | OUTPATIENT
Start: 2021-09-03 | End: 2021-09-10

## 2021-09-03 RX ORDER — TAMSULOSIN HYDROCHLORIDE 0.4 MG/1
0.4 CAPSULE ORAL AT BEDTIME
Refills: 0 | Status: DISCONTINUED | OUTPATIENT
Start: 2021-09-03 | End: 2021-09-10

## 2021-09-03 RX ORDER — ATORVASTATIN CALCIUM 80 MG/1
40 TABLET, FILM COATED ORAL AT BEDTIME
Refills: 0 | Status: DISCONTINUED | OUTPATIENT
Start: 2021-09-03 | End: 2021-09-10

## 2021-09-03 RX ORDER — HEPARIN SODIUM 5000 [USP'U]/ML
5000 INJECTION INTRAVENOUS; SUBCUTANEOUS EVERY 12 HOURS
Refills: 0 | Status: DISCONTINUED | OUTPATIENT
Start: 2021-09-03 | End: 2021-09-04

## 2021-09-03 RX ORDER — ACETAMINOPHEN 500 MG
2 TABLET ORAL
Qty: 0 | Refills: 0 | DISCHARGE

## 2021-09-03 RX ORDER — CEFEPIME 1 G/1
2000 INJECTION, POWDER, FOR SOLUTION INTRAMUSCULAR; INTRAVENOUS EVERY 24 HOURS
Refills: 0 | Status: DISCONTINUED | OUTPATIENT
Start: 2021-09-04 | End: 2021-09-05

## 2021-09-03 RX ORDER — FAMOTIDINE 10 MG/ML
20 INJECTION INTRAVENOUS DAILY
Refills: 0 | Status: DISCONTINUED | OUTPATIENT
Start: 2021-09-03 | End: 2021-09-10

## 2021-09-03 RX ORDER — ACETAMINOPHEN 500 MG
650 TABLET ORAL EVERY 6 HOURS
Refills: 0 | Status: DISCONTINUED | OUTPATIENT
Start: 2021-09-03 | End: 2021-09-10

## 2021-09-03 RX ORDER — VANCOMYCIN HCL 1 G
1000 VIAL (EA) INTRAVENOUS ONCE
Refills: 0 | Status: COMPLETED | OUTPATIENT
Start: 2021-09-03 | End: 2021-09-03

## 2021-09-03 RX ORDER — ASPIRIN/CALCIUM CARB/MAGNESIUM 324 MG
81 TABLET ORAL DAILY
Refills: 0 | Status: DISCONTINUED | OUTPATIENT
Start: 2021-09-03 | End: 2021-09-10

## 2021-09-03 RX ORDER — SODIUM CHLORIDE 9 MG/ML
2300 INJECTION INTRAMUSCULAR; INTRAVENOUS; SUBCUTANEOUS ONCE
Refills: 0 | Status: COMPLETED | OUTPATIENT
Start: 2021-09-03 | End: 2021-09-03

## 2021-09-03 RX ORDER — GABAPENTIN 400 MG/1
1 CAPSULE ORAL
Qty: 0 | Refills: 0 | DISCHARGE

## 2021-09-03 RX ORDER — CEFEPIME 1 G/1
2000 INJECTION, POWDER, FOR SOLUTION INTRAMUSCULAR; INTRAVENOUS ONCE
Refills: 0 | Status: COMPLETED | OUTPATIENT
Start: 2021-09-03 | End: 2021-09-03

## 2021-09-03 RX ORDER — PANTOPRAZOLE SODIUM 20 MG/1
40 TABLET, DELAYED RELEASE ORAL
Refills: 0 | Status: DISCONTINUED | OUTPATIENT
Start: 2021-09-03 | End: 2021-09-10

## 2021-09-03 RX ORDER — DULOXETINE HYDROCHLORIDE 30 MG/1
60 CAPSULE, DELAYED RELEASE ORAL DAILY
Refills: 0 | Status: DISCONTINUED | OUTPATIENT
Start: 2021-09-03 | End: 2021-09-10

## 2021-09-03 RX ORDER — ACETAMINOPHEN 500 MG
1000 TABLET ORAL ONCE
Refills: 0 | Status: COMPLETED | OUTPATIENT
Start: 2021-09-03 | End: 2021-09-03

## 2021-09-03 RX ORDER — FERROUS SULFATE 325(65) MG
325 TABLET ORAL DAILY
Refills: 0 | Status: DISCONTINUED | OUTPATIENT
Start: 2021-09-03 | End: 2021-09-10

## 2021-09-03 RX ORDER — METOPROLOL TARTRATE 50 MG
75 TABLET ORAL DAILY
Refills: 0 | Status: DISCONTINUED | OUTPATIENT
Start: 2021-09-03 | End: 2021-09-10

## 2021-09-03 RX ORDER — FUROSEMIDE 40 MG
1 TABLET ORAL
Qty: 0 | Refills: 0 | DISCHARGE

## 2021-09-03 RX ADMIN — CEFEPIME 100 MILLIGRAM(S): 1 INJECTION, POWDER, FOR SOLUTION INTRAMUSCULAR; INTRAVENOUS at 11:20

## 2021-09-03 RX ADMIN — Medication 1000 MILLIGRAM(S): at 12:50

## 2021-09-03 RX ADMIN — GABAPENTIN 300 MILLIGRAM(S): 400 CAPSULE ORAL at 23:10

## 2021-09-03 RX ADMIN — Medication 650 MILLIGRAM(S): at 23:11

## 2021-09-03 RX ADMIN — PANTOPRAZOLE SODIUM 40 MILLIGRAM(S): 20 TABLET, DELAYED RELEASE ORAL at 23:11

## 2021-09-03 RX ADMIN — Medication 1000 MILLIGRAM(S): at 11:20

## 2021-09-03 RX ADMIN — CEFEPIME 2000 MILLIGRAM(S): 1 INJECTION, POWDER, FOR SOLUTION INTRAMUSCULAR; INTRAVENOUS at 11:50

## 2021-09-03 RX ADMIN — TAMSULOSIN HYDROCHLORIDE 0.4 MILLIGRAM(S): 0.4 CAPSULE ORAL at 23:11

## 2021-09-03 RX ADMIN — SODIUM CHLORIDE 2300 MILLILITER(S): 9 INJECTION INTRAMUSCULAR; INTRAVENOUS; SUBCUTANEOUS at 11:20

## 2021-09-03 RX ADMIN — Medication 250 MILLIGRAM(S): at 11:50

## 2021-09-03 RX ADMIN — Medication 1000 MILLIGRAM(S): at 11:50

## 2021-09-03 RX ADMIN — ATORVASTATIN CALCIUM 40 MILLIGRAM(S): 80 TABLET, FILM COATED ORAL at 23:11

## 2021-09-03 RX ADMIN — HEPARIN SODIUM 5000 UNIT(S): 5000 INJECTION INTRAVENOUS; SUBCUTANEOUS at 23:11

## 2021-09-03 RX ADMIN — SODIUM CHLORIDE 2300 MILLILITER(S): 9 INJECTION INTRAMUSCULAR; INTRAVENOUS; SUBCUTANEOUS at 12:50

## 2021-09-03 NOTE — H&P ADULT - NSICDXPASTMEDICALHX_GEN_ALL_CORE_FT
PAST MEDICAL HISTORY:  AAA (abdominal aortic aneurysm) without rupture     BPH (benign prostatic hyperplasia)     CAD (coronary artery disease)     Cellulitis BL    CKD (chronic kidney disease)     CKD (chronic kidney disease) stage 3, GFR 30-59 ml/min     Colon polyp     HLD (hyperlipidemia)     HTN (hypertension)     Leg swelling related to cellulitis of LLE    No significant past medical history     OA (osteoarthritis)     PAD (peripheral artery disease)     Stented coronary artery      PAST MEDICAL HISTORY:  AAA (abdominal aortic aneurysm) without rupture     BPH (benign prostatic hyperplasia)     CAD (coronary artery disease)     Cellulitis BL    CKD (chronic kidney disease) stage 3, GFR 30-59 ml/min     Colon polyp     HLD (hyperlipidemia)     HTN (hypertension)     Leg swelling related to cellulitis of LLE    OA (osteoarthritis)     PAD (peripheral artery disease)     Stented coronary artery

## 2021-09-03 NOTE — ED ADULT NURSE NOTE - INTERVENTIONS DEFINITIONS
[place] to call system/Call bell, personal items and telephone within reach/Instruct patient to call for assistance/Room bathroom lighting operational/Non-slip footwear when patient is off stretcher

## 2021-09-03 NOTE — H&P ADULT - HISTORY OF PRESENT ILLNESS
89 y/o M PMHx significant for CAD, s/p PCI w/ stent placement, Hypertension, Hyperlipidemia, CKD stage 3, AAA, PAD, OA, BPH, BIBA from [facility] for futher evaluation and management of c/o subjective fevers, chills, rigors, increased lethargy, and c/o generalized abdominal pain/chest pain.. As noted in prior documentation patient unable to give further hx.  Labs => WBC 14.58, Hgb/Hct 10.1/32.8, HCO3 32, BUN/Cr 39/2.36, UA (+). CT Chest/ABD/Pelvis => Interval development of mild hydronephrosis and moderate right-sided hydroureter. The ureter appears to implant in the lateral aspect of the bladder rather than posteriorly. Please correlate clinically. There is bladder wall thickening predominantly laterally and superiorly. This may be due to cystitis, muscular hypertrophy and or other etiologies. This is limited in evaluation without contrast and underlying lesion cannot be excluded. Further evaluation with bladder ultrasound is recommended if patient cannot receive iodinated contrast. Small right pelvic sidewall lymph nodes are not considered significant by size criteria although have mildly increased in size when compared with the prior examination. If there is a primary neoplasm, metastatic disease could not be excluded on the basis of this study. US Kidney/Bladder => Bilateral renal cortical thinning compatible with chronic kidney disease. Mild right hydroureteronephrosis to the level of the UVJ where there is irregular bladder wall thickening. Consider CT urogram or cystoscopy for further evaluation. Septated cyst in the lower pole of the left kidney measuring 2.4 cm. In the ED the patient was given Acetaminophen 1g ID x 1, Cefepime 2g IVPB x 1, Vancomycin 1g IVPB x 1, and NaCl 2.3L x 1. 89 y/o M PMHx significant for CAD, s/p PCI w/ stent placement, Hypertension, Hyperlipidemia, CKD stage 3, AAA, PAD, OA, BPH, BIB[facility] from [facility] for futher evaluation and management of c/o subjective fevers (Tmax 103.1'F), chills, rigors, increased lethargy, and c/o generalized abdominal pain/chest pain.. As noted in prior documentation patient unable to give further hx.  Labs => WBC 14.58, Hgb/Hct 10.1/32.8, HCO3 32, BUN/Cr 39/2.36, UA (+). CT Chest/ABD/Pelvis => Interval development of mild hydronephrosis and moderate right-sided hydroureter. The ureter appears to implant in the lateral aspect of the bladder rather than posteriorly. Please correlate clinically. There is bladder wall thickening predominantly laterally and superiorly. This may be due to cystitis, muscular hypertrophy and or other etiologies. This is limited in evaluation without contrast and underlying lesion cannot be excluded. Further evaluation with bladder ultrasound is recommended if patient cannot receive iodinated contrast. Small right pelvic sidewall lymph nodes are not considered significant by size criteria although have mildly increased in size when compared with the prior examination. If there is a primary neoplasm, metastatic disease could not be excluded on the basis of this study. US Kidney/Bladder => Bilateral renal cortical thinning compatible with chronic kidney disease. Mild right hydroureteronephrosis to the level of the UVJ where there is irregular bladder wall thickening. Consider CT urogram or cystoscopy for further evaluation. Septated cyst in the lower pole of the left kidney measuring 2.4 cm. In the ED the patient was given Acetaminophen 1g MD x 1, Cefepime 2g IVPB x 1, Vancomycin 1g IVPB x 1, and NaCl 2.3L x 1.

## 2021-09-03 NOTE — ED ADULT NURSE REASSESSMENT NOTE - NS ED NURSE REASSESS COMMENT FT1
pt is resting at this time and offers no complaints. pt changed and repositioned and is resting at this time and offers no complaints. report given

## 2021-09-03 NOTE — ED PROVIDER NOTE - OBJECTIVE STATEMENT
2 y/o male with a PMHx of AAA, PAD, OA, CAD, HLD, CKD, HTN, stented coronary artery, colon polyp s/p colonoscopy, BPH, cellulitis, leg swelling, inguinal hernia, hemorrhoidectomy presents to the ED BIBA c/o fever.  [facility] at  [facility]. Poor historian. + fever. +shaking. + lethargic. +CP. + abd pain. Unable to give further hx. 89 y/o male with a PMHx of AAA, PAD, OA, CAD, HLD, CKD, HTN, stented coronary artery, colon polyp s/p colonoscopy, BPH, cellulitis, leg swelling, inguinal hernia, hemorrhoidectomy presents to the ED BIBA c/o fever.  Res[facility] at  [facility]. Poor historian. + fever. +shaking. + lethargic. +CP. + abd pain. Unable to give further hx.

## 2021-09-03 NOTE — ED ADULT TRIAGE NOTE - CHIEF COMPLAINT QUOTE
Patient comes in with fever (103.5). Patient was not acting himself as per [facility]. Patient denies chest pain, sob.

## 2021-09-03 NOTE — ED ADULT NURSE REASSESSMENT NOTE - NS ED NURSE REASSESS COMMENT FT1
Patient resting comfortably. Cardiac Monitoring in place. Patient turned and positioned. Incontinence care provided. Will continue to monitor.

## 2021-09-03 NOTE — H&P ADULT - ASSESSMENT
89 y/o M PMHx significant for CAD, s/p PCI w/ stent placement, Hypertension, Hyperlipidemia, CKD stage 3, AAA, PAD, OA, BPH, [facility]IB[facility] [place] for further evaluation and management of c/o subjective fevers, chills, rigors, increased lethargy, and c/o generalized abdominal pain/chest pain.. As noted in prior documentation patient unable to give further hx.  Labs => WBC 14.58, Hgb/Hct 10.1/32.8, HCO3 32, BUN/Cr 39/2.36, UA (+). CT Chest/ABD/Pelvis => Interval development of mild hydronephrosis and moderate right-sided hydroureter. The ureter appears to implant in the lateral aspect of the bladder rather than posteriorly. Please correlate clinically. There is bladder wall thickening predominantly laterally and superiorly. This may be due to cystitis, muscular hypertrophy and or other etiologies. This is limited in evaluation without contrast and underlying lesion cannot be excluded. Further evaluation with bladder ultrasound is recommended if patient cannot receive iodinated contrast. Small right pelvic sidewall lymph nodes are not considered significant by size criteria although have mildly increased in size when compared with the prior examination. If there is a primary neoplasm, metastatic disease could not be excluded on the basis of this study. US Kidney/Bladder => Bilateral renal cortical thinning compatible with chronic kidney disease. Mild right hydroureteronephrosis to the level of the UVJ where there is irregular bladder wall thickening. Consider CT urogram or cystoscopy for further evaluation. Septated cyst in the lower pole of the left kidney measuring 2.4 cm. In the ED the patient was given Acetaminophen 1g MO x 1, Cefepime 2g IVPB x 1, Vancomycin 1g IVPB x 1, and NaCl 2.3L x 1. 89 y/o M PMHx significant for CAD, s/p PCI w/ stent placement, Hypertension, Hyperlipidemia, CKD stage 3, AAA, PAD, OA, BPH, BIBA from [facility] [place] for futher evaluation and management of c/o subjective fevers (Tmax 103.1'F), chills, rigors, increased lethargy, and c/o generalized abdominal pain/chest pain.. As noted in prior documentation patient unable to give further hx.  Labs => WBC 14.58, Hgb/Hct 10.1/32.8, HCO3 32, BUN/Cr 39/2.36, UA (+). CT Chest/ABD/Pelvis => Interval development of mild hydronephrosis and moderate right-sided hydroureter. The ureter appears to implant in the lateral aspect of the bladder rather than posteriorly. Please correlate clinically. There is bladder wall thickening predominantly laterally and superiorly. This may be due to cystitis, muscular hypertrophy and or other etiologies. This is limited in evaluation without contrast and underlying lesion cannot be excluded. Further evaluation with bladder ultrasound is recommended if patient cannot receive iodinated contrast. Small right pelvic sidewall lymph nodes are not considered significant by size criteria although have mildly increased in size when compared with the prior examination. If there is a primary neoplasm, metastatic disease could not be excluded on the basis of this study. US Kidney/Bladder => Bilateral renal cortical thinning compatible with chronic kidney disease. Mild right hydroureteronephrosis to the level of the UVJ where there is irregular bladder wall thickening. Consider CT urogram or cystoscopy for further evaluation. Septated cyst in the lower pole of the left kidney measuring 2.4 cm. In the ED the patient was given Acetaminophen 1g CO x 1, Cefepime 2g IVPB x 1, Vancomycin 1g IVPB x 1, and NaCl 2.3L x 1. 89 y/o M PMHx significant for CAD, s/p PCI w/ stent placement, Hypertension, Hyperlipidemia, CKD stage 3, AAA, PAD, OA, BPH, BIBA from [facility] [place] for further evaluation and management of c/o subjective fevers (Tmax 103.1'F), chills, rigors, increased lethargy, and c/o generalized abdominal pain/chest pain.. As noted in prior documentation patient unable to give further hx.  Labs => WBC 14.58, Hgb/Hct 10.1/32.8, HCO3 32, BUN/Cr 39/2.36, UA (+). CT Chest/ABD/Pelvis => Interval development of mild hydronephrosis and moderate right-sided hydroureter. The ureter appears to implant in the lateral aspect of the bladder rather than posteriorly. Please correlate clinically. There is bladder wall thickening predominantly laterally and superiorly. This may be due to cystitis, muscular hypertrophy and or other etiologies. This is limited in evaluation without contrast and underlying lesion cannot be excluded. Further evaluation with bladder ultrasound is recommended if patient cannot receive iodinated contrast. Small right pelvic sidewall lymph nodes are not considered significant by size criteria although have mildly increased in size when compared with the prior examination. If there is a primary neoplasm, metastatic disease could not be excluded on the basis of this study. US Kidney/Bladder => Bilateral renal cortical thinning compatible with chronic kidney disease. Mild right hydroureteronephrosis to the level of the UVJ where there is irregular bladder wall thickening. Consider CT urogram or cystoscopy for further evaluation. Septated cyst in the lower pole of the left kidney measuring 2.4 cm. In the ED the patient was given Acetaminophen 1g OH x 1, Cefepime 2g IVPB x 1, Vancomycin 1g IVPB x 1, and NaCl 2.3L x 1.    #Sepsis due to Complicated UTI/Cystitis  #Right hydroureteronephrosis to the level of the UVJ   ~strict I/Os  ~NPO after MN  ~f/u w/ Urology in the am  ~f/u PAN C+S  ~cont. IV hydration  ~cont. IV abx    #Irregular bladder wall thickening   ~CT urogram or cystoscopy for further evaluation.  ~f/u w/ Urology in the am    #RASHAUN on CKD3  ~will hold Furosemide (takes 40mg po bid)  ~strict I/Os  ~please reassess further in the am  ~daily weights    #CAD/Hypertension  ~cont. ASA 81mg po daily  ~cont. Clopidogrel 75mg po daily  ~cont. Metoprolol ER 50mg po daily    #Hyperlipidemia  ~cont. Atorvastatin 40mg po qhs    #BPH  ~cont. Tamsulosin 0.4mg po qhs    #Vte ppx  ~IMPROVE Vte Risk Score is 2  ~cont. Heparin sq

## 2021-09-03 NOTE — ED PROVIDER NOTE - CARDIAC, MLM
Normal rate, irregularly irregular rhythm, tachycardic.  Heart sounds S1, S2.  No murmurs, rubs or gallops.

## 2021-09-03 NOTE — H&P ADULT - DOES THIS PATIENT HAVE A HISTORY OF OR HAS BEEN DX WITH HEART FAILURE?
Subjective    Referred By: Afua.   Seen For: audiologic evaluation.   Reason for Audiology Visit: Evaluation of suspected change in ears and/or hearing and tinnitus.   Chaperoned By: her family member.       Patient Audiology History    Current Symptoms: Patient reports hearing loss; right worse than left. Patient reports tinnitus. Patient denies dizziness and otalgia.      Objective  Otoscopic Exam: The otoscopic examination was unremarkable bilaterally.      Procedure  Tympanogram: normal type A tymp bilaterally.   Audiogram: Completed without incident. See scanned document.     Speech Audiometry:   SRTs: The SRT is consistent with the PTA bilaterally.   Word Recognition Scores: fair bilaterally.   Hearing Aids: using hearing aids in both ears. Siemens Insio Primax 5 bilaterally. warranty expires 6/30/2018.     Checked settings and increased gain bilaterally today.    Will reevaluate in 2 weeks.      Assessment Summary    Audiometry revealed a moderately-severe SNHL bilaterally.      Orders/Plan    The results were discussed with the patient . The patient is to follow-up with Dr. Clay in 2 weeks to see how hearing aids are doing. The patient was comfortable with this plan.      Diagnosis Code   1. Sensorineural hearing loss, bilateral (H90.3)    Signatures   Electronically signed by : Aspen CONTRERAS; May  7 2018  9:35AM CST     yes

## 2021-09-03 NOTE — H&P ADULT - NSHPPHYSICALEXAM_GEN_ALL_CORE
Vital Signs Last 24 Hrs  T(C): 37.2 (03 Sep 2021 18:16), Max: 39.6 (03 Sep 2021 11:00)  T(F): 99 (03 Sep 2021 18:16), Max: 103.2 (03 Sep 2021 11:00)  HR: 87 (03 Sep 2021 18:16) (80 - 109)  BP: 115/77 (03 Sep 2021 18:16) (115/77 - 140/65)  RR: 20 (03 Sep 2021 18:16) (20 - 25)  SpO2: 100% (03 Sep 2021 18:16) (94% - 100%)

## 2021-09-03 NOTE — ED ADULT NURSE REASSESSMENT NOTE - NS ED NURSE REASSESS COMMENT FT1
Patient returned from CT. VSS. Cardiac Monitoring in place. Cooling blanket in place. Patient updated on plan of care. Spoke with Walter Ruiz and also updated on plan of care. Will continue to monitor.

## 2021-09-04 LAB
ALBUMIN SERPL ELPH-MCNC: 2.8 G/DL — LOW (ref 3.3–5)
ALP SERPL-CCNC: 71 U/L — SIGNIFICANT CHANGE UP (ref 40–120)
ALT FLD-CCNC: 12 U/L — SIGNIFICANT CHANGE UP (ref 12–78)
ANION GAP SERPL CALC-SCNC: 3 MMOL/L — LOW (ref 5–17)
AST SERPL-CCNC: 12 U/L — LOW (ref 15–37)
BILIRUB SERPL-MCNC: 0.4 MG/DL — SIGNIFICANT CHANGE UP (ref 0.2–1.2)
BUN SERPL-MCNC: 36 MG/DL — HIGH (ref 7–23)
CALCIUM SERPL-MCNC: 8.6 MG/DL — SIGNIFICANT CHANGE UP (ref 8.5–10.1)
CHLORIDE SERPL-SCNC: 111 MMOL/L — HIGH (ref 96–108)
CO2 SERPL-SCNC: 30 MMOL/L — SIGNIFICANT CHANGE UP (ref 22–31)
COVID-19 SPIKE DOMAIN AB INTERP: POSITIVE
COVID-19 SPIKE DOMAIN ANTIBODY RESULT: 7.84 U/ML — HIGH
CREAT SERPL-MCNC: 2.12 MG/DL — HIGH (ref 0.5–1.3)
CULTURE RESULTS: SIGNIFICANT CHANGE UP
GLUCOSE SERPL-MCNC: 98 MG/DL — SIGNIFICANT CHANGE UP (ref 70–99)
HCT VFR BLD CALC: 26.8 % — LOW (ref 39–50)
HGB BLD-MCNC: 8.1 G/DL — LOW (ref 13–17)
MCHC RBC-ENTMCNC: 27.8 PG — SIGNIFICANT CHANGE UP (ref 27–34)
MCHC RBC-ENTMCNC: 30.2 GM/DL — LOW (ref 32–36)
MCV RBC AUTO: 92.1 FL — SIGNIFICANT CHANGE UP (ref 80–100)
PLATELET # BLD AUTO: 191 K/UL — SIGNIFICANT CHANGE UP (ref 150–400)
POTASSIUM SERPL-MCNC: 3.8 MMOL/L — SIGNIFICANT CHANGE UP (ref 3.5–5.3)
POTASSIUM SERPL-SCNC: 3.8 MMOL/L — SIGNIFICANT CHANGE UP (ref 3.5–5.3)
PROT SERPL-MCNC: 6.1 GM/DL — SIGNIFICANT CHANGE UP (ref 6–8.3)
RBC # BLD: 2.91 M/UL — LOW (ref 4.2–5.8)
RBC # FLD: 14.6 % — HIGH (ref 10.3–14.5)
SARS-COV-2 IGG+IGM SERPL QL IA: 7.84 U/ML — HIGH
SARS-COV-2 IGG+IGM SERPL QL IA: POSITIVE
SODIUM SERPL-SCNC: 144 MMOL/L — SIGNIFICANT CHANGE UP (ref 135–145)
SPECIMEN SOURCE: SIGNIFICANT CHANGE UP
WBC # BLD: 7.9 K/UL — SIGNIFICANT CHANGE UP (ref 3.8–10.5)
WBC # FLD AUTO: 7.9 K/UL — SIGNIFICANT CHANGE UP (ref 3.8–10.5)

## 2021-09-04 PROCEDURE — 99232 SBSQ HOSP IP/OBS MODERATE 35: CPT

## 2021-09-04 PROCEDURE — 99233 SBSQ HOSP IP/OBS HIGH 50: CPT

## 2021-09-04 RX ADMIN — CEFEPIME 100 MILLIGRAM(S): 1 INJECTION, POWDER, FOR SOLUTION INTRAMUSCULAR; INTRAVENOUS at 10:00

## 2021-09-04 RX ADMIN — GABAPENTIN 300 MILLIGRAM(S): 400 CAPSULE ORAL at 10:01

## 2021-09-04 RX ADMIN — Medication 75 MILLIGRAM(S): at 10:01

## 2021-09-04 RX ADMIN — FAMOTIDINE 20 MILLIGRAM(S): 10 INJECTION INTRAVENOUS at 10:00

## 2021-09-04 RX ADMIN — Medication 650 MILLIGRAM(S): at 01:22

## 2021-09-04 RX ADMIN — ATORVASTATIN CALCIUM 40 MILLIGRAM(S): 80 TABLET, FILM COATED ORAL at 21:36

## 2021-09-04 RX ADMIN — PANTOPRAZOLE SODIUM 40 MILLIGRAM(S): 20 TABLET, DELAYED RELEASE ORAL at 21:36

## 2021-09-04 RX ADMIN — PANTOPRAZOLE SODIUM 40 MILLIGRAM(S): 20 TABLET, DELAYED RELEASE ORAL at 10:00

## 2021-09-04 RX ADMIN — Medication 650 MILLIGRAM(S): at 06:27

## 2021-09-04 RX ADMIN — Medication 81 MILLIGRAM(S): at 10:00

## 2021-09-04 RX ADMIN — CLOPIDOGREL BISULFATE 75 MILLIGRAM(S): 75 TABLET, FILM COATED ORAL at 10:00

## 2021-09-04 RX ADMIN — Medication 325 MILLIGRAM(S): at 10:00

## 2021-09-04 RX ADMIN — DULOXETINE HYDROCHLORIDE 60 MILLIGRAM(S): 30 CAPSULE, DELAYED RELEASE ORAL at 10:00

## 2021-09-04 RX ADMIN — GABAPENTIN 300 MILLIGRAM(S): 400 CAPSULE ORAL at 21:36

## 2021-09-04 RX ADMIN — TAMSULOSIN HYDROCHLORIDE 0.4 MILLIGRAM(S): 0.4 CAPSULE ORAL at 21:36

## 2021-09-04 NOTE — CONSULT NOTE ADULT - PROBLEM SELECTOR RECOMMENDATION 9
Findings may be based  upon max retention.   A renal scan will be done to see if munoz drainiage has relieved obstruction.   There is a chance that there is a bladder lesion but the risks are great for any intervention.  A chronic munoz is most likely.

## 2021-09-04 NOTE — PATIENT PROFILE ADULT - NSPROEXTENSIONSOFSELF_GEN_A_NUR
Patient states he brought a cell-phone to the ED. No documentation provided on chart. No Cell-phone present on arrival to unit/none

## 2021-09-04 NOTE — SWALLOW BEDSIDE ASSESSMENT ADULT - NS SPL SWALLOW CLINIC TRIAL FT
NO BEHAVIORAL ASPIRATION SIGNS EXHIBITED. ODYNOPHAGIA WAS DENIED. NO CHANGE IN O2 SATS NOTED PRE/POST SWALLOWING TRIALS.

## 2021-09-04 NOTE — SWALLOW BEDSIDE ASSESSMENT ADULT - SLP GENERAL OBSERVATIONS
The pt was alert and interactive. He was able to verbalize during communicative probes via intelligible linguistically intact utterances without evidence of primary speech-language pathology. Pt was able to verbalize needs and is at communicative baseline.

## 2021-09-04 NOTE — SWALLOW BEDSIDE ASSESSMENT ADULT - COMMENTS
The pt was admitted to  from [facility] with fever and lethargy. Hospital course is notable for suspected UTI, RASHAUN and improving encephalopathy. This profile is superimposed upon a h/o CAD status post stent placement, presence of a AAA, HTN, CKD, PAD, OA, BPH, colonic polyps and prior LE cellulitis NOS. See below for prior surgical history.

## 2021-09-04 NOTE — CONSULT NOTE ADULT - SUBJECTIVE AND OBJECTIVE BOX
Patient is a 89y old  Male who presents with a chief complaint of Fever    HPI:  89 y/o Male with h/o CAD, s/p PCI w/ stent placement, Hypertension, Hyperlipidemia, CKD stage 3, AAA, PAD, OA, BPH was admitted on 9/3 from [facility] for subjective fevers (Tmax 103.1'F), chills, rigors, increased lethargy and generalized abdominal pain/chest pain. The patient is a poor historian. He was reported with fever and lethargy for one day PTA. In ER he was noted febrile to 103F and received vancomycin IV and cefepime.      PMH: as above  PSH: as above  Meds: per reconciliation sheet, noted below  MEDICATIONS  (STANDING):  aspirin enteric coated 81 milliGRAM(s) Oral daily  atorvastatin 40 milliGRAM(s) Oral at bedtime  cefepime   IVPB 2000 milliGRAM(s) IV Intermittent every 24 hours  clopidogrel Tablet 75 milliGRAM(s) Oral daily  DULoxetine 60 milliGRAM(s) Oral daily  famotidine    Tablet 20 milliGRAM(s) Oral daily  ferrous    sulfate 325 milliGRAM(s) Oral daily  gabapentin 300 milliGRAM(s) Oral two times a day  heparin   Injectable 5000 Unit(s) SubCutaneous every 12 hours  metoprolol succinate ER 75 milliGRAM(s) Oral daily  pantoprazole    Tablet 40 milliGRAM(s) Oral two times a day  tamsulosin 0.4 milliGRAM(s) Oral at bedtime    MEDICATIONS  (PRN):  acetaminophen   Tablet .. 650 milliGRAM(s) Oral every 6 hours PRN Temp greater or equal to 38.5C (101.3F), Mild Pain (1 - 3)  aluminum hydroxide/magnesium hydroxide/simethicone Suspension 30 milliLiter(s) Oral every 4 hours PRN Dyspepsia  ondansetron Injectable 4 milliGRAM(s) IV Push every 8 hours PRN Nausea and/or Vomiting    Allergies    penicillin (Angioedema)    Intolerances    Social: no smoking, no alcohol, no illegal drugs; no recent travel, no exposure to TB  FAMILY HISTORY:  Family history unobtainable  d/t dementia      no history of premature cardiovascular disease in first degree relatives    ROS: the patient denies HA, no seizures, no dizziness, no sore throat, no nasal congestion, no blurry vision, no CP, no palpitations, no SOB, no cough, no abdominal pain, no diarrhea, no N/V, no dysuria, no leg pain, no claudication, no rash, no joint aches, no rectal pain or bleeding, no night sweats; has increased weakness  All other systems reviewed and are negative    Vital Signs Last 24 Hrs  T(C): 36.4 (04 Sep 2021 07:35), Max: 39.6 (03 Sep 2021 11:00)  T(F): 97.6 (04 Sep 2021 07:35), Max: 103.2 (03 Sep 2021 11:00)  HR: 72 (04 Sep 2021 07:35) (72 - 109)  BP: 108/33 (04 Sep 2021 07:35) (102/59 - 140/65)  BP(mean): --  RR: 19 (04 Sep 2021 07:35) (18 - 25)  SpO2: 96% (04 Sep 2021 07:35) (94% - 100%)  Daily Height in cm: 177.8 (03 Sep 2021 10:47)    Daily     PE:    Constitutional:  No acute distress  HEENT: NC/AT, EOMI, PERRLA, conjunctivae clear; ears and nose atraumatic; pharynx benign  Neck: supple; thyroid not palpable  Back: no tenderness  Respiratory: respiratory effort normal; clear to auscultation  Cardiovascular: S1S2 regular, no murmurs  Abdomen: soft, not tender, not distended, positive BS; no liver or spleen organomegaly  Genitourinary: no suprapubic tenderness  Lymphatic: no LN palpable  Musculoskeletal: no muscle tenderness, no joint swelling or tenderness  Extremities: no pedal edema  Neurological/ Psychiatric: AxOx3, judgement and insight impaired; moving all extremities  Skin: no rashes; no palpable lesions    Labs: all available labs reviewed                        8.1    7.90  )-----------( 191      ( 04 Sep 2021 08:01 )             26.8     09-04    144  |  111<H>  |  36<H>  ----------------------------<  98  3.8   |  30  |  2.12<H>    Ca    8.6      04 Sep 2021 08:01    TPro  6.1  /  Alb  2.8<L>  /  TBili  0.4  /  DBili  x   /  AST  12<L>  /  ALT  12  /  AlkPhos  71  09-04     LIVER FUNCTIONS - ( 04 Sep 2021 08:01 )  Alb: 2.8 g/dL / Pro: 6.1 gm/dL / ALK PHOS: 71 U/L / ALT: 12 U/L / AST: 12 U/L / GGT: x           Urinalysis Basic - ( 03 Sep 2021 11:15 )    Color: Yellow-Peach / Appearance: Clear / S.005 / pH: x  Gluc: x / Ketone: Negative  / Bili: Negative / Urobili: Negative mg/dL   Blood: x / Protein: 100 mg/dL / Nitrite: Negative   Leuk Esterase: Moderate / RBC: >50 /HPF / WBC >50   Sq Epi: x / Non Sq Epi: Occasional / Bacteria: Few    ( @ 11:15)  NotDete    Radiology: all available radiological tests reviewed    < from: US Kidney and Bladder (21 @ 16:34) >  Bilateral renal cortical thinning compatible with chronic kidney disease.  Mild right hydroureteronephrosis to the level of the UVJ where there is irregular bladder wall thickening. Consider CT urogram or cystoscopy for further evaluation.  Septated cyst in the lower pole of the left kidney measuring 2.4 cm.  < end of copied text >    < from: CT Abdomen and Pelvis No Cont (21 @ 12:31) >  BLADDER: Wall thickening of the lateral aspect of the bladder bilaterally and superiorly. The bladder was collapsed on the prior examination which limits evaluation.  Interval development of mild hydronephrosis and moderate right-sided hydroureter. The ureter appears to implant in the lateral aspect of the bladder rather than posteriorly. Please correlate clinically. There is bladder wall thickening predominantly laterally and superiorly. This may be due to cystitis, muscular hypertrophy and or other etiologies. This is limited in evaluation without contrast and underlying lesion cannot be excluded. Further evaluation with bladder ultrasound is recommended if patient cannot receive iodinated contrast.  Small right pelvic sidewall lymph nodes are not considered significant by size criteria although have mildly increased in size when compared with the prior examination. If there is a primary neoplasm, metastatic disease could not be excluded on the basis of this study.  < end of copied text >    Advanced directives addressed: full resuscitation Patient is a 89y old  Male who presents with a chief complaint of Fever    HPI:  89 y/o Male with h/o CAD, s/p PCI w/ stent placement, Hypertension, Hyperlipidemia, CKD stage 3, AAA, PAD, OA, BPH was admitted on 9/3 from [facility] for subjective fevers (Tmax 103.1'F), chills, rigors, increased lethargy and generalized abdominal pain/chest pain. The patient is a poor historian. He was reported with fever and lethargy for one day PTA. In ER he was noted febrile to 103F and received vancomycin IV and cefepime.      PMH: as above  PSH: as above  Meds: per reconciliation sheet, noted below  MEDICATIONS  (STANDING):  aspirin enteric coated 81 milliGRAM(s) Oral daily  atorvastatin 40 milliGRAM(s) Oral at bedtime  cefepime   IVPB 2000 milliGRAM(s) IV Intermittent every 24 hours  clopidogrel Tablet 75 milliGRAM(s) Oral daily  DULoxetine 60 milliGRAM(s) Oral daily  famotidine    Tablet 20 milliGRAM(s) Oral daily  ferrous    sulfate 325 milliGRAM(s) Oral daily  gabapentin 300 milliGRAM(s) Oral two times a day  heparin   Injectable 5000 Unit(s) SubCutaneous every 12 hours  metoprolol succinate ER 75 milliGRAM(s) Oral daily  pantoprazole    Tablet 40 milliGRAM(s) Oral two times a day  tamsulosin 0.4 milliGRAM(s) Oral at bedtime    MEDICATIONS  (PRN):  acetaminophen   Tablet .. 650 milliGRAM(s) Oral every 6 hours PRN Temp greater or equal to 38.5C (101.3F), Mild Pain (1 - 3)  aluminum hydroxide/magnesium hydroxide/simethicone Suspension 30 milliLiter(s) Oral every 4 hours PRN Dyspepsia  ondansetron Injectable 4 milliGRAM(s) IV Push every 8 hours PRN Nausea and/or Vomiting    Allergies    penicillin (Angioedema)    Intolerances    Social: no smoking, no alcohol, no illegal drugs; no recent travel, no exposure to TB  FAMILY HISTORY:  Family history unobtainable  d/t dementia      no history of premature cardiovascular disease in first degree relatives    ROS: the patient is confused and poorly verbal; denies pain; has increased weakness  All other systems reviewed and are negative    Vital Signs Last 24 Hrs  T(C): 36.4 (04 Sep 2021 07:35), Max: 39.6 (03 Sep 2021 11:00)  T(F): 97.6 (04 Sep 2021 07:35), Max: 103.2 (03 Sep 2021 11:00)  HR: 72 (04 Sep 2021 07:35) (72 - 109)  BP: 108/33 (04 Sep 2021 07:35) (102/59 - 140/65)  BP(mean): --  RR: 19 (04 Sep 2021 07:35) (18 - 25)  SpO2: 96% (04 Sep 2021 07:35) (94% - 100%)  Daily Height in cm: 177.8 (03 Sep 2021 10:47)    Daily     PE:    Constitutional:  No acute distress  HEENT: NC/AT, EOMI, PERRLA, conjunctivae clear; ears and nose atraumatic; pharynx benign  Neck: supple; thyroid not palpable  Back: no tenderness  Respiratory: respiratory effort normal; clear to auscultation  Cardiovascular: S1S2 regular, no murmurs  Abdomen: soft, not tender, not distended, positive BS; no liver or spleen organomegaly  Genitourinary: no suprapubic tenderness  Lymphatic: no LN palpable  Musculoskeletal: no muscle tenderness, no joint swelling or tenderness  Extremities: no pedal edema  Neurological/ Psychiatric: confused, judgement and insight impaired; moving all extremities  Skin: no rashes; no palpable lesions    Labs: all available labs reviewed                        8.1    7.90  )-----------( 191      ( 04 Sep 2021 08:01 )             26.8     09-04    144  |  111<H>  |  36<H>  ----------------------------<  98  3.8   |  30  |  2.12<H>    Ca    8.6      04 Sep 2021 08:01    TPro  6.1  /  Alb  2.8<L>  /  TBili  0.4  /  DBili  x   /  AST  12<L>  /  ALT  12  /  AlkPhos  71  09-04     LIVER FUNCTIONS - ( 04 Sep 2021 08:01 )  Alb: 2.8 g/dL / Pro: 6.1 gm/dL / ALK PHOS: 71 U/L / ALT: 12 U/L / AST: 12 U/L / GGT: x           Urinalysis Basic - ( 03 Sep 2021 11:15 )    Color: Yellow-Peach / Appearance: Clear / S.005 / pH: x  Gluc: x / Ketone: Negative  / Bili: Negative / Urobili: Negative mg/dL   Blood: x / Protein: 100 mg/dL / Nitrite: Negative   Leuk Esterase: Moderate / RBC: >50 /HPF / WBC >50   Sq Epi: x / Non Sq Epi: Occasional / Bacteria: Few    ( @ 11:15)  NotDete    Radiology: all available radiological tests reviewed    < from: US Kidney and Bladder (21 @ 16:34) >  Bilateral renal cortical thinning compatible with chronic kidney disease.  Mild right hydroureteronephrosis to the level of the UVJ where there is irregular bladder wall thickening. Consider CT urogram or cystoscopy for further evaluation.  Septated cyst in the lower pole of the left kidney measuring 2.4 cm.  < end of copied text >    < from: CT Abdomen and Pelvis No Cont (21 @ 12:31) >  BLADDER: Wall thickening of the lateral aspect of the bladder bilaterally and superiorly. The bladder was collapsed on the prior examination which limits evaluation.  Interval development of mild hydronephrosis and moderate right-sided hydroureter. The ureter appears to implant in the lateral aspect of the bladder rather than posteriorly. Please correlate clinically. There is bladder wall thickening predominantly laterally and superiorly. This may be due to cystitis, muscular hypertrophy and or other etiologies. This is limited in evaluation without contrast and underlying lesion cannot be excluded. Further evaluation with bladder ultrasound is recommended if patient cannot receive iodinated contrast.  Small right pelvic sidewall lymph nodes are not considered significant by size criteria although have mildly increased in size when compared with the prior examination. If there is a primary neoplasm, metastatic disease could not be excluded on the basis of this study.  < end of copied text >    Advanced directives addressed: full resuscitation

## 2021-09-04 NOTE — SWALLOW BEDSIDE ASSESSMENT ADULT - SWALLOW EVAL: CRITERIA FOR SKILLED INTERVENTION MET
DO NOT FEEL THAT ACUTE SPEECH PATHOLOGY FOLLOW UP WOULD CHANGE CLINICAL MANAGEMENT/OUTCOME WHILE IN HOSPITAL. PT'S OROPHARYNGEAL SWALLOWING ABILITIES ARE FUNCTIONAL FOR AGE AND NO PRIMARY SPEECH-LANGUAGE PATHOLOGY WAS EVIDENT. AT SUSPECTED COMMUNICATIVE/OROPHARYNGEAL SWALLOWING BASELINE. GIVEN ABOVE, WILL NOT ACTIVELY FOLLOW. RECONSULT PRN SHOULD STATUS CHANGE AND CONDITION WARRANT.

## 2021-09-04 NOTE — SWALLOW BEDSIDE ASSESSMENT ADULT - SWALLOW EVAL: DIAGNOSIS
1) The pt exhibits Oropharyngeal Swallowing abilities which subjectively appeared to be within functional parameters for age. NO behavioral aspiration signs exhibited. Odynophagia was denied.  2) The pt was alert and interactive. He was able to verbalize during communicative probes via intelligible linguistically intact utterances without evidence of primary speech-language pathology. Pt was able to verbalize needs and is reportedly at communicative baseline.

## 2021-09-04 NOTE — CONSULT NOTE ADULT - SUBJECTIVE AND OBJECTIVE BOX
CHIEF COMPLAINT:Sepsis, right hydro, RASHAUN    HISTORY OF PRESENT ILLNESS:Patient admitted from assisted living with sepsis, creatinine elevation, right hydro    PAST MEDICAL & SURGICAL HISTORY:  Leg swelling  related to cellulitis of LLE    CKD (chronic kidney disease) stage 3, GFR 30-59 ml/min    Cellulitis  BL    BPH (benign prostatic hyperplasia)    Colon polyp    Stented coronary artery    HTN (hypertension)    HLD (hyperlipidemia)    CAD (coronary artery disease)    OA (osteoarthritis)    PAD (peripheral artery disease)    AAA (abdominal aortic aneurysm) without rupture    S/P hemorrhoidectomy    H/O inguinal hernia  repair    History of colonoscopy    History of coronary artery stent placement        REVIEW OF SYSTEMS:    CONSTITUTIONAL: No weakness, fevers or chills/debilitated/spoke with son  EYES/ENT: No visual changes;  No vertigo or throat pain   NECK: No pain or stiffness  RESPIRATORY: No cough, wheezing, hemoptysis; No shortness of breath  CARDIOVASCULAR: No chest pain or palpitations  GASTROINTESTINAL: No abdominal or epigastric pain. No nausea, vomiting, or hematemesis; No diarrhea or constipation. No melena or hematochezia.  GENITOURINARY: No dysuria, frequency or hematuria  NEUROLOGICAL: No numbness or weakness  SKIN: No itching, burning, rashes, or lesions   All other review of systems is negative unless indicated above.    MEDICATIONS  (STANDING):  aspirin enteric coated 81 milliGRAM(s) Oral daily  atorvastatin 40 milliGRAM(s) Oral at bedtime  cefepime   IVPB 2000 milliGRAM(s) IV Intermittent every 24 hours  clopidogrel Tablet 75 milliGRAM(s) Oral daily  DULoxetine 60 milliGRAM(s) Oral daily  famotidine    Tablet 20 milliGRAM(s) Oral daily  ferrous    sulfate 325 milliGRAM(s) Oral daily  gabapentin 300 milliGRAM(s) Oral two times a day  metoprolol succinate ER 75 milliGRAM(s) Oral daily  pantoprazole    Tablet 40 milliGRAM(s) Oral two times a day  tamsulosin 0.4 milliGRAM(s) Oral at bedtime    MEDICATIONS  (PRN):  acetaminophen   Tablet .. 650 milliGRAM(s) Oral every 6 hours PRN Temp greater or equal to 38.5C (101.3F), Mild Pain (1 - 3)  aluminum hydroxide/magnesium hydroxide/simethicone Suspension 30 milliLiter(s) Oral every 4 hours PRN Dyspepsia  ondansetron Injectable 4 milliGRAM(s) IV Push every 8 hours PRN Nausea and/or Vomiting      Allergies    penicillin (Angioedema)    Intolerances        SOCIAL HISTORY:    FAMILY HISTORY:  Family history unobtainable  d/t dementia        Vital Signs Last 24 Hrs  T(C): 36.6 (04 Sep 2021 15:17), Max: 38.4 (03 Sep 2021 22:47)  T(F): 97.9 (04 Sep 2021 15:17), Max: 101.2 (03 Sep 2021 22:47)  HR: 69 (04 Sep 2021 15:17) (69 - 87)  BP: 110/49 (04 Sep 2021 15:17) (102/59 - 125/50)  BP(mean): --  RR: 19 (04 Sep 2021 15:17) (18 - 20)  SpO2: 100% (04 Sep 2021 15:17) (94% - 100%)    PHYSICAL EXAM:    Constitutional: NAD, well-developed  HEENT: JULISSA, EOMI, Normal Hearing, MMM  Neck: No LAD, No JVD  Back: Normal spine flexure, No CVA tenderness  Respiratory: CTAB   Cardiovascular: S1 and S2, RRR, no M/G/R  Abd: BS+, soft, NT/ND, No CVAT  : Normal phallus,open meatus,bilateral descended testes, no masses  MARIANELA: Normal prostate, no masses  Extremities: No peripheral edema  Vascular: 2+ peripheral pulses  Neurological: A/O x 3, no focal deficits  Psychiatric: Normal mood, normal affect  Musculoskeletal: 5/5 strength b/l upper and lower extremities  Skin: No rashes    LABS:                        8.1    7.90  )-----------( 191      ( 04 Sep 2021 08:01 )             26.8     09-04    144  |  111<H>  |  36<H>  ----------------------------<  98  3.8   |  30  |  2.12<H>    Ca    8.6      04 Sep 2021 08:01    TPro  6.1  /  Alb  2.8<L>  /  TBili  0.4  /  DBili  x   /  AST  12<L>  /  ALT  12  /  AlkPhos  71  09-04    PT/INR - ( 03 Sep 2021 11:15 )   PT: 12.9 sec;   INR: 1.12 ratio         PTT - ( 03 Sep 2021 11:15 )  PTT:29.4 sec  Urinalysis Basic - ( 03 Sep 2021 11:15 )    Color: Yellow-Peach / Appearance: Clear / S.005 / pH: x  Gluc: x / Ketone: Negative  / Bili: Negative / Urobili: Negative mg/dL   Blood: x / Protein: 100 mg/dL / Nitrite: Negative   Leuk Esterase: Moderate / RBC: >50 /HPF / WBC >50   Sq Epi: x / Non Sq Epi: Occasional / Bacteria: Few      Urine Culture:     RADIOLOGY & ADDITIONAL STUDIES:

## 2021-09-04 NOTE — SWALLOW BEDSIDE ASSESSMENT ADULT - SWALLOW EVAL: RECOMMENDED DIET
SUGGEST A REGULAR TEXTURE DIET WITH THIN LIQUID CONSISTENCIES AS THE PATIENT APPEARED CLINICALLY TOLERANT OF THESE SUGGESTED FOOD CONSISTENCIES FROM AN OROPHARYNGEAL SWALLOWING STANCE ON CLINICAL EXAM.

## 2021-09-05 LAB
ALBUMIN SERPL ELPH-MCNC: 2.8 G/DL — LOW (ref 3.3–5)
ALP SERPL-CCNC: 70 U/L — SIGNIFICANT CHANGE UP (ref 40–120)
ALT FLD-CCNC: 14 U/L — SIGNIFICANT CHANGE UP (ref 12–78)
ANION GAP SERPL CALC-SCNC: 1 MMOL/L — LOW (ref 5–17)
AST SERPL-CCNC: 16 U/L — SIGNIFICANT CHANGE UP (ref 15–37)
BILIRUB SERPL-MCNC: 0.2 MG/DL — SIGNIFICANT CHANGE UP (ref 0.2–1.2)
BUN SERPL-MCNC: 31 MG/DL — HIGH (ref 7–23)
CALCIUM SERPL-MCNC: 8.6 MG/DL — SIGNIFICANT CHANGE UP (ref 8.5–10.1)
CHLORIDE SERPL-SCNC: 111 MMOL/L — HIGH (ref 96–108)
CO2 SERPL-SCNC: 30 MMOL/L — SIGNIFICANT CHANGE UP (ref 22–31)
CREAT SERPL-MCNC: 1.93 MG/DL — HIGH (ref 0.5–1.3)
GLUCOSE SERPL-MCNC: 108 MG/DL — HIGH (ref 70–99)
HCT VFR BLD CALC: 27.6 % — LOW (ref 39–50)
HGB BLD-MCNC: 8.4 G/DL — LOW (ref 13–17)
MCHC RBC-ENTMCNC: 27.5 PG — SIGNIFICANT CHANGE UP (ref 27–34)
MCHC RBC-ENTMCNC: 30.4 GM/DL — LOW (ref 32–36)
MCV RBC AUTO: 90.5 FL — SIGNIFICANT CHANGE UP (ref 80–100)
PLATELET # BLD AUTO: 193 K/UL — SIGNIFICANT CHANGE UP (ref 150–400)
POTASSIUM SERPL-MCNC: 3.9 MMOL/L — SIGNIFICANT CHANGE UP (ref 3.5–5.3)
POTASSIUM SERPL-SCNC: 3.9 MMOL/L — SIGNIFICANT CHANGE UP (ref 3.5–5.3)
PROT SERPL-MCNC: 6.3 GM/DL — SIGNIFICANT CHANGE UP (ref 6–8.3)
RBC # BLD: 3.05 M/UL — LOW (ref 4.2–5.8)
RBC # FLD: 14.6 % — HIGH (ref 10.3–14.5)
SODIUM SERPL-SCNC: 142 MMOL/L — SIGNIFICANT CHANGE UP (ref 135–145)
WBC # BLD: 5.35 K/UL — SIGNIFICANT CHANGE UP (ref 3.8–10.5)
WBC # FLD AUTO: 5.35 K/UL — SIGNIFICANT CHANGE UP (ref 3.8–10.5)

## 2021-09-05 PROCEDURE — 99232 SBSQ HOSP IP/OBS MODERATE 35: CPT

## 2021-09-05 RX ORDER — VANCOMYCIN HCL 1 G
VIAL (EA) INTRAVENOUS
Refills: 0 | Status: DISCONTINUED | OUTPATIENT
Start: 2021-09-05 | End: 2021-09-05

## 2021-09-05 RX ORDER — HEPARIN SODIUM 5000 [USP'U]/ML
5000 INJECTION INTRAVENOUS; SUBCUTANEOUS EVERY 12 HOURS
Refills: 0 | Status: DISCONTINUED | OUTPATIENT
Start: 2021-09-05 | End: 2021-09-10

## 2021-09-05 RX ORDER — POLYETHYLENE GLYCOL 3350 17 G/17G
17 POWDER, FOR SOLUTION ORAL DAILY
Refills: 0 | Status: DISCONTINUED | OUTPATIENT
Start: 2021-09-05 | End: 2021-09-10

## 2021-09-05 RX ORDER — VANCOMYCIN HCL 1 G
1000 VIAL (EA) INTRAVENOUS ONCE
Refills: 0 | Status: COMPLETED | OUTPATIENT
Start: 2021-09-05 | End: 2021-09-05

## 2021-09-05 RX ORDER — FUROSEMIDE 40 MG
40 TABLET ORAL
Refills: 0 | Status: COMPLETED | OUTPATIENT
Start: 2021-09-05 | End: 2021-09-06

## 2021-09-05 RX ORDER — SENNA PLUS 8.6 MG/1
2 TABLET ORAL AT BEDTIME
Refills: 0 | Status: DISCONTINUED | OUTPATIENT
Start: 2021-09-05 | End: 2021-09-10

## 2021-09-05 RX ORDER — CEFEPIME 1 G/1
2000 INJECTION, POWDER, FOR SOLUTION INTRAMUSCULAR; INTRAVENOUS EVERY 24 HOURS
Refills: 0 | Status: DISCONTINUED | OUTPATIENT
Start: 2021-09-05 | End: 2021-09-10

## 2021-09-05 RX ADMIN — PANTOPRAZOLE SODIUM 40 MILLIGRAM(S): 20 TABLET, DELAYED RELEASE ORAL at 21:21

## 2021-09-05 RX ADMIN — Medication 250 MILLIGRAM(S): at 13:16

## 2021-09-05 RX ADMIN — SENNA PLUS 2 TABLET(S): 8.6 TABLET ORAL at 21:21

## 2021-09-05 RX ADMIN — GABAPENTIN 300 MILLIGRAM(S): 400 CAPSULE ORAL at 21:21

## 2021-09-05 RX ADMIN — FAMOTIDINE 20 MILLIGRAM(S): 10 INJECTION INTRAVENOUS at 09:12

## 2021-09-05 RX ADMIN — DULOXETINE HYDROCHLORIDE 60 MILLIGRAM(S): 30 CAPSULE, DELAYED RELEASE ORAL at 09:12

## 2021-09-05 RX ADMIN — Medication 1 DROP(S): at 21:21

## 2021-09-05 RX ADMIN — Medication 81 MILLIGRAM(S): at 09:11

## 2021-09-05 RX ADMIN — CEFEPIME 100 MILLIGRAM(S): 1 INJECTION, POWDER, FOR SOLUTION INTRAMUSCULAR; INTRAVENOUS at 09:11

## 2021-09-05 RX ADMIN — PANTOPRAZOLE SODIUM 40 MILLIGRAM(S): 20 TABLET, DELAYED RELEASE ORAL at 09:12

## 2021-09-05 RX ADMIN — ATORVASTATIN CALCIUM 40 MILLIGRAM(S): 80 TABLET, FILM COATED ORAL at 21:21

## 2021-09-05 RX ADMIN — Medication 1 DROP(S): at 13:16

## 2021-09-05 RX ADMIN — Medication 325 MILLIGRAM(S): at 09:11

## 2021-09-05 RX ADMIN — HEPARIN SODIUM 5000 UNIT(S): 5000 INJECTION INTRAVENOUS; SUBCUTANEOUS at 21:21

## 2021-09-05 RX ADMIN — Medication 75 MILLIGRAM(S): at 09:11

## 2021-09-05 RX ADMIN — Medication 40 MILLIGRAM(S): at 18:47

## 2021-09-05 RX ADMIN — POLYETHYLENE GLYCOL 3350 17 GRAM(S): 17 POWDER, FOR SOLUTION ORAL at 09:11

## 2021-09-05 RX ADMIN — TAMSULOSIN HYDROCHLORIDE 0.4 MILLIGRAM(S): 0.4 CAPSULE ORAL at 21:21

## 2021-09-05 RX ADMIN — GABAPENTIN 300 MILLIGRAM(S): 400 CAPSULE ORAL at 09:11

## 2021-09-05 RX ADMIN — CLOPIDOGREL BISULFATE 75 MILLIGRAM(S): 75 TABLET, FILM COATED ORAL at 09:12

## 2021-09-05 NOTE — DIETITIAN INITIAL EVALUATION ADULT. - PERTINENT MEDS FT
MEDICATIONS  (STANDING):  aspirin enteric coated 81 milliGRAM(s) Oral daily  atorvastatin 40 milliGRAM(s) Oral at bedtime  cefepime   IVPB 2000 milliGRAM(s) IV Intermittent every 24 hours  clopidogrel Tablet 75 milliGRAM(s) Oral daily  DULoxetine 60 milliGRAM(s) Oral daily  famotidine    Tablet 20 milliGRAM(s) Oral daily  ferrous    sulfate 325 milliGRAM(s) Oral daily  gabapentin 300 milliGRAM(s) Oral two times a day  metoprolol succinate ER 75 milliGRAM(s) Oral daily  pantoprazole    Tablet 40 milliGRAM(s) Oral two times a day  tamsulosin 0.4 milliGRAM(s) Oral at bedtime    MEDICATIONS  (PRN):  acetaminophen   Tablet .. 650 milliGRAM(s) Oral every 6 hours PRN Temp greater or equal to 38.5C (101.3F), Mild Pain (1 - 3)  aluminum hydroxide/magnesium hydroxide/simethicone Suspension 30 milliLiter(s) Oral every 4 hours PRN Dyspepsia  ondansetron Injectable 4 milliGRAM(s) IV Push every 8 hours PRN Nausea and/or Vomiting

## 2021-09-05 NOTE — DIETITIAN NUTRITION RISK NOTIFICATION - ADDITIONAL COMMENTS/DIETITIAN RECOMMENDATIONS
· Additional Recommendations  1) liberalize diet to regular 2) encourage protein enriched foods and assist w/ meals as needed 3) Suggest add MVI w/minerals, Vit C 500 mg BID, add Zinc Sulfate 220 mg x 10 days to promote wound healing. 4) Maintain aspiration precautions, back of bed >35 degrees. 5) Palliative care consult for GOC (address nutrition support) 6) monitor I & O's if no BM x > 3 days initiate bowel regimen 7) monitor lytes and hydration replete as needed.

## 2021-09-05 NOTE — DIETITIAN INITIAL EVALUATION ADULT. - MALNUTRITION
Timothy Dimas pt had his CT soft tissue neck done this morning.  Pt states his wife gets extremely anxious in regards to his health - he is asking if Dr. Cordero would call him today with scan results.  He stated he knows he has appt on Friday but his wife is a wreck.  Please call, [phone].   Criteria met for severe protein/calorie malnutrition in context of acute on chronic illness Criteria met for severe protein/calorie malnutrition in context of acute on chronic illness AEB suboptimal nutrition 2/2 infection.

## 2021-09-05 NOTE — DIETITIAN INITIAL EVALUATION ADULT. - ADD RECOMMEND
1) liberalize diet to regular 2) encourage protein enriched foods and assist w/ meals as needed 3) Suggest add MVI w/minerals, Vit C 500 mg BID, add Zinc Sulfate 220 mg x 10 days to promote wound healing. 4) Maintain aspiration precautions, back of bed >35 degrees. 5) Palliative care consult for GOC (address nutrition support) 6) monitor I & O's if no BM x > 3 days initiate bowel regimen 7) monitor lytes and hydration replete as needed.

## 2021-09-05 NOTE — DIETITIAN INITIAL EVALUATION ADULT. - OTHER INFO
General: 89 y/o M PMHx significant for CAD, s/p PCI w/ stent placement, Hypertension, Hyperlipidemia, CKD stage 3, AAA, PAD, OA, BPH, BIBA from [place] for further evaluation and management of c/o subjective fevers (Tmax 103.1'F), chills, rigors, increased lethargy, and c/o generalized abdominal pain/chest pain.. As noted in prior documentation patient unable to give further hx. Pt not meeting energy/protein requirements at this time due to poor cognition and increased needs. NFPE indicates mild/moderate muscle/fat wasting. 89 y/o M PMHx significant for CAD, s/p PCI w/ stent placement, Hypertension, Hyperlipidemia, CKD stage 3, AAA, PAD, OA, BPH, BIBA from [place] for further evaluation and management of c/o subjective fevers (Tmax 103.1'F), chills, rigors, increased lethargy, and c/o generalized abdominal pain/chest pain.. As noted in prior documentation patient unable to give further hx. Pt not meeting energy/protein requirements at this time due to poor cognition and increased needs. NFPE indicates mild/moderate muscle/fat wasting. Last hospital weight documented 12/12/19 192# indicating a 25#/13% non clinically significant loss noted. Nutrition support is not recommended due to overall declining medical status which evidenced based studies indicate EN is not effective in prolonging survival and improving quality of life. It can also increase risk of aspiration pneumonia as well as other related issues. Will continue to monitor and follow up prn. Criteria met for severe protein/calorie malnutrition in context of acute on chronic illness AEB suboptimal nutrition 2/2 infection.

## 2021-09-05 NOTE — PROGRESS NOTE ADULT - ASSESSMENT
89 y/o Male with h/o CAD, s/p PCI w/ stent placement, Hypertension, Hyperlipidemia, CKD stage 3, AAA, PAD, OA, BPH was admitted on 9/3 from [facility] for subjective fevers (Tmax 103.1'F), chills, rigors, increased lethargy and generalized abdominal pain/chest pain. The patient is a poor historian. He was reported with fever and lethargy for one day PTA. In ER he was noted febrile to 103F and received vancomycin IV and cefepime.      1. Febrile syndrome. Possible sepsis. Pyuria. Likely UTI. ARF on CRF stage 3. Allergy to PCN.   -encephalopathy is improving  - BC x 2, urine c /s noted  -son cefepime 2 gm IV qd # 2  -tolerating abx well so far; no side effects noted  -monitor closely in [place] of PCN allergy history  -continue abx coverage   -monitor temps  -f/u CBC  -supportive care  2. Other issues:   -care per medicine

## 2021-09-05 NOTE — DIETITIAN INITIAL EVALUATION ADULT. - PERTINENT LABORATORY DATA
09-04    144  |  111<H>  |  36<H>  ----------------------------<  98  3.8   |  30  |  2.12<H>    Ca    8.6      04 Sep 2021 08:01    TPro  6.1  /  Alb  2.8<L>  /  TBili  0.4  /  DBili  x   /  AST  12<L>  /  ALT  12  /  AlkPhos  71  09-04  BMI: BMI (kg/m2): 23.9 (09-03-21 @ 10:47)  HbA1c:   Glucose:   BP: 142/61 (09-04-21 @ 23:33) (102/59 - 142/61)  Lipid Panel:

## 2021-09-06 LAB
ANION GAP SERPL CALC-SCNC: 3 MMOL/L — LOW (ref 5–17)
BUN SERPL-MCNC: 32 MG/DL — HIGH (ref 7–23)
CALCIUM SERPL-MCNC: 8.7 MG/DL — SIGNIFICANT CHANGE UP (ref 8.5–10.1)
CHLORIDE SERPL-SCNC: 108 MMOL/L — SIGNIFICANT CHANGE UP (ref 96–108)
CO2 SERPL-SCNC: 31 MMOL/L — SIGNIFICANT CHANGE UP (ref 22–31)
CREAT SERPL-MCNC: 1.97 MG/DL — HIGH (ref 0.5–1.3)
GLUCOSE SERPL-MCNC: 106 MG/DL — HIGH (ref 70–99)
HCT VFR BLD CALC: 28.3 % — LOW (ref 39–50)
HGB BLD-MCNC: 8.9 G/DL — LOW (ref 13–17)
MCHC RBC-ENTMCNC: 28.3 PG — SIGNIFICANT CHANGE UP (ref 27–34)
MCHC RBC-ENTMCNC: 31.4 GM/DL — LOW (ref 32–36)
MCV RBC AUTO: 89.8 FL — SIGNIFICANT CHANGE UP (ref 80–100)
NT-PROBNP SERPL-SCNC: 4035 PG/ML — HIGH (ref 0–450)
PLATELET # BLD AUTO: 198 K/UL — SIGNIFICANT CHANGE UP (ref 150–400)
POTASSIUM SERPL-MCNC: 3.8 MMOL/L — SIGNIFICANT CHANGE UP (ref 3.5–5.3)
POTASSIUM SERPL-SCNC: 3.8 MMOL/L — SIGNIFICANT CHANGE UP (ref 3.5–5.3)
RBC # BLD: 3.15 M/UL — LOW (ref 4.2–5.8)
RBC # FLD: 14.4 % — SIGNIFICANT CHANGE UP (ref 10.3–14.5)
SODIUM SERPL-SCNC: 142 MMOL/L — SIGNIFICANT CHANGE UP (ref 135–145)
WBC # BLD: 5.71 K/UL — SIGNIFICANT CHANGE UP (ref 3.8–10.5)
WBC # FLD AUTO: 5.71 K/UL — SIGNIFICANT CHANGE UP (ref 3.8–10.5)

## 2021-09-06 PROCEDURE — 99232 SBSQ HOSP IP/OBS MODERATE 35: CPT

## 2021-09-06 RX ORDER — FUROSEMIDE 40 MG
40 TABLET ORAL
Refills: 0 | Status: DISCONTINUED | OUTPATIENT
Start: 2021-09-07 | End: 2021-09-10

## 2021-09-06 RX ADMIN — Medication 325 MILLIGRAM(S): at 11:25

## 2021-09-06 RX ADMIN — Medication 650 MILLIGRAM(S): at 11:28

## 2021-09-06 RX ADMIN — CLOPIDOGREL BISULFATE 75 MILLIGRAM(S): 75 TABLET, FILM COATED ORAL at 11:25

## 2021-09-06 RX ADMIN — Medication 1 DROP(S): at 13:44

## 2021-09-06 RX ADMIN — Medication 40 MILLIGRAM(S): at 11:26

## 2021-09-06 RX ADMIN — Medication 75 MILLIGRAM(S): at 11:26

## 2021-09-06 RX ADMIN — SENNA PLUS 2 TABLET(S): 8.6 TABLET ORAL at 21:23

## 2021-09-06 RX ADMIN — GABAPENTIN 300 MILLIGRAM(S): 400 CAPSULE ORAL at 21:22

## 2021-09-06 RX ADMIN — Medication 1 DROP(S): at 21:22

## 2021-09-06 RX ADMIN — GABAPENTIN 300 MILLIGRAM(S): 400 CAPSULE ORAL at 11:25

## 2021-09-06 RX ADMIN — Medication 40 MILLIGRAM(S): at 18:19

## 2021-09-06 RX ADMIN — CEFEPIME 100 MILLIGRAM(S): 1 INJECTION, POWDER, FOR SOLUTION INTRAMUSCULAR; INTRAVENOUS at 11:27

## 2021-09-06 RX ADMIN — POLYETHYLENE GLYCOL 3350 17 GRAM(S): 17 POWDER, FOR SOLUTION ORAL at 11:26

## 2021-09-06 RX ADMIN — Medication 81 MILLIGRAM(S): at 11:25

## 2021-09-06 RX ADMIN — HEPARIN SODIUM 5000 UNIT(S): 5000 INJECTION INTRAVENOUS; SUBCUTANEOUS at 21:25

## 2021-09-06 RX ADMIN — HEPARIN SODIUM 5000 UNIT(S): 5000 INJECTION INTRAVENOUS; SUBCUTANEOUS at 11:26

## 2021-09-06 RX ADMIN — FAMOTIDINE 20 MILLIGRAM(S): 10 INJECTION INTRAVENOUS at 11:25

## 2021-09-06 RX ADMIN — ATORVASTATIN CALCIUM 40 MILLIGRAM(S): 80 TABLET, FILM COATED ORAL at 21:25

## 2021-09-06 RX ADMIN — PANTOPRAZOLE SODIUM 40 MILLIGRAM(S): 20 TABLET, DELAYED RELEASE ORAL at 11:25

## 2021-09-06 RX ADMIN — DULOXETINE HYDROCHLORIDE 60 MILLIGRAM(S): 30 CAPSULE, DELAYED RELEASE ORAL at 11:25

## 2021-09-06 RX ADMIN — Medication 100 MILLIGRAM(S): at 21:22

## 2021-09-06 RX ADMIN — Medication 1 DROP(S): at 05:16

## 2021-09-06 RX ADMIN — Medication 650 MILLIGRAM(S): at 11:58

## 2021-09-06 RX ADMIN — PANTOPRAZOLE SODIUM 40 MILLIGRAM(S): 20 TABLET, DELAYED RELEASE ORAL at 21:25

## 2021-09-06 RX ADMIN — TAMSULOSIN HYDROCHLORIDE 0.4 MILLIGRAM(S): 0.4 CAPSULE ORAL at 21:25

## 2021-09-06 NOTE — PHYSICAL THERAPY INITIAL EVALUATION ADULT - ACTIVE RANGE OF MOTION EXAMINATION, REHAB EVAL
Bilateral shoulder flex ~ 100 degrees. Right hip flex ~ 90 degrees and DF neutral. Left LE positioned in hip ER, hip flex ~ 60 degrees, left knee ext/flex ~ 30-~WFL, and DF neutral./Left UE Active ROM was WFL (within functional limits)/Right UE Active ROM was WFL (within functional limits)/Left LE Active ROM was WFL (within functional limits)/Right LE Active ROM was WFL (within functional limits)

## 2021-09-06 NOTE — PROGRESS NOTE ADULT - ASSESSMENT
89 y/o Male with h/o CAD, s/p PCI w/ stent placement, Hypertension, Hyperlipidemia, CKD stage 3, AAA, PAD, OA, BPH was admitted on 9/3 from [facility] for subjective fevers (Tmax 103.1'F), chills, rigors, increased lethargy and generalized abdominal pain/chest pain. The patient is a poor historian. He was reported with fever and lethargy for one day PTA. In ER he was noted febrile to 103F and received vancomycin IV and cefepime.      1. Febrile syndrome. Possible sepsis. Pyuria. Likely UTI. Possible right lower leg cellulitis. ARF on CRF stage 3. Allergy to PCN.   -pedal edema  -encephalopathy is improving  - BC x 2, urine c /s noted  -on cefepime 2 gm IV qd # 3  -tolerating abx well so far; no side effects noted  -monitor closely in [place] of PCN allergy history  -elevate legs  -continue abx coverage   -monitor temps  -f/u CBC  -supportive care  2. Other issues:   -care per medicine

## 2021-09-06 NOTE — CONSULT NOTE ADULT - ASSESSMENT
89 yo male with hx of CAD, PCI,  AAA from [facility] for weakness and fevers and found with elevated Creatinine and possible hydron mild on right w fevers due to UTI and Cellultiis of LE    Yared on CKD 3/4    Cr nearing baseline  Cr 1.9 - 2 in past April    monitor   no ACE or ARB   no NSAID       Anemia    ? hx of AOCKD on [facility] in 2019    check epo, iron levels      Cellulitis/UTI    per ID    RIght hydrouteronephrosis - acute vs chronic?    renal scan per Urology ( does not require IV contrast )        Thank you for the courtesy of this consult. We will follow this patient with you.   Management is subject to change if new information becomes available or patient condition changes.      
89 y/o Male with h/o CAD, s/p PCI w/ stent placement, Hypertension, Hyperlipidemia, CKD stage 3, AAA, PAD, OA, BPH was admitted on 9/3 from [facility] for subjective fevers (Tmax 103.1'F), chills, rigors, increased lethargy and generalized abdominal pain/chest pain. The patient is a poor historian. He was reported with fever and lethargy for one day PTA. In ER he was noted febrile to 103F and received vancomycin IV and cefepime.      1. Febrile syndrome. Possible sepsis. Pyuria. Probable UTI. Encephalopathy. ARF on CRF stage 3. Allergy to PCN.   -obtain BC x 2, urine c /s  -start cefepime 2 gm IV qd  -reason for abx use and side effects reviewed with patient; monitor BMP  -monitor closely in [place] of PCN allergy history  -old chart reviewed to assess prior cultures  -monitor temps  -f/u CBC  -supportive care  2. Other issues:   -care per medicine

## 2021-09-06 NOTE — CONSULT NOTE ADULT - SUBJECTIVE AND OBJECTIVE BOX
89 y/o M PMHx significant for CAD, s/p PCI w/ stent placement, Hypertension, Hyperlipidemia, CKD stage 4 w Cr 1.7 in 2019 , AAA, PAD, OA, BPH, BIBA from [place] for further evaluation and management of c/o subjective fevers (Tmax 103.1'F), chills, rigors, increased lethargy, and c/o generalized abdominal pain/chest pain.. As noted in prior documentation patient unable to give further hx.   pt is  poor historian - does not recall seeing a nephrologist in past but last visit Dr Raciel Hendrix in 2019 noted in our system   in ED - WBC 14.58, Hgb/Hct 10.1/32.8, HCO3 32, BUN/Cr 39/2.36, UA (+). CT Chest/ABD/Pelvis => Interval development of mild hydronephrosis and moderate right-sided hydroureter. The ureter appears to implant in the lateral aspect of the bladder rather than posteriorl  Urology was consulted and recommending a renal scan to assess if obstruction is acute or chronic   pt has condom catheter and urinating w good  UOP   Cr in past 1.7 in 2019    Cr was 2.36 and now trending down            PAST MEDICAL & SURGICAL HISTORY:  Leg swelling  related to cellulitis of LLE    CKD (chronic kidney disease) stage 3, GFR 30-59 ml/min    Cellulitis  BL    BPH (benign prostatic hyperplasia)    Colon polyp    Stented coronary artery    HTN (hypertension)    HLD (hyperlipidemia)    CAD (coronary artery disease)    OA (osteoarthritis)    PAD (peripheral artery disease)    AAA (abdominal aortic aneurysm) without rupture    S/P hemorrhoidectomy    H/O inguinal hernia  repair    History of colonoscopy    History of coronary artery stent placement        MEDICATIONS  (STANDING):  artificial  tears Solution 1 Drop(s) Both EYES three times a day  aspirin enteric coated 81 milliGRAM(s) Oral daily  atorvastatin 40 milliGRAM(s) Oral at bedtime  cefepime   IVPB 2000 milliGRAM(s) IV Intermittent every 24 hours  clopidogrel Tablet 75 milliGRAM(s) Oral daily  doxycycline hyclate Capsule 100 milliGRAM(s) Oral every 12 hours  DULoxetine 60 milliGRAM(s) Oral daily  famotidine    Tablet 20 milliGRAM(s) Oral daily  ferrous    sulfate 325 milliGRAM(s) Oral daily  furosemide   Injectable 40 milliGRAM(s) IV Push two times a day  gabapentin 300 milliGRAM(s) Oral two times a day  heparin   Injectable 5000 Unit(s) SubCutaneous every 12 hours  metoprolol succinate ER 75 milliGRAM(s) Oral daily  pantoprazole    Tablet 40 milliGRAM(s) Oral two times a day  polyethylene glycol 3350 17 Gram(s) Oral daily  senna 2 Tablet(s) Oral at bedtime  tamsulosin 0.4 milliGRAM(s) Oral at bedtime      Allergies    penicillin (Angioedema)    Intolerances        SOCIAL HISTORY:  Denies ETOh,Smoking,     FAMILY HISTORY:  Family history unobtainable  d/t dementia        REVIEW OF SYSTEMS:  lmted due to MS     Vital Signs Last 24 Hrs  T(C): 36.2 (06 Sep 2021 16:17), Max: 36.9 (06 Sep 2021 00:05)  T(F): 97.1 (06 Sep 2021 16:17), Max: 98.5 (06 Sep 2021 00:05)  HR: 63 (06 Sep 2021 16:17) (63 - 74)  BP: 149/59 (06 Sep 2021 16:17) (143/51 - 149/59)  BP(mean): --  RR: 18 (06 Sep 2021 16:17) (18 - 18)  SpO2: 99% (06 Sep 2021 16:17) (94% - 99%)      I&O's Detail    05 Sep 2021 07:01  -  06 Sep 2021 07:00  --------------------------------------------------------  IN:  Total IN: 0 mL    OUT:    Voided (mL): 1100 mL  Total OUT: 1100 mL    Total NET: -1100 mL      PHYSICAL EXAM:    Constitutional: frail, [place]   HEENT:   MMM  Neck: No LAD, No JVD  Respiratory: CTAB  Cardiovascular: S1 and S2  Gastrointestinal: BS+, soft, NT/ND  Extremities: 2+peripheral edema  Neurological: A/O x 3, no focal deficits  : condom cath   Skin: No rashes  Access: Not applicable    LABS:               142    |  108    |  32     ----------------------------<  106       06 Sep 2021 08:38  3.8     |  31     |  1.97     142    |  111    |  31     ----------------------------<  108       05 Sep 2021 08:50  3.9     |  30     |  1.93     144    |  111    |  36     ----------------------------<  98        04 Sep 2021 08:01  3.8     |  30     |  2.12     Ca    8.7        06 Sep 2021 08:38  Ca    8.6        05 Sep 2021 08:50        TPro  6.3    /  Alb  2.8    /  TBili  0.2    /        05 Sep 2021 08:50  DBili  x      /  AST  16     /  ALT  14     /  AlkPhos  70       TPro  6.1    /  Alb  2.8    /  TBili  0.4    /        04 Sep 2021 08:01  DBili  x      /  AST  12     /  ALT  12     /  AlkPhos  71                               8.9    5.71  )-----------( 198      ( 06 Sep 2021 08:38 )             28.3                         8.4    5.35  )-----------( 193      ( 05 Sep 2021 08:50 )             27.6       Urine Studies:          RADIOLOGY & ADDITIONAL STUDIES:

## 2021-09-06 NOTE — PHYSICAL THERAPY INITIAL EVALUATION ADULT - GAIT PATTERN USED, PT EVAL
Pt with left hip ER, knee flex, and standing on forefoot left LE. Pt required constant verbal cues to keep LE's from crossing, to increase step length bilaterally left>right, increase heel to toe left foot, and to increase posture.

## 2021-09-06 NOTE — PHYSICAL THERAPY INITIAL EVALUATION ADULT - PERTINENT HX OF CURRENT PROBLEM, REHAB EVAL
Pt admitted to  secondary to fevers, chills, increase lethargy, abd pain, and chest pain. H/H- 8.9/28.3. SARS CoV 2: neg.

## 2021-09-06 NOTE — PHYSICAL THERAPY INITIAL EVALUATION ADULT - GENERAL OBSERVATIONS, REHAB EVAL
Pt found semi-sidelying in bed with condom cath, and bed alarm activated. Noted min-mod edema bilateral LE's and redness bilateral LE's. Pt noted with left LE positioned in hip ER, knee flex, and pt laying in semi-sidelying. Pt with no c/o pain while lying still when he moves pt c/o increase pain bilateral feet.

## 2021-09-06 NOTE — PHYSICAL THERAPY INITIAL EVALUATION ADULT - MANUAL MUSCLE TESTING RESULTS, REHAB EVAL
Bilateral UE Strength 3+/5 throughout grossly. Right LE strength 3+/5 throughout grossly. Left LE strength 2-2+/5 throughout grossly, DF 3/5.

## 2021-09-06 NOTE — PHYSICAL THERAPY INITIAL EVALUATION ADULT - LEVEL OF INDEPENDENCE: SUPINE/SIT, REHAB EVAL
Pt sitting at EOB with supervision with crossing of LE's left over right. Pt required verbal/tactile cues cues to "uncross" LE's./moderate assist (50% patients effort)

## 2021-09-06 NOTE — PHYSICAL THERAPY INITIAL EVALUATION ADULT - ADDITIONAL COMMENTS
Pt states PTA he was able to get from his bed to his WC independently and he has PT 2 x /wk and walks short distances with the RW w/  PT assistance. Info obtained from eval 2/2021.

## 2021-09-06 NOTE — PHYSICAL THERAPY INITIAL EVALUATION ADULT - PLANNED THERAPY INTERVENTIONS, PT EVAL
Increase mobility as tolerated. Eval, transfers, bed mobility x 20'./bed mobility training/ROM/strengthening/transfer training

## 2021-09-06 NOTE — PHYSICAL THERAPY INITIAL EVALUATION ADULT - PHYSICAL ASSIST/NONPHYSICAL ASSIST: SIT/STAND, REHAB EVAL
How Severe Is Your Skin Lesion?: mild Has Your Skin Lesion Been Treated?: not been treated Is This A New Presentation, Or A Follow-Up?: Growths 1 person assist

## 2021-09-07 ENCOUNTER — NON-APPOINTMENT (OUTPATIENT)
Age: 86
End: 2021-09-07

## 2021-09-07 LAB
ALBUMIN SERPL ELPH-MCNC: 2.9 G/DL — LOW (ref 3.3–5)
ANION GAP SERPL CALC-SCNC: 2 MMOL/L — LOW (ref 5–17)
BUN SERPL-MCNC: 32 MG/DL — HIGH (ref 7–23)
CALCIUM SERPL-MCNC: 8.7 MG/DL — SIGNIFICANT CHANGE UP (ref 8.5–10.1)
CHLORIDE SERPL-SCNC: 105 MMOL/L — SIGNIFICANT CHANGE UP (ref 96–108)
CO2 SERPL-SCNC: 33 MMOL/L — HIGH (ref 22–31)
CREAT SERPL-MCNC: 1.99 MG/DL — HIGH (ref 0.5–1.3)
FERRITIN SERPL-MCNC: 73 NG/ML — SIGNIFICANT CHANGE UP (ref 30–400)
GLUCOSE SERPL-MCNC: 130 MG/DL — HIGH (ref 70–99)
HCT VFR BLD CALC: 29.3 % — LOW (ref 39–50)
HGB BLD-MCNC: 9 G/DL — LOW (ref 13–17)
IRON SATN MFR SERPL: 15 % — LOW (ref 16–55)
IRON SATN MFR SERPL: 41 UG/DL — LOW (ref 45–165)
MCHC RBC-ENTMCNC: 27.8 PG — SIGNIFICANT CHANGE UP (ref 27–34)
MCHC RBC-ENTMCNC: 30.7 GM/DL — LOW (ref 32–36)
MCV RBC AUTO: 90.4 FL — SIGNIFICANT CHANGE UP (ref 80–100)
PHOSPHATE SERPL-MCNC: 2.7 MG/DL — SIGNIFICANT CHANGE UP (ref 2.5–4.5)
PLATELET # BLD AUTO: 218 K/UL — SIGNIFICANT CHANGE UP (ref 150–400)
POTASSIUM SERPL-MCNC: 3.5 MMOL/L — SIGNIFICANT CHANGE UP (ref 3.5–5.3)
POTASSIUM SERPL-SCNC: 3.5 MMOL/L — SIGNIFICANT CHANGE UP (ref 3.5–5.3)
RBC # BLD: 3.24 M/UL — LOW (ref 4.2–5.8)
RBC # FLD: 14.2 % — SIGNIFICANT CHANGE UP (ref 10.3–14.5)
SODIUM SERPL-SCNC: 140 MMOL/L — SIGNIFICANT CHANGE UP (ref 135–145)
TIBC SERPL-MCNC: 276 UG/DL — SIGNIFICANT CHANGE UP (ref 220–430)
UIBC SERPL-MCNC: 235 UG/DL — SIGNIFICANT CHANGE UP (ref 110–370)
WBC # BLD: 4.72 K/UL — SIGNIFICANT CHANGE UP (ref 3.8–10.5)
WBC # FLD AUTO: 4.72 K/UL — SIGNIFICANT CHANGE UP (ref 3.8–10.5)

## 2021-09-07 PROCEDURE — 99232 SBSQ HOSP IP/OBS MODERATE 35: CPT

## 2021-09-07 RX ADMIN — Medication 100 MILLIGRAM(S): at 11:29

## 2021-09-07 RX ADMIN — Medication 81 MILLIGRAM(S): at 11:29

## 2021-09-07 RX ADMIN — DULOXETINE HYDROCHLORIDE 60 MILLIGRAM(S): 30 CAPSULE, DELAYED RELEASE ORAL at 11:30

## 2021-09-07 RX ADMIN — GABAPENTIN 300 MILLIGRAM(S): 400 CAPSULE ORAL at 11:29

## 2021-09-07 RX ADMIN — SENNA PLUS 2 TABLET(S): 8.6 TABLET ORAL at 21:43

## 2021-09-07 RX ADMIN — Medication 75 MILLIGRAM(S): at 11:30

## 2021-09-07 RX ADMIN — Medication 100 MILLIGRAM(S): at 21:41

## 2021-09-07 RX ADMIN — PANTOPRAZOLE SODIUM 40 MILLIGRAM(S): 20 TABLET, DELAYED RELEASE ORAL at 21:41

## 2021-09-07 RX ADMIN — Medication 1 DROP(S): at 13:32

## 2021-09-07 RX ADMIN — Medication 325 MILLIGRAM(S): at 11:29

## 2021-09-07 RX ADMIN — GABAPENTIN 300 MILLIGRAM(S): 400 CAPSULE ORAL at 21:41

## 2021-09-07 RX ADMIN — CLOPIDOGREL BISULFATE 75 MILLIGRAM(S): 75 TABLET, FILM COATED ORAL at 11:30

## 2021-09-07 RX ADMIN — POLYETHYLENE GLYCOL 3350 17 GRAM(S): 17 POWDER, FOR SOLUTION ORAL at 11:30

## 2021-09-07 RX ADMIN — Medication 40 MILLIGRAM(S): at 17:28

## 2021-09-07 RX ADMIN — PANTOPRAZOLE SODIUM 40 MILLIGRAM(S): 20 TABLET, DELAYED RELEASE ORAL at 11:30

## 2021-09-07 RX ADMIN — CEFEPIME 100 MILLIGRAM(S): 1 INJECTION, POWDER, FOR SOLUTION INTRAMUSCULAR; INTRAVENOUS at 11:30

## 2021-09-07 RX ADMIN — TAMSULOSIN HYDROCHLORIDE 0.4 MILLIGRAM(S): 0.4 CAPSULE ORAL at 21:43

## 2021-09-07 RX ADMIN — HEPARIN SODIUM 5000 UNIT(S): 5000 INJECTION INTRAVENOUS; SUBCUTANEOUS at 21:43

## 2021-09-07 RX ADMIN — FAMOTIDINE 20 MILLIGRAM(S): 10 INJECTION INTRAVENOUS at 11:29

## 2021-09-07 RX ADMIN — ATORVASTATIN CALCIUM 40 MILLIGRAM(S): 80 TABLET, FILM COATED ORAL at 21:43

## 2021-09-07 RX ADMIN — Medication 1 DROP(S): at 21:43

## 2021-09-07 RX ADMIN — HEPARIN SODIUM 5000 UNIT(S): 5000 INJECTION INTRAVENOUS; SUBCUTANEOUS at 11:30

## 2021-09-07 RX ADMIN — Medication 1 DROP(S): at 05:54

## 2021-09-07 NOTE — PROGRESS NOTE ADULT - ASSESSMENT
89 y/o Male admitted with UTI. Urology consulted for pt with right hydronephrosis.   CT Chest/ABD/Pelvis with Interval development of mild hydronephrosis and moderate right-sided hydroureter. The ureter appears to implant in the lateral aspect of the bladder rather than posteriorly. Please correlate clinically. There is bladder wall thickening predominantly laterally and superiorly. This may be due to cystitis, muscular hypertrophy and or other etiologies. This is limited in evaluation without contrast and underlying lesion cannot be excluded. Further evaluation with bladder ultrasound is recommended if patient cannot receive iodinated contrast. Small right pelvic sidewall lymph nodes are not considered significant by size criteria although have mildly increased in size when compared with the prior examination. If there is a primary neoplasm, metastatic disease could not be excluded on the basis of   this study.     US Kidney/Bladder with  Bilateral renal cortical thinning compatible with chronic kidney disease. Mild right hydroureteronephrosis to the level of the UVJ where there is irregular bladder wall thickening. Consider CT urogram or cystoscopy for further evaluation.         #Sepsis due to Complicated UTI/Cystitis with right Hydronephrosis  -Right hydroureteronephrosis to the level of the UVJ   ~ Urology consult appreciated; reccomend renal scan.   -c/w cefepime  -blood cultuers negative  -urine cultrues growing contaminant  -ultrasound and CT with right hydro. and irregular bladd wall thickening  -patient is urinating great, no issues; continue with condom catheter, very good urine output  -however will need munoz insertion immediately before the renal scan and then discontinued.   -renal consult pending to see if renal scan will be helpful  ·      Recommendation: Findings may be based  upon max retention.   A renal scan will be done to see if munoz drainiage has relieved obstruction.   There is a chance that there is a bladder lesion but the risks are great for any intervention.  A chronic munoz is most likely. 89 y/o Male admitted with UTI. Urology consulted for pt with right hydronephrosis.   CT Chest/ABD/Pelvis with Interval development of mild hydronephrosis and moderate right-sided hydroureter. The ureter appears to implant in the lateral aspect of the bladder rather than posteriorly. Please correlate clinically. There is bladder wall thickening predominantly laterally and superiorly. This may be due to cystitis, muscular hypertrophy and or other etiologies. This is limited in evaluation without contrast and underlying lesion cannot be excluded. Further evaluation with bladder ultrasound is recommended if patient cannot receive iodinated contrast. Small right pelvic sidewall lymph nodes are not considered significant by size criteria although have mildly increased in size when compared with the prior examination. If there is a primary neoplasm, metastatic disease could not be excluded on the basis of   this study.     US Kidney/Bladder with  Bilateral renal cortical thinning compatible with chronic kidney disease. Mild right hydroureteronephrosis to the level of the UVJ where there is irregular bladder wall thickening. Consider CT urogram or cystoscopy for further evaluation.   Creat slowly trending to baseline    Dr. Dunbar recommended pt have a catheter placed and have a Renal scan with Lasix for further evaluation of right hydronephrosis and to rule out mechanical obstruction- this was explained to pt and pt's son Joseph, however pt's son Joseph and family are concerned about patient's other co morbidities and were told that he can end up with fluid overload and possible CHF/resp distress due to fluid bolus that is needed for renal scan. Pt also refused indwelling munoz.     Pt's son Joseph and family do not wish for pt to have the renal scan with lasix and would like to follow up outpatient with Dr. Dunbar for cystoscopy now and repeat RBUS in 1 month to assess for resolution of hydronephrosis.          Case discussed with Dr. Dunbar

## 2021-09-07 NOTE — PROGRESS NOTE ADULT - ASSESSMENT
89 y/o M PMHx significant for CAD, s/p PCI w/ stent placement, Hypertension, Hyperlipidemia, CKD stage 3, AAA, PAD, OA, BPH, BIBA [place] [facility] [place] for further evaluation and management of c/o subjective fevers (Tmax 103.1'F), chills, rigors, increased lethargy, and c/o generalized abdominal pain/chest pain.. As noted in prior documentation patient unable to give further hx.  Labs => WBC 14.58, Hgb/Hct 10.1/32.8, HCO3 32, BUN/Cr 39/2.36, UA (+). CT Chest/ABD/Pelvis => Interval development of mild hydronephrosis and moderate right-sided hydroureter. The ureter appears to implant in the lateral aspect of the bladder rather than posteriorly. Please correlate clinically. There is bladder wall thickening predominantly laterally and superiorly. This may be due to cystitis, muscular hypertrophy and or other etiologies. This is limited in evaluation without contrast and underlying lesion cannot be excluded. Further evaluation with bladder ultrasound is recommended if patient cannot receive iodinated contrast. Small right pelvic sidewall lymph nodes are not considered significant by size criteria although have mildly increased in size when compared with the prior examination. If there is a primary neoplasm, metastatic disease could not be excluded on the basis of   this study.     US Kidney/Bladder => Bilateral renal cortical thinning compatible with chronic kidney disease. Mild right hydroureteronephrosis to the level of the UVJ where there is irregular bladder wall thickening. Consider CT urogram or cystoscopy for further evaluation.     #Sepsis due to Complicated UTI/Cystitis with right Hydronephrosis  -Right hydroureteronephrosis to the level of the UVJ   ~ Urology consult appreciated; reccomend renal scan- family at this time refused; discussed with Urology and FU outpatient for Cystoscopy and repeat imaging   -c/w cefepime and doxy  -blood cultuers negative  -urine cultrues growing contaminant  -ultrasound and CT with right hydro. and irregular bladd wall thickening  -patient is urinating great, no issues; continue with condom catheter, very good urine output    #right lower extremity cellultiis  -ss/p one dose of vancomycin  -d/w Dr. Mcmahon, reccomend to start doxycycline.   -recommend to observe patietn 1-2 days to make sure cellulitis is improving.     #CKD3  -NO RASHAUN; baseline is 2.0; he is at baseline.     #CAD/Hypertension  ~cont. ASA 81mg po daily  ~cont. Clopidogrel 75mg po daily  ~cont. Metoprolol ER 50mg po daily    #Hyperlipidemia  ~cont. Atorvastatin 40mg po qhs    #BPH  ~cont. Tamsulosin 0.4mg po qhs    #Vte ppx  ~IMPROVE Vte Risk Score is 2  ~cont. Heparin sq        off serivce note:  patient here for "uti" however urine cultures are negative. He does have lower extremity , right leg cellultiis, that was worsening on cefepime. gave him one dose of vanco. d/w dr julio culver oral doxycycline.   recco to observe him for 1-2 days and see if cellulitis is improving before discharge.   urology recco renal scan for the hydronephrosis but family is refusing as patient will require Bolus of 75- over an hour and patient has a hx of chf.  patient is urinating fine, no issues at all. ashlee has confom cathetr, and we know urine output is great.

## 2021-09-07 NOTE — PROGRESS NOTE ADULT - ASSESSMENT
89 yo male with hx of CAD, PCI,  AAA from [facility] for weakness and fevers and found with elevated Creatinine and possible hydron mild on right w fevers due to UTI and Cellultiis of LE    Yared on CKD 3/4    Cr nearing baseline  Cr 1.9 - 2 in past    no ACE or ARB   no NSAID       Anemia    ? 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    renal scan deferred by family

## 2021-09-07 NOTE — PROGRESS NOTE ADULT - ASSESSMENT
89 y/o Male with h/o CAD, s/p PCI w/ stent placement, Hypertension, Hyperlipidemia, CKD stage 3, AAA, PAD, OA, BPH was admitted on 9/3 from [facility] for subjective fevers (Tmax 103.1'F), chills, rigors, increased lethargy and generalized abdominal pain/chest pain. The patient is a poor historian. He was reported with fever and lethargy for one day PTA. In ER he was noted febrile to 103F and received vancomycin IV and cefepime.      1. Febrile syndrome improving. Pyuria. Likely UTI. Possible right lower leg cellulitis. ARF on CRF stage 3. Allergy to PCN.   -pedal edema  -encephalopathy is improving  - BC x 2, urine c /s noted  -on cefepime 2 gm IV qd # 4 and doxycycline 100 mg PO q12h # 2  -tolerating abx well so far; no side effects noted  -monitor closely in [place] of PCN allergy history  -elevate legs  -continue abx coverage   -monitor temps  -f/u CBC  -supportive care  2. Other issues:   -care per medicine

## 2021-09-08 LAB
ANION GAP SERPL CALC-SCNC: 1 MMOL/L — LOW (ref 5–17)
BUN SERPL-MCNC: 36 MG/DL — HIGH (ref 7–23)
CALCIUM SERPL-MCNC: 8.8 MG/DL — SIGNIFICANT CHANGE UP (ref 8.5–10.1)
CHLORIDE SERPL-SCNC: 105 MMOL/L — SIGNIFICANT CHANGE UP (ref 96–108)
CO2 SERPL-SCNC: 34 MMOL/L — HIGH (ref 22–31)
CREAT SERPL-MCNC: 2.01 MG/DL — HIGH (ref 0.5–1.3)
CULTURE RESULTS: SIGNIFICANT CHANGE UP
CULTURE RESULTS: SIGNIFICANT CHANGE UP
EPO SERPL-MCNC: 19.6 MIU/ML — HIGH (ref 2.6–18.5)
GLUCOSE SERPL-MCNC: 98 MG/DL — SIGNIFICANT CHANGE UP (ref 70–99)
MAGNESIUM SERPL-MCNC: 2.3 MG/DL — SIGNIFICANT CHANGE UP (ref 1.6–2.6)
PHOSPHATE SERPL-MCNC: 2.9 MG/DL — SIGNIFICANT CHANGE UP (ref 2.5–4.5)
POTASSIUM SERPL-MCNC: 4 MMOL/L — SIGNIFICANT CHANGE UP (ref 3.5–5.3)
POTASSIUM SERPL-SCNC: 4 MMOL/L — SIGNIFICANT CHANGE UP (ref 3.5–5.3)
SODIUM SERPL-SCNC: 140 MMOL/L — SIGNIFICANT CHANGE UP (ref 135–145)
SPECIMEN SOURCE: SIGNIFICANT CHANGE UP
SPECIMEN SOURCE: SIGNIFICANT CHANGE UP

## 2021-09-08 PROCEDURE — 99232 SBSQ HOSP IP/OBS MODERATE 35: CPT

## 2021-09-08 RX ORDER — FUROSEMIDE 40 MG
20 TABLET ORAL ONCE
Refills: 0 | Status: COMPLETED | OUTPATIENT
Start: 2021-09-08 | End: 2021-09-08

## 2021-09-08 RX ADMIN — Medication 1 DROP(S): at 14:05

## 2021-09-08 RX ADMIN — Medication 100 MILLIGRAM(S): at 21:26

## 2021-09-08 RX ADMIN — Medication 75 MILLIGRAM(S): at 09:27

## 2021-09-08 RX ADMIN — Medication 100 MILLIGRAM(S): at 09:26

## 2021-09-08 RX ADMIN — SENNA PLUS 2 TABLET(S): 8.6 TABLET ORAL at 21:26

## 2021-09-08 RX ADMIN — Medication 1 DROP(S): at 05:40

## 2021-09-08 RX ADMIN — Medication 40 MILLIGRAM(S): at 09:26

## 2021-09-08 RX ADMIN — HEPARIN SODIUM 5000 UNIT(S): 5000 INJECTION INTRAVENOUS; SUBCUTANEOUS at 21:27

## 2021-09-08 RX ADMIN — Medication 20 MILLIGRAM(S): at 11:40

## 2021-09-08 RX ADMIN — POLYETHYLENE GLYCOL 3350 17 GRAM(S): 17 POWDER, FOR SOLUTION ORAL at 09:27

## 2021-09-08 RX ADMIN — Medication 81 MILLIGRAM(S): at 09:27

## 2021-09-08 RX ADMIN — PANTOPRAZOLE SODIUM 40 MILLIGRAM(S): 20 TABLET, DELAYED RELEASE ORAL at 09:27

## 2021-09-08 RX ADMIN — GABAPENTIN 300 MILLIGRAM(S): 400 CAPSULE ORAL at 09:26

## 2021-09-08 RX ADMIN — DULOXETINE HYDROCHLORIDE 60 MILLIGRAM(S): 30 CAPSULE, DELAYED RELEASE ORAL at 09:26

## 2021-09-08 RX ADMIN — CLOPIDOGREL BISULFATE 75 MILLIGRAM(S): 75 TABLET, FILM COATED ORAL at 09:26

## 2021-09-08 RX ADMIN — Medication 325 MILLIGRAM(S): at 09:26

## 2021-09-08 RX ADMIN — HEPARIN SODIUM 5000 UNIT(S): 5000 INJECTION INTRAVENOUS; SUBCUTANEOUS at 09:26

## 2021-09-08 RX ADMIN — CEFEPIME 100 MILLIGRAM(S): 1 INJECTION, POWDER, FOR SOLUTION INTRAMUSCULAR; INTRAVENOUS at 11:40

## 2021-09-08 RX ADMIN — ATORVASTATIN CALCIUM 40 MILLIGRAM(S): 80 TABLET, FILM COATED ORAL at 21:26

## 2021-09-08 RX ADMIN — FAMOTIDINE 20 MILLIGRAM(S): 10 INJECTION INTRAVENOUS at 09:27

## 2021-09-08 RX ADMIN — GABAPENTIN 300 MILLIGRAM(S): 400 CAPSULE ORAL at 21:26

## 2021-09-08 RX ADMIN — Medication 1 DROP(S): at 21:26

## 2021-09-08 RX ADMIN — TAMSULOSIN HYDROCHLORIDE 0.4 MILLIGRAM(S): 0.4 CAPSULE ORAL at 21:26

## 2021-09-08 RX ADMIN — PANTOPRAZOLE SODIUM 40 MILLIGRAM(S): 20 TABLET, DELAYED RELEASE ORAL at 21:26

## 2021-09-08 NOTE — PROGRESS NOTE ADULT - ASSESSMENT
89 y/o M PMHx significant for CAD, s/p PCI w/ stent placement, Hypertension, Hyperlipidemia, CKD stage 3, AAA, PAD, OA, BPH, BIBA from [facility] [place] for further evaluation and management of c/o subjective fevers (Tmax 103.1'F), chills, rigors, increased lethargy, and c/o generalized abdominal pain/chest pain.. As noted in prior documentation patient unable to give further hx.  Labs => WBC 14.58, Hgb/Hct 10.1/32.8, HCO3 32, BUN/Cr 39/2.36, UA (+). CT Chest/ABD/Pelvis => Interval development of mild hydronephrosis and moderate right-sided hydroureter. The ureter appears to implant in the lateral aspect of the bladder rather than posteriorly. Please correlate clinically. There is bladder wall thickening predominantly laterally and superiorly. This may be due to cystitis, muscular hypertrophy and or other etiologies. This is limited in evaluation without contrast and underlying lesion cannot be excluded. Further evaluation with bladder ultrasound is recommended if patient cannot receive iodinated contrast. Small right pelvic sidewall lymph nodes are not considered significant by size criteria although have mildly increased in size when compared with the prior examination. If there is a primary neoplasm, metastatic disease could not be excluded on the basis of   this study.     US Kidney/Bladder => Bilateral renal cortical thinning compatible with chronic kidney disease. Mild right hydroureteronephrosis to the level of the UVJ where there is irregular bladder wall thickening. Consider CT urogram or cystoscopy for further evaluation.     #Sepsis due to Complicated UTI/Cystitis with right Hydronephrosis  -Right hydroureteronephrosis to the level of the UVJ   ~ Urology consult appreciated; reccomend renal scan- family at this time refused; discussed with Urology and FU outpatient for Cystoscopy and repeat imaging   -c/w cefepime and doxy  -blood cultuers negative  -urine cultrues growing contaminant  -ultrasound and CT with right hydro. and irregular bladd wall thickening  -patient is urinating great, no issues; continue with condom catheter, very good urine output  -Bladder scan    #right lower extremity cellultiis  -s/p one dose of vancomycin  -continue with cefepime and doxy.   -recommend to observe patient 1-2 days to make sure cellulitis is improving.     #Dyspnea 2ndry to Chronic Diastolic CHF   -S/P IV lasix x 1 today  -Lasix 40mg pO Q12H  -monitor I&O, daily weights    #CKD3  -NO RASHAUN; baseline is 2.0; he is at baseline.     #CAD/Hypertension  ~cont. ASA 81mg po daily  ~cont. Clopidogrel 75mg po daily  ~cont. Metoprolol ER 50mg po daily    #Hyperlipidemia  ~cont. Atorvastatin 40mg po qhs    #BPH  ~cont. Tamsulosin 0.4mg po qhs    #Vte ppx  ~IMPROVE Vte Risk Score is 2  ~cont. Heparin sq        off serivce note:  patient here for "uti" however urine cultures are negative. He does have lower extremity , right leg cellultiis, that was worsening on cefepime. gave him one dose of vanco. d/w dr julio culver oral doxycycline.   recco to observe him for 1-2 days and see if cellulitis is improving before discharge.   urology recco renal scan for the hydronephrosis but family is refusing as patient will require Bolus of 750 over an hour and patient has a hx of chf.  patient is urinating fine, no issues at all. ashlee has confom cathetr, and we know urine output is great.

## 2021-09-09 LAB
ANION GAP SERPL CALC-SCNC: 5 MMOL/L — SIGNIFICANT CHANGE UP (ref 5–17)
BUN SERPL-MCNC: 43 MG/DL — HIGH (ref 7–23)
CALCIUM SERPL-MCNC: 8.8 MG/DL — SIGNIFICANT CHANGE UP (ref 8.5–10.1)
CHLORIDE SERPL-SCNC: 104 MMOL/L — SIGNIFICANT CHANGE UP (ref 96–108)
CO2 SERPL-SCNC: 32 MMOL/L — HIGH (ref 22–31)
CREAT SERPL-MCNC: 2.2 MG/DL — HIGH (ref 0.5–1.3)
GLUCOSE SERPL-MCNC: 135 MG/DL — HIGH (ref 70–99)
MAGNESIUM SERPL-MCNC: 2.3 MG/DL — SIGNIFICANT CHANGE UP (ref 1.6–2.6)
PHOSPHATE SERPL-MCNC: 2.7 MG/DL — SIGNIFICANT CHANGE UP (ref 2.5–4.5)
POTASSIUM SERPL-MCNC: 3.7 MMOL/L — SIGNIFICANT CHANGE UP (ref 3.5–5.3)
POTASSIUM SERPL-SCNC: 3.7 MMOL/L — SIGNIFICANT CHANGE UP (ref 3.5–5.3)
SODIUM SERPL-SCNC: 141 MMOL/L — SIGNIFICANT CHANGE UP (ref 135–145)

## 2021-09-09 PROCEDURE — 99232 SBSQ HOSP IP/OBS MODERATE 35: CPT

## 2021-09-09 RX ORDER — LACTOBACILLUS ACIDOPHILUS 100MM CELL
1 CAPSULE ORAL
Refills: 0 | Status: DISCONTINUED | OUTPATIENT
Start: 2021-09-09 | End: 2021-09-10

## 2021-09-09 RX ADMIN — PANTOPRAZOLE SODIUM 40 MILLIGRAM(S): 20 TABLET, DELAYED RELEASE ORAL at 10:40

## 2021-09-09 RX ADMIN — Medication 75 MILLIGRAM(S): at 10:40

## 2021-09-09 RX ADMIN — Medication 81 MILLIGRAM(S): at 10:40

## 2021-09-09 RX ADMIN — CEFEPIME 100 MILLIGRAM(S): 1 INJECTION, POWDER, FOR SOLUTION INTRAMUSCULAR; INTRAVENOUS at 10:45

## 2021-09-09 RX ADMIN — DULOXETINE HYDROCHLORIDE 60 MILLIGRAM(S): 30 CAPSULE, DELAYED RELEASE ORAL at 10:40

## 2021-09-09 RX ADMIN — TAMSULOSIN HYDROCHLORIDE 0.4 MILLIGRAM(S): 0.4 CAPSULE ORAL at 21:18

## 2021-09-09 RX ADMIN — GABAPENTIN 300 MILLIGRAM(S): 400 CAPSULE ORAL at 10:41

## 2021-09-09 RX ADMIN — HEPARIN SODIUM 5000 UNIT(S): 5000 INJECTION INTRAVENOUS; SUBCUTANEOUS at 10:41

## 2021-09-09 RX ADMIN — Medication 1 TABLET(S): at 21:18

## 2021-09-09 RX ADMIN — CLOPIDOGREL BISULFATE 75 MILLIGRAM(S): 75 TABLET, FILM COATED ORAL at 10:40

## 2021-09-09 RX ADMIN — Medication 1 DROP(S): at 14:36

## 2021-09-09 RX ADMIN — GABAPENTIN 300 MILLIGRAM(S): 400 CAPSULE ORAL at 21:18

## 2021-09-09 RX ADMIN — ATORVASTATIN CALCIUM 40 MILLIGRAM(S): 80 TABLET, FILM COATED ORAL at 21:18

## 2021-09-09 RX ADMIN — Medication 1 DROP(S): at 21:17

## 2021-09-09 RX ADMIN — HEPARIN SODIUM 5000 UNIT(S): 5000 INJECTION INTRAVENOUS; SUBCUTANEOUS at 21:17

## 2021-09-09 RX ADMIN — Medication 100 MILLIGRAM(S): at 10:40

## 2021-09-09 RX ADMIN — PANTOPRAZOLE SODIUM 40 MILLIGRAM(S): 20 TABLET, DELAYED RELEASE ORAL at 21:18

## 2021-09-09 RX ADMIN — Medication 1 DROP(S): at 06:12

## 2021-09-09 RX ADMIN — FAMOTIDINE 20 MILLIGRAM(S): 10 INJECTION INTRAVENOUS at 10:45

## 2021-09-09 RX ADMIN — Medication 40 MILLIGRAM(S): at 10:40

## 2021-09-09 RX ADMIN — Medication 40 MILLIGRAM(S): at 16:51

## 2021-09-09 RX ADMIN — Medication 100 MILLIGRAM(S): at 21:18

## 2021-09-09 RX ADMIN — Medication 325 MILLIGRAM(S): at 10:41

## 2021-09-09 NOTE — PROGRESS NOTE ADULT - ASSESSMENT
89 y/o M PMHx significant for CAD, s/p PCI w/ stent placement, Hypertension, Hyperlipidemia, CKD stage 3, AAA, PAD, OA, BPH, BIBA [place] [facility] [place] for further evaluation and management of c/o subjective fevers (Tmax 103.1'F), chills, rigors, increased lethargy, and c/o generalized abdominal pain/chest pain.. As noted in prior documentation patient unable to give further hx.  Labs => WBC 14.58, Hgb/Hct 10.1/32.8, HCO3 32, BUN/Cr 39/2.36, UA (+). CT Chest/ABD/Pelvis => Interval development of mild hydronephrosis and moderate right-sided hydroureter. The ureter appears to implant in the lateral aspect of the bladder rather than posteriorly. Please correlate clinically. There is bladder wall thickening predominantly laterally and superiorly. This may be due to cystitis, muscular hypertrophy and or other etiologies. This is limited in evaluation without contrast and underlying lesion cannot be excluded. Further evaluation with bladder ultrasound is recommended if patient cannot receive iodinated contrast. Small right pelvic sidewall lymph nodes are not considered significant by size criteria although have mildly increased in size when compared with the prior examination. If there is a primary neoplasm, metastatic disease could not be excluded on the basis of   this study.     US Kidney/Bladder => Bilateral renal cortical thinning compatible with chronic kidney disease. Mild right hydroureteronephrosis to the level of the UVJ where there is irregular bladder wall thickening. Consider CT urogram or cystoscopy for further evaluation.     #Sepsis due to Complicated UTI/Cystitis with right Hydronephrosis  -Right hydroureteronephrosis to the level of the UVJ   ~ Urology consult appreciated; reccomend renal scan- family at this time refused; discussed with Urology and FU outpatient for Cystoscopy and repeat imaging   -c/w cefepime#6 and doxy#4 - FU with Dr julio vergara  -blood cultuers negative  -urine cultrues growing contaminant  -ultrasound and CT with right hydro. and irregular bladd wall thickening  -patient is urinating; continue with condom catheter,   -Bladder scan    #right lower extremity cellultiis  -s/p one dose of vancomycin  -continue with cefepime and doxy.    -recommend to observe patient 1-2 days to make sure cellulitis is improving.     #Dyspnea 2ndry to Chronic Diastolic CHF   -S/P IV lasix x 2 today  -Lasix 40mg pO Q12H  -monitor I&O, daily weights    #CKD3  -NO RASHAUN; baseline is 2.0; he is at baseline.   -Rise in Cr - FU jai     #CAD/Hypertension  ~cont. ASA 81mg po daily  ~cont. Clopidogrel 75mg po daily  ~cont. Metoprolol ER 50mg po daily    #Hyperlipidemia  ~cont. Atorvastatin 40mg po qhs    #BPH  ~cont. Tamsulosin 0.4mg po qhs    #Vte ppx  ~IMPROVE Vte Risk Score is 2  ~cont. Heparin sq        off serivce note:  patient here for "uti" however urine cultures are negative. He does have right leg cellultiis, that was worsening on cefepime. gave him one dose of vanco. d/w dr kim recco oral doxycycline.   urology recco renal scan for the hydronephrosis but family is deferring it as patient will require Bolus of 750 over an hour and patient has a hx of chf and required IV lasix during this admission;  patient is urinating fine, currently has condom cathter. Jump in cr please recheck jai and if stable can be discahrged to apex jai; also FU bladder scan

## 2021-09-09 NOTE — PROGRESS NOTE ADULT - ASSESSMENT
89 y/o Male with h/o CAD, s/p PCI w/ stent placement, Hypertension, Hyperlipidemia, CKD stage 3, AAA, PAD, OA, BPH was admitted on 9/3 from [facility] for subjective fevers (Tmax 103.1'F), chills, rigors, increased lethargy and generalized abdominal pain/chest pain. The patient is a poor historian. He was reported with fever and lethargy for one day PTA. In ER he was noted febrile to 103F and received vancomycin IV and cefepime.      1. Pyuria. Likely UTI. Rght lower leg cellulitis. ARF on CRF stage 3. Allergy to PCN.   -pedal edema  -lef erythema is improving  -encephalopathy is improving  - BC x 2, urine c /s noted  -on cefepime 2 gm IV qd # 6 and doxycycline 100 mg PO q12h # 4  -tolerating abx well so far; no side effects noted  -monitor closely in [place] of PCN allergy history  -elevate legs  -continue abx coverage for now; plan to change to oral regimen soon  -monitor temps  -f/u CBC  -supportive care  2. Other issues:   -care per medicine

## 2021-09-09 NOTE — PROGRESS NOTE ADULT - ASSESSMENT
89 yo male with hx of CAD, PCI,  AAA from [facility] for weakness and fevers and found with elevated Creatinine and possible hydron mild on right w fevers due to UTI and Cellultiis of LE    Yared on CKD 3/4    Cr baseline  Cr 1.9 - 2 in past  - now slight rise after lasix   monitor this    no ACE or ARB   no NSAID       Anemia    ? 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    renal scan deferred by family   Urolog fup

## 2021-09-10 ENCOUNTER — TRANSCRIPTION ENCOUNTER (OUTPATIENT)
Age: 86
End: 2021-09-10

## 2021-09-10 VITALS
HEART RATE: 93 BPM | DIASTOLIC BLOOD PRESSURE: 58 MMHG | TEMPERATURE: 98 F | RESPIRATION RATE: 16 BRPM | OXYGEN SATURATION: 98 % | SYSTOLIC BLOOD PRESSURE: 126 MMHG

## 2021-09-10 LAB
ANION GAP SERPL CALC-SCNC: 5 MMOL/L — SIGNIFICANT CHANGE UP (ref 5–17)
BUN SERPL-MCNC: 41 MG/DL — HIGH (ref 7–23)
CALCIUM SERPL-MCNC: 8.9 MG/DL — SIGNIFICANT CHANGE UP (ref 8.5–10.1)
CHLORIDE SERPL-SCNC: 103 MMOL/L — SIGNIFICANT CHANGE UP (ref 96–108)
CO2 SERPL-SCNC: 32 MMOL/L — HIGH (ref 22–31)
CREAT SERPL-MCNC: 2.1 MG/DL — HIGH (ref 0.5–1.3)
GLUCOSE SERPL-MCNC: 97 MG/DL — SIGNIFICANT CHANGE UP (ref 70–99)
MAGNESIUM SERPL-MCNC: 2.2 MG/DL — SIGNIFICANT CHANGE UP (ref 1.6–2.6)
PHOSPHATE SERPL-MCNC: 3.2 MG/DL — SIGNIFICANT CHANGE UP (ref 2.5–4.5)
POTASSIUM SERPL-MCNC: 3.6 MMOL/L — SIGNIFICANT CHANGE UP (ref 3.5–5.3)
POTASSIUM SERPL-SCNC: 3.6 MMOL/L — SIGNIFICANT CHANGE UP (ref 3.5–5.3)
SARS-COV-2 RNA SPEC QL NAA+PROBE: SIGNIFICANT CHANGE UP
SODIUM SERPL-SCNC: 140 MMOL/L — SIGNIFICANT CHANGE UP (ref 135–145)

## 2021-09-10 PROCEDURE — 99239 HOSP IP/OBS DSCHRG MGMT >30: CPT

## 2021-09-10 RX ORDER — CEFUROXIME AXETIL 250 MG
1 TABLET ORAL
Qty: 8 | Refills: 0
Start: 2021-09-10 | End: 2021-09-13

## 2021-09-10 RX ADMIN — Medication 81 MILLIGRAM(S): at 09:27

## 2021-09-10 RX ADMIN — Medication 1 TABLET(S): at 09:27

## 2021-09-10 RX ADMIN — DULOXETINE HYDROCHLORIDE 60 MILLIGRAM(S): 30 CAPSULE, DELAYED RELEASE ORAL at 09:28

## 2021-09-10 RX ADMIN — PANTOPRAZOLE SODIUM 40 MILLIGRAM(S): 20 TABLET, DELAYED RELEASE ORAL at 09:28

## 2021-09-10 RX ADMIN — FAMOTIDINE 20 MILLIGRAM(S): 10 INJECTION INTRAVENOUS at 09:27

## 2021-09-10 RX ADMIN — Medication 100 MILLIGRAM(S): at 09:27

## 2021-09-10 RX ADMIN — Medication 1 DROP(S): at 13:06

## 2021-09-10 RX ADMIN — GABAPENTIN 300 MILLIGRAM(S): 400 CAPSULE ORAL at 09:27

## 2021-09-10 RX ADMIN — CLOPIDOGREL BISULFATE 75 MILLIGRAM(S): 75 TABLET, FILM COATED ORAL at 09:27

## 2021-09-10 RX ADMIN — POLYETHYLENE GLYCOL 3350 17 GRAM(S): 17 POWDER, FOR SOLUTION ORAL at 09:28

## 2021-09-10 RX ADMIN — Medication 40 MILLIGRAM(S): at 09:27

## 2021-09-10 RX ADMIN — Medication 325 MILLIGRAM(S): at 09:27

## 2021-09-10 RX ADMIN — Medication 1 DROP(S): at 06:06

## 2021-09-10 RX ADMIN — Medication 75 MILLIGRAM(S): at 09:26

## 2021-09-10 RX ADMIN — CEFEPIME 100 MILLIGRAM(S): 1 INJECTION, POWDER, FOR SOLUTION INTRAMUSCULAR; INTRAVENOUS at 09:28

## 2021-09-10 RX ADMIN — HEPARIN SODIUM 5000 UNIT(S): 5000 INJECTION INTRAVENOUS; SUBCUTANEOUS at 09:28

## 2021-09-10 NOTE — PROGRESS NOTE ADULT - ASSESSMENT
89 yo male with hx of CAD, PCI,  AAA from [facility] for weakness and fevers and found with elevated Creatinine and possible hydron mild on right w fevers due to UTI and Cellultiis of LE    Yared on CKD 3/4    Cr baseline  Cr 1.9 - 2 in past  - stable after lasix   monitor this    no ACE or ARB   no NSAID       Anemia     iron sats low c.w Fedef anemia  - po iron   EPO level approp      Cellulitis/UTI    per ID    RIght hydrouteronephrosis - acute vs chronic?    renal scan deferred by family   Urolog fup       ** pt seen earlier

## 2021-09-10 NOTE — PROGRESS NOTE ADULT - PROVIDER SPECIALTY LIST ADULT
Hospitalist
Hospitalist
Infectious Disease
Hospitalist
Infectious Disease
Nephrology
Hospitalist
Infectious Disease
Nephrology
Nephrology
Urology

## 2021-09-10 NOTE — DISCHARGE NOTE PROVIDER - HOSPITAL COURSE
Vital Signs Last 24 Hrs  T(C): 36.6 (10 Sep 2021 07:55), Max: 36.6 (10 Sep 2021 07:55)  T(F): 97.9 (10 Sep 2021 07:55), Max: 97.9 (10 Sep 2021 07:55)  HR: 68 (10 Sep 2021 09:30) (68 - 69)  BP: 118/52 (10 Sep 2021 09:30) (118/52 - 138/55)  BP(mean): 73 (10 Sep 2021 07:55) (73 - 73)  RR: 17 (10 Sep 2021 07:55) (17 - 18)  SpO2: 95% (10 Sep 2021 07:55) (95% - 99%)    HEENT:   pupils equal and reactive, EOMI, no oropharyngeal lesions, erythema, exudates, oral thrush    NECK:   supple, no carotid bruits, no palpable lymph nodes, no thyromegaly    CV:  +S1, +S2, regular, no murmurs or rubs    RESP:   lungs clear to auscultation bilaterally, no wheezing, rales, rhonchi, good air entry bilaterally    BREAST:  not examined    GI:  abdomen soft, non-tender, non-distended, normal BS, no bruits, no abdominal masses, no palpable masses    RECTAL:  not examined    :  not examined    MSK:   normal muscle tone, no atrophy, no rigidity, no contractions    EXT:   no clubbing, no cyanosis, no edema, no calf pain, swelling or erythema    VASCULAR:  pulses equal and symmetric in the upper and lower extremities    NEURO:  AAOX3, no focal neurological deficits, follows all commands, able to move extremities spontaneously    SKIN:  no ulcers, lesions or rashes          Hospital Course:     89 y/o M PMHx significant for CAD, s/p PCI w/ stent placement, Hypertension, Hyperlipidemia, CKD stage 3 (cr BL  2.0), AAA, PAD, OA, BPH, BIBA from [facility] for further evaluation and management of c/o subjective fevers (Tmax 103.1'F), chills, rigors, increased lethargy, and c/o generalized abdominal pain/chest pain.. As noted in prior documentation patient unable to give further hx.  Labs => WBC 14.58, Hgb/Hct 10.1/32.8, HCO3 32, BUN/Cr 39/2.36, UA (+). CT Chest/ABD/Pelvis => Interval development of mild hydronephrosis and moderate right-sided hydroureter.     US Kidney/Bladder => Bilateral renal cortical thinning compatible with chronic kidney disease. Mild right hydroureteronephrosis to the level of the UVJ where there is irregular bladder wall thickening. Consider CT urogram or cystoscopy for further evaluation.     #Sepsis due to Complicated UTI/Cystitis with right Hydronephrosis  -Right hydroureteronephrosis to the level of the UVJ   ~ Urology consult appreciated; reccomend renal scan- family at this time refused; discussed with Urology and FU outpatient for Cystoscopy and repeat imaging   -c/w cefepime#6 and doxy#4 - FU with Dr kim jai  -blood cultuers negative  -urine cultrues growing contaminant  -ultrasound and CT with right hydro. and irregular bladd wall thickening  -patient is urinating; continue with condom catheter,       #right lower extremity cellultiis  -s/p one dose of vancomycin  -continue with cefepime and doxy.  D C home with oral ceftin and Doxy for 4 more days    #Dyspnea 2ndry to Acute on Chronic Diastolic CHF   -S/P IV lasix x 2 today  -Lasix 40mg pO Q12H  -monitor I&O, daily weights      off serivce note:  patient here for "uti" however urine cultures are negative. He does have right leg cellultiis, that was worsening on cefepime. gave him one dose of vanco. d/w dr kim recco oral doxycycline.   urology recco renal scan for the hydronephrosis but family is deferring it as patient will require Bolus of 750 over an hour and patient has a hx of chf and required IV lasix during this admission;  patient is urinating fine, currently has condom cathter.

## 2021-09-10 NOTE — DISCHARGE NOTE NURSING/CASE MANAGEMENT/SOCIAL WORK - PATIENT PORTAL LINK FT
You can access the FollowMyHealth Patient Portal offered by [facility] by registering at the following website: [email or web address]. By joining [facility]’s FollowMyHealth portal, you will also be able to view your health information using other applications (apps) compatible with our system.

## 2021-09-10 NOTE — DISCHARGE NOTE PROVIDER - NSDCCPCAREPLAN_GEN_ALL_CORE_FT
PRINCIPAL DISCHARGE DIAGNOSIS  Diagnosis: Cellulitis of right leg  Assessment and Plan of Treatment:   *Continue with oral Doxycycline and Ceftin for 4 more days  *follow up outpatient with primary care provider in one week  *clean and dry your legs every day to prevent future infection. always keep them hydrated as well to preven cracking.      SECONDARY DISCHARGE DIAGNOSES  Diagnosis: Acute UTI  Assessment and Plan of Treatment:   *continue with antibiotics as above.

## 2021-09-10 NOTE — PROGRESS NOTE ADULT - REASON FOR ADMISSION
Fevers

## 2021-09-10 NOTE — DISCHARGE NOTE NURSING/CASE MANAGEMENT/SOCIAL WORK - NSDCVIVACCINE_GEN_ALL_CORE_FT
Td (adult) preservative free; 16-Feb-2020 18:27; Eder Hunter (ANGELICA); [employer]; [id number] (Exp. Date: 22-Oct-2021); IntraMuscular; Deltoid Left.; 0.5 milliLiter(s); VIS (VIS Published: 24-Feb-2025, VIS Presented: 16-Feb-2020);

## 2021-09-10 NOTE — PROGRESS NOTE ADULT - ASSESSMENT
89 y/o Male with h/o CAD, s/p PCI w/ stent placement, Hypertension, Hyperlipidemia, CKD stage 3, AAA, PAD, OA, BPH was admitted on 9/3 from [facility] for subjective fevers (Tmax 103.1'F), chills, rigors, increased lethargy and generalized abdominal pain/chest pain. The patient is a poor historian. He was reported with fever and lethargy for one day PTA. In ER he was noted febrile to 103F and received vancomycin IV and cefepime.      1. Pyuria. Likely UTI. Rght lower leg cellulitis. ARF on CRF stage 3. Allergy to PCN.   -pedal edema  -lef erythema is improving  -encephalopathy is improving  - BC x 2, urine c /s noted  -on cefepime 2 gm IV qd # 7 and doxycycline 100 mg PO q12h # 5  -tolerating abx well so far; no side effects noted  -monitor closely in [place] of PCN allergy history  -elevate legs  -complete abx therapy with cefepime  -continue abx coverage with doxycyline for 7 more days  -monitor temps  -f/u CBC  -supportive care  2. Other issues:   -care per medicine

## 2021-09-10 NOTE — DISCHARGE NOTE PROVIDER - NSDCMRMEDTOKEN_GEN_ALL_CORE_FT
acetaminophen 325 mg oral tablet: 2 tab(s) orally every 6 hours, As Needed - for mild pain  aspirin 81 mg oral delayed release tablet: 1 tab(s) orally once a day  atorvastatin 40 mg oral tablet: 1 tab(s) orally once a day (at bedtime)  cefuroxime 250 mg oral tablet: 1 tab(s) orally 2 times a day   clopidogrel 75 mg oral tablet: 1 tab(s) orally once a day; start on 4/4/21  cyanocobalamin 1000 mcg oral tablet: 1 tab(s) orally once a day  doxycycline hyclate 100 mg oral capsule: 1 cap(s) orally 2 times a day   DULoxetine 60 mg oral delayed release capsule: 1 cap(s) orally once a day  ferrous sulfate 325 mg (65 mg elemental iron) oral tablet: 1 tab(s) orally once a day  furosemide 40 mg oral tablet: 1 tab(s) orally 2 times a day  gabapentin 300 mg oral capsule: 1 cap(s) orally 2 times a day in the morning and evening   ***9am, 6pm***  gabapentin 600 mg oral tablet: 1 tab(s) orally once a day (at bedtime)  metoprolol succinate 50 mg oral tablet, extended release: 1.5 tab(s) orally once a day  Multiple Vitamins oral tablet: 1 tab(s) orally once a day  pantoprazole 40 mg oral delayed release tablet: 1 tab(s) orally 2 times a day  tamsulosin 0.4 mg oral capsule: 1 cap(s) orally once a day (at bedtime)  Ultracet 37.5 mg-325 mg oral tablet: 1 tab(s) orally 2 times a day, As Needed for pain

## 2021-09-10 NOTE — PROGRESS NOTE ADULT - NUTRITIONAL ASSESSMENT
This patient has been assessed with a concern for Malnutrition and has been determined to have a diagnosis/diagnoses of Severe protein-calorie malnutrition.    This patient is being managed with:   Diet DASH/TLC-  Sodium & Cholesterol Restricted  Entered: Sep  4 2021  4:37PM    

## 2021-09-14 DIAGNOSIS — N17.9 ACUTE KIDNEY FAILURE, UNSPECIFIED: ICD-10-CM

## 2021-09-14 DIAGNOSIS — A41.9 SEPSIS, UNSPECIFIED ORGANISM: ICD-10-CM

## 2021-09-14 DIAGNOSIS — Z79.01 LONG TERM (CURRENT) USE OF ANTICOAGULANTS: ICD-10-CM

## 2021-09-14 DIAGNOSIS — N40.0 BENIGN PROSTATIC HYPERPLASIA WITHOUT LOWER URINARY TRACT SYMPTOMS: ICD-10-CM

## 2021-09-14 DIAGNOSIS — E43 UNSPECIFIED SEVERE PROTEIN-CALORIE MALNUTRITION: ICD-10-CM

## 2021-09-14 DIAGNOSIS — D64.9 ANEMIA, UNSPECIFIED: ICD-10-CM

## 2021-09-14 DIAGNOSIS — N39.0 URINARY TRACT INFECTION, SITE NOT SPECIFIED: ICD-10-CM

## 2021-09-14 DIAGNOSIS — I73.9 PERIPHERAL VASCULAR DISEASE, UNSPECIFIED: ICD-10-CM

## 2021-09-14 DIAGNOSIS — M19.90 UNSPECIFIED OSTEOARTHRITIS, UNSPECIFIED SITE: ICD-10-CM

## 2021-09-14 DIAGNOSIS — I71.4 ABDOMINAL AORTIC ANEURYSM, WITHOUT RUPTURE: ICD-10-CM

## 2021-09-14 DIAGNOSIS — Z79.82 LONG TERM (CURRENT) USE OF ASPIRIN: ICD-10-CM

## 2021-09-14 DIAGNOSIS — N28.1 CYST OF KIDNEY, ACQUIRED: ICD-10-CM

## 2021-09-14 DIAGNOSIS — I13.0 HYPERTENSIVE HEART AND CHRONIC KIDNEY DISEASE WITH HEART FAILURE AND STAGE 1 THROUGH STAGE 4 CHRONIC KIDNEY DISEASE, OR UNSPECIFIED CHRONIC KIDNEY DISEASE: ICD-10-CM

## 2021-09-14 DIAGNOSIS — Z88.0 ALLERGY STATUS TO PENICILLIN: ICD-10-CM

## 2021-09-14 DIAGNOSIS — L03.115 CELLULITIS OF RIGHT LOWER LIMB: ICD-10-CM

## 2021-09-14 DIAGNOSIS — F03.90 UNSPECIFIED DEMENTIA WITHOUT BEHAVIORAL DISTURBANCE: ICD-10-CM

## 2021-09-14 DIAGNOSIS — Z86.010 PERSONAL HISTORY OF COLONIC POLYPS: ICD-10-CM

## 2021-09-14 DIAGNOSIS — N18.4 CHRONIC KIDNEY DISEASE, STAGE 4 (SEVERE): ICD-10-CM

## 2021-09-14 DIAGNOSIS — Z95.5 PRESENCE OF CORONARY ANGIOPLASTY IMPLANT AND GRAFT: ICD-10-CM

## 2021-09-14 DIAGNOSIS — I50.33 ACUTE ON CHRONIC DIASTOLIC (CONGESTIVE) HEART FAILURE: ICD-10-CM

## 2021-09-14 DIAGNOSIS — N32.89 OTHER SPECIFIED DISORDERS OF BLADDER: ICD-10-CM

## 2021-09-14 DIAGNOSIS — G93.40 ENCEPHALOPATHY, UNSPECIFIED: ICD-10-CM

## 2021-09-14 DIAGNOSIS — E78.5 HYPERLIPIDEMIA, UNSPECIFIED: ICD-10-CM

## 2021-09-14 DIAGNOSIS — I25.10 ATHEROSCLEROTIC HEART DISEASE OF NATIVE CORONARY ARTERY WITHOUT ANGINA PECTORIS: ICD-10-CM

## 2021-09-25 ENCOUNTER — INPATIENT (INPATIENT)
Facility: HOSPITAL | Age: 86
LOS: 4 days | Discharge: HOME | DRG: 689 | End: 2021-09-30
Attending: HOSPITALIST | Admitting: INTERNAL MEDICINE
Payer: MEDICARE

## 2021-09-25 VITALS
RESPIRATION RATE: 20 BRPM | HEART RATE: 100 BPM | TEMPERATURE: 99 F | WEIGHT: 166.89 LBS | DIASTOLIC BLOOD PRESSURE: 57 MMHG | HEIGHT: 70 IN | OXYGEN SATURATION: 100 % | SYSTOLIC BLOOD PRESSURE: 112 MMHG

## 2021-09-25 DIAGNOSIS — Z98.89 OTHER SPECIFIED POSTPROCEDURAL STATES: Chronic | ICD-10-CM

## 2021-09-25 DIAGNOSIS — Z98.890 OTHER SPECIFIED POSTPROCEDURAL STATES: Chronic | ICD-10-CM

## 2021-09-25 DIAGNOSIS — Z95.5 PRESENCE OF CORONARY ANGIOPLASTY IMPLANT AND GRAFT: Chronic | ICD-10-CM

## 2021-09-25 DIAGNOSIS — Z87.19 PERSONAL HISTORY OF OTHER DISEASES OF THE DIGESTIVE SYSTEM: Chronic | ICD-10-CM

## 2021-09-25 PROCEDURE — 99285 EMERGENCY DEPT VISIT HI MDM: CPT

## 2021-09-25 NOTE — ED ADULT TRIAGE NOTE - CHIEF COMPLAINT QUOTE
Patient from assisted living sent for AMS.  Patient also has new right arm tremor.  No complaints.  A&Ox2.

## 2021-09-25 NOTE — ED PROVIDER NOTE - NSICDXPASTMEDICALHX_GEN_ALL_CORE_FT
PAST MEDICAL HISTORY:  AAA (abdominal aortic aneurysm) without rupture     BPH (benign prostatic hyperplasia)     CAD (coronary artery disease)     Cellulitis BL    CKD (chronic kidney disease) stage 3, GFR 30-59 ml/min     Colon polyp     HLD (hyperlipidemia)     HTN (hypertension)     Leg swelling related to cellulitis of LLE    OA (osteoarthritis)     PAD (peripheral artery disease)     Stented coronary artery

## 2021-09-25 NOTE — ED PROVIDER NOTE - CLINICAL SUMMARY MEDICAL DECISION MAKING FREE TEXT BOX
89 y/o M with h/o CAD, CKD p/w rigors and AMS.  Will get septic workup to include CBC, UA/Cx, blood cx, lactate.

## 2021-09-25 NOTE — ED PROVIDER NOTE - OBJECTIVE STATEMENT
89 y/o M PMHx significant for CAD, s/p PCI w/ stent placement, Hypertension, Hyperlipidemia, CKD stage 3 (cr BL  2.0), AAA, PAD, OA, BPH, BIBA for AMS.  PT was admitted until 9/10 for complicated UTI with hydronephrosis. 89 y/o M PMHx significant for CAD, s/p PCI w/ stent placement, Hypertension, Hyperlipidemia, CKD stage 3 (cr BL  2.0), AAA, PAD, OA, BPH, BIBA for AMS and new BUE tremors.  Pt is currently AAO x 3 and is denying any CP or SOB.  PT was admitted until 9/10 for complicated UTI with hydronephrosis.  Pt has no other specific complaints.  Pt notes chronic weakness of the LLE.  He normally doesn't ambulate.

## 2021-09-25 NOTE — ED PROVIDER NOTE - NSCAREINITIATED _GEN_ER
CHIEF COMPLAINT:  This is a 52-year-old female complaining of acute severe headache.     SUBJECTIVE:  The patient reports onset of severe headache 5 days ago (Wednesday) while at work.  Headache was accompanied by nausea and was frontal in location migrating for one side to the other.  The headache caused her to leave work to go home.  Patient went back to work on Thursday but once again headache was unbearable and she went home.  She remained home on Friday and stated bed over the weekend.  Today her headache has subsided.  She took Sudafed, Excedrin migraine, Zofran and Fioricet.  Patient reports using Zofran and Fioricet infrequently over the last 6 months.  She has a history of headaches and has always attributed the headaches to sinus issues.  She also reports that change in weather can trigger her headaches.  She has a history of allergic rhinitis and conjunctivitis as well as urticaria.  She is followed by allergist and continues to take multiple antihistamines, Singulair, doxepin and an H2 blocker (famotidine) for her allergic symptoms.  Patient is also undergoing immunotherapy.  Previous workup of her headache was to include MRI but patient did not follow through on imaging study.  Patient denies vertigo, numbness, tingling, weakness in limb, dysarthria, dysphagia or abnormality of gait.  She requests refill of hydroxyzine.    Patient has a history of essential hypertension but is not currently on antihypertensive medication.  Her blood pressure today is 130/84.  Patient is being followed for fibrocystic breast disease by breast specialist.    ROS:  GENERAL: Patient denies fever, chills, night sweats. Patient denies weight gain or loss. Patient denies anorexia, fatigue, weakness or swollen glands.  SKIN: Patient denies rash.  HEENT: Patient denies sore throat, ear pain, hearing loss, nasal congestion, or runny nose. Patient denies visual disturbance, eye irritation or discharge.  LUNGS: Patient denies  Harrison Wayne(Attending) cough, wheeze or hemoptysis.  CARDIOVASCULAR: Patient denies shortness of breath, palpitations, syncope or lower extremity edema.  GI: Patient denies abdominal pain, vomiting, diarrhea, constipation, blood in stool or melena.  Positive for nausea.  GENITOURINARY:  Patient denies dysuria, frequency, hematuria, nocturia, urgency or incontinence.  MUSCULOSKELETAL: Patient denies joint swelling, redness or warmth. Positive for ankle and foot pain.  NEUROLOGIC: Patient denies vertigo, paresthesias, weakness in limb, dysarthria, dysphagia or abnormality of gait.  Positive for headache.  PSYCHIATRIC: Patient denies anxiety, depression, or memory loss.     OBJECTIVE:   GENERAL: Well-developed well-nourished, obese, black female alert and oriented x3, in no acute distress. Memory, judgment and cognition without deficit.  SKIN: Clear without rash. Normal color and tone.  HEENT: Eyes: Clear conjunctivae. Pupils equal reactive to light and accommodation. Extraocular movements intact. No nystagmus.  Fundi not visualized.  Ears: Clear TMs. Clear canals. Nose: Without congestion. Pharynx: Without injection or exudates.  NECK: Supple, normal range of motion. No masses, lymphadenopathy or enlarged thyroid. No JVD. Carotids 2+ and equal. No bruits.  LUNGS: Clear to auscultation. Normal respiratory effort.  CARDIOVASCULAR: Regular rhythm, normal S1, S2 without murmur, gallop or rub.  EXTREMITIES: Without cyanosis, clubbing or edema. Distal pulses 2+ and equal. Normal range of motion in all extremities. No joint effusion, erythema or warmth.  NEUROLOGIC: Cranial nerves II through XII without deficit. Motor strength equal bilaterally. Sensation normal to touch. Deep tendon reflexes 2+ and equal. Gait without abnormality. No tremor. Negative cerebellar signs. Negative Romberg.    ASSESSMENT:  1. Acute nonintractable headache, unspecified headache type    2. Essential hypertension    3. Allergic rhinitis, unspecified seasonality,  unspecified trigger    4. Urticaria      PLAN:  1.  Refill hydroxyzine 25 mg 4 times daily as needed.  2.  Use Fioricet and Zofran as needed.  Report episodes requiring frequent use of medication.  3.  Work excuse given.    This note is generated with speech recognition software and is subject to transcription error and sound alike phrases that may be missed by proofreading.

## 2021-09-26 DIAGNOSIS — N39.0 URINARY TRACT INFECTION, SITE NOT SPECIFIED: ICD-10-CM

## 2021-09-26 LAB
ALBUMIN SERPL ELPH-MCNC: 3.5 G/DL — SIGNIFICANT CHANGE UP (ref 3.3–5)
ALP SERPL-CCNC: 86 U/L — SIGNIFICANT CHANGE UP (ref 40–120)
ALT FLD-CCNC: 20 U/L — SIGNIFICANT CHANGE UP (ref 12–78)
ANION GAP SERPL CALC-SCNC: 5 MMOL/L — SIGNIFICANT CHANGE UP (ref 5–17)
APPEARANCE UR: ABNORMAL
AST SERPL-CCNC: 27 U/L — SIGNIFICANT CHANGE UP (ref 15–37)
BASOPHILS # BLD AUTO: 0.03 K/UL — SIGNIFICANT CHANGE UP (ref 0–0.2)
BASOPHILS NFR BLD AUTO: 0.3 % — SIGNIFICANT CHANGE UP (ref 0–2)
BILIRUB SERPL-MCNC: 0.5 MG/DL — SIGNIFICANT CHANGE UP (ref 0.2–1.2)
BILIRUB UR-MCNC: NEGATIVE — SIGNIFICANT CHANGE UP
BUN SERPL-MCNC: 40 MG/DL — HIGH (ref 7–23)
CALCIUM SERPL-MCNC: 8.4 MG/DL — LOW (ref 8.5–10.1)
CHLORIDE SERPL-SCNC: 99 MMOL/L — SIGNIFICANT CHANGE UP (ref 96–108)
CO2 SERPL-SCNC: 32 MMOL/L — HIGH (ref 22–31)
COLOR SPEC: YELLOW — SIGNIFICANT CHANGE UP
CREAT SERPL-MCNC: 2.51 MG/DL — HIGH (ref 0.5–1.3)
DIFF PNL FLD: ABNORMAL
EOSINOPHIL # BLD AUTO: 0.05 K/UL — SIGNIFICANT CHANGE UP (ref 0–0.5)
EOSINOPHIL NFR BLD AUTO: 0.5 % — SIGNIFICANT CHANGE UP (ref 0–6)
GLUCOSE SERPL-MCNC: 119 MG/DL — HIGH (ref 70–99)
GLUCOSE UR QL: NEGATIVE MG/DL — SIGNIFICANT CHANGE UP
GRAM STN FLD: SIGNIFICANT CHANGE UP
HCT VFR BLD CALC: 31.8 % — LOW (ref 39–50)
HGB BLD-MCNC: 9.9 G/DL — LOW (ref 13–17)
IMM GRANULOCYTES NFR BLD AUTO: 0.4 % — SIGNIFICANT CHANGE UP (ref 0–1.5)
KETONES UR-MCNC: NEGATIVE — SIGNIFICANT CHANGE UP
LACTATE SERPL-SCNC: 1.2 MMOL/L — SIGNIFICANT CHANGE UP (ref 0.7–2)
LEUKOCYTE ESTERASE UR-ACNC: ABNORMAL
LYMPHOCYTES # BLD AUTO: 0.76 K/UL — LOW (ref 1–3.3)
LYMPHOCYTES # BLD AUTO: 7.6 % — LOW (ref 13–44)
MAGNESIUM SERPL-MCNC: 2.2 MG/DL — SIGNIFICANT CHANGE UP (ref 1.6–2.6)
MCHC RBC-ENTMCNC: 28.5 PG — SIGNIFICANT CHANGE UP (ref 27–34)
MCHC RBC-ENTMCNC: 31.1 GM/DL — LOW (ref 32–36)
MCV RBC AUTO: 91.6 FL — SIGNIFICANT CHANGE UP (ref 80–100)
MONOCYTES # BLD AUTO: 0.61 K/UL — SIGNIFICANT CHANGE UP (ref 0–0.9)
MONOCYTES NFR BLD AUTO: 6.1 % — SIGNIFICANT CHANGE UP (ref 2–14)
NEUTROPHILS # BLD AUTO: 8.49 K/UL — HIGH (ref 1.8–7.4)
NEUTROPHILS NFR BLD AUTO: 85.1 % — HIGH (ref 43–77)
NITRITE UR-MCNC: NEGATIVE — SIGNIFICANT CHANGE UP
PH UR: 8 — SIGNIFICANT CHANGE UP (ref 5–8)
PLATELET # BLD AUTO: 207 K/UL — SIGNIFICANT CHANGE UP (ref 150–400)
POTASSIUM SERPL-MCNC: 4.4 MMOL/L — SIGNIFICANT CHANGE UP (ref 3.5–5.3)
POTASSIUM SERPL-SCNC: 4.4 MMOL/L — SIGNIFICANT CHANGE UP (ref 3.5–5.3)
PROT SERPL-MCNC: 7.6 GM/DL — SIGNIFICANT CHANGE UP (ref 6–8.3)
PROT UR-MCNC: 30 MG/DL
RBC # BLD: 3.47 M/UL — LOW (ref 4.2–5.8)
RBC # FLD: 15.5 % — HIGH (ref 10.3–14.5)
SODIUM SERPL-SCNC: 136 MMOL/L — SIGNIFICANT CHANGE UP (ref 135–145)
SP GR SPEC: 1.01 — SIGNIFICANT CHANGE UP (ref 1.01–1.02)
SPECIMEN SOURCE: SIGNIFICANT CHANGE UP
UROBILINOGEN FLD QL: NEGATIVE MG/DL — SIGNIFICANT CHANGE UP
WBC # BLD: 9.98 K/UL — SIGNIFICANT CHANGE UP (ref 3.8–10.5)
WBC # FLD AUTO: 9.98 K/UL — SIGNIFICANT CHANGE UP (ref 3.8–10.5)

## 2021-09-26 PROCEDURE — 36415 COLL VENOUS BLD VENIPUNCTURE: CPT

## 2021-09-26 PROCEDURE — U0005: CPT

## 2021-09-26 PROCEDURE — 70450 CT HEAD/BRAIN W/O DYE: CPT | Mod: 26,MA

## 2021-09-26 PROCEDURE — 76770 US EXAM ABDO BACK WALL COMP: CPT

## 2021-09-26 PROCEDURE — 80048 BASIC METABOLIC PNL TOTAL CA: CPT

## 2021-09-26 PROCEDURE — 87040 BLOOD CULTURE FOR BACTERIA: CPT

## 2021-09-26 PROCEDURE — 97530 THERAPEUTIC ACTIVITIES: CPT | Mod: GP

## 2021-09-26 PROCEDURE — 86769 SARS-COV-2 COVID-19 ANTIBODY: CPT

## 2021-09-26 PROCEDURE — 71045 X-RAY EXAM CHEST 1 VIEW: CPT | Mod: 26

## 2021-09-26 PROCEDURE — 97116 GAIT TRAINING THERAPY: CPT | Mod: GP

## 2021-09-26 PROCEDURE — 73502 X-RAY EXAM HIP UNI 2-3 VIEWS: CPT | Mod: LT

## 2021-09-26 PROCEDURE — 93010 ELECTROCARDIOGRAM REPORT: CPT

## 2021-09-26 PROCEDURE — 73502 X-RAY EXAM HIP UNI 2-3 VIEWS: CPT | Mod: 26,LT

## 2021-09-26 PROCEDURE — 97162 PT EVAL MOD COMPLEX 30 MIN: CPT | Mod: GP

## 2021-09-26 PROCEDURE — 99222 1ST HOSP IP/OBS MODERATE 55: CPT

## 2021-09-26 PROCEDURE — 85027 COMPLETE CBC AUTOMATED: CPT

## 2021-09-26 PROCEDURE — U0003: CPT

## 2021-09-26 RX ORDER — PREGABALIN 225 MG/1
1000 CAPSULE ORAL DAILY
Refills: 0 | Status: DISCONTINUED | OUTPATIENT
Start: 2021-09-26 | End: 2021-09-30

## 2021-09-26 RX ORDER — CLOPIDOGREL BISULFATE 75 MG/1
75 TABLET, FILM COATED ORAL DAILY
Refills: 0 | Status: DISCONTINUED | OUTPATIENT
Start: 2021-09-26 | End: 2021-09-30

## 2021-09-26 RX ORDER — PANTOPRAZOLE SODIUM 20 MG/1
40 TABLET, DELAYED RELEASE ORAL
Refills: 0 | Status: DISCONTINUED | OUTPATIENT
Start: 2021-09-26 | End: 2021-09-30

## 2021-09-26 RX ORDER — MEROPENEM 1 G/30ML
500 INJECTION INTRAVENOUS ONCE
Refills: 0 | Status: COMPLETED | OUTPATIENT
Start: 2021-09-26 | End: 2021-09-26

## 2021-09-26 RX ORDER — METOPROLOL TARTRATE 50 MG
75 TABLET ORAL DAILY
Refills: 0 | Status: DISCONTINUED | OUTPATIENT
Start: 2021-09-26 | End: 2021-09-30

## 2021-09-26 RX ORDER — DULOXETINE HYDROCHLORIDE 30 MG/1
60 CAPSULE, DELAYED RELEASE ORAL DAILY
Refills: 0 | Status: DISCONTINUED | OUTPATIENT
Start: 2021-09-26 | End: 2021-09-30

## 2021-09-26 RX ORDER — SODIUM CHLORIDE 9 MG/ML
1000 INJECTION INTRAMUSCULAR; INTRAVENOUS; SUBCUTANEOUS ONCE
Refills: 0 | Status: DISCONTINUED | OUTPATIENT
Start: 2021-09-26 | End: 2021-09-26

## 2021-09-26 RX ORDER — GABAPENTIN 400 MG/1
300 CAPSULE ORAL
Refills: 0 | Status: DISCONTINUED | OUTPATIENT
Start: 2021-09-26 | End: 2021-09-30

## 2021-09-26 RX ORDER — SODIUM CHLORIDE 9 MG/ML
1000 INJECTION INTRAMUSCULAR; INTRAVENOUS; SUBCUTANEOUS
Refills: 0 | Status: DISCONTINUED | OUTPATIENT
Start: 2021-09-26 | End: 2021-09-27

## 2021-09-26 RX ORDER — SODIUM CHLORIDE 9 MG/ML
500 INJECTION INTRAMUSCULAR; INTRAVENOUS; SUBCUTANEOUS ONCE
Refills: 0 | Status: COMPLETED | OUTPATIENT
Start: 2021-09-26 | End: 2021-09-26

## 2021-09-26 RX ORDER — ACETAMINOPHEN 500 MG
650 TABLET ORAL EVERY 6 HOURS
Refills: 0 | Status: DISCONTINUED | OUTPATIENT
Start: 2021-09-26 | End: 2021-09-30

## 2021-09-26 RX ORDER — MEROPENEM 1 G/30ML
500 INJECTION INTRAVENOUS EVERY 12 HOURS
Refills: 0 | Status: DISCONTINUED | OUTPATIENT
Start: 2021-09-26 | End: 2021-09-29

## 2021-09-26 RX ORDER — LANOLIN ALCOHOL/MO/W.PET/CERES
3 CREAM (GRAM) TOPICAL AT BEDTIME
Refills: 0 | Status: DISCONTINUED | OUTPATIENT
Start: 2021-09-26 | End: 2021-09-30

## 2021-09-26 RX ORDER — LACTOBACILLUS ACIDOPHILUS 100MM CELL
1 CAPSULE ORAL DAILY
Refills: 0 | Status: DISCONTINUED | OUTPATIENT
Start: 2021-09-26 | End: 2021-09-30

## 2021-09-26 RX ORDER — TAMSULOSIN HYDROCHLORIDE 0.4 MG/1
0.4 CAPSULE ORAL AT BEDTIME
Refills: 0 | Status: DISCONTINUED | OUTPATIENT
Start: 2021-09-26 | End: 2021-09-30

## 2021-09-26 RX ORDER — MEROPENEM 1 G/30ML
250 INJECTION INTRAVENOUS EVERY 12 HOURS
Refills: 0 | Status: DISCONTINUED | OUTPATIENT
Start: 2021-09-26 | End: 2021-09-26

## 2021-09-26 RX ORDER — ASPIRIN/CALCIUM CARB/MAGNESIUM 324 MG
81 TABLET ORAL DAILY
Refills: 0 | Status: DISCONTINUED | OUTPATIENT
Start: 2021-09-26 | End: 2021-09-30

## 2021-09-26 RX ORDER — ONDANSETRON 8 MG/1
4 TABLET, FILM COATED ORAL EVERY 6 HOURS
Refills: 0 | Status: DISCONTINUED | OUTPATIENT
Start: 2021-09-26 | End: 2021-09-30

## 2021-09-26 RX ORDER — FERROUS SULFATE 325(65) MG
325 TABLET ORAL DAILY
Refills: 0 | Status: DISCONTINUED | OUTPATIENT
Start: 2021-09-26 | End: 2021-09-30

## 2021-09-26 RX ORDER — ATORVASTATIN CALCIUM 80 MG/1
40 TABLET, FILM COATED ORAL AT BEDTIME
Refills: 0 | Status: DISCONTINUED | OUTPATIENT
Start: 2021-09-26 | End: 2021-09-30

## 2021-09-26 RX ORDER — HEPARIN SODIUM 5000 [USP'U]/ML
5000 INJECTION INTRAVENOUS; SUBCUTANEOUS EVERY 12 HOURS
Refills: 0 | Status: DISCONTINUED | OUTPATIENT
Start: 2021-09-26 | End: 2021-09-30

## 2021-09-26 RX ORDER — GABAPENTIN 400 MG/1
600 CAPSULE ORAL AT BEDTIME
Refills: 0 | Status: DISCONTINUED | OUTPATIENT
Start: 2021-09-26 | End: 2021-09-30

## 2021-09-26 RX ADMIN — Medication 81 MILLIGRAM(S): at 12:13

## 2021-09-26 RX ADMIN — PANTOPRAZOLE SODIUM 40 MILLIGRAM(S): 20 TABLET, DELAYED RELEASE ORAL at 22:38

## 2021-09-26 RX ADMIN — Medication 325 MILLIGRAM(S): at 12:13

## 2021-09-26 RX ADMIN — HEPARIN SODIUM 5000 UNIT(S): 5000 INJECTION INTRAVENOUS; SUBCUTANEOUS at 12:13

## 2021-09-26 RX ADMIN — ATORVASTATIN CALCIUM 40 MILLIGRAM(S): 80 TABLET, FILM COATED ORAL at 22:38

## 2021-09-26 RX ADMIN — Medication 1 TABLET(S): at 12:13

## 2021-09-26 RX ADMIN — CLOPIDOGREL BISULFATE 75 MILLIGRAM(S): 75 TABLET, FILM COATED ORAL at 14:28

## 2021-09-26 RX ADMIN — SODIUM CHLORIDE 500 MILLILITER(S): 9 INJECTION INTRAMUSCULAR; INTRAVENOUS; SUBCUTANEOUS at 02:30

## 2021-09-26 RX ADMIN — MEROPENEM 100 MILLIGRAM(S): 1 INJECTION INTRAVENOUS at 20:13

## 2021-09-26 RX ADMIN — Medication 75 MILLIGRAM(S): at 12:13

## 2021-09-26 RX ADMIN — GABAPENTIN 600 MILLIGRAM(S): 400 CAPSULE ORAL at 22:38

## 2021-09-26 RX ADMIN — SODIUM CHLORIDE 50 MILLILITER(S): 9 INJECTION INTRAMUSCULAR; INTRAVENOUS; SUBCUTANEOUS at 12:16

## 2021-09-26 RX ADMIN — PANTOPRAZOLE SODIUM 40 MILLIGRAM(S): 20 TABLET, DELAYED RELEASE ORAL at 12:13

## 2021-09-26 RX ADMIN — DULOXETINE HYDROCHLORIDE 60 MILLIGRAM(S): 30 CAPSULE, DELAYED RELEASE ORAL at 14:28

## 2021-09-26 RX ADMIN — HEPARIN SODIUM 5000 UNIT(S): 5000 INJECTION INTRAVENOUS; SUBCUTANEOUS at 22:38

## 2021-09-26 RX ADMIN — MEROPENEM 100 MILLIGRAM(S): 1 INJECTION INTRAVENOUS at 07:02

## 2021-09-26 RX ADMIN — GABAPENTIN 300 MILLIGRAM(S): 400 CAPSULE ORAL at 17:46

## 2021-09-26 RX ADMIN — PREGABALIN 1000 MICROGRAM(S): 225 CAPSULE ORAL at 14:28

## 2021-09-26 RX ADMIN — SODIUM CHLORIDE 1000 MILLILITER(S): 9 INJECTION INTRAMUSCULAR; INTRAVENOUS; SUBCUTANEOUS at 02:02

## 2021-09-26 RX ADMIN — TAMSULOSIN HYDROCHLORIDE 0.4 MILLIGRAM(S): 0.4 CAPSULE ORAL at 22:38

## 2021-09-26 NOTE — ED ADULT NURSE NOTE - OBJECTIVE STATEMENT
Pt presents to ED for altered mental status from assisted living. Pt is A+Ox2, calm and cooperative. Pt states that a tremor in both hands started yesterday. Pt denies headache, weakness, LOC, fever/chills, chest pain, shortness of breath, abdominal pain. Pt was recently discharged from  for UTI. Pt also states that he has a "dead left leg" that he is unable to move for the past year.

## 2021-09-26 NOTE — ED ADULT NURSE REASSESSMENT NOTE - NS ED NURSE REASSESS COMMENT FT1
pt aaox2, pt resting comfortably on stretcher, no complaints.  pt receiving fluids.  will con't to monitor

## 2021-09-26 NOTE — H&P ADULT - HISTORY OF PRESENT ILLNESS
89/ M PMHx of recent right leg cellulitis, recent UTI, right hydronephrosis, CAD, s/p PCI w/ stent placement, Hypertension, Hyperlipidemia, CKD stage 4 (cr BL  2.0), AAA, PAD, OA, BPH,  was brought to ED for AMS and new tremors of upper extremity.   PT was admitted until 9/10 for complicated UTI with hydronephrosis.  Pt has no other specific complaints.  Pt notes chronic weakness of the LLE.  He normally doesn't ambulate. He is more awake and alert at this time and responds to questions. No c/o cp/abd pain/sob/n/v.     Cr increased to 2.51.    Given meropenem in ED.     89/ M PMHx of recent right leg cellulitis, recent UTI, right hydronephrosis, CAD, s/p PCI w/ stent placement, Hypertension, left leg weakness x 6 months,  Hyperlipidemia, CKD stage 4 (cr BL  2.0), AAA, PAD, OA, BPH,  was brought to ED for AMS and new tremors of upper extremity.   PT was admitted until 9/10 for complicated UTI with hydronephrosis.  Pt has no other specific complaints.  Pt notes chronic weakness of the LLE.  He normally doesn't ambulate. He is more awake and alert at this time and responds to questions. No c/o cp/abd pain/sob/n/v.     Cr increased to 2.51.    Given meropenem in ED.

## 2021-09-26 NOTE — H&P ADULT - NSHPLABSRESULTS_GEN_ALL_CORE
Lab Results:  CBC  CBC Full  -  ( 26 Sep 2021 01:44 )  WBC Count : 9.98 K/uL  RBC Count : 3.47 M/uL  Hemoglobin : 9.9 g/dL  Hematocrit : 31.8 %  Platelet Count - Automated : 207 K/uL  Mean Cell Volume : 91.6 fl  Mean Cell Hemoglobin : 28.5 pg  Mean Cell Hemoglobin Concentration : 31.1 gm/dL  Auto Neutrophil # : 8.49 K/uL  Auto Lymphocyte # : 0.76 K/uL  Auto Monocyte # : 0.61 K/uL  Auto Eosinophil # : 0.05 K/uL  Auto Basophil # : 0.03 K/uL  Auto Neutrophil % : 85.1 %  Auto Lymphocyte % : 7.6 %  Auto Monocyte % : 6.1 %  Auto Eosinophil % : 0.5 %  Auto Basophil % : 0.3 %    .		Differential:	[] Automated		[] Manual  Chemistry                        9.9    9.98  )-----------( 207      ( 26 Sep 2021 01:44 )             31.8         136  |  99  |  40<H>  ----------------------------<  119<H>  4.4   |  32<H>  |  2.51<H>    Ca    8.4<L>      26 Sep 2021 01:44  Mg     2.2         TPro  7.6  /  Alb  3.5  /  TBili  0.5  /  DBili  x   /  AST  27  /  ALT  20  /  AlkPhos  86      LIVER FUNCTIONS - ( 26 Sep 2021 01:44 )  Alb: 3.5 g/dL / Pro: 7.6 gm/dL / ALK PHOS: 86 U/L / ALT: 20 U/L / AST: 27 U/L / GGT: x             Urinalysis Basic - ( 26 Sep 2021 03:56 )    Color: Yellow / Appearance: Slightly Turbid / S.010 / pH: x  Gluc: x / Ketone: Negative  / Bili: Negative / Urobili: Negative mg/dL   Blood: x / Protein: 30 mg/dL / Nitrite: Negative   Leuk Esterase: Moderate / RBC: 6-10 /HPF / WBC >50   Sq Epi: x / Non Sq Epi: Negative / Bacteria: Many      RADIOLOGY RESULTS:    cxr : clear    < from: CT Head No Cont (21 @ 03:25) >    IMPRESSION:    No intracranial hemorrhage, mass effect or large acute cortical infarct.    < end of copied text >

## 2021-09-26 NOTE — INPATIENT CERTIFICATION FOR MEDICARE PATIENTS - PHYSICIAN CONCUR
I concur with the Admission Order and I certify that services are provided in accordance with Section 42 CFR § 412.3
7

## 2021-09-26 NOTE — H&P ADULT - ASSESSMENT
89/ M PMHx of recent right leg cellulitis, recent UTI, right hydronephrosis, CAD, s/p PCI w/ stent placement, Hypertension, Hyperlipidemia, CKD stage 4 (cr BL  2.0), AAA, PAD, OA, BPH,  was brought to ED for AMS and new tremors of upper extremity.   PT was admitted until 9/10 for complicated UTI with hydronephrosis.  Pt has no other specific complaints.  Pt notes chronic weakness of the LLE.  He normally doesn't ambulate. He is more awake and alert at this time and responds to questions. No c/o cp/abd pain/sob/n/v.     Cr increased to 2.51.    Given meropenem in ED.    Pt admitted with     1) AMS / Metabolic encephalopathy:  CT head neg  sec to uti  treat uti  improving    2) UTI;  admit to med floor  vitals stable  iv meropenem  r/o MDR/ESBL UTI  last admission urine cx showed > 3 organisms  ID eval  contact precautions till urine cx available and rules out MDR/ESBL   f/u blood cx  low dose iv fluids    3) Worsening BUN/Cr sec to dehydration and uti + CKD 4: Hx of right sided hydronephrosis   admit  hold lasix x 24 hrs  low dose iv fluids x 20 hrs only  BMP in am  restart lasix at a lower dose, current home dose is 40 mg po q 12 hrs    4) CAD/stent:   cont asa, plavix, BB, statin    5) DVT PPX: heparin s/q    poc discussed with pt, his son, Joseph [phone], team.

## 2021-09-26 NOTE — H&P ADULT - NSHPPHYSICALEXAM_GEN_ALL_CORE
PHYSICAL EXAM:    Daily Height in cm: 177.8 (25 Sep 2021 23:46)    Daily     ICU Vital Signs Last 24 Hrs  T(C): 36.8 (26 Sep 2021 07:58), Max: 37.4 (25 Sep 2021 23:46)  T(F): 98.2 (26 Sep 2021 07:58), Max: 99.4 (25 Sep 2021 23:46)  HR: 69 (26 Sep 2021 07:58) (69 - 100)  BP: 118/50 (26 Sep 2021 07:58) (98/72 - 118/50)  BP(mean): 70 (26 Sep 2021 07:58) (70 - 70)  ABP: --  ABP(mean): --  RR: 16 (26 Sep 2021 07:58) (16 - 20)  SpO2: 100% (26 Sep 2021 07:58) (100% - 100%)      Constitutional: Weak appearing  HEENT: Atraumatic, MADISYN, Normal, No congestion  Respiratory: Breath Sounds normal, no rhonchi/wheeze  Cardiovascular: N S1S2; CLIFF present  Gastrointestinal: Abdomen soft, non tender, Bowel Sounds present  Extremities: No edema, peripheral pulses present  Neurological: AAO x 1, no gross focal motor deficits  Skin: Non cellulitic, no rash, ulcers  Lymph Nodes: No lymphadenopathy noted  Back: No CVA tenderness   Musculoskeletal: non tender  Breasts: Deferred  Genitourinary: deferred  Rectal: Deferred PHYSICAL EXAM:    Daily Height in cm: 177.8 (25 Sep 2021 23:46)    Daily     ICU Vital Signs Last 24 Hrs  T(C): 36.8 (26 Sep 2021 07:58), Max: 37.4 (25 Sep 2021 23:46)  T(F): 98.2 (26 Sep 2021 07:58), Max: 99.4 (25 Sep 2021 23:46)  HR: 69 (26 Sep 2021 07:58) (69 - 100)  BP: 118/50 (26 Sep 2021 07:58) (98/72 - 118/50)  BP(mean): 70 (26 Sep 2021 07:58) (70 - 70)  ABP: --  ABP(mean): --  RR: 16 (26 Sep 2021 07:58) (16 - 20)  SpO2: 100% (26 Sep 2021 07:58) (100% - 100%)      Constitutional: Weak appearing  HEENT: Atraumatic, MADISYN, Normal, No congestion  Respiratory: Breath Sounds normal, no rhonchi/wheeze  Cardiovascular: N S1S2; CLIFF present  Gastrointestinal: Abdomen soft, non tender, Bowel Sounds present  Extremities: No edema, peripheral pulses present  Neurological: AAO x 1, chr left leg paresis   Skin: Non cellulitic, no rash, ulcers  Lymph Nodes: No lymphadenopathy noted  Back: No CVA tenderness   Musculoskeletal: non tender  Breasts: Deferred  Genitourinary: deferred  Rectal: Deferred

## 2021-09-27 LAB
ANION GAP SERPL CALC-SCNC: 4 MMOL/L — LOW (ref 5–17)
BUN SERPL-MCNC: 43 MG/DL — HIGH (ref 7–23)
CALCIUM SERPL-MCNC: 8.4 MG/DL — LOW (ref 8.5–10.1)
CHLORIDE SERPL-SCNC: 104 MMOL/L — SIGNIFICANT CHANGE UP (ref 96–108)
CO2 SERPL-SCNC: 32 MMOL/L — HIGH (ref 22–31)
COVID-19 SPIKE DOMAIN AB INTERP: POSITIVE
COVID-19 SPIKE DOMAIN ANTIBODY RESULT: 7.05 U/ML — HIGH
CREAT SERPL-MCNC: 2.72 MG/DL — HIGH (ref 0.5–1.3)
GLUCOSE SERPL-MCNC: 107 MG/DL — HIGH (ref 70–99)
HCT VFR BLD CALC: 27.4 % — LOW (ref 39–50)
HGB BLD-MCNC: 8.5 G/DL — LOW (ref 13–17)
MCHC RBC-ENTMCNC: 28.5 PG — SIGNIFICANT CHANGE UP (ref 27–34)
MCHC RBC-ENTMCNC: 31 GM/DL — LOW (ref 32–36)
MCV RBC AUTO: 91.9 FL — SIGNIFICANT CHANGE UP (ref 80–100)
METHOD TYPE: SIGNIFICANT CHANGE UP
P MIRABILIS DNA BLD POS QL NAA+PROBE: SIGNIFICANT CHANGE UP
PLATELET # BLD AUTO: 143 K/UL — LOW (ref 150–400)
POTASSIUM SERPL-MCNC: 4 MMOL/L — SIGNIFICANT CHANGE UP (ref 3.5–5.3)
POTASSIUM SERPL-SCNC: 4 MMOL/L — SIGNIFICANT CHANGE UP (ref 3.5–5.3)
RBC # BLD: 2.98 M/UL — LOW (ref 4.2–5.8)
RBC # FLD: 15.7 % — HIGH (ref 10.3–14.5)
SARS-COV-2 IGG+IGM SERPL QL IA: 7.05 U/ML — HIGH
SARS-COV-2 IGG+IGM SERPL QL IA: POSITIVE
SODIUM SERPL-SCNC: 140 MMOL/L — SIGNIFICANT CHANGE UP (ref 135–145)
WBC # BLD: 8.13 K/UL — SIGNIFICANT CHANGE UP (ref 3.8–10.5)
WBC # FLD AUTO: 8.13 K/UL — SIGNIFICANT CHANGE UP (ref 3.8–10.5)

## 2021-09-27 PROCEDURE — 99232 SBSQ HOSP IP/OBS MODERATE 35: CPT

## 2021-09-27 RX ORDER — ACETAMINOPHEN 500 MG
1000 TABLET ORAL ONCE
Refills: 0 | Status: COMPLETED | OUTPATIENT
Start: 2021-09-27 | End: 2021-09-27

## 2021-09-27 RX ORDER — SODIUM CHLORIDE 9 MG/ML
1000 INJECTION INTRAMUSCULAR; INTRAVENOUS; SUBCUTANEOUS
Refills: 0 | Status: DISCONTINUED | OUTPATIENT
Start: 2021-09-27 | End: 2021-09-28

## 2021-09-27 RX ORDER — MIDODRINE HYDROCHLORIDE 2.5 MG/1
10 TABLET ORAL THREE TIMES A DAY
Refills: 0 | Status: DISCONTINUED | OUTPATIENT
Start: 2021-09-27 | End: 2021-09-27

## 2021-09-27 RX ADMIN — Medication 1000 MILLIGRAM(S): at 05:08

## 2021-09-27 RX ADMIN — Medication 1 TABLET(S): at 09:38

## 2021-09-27 RX ADMIN — GABAPENTIN 300 MILLIGRAM(S): 400 CAPSULE ORAL at 09:37

## 2021-09-27 RX ADMIN — Medication 400 MILLIGRAM(S): at 03:49

## 2021-09-27 RX ADMIN — Medication 81 MILLIGRAM(S): at 09:38

## 2021-09-27 RX ADMIN — PANTOPRAZOLE SODIUM 40 MILLIGRAM(S): 20 TABLET, DELAYED RELEASE ORAL at 11:38

## 2021-09-27 RX ADMIN — GABAPENTIN 300 MILLIGRAM(S): 400 CAPSULE ORAL at 17:47

## 2021-09-27 RX ADMIN — DULOXETINE HYDROCHLORIDE 60 MILLIGRAM(S): 30 CAPSULE, DELAYED RELEASE ORAL at 09:39

## 2021-09-27 RX ADMIN — MEROPENEM 100 MILLIGRAM(S): 1 INJECTION INTRAVENOUS at 09:37

## 2021-09-27 RX ADMIN — HEPARIN SODIUM 5000 UNIT(S): 5000 INJECTION INTRAVENOUS; SUBCUTANEOUS at 09:37

## 2021-09-27 RX ADMIN — HEPARIN SODIUM 5000 UNIT(S): 5000 INJECTION INTRAVENOUS; SUBCUTANEOUS at 22:31

## 2021-09-27 RX ADMIN — SODIUM CHLORIDE 60 MILLILITER(S): 9 INJECTION INTRAMUSCULAR; INTRAVENOUS; SUBCUTANEOUS at 12:15

## 2021-09-27 RX ADMIN — Medication 650 MILLIGRAM(S): at 03:08

## 2021-09-27 RX ADMIN — GABAPENTIN 600 MILLIGRAM(S): 400 CAPSULE ORAL at 22:31

## 2021-09-27 RX ADMIN — Medication 3 MILLIGRAM(S): at 22:31

## 2021-09-27 RX ADMIN — ATORVASTATIN CALCIUM 40 MILLIGRAM(S): 80 TABLET, FILM COATED ORAL at 22:31

## 2021-09-27 RX ADMIN — TAMSULOSIN HYDROCHLORIDE 0.4 MILLIGRAM(S): 0.4 CAPSULE ORAL at 22:31

## 2021-09-27 RX ADMIN — Medication 650 MILLIGRAM(S): at 02:35

## 2021-09-27 RX ADMIN — Medication 1 TABLET(S): at 09:37

## 2021-09-27 RX ADMIN — Medication 650 MILLIGRAM(S): at 23:45

## 2021-09-27 RX ADMIN — CLOPIDOGREL BISULFATE 75 MILLIGRAM(S): 75 TABLET, FILM COATED ORAL at 09:38

## 2021-09-27 RX ADMIN — PREGABALIN 1000 MICROGRAM(S): 225 CAPSULE ORAL at 09:38

## 2021-09-27 RX ADMIN — Medication 325 MILLIGRAM(S): at 09:37

## 2021-09-27 RX ADMIN — PANTOPRAZOLE SODIUM 40 MILLIGRAM(S): 20 TABLET, DELAYED RELEASE ORAL at 22:33

## 2021-09-27 RX ADMIN — MEROPENEM 100 MILLIGRAM(S): 1 INJECTION INTRAVENOUS at 22:31

## 2021-09-27 NOTE — PHYSICAL THERAPY INITIAL EVALUATION ADULT - GENERAL OBSERVATIONS, REHAB EVAL
Pt seen on 2SW, supine in bed, awaiting X ray L hip, noted L LE ER and shorter than R w/ pt c/o 10/10 pain. Held OOB awaiting X ray results suspected FX L HIP.

## 2021-09-27 NOTE — PHYSICAL THERAPY INITIAL EVALUATION ADULT - ADDITIONAL COMMENTS
chronic weakness of the LLE.  He normally doesn't ambulate as per chart "chronic weakness of the LLE.  He normally doesn't ambulate"  Pt states he pushes his chair when he ambulates

## 2021-09-27 NOTE — PHYSICAL THERAPY INITIAL EVALUATION ADULT - PERTINENT HX OF CURRENT PROBLEM, REHAB EVAL
Pt is a 89 y/o F,  was brought to ED for AMS and new tremors of upper extremity.  PMHx of recent right leg cellulitis, recent UTI, right hydronephrosis, CAD, s/p PCI w/ stent placement, Hypertension, Hyperlipidemia, CKD stage 4 (cr BL  2.0), AAA, PAD, OA, BPH,.  CT head neg acute findings. Pt has chronic weakness of the LLE.  He normally doesn't ambulate

## 2021-09-27 NOTE — PROGRESS NOTE ADULT - SUBJECTIVE AND OBJECTIVE BOX
HPI: 89/ M PMHx of recent right leg cellulitis, recent UTI, right hydronephrosis, CAD, s/p PCI w/ stent placement, Hypertension, left leg weakness x 6 months,  Hyperlipidemia, CKD stage 4 (cr BL  2.0), AAA, PAD, OA, BPH,  was brought to ED for AMS and new tremors of upper extremity.   PT was admitted until 9/10 for complicated UTI with hydronephrosis.  Pt has no other specific complaints.  Pt notes chronic weakness of the LLE.  He normally doesn't ambulate. He is more awake and alert at this time and responds to questions. No c/o cp/abd pain/sob/n/v.     Cr increased to 2.51.    Given meropenem in ED.    : had temp of 101.2 last night  blood cx : Proteus mirabilis  Cr increased to 2.72; increase iv fluids to 60 ml/hr x 24 hrs      PHYSICAL EXAM:        Vital Signs Last 24 Hrs  T(C): 37.1 (27 Sep 2021 10:17), Max: 39.3 (26 Sep 2021 17:27)  T(F): 98.7 (27 Sep 2021 10:17), Max: 102.7 (26 Sep 2021 17:27)  HR: 68 (27 Sep 2021 08:37) (68 - 104)  BP: 100/49 (27 Sep 2021 08:37) (98/37 - 138/54)  RR: 17 (27 Sep 2021 08:37) (17 - 20)  SpO2: 97% (27 Sep 2021 08:37) (93% - 98%)      Constitutional: Weak appearing  HEENT: Atraumatic, MADISYN, Normal, No congestion  Respiratory: Breath Sounds normal, no rhonchi/wheeze  Cardiovascular: N S1S2; CLIFF present  Gastrointestinal: Abdomen soft, non tender, Bowel Sounds present  Extremities: No edema, peripheral pulses present  Neurological: AAO x 0, no gross focal motor deficits  Skin: Non cellulitic, no rash, ulcers  Lymph Nodes: No lymphadenopathy noted  Back: No CVA tenderness   Musculoskeletal: non tender  Breasts: Deferred  Genitourinary: deferred  Rectal: Deferred                          8.5    8.13  )-----------( 143      ( 27 Sep 2021 09:19 )             27.4       CBC Full  -  ( 27 Sep 2021 09:19 )  WBC Count : 8.13 K/uL  RBC Count : 2.98 M/uL  Hemoglobin : 8.5 g/dL  Hematocrit : 27.4 %  Platelet Count - Automated : 143 K/uL  Mean Cell Volume : 91.9 fl  Mean Cell Hemoglobin : 28.5 pg  Mean Cell Hemoglobin Concentration : 31.0 gm/dL  Auto Neutrophil # : x  Auto Lymphocyte # : x  Auto Monocyte # : x  Auto Eosinophil # : x  Auto Basophil # : x  Auto Neutrophil % : x  Auto Lymphocyte % : x  Auto Monocyte % : x  Auto Eosinophil % : x  Auto Basophil % : x          140  |  104  |  43<H>  ----------------------------<  107<H>  4.0   |  32<H>  |  2.72<H>    Ca    8.4<L>      27 Sep 2021 09:19  Mg     2.2         TPro  7.6  /  Alb  3.5  /  TBili  0.5  /  DBili  x   /  AST  27  /  ALT  20  /  AlkPhos  86        LIVER FUNCTIONS - ( 26 Sep 2021 01:44 )  Alb: 3.5 g/dL / Pro: 7.6 gm/dL / ALK PHOS: 86 U/L / ALT: 20 U/L / AST: 27 U/L / GGT: x                       Urinalysis Basic - ( 26 Sep 2021 03:56 )    Color: Yellow / Appearance: Slightly Turbid / S.010 / pH: x  Gluc: x / Ketone: Negative  / Bili: Negative / Urobili: Negative mg/dL   Blood: x / Protein: 30 mg/dL / Nitrite: Negative   Leuk Esterase: Moderate / RBC: 6-10 /HPF / WBC >50   Sq Epi: x / Non Sq Epi: Negative / Bacteria: Many            MEDICATIONS  (STANDING):  aspirin enteric coated 81 milliGRAM(s) Oral daily  atorvastatin 40 milliGRAM(s) Oral at bedtime  clopidogrel Tablet 75 milliGRAM(s) Oral daily  cyanocobalamin 1000 MICROGram(s) Oral daily  DULoxetine 60 milliGRAM(s) Oral daily  ferrous    sulfate 325 milliGRAM(s) Oral daily  gabapentin 300 milliGRAM(s) Oral <User Schedule>  gabapentin 600 milliGRAM(s) Oral at bedtime  heparin   Injectable 5000 Unit(s) SubCutaneous every 12 hours  lactobacillus acidophilus 1 Tablet(s) Oral daily  meropenem  IVPB 500 milliGRAM(s) IV Intermittent every 12 hours  metoprolol succinate ER 75 milliGRAM(s) Oral daily  multivitamin 1 Tablet(s) Oral daily  pantoprazole    Tablet 40 milliGRAM(s) Oral two times a day  sodium chloride 0.9%. 1000 milliLiter(s) (60 mL/Hr) IV Continuous <Continuous>  tamsulosin 0.4 milliGRAM(s) Oral at bedtime

## 2021-09-27 NOTE — CHART NOTE - NSCHARTNOTEFT_GEN_A_CORE
Subjective: Called overnight for critical value. Blood culture was positive for anaerobic bottle, gram negative rods. Patient is already on appropriate antibiotic regimen with ID already on board. No new symptoms reported at this time. No additional complaints.    ICU Vital Signs Last 24 Hrs  T(C): 38.4 (27 Sep 2021 02:33), Max: 39.3 (26 Sep 2021 17:27)  T(F): 101.2 (27 Sep 2021 02:33), Max: 102.7 (26 Sep 2021 17:27)  HR: 70 (27 Sep 2021 02:33) (69 - 104)  BP: 119/66 (27 Sep 2021 02:33) (98/37 - 138/54)  BP(mean): 70 (26 Sep 2021 07:58) (70 - 70)  ABP: --  ABP(mean): --  RR: 18 (27 Sep 2021 02:33) (16 - 20)  SpO2: 98% (27 Sep 2021 02:33) (93% - 100%)    PLAN  #Blood culture positive for anaerobic bottle, gram negative rods.  - C/w IV meropnem  - F/u ID consult Subjective: Called overnight for critical value. Blood culture was positive for anaerobic bottle, gram negative rods. Patient is already on appropriate antibiotic regimen with ID already on board. He is also complaining of lower extremity pain. No other new symptoms reported at this time. No additional complaints currently.      ICU Vital Signs Last 24 Hrs  T(C): 38.4 (27 Sep 2021 02:33), Max: 39.3 (26 Sep 2021 17:27)  T(F): 101.2 (27 Sep 2021 02:33), Max: 102.7 (26 Sep 2021 17:27)  HR: 70 (27 Sep 2021 02:33) (69 - 104)  BP: 119/66 (27 Sep 2021 02:33) (98/37 - 138/54)  BP(mean): 70 (26 Sep 2021 07:58) (70 - 70)  ABP: --  ABP(mean): --  RR: 18 (27 Sep 2021 02:33) (16 - 20)  SpO2: 98% (27 Sep 2021 02:33) (93% - 100%)    PLAN  #Blood culture positive for anaerobic bottle, gram negative rods.  - C/w IV meropnem  - F/u ID consult    #Lower extremity pain  - IV tylenol

## 2021-09-27 NOTE — PHYSICAL THERAPY INITIAL EVALUATION ADULT - ACTIVE RANGE OF MOTION EXAMINATION, REHAB EVAL
ankle DF limited to neutral,/bilateral upper extremity Active ROM was WFL (within functional limits)/Right LE Active ROM was WFL (within functional limits)

## 2021-09-27 NOTE — PHYSICAL THERAPY INITIAL EVALUATION ADULT - MODALITIES TREATMENT COMMENTS
Pt supine in bed, +alarm, Reports pain, L LE same as prior to session. RN aware, Will follow as per results of X ray.

## 2021-09-28 LAB
-  AMIKACIN: SIGNIFICANT CHANGE UP
-  AMPICILLIN/SULBACTAM: SIGNIFICANT CHANGE UP
-  AMPICILLIN: SIGNIFICANT CHANGE UP
-  AZTREONAM: SIGNIFICANT CHANGE UP
-  CEFAZOLIN: SIGNIFICANT CHANGE UP
-  CEFEPIME: SIGNIFICANT CHANGE UP
-  CEFOXITIN: SIGNIFICANT CHANGE UP
-  CEFTRIAXONE: SIGNIFICANT CHANGE UP
-  CIPROFLOXACIN: SIGNIFICANT CHANGE UP
-  ERTAPENEM: SIGNIFICANT CHANGE UP
-  GENTAMICIN: SIGNIFICANT CHANGE UP
-  LEVOFLOXACIN: SIGNIFICANT CHANGE UP
-  MEROPENEM: SIGNIFICANT CHANGE UP
-  PIPERACILLIN/TAZOBACTAM: SIGNIFICANT CHANGE UP
-  TOBRAMYCIN: SIGNIFICANT CHANGE UP
-  TRIMETHOPRIM/SULFAMETHOXAZOLE: SIGNIFICANT CHANGE UP
ANION GAP SERPL CALC-SCNC: 7 MMOL/L — SIGNIFICANT CHANGE UP (ref 5–17)
BUN SERPL-MCNC: 35 MG/DL — HIGH (ref 7–23)
CALCIUM SERPL-MCNC: 8.2 MG/DL — LOW (ref 8.5–10.1)
CHLORIDE SERPL-SCNC: 103 MMOL/L — SIGNIFICANT CHANGE UP (ref 96–108)
CO2 SERPL-SCNC: 28 MMOL/L — SIGNIFICANT CHANGE UP (ref 22–31)
CREAT SERPL-MCNC: 2.45 MG/DL — HIGH (ref 0.5–1.3)
CULTURE RESULTS: SIGNIFICANT CHANGE UP
GLUCOSE SERPL-MCNC: 125 MG/DL — HIGH (ref 70–99)
HCT VFR BLD CALC: 28.8 % — LOW (ref 39–50)
HGB BLD-MCNC: 9 G/DL — LOW (ref 13–17)
MCHC RBC-ENTMCNC: 28.8 PG — SIGNIFICANT CHANGE UP (ref 27–34)
MCHC RBC-ENTMCNC: 31.3 GM/DL — LOW (ref 32–36)
MCV RBC AUTO: 92.3 FL — SIGNIFICANT CHANGE UP (ref 80–100)
METHOD TYPE: SIGNIFICANT CHANGE UP
ORGANISM # SPEC MICROSCOPIC CNT: SIGNIFICANT CHANGE UP
PLATELET # BLD AUTO: 145 K/UL — LOW (ref 150–400)
POTASSIUM SERPL-MCNC: 3.5 MMOL/L — SIGNIFICANT CHANGE UP (ref 3.5–5.3)
POTASSIUM SERPL-SCNC: 3.5 MMOL/L — SIGNIFICANT CHANGE UP (ref 3.5–5.3)
RBC # BLD: 3.12 M/UL — LOW (ref 4.2–5.8)
RBC # FLD: 15.6 % — HIGH (ref 10.3–14.5)
SODIUM SERPL-SCNC: 138 MMOL/L — SIGNIFICANT CHANGE UP (ref 135–145)
SPECIMEN SOURCE: SIGNIFICANT CHANGE UP
WBC # BLD: 4.48 K/UL — SIGNIFICANT CHANGE UP (ref 3.8–10.5)
WBC # FLD AUTO: 4.48 K/UL — SIGNIFICANT CHANGE UP (ref 3.8–10.5)

## 2021-09-28 PROCEDURE — 99232 SBSQ HOSP IP/OBS MODERATE 35: CPT

## 2021-09-28 RX ORDER — SODIUM CHLORIDE 9 MG/ML
1000 INJECTION INTRAMUSCULAR; INTRAVENOUS; SUBCUTANEOUS
Refills: 0 | Status: DISCONTINUED | OUTPATIENT
Start: 2021-09-28 | End: 2021-09-30

## 2021-09-28 RX ADMIN — Medication 1 TABLET(S): at 09:20

## 2021-09-28 RX ADMIN — MEROPENEM 100 MILLIGRAM(S): 1 INJECTION INTRAVENOUS at 09:30

## 2021-09-28 RX ADMIN — Medication 81 MILLIGRAM(S): at 09:18

## 2021-09-28 RX ADMIN — PREGABALIN 1000 MICROGRAM(S): 225 CAPSULE ORAL at 09:19

## 2021-09-28 RX ADMIN — MEROPENEM 100 MILLIGRAM(S): 1 INJECTION INTRAVENOUS at 21:23

## 2021-09-28 RX ADMIN — SODIUM CHLORIDE 60 MILLILITER(S): 9 INJECTION INTRAMUSCULAR; INTRAVENOUS; SUBCUTANEOUS at 21:24

## 2021-09-28 RX ADMIN — HEPARIN SODIUM 5000 UNIT(S): 5000 INJECTION INTRAVENOUS; SUBCUTANEOUS at 21:24

## 2021-09-28 RX ADMIN — TAMSULOSIN HYDROCHLORIDE 0.4 MILLIGRAM(S): 0.4 CAPSULE ORAL at 21:24

## 2021-09-28 RX ADMIN — DULOXETINE HYDROCHLORIDE 60 MILLIGRAM(S): 30 CAPSULE, DELAYED RELEASE ORAL at 09:19

## 2021-09-28 RX ADMIN — Medication 325 MILLIGRAM(S): at 09:19

## 2021-09-28 RX ADMIN — PANTOPRAZOLE SODIUM 40 MILLIGRAM(S): 20 TABLET, DELAYED RELEASE ORAL at 09:19

## 2021-09-28 RX ADMIN — SODIUM CHLORIDE 60 MILLILITER(S): 9 INJECTION INTRAMUSCULAR; INTRAVENOUS; SUBCUTANEOUS at 13:32

## 2021-09-28 RX ADMIN — GABAPENTIN 300 MILLIGRAM(S): 400 CAPSULE ORAL at 09:19

## 2021-09-28 RX ADMIN — PANTOPRAZOLE SODIUM 40 MILLIGRAM(S): 20 TABLET, DELAYED RELEASE ORAL at 21:24

## 2021-09-28 RX ADMIN — Medication 75 MILLIGRAM(S): at 09:29

## 2021-09-28 RX ADMIN — GABAPENTIN 300 MILLIGRAM(S): 400 CAPSULE ORAL at 19:17

## 2021-09-28 RX ADMIN — SODIUM CHLORIDE 60 MILLILITER(S): 9 INJECTION INTRAMUSCULAR; INTRAVENOUS; SUBCUTANEOUS at 03:58

## 2021-09-28 RX ADMIN — CLOPIDOGREL BISULFATE 75 MILLIGRAM(S): 75 TABLET, FILM COATED ORAL at 09:19

## 2021-09-28 RX ADMIN — HEPARIN SODIUM 5000 UNIT(S): 5000 INJECTION INTRAVENOUS; SUBCUTANEOUS at 09:19

## 2021-09-28 RX ADMIN — ATORVASTATIN CALCIUM 40 MILLIGRAM(S): 80 TABLET, FILM COATED ORAL at 21:24

## 2021-09-28 RX ADMIN — GABAPENTIN 600 MILLIGRAM(S): 400 CAPSULE ORAL at 21:24

## 2021-09-28 NOTE — PROGRESS NOTE ADULT - SUBJECTIVE AND OBJECTIVE BOX
HPI: 89/ M PMHx of recent right leg cellulitis, recent UTI, right hydronephrosis, CAD, s/p PCI w/ stent placement, Hypertension, left leg weakness x 6 months,  Hyperlipidemia, CKD stage 4 (cr BL  2.0), AAA, PAD, OA, BPH,  was brought to ED for AMS and new tremors of upper extremity.   PT was admitted until 9/10 for complicated UTI with hydronephrosis.  Pt has no other specific complaints.  Pt notes chronic weakness of the LLE.  He normally doesn't ambulate. He is more awake and alert at this time and responds to questions. No c/o cp/abd pain/sob/n/v.     Cr increased to 2.51.    Given meropenem in ED.    : had temp of 101.2 last night  blood cx : Proteus mirabilis  Cr increased to 2.72; increase iv fluids to 60 ml/hr x 24 hrs    :  cr down to 2.45  urine and blood cx : Proteus mirabilis  renal sono to r/o stone    PHYSICAL EXAM:    Vital Signs Last 24 Hrs  T(C): 36.2 (28 Sep 2021 15:35), Max: 38.6 (27 Sep 2021 23:10)  T(F): 97.1 (28 Sep 2021 15:35), Max: 101.5 (27 Sep 2021 23:10)  HR: 66 (28 Sep 2021 15:35) (66 - 109)  BP: 112/49 (28 Sep 2021 15:35) (99/60 - 120/43)  BP(mean): --  RR: 18 (28 Sep 2021 15:35) (17 - 18)  SpO2: 93% (28 Sep 2021 15:35) (93% - 100%)      Constitutional: Weak appearing  HEENT: Atraumatic, MADISYN, Normal, No congestion  Respiratory: Breath Sounds normal, no rhonchi/wheeze  Cardiovascular: N S1S2; CLIFF present  Gastrointestinal: Abdomen soft, non tender, Bowel Sounds present  Extremities: No edema, peripheral pulses present  Neurological: AAO x 0, no gross focal motor deficits  Skin: Non cellulitic, no rash, ulcers  Lymph Nodes: No lymphadenopathy noted  Back: No CVA tenderness   Musculoskeletal: non tender  Breasts: Deferred  Genitourinary: deferred  Rectal: Deferred                          9.0    4.48  )-----------( 145      ( 28 Sep 2021 10:16 )             28.8   09    138  |  103  |  35<H>  ----------------------------<  125<H>  3.5   |  28  |  2.45<H>    Ca    8.2<L>      28 Sep 2021 10:16                            8.5    8.13  )-----------( 143      ( 27 Sep 2021 09:19 )             27.4       CBC Full  -  ( 27 Sep 2021 09:19 )  WBC Count : 8.13 K/uL  RBC Count : 2.98 M/uL  Hemoglobin : 8.5 g/dL  Hematocrit : 27.4 %  Platelet Count - Automated : 143 K/uL  Mean Cell Volume : 91.9 fl  Mean Cell Hemoglobin : 28.5 pg  Mean Cell Hemoglobin Concentration : 31.0 gm/dL  Auto Neutrophil # : x  Auto Lymphocyte # : x  Auto Monocyte # : x  Auto Eosinophil # : x  Auto Basophil # : x  Auto Neutrophil % : x  Auto Lymphocyte % : x  Auto Monocyte % : x  Auto Eosinophil % : x  Auto Basophil % : x          140  |  104  |  43<H>  ----------------------------<  107<H>  4.0   |  32<H>  |  2.72<H>    Ca    8.4<L>      27 Sep 2021 09:19  Mg     2.2         TPro  7.6  /  Alb  3.5  /  TBili  0.5  /  DBili  x   /  AST  27  /  ALT  20  /  AlkPhos  86        LIVER FUNCTIONS - ( 26 Sep 2021 01:44 )  Alb: 3.5 g/dL / Pro: 7.6 gm/dL / ALK PHOS: 86 U/L / ALT: 20 U/L / AST: 27 U/L / GGT: x                       Urinalysis Basic - ( 26 Sep 2021 03:56 )    Color: Yellow / Appearance: Slightly Turbid / S.010 / pH: x  Gluc: x / Ketone: Negative  / Bili: Negative / Urobili: Negative mg/dL   Blood: x / Protein: 30 mg/dL / Nitrite: Negative   Leuk Esterase: Moderate / RBC: 6-10 /HPF / WBC >50   Sq Epi: x / Non Sq Epi: Negative / Bacteria: Many            MEDICATIONS  (STANDING):  aspirin enteric coated 81 milliGRAM(s) Oral daily  atorvastatin 40 milliGRAM(s) Oral at bedtime  clopidogrel Tablet 75 milliGRAM(s) Oral daily  cyanocobalamin 1000 MICROGram(s) Oral daily  DULoxetine 60 milliGRAM(s) Oral daily  ferrous    sulfate 325 milliGRAM(s) Oral daily  gabapentin 300 milliGRAM(s) Oral <User Schedule>  gabapentin 600 milliGRAM(s) Oral at bedtime  heparin   Injectable 5000 Unit(s) SubCutaneous every 12 hours  lactobacillus acidophilus 1 Tablet(s) Oral daily  meropenem  IVPB 500 milliGRAM(s) IV Intermittent every 12 hours  metoprolol succinate ER 75 milliGRAM(s) Oral daily  multivitamin 1 Tablet(s) Oral daily  pantoprazole    Tablet 40 milliGRAM(s) Oral two times a day  sodium chloride 0.9%. 1000 milliLiter(s) (60 mL/Hr) IV Continuous <Continuous>  tamsulosin 0.4 milliGRAM(s) Oral at bedtime

## 2021-09-28 NOTE — PROGRESS NOTE ADULT - SUBJECTIVE AND OBJECTIVE BOX
Date of service: 09-28-21 @ 12:52    Patient lying in bed; notes left foot discomfort; states he has peripheral neuropathy  Afebrile        ROS unable to obtain secondary to patient medical condition     MEDICATIONS  (STANDING):  aspirin enteric coated 81 milliGRAM(s) Oral daily  atorvastatin 40 milliGRAM(s) Oral at bedtime  clopidogrel Tablet 75 milliGRAM(s) Oral daily  cyanocobalamin 1000 MICROGram(s) Oral daily  DULoxetine 60 milliGRAM(s) Oral daily  ferrous    sulfate 325 milliGRAM(s) Oral daily  gabapentin 300 milliGRAM(s) Oral <User Schedule>  gabapentin 600 milliGRAM(s) Oral at bedtime  heparin   Injectable 5000 Unit(s) SubCutaneous every 12 hours  lactobacillus acidophilus 1 Tablet(s) Oral daily  meropenem  IVPB 500 milliGRAM(s) IV Intermittent every 12 hours  metoprolol succinate ER 75 milliGRAM(s) Oral daily  multivitamin 1 Tablet(s) Oral daily  pantoprazole    Tablet 40 milliGRAM(s) Oral two times a day  sodium chloride 0.9%. 1000 milliLiter(s) (60 mL/Hr) IV Continuous <Continuous>  tamsulosin 0.4 milliGRAM(s) Oral at bedtime    MEDICATIONS  (PRN):  acetaminophen   Tablet .. 650 milliGRAM(s) Oral every 6 hours PRN Temp greater or equal to 38.5C (101.3F), Mild Pain (1 - 3)  aluminum hydroxide/magnesium hydroxide/simethicone Suspension 30 milliLiter(s) Oral every 4 hours PRN Dyspepsia  melatonin 3 milliGRAM(s) Oral at bedtime PRN Insomnia  ondansetron Injectable 4 milliGRAM(s) IV Push every 6 hours PRN Nausea and/or Vomiting      Vital Signs Last 24 Hrs  T(C): 36.4 (28 Sep 2021 08:41), Max: 38.6 (27 Sep 2021 23:10)  T(F): 97.5 (28 Sep 2021 08:41), Max: 101.5 (27 Sep 2021 23:10)  HR: 70 (28 Sep 2021 08:41) (70 - 109)  BP: 120/43 (28 Sep 2021 08:41) (99/60 - 120/43)  BP(mean): --  RR: 18 (28 Sep 2021 11:11) (17 - 18)  SpO2: 99% (28 Sep 2021 11:11) (98% - 100%)        Physical Exam:              PE:  Constitutional: frail looking  HEENT: NC/AT, EOMI, PERRLA, conjunctivae clear; ears and nose atraumatic; pharynx benign  Neck: supple; thyroid not palpable  Back: no tenderness  Respiratory: respiratory effort normal; clear to auscultation  Cardiovascular: S1S2 regular, no murmurs  Abdomen: soft, not tender, not distended, positive BS; liver and spleen WNL  Genitourinary: no suprapubic tenderness  Musculoskeletal: no muscle tenderness, mild peripheral edema  Extremities: no pedal edema  Neurological/ Psychiatric:  moving all extremities  Skin: no rashes; no palpable lesions    Labs: all available labs reviewed                       Labs:                        9.0    4.48  )-----------( 145      ( 28 Sep 2021 10:16 )             28.8     09-28    138  |  103  |  35<H>  ----------------------------<  125<H>  3.5   |  28  |  2.45<H>    Ca    8.2<L>      28 Sep 2021 10:16             Cultures:       Culture - Urine (collected 09-26-21 @ 03:56)  Source: Clean Catch None  Preliminary Report (09-28-21 @ 12:31):    >100,000 CFU/ml Proteus mirabilis    Culture - Blood (collected 09-26-21 @ 01:44)  Source: .Blood None  Gram Stain (09-26-21 @ 19:23):    Growth in anaerobic bottle: Gram Negative Rods  Preliminary Report (09-27-21 @ 19:21):    Growth in anaerobic bottle: Proteus mirabilis    ***Blood Panel PCR results on this specimen are available    approximately 3 hours after the Gram stain result.***    Gram stain, PCR, and/or culture results may not always    correspond due to difference inmethodologies.    ************************************************************    This PCR assay was performed by multiplex PCR. This    Assay tests for 66 bacterial and resistance gene targets.    Please refer to the [facility] test directory    [email or web address] for details.  Organism: Blood Culture PCR (09-27-21 @ 01:00)  Organism: Blood Culture PCR (09-27-21 @ 01:00)      -  Proteus mirabilis: Detec      Method Type: PCR    Culture - Blood (collected 09-26-21 @ 01:44)  Source: .Blood None  Preliminary Report (09-27-21 @ 07:01):    No growth to date.            Radiology: all available radiological tests reviewed  < from: Xray Chest 1 View-PORTABLE IMMEDIATE (09.26.21 @ 01:06) >    EXAM:  XR CHEST PORTABLE IMMED 1V                            PROCEDURE DATE:  09/26/2021          INTERPRETATION:  HISTORY: Altered mental status    TECHNIQUE: A single AP view of the chest was obtained.    COMPARISON: 1/2/2021    FINDINGS:  The cardiac silhouette is normal in size. There are no focal consolidations or pleural effusions. The hilar and mediastinal structures appear unremarkable. The osseous structures are intact.    IMPRESSION: No evidence of acute cardiopulmonary disease.    --- End of Report ---      < end of copied text >  < from: CT Head No Cont (09.26.21 @ 03:25) >    EXAM:  CT BRAIN                            PROCEDURE DATE:  09/26/2021          INTERPRETATION:  Brain CT    Indication: Altered mental status    Technique: Axial images were obtained, along with reformatted coronal and sagittal images.    Comparison:  CT of July 1, 2019    FINDINGS:    There is no intracranial hemorrhage, abnormal extra-axial fluid collection, mass effect, midline shift or large acute cortical infarct.    There is a cavum septum pellucidum, a normal variant.  Focal infarct in the left centrum semiovale. There are age appropriate involutional changes with commensurate dilatation of the CSF spaces.  There are subcortical and periventricular white matter lucencies likely representing microvascular ischemic disease.    The mastoid air cells and visualized paranasal sinuses are well aerated.    IMPRESSION:    No intracranial hemorrhage, mass effect or large acute cortical infarct.    --- End of Report ---      < end of copied text >                Advanced directives addressed: DNR

## 2021-09-28 NOTE — CDI QUERY NOTE - NSCDIOTHERTXTBX_GEN_ALL_CORE_HH
This patient was admitted with a UTI and metabolic encephalopathy:    Progress Note Adult-Hospitalist Attending 9-27-21 @ 11:55  2) UTI;  admit to med floor  vitals stable  iv meropenem  r/o MDR/ESBL UTI  last admission urine cx showed > 3 organisms    Patient seems to have met 2 SIRS criteria upon admission:  HR: up to 100  Altered mental status    Patient also has positive blood cultures:  Gram Stain:   Growth in anaerobic bottle: Gram Negative Rods (09.26.21 @ 01:44)    Source of infection: UTI    Is the above clinical criteria indicative of a further diagnosis?  A) Sepsis, present on admission.  B) Sepsis, developed after admission.  C) No clinical indication of sepsis.  D) Unable to determine.

## 2021-09-29 LAB
-  AMIKACIN: SIGNIFICANT CHANGE UP
-  AMOXICILLIN/CLAVULANIC ACID: SIGNIFICANT CHANGE UP
-  AMPICILLIN/SULBACTAM: SIGNIFICANT CHANGE UP
-  AMPICILLIN: SIGNIFICANT CHANGE UP
-  AZTREONAM: SIGNIFICANT CHANGE UP
-  CEFAZOLIN: SIGNIFICANT CHANGE UP
-  CEFEPIME: SIGNIFICANT CHANGE UP
-  CEFOXITIN: SIGNIFICANT CHANGE UP
-  CEFTRIAXONE: SIGNIFICANT CHANGE UP
-  CIPROFLOXACIN: SIGNIFICANT CHANGE UP
-  ERTAPENEM: SIGNIFICANT CHANGE UP
-  GENTAMICIN: SIGNIFICANT CHANGE UP
-  LEVOFLOXACIN: SIGNIFICANT CHANGE UP
-  MEROPENEM: SIGNIFICANT CHANGE UP
-  NITROFURANTOIN: SIGNIFICANT CHANGE UP
-  PIPERACILLIN/TAZOBACTAM: SIGNIFICANT CHANGE UP
-  TOBRAMYCIN: SIGNIFICANT CHANGE UP
-  TRIMETHOPRIM/SULFAMETHOXAZOLE: SIGNIFICANT CHANGE UP
CULTURE RESULTS: SIGNIFICANT CHANGE UP
METHOD TYPE: SIGNIFICANT CHANGE UP
ORGANISM # SPEC MICROSCOPIC CNT: SIGNIFICANT CHANGE UP
ORGANISM # SPEC MICROSCOPIC CNT: SIGNIFICANT CHANGE UP
SPECIMEN SOURCE: SIGNIFICANT CHANGE UP

## 2021-09-29 PROCEDURE — 99232 SBSQ HOSP IP/OBS MODERATE 35: CPT

## 2021-09-29 PROCEDURE — 76770 US EXAM ABDO BACK WALL COMP: CPT | Mod: 26

## 2021-09-29 RX ORDER — CEFUROXIME AXETIL 250 MG
250 TABLET ORAL EVERY 12 HOURS
Refills: 0 | Status: DISCONTINUED | OUTPATIENT
Start: 2021-09-29 | End: 2021-09-30

## 2021-09-29 RX ADMIN — GABAPENTIN 300 MILLIGRAM(S): 400 CAPSULE ORAL at 10:10

## 2021-09-29 RX ADMIN — ATORVASTATIN CALCIUM 40 MILLIGRAM(S): 80 TABLET, FILM COATED ORAL at 21:40

## 2021-09-29 RX ADMIN — HEPARIN SODIUM 5000 UNIT(S): 5000 INJECTION INTRAVENOUS; SUBCUTANEOUS at 21:40

## 2021-09-29 RX ADMIN — Medication 325 MILLIGRAM(S): at 10:09

## 2021-09-29 RX ADMIN — CLOPIDOGREL BISULFATE 75 MILLIGRAM(S): 75 TABLET, FILM COATED ORAL at 10:10

## 2021-09-29 RX ADMIN — HEPARIN SODIUM 5000 UNIT(S): 5000 INJECTION INTRAVENOUS; SUBCUTANEOUS at 10:10

## 2021-09-29 RX ADMIN — Medication 75 MILLIGRAM(S): at 10:10

## 2021-09-29 RX ADMIN — Medication 1 TABLET(S): at 10:12

## 2021-09-29 RX ADMIN — Medication 1 TABLET(S): at 10:10

## 2021-09-29 RX ADMIN — GABAPENTIN 300 MILLIGRAM(S): 400 CAPSULE ORAL at 18:58

## 2021-09-29 RX ADMIN — Medication 250 MILLIGRAM(S): at 18:58

## 2021-09-29 RX ADMIN — PANTOPRAZOLE SODIUM 40 MILLIGRAM(S): 20 TABLET, DELAYED RELEASE ORAL at 21:40

## 2021-09-29 RX ADMIN — SODIUM CHLORIDE 60 MILLILITER(S): 9 INJECTION INTRAMUSCULAR; INTRAVENOUS; SUBCUTANEOUS at 17:14

## 2021-09-29 RX ADMIN — PREGABALIN 1000 MICROGRAM(S): 225 CAPSULE ORAL at 10:10

## 2021-09-29 RX ADMIN — MEROPENEM 100 MILLIGRAM(S): 1 INJECTION INTRAVENOUS at 10:10

## 2021-09-29 RX ADMIN — GABAPENTIN 600 MILLIGRAM(S): 400 CAPSULE ORAL at 21:40

## 2021-09-29 RX ADMIN — TAMSULOSIN HYDROCHLORIDE 0.4 MILLIGRAM(S): 0.4 CAPSULE ORAL at 21:40

## 2021-09-29 RX ADMIN — Medication 81 MILLIGRAM(S): at 10:10

## 2021-09-29 RX ADMIN — PANTOPRAZOLE SODIUM 40 MILLIGRAM(S): 20 TABLET, DELAYED RELEASE ORAL at 10:19

## 2021-09-29 RX ADMIN — DULOXETINE HYDROCHLORIDE 60 MILLIGRAM(S): 30 CAPSULE, DELAYED RELEASE ORAL at 10:10

## 2021-09-29 NOTE — PROGRESS NOTE ADULT - SUBJECTIVE AND OBJECTIVE BOX
Date of service: 09-29-21 @ 11:20    Patient lying in bed; feels good today, no complaints, afebrile      ROS unable to obtain secondary to patient medical condition     MEDICATIONS  (STANDING):  aspirin enteric coated 81 milliGRAM(s) Oral daily  atorvastatin 40 milliGRAM(s) Oral at bedtime  clopidogrel Tablet 75 milliGRAM(s) Oral daily  cyanocobalamin 1000 MICROGram(s) Oral daily  DULoxetine 60 milliGRAM(s) Oral daily  ferrous    sulfate 325 milliGRAM(s) Oral daily  gabapentin 300 milliGRAM(s) Oral <User Schedule>  gabapentin 600 milliGRAM(s) Oral at bedtime  heparin   Injectable 5000 Unit(s) SubCutaneous every 12 hours  lactobacillus acidophilus 1 Tablet(s) Oral daily  meropenem  IVPB 500 milliGRAM(s) IV Intermittent every 12 hours  metoprolol succinate ER 75 milliGRAM(s) Oral daily  multivitamin 1 Tablet(s) Oral daily  pantoprazole    Tablet 40 milliGRAM(s) Oral two times a day  sodium chloride 0.9%. 1000 milliLiter(s) (60 mL/Hr) IV Continuous <Continuous>  tamsulosin 0.4 milliGRAM(s) Oral at bedtime    MEDICATIONS  (PRN):  acetaminophen   Tablet .. 650 milliGRAM(s) Oral every 6 hours PRN Temp greater or equal to 38.5C (101.3F), Mild Pain (1 - 3)  aluminum hydroxide/magnesium hydroxide/simethicone Suspension 30 milliLiter(s) Oral every 4 hours PRN Dyspepsia  melatonin 3 milliGRAM(s) Oral at bedtime PRN Insomnia  ondansetron Injectable 4 milliGRAM(s) IV Push every 6 hours PRN Nausea and/or Vomiting      Vital Signs Last 24 Hrs  T(C): 36.4 (29 Sep 2021 09:22), Max: 36.8 (29 Sep 2021 06:46)  T(F): 97.5 (29 Sep 2021 09:22), Max: 98.3 (29 Sep 2021 06:46)  HR: 71 (29 Sep 2021 09:22) (66 - 71)  BP: 120/61 (29 Sep 2021 09:22) (112/49 - 130/62)  BP(mean): --  RR: 17 (29 Sep 2021 09:22) (17 - 18)  SpO2: 96% (29 Sep 2021 09:22) (93% - 98%)        Physical Exam:            PE:  Constitutional: frail looking  HEENT: NC/AT, EOMI, PERRLA, conjunctivae clear; ears and nose atraumatic; pharynx benign  Neck: supple; thyroid not palpable  Back: no tenderness  Respiratory: respiratory effort normal; clear to auscultation  Cardiovascular: S1S2 regular, no murmurs  Abdomen: soft, not tender, not distended, positive BS; liver and spleen WNL  Genitourinary: no suprapubic tenderness  Musculoskeletal: no muscle tenderness, mild peripheral edema  Extremities: no pedal edema  Neurological/ Psychiatric:  moving all extremities  Skin: no rashes; no palpable lesions    Labs: all available labs reviewed                       Labs:             Labs:                        9.0    4.48  )-----------( 145      ( 28 Sep 2021 10:16 )             28.8     09-28    138  |  103  |  35<H>  ----------------------------<  125<H>  3.5   |  28  |  2.45<H>    Ca    8.2<L>      28 Sep 2021 10:16             Cultures:       Culture - Urine (collected 09-26-21 @ 03:56)  Source: Clean Catch None  Preliminary Report (09-28-21 @ 12:31):    >100,000 CFU/ml Proteus mirabilis    Culture - Blood (collected 09-26-21 @ 01:44)  Source: .Blood None  Gram Stain (09-26-21 @ 19:23):    Growth in anaerobic bottle: Gram Negative Rods  Final Report (09-28-21 @ 15:43):    Growth in anaerobic bottle: Proteus mirabilis    ***Blood Panel PCR results on this specimen are available    approximately 3 hours after the Gram stain result.***    Gram stain, PCR, and/or culture results may not always    correspond due to difference inmethodologies.    ************************************************************    This PCR assay was performed by multiplex PCR. This    Assay tests for 66 bacterial and resistance gene targets.    Please refer to the [facility] test directory    [email or web address] for details.  Organism: Blood Culture PCR  Proteus mirabilis (09-28-21 @ 15:43)  Organism: Proteus mirabilis (09-28-21 @ 15:43)      -  Amikacin: S <=16      -  Ampicillin: S <=8 These ampicillin results predict results for amoxicillin      -  Ampicillin/Sulbactam: S <=4/2 Enterobacter, Citrobacter, and Serratia may develop resistance during prolonged therapy (3-4 days)      -  Aztreonam: S <=4      -  Cefazolin: S <=2 Enterobacter, Citrobacter, and Serratia may develop resistance during prolonged therapy (3-4 days)      -  Cefepime: S <=2      -  Cefoxitin: S <=8      -  Ceftriaxone: S <=1 Enterobacter, Citrobacter, and Serratia may develop resistance during prolonged therapy      -  Ciprofloxacin: R >2      -  Ertapenem: S <=0.5      -  Gentamicin: S <=2      -  Levofloxacin: R 2      -  Meropenem: S <=1      -  Piperacillin/Tazobactam: S <=8      -  Tobramycin: S <=2      -  Trimethoprim/Sulfamethoxazole: S 1/19      Method Type: MAULIK  Organism: Blood Culture PCR (09-28-21 @ 15:43)      -  Proteus mirabilis: Detec      Method Type: PCR    Culture - Blood (collected 09-26-21 @ 01:44)  Source: .Blood None  Preliminary Report (09-27-21 @ 07:01):    No growth to date.              Radiology: all available radiological tests reviewed  < from: Xray Chest 1 View-PORTABLE IMMEDIATE (09.26.21 @ 01:06) >    EXAM:  XR CHEST PORTABLE IMMED 1V                            PROCEDURE DATE:  09/26/2021          INTERPRETATION:  HISTORY: Altered mental status    TECHNIQUE: A single AP view of the chest was obtained.    COMPARISON: 1/2/2021    FINDINGS:  The cardiac silhouette is normal in size. There are no focal consolidations or pleural effusions. The hilar and mediastinal structures appear unremarkable. The osseous structures are intact.    IMPRESSION: No evidence of acute cardiopulmonary disease.    --- End of Report ---      < end of copied text >  < from: CT Head No Cont (09.26.21 @ 03:25) >    EXAM:  CT BRAIN                            PROCEDURE DATE:  09/26/2021          INTERPRETATION:  Brain CT    Indication: Altered mental status    Technique: Axial images were obtained, along with reformatted coronal and sagittal images.    Comparison:  CT of July 1, 2019    FINDINGS:    There is no intracranial hemorrhage, abnormal extra-axial fluid collection, mass effect, midline shift or large acute cortical infarct.    There is a cavum septum pellucidum, a normal variant.  Focal infarct in the left centrum semiovale. There are age appropriate involutional changes with commensurate dilatation of the CSF spaces.  There are subcortical and periventricular white matter lucencies likely representing microvascular ischemic disease.    The mastoid air cells and visualized paranasal sinuses are well aerated.    IMPRESSION:    No intracranial hemorrhage, mass effect or large acute cortical infarct.    --- End of Report ---      < end of copied text >                Advanced directives addressed: DNR

## 2021-09-30 ENCOUNTER — TRANSCRIPTION ENCOUNTER (OUTPATIENT)
Age: 86
End: 2021-09-30

## 2021-09-30 VITALS
OXYGEN SATURATION: 100 % | RESPIRATION RATE: 18 BRPM | SYSTOLIC BLOOD PRESSURE: 118 MMHG | TEMPERATURE: 97 F | HEART RATE: 65 BPM | DIASTOLIC BLOOD PRESSURE: 55 MMHG

## 2021-09-30 LAB
ANION GAP SERPL CALC-SCNC: 5 MMOL/L — SIGNIFICANT CHANGE UP (ref 5–17)
BUN SERPL-MCNC: 25 MG/DL — HIGH (ref 7–23)
CALCIUM SERPL-MCNC: 8.2 MG/DL — LOW (ref 8.5–10.1)
CHLORIDE SERPL-SCNC: 109 MMOL/L — HIGH (ref 96–108)
CO2 SERPL-SCNC: 28 MMOL/L — SIGNIFICANT CHANGE UP (ref 22–31)
CREAT SERPL-MCNC: 1.53 MG/DL — HIGH (ref 0.5–1.3)
GLUCOSE SERPL-MCNC: 99 MG/DL — SIGNIFICANT CHANGE UP (ref 70–99)
HCT VFR BLD CALC: 27.6 % — LOW (ref 39–50)
HGB BLD-MCNC: 8.5 G/DL — LOW (ref 13–17)
MCHC RBC-ENTMCNC: 28.1 PG — SIGNIFICANT CHANGE UP (ref 27–34)
MCHC RBC-ENTMCNC: 30.8 GM/DL — LOW (ref 32–36)
MCV RBC AUTO: 91.1 FL — SIGNIFICANT CHANGE UP (ref 80–100)
PLATELET # BLD AUTO: 159 K/UL — SIGNIFICANT CHANGE UP (ref 150–400)
POTASSIUM SERPL-MCNC: 3.9 MMOL/L — SIGNIFICANT CHANGE UP (ref 3.5–5.3)
POTASSIUM SERPL-SCNC: 3.9 MMOL/L — SIGNIFICANT CHANGE UP (ref 3.5–5.3)
RBC # BLD: 3.03 M/UL — LOW (ref 4.2–5.8)
RBC # FLD: 15.2 % — HIGH (ref 10.3–14.5)
SARS-COV-2 RNA SPEC QL NAA+PROBE: SIGNIFICANT CHANGE UP
SODIUM SERPL-SCNC: 142 MMOL/L — SIGNIFICANT CHANGE UP (ref 135–145)
WBC # BLD: 3.8 K/UL — SIGNIFICANT CHANGE UP (ref 3.8–10.5)
WBC # FLD AUTO: 3.8 K/UL — SIGNIFICANT CHANGE UP (ref 3.8–10.5)

## 2021-09-30 PROCEDURE — 99239 HOSP IP/OBS DSCHRG MGMT >30: CPT

## 2021-09-30 RX ORDER — CEFUROXIME AXETIL 250 MG
1 TABLET ORAL
Qty: 18 | Refills: 0
Start: 2021-09-30 | End: 2021-10-08

## 2021-09-30 RX ADMIN — Medication 81 MILLIGRAM(S): at 09:19

## 2021-09-30 RX ADMIN — HEPARIN SODIUM 5000 UNIT(S): 5000 INJECTION INTRAVENOUS; SUBCUTANEOUS at 09:20

## 2021-09-30 RX ADMIN — Medication 75 MILLIGRAM(S): at 09:21

## 2021-09-30 RX ADMIN — PANTOPRAZOLE SODIUM 40 MILLIGRAM(S): 20 TABLET, DELAYED RELEASE ORAL at 09:21

## 2021-09-30 RX ADMIN — GABAPENTIN 300 MILLIGRAM(S): 400 CAPSULE ORAL at 09:20

## 2021-09-30 RX ADMIN — DULOXETINE HYDROCHLORIDE 60 MILLIGRAM(S): 30 CAPSULE, DELAYED RELEASE ORAL at 09:20

## 2021-09-30 RX ADMIN — PREGABALIN 1000 MICROGRAM(S): 225 CAPSULE ORAL at 09:20

## 2021-09-30 RX ADMIN — Medication 325 MILLIGRAM(S): at 09:20

## 2021-09-30 RX ADMIN — Medication 1 TABLET(S): at 09:21

## 2021-09-30 RX ADMIN — Medication 250 MILLIGRAM(S): at 06:26

## 2021-09-30 RX ADMIN — SODIUM CHLORIDE 60 MILLILITER(S): 9 INJECTION INTRAMUSCULAR; INTRAVENOUS; SUBCUTANEOUS at 11:44

## 2021-09-30 RX ADMIN — CLOPIDOGREL BISULFATE 75 MILLIGRAM(S): 75 TABLET, FILM COATED ORAL at 09:20

## 2021-09-30 NOTE — CONSULT NOTE ADULT - ASSESSMENT
89/ M PMHx of recent right leg cellulitis, recent UTI, right hydronephrosis, CAD, s/p PCI w/ stent placement, Hypertension, Hyperlipidemia, CKD stage 4 (cr BL  2.0), AAA, PAD, OA, BPH,  was brought to ED for AMS and new tremors of upper extremity.   PT was admitted until 9/10 for complicated UTI with hydronephrosis.  Pt has no other specific complaints.  Pt notes chronic weakness of the LLE.  He normally doesn't ambulate. He is more awake and alert at this time and responds to questions. No c/o cp/abd pain/sob/n/v. UA grossly positive, Cr increased to 2.51.Given meropenem in ED. hx MDR/ESBL UTI in past.    1. pyuria. complicated UTI. hx ESBL/MDR cystitis. CKD4  - agree with merrem adjust dose to [id number]  - f/u urine cx/blood cx  - monitor for urinary retention  - had renal/bladder sono 9/3   - previous cultures reviewed  - tolerating abx well so far; no side effects noted  - reason for abx use and side effects reviewed with patient  - supportive care  - isolation precautions  - fu cbc    2. other issues - care per medicine 
89 yo male with hx of CAD, HTN, CKD stage Cr ~ 1.9 -2 presenting with AMS and tremors and UTI suspected    RASHAUN on CKD 4    cr improved after IVF now Cr 1.5 below baseline due to IVF   may stop IVF and resuem lasix 40 mg daily ( home dose is BID) = monitor volume status as outpatient with PCP    and cardiology    chronic known right hydro - fup with Urology w recurrent UTI. familly had declined intervention in past apparently     no NSAID    fup labs at SNF if dicharged today     d/w RN earlier     Thank you for the courtesy of this consult. We will follow this patient with you.   Management is subject to change if new information becomes available or patient condition changes.

## 2021-09-30 NOTE — CONSULT NOTE ADULT - SUBJECTIVE AND OBJECTIVE BOX
Patient is a 89y old  Male who presents with a chief complaint of AMS, UTI (28 Sep 2021 17:10)  HPI: 89/ M PMHx of recent right leg cellulitis, recent UTI, right hydronephrosis, CAD, s/p PCI w/ stent placement, Hypertension, left leg weakness x 6 months,  Hyperlipidemia, CKD stage 4 (cr BL  2.0), AAA, PAD, OA, BPH,  was brought to ED for AMS and new tremors of upper extremity.   PT was admitted until 9/10 for complicated UTI with hydronephrosis ( family did not want intervention)    CKD 4 - Cr 1.9 - 2 baseline    Pt has no other specific complaints.  Pt notes chronic weakness of the LLE.  He normally doesn't ambulate. He is more awake and alert at this time and responds to questions. No c/o cp/abd pain/sob/n/v.   Renal eval called for Cr increased to 2.51.on 9/29     today    pt feeling well    no complaints   condom catheter with good UOP     per RN pt is to be discharged today to SNF       Patient is a 89y Male whom presented to the hospital with     PAST MEDICAL & SURGICAL HISTORY:  Leg swelling  related to cellulitis of LLE    CKD (chronic kidney disease) stage 3, GFR 30-59 ml/min    Cellulitis  BL    BPH (benign prostatic hyperplasia)    Colon polyp    Stented coronary artery    HTN (hypertension)    HLD (hyperlipidemia)    CAD (coronary artery disease)    OA (osteoarthritis)    PAD (peripheral artery disease)    AAA (abdominal aortic aneurysm) without rupture    S/P hemorrhoidectomy    H/O inguinal hernia  repair    History of colonoscopy    History of coronary artery stent placement    Home Medications:  acetaminophen 325 mg oral tablet: 2 tab(s) orally every 6 hours, As Needed - for mild pain (26 Sep 2021 09:12)  aspirin 81 mg oral delayed release tablet: 1 tab(s) orally once a day (26 Sep 2021 09:12)  clopidogrel 75 mg oral tablet: 1 tab(s) orally once a day; start on 4/4/21 (26 Sep 2021 09:12)  cyanocobalamin 1000 mcg oral tablet: 1 tab(s) orally once a day (26 Sep 2021 09:12)  DULoxetine 60 mg oral delayed release capsule: 1 cap(s) orally once a day (26 Sep 2021 09:12)  famotidine 20 mg oral tablet: 1 tab(s) orally 2 times a day (26 Sep 2021 09:12)  ferrous sulfate 325 mg (65 mg elemental iron) oral tablet: 1 tab(s) orally once a day (26 Sep 2021 09:12)  furosemide 40 mg oral tablet: 1 tab(s) orally 2 times a day (26 Sep 2021 09:12)  gabapentin 300 mg oral capsule: 1 cap(s) orally 2 times a day in the morning and evening   ***9am, 6pm*** (26 Sep 2021 09:12)  gabapentin 600 mg oral tablet: 1 tab(s) orally once a day (at bedtime) (26 Sep 2021 09:12)  metoprolol succinate 50 mg oral tablet, extended release: 1.5 tab(s) orally once a day (26 Sep 2021 09:12)  Multiple Vitamins oral tablet: 1 tab(s) orally once a day (26 Sep 2021 09:12)  tamsulosin 0.4 mg oral capsule: 1 cap(s) orally once a day (at bedtime) (26 Sep 2021 09:12)      MEDICATIONS  (STANDING):  aspirin enteric coated 81 milliGRAM(s) Oral daily  atorvastatin 40 milliGRAM(s) Oral at bedtime  cefuroxime   Tablet 250 milliGRAM(s) Oral every 12 hours  clopidogrel Tablet 75 milliGRAM(s) Oral daily  cyanocobalamin 1000 MICROGram(s) Oral daily  DULoxetine 60 milliGRAM(s) Oral daily  ferrous    sulfate 325 milliGRAM(s) Oral daily  gabapentin 300 milliGRAM(s) Oral <User Schedule>  gabapentin 600 milliGRAM(s) Oral at bedtime  heparin   Injectable 5000 Unit(s) SubCutaneous every 12 hours  lactobacillus acidophilus 1 Tablet(s) Oral daily  metoprolol succinate ER 75 milliGRAM(s) Oral daily  multivitamin 1 Tablet(s) Oral daily  pantoprazole    Tablet 40 milliGRAM(s) Oral two times a day  sodium chloride 0.9%. 1000 milliLiter(s) (60 mL/Hr) IV Continuous <Continuous>  tamsulosin 0.4 milliGRAM(s) Oral at bedtime      Allergies    penicillin (Angioedema)    Intolerances        SOCIAL HISTORY:  Denies ETOh,Smoking,     FAMILY HISTORY:  Family history unobtainable  d/t dementia      REVIEW OF SYSTEMS:    CONSTITUTIONAL: No weakness, fevers or chills  EYES/ENT: No visual changes;  No vertigo or throat pain   NECK: No pain or stiffness  RESPIRATORY: No cough, wheezing, hemoptysis; No shortness of breath  CARDIOVASCULAR: No chest pain or palpitations  GASTROINTESTINAL: No abdominal or epigastric pain. No nausea, vomiting, or hematemesis; No diarrhea or constipation. No melena or hematochezia.  GENITOURINARY: No dysuria, frequency or hematuria  NEUROLOGICAL: No numbness or weakness  SKIN: No itching, burning, rashes, or lesions   All other review of systems is negative unless indicated above.    VITAL:  T(C): , Max: 36.9 (09-30-21 @ 00:24)  T(F): , Max: 98.4 (09-30-21 @ 00:24)  HR: 65 (09-30-21 @ 15:30)  BP: 118/55 (09-30-21 @ 15:30)  BP(mean): --  RR: 18 (09-30-21 @ 15:30)  SpO2: 100% (09-30-21 @ 15:30)  Wt(kg): --    I&O's Detail    29 Sep 2021 07:01  -  30 Sep 2021 07:00  --------------------------------------------------------  IN:    sodium chloride 0.9%: 720 mL  Total IN: 720 mL    OUT:    Incontinent per Condom Catheter (mL): 300 mL  Total OUT: 300 mL    Total NET: 420 mL      PHYSICAL EXAM:    Constitutional: NAD  HEENT: PERRLA, EOMI,  MMM  Neck: No LAD, No JVD  Respiratory: CTAB  Cardiovascular: S1 and S2  Gastrointestinal: BS+, soft, NT/ND  Extremities: No peripheral edema  Neurological: A/O x 3, no focal deficits  : No Flores  Skin: No rashes  Access: Not applicable    LABS:             142    |  109    |  25     ----------------------------<  99        30 Sep 2021 09:08  3.9     |  28     |  1.53     138    |  103    |  35     ----------------------------<  125       28 Sep 2021 10:16  3.5     |  28     |  2.45     140    |  104    |  43     ----------------------------<  107       27 Sep 2021 09:19  4.0     |  32     |  2.72     Ca    8.2        30 Sep 2021 09:08  Ca    8.2        28 Sep 2021 10:16                   8.5    3.80  )-----------( 159      ( 30 Sep 2021 09:08 )             27.6         Urine Studies:          RADIOLOGY & ADDITIONAL STUDIES:    EXAM:  US KIDNEYS AND BLADDER                            PROCEDURE DATE:  09/29/2021          INTERPRETATION:  CLINICAL INFORMATION: Urinary tract infection, evaluate for renal stone    COMPARISON: 09/03/2021    TECHNIQUE: Sonography of the kidneysand bladder.    FINDINGS:    Right kidney: 10.8 cm. Moderate hydronephrosis. Cortical thinning.    Left kidney: 10.4 cm. No hydronephrosis. 2.1 cm septated cyst in the lower pole. Mild cortical thinning.    Urinary bladder: Irregular bladder wall thickening antrum evaluation.    Prostate gland is mildly prominent with a volume of 36 cc.    IMPRESSION:    Bilateral renal cortical thinning, right greater than left, compatible with chronic kidney disease.    Mild to moderate right hydronephrosis.    Thickened/trabeculated urinary bladder wall. Correlate clinically.    --- End of Report ---              
Patient is a 89y old  Male who presents with a chief complaint of AMS, UTI (26 Sep 2021 08:59)    HPI:  89/ M PMHx of recent right leg cellulitis, recent UTI, right hydronephrosis, CAD, s/p PCI w/ stent placement, Hypertension, Hyperlipidemia, CKD stage 4 (cr BL  2.0), AAA, PAD, OA, BPH,  was brought to ED for AMS and new tremors of upper extremity.   PT was admitted until 9/10 for complicated UTI with hydronephrosis.  Pt has no other specific complaints.  Pt notes chronic weakness of the LLE.  He normally doesn't ambulate. He is more awake and alert at this time and responds to questions. No c/o cp/abd pain/sob/n/v. UA grossly positive, Cr increased to 2.51.Given meropenem in ED. hx MDR/ESBL UTI in past.        PMH: as above  PSH: as above  Meds: per reconciliation sheet, noted below  MEDICATIONS  (STANDING):  aspirin enteric coated 81 milliGRAM(s) Oral daily  atorvastatin 40 milliGRAM(s) Oral at bedtime  clopidogrel Tablet 75 milliGRAM(s) Oral daily  cyanocobalamin 1000 MICROGram(s) Oral daily  DULoxetine 60 milliGRAM(s) Oral daily  ferrous    sulfate 325 milliGRAM(s) Oral daily  gabapentin 300 milliGRAM(s) Oral <User Schedule>  gabapentin 600 milliGRAM(s) Oral at bedtime  heparin   Injectable 5000 Unit(s) SubCutaneous every 12 hours  lactobacillus acidophilus 1 Tablet(s) Oral daily  meropenem  IVPB 500 milliGRAM(s) IV Intermittent every 12 hours  metoprolol succinate ER 75 milliGRAM(s) Oral daily  multivitamin 1 Tablet(s) Oral daily  pantoprazole    Tablet 40 milliGRAM(s) Oral two times a day  sodium chloride 0.9%. 1000 milliLiter(s) (50 mL/Hr) IV Continuous <Continuous>  tamsulosin 0.4 milliGRAM(s) Oral at bedtime    Allergies    penicillin (Angioedema)    Intolerances      Social: no smoking, no alcohol, no illegal drugs; no recent travel, no exposure to TB  FAMILY HISTORY:  Family history unobtainable  d/t dementia       no history of premature cardiovascular disease in first degree relatives    ROS: unable to obtain dt medical condition       All other systems reviewed and are negative    Vital Signs Last 24 Hrs  T(C): 36.7 (26 Sep 2021 12:07), Max: 37.4 (25 Sep 2021 23:46)  T(F): 98 (26 Sep 2021 12:07), Max: 99.4 (25 Sep 2021 23:46)  HR: 104 (26 Sep 2021 12:07) (69 - 104)  BP: 138/54 (26 Sep 2021 12:07) (98/72 - 138/54)  BP(mean): 70 (26 Sep 2021 07:58) (70 - 70)  RR: 20 (26 Sep 2021 12:07) (16 - 20)  SpO2: 96% (26 Sep 2021 12:07) (96% - 100%)  Daily Height in cm: 177.8 (25 Sep 2021 23:46)    Daily     PE:  Constitutional: frail looking  HEENT: NC/AT, EOMI, PERRLA, conjunctivae clear; ears and nose atraumatic; pharynx benign  Neck: supple; thyroid not palpable  Back: no tenderness  Respiratory: respiratory effort normal; clear to auscultation  Cardiovascular: S1S2 regular, no murmurs  Abdomen: soft, not tender, not distended, positive BS; liver and spleen WNL  Genitourinary: no suprapubic tenderness  Lymphatic: no LN palpable  Musculoskeletal: no muscle tenderness, no joint swelling or tenderness  Extremities: no pedal edema  Neurological/ Psychiatric:  moving all extremities  Skin: no rashes; no palpable lesions    Labs: all available labs reviewed                        9.9    9.98  )-----------( 207      ( 26 Sep 2021 01:44 )             31.8         136  |  99  |  40<H>  ----------------------------<  119<H>  4.4   |  32<H>  |  2.51<H>    Ca    8.4<L>      26 Sep 2021 01:44  Mg     2.2         TPro  7.6  /  Alb  3.5  /  TBili  0.5  /  DBili  x   /  AST  27  /  ALT  20  /  AlkPhos  86       LIVER FUNCTIONS - ( 26 Sep 2021 01:44 )  Alb: 3.5 g/dL / Pro: 7.6 gm/dL / ALK PHOS: 86 U/L / ALT: 20 U/L / AST: 27 U/L / GGT: x           Urinalysis Basic - ( 26 Sep 2021 03:56 )    Color: Yellow / Appearance: Slightly Turbid / S.010 / pH: x  Gluc: x / Ketone: Negative  / Bili: Negative / Urobili: Negative mg/dL   Blood: x / Protein: 30 mg/dL / Nitrite: Negative   Leuk Esterase: Moderate / RBC: 6-10 /HPF / WBC >50   Sq Epi: x / Non Sq Epi: Negative / Bacteria: Many          Radiology: all available radiological tests reviewed  < from: Xray Chest 1 View-PORTABLE IMMEDIATE (21 @ 01:06) >    EXAM:  XR CHEST PORTABLE IMMED 1V                            PROCEDURE DATE:  2021          INTERPRETATION:  HISTORY: Altered mental status    TECHNIQUE: A single AP view of the chest was obtained.    COMPARISON: 2021    FINDINGS:  The cardiac silhouette is normal in size. There are no focal consolidations or pleural effusions. The hilar and mediastinal structures appear unremarkable. The osseous structures are intact.    IMPRESSION: No evidence of acute cardiopulmonary disease.    --- End of Report ---      < end of copied text >  < from: CT Head No Cont (21 @ 03:25) >    EXAM:  CT BRAIN                            PROCEDURE DATE:  2021          INTERPRETATION:  Brain CT    Indication: Altered mental status    Technique: Axial images were obtained, along with reformatted coronal and sagittal images.    Comparison:  CT of 2019    FINDINGS:    There is no intracranial hemorrhage, abnormal extra-axial fluid collection, mass effect, midline shift or large acute cortical infarct.    There is a cavum septum pellucidum, a normal variant.  Focal infarct in the left centrum semiovale. There are age appropriate involutional changes with commensurate dilatation of the CSF spaces.  There are subcortical and periventricular white matter lucencies likely representing microvascular ischemic disease.    The mastoid air cells and visualized paranasal sinuses are well aerated.    IMPRESSION:    No intracranial hemorrhage, mass effect or large acute cortical infarct.    --- End of Report ---      < end of copied text >    Advanced directives addressed: full resuscitation

## 2021-09-30 NOTE — DISCHARGE NOTE PROVIDER - NSDCCPCAREPLAN_GEN_ALL_CORE_FT
PRINCIPAL DISCHARGE DIAGNOSIS  Diagnosis: Acute UTI  Assessment and Plan of Treatment: complete your antibiotics as prescribed      SECONDARY DISCHARGE DIAGNOSES  Diagnosis: Hydronephrosis of right kidney  Assessment and Plan of Treatment: FOLLOW UP WITH UROLOGY AS INSTRUCTED FOR FURARIC WORK UP.

## 2021-09-30 NOTE — DISCHARGE NOTE NURSING/CASE MANAGEMENT/SOCIAL WORK - PATIENT PORTAL LINK FT
You can access the FollowMyHealth Patient Portal offered by [facility] by registering at the following website: [email or web address]. By joining [facility]’s FollowMyHealth portal, you will also be able to view your health information using other applications (apps) compatible with our system.

## 2021-09-30 NOTE — PROGRESS NOTE ADULT - SUBJECTIVE AND OBJECTIVE BOX
Date of service: 09-30-21 @ 11:21    Patient afebrile,no complaints, wants to go home        ROS unable to obtain secondary to patient medical condition     MEDICATIONS  (STANDING):  aspirin enteric coated 81 milliGRAM(s) Oral daily  atorvastatin 40 milliGRAM(s) Oral at bedtime  cefuroxime   Tablet 250 milliGRAM(s) Oral every 12 hours  clopidogrel Tablet 75 milliGRAM(s) Oral daily  cyanocobalamin 1000 MICROGram(s) Oral daily  DULoxetine 60 milliGRAM(s) Oral daily  ferrous    sulfate 325 milliGRAM(s) Oral daily  gabapentin 300 milliGRAM(s) Oral <User Schedule>  gabapentin 600 milliGRAM(s) Oral at bedtime  heparin   Injectable 5000 Unit(s) SubCutaneous every 12 hours  lactobacillus acidophilus 1 Tablet(s) Oral daily  metoprolol succinate ER 75 milliGRAM(s) Oral daily  multivitamin 1 Tablet(s) Oral daily  pantoprazole    Tablet 40 milliGRAM(s) Oral two times a day  sodium chloride 0.9%. 1000 milliLiter(s) (60 mL/Hr) IV Continuous <Continuous>  tamsulosin 0.4 milliGRAM(s) Oral at bedtime    MEDICATIONS  (PRN):  acetaminophen   Tablet .. 650 milliGRAM(s) Oral every 6 hours PRN Temp greater or equal to 38.5C (101.3F), Mild Pain (1 - 3)  aluminum hydroxide/magnesium hydroxide/simethicone Suspension 30 milliLiter(s) Oral every 4 hours PRN Dyspepsia  melatonin 3 milliGRAM(s) Oral at bedtime PRN Insomnia  ondansetron Injectable 4 milliGRAM(s) IV Push every 6 hours PRN Nausea and/or Vomiting      Vital Signs Last 24 Hrs  T(C): 36.8 (30 Sep 2021 07:23), Max: 36.9 (30 Sep 2021 00:24)  T(F): 98.3 (30 Sep 2021 07:23), Max: 98.4 (30 Sep 2021 00:24)  HR: 68 (30 Sep 2021 07:23) (63 - 68)  BP: 133/57 (30 Sep 2021 07:23) (118/52 - 141/55)  BP(mean): 62 (29 Sep 2021 16:08) (62 - 62)  RR: 18 (30 Sep 2021 07:23) (17 - 18)  SpO2: 96% (30 Sep 2021 07:23) (96% - 100%)        Physical Exam:            PE:  Constitutional: frail looking  HEENT: NC/AT, EOMI, PERRLA, conjunctivae clear; ears and nose atraumatic; pharynx benign  Neck: supple; thyroid not palpable  Back: no tenderness  Respiratory: respiratory effort normal; clear to auscultation  Cardiovascular: S1S2 regular, no murmurs  Abdomen: soft, not tender, not distended, positive BS; liver and spleen WNL  Genitourinary: no suprapubic tenderness  Musculoskeletal: no muscle tenderness, mild peripheral edema  Extremities: no pedal edema  Neurological/ Psychiatric:  moving all extremities  Skin: no rashes; no palpable lesions    Labs: all available labs reviewed             Labs:                        8.5    3.80  )-----------( 159      ( 30 Sep 2021 09:08 )             27.6     09-30    142  |  109<H>  |  25<H>  ----------------------------<  99  3.9   |  28  |  1.53<H>    Ca    8.2<L>      30 Sep 2021 09:08             Cultures:       Culture - Urine (collected 09-26-21 @ 03:56)  Source: Clean Catch None  Final Report (09-29-21 @ 11:21):    >100,000 CFU/ml Proteus mirabilis  Organism: Proteus mirabilis (09-29-21 @ 11:21)  Organism: Proteus mirabilis (09-29-21 @ 11:21)      -  Amikacin: S <=16      -  Amoxicillin/Clavulanic Acid: S <=8/4      -  Ampicillin: S <=8 These ampicillin results predict results for amoxicillin      -  Ampicillin/Sulbactam: S <=4/2 Enterobacter, Citrobacter, and Serratia may develop resistance during prolonged therapy (3-4 days)      -  Aztreonam: S <=4      -  Cefazolin: S <=2 (MIC_CL_COM_ENTERIC_CEFAZU) For uncomplicated UTI with K. pneumoniae, E. coli, or P. mirablis: MAULIK <=16 is sensitive and MAULIK >=32 is resistant. This also predicts results for oral agents cefaclor, cefdinir, cefpodoxime, cefprozil, cefuroxime axetil, cephalexin and locarbef for uncomplicated UTI. Note that some isolates may be susceptible to these agents while testing resistant to cefazolin.      -  Cefepime: S <=2      -  Cefoxitin: S <=8      -  Ceftriaxone: S <=1 Enterobacter, Citrobacter, and Serratia may develop resistance during prolonged therapy      -  Ciprofloxacin: R >2      -  Ertapenem: S <=0.5      -  Gentamicin: S <=2      -  Levofloxacin: R 2      -  Meropenem: S <=1      -  Nitrofurantoin: R >64 Should not be used to treat pyelonephritis      -  Piperacillin/Tazobactam: S <=8      -  Tobramycin: S <=2      -  Trimethoprim/Sulfamethoxazole: S 1/19      Method Type: MAULIK    Culture - Blood (collected 09-26-21 @ 01:44)  Source: .Blood None  Gram Stain (09-26-21 @ 19:23):    Growth in anaerobic bottle: Gram Negative Rods  Final Report (09-28-21 @ 15:43):    Growth in anaerobic bottle: Proteus mirabilis    ***Blood Panel PCR results on this specimen are available    approximately 3 hours after the Gram stain result.***    Gram stain, PCR, and/or culture results may not always    correspond due to difference inmethodologies.    ************************************************************    This PCR assay was performed by multiplex PCR. This    Assay tests for 66 bacterial and resistance gene targets.    Please refer to the [facility] test directory    [email or web address] for details.  Organism: Blood Culture PCR  Proteus mirabilis (09-28-21 @ 15:43)  Organism: Proteus mirabilis (09-28-21 @ 15:43)      -  Amikacin: S <=16      -  Ampicillin: S <=8 These ampicillin results predict results for amoxicillin      -  Ampicillin/Sulbactam: S <=4/2 Enterobacter, Citrobacter, and Serratia may develop resistance during prolonged therapy (3-4 days)      -  Aztreonam: S <=4      -  Cefazolin: S <=2 Enterobacter, Citrobacter, and Serratia may develop resistance during prolonged therapy (3-4 days)      -  Cefepime: S <=2      -  Cefoxitin: S <=8      -  Ceftriaxone: S <=1 Enterobacter, Citrobacter, and Serratia may develop resistance during prolonged therapy      -  Ciprofloxacin: R >2      -  Ertapenem: S <=0.5      -  Gentamicin: S <=2      -  Levofloxacin: R 2      -  Meropenem: S <=1      -  Piperacillin/Tazobactam: S <=8      -  Tobramycin: S <=2      -  Trimethoprim/Sulfamethoxazole: S 1/19      Method Type: MAULIK  Organism: Blood Culture PCR (09-28-21 @ 15:43)      -  Proteus mirabilis: Detec      Method Type: PCR    Culture - Blood (collected 09-26-21 @ 01:44)  Source: .Blood None  Preliminary Report (09-27-21 @ 07:01):    No growth to date.                  Radiology: all available radiological tests reviewed  < from: Xray Chest 1 View-PORTABLE IMMEDIATE (09.26.21 @ 01:06) >    EXAM:  XR CHEST PORTABLE IMMED 1V                            PROCEDURE DATE:  09/26/2021          INTERPRETATION:  HISTORY: Altered mental status    TECHNIQUE: A single AP view of the chest was obtained.    COMPARISON: 1/2/2021    FINDINGS:  The cardiac silhouette is normal in size. There are no focal consolidations or pleural effusions. The hilar and mediastinal structures appear unremarkable. The osseous structures are intact.    IMPRESSION: No evidence of acute cardiopulmonary disease.    --- End of Report ---      < end of copied text >  < from: CT Head No Cont (09.26.21 @ 03:25) >    EXAM:  CT BRAIN                            PROCEDURE DATE:  09/26/2021          INTERPRETATION:  Brain CT    Indication: Altered mental status    Technique: Axial images were obtained, along with reformatted coronal and sagittal images.    Comparison:  CT of July 1, 2019    FINDINGS:    There is no intracranial hemorrhage, abnormal extra-axial fluid collection, mass effect, midline shift or large acute cortical infarct.    There is a cavum septum pellucidum, a normal variant.  Focal infarct in the left centrum semiovale. There are age appropriate involutional changes with commensurate dilatation of the CSF spaces.  There are subcortical and periventricular white matter lucencies likely representing microvascular ischemic disease.    The mastoid air cells and visualized paranasal sinuses are well aerated.    IMPRESSION:    No intracranial hemorrhage, mass effect or large acute cortical infarct.    --- End of Report ---      < end of copied text >                Advanced directives addressed: DNR

## 2021-09-30 NOTE — DISCHARGE NOTE NURSING/CASE MANAGEMENT/SOCIAL WORK - NSDCVIVACCINE_GEN_ALL_CORE_FT
Td (adult) preservative free; 16-Feb-2020 18:27; Eder Hunter (ANGELICA); [employer]; [id number] (Exp. Date: 22-Oct-2021); IntraMuscular; Deltoid Left.; 0.5 milliLiter(s); VIS (VIS Published: 24-Feb-2025, VIS Presented: 16-Feb-2020);

## 2021-09-30 NOTE — PROGRESS NOTE ADULT - ASSESSMENT
89/ M PMHx of recent right leg cellulitis, recent UTI, right hydronephrosis, CAD, s/p PCI w/ stent placement, Hypertension, Hyperlipidemia, CKD stage 4 (cr BL  2.0), AAA, PAD, OA, BPH,  was brought to ED for AMS and new tremors of upper extremity.   PT was admitted until 9/10 for complicated UTI with hydronephrosis.  Pt has no other specific complaints.  Pt notes chronic weakness of the LLE.  He normally doesn't ambulate. He is more awake and alert at this time and responds to questions. No c/o cp/abd pain/sob/n/v.     Cr increased to 2.51.    Given meropenem in ED.    Pt admitted with     1) AMS / Metabolic encephalopathy:  CT head neg  sec to uti  treat uti    2) UTI;  admit to med floor  vitals stable  iv meropenem  r/o MDR/ESBL UTI  last admission urine cx showed > 3 organisms  ID eval appreciated  contact precautions till urine cx available and rules out MDR/ESBL   f/u blood cx; Proteus mirabilis  low dose iv fluids    3) Worsening BUN/Cr sec to dehydration and uti + CKD 4: Hx of right sided hydronephrosis   Cr increased to 2.72  admit  hold lasix x 24 hrs more  low dose iv fluids x 24 hrs more   BMP in am  would restart lasix at a lower dose from 9/28 if cr better,(urrent home dose is 40 mg po q 12 hrs)    4) CAD/stent:   cont asa, plavix, BB, statin    5) DVT PPX: heparin s/q    poc discussed with pt, his son, Joseph [phone], team.     PT is DNR/DNI      
89/ M PMHx of recent right leg cellulitis, recent UTI, right hydronephrosis, CAD, s/p PCI w/ stent placement, Hypertension, Hyperlipidemia, CKD stage 4 (cr BL  2.0), AAA, PAD, OA, BPH,  was brought to ED for AMS and new tremors of upper extremity.   PT was admitted until 9/10 for complicated UTI with hydronephrosis.  Pt has no other specific complaints.  Pt notes chronic weakness of the LLE.  He normally doesn't ambulate. He is more awake and alert at this time and responds to questions. No c/o cp/abd pain/sob/n/v.     Cr increased to 2.51.    Given meropenem in ED.    Pt admitted with     1) AMS / Metabolic encephalopathy: resolved  CT head neg  sec to uti  treat uti    2) UTI; no sepsis  admit to med floor  vitals stable  iv meropenem  r/o MDR/ESBL UTI  last admission urine cx showed > 3 organisms  ID eval appreciated  contact precautions till urine cx available and rules out MDR/ESBL   f/u blood cx; Proteus mirabilis  low dose iv fluids  urine cx: Proteus mirabilis  renal sono to r/o renal stone    3) Worsening BUN/Cr sec to dehydration and uti + CKD 4: Hx of right sided hydronephrosis   Cr increased to 2.72  admit  hold lasix x 24 hrs more  low dose iv fluids x 24 hrs more   BMP in am  would restart lasix at a lower dose from 9/29 if cr better,(current home dose is 40 mg po q 12 hrs)    4) CAD/stent:   cont asa, plavix, BB, statin    5) DVT PPX: heparin s/q      PT is DNR/DNI      
89/ M PMHx of recent right leg cellulitis, recent UTI, right hydronephrosis, CAD, s/p PCI w/ stent placement, Hypertension, Hyperlipidemia, CKD stage 4 (cr BL  2.0), AAA, PAD, OA, BPH,  was brought to ED for AMS and new tremors of upper extremity.   PT was admitted until 9/10 for complicated UTI with hydronephrosis.  Pt has no other specific complaints.  Pt notes chronic weakness of the LLE.  He normally doesn't ambulate. He is more awake and alert at this time and responds to questions. No c/o cp/abd pain/sob/n/v. UA grossly positive, Cr increased to 2.51.Given meropenem in ED. hx MDR/ESBL UTI in past.    1. pyuria. complicated UTI. hx ESBL/MDR cystitis. CKD4  - agree with merrem adjust dose to [id number], day #4, completed  - f/u urine cx/blood cx------ found to have Proteus mirabilis in both urine and blood; sensitivity is reviewed, not an ESBL organism  - now on  ceftin 250 mg po q 12 hours to complete 9 more days  - repeat blood cultures are pending; I will follow up   - can start discharge planning if medically stable  - monitor for urinary retention  - had renal/bladder sono 9/3   - previous cultures reviewed  - tolerating abx well so far; no side effects noted  - reason for abx use and side effects reviewed with patient  - supportive care  - isolation precautions  - fu cbc  - Proteus is stone avid organism, can do stone hunt on imaging if need be  2. other issues - care per medicine 
89/ M PMHx of recent right leg cellulitis, recent UTI, right hydronephrosis, CAD, s/p PCI w/ stent placement, Hypertension, Hyperlipidemia, CKD stage 4 (cr BL  2.0), AAA, PAD, OA, BPH,  was brought to ED for AMS and new tremors of upper extremity.   PT was admitted until 9/10 for complicated UTI with hydronephrosis.  Pt has no other specific complaints.  Pt notes chronic weakness of the LLE.  He normally doesn't ambulate. He is more awake and alert at this time and responds to questions. No c/o cp/abd pain/sob/n/v. UA grossly positive, Cr increased to 2.51.Given meropenem in ED. hx MDR/ESBL UTI in past.    1. pyuria. complicated UTI. hx ESBL/MDR cystitis. CKD4  - agree with merrem adjust dose to [id number], day #4  - f/u urine cx/blood cx------ found to have Proteus mirabilis in both urine and blood; sensitivity is reviewed, not an ESBL organism  - will change to ceftin 250 mg po q 12 hours for 10 more days  - repeat blood cultures are pending; I will follow up   - can start discharge planning if medically stable  - monitor for urinary retention  - had renal/bladder sono 9/3   - previous cultures reviewed  - tolerating abx well so far; no side effects noted  - reason for abx use and side effects reviewed with patient  - supportive care  - isolation precautions  - fu cbc  - Proteus is stone avid organism, can do stone hunt on imaging if need be  2. other issues - care per medicine 
89/ M PMHx of recent right leg cellulitis, recent UTI, right hydronephrosis, CAD, s/p PCI w/ stent placement, Hypertension, Hyperlipidemia, CKD stage 4 (cr BL  2.0), AAA, PAD, OA, BPH,  was brought to ED for AMS and new tremors of upper extremity.   PT was admitted until 9/10 for complicated UTI with hydronephrosis.  Pt has no other specific complaints.  Pt notes chronic weakness of the LLE.  He normally doesn't ambulate. He is more awake and alert at this time and responds to questions. No c/o cp/abd pain/sob/n/v. UA grossly positive, Cr increased to 2.51.Given meropenem in ED. hx MDR/ESBL UTI in past.    1. pyuria. complicated UTI. hx ESBL/MDR cystitis. CKD4  - agree with merrem adjust dose to [id number]  - f/u urine cx/blood cx------ found to have Proteus mirabilis in both urine and blood; sensitivity is pending  - monitor for urinary retention  - had renal/bladder sono 9/3   - previous cultures reviewed  - tolerating abx well so far; no side effects noted  - reason for abx use and side effects reviewed with patient  - supportive care  - isolation precautions  - fu cbc  - Proteus is stone avid organism, can do stone hunt on imaging if need be  2. other issues - care per medicine 
89/ M PMHx of recent right leg cellulitis, recent UTI, right hydronephrosis, CAD, s/p PCI w/ stent placement, Hypertension, Hyperlipidemia, CKD stage 4 (cr BL  2.0), AAA, PAD, OA, BPH,  was brought to ED for AMS and new tremors of upper extremity.   PT was admitted until 9/10 for complicated UTI with hydronephrosis.  Pt has no other specific complaints.  Pt notes chronic weakness of the LLE.  He normally doesn't ambulate. He is more awake and alert at this time and responds to questions. No c/o cp/abd pain/sob/n/v.     Cr increased to 2.51.    Given meropenem in ED.    Pt admitted with     1) AMS / Metabolic encephalopathy: resolved  CT head neg  sec to uti  treat uti    2) UTI; no e/o sepsis  vitals stable  iv meropenem  U/blood Cx- found to have Proteus mirabilis in both urine and blood;  last admission urine cx showed > 3 organisms  ID eval appreciated  renal sono to r/o renal stone- pending     3) Worsening BUN/Cr sec to dehydration and uti + CKD 4: Hx of right sided hydronephrosis   Cr increased to 2.72- improving,   admit  hold lasix x 24 hrs more  low dose iv fluids x 24 hrs more   BMP in am  would restart lasix at a lower dose  if cr c/w improvment ,  home dose is 40 mg po q 12 hrs)    4) CAD/stent:   cont asa, plavix, BB, statin    5) DVT PPX: heparin s/q      PT is DNR/DNI

## 2021-09-30 NOTE — DISCHARGE NOTE PROVIDER - HOSPITAL COURSE
FROM ADMISSION H+P:   HPI:  89/ M PMHx of recent right leg cellulitis, recent UTI, right hydronephrosis, CAD, s/p PCI w/ stent placement, Hypertension, left leg weakness x 6 months,  Hyperlipidemia, CKD stage 4 (cr BL  2.0), AAA, PAD, OA, BPH,  was brought to ED for AMS and new tremors of upper extremity.   PT was admitted until 9/10 for complicated UTI with hydronephrosis.  Pt has no other specific complaints.  Pt notes chronic weakness of the LLE.  He normally doesn't ambulate. He is more awake and alert at this time and responds to questions. No c/o cp/abd pain/sob/n/v. Cr increased to 2.51. Given meropenem in ED.  ---  HOSPITAL COURSE:   Pt c/w IV meropenem, seen by ID, UCx revealed:  found to have Proteus mirabilis in both urine and blood; sensitivity is reviewed, not an ESBL organism.   9/27: had temp of 101.2 last night  blood cx : Proteus mirabilis  Cr increased to 2.72; increase iv fluids to 60 ml/hr x 24 hrs  9/28:  cr down to 2.45  urine and blood cx : Proteus mirabilis  renal sono to r/o stone- NEG,  but did again reveal right hydronephrosis. On prior admit, pt had been evaluated for this by urology. Recommendation was for renal scan which (as documented), pt's family had refused. REcommnedation was then to f/u as op for cystoscopy and repeat imaging. I d/w son regarding this once again.  Today,   Pt stable, abx changed to oral ceftin for 10more days. Pt deemed medically stable for dc to f/u as op.     Vital Signs Last 24 Hrs  T(C): 36.8 (30 Sep 2021 07:23), Max: 36.9 (30 Sep 2021 00:24)  T(F): 98.3 (30 Sep 2021 07:23), Max: 98.4 (30 Sep 2021 00:24)  HR: 68 (30 Sep 2021 07:23) (63 - 68)  BP: 133/57 (30 Sep 2021 07:23) (118/52 - 141/55)  BP(mean): 62 (29 Sep 2021 16:08) (62 - 62)  RR: 18 (30 Sep 2021 07:23) (17 - 18)  SpO2: 96% (30 Sep 2021 07:23) (96% - 100%)  GENERAL: NAD, frail  HEAD:  Atraumatic, Normocephalic  EYES: EOMI, PERRLA, conjunctiva and sclera clear  NECK: Supple, No JVD, no LAD  CHEST/LUNG: Clear to auscultation bilaterally; No wheeze, resp unlabored  HEART: Regular rate and rhythm; No murmurs, rubs, or gallops  ABDOMEN: Soft, Nontender, Nondistended; Bowel sounds present, no HSM  EXTREMITIES:  2+ Peripheral Pulses, No clubbing, cyanosis, or edema  PSYCH: AAOx1, normal behavior  NEUROLOGY: non-focal, sensory and cn 2-12 intact  SKIN: No visible rashes or lesions    1) AMS / Metabolic encephalopathy: resolved  CT head neg, suspect d/t uti  2) UTI; no e/o sepsis  vitals stable  iv meropenem- changed to po ceftin,   U/blood Cx- found to have Proteus mirabilis in both urine and blood;  3) Worsening BUN/Cr sec to dehydration and uti + CKD 4: Hx of right sided hydronephrosis   Cr increased to 2.72- improving,  TODAY'S CR 1.53, WILL RESUME LASIX ON D/C  4) CAD/stent:  cont asa, plavix, BB, statin    CONSULTANTS:   Infectious Disease    ---  TIME SPENT:  I, the attending physician, was physically present for the key portions of the evaluation and management (E/M) service provided. The total amount of time spent reviewing the hospital notes, laboratory values, imaging findings, assessing/counseling the patient, discussing with consultant physicians, social work, nursing staff was 90 minutes

## 2021-09-30 NOTE — DISCHARGE NOTE PROVIDER - NSDCMRMEDTOKEN_GEN_ALL_CORE_FT
acetaminophen 325 mg oral tablet: 2 tab(s) orally every 6 hours, As Needed - for mild pain  aspirin 81 mg oral delayed release tablet: 1 tab(s) orally once a day  atorvastatin 40 mg oral tablet: 1 tab(s) orally once a day (at bedtime)  cefuroxime 250 mg oral tablet: 1 tab(s) orally every 12 hours  clopidogrel 75 mg oral tablet: 1 tab(s) orally once a day; start on 4/4/21  cyanocobalamin 1000 mcg oral tablet: 1 tab(s) orally once a day  DULoxetine 60 mg oral delayed release capsule: 1 cap(s) orally once a day  famotidine 20 mg oral tablet: 1 tab(s) orally 2 times a day  ferrous sulfate 325 mg (65 mg elemental iron) oral tablet: 1 tab(s) orally once a day  furosemide 40 mg oral tablet: 1 tab(s) orally 2 times a day  gabapentin 300 mg oral capsule: 1 cap(s) orally 2 times a day in the morning and evening   ***9am, 6pm***  gabapentin 600 mg oral tablet: 1 tab(s) orally once a day (at bedtime)  metoprolol succinate 50 mg oral tablet, extended release: 1.5 tab(s) orally once a day  Multiple Vitamins oral tablet: 1 tab(s) orally once a day  pantoprazole 40 mg oral delayed release tablet: 1 tab(s) orally 2 times a day  tamsulosin 0.4 mg oral capsule: 1 cap(s) orally once a day (at bedtime)

## 2021-09-30 NOTE — DISCHARGE NOTE PROVIDER - CARE PROVIDER_API CALL
Ankit Dunbar)  Urology  [address] [facility] [address] [facility], [address] [place] [address]  Phone: [phone]  Fax: [phone]  Follow Up Time: 1 week

## 2021-09-30 NOTE — DISCHARGE NOTE NURSING/CASE MANAGEMENT/SOCIAL WORK - NSDCPEFALRISK_GEN_ALL_CORE
For information on Fall & injury Prevention, visit [email or web address]

## 2021-10-01 LAB
CULTURE RESULTS: SIGNIFICANT CHANGE UP
SPECIMEN SOURCE: SIGNIFICANT CHANGE UP

## 2021-10-05 DIAGNOSIS — N17.9 ACUTE KIDNEY FAILURE, UNSPECIFIED: ICD-10-CM

## 2021-10-05 DIAGNOSIS — N18.4 CHRONIC KIDNEY DISEASE, STAGE 4 (SEVERE): ICD-10-CM

## 2021-10-05 DIAGNOSIS — G93.41 METABOLIC ENCEPHALOPATHY: ICD-10-CM

## 2021-10-05 DIAGNOSIS — R25.1 TREMOR, UNSPECIFIED: ICD-10-CM

## 2021-10-05 DIAGNOSIS — Z95.5 PRESENCE OF CORONARY ANGIOPLASTY IMPLANT AND GRAFT: ICD-10-CM

## 2021-10-05 DIAGNOSIS — E78.5 HYPERLIPIDEMIA, UNSPECIFIED: ICD-10-CM

## 2021-10-05 DIAGNOSIS — I25.10 ATHEROSCLEROTIC HEART DISEASE OF NATIVE CORONARY ARTERY WITHOUT ANGINA PECTORIS: ICD-10-CM

## 2021-10-05 DIAGNOSIS — R29.898 OTHER SYMPTOMS AND SIGNS INVOLVING THE MUSCULOSKELETAL SYSTEM: ICD-10-CM

## 2021-10-05 DIAGNOSIS — I73.9 PERIPHERAL VASCULAR DISEASE, UNSPECIFIED: ICD-10-CM

## 2021-10-05 DIAGNOSIS — I71.4 ABDOMINAL AORTIC ANEURYSM, WITHOUT RUPTURE: ICD-10-CM

## 2021-10-05 DIAGNOSIS — I12.9 HYPERTENSIVE CHRONIC KIDNEY DISEASE WITH STAGE 1 THROUGH STAGE 4 CHRONIC KIDNEY DISEASE, OR UNSPECIFIED CHRONIC KIDNEY DISEASE: ICD-10-CM

## 2021-10-05 DIAGNOSIS — Z66 DO NOT RESUSCITATE: ICD-10-CM

## 2021-10-05 DIAGNOSIS — N40.0 BENIGN PROSTATIC HYPERPLASIA WITHOUT LOWER URINARY TRACT SYMPTOMS: ICD-10-CM

## 2021-10-05 DIAGNOSIS — N13.6 PYONEPHROSIS: ICD-10-CM

## 2021-10-05 DIAGNOSIS — Z88.0 ALLERGY STATUS TO PENICILLIN: ICD-10-CM

## 2021-10-05 DIAGNOSIS — E86.0 DEHYDRATION: ICD-10-CM

## 2021-10-05 DIAGNOSIS — B96.4 PROTEUS (MIRABILIS) (MORGANII) AS THE CAUSE OF DISEASES CLASSIFIED ELSEWHERE: ICD-10-CM

## 2021-10-05 DIAGNOSIS — G62.9 POLYNEUROPATHY, UNSPECIFIED: ICD-10-CM

## 2021-10-14 ENCOUNTER — APPOINTMENT (OUTPATIENT)
Dept: UROLOGY | Facility: CLINIC | Age: 86
End: 2021-10-14

## 2021-10-15 ENCOUNTER — INPATIENT (INPATIENT)
Facility: HOSPITAL | Age: 86
LOS: 4 days | Discharge: NURSING, REHAB OR LONG-TERM CARE | DRG: 871 | End: 2021-10-20
Attending: FAMILY MEDICINE | Admitting: INTERNAL MEDICINE
Payer: MEDICARE

## 2021-10-15 VITALS
WEIGHT: 156.97 LBS | HEIGHT: 70 IN | DIASTOLIC BLOOD PRESSURE: 56 MMHG | TEMPERATURE: 98 F | OXYGEN SATURATION: 97 % | HEART RATE: 87 BPM | SYSTOLIC BLOOD PRESSURE: 127 MMHG | RESPIRATION RATE: 17 BRPM

## 2021-10-15 DIAGNOSIS — N18.30 CHRONIC KIDNEY DISEASE, STAGE 3 UNSPECIFIED: ICD-10-CM

## 2021-10-15 DIAGNOSIS — Z98.89 OTHER SPECIFIED POSTPROCEDURAL STATES: Chronic | ICD-10-CM

## 2021-10-15 DIAGNOSIS — N17.9 ACUTE KIDNEY FAILURE, UNSPECIFIED: ICD-10-CM

## 2021-10-15 DIAGNOSIS — G93.41 METABOLIC ENCEPHALOPATHY: ICD-10-CM

## 2021-10-15 DIAGNOSIS — A41.59 OTHER GRAM-NEGATIVE SEPSIS: ICD-10-CM

## 2021-10-15 DIAGNOSIS — N39.0 URINARY TRACT INFECTION, SITE NOT SPECIFIED: ICD-10-CM

## 2021-10-15 DIAGNOSIS — I50.32 CHRONIC DIASTOLIC (CONGESTIVE) HEART FAILURE: ICD-10-CM

## 2021-10-15 DIAGNOSIS — Z98.890 OTHER SPECIFIED POSTPROCEDURAL STATES: Chronic | ICD-10-CM

## 2021-10-15 DIAGNOSIS — Z87.19 PERSONAL HISTORY OF OTHER DISEASES OF THE DIGESTIVE SYSTEM: Chronic | ICD-10-CM

## 2021-10-15 DIAGNOSIS — Z95.5 PRESENCE OF CORONARY ANGIOPLASTY IMPLANT AND GRAFT: Chronic | ICD-10-CM

## 2021-10-15 LAB
ALBUMIN SERPL ELPH-MCNC: 3.4 G/DL — SIGNIFICANT CHANGE UP (ref 3.3–5)
ALP SERPL-CCNC: 84 U/L — SIGNIFICANT CHANGE UP (ref 40–120)
ALT FLD-CCNC: 15 U/L — SIGNIFICANT CHANGE UP (ref 12–78)
ANION GAP SERPL CALC-SCNC: 4 MMOL/L — LOW (ref 5–17)
APPEARANCE UR: ABNORMAL
APTT BLD: 30.7 SEC — SIGNIFICANT CHANGE UP (ref 27.5–35.5)
AST SERPL-CCNC: 16 U/L — SIGNIFICANT CHANGE UP (ref 15–37)
BASOPHILS # BLD AUTO: 0.04 K/UL — SIGNIFICANT CHANGE UP (ref 0–0.2)
BASOPHILS NFR BLD AUTO: 0.3 % — SIGNIFICANT CHANGE UP (ref 0–2)
BILIRUB SERPL-MCNC: 0.4 MG/DL — SIGNIFICANT CHANGE UP (ref 0.2–1.2)
BILIRUB UR-MCNC: NEGATIVE — SIGNIFICANT CHANGE UP
BUN SERPL-MCNC: 31 MG/DL — HIGH (ref 7–23)
CALCIUM SERPL-MCNC: 8.9 MG/DL — SIGNIFICANT CHANGE UP (ref 8.5–10.1)
CHLORIDE SERPL-SCNC: 101 MMOL/L — SIGNIFICANT CHANGE UP (ref 96–108)
CO2 SERPL-SCNC: 32 MMOL/L — HIGH (ref 22–31)
COLOR SPEC: YELLOW — SIGNIFICANT CHANGE UP
CREAT SERPL-MCNC: 2.23 MG/DL — HIGH (ref 0.5–1.3)
DIFF PNL FLD: ABNORMAL
EOSINOPHIL # BLD AUTO: 0.06 K/UL — SIGNIFICANT CHANGE UP (ref 0–0.5)
EOSINOPHIL NFR BLD AUTO: 0.5 % — SIGNIFICANT CHANGE UP (ref 0–6)
GLUCOSE SERPL-MCNC: 115 MG/DL — HIGH (ref 70–99)
GLUCOSE UR QL: NEGATIVE MG/DL — SIGNIFICANT CHANGE UP
HCT VFR BLD CALC: 30.4 % — LOW (ref 39–50)
HGB BLD-MCNC: 9.4 G/DL — LOW (ref 13–17)
IMM GRANULOCYTES NFR BLD AUTO: 0.3 % — SIGNIFICANT CHANGE UP (ref 0–1.5)
INR BLD: 1.06 RATIO — SIGNIFICANT CHANGE UP (ref 0.88–1.16)
KETONES UR-MCNC: NEGATIVE — SIGNIFICANT CHANGE UP
LACTATE SERPL-SCNC: 2 MMOL/L — SIGNIFICANT CHANGE UP (ref 0.7–2)
LEUKOCYTE ESTERASE UR-ACNC: ABNORMAL
LYMPHOCYTES # BLD AUTO: 0.87 K/UL — LOW (ref 1–3.3)
LYMPHOCYTES # BLD AUTO: 7.4 % — LOW (ref 13–44)
MCHC RBC-ENTMCNC: 28.1 PG — SIGNIFICANT CHANGE UP (ref 27–34)
MCHC RBC-ENTMCNC: 30.9 GM/DL — LOW (ref 32–36)
MCV RBC AUTO: 90.7 FL — SIGNIFICANT CHANGE UP (ref 80–100)
MONOCYTES # BLD AUTO: 0.72 K/UL — SIGNIFICANT CHANGE UP (ref 0–0.9)
MONOCYTES NFR BLD AUTO: 6.1 % — SIGNIFICANT CHANGE UP (ref 2–14)
NEUTROPHILS # BLD AUTO: 10.02 K/UL — HIGH (ref 1.8–7.4)
NEUTROPHILS NFR BLD AUTO: 85.4 % — HIGH (ref 43–77)
NITRITE UR-MCNC: NEGATIVE — SIGNIFICANT CHANGE UP
PH UR: 7 — SIGNIFICANT CHANGE UP (ref 5–8)
PLATELET # BLD AUTO: 270 K/UL — SIGNIFICANT CHANGE UP (ref 150–400)
POTASSIUM SERPL-MCNC: 4.6 MMOL/L — SIGNIFICANT CHANGE UP (ref 3.5–5.3)
POTASSIUM SERPL-SCNC: 4.6 MMOL/L — SIGNIFICANT CHANGE UP (ref 3.5–5.3)
PROT SERPL-MCNC: 7.5 GM/DL — SIGNIFICANT CHANGE UP (ref 6–8.3)
PROT UR-MCNC: 30 MG/DL
PROTHROM AB SERPL-ACNC: 12.4 SEC — SIGNIFICANT CHANGE UP (ref 10.6–13.6)
RAPID RVP RESULT: SIGNIFICANT CHANGE UP
RBC # BLD: 3.35 M/UL — LOW (ref 4.2–5.8)
RBC # FLD: 16.2 % — HIGH (ref 10.3–14.5)
SARS-COV-2 RNA SPEC QL NAA+PROBE: SIGNIFICANT CHANGE UP
SODIUM SERPL-SCNC: 137 MMOL/L — SIGNIFICANT CHANGE UP (ref 135–145)
SP GR SPEC: 1 — LOW (ref 1.01–1.02)
UROBILINOGEN FLD QL: NEGATIVE MG/DL — SIGNIFICANT CHANGE UP
WBC # BLD: 11.74 K/UL — HIGH (ref 3.8–10.5)
WBC # FLD AUTO: 11.74 K/UL — HIGH (ref 3.8–10.5)

## 2021-10-15 PROCEDURE — 97116 GAIT TRAINING THERAPY: CPT | Mod: GP

## 2021-10-15 PROCEDURE — 71045 X-RAY EXAM CHEST 1 VIEW: CPT | Mod: 26

## 2021-10-15 PROCEDURE — 93010 ELECTROCARDIOGRAM REPORT: CPT

## 2021-10-15 PROCEDURE — U0005: CPT

## 2021-10-15 PROCEDURE — 76770 US EXAM ABDO BACK WALL COMP: CPT

## 2021-10-15 PROCEDURE — 36415 COLL VENOUS BLD VENIPUNCTURE: CPT

## 2021-10-15 PROCEDURE — 83735 ASSAY OF MAGNESIUM: CPT

## 2021-10-15 PROCEDURE — 83605 ASSAY OF LACTIC ACID: CPT

## 2021-10-15 PROCEDURE — 84100 ASSAY OF PHOSPHORUS: CPT

## 2021-10-15 PROCEDURE — 99285 EMERGENCY DEPT VISIT HI MDM: CPT

## 2021-10-15 PROCEDURE — 99223 1ST HOSP IP/OBS HIGH 75: CPT

## 2021-10-15 PROCEDURE — 85025 COMPLETE CBC W/AUTO DIFF WBC: CPT

## 2021-10-15 PROCEDURE — 80053 COMPREHEN METABOLIC PANEL: CPT

## 2021-10-15 PROCEDURE — 80048 BASIC METABOLIC PNL TOTAL CA: CPT

## 2021-10-15 PROCEDURE — 97162 PT EVAL MOD COMPLEX 30 MIN: CPT | Mod: GP

## 2021-10-15 PROCEDURE — 86769 SARS-COV-2 COVID-19 ANTIBODY: CPT

## 2021-10-15 PROCEDURE — U0003: CPT

## 2021-10-15 PROCEDURE — 85027 COMPLETE CBC AUTOMATED: CPT

## 2021-10-15 RX ORDER — ACETAMINOPHEN 500 MG
650 TABLET ORAL EVERY 6 HOURS
Refills: 0 | Status: DISCONTINUED | OUTPATIENT
Start: 2021-10-15 | End: 2021-10-20

## 2021-10-15 RX ORDER — SODIUM CHLORIDE 9 MG/ML
2000 INJECTION INTRAMUSCULAR; INTRAVENOUS; SUBCUTANEOUS ONCE
Refills: 0 | Status: COMPLETED | OUTPATIENT
Start: 2021-10-15 | End: 2021-10-15

## 2021-10-15 RX ORDER — TAMSULOSIN HYDROCHLORIDE 0.4 MG/1
0.4 CAPSULE ORAL AT BEDTIME
Refills: 0 | Status: DISCONTINUED | OUTPATIENT
Start: 2021-10-15 | End: 2021-10-20

## 2021-10-15 RX ORDER — ATORVASTATIN CALCIUM 80 MG/1
40 TABLET, FILM COATED ORAL AT BEDTIME
Refills: 0 | Status: DISCONTINUED | OUTPATIENT
Start: 2021-10-15 | End: 2021-10-20

## 2021-10-15 RX ORDER — CLOPIDOGREL BISULFATE 75 MG/1
75 TABLET, FILM COATED ORAL DAILY
Refills: 0 | Status: DISCONTINUED | OUTPATIENT
Start: 2021-10-15 | End: 2021-10-17

## 2021-10-15 RX ORDER — PREGABALIN 225 MG/1
1000 CAPSULE ORAL DAILY
Refills: 0 | Status: DISCONTINUED | OUTPATIENT
Start: 2021-10-15 | End: 2021-10-20

## 2021-10-15 RX ORDER — VANCOMYCIN HCL 1 G
1000 VIAL (EA) INTRAVENOUS ONCE
Refills: 0 | Status: DISCONTINUED | OUTPATIENT
Start: 2021-10-15 | End: 2021-10-15

## 2021-10-15 RX ORDER — ASPIRIN/CALCIUM CARB/MAGNESIUM 324 MG
81 TABLET ORAL DAILY
Refills: 0 | Status: DISCONTINUED | OUTPATIENT
Start: 2021-10-15 | End: 2021-10-20

## 2021-10-15 RX ORDER — MEROPENEM 1 G/30ML
500 INJECTION INTRAVENOUS ONCE
Refills: 0 | Status: COMPLETED | OUTPATIENT
Start: 2021-10-15 | End: 2021-10-15

## 2021-10-15 RX ORDER — ACETAMINOPHEN 500 MG
975 TABLET ORAL ONCE
Refills: 0 | Status: COMPLETED | OUTPATIENT
Start: 2021-10-15 | End: 2021-10-15

## 2021-10-15 RX ORDER — GABAPENTIN 400 MG/1
300 CAPSULE ORAL
Refills: 0 | Status: DISCONTINUED | OUTPATIENT
Start: 2021-10-15 | End: 2021-10-20

## 2021-10-15 RX ORDER — FAMOTIDINE 10 MG/ML
20 INJECTION INTRAVENOUS
Refills: 0 | Status: DISCONTINUED | OUTPATIENT
Start: 2021-10-15 | End: 2021-10-15

## 2021-10-15 RX ORDER — AZTREONAM 2 G
1000 VIAL (EA) INJECTION ONCE
Refills: 0 | Status: COMPLETED | OUTPATIENT
Start: 2021-10-15 | End: 2021-10-15

## 2021-10-15 RX ORDER — METOPROLOL TARTRATE 50 MG
75 TABLET ORAL DAILY
Refills: 0 | Status: DISCONTINUED | OUTPATIENT
Start: 2021-10-15 | End: 2021-10-20

## 2021-10-15 RX ORDER — GABAPENTIN 400 MG/1
600 CAPSULE ORAL AT BEDTIME
Refills: 0 | Status: DISCONTINUED | OUTPATIENT
Start: 2021-10-15 | End: 2021-10-20

## 2021-10-15 RX ORDER — FERROUS SULFATE 325(65) MG
325 TABLET ORAL DAILY
Refills: 0 | Status: DISCONTINUED | OUTPATIENT
Start: 2021-10-15 | End: 2021-10-20

## 2021-10-15 RX ORDER — PANTOPRAZOLE SODIUM 20 MG/1
40 TABLET, DELAYED RELEASE ORAL
Refills: 0 | Status: DISCONTINUED | OUTPATIENT
Start: 2021-10-15 | End: 2021-10-20

## 2021-10-15 RX ORDER — SODIUM CHLORIDE 9 MG/ML
500 INJECTION INTRAMUSCULAR; INTRAVENOUS; SUBCUTANEOUS
Refills: 0 | Status: DISCONTINUED | OUTPATIENT
Start: 2021-10-15 | End: 2021-10-16

## 2021-10-15 RX ORDER — FUROSEMIDE 40 MG
40 TABLET ORAL
Refills: 0 | Status: DISCONTINUED | OUTPATIENT
Start: 2021-10-15 | End: 2021-10-20

## 2021-10-15 RX ORDER — DULOXETINE HYDROCHLORIDE 30 MG/1
60 CAPSULE, DELAYED RELEASE ORAL DAILY
Refills: 0 | Status: DISCONTINUED | OUTPATIENT
Start: 2021-10-15 | End: 2021-10-20

## 2021-10-15 RX ADMIN — SODIUM CHLORIDE 2000 MILLILITER(S): 9 INJECTION INTRAMUSCULAR; INTRAVENOUS; SUBCUTANEOUS at 17:58

## 2021-10-15 RX ADMIN — MEROPENEM 100 MILLIGRAM(S): 1 INJECTION INTRAVENOUS at 19:41

## 2021-10-15 RX ADMIN — GABAPENTIN 600 MILLIGRAM(S): 400 CAPSULE ORAL at 22:18

## 2021-10-15 RX ADMIN — Medication 975 MILLIGRAM(S): at 17:58

## 2021-10-15 RX ADMIN — TAMSULOSIN HYDROCHLORIDE 0.4 MILLIGRAM(S): 0.4 CAPSULE ORAL at 22:18

## 2021-10-15 RX ADMIN — ATORVASTATIN CALCIUM 40 MILLIGRAM(S): 80 TABLET, FILM COATED ORAL at 22:19

## 2021-10-15 RX ADMIN — Medication 50 MILLIGRAM(S): at 17:58

## 2021-10-15 RX ADMIN — SODIUM CHLORIDE 75 MILLILITER(S): 9 INJECTION INTRAMUSCULAR; INTRAVENOUS; SUBCUTANEOUS at 22:24

## 2021-10-15 NOTE — ED PROVIDER NOTE - OBJECTIVE STATEMENT
89 y/o male with a PMHx of right leg cellulitis, recent UTI, right hydronephrosis, CAD, s/p PCI w/ stent placement, Hypertension, left leg weakness x 6 months,  Hyperlipidemia, CKD stage 4 (cr BL  2.0), AAA, PAD, OA, BPH sent from nursing home presents to the ED c/o shaking, cold, ams, general weakness. Pt recently here for uti. Denies pain, cough, fever, chills. 89 y/o male with a PMHx of right leg cellulitis, recent UTI, right hydronephrosis, CAD, s/p PCI w/ stent placement, Hypertension, left leg weakness x 6 months,  Hyperlipidemia, CKD stage 4 (cr BL  2.0), AAA, PAD, OA, BPH sent from nursing home presents to the ED c/o shaking, cold, ?AMS, general weakness. Pt recently here for uti. Denies pain, cough, fever, chills.

## 2021-10-15 NOTE — ED ADULT NURSE NOTE - OBJECTIVE STATEMENT
Patient BIBA from [facility] complaining of AMS. As per staff patient tremulous, altered, complaining of chills. Patient febrile on arrival 102.2 rectal. Patient denies abdominal pain, CP, SOB, NVD. Unable to answer all assessment questions due to mentation, A&0x2, patient recently DC'ed from  for UTI.

## 2021-10-15 NOTE — H&P ADULT - HISTORY OF PRESENT ILLNESS
89M with a PMH significant for CAD s/p PCI/Stent, HTN, BPH, Chronic LLE Hip Pain 2/2 OA, HLD, CKD-IV, AAA, PAD and BPH, who presents to  from [facility] for progressive confusion. Per his daughter and Son he was at his baseline mentation yesterday morning, then towards the afternoon he began to complain of being "cold and tired". At that time he denied any sxs of fevers, abdominal/chest pain, acute focal neurological deficits or urinary sxs however he exhibited slurred speech with intermittent lethargy after which, EMS was notified and he was sent to the ED for further evaluation. Per chart review, the pt was recently admitted to  2 weeks prior for Acute Cystitis 2/2 Proteus Mirabilis complicated by Bacteremia and Hydronephrosis; for which he was initiated on a course of IV Meropenum and discharge home on a 10day course of PO Ceftin, at that time out patient follow up with Urology was recommended for possible Cystoscopy to identify any structural pathologies.

## 2021-10-15 NOTE — H&P ADULT - NSHPPHYSICALEXAM_GEN_ALL_CORE
Vital Signs Last 24 Hrs  T(C): 36.9 (15 Oct 2021 19:55), Max: 39 (15 Oct 2021 17:00)  T(F): 98.4 (15 Oct 2021 19:55), Max: 102.2 (15 Oct 2021 17:00)  HR: 83 (15 Oct 2021 19:55) (83 - 88)  BP: 102/49 (15 Oct 2021 19:55) (101/52 - 127/56)  RR: 18 (15 Oct 2021 19:55) (17 - 19)  SpO2: 97% (15 Oct 2021 19:55) (97% - 99%)    PHYSICAL EXAM:  Constitutional: NAD, awake and alert, frail appearing   HEENT: PERR, EOMI, Normal Hearing, MMM  Neck: Soft and supple, No LAD, No JVD  Respiratory: Breath sounds are clear bilaterally, No wheezing, rales or rhonchi  Cardiovascular: S1 and S2, regular rate and rhythm, no Murmurs, gallops or rubs  Gastrointestinal: Bowel Sounds present, soft, nontender, nondistended, no guarding, no rebound  Extremities: No peripheral edema  Vascular: 2+ peripheral pulses  Neurological: A/O x 2, no focal deficits  Musculoskeletal: decreased yet equal strength b/l lower extremities, no different from baseline  Skin: No rashes

## 2021-10-15 NOTE — H&P ADULT - ASSESSMENT
89M with an extensive PMH, most significant for BPH, CKD IV and recent admission to  2 weeks prior for Acute Cystitis 2/2 Proteus Mirabilis complicated by Bacteremia and Hydronephrosis; for which he was initiated on a course of IV Meropenum and discharge home on a 10day course of PO Ceftin, he returns to ED with progressive confusion and lethargy. In the ED he was found to be febrile, with a Tmax of 102.2 F with Laboratory findings significant for Leukocytosis, WBC 11.74, LA of 2, Cr of 2/23 with UA detecting Moderate LE's and Blood. Pt was given a 1 time dose of IV Azactam as well as a 2L NS Bolus, then admitted to the floor with Metabolic Encephalopathy in the setting of UroSepsis, with urology consulted.         #Metabolic Encephalopathy in the setting of Severe UroSepsis  -Admit to [facility]   -SIRS: Tmax 102.2 F with WBC 11.74,   -Pt with H/o ESBL UTI and recent admission in September for Proteus Bacteremia/Cystis   -UA: (+) Moderate LE's, Moderate Blood   -S/p IV Azectam x 1   -S/p 2L NS IVF bolus   -Continue with IV Meropenum   -Consult ID   -Consult Urology   -F/u Renal US   -Consider CT abd/pel in search for Nephrolithiasis         #RASHAUN on CKD-III c/b Chronic Anemia   -Cr on admission 2.23  -Baseline Cr~2.0  -Cr on 9/30: 1.53  -Caution with nephrotoxic agents   -Gentle diureses, s/p 2L NS Boulus   -Continue to trend Cr, f/u am BMP   -H/H on Admission 9.4/30.4  -Baseline H/H 9/28  -Continue to monitor, f/u am CBC  -Continue with daily Iron supplementation       #Neuropathy   -Fall Risk Precautions   -Consult PT once improved  -Continue with Gabapentin 300mg BID (9am/6pm)   -Continue with Gabapentin 600mg qHs   -Continue with Duloxetine 50mg qD         #HTN/HFPEF  -Caution with Antihypertensives as DBP's were soft on admission   -Continue Lasix 4mg BID   -Continue Metoprolol Succinate 75mg qD   -DASH Diet   -TTE 4/21: LVEF 60-65%  -Pt currently Euvolemic, with no signs of fluid overload       #CAD   -S/p PCI with stent placement in January 2018  -Continue with ASA qD  -Continue with Plavix qD    #HLD  -Continue home dose Statin   -Atorvastatin       #Advanced Directives   -DNR/DNI  -Pt without Capacity at the moment    -MOLST form completed verbally on addmisison   -HCP: Demian Ly-Son- [phone]    #PPX  -DVT PPX: SubQ Heparin   -GI PPX: Pantoprazole 40mg qD       case d/w Dr. Rendon      89M with an extensive PMH, most significant for BPH, CKD IV and recent admission to  2 weeks prior for Acute Cystitis 2/2 Proteus Mirabilis complicated by Bacteremia and Hydronephrosis; for which he was initiated on a course of IV Meropenum and discharge home on a 10day course of PO Ceftin, he returns to ED with progressive confusion and lethargy. In the ED he was found to be febrile, with a Tmax of 102.2 F with Laboratory findings significant for Leukocytosis, WBC 11.74, LA of 2, Cr of 2/23 with UA detecting Moderate LE's and Blood. Pt was given a 1 time dose of IV Azactam as well as a 2L NS Bolus, then admitted to the floor with Metabolic Encephalopathy in the setting of UroSepsis, with urology consulted.         #Metabolic Encephalopathy in the setting of Urosepsis  -Admit to [facility]   -SIRS: Tmax 102.2 F with WBC 11.74,   -Pt with H/o ESBL UTI and recent admission in September for Proteus Bacteremia/Cystis   -UA: (+) Moderate LE's, Moderate Blood   -S/p IV Azectam x 1   -S/p 2L NS IVF bolus   -Continue with IV Meropenum   -F/u Urine Culture  -F/u Blood Culture  -Consult ID   -Consult Urology   -F/u Renal US   -Consider CT abd/pel in search for Nephrolithiasis         #RASHAUN on CKD-III c/b Chronic Anemia   -Cr on admission 2.23  -Baseline Cr~2.0  -Cr on 9/30: 1.53  -Caution with nephrotoxic agents   -Gentle diureses, s/p 2L NS Boulus   -Continue to trend Cr, f/u am BMP   -H/H on Admission 9.4/30.4  -Baseline H/H 9/28  -Continue to monitor, f/u am CBC  -Continue with daily Iron supplementation       #Neuropathy   -Fall Risk Precautions   -Consult PT once improved  -Continue with Gabapentin 300mg BID (9am/6pm)   -Continue with Gabapentin 600mg qHs   -Continue with Duloxetine 50mg qD         #HTN/HFPEF  -Caution with Antihypertensives as DBP's were soft on admission   -Continue Lasix 4mg BID   -Continue Metoprolol Succinate 75mg qD   -DASH Diet   -TTE 4/21: LVEF 60-65%  -Pt currently Euvolemic, with no signs of fluid overload       #CAD   -S/p PCI with stent placement in January 2018  -Continue with ASA qD  -Continue with Plavix qD    #HLD  -Continue home dose Statin   -Atorvastatin       #Advanced Directives   -DNR/DNI  -Pt without Capacity at the moment    -MOLST form completed verbally on addmisison   -HCP: Demian Ly-Son- [phone]    #PPX  -DVT PPX: SubQ Heparin   -GI PPX: Pantoprazole 40mg qD       case d/w Dr. Rendon      89M with an extensive PMH, most significant for BPH, CKD IV and recent admission to  2 weeks prior for Acute Cystitis 2/2 Proteus Mirabilis complicated by Bacteremia and Hydronephrosis; for which he was initiated on a course of IV Meropenum and discharge home on a 10day course of PO Ceftin, he returns to ED with progressive confusion and lethargy. In the ED he was found to be febrile, with a Tmax of 102.2 F with Laboratory findings significant for Leukocytosis, WBC 11.74, LA of 2, Cr of 2/23 with UA detecting Moderate LE's and Blood. Pt was given a 1 time dose of IV Azactam as well as a 2L NS Bolus, then admitted to the floor with Metabolic Encephalopathy in the setting of UroSepsis, with urology consulted.         #Metabolic Encephalopathy in the setting of UTI (hemorrhagic cystitis)/sepsis  -Admit to [facility]   -SIRS: Tmax 102.2 F with WBC 11.74,   -Pt with H/o ESBL UTI and recent admission in September for Proteus Bacteremia/Cystis, not ESBL  -UA: (+) Moderate LE's, Moderate Blood   -S/p IV Azectam x 1   -S/p 2L NS IVF bolus   -Continue with IV Meropenum   -F/u Urine Culture  -F/u Blood Culture  -Consult ID   -Consult Urology   -F/u Renal US   -Consider CT abd/pel in search for Nephrolithiasis         #RASHAUN on CKD-III c/b Chronic Anemia   -Cr on admission 2.23  -Baseline Cr~2.0  -Cr on 9/30: 1.53  -Caution with nephrotoxic agents   -Gentle diureses, s/p 2L NS Boulus   -Continue to trend Cr, f/u am BMP   -H/H on Admission 9.4/30.4  -Baseline H/H 9/28  -Continue to monitor, f/u am CBC  -Continue with daily Iron supplementation       #Neuropathy   -Fall Risk Precautions   -Consult PT once improved  -Continue with Gabapentin 300mg BID (9am/6pm)   -Continue with Gabapentin 600mg qHs   -Continue with Duloxetine 50mg qD         #HTN/HFPEF  -Caution with Antihypertensives (with parameters) as DBP low on admission in the setting of sepsis  -Continue Lasix 4mg BID   -Continue Metoprolol Succinate 75mg qD   -DASH Diet   -TTE 4/21: LVEF 60-65%  -Pt currently Euvolemic, with no signs of fluid overload       #CAD   -S/p PCI with stent placement in January 2018  -Continue with ASA qD  -Continue with Plavix qD    #HLD  -Continue home dose Statin   -Atorvastatin       #Advanced Directives   -DNR/DNI  -Pt without Capacity at the moment    -MOLST form completed verbally on addmisison   -HCP: Demian Ly-Son- [phone]    #PPX  -DVT PPX: SubQ Heparin   -GI PPX: Pantoprazole 40mg qD       case d/w Dr. Rendon      89M with an extensive PMH, most significant for BPH, CKD IV and recent admission to  2 weeks prior for Acute Cystitis 2/2 Proteus Mirabilis complicated by Bacteremia and Hydronephrosis; for which he was initiated on a course of IV Meropenum and discharge home on a 10day course of PO Ceftin, he returns to ED with progressive confusion and lethargy. In the ED he was found to be febrile, with a Tmax of 102.2 F with Laboratory findings significant for Leukocytosis, WBC 11.74, LA of 2, Cr of 2/23 with UA detecting Moderate LE's and Blood. Pt was given a 1 time dose of IV Azactam as well as a 2L NS Bolus, then admitted to the floor with Metabolic Encephalopathy in the setting of UroSepsis, with urology consulted.         #Metabolic Encephalopathy in the setting of UTI (hemorrhagic cystitis)/sepsis  -Admit to [facility]   -SIRS: Tmax 102.2 F with WBC 11.74,   -Pt with H/o ESBL UTI and recent admission in September for Proteus Bacteremia/Cystis, not ESBL  -UA: (+) Moderate LE's, Moderate Blood   -H/H on Admission 9.4/30.4  -Baseline H/H 9/28  -S/p IV Azectam x 1   -S/p 2L NS IVF bolus   -Continue with IV Meropenum   -F/u Urine Culture  -F/u Blood Culture  -Consult ID   -Consult Urology   -F/u Renal US   -Consider CT abd/pel in search for Nephrolithiasis         #RASHAUN on CKD-III c/b Chronic Anemia   -Cr on admission 2.23  -Baseline Cr~2.0  -Cr on 9/30: 1.53  -Caution with nephrotoxic agents   -Gentle diureses, s/p 2L NS Boulus   -Continue to trend Cr, f/u am BMP   -H/H on Admission 9.4/30.4  -Baseline H/H 9/28  -Continue to monitor, f/u am CBC  -Continue with daily Iron supplementation       #Neuropathy   -Fall Risk Precautions   -Consult PT once improved  -Continue with Gabapentin 300mg BID (9am/6pm)   -Continue with Gabapentin 600mg qHs   -Continue with Duloxetine 50mg qD         #HTN/HFPEF  -Caution with Antihypertensives (with parameters) as DBP low on admission in the setting of sepsis  -Continue Lasix 4mg BID   -Continue Metoprolol Succinate 75mg qD   -DASH Diet   -TTE 4/21: LVEF 60-65%  -Pt currently Euvolemic, with no signs of fluid overload       #CAD   -S/p PCI with stent placement in January 2018  -Continue with ASA qD  -Continue with Plavix qD    #HLD  -Continue home dose Statin   -Atorvastatin       #Advanced Directives   -DNR/DNI  -Pt without Capacity at the moment    -MOLST updated verbally on admission with son    -HCP: Demian Ly-Son- [phone]    #PPX  -DVT PPX: SubQ Heparin   -GI PPX: Pantoprazole 40mg qD       case d/w Dr. Rendon      89M with an extensive PMH, most significant for BPH, CKD IV and recent admission to  2 weeks prior for Acute Cystitis 2/2 Proteus Mirabilis complicated by Bacteremia and Hydronephrosis; for which he was initiated on a course of IV Meropenum and discharge home on a 10day course of PO Ceftin, he returns to ED with progressive confusion and lethargy. In the ED he was found to be febrile, with a Tmax of 102.2 F with Laboratory findings significant for Leukocytosis, WBC 11.74, LA of 2, Cr of 2/23 with UA detecting Moderate LE's and Blood. Pt was given a 1 time dose of IV Azactam as well as a 2L NS Bolus, then admitted to the floor with Metabolic Encephalopathy in the setting of UroSepsis, with urology consulted.         #Metabolic Encephalopathy in the setting of UTI (hemorrhagic cystitis)/sepsis  -Admit to [facility]   -SIRS: Tmax 102.2 F with WBC 11.74,   -Pt with H/o ESBL UTI and recent admission in September for Proteus Bacteremia/Cystis, not ESBL  -UA: (+) Moderate LE's, Moderate Blood   -H/H on Admission 9.4/30.4  -Baseline H/H 9/28  -S/p IV Azectam x 1   -S/p 2L NS IVF bolus   -Continued on a 75cc/hr IVF NS Bolus   -Continue with IV Meropenum   -F/u Urine Culture  -F/u Blood Culture  -Consult ID   -Consult Urology   -F/u Renal US   -Consider CT abd/pel in search for Nephrolithiasis         #RASHAUN on CKD-III c/b Chronic Anemia   -Cr on admission 2.23  -Baseline Cr~2.0  -Cr on 9/30: 1.53  -Caution with nephrotoxic agents   -Gentle diureses, s/p 2L NS Boulus   -Continue to trend Cr, f/u am BMP   -H/H on Admission 9.4/30.4  -Baseline H/H 9/28  -Continue to monitor, f/u am CBC  -Continue with daily Iron supplementation       #Neuropathy   -Fall Risk Precautions   -Consult PT once improved  -Continue with Gabapentin 300mg BID (9am/6pm)   -Continue with Gabapentin 600mg qHs   -Continue with Duloxetine 50mg qD         #HTN/HFPEF  -Caution with Antihypertensives (with parameters) as DBP low on admission in the setting of sepsis  -Continue Lasix 4mg BID   -Continue Metoprolol Succinate 75mg qD   -DASH Diet   -TTE 4/21: LVEF 60-65%  -Pt currently Euvolemic, with no signs of fluid overload       #CAD   -S/p PCI with stent placement in January 2018  -Continue with ASA qD  -Continue with Plavix qD    #HLD  -Continue home dose Statin   -Atorvastatin       #Advanced Directives   -DNR/DNI  -Pt without Capacity at the moment    -MOLST updated verbally on admission with son    -HCP: Demian Ly-Son- [phone]    #PPX  -DVT PPX: SubQ Heparin   -GI PPX: Pantoprazole 40mg qD       case d/w Dr. Rendon

## 2021-10-15 NOTE — ED PROVIDER NOTE - PROGRESS NOTE DETAILS
Stephen CARROLL: old records reviewed- patient with recent (+) urine culture; given meropenem IV- ordered at this time in d/w admitting team; endorsed to Dr. Grove for admission.

## 2021-10-15 NOTE — H&P ADULT - ATTENDING COMMENTS
89M with Acute UTI in the setting of recurring UTI, sepsis with AMS, received sepsis fluids in ED, will continue gentle hydration and diurese as needed. Continue IV Abx.  Follow urine and blood cultures. ID and Urology consulted

## 2021-10-15 NOTE — ED ADULT TRIAGE NOTE - CHIEF COMPLAINT QUOTE
Pt comes [facility] ED from [facility] with c/o AMS that has been progressing over the past few days. As per EMS pt was recently tx for UTI in hospital and d/c home. Denies chest pain, SOB.

## 2021-10-15 NOTE — ED PROVIDER NOTE - CLINICAL SUMMARY MEDICAL DECISION MAKING FREE TEXT BOX
Pt sent from nursing home. Pt has no complaints. Plan: ekg, cxr, labs, urine, reeval. Pt sent from nursing home. Pt has no complaints; recent UTI; "shaking" per transfer paperwork- ?[place]ors; r/o sepsis. Plan: ekg, cxr, labs, urine, reeval.

## 2021-10-15 NOTE — PHARMACOTHERAPY INTERVENTION NOTE - COMMENTS
Pepcid 20mg po bid was ordered when pt's CrCl = 21ml/min. MD oked to change it to protonix 40mg po qd.

## 2021-10-15 NOTE — H&P ADULT - NSHPLABSRESULTS_GEN_ALL_CORE
Labs                             9.4    11.74 )-----------( 270      ( 15 Oct 2021 17:06 )             30.4     15 Oct 2021 17:06    137    |  101    |  31     ----------------------------<  115    4.6     |  32     |  2.23     Ca    8.9        15 Oct 2021 17:06    TPro  7.5    /  Alb  3.4    /  TBili  0.4    /  DBili  x      /  AST  16     /  ALT  15     /  AlkPhos  84     15 Oct 2021 17:06    LIVER FUNCTIONS - ( 15 Oct 2021 17:06 )  Alb: 3.4 g/dL / Pro: 7.5 gm/dL / ALK PHOS: 84 U/L / ALT: 15 U/L / AST: 16 U/L / GGT: x           PT/INR - ( 15 Oct 2021 17:06 )   PT: 12.4 sec;   INR: 1.06 ratio         PTT - ( 15 Oct 2021 17:06 )  PTT:30.7 sec  CAPILLARY BLOOD GLUCOSE            Urinalysis Basic - ( 15 Oct 2021 17:06 )    Color: Yellow / Appearance: very cloudy / S.005 / pH: x  Gluc: x / Ketone: Negative  / Bili: Negative / Urobili: Negative mg/dL   Blood: x / Protein: 30 mg/dL / Nitrite: Negative   Leuk Esterase: Moderate / RBC: 11-25 /HPF / WBC >50   Sq Epi: x / Non Sq Epi: Negative / Bacteria: Few

## 2021-10-15 NOTE — H&P ADULT - NSHPSOCIALHISTORY_GEN_ALL_CORE
Per pt's Son, pt currently resides at [facility], and is mostly Wheel-Chair bound as a result of his severe Neuropathy and sometimes may ambulate with the assistance of a walker during PT sessions, and is completely dependant for his IDLs/IADLs.

## 2021-10-15 NOTE — ED PROVIDER NOTE - CONSTITUTIONAL, MLM
normal... Well appearing, awake, alert, oriented to person, place, time/situation and in no apparent distress. Well appearing, awake, alert & oriented x2, in no apparent distress.

## 2021-10-16 DIAGNOSIS — N39.0 URINARY TRACT INFECTION, SITE NOT SPECIFIED: ICD-10-CM

## 2021-10-16 DIAGNOSIS — N13.30 UNSPECIFIED HYDRONEPHROSIS: ICD-10-CM

## 2021-10-16 DIAGNOSIS — N40.1 BENIGN PROSTATIC HYPERPLASIA WITH LOWER URINARY TRACT SYMPTOMS: ICD-10-CM

## 2021-10-16 DIAGNOSIS — R31.0 GROSS HEMATURIA: ICD-10-CM

## 2021-10-16 LAB
ALBUMIN SERPL ELPH-MCNC: 2.5 G/DL — LOW (ref 3.3–5)
ALP SERPL-CCNC: 63 U/L — SIGNIFICANT CHANGE UP (ref 40–120)
ALT FLD-CCNC: 10 U/L — LOW (ref 12–78)
ANION GAP SERPL CALC-SCNC: 6 MMOL/L — SIGNIFICANT CHANGE UP (ref 5–17)
AST SERPL-CCNC: 12 U/L — LOW (ref 15–37)
BILIRUB SERPL-MCNC: 0.5 MG/DL — SIGNIFICANT CHANGE UP (ref 0.2–1.2)
BUN SERPL-MCNC: 32 MG/DL — HIGH (ref 7–23)
CALCIUM SERPL-MCNC: 8 MG/DL — LOW (ref 8.5–10.1)
CHLORIDE SERPL-SCNC: 107 MMOL/L — SIGNIFICANT CHANGE UP (ref 96–108)
CO2 SERPL-SCNC: 28 MMOL/L — SIGNIFICANT CHANGE UP (ref 22–31)
COVID-19 SPIKE DOMAIN AB INTERP: POSITIVE
COVID-19 SPIKE DOMAIN ANTIBODY RESULT: 5.13 U/ML — HIGH
CREAT SERPL-MCNC: 2.12 MG/DL — HIGH (ref 0.5–1.3)
GLUCOSE SERPL-MCNC: 101 MG/DL — HIGH (ref 70–99)
HCT VFR BLD CALC: 25 % — LOW (ref 39–50)
HGB BLD-MCNC: 7.8 G/DL — LOW (ref 13–17)
LACTATE SERPL-SCNC: 0.7 MMOL/L — SIGNIFICANT CHANGE UP (ref 0.7–2)
MAGNESIUM SERPL-MCNC: 2.1 MG/DL — SIGNIFICANT CHANGE UP (ref 1.6–2.6)
MCHC RBC-ENTMCNC: 28.4 PG — SIGNIFICANT CHANGE UP (ref 27–34)
MCHC RBC-ENTMCNC: 31.2 GM/DL — LOW (ref 32–36)
MCV RBC AUTO: 90.9 FL — SIGNIFICANT CHANGE UP (ref 80–100)
PHOSPHATE SERPL-MCNC: 3.2 MG/DL — SIGNIFICANT CHANGE UP (ref 2.5–4.5)
PLATELET # BLD AUTO: 204 K/UL — SIGNIFICANT CHANGE UP (ref 150–400)
POTASSIUM SERPL-MCNC: 3.9 MMOL/L — SIGNIFICANT CHANGE UP (ref 3.5–5.3)
POTASSIUM SERPL-SCNC: 3.9 MMOL/L — SIGNIFICANT CHANGE UP (ref 3.5–5.3)
PROT SERPL-MCNC: 5.9 GM/DL — LOW (ref 6–8.3)
RBC # BLD: 2.75 M/UL — LOW (ref 4.2–5.8)
RBC # FLD: 16.1 % — HIGH (ref 10.3–14.5)
SARS-COV-2 IGG+IGM SERPL QL IA: 5.13 U/ML — HIGH
SARS-COV-2 IGG+IGM SERPL QL IA: POSITIVE
SODIUM SERPL-SCNC: 141 MMOL/L — SIGNIFICANT CHANGE UP (ref 135–145)
WBC # BLD: 8.2 K/UL — SIGNIFICANT CHANGE UP (ref 3.8–10.5)
WBC # FLD AUTO: 8.2 K/UL — SIGNIFICANT CHANGE UP (ref 3.8–10.5)

## 2021-10-16 PROCEDURE — 99232 SBSQ HOSP IP/OBS MODERATE 35: CPT | Mod: GC

## 2021-10-16 PROCEDURE — 99222 1ST HOSP IP/OBS MODERATE 55: CPT

## 2021-10-16 PROCEDURE — 76770 US EXAM ABDO BACK WALL COMP: CPT | Mod: 26

## 2021-10-16 RX ORDER — MEROPENEM 1 G/30ML
500 INJECTION INTRAVENOUS EVERY 8 HOURS
Refills: 0 | Status: DISCONTINUED | OUTPATIENT
Start: 2021-10-16 | End: 2021-10-18

## 2021-10-16 RX ORDER — MEROPENEM 1 G/30ML
500 INJECTION INTRAVENOUS ONCE
Refills: 0 | Status: COMPLETED | OUTPATIENT
Start: 2021-10-16 | End: 2021-10-16

## 2021-10-16 RX ORDER — FINASTERIDE 5 MG/1
5 TABLET, FILM COATED ORAL DAILY
Refills: 0 | Status: DISCONTINUED | OUTPATIENT
Start: 2021-10-16 | End: 2021-10-20

## 2021-10-16 RX ORDER — MEROPENEM 1 G/30ML
INJECTION INTRAVENOUS
Refills: 0 | Status: DISCONTINUED | OUTPATIENT
Start: 2021-10-16 | End: 2021-10-18

## 2021-10-16 RX ADMIN — DULOXETINE HYDROCHLORIDE 60 MILLIGRAM(S): 30 CAPSULE, DELAYED RELEASE ORAL at 09:19

## 2021-10-16 RX ADMIN — GABAPENTIN 600 MILLIGRAM(S): 400 CAPSULE ORAL at 21:16

## 2021-10-16 RX ADMIN — TAMSULOSIN HYDROCHLORIDE 0.4 MILLIGRAM(S): 0.4 CAPSULE ORAL at 21:15

## 2021-10-16 RX ADMIN — Medication 325 MILLIGRAM(S): at 09:20

## 2021-10-16 RX ADMIN — GABAPENTIN 300 MILLIGRAM(S): 400 CAPSULE ORAL at 06:03

## 2021-10-16 RX ADMIN — ATORVASTATIN CALCIUM 40 MILLIGRAM(S): 80 TABLET, FILM COATED ORAL at 21:16

## 2021-10-16 RX ADMIN — PANTOPRAZOLE SODIUM 40 MILLIGRAM(S): 20 TABLET, DELAYED RELEASE ORAL at 06:03

## 2021-10-16 RX ADMIN — Medication 1 TABLET(S): at 09:20

## 2021-10-16 RX ADMIN — MEROPENEM 100 MILLIGRAM(S): 1 INJECTION INTRAVENOUS at 21:14

## 2021-10-16 RX ADMIN — GABAPENTIN 300 MILLIGRAM(S): 400 CAPSULE ORAL at 14:01

## 2021-10-16 RX ADMIN — PREGABALIN 1000 MICROGRAM(S): 225 CAPSULE ORAL at 09:19

## 2021-10-16 RX ADMIN — MEROPENEM 100 MILLIGRAM(S): 1 INJECTION INTRAVENOUS at 11:50

## 2021-10-16 NOTE — PROGRESS NOTE ADULT - SUBJECTIVE AND OBJECTIVE BOX
89M with a PMH significant for CAD s/p PCI/Stent, HTN, BPH, Chronic LLE Hip Pain 2/2 OA, HLD, CKD-IV, AAA, PAD and BPH, who presents to  from [facility] for progressive confusion. Per his daughter and Son he was at his baseline mentation yesterday morning, then towards the afternoon he began to complain of being "cold and tired". At that time he denied any sxs of fevers, abdominal/chest pain, acute focal neurological deficits or urinary sxs however he exhibited slurred speech with intermittent lethargy after which, EMS was notified and he was sent to the ED for further evaluation. Per chart review, the pt was recently admitted to  2 weeks prior for Acute Cystitis 2/2 Proteus Mirabilis complicated by Bacteremia and Hydronephrosis; for which he was initiated on a course of IV Meropenum and discharge home on a 10day course of PO Ceftin, at that time out patient follow up with Urology was recommended for possible Cystoscopy to identify any structural pathologies.     Subjective: Patient seen and examined at bedside. No acute distress, no pain. Patient VSS. Patient with clots in his adult diapers. Awaiting Urology evaluation for inpatient cystoscopy    REVIEW OF SYSTEMS:  CONSTITUTIONAL: No weakness, fevers or chills  EYES/ENT: No visual changes;  No vertigo or throat pain   NECK: No pain or stiffness  RESPIRATORY: No cough, wheezing, hemoptysis; No shortness of breath  CARDIOVASCULAR: No chest pain or palpitations  GASTROINTESTINAL: No abdominal or epigastric pain. No nausea, vomiting, or hematemesis; No diarrhea or constipation. No melena or hematochezia.  GENITOURINARY: No dysuria, frequency or hematuria  NEUROLOGICAL: No numbness or weakness  SKIN: No itching, burning, rashes, or lesions   All other review of systems is negative unless indicated above    PHYSICAL EXAM:  Vital Signs Last 24 Hrs  T(C): 37.3 (16 Oct 2021 08:20), Max: 39 (15 Oct 2021 17:00)  T(F): 99.1 (16 Oct 2021 08:20), Max: 102.2 (15 Oct 2021 17:00)  HR: 78 (16 Oct 2021 08:20) (74 - 88)  BP: 98/42 (16 Oct 2021 08:20) (98/42 - 127/56)  RR: 18 (16 Oct 2021 08:20) (17 - 19)  SpO2: 90% (16 Oct 2021 08:20) (90% - 99%)    Constitutional: Pt lying in bed, awake and alert, NAD  HEENT: EOMI, normal hearing, moist mucous membranes  Neck: Soft and supple, no JVD  Respiratory: CTABL, No wheezing, rales or rhonchi  Cardiovascular: S1S2+, RRR, no M/G/R  Gastrointestinal: BS+, soft, NT/ND, no guarding, no rebound  Extremities: No peripheral edema  Vascular: 2+ peripheral pulses  Neurological: AAOx3, no focal deficits  Musculoskeletal: 5/5 strength b/l upper and lower extremities  Skin: No rashes    MEDICATIONS:  MEDICATIONS  (STANDING):  aspirin enteric coated 81 milliGRAM(s) Oral daily  atorvastatin 40 milliGRAM(s) Oral at bedtime  clopidogrel Tablet 75 milliGRAM(s) Oral daily  cyanocobalamin 1000 MICROGram(s) Oral daily  DULoxetine 60 milliGRAM(s) Oral daily  ferrous    sulfate 325 milliGRAM(s) Oral daily  furosemide    Tablet 40 milliGRAM(s) Oral two times a day  gabapentin 300 milliGRAM(s) Oral two times a day  gabapentin 600 milliGRAM(s) Oral at bedtime  meropenem  IVPB      meropenem  IVPB 500 milliGRAM(s) IV Intermittent every 8 hours  metoprolol succinate ER 75 milliGRAM(s) Oral daily  multivitamin 1 Tablet(s) Oral daily  pantoprazole    Tablet 40 milliGRAM(s) Oral before breakfast  tamsulosin 0.4 milliGRAM(s) Oral at bedtime      LABS: All Labs Reviewed:                        7.8    8.20  )-----------( 204      ( 16 Oct 2021 09:20 )             25.0     10-16    141  |  107  |  32<H>  ----------------------------<  101<H>  3.9   |  28  |  2.12<H>    Ca    8.0<L>      16 Oct 2021 09:20  Phos  3.2     10-16  Mg     2.1     10-16    TPro  5.9<L>  /  Alb  2.5<L>  /  TBili  0.5  /  DBili  x   /  AST  12<L>  /  ALT  10<L>  /  AlkPhos  63  10-16    PT/INR - ( 15 Oct 2021 17:06 )   PT: 12.4 sec;   INR: 1.06 ratio         PTT - ( 15 Oct 2021 17:06 )  PTT:30.7 sec      Blood Culture:   I&O's Summary    15 Oct 2021 07:01  -  16 Oct 2021 07:00  --------------------------------------------------------  IN: 375 mL / OUT: 0 mL / NET: 375 mL      CAPILLARY BLOOD GLUCOSE  RADIOLOGY/EKG:  < from: Xray Chest 1 View-PORTABLE IMMEDIATE (10.15.21 @ 17:59) >  Impression: Clear lungs, grossly unchanged.    < end of copied text >  < from: US Kidney and Bladder (10.16.21 @ 11:50) >  Right kidney: 11.2 cm. No renal mass, no change in mild-moderate hydronephrosis, no calculi.    Left kidney: 9.6 cm. No renal mass, hydronephrosis or calculi.    Urinary bladder: Signs of chronic outlet obstruction again noted    IMPRESSION:    No significant change since 9/29/2021    < end of copied text >   89M with a PMH significant for CAD s/p PCI/Stent, HTN, BPH, Chronic LLE Hip Pain 2/2 OA, HLD, CKD-IV, AAA, PAD and BPH, [facility]. Per his daughter and Son he was at his baseline mentation yesterday morning, then towards the afternoon he began to complain of being "cold and tired". At that time he denied any sxs of fevers, abdominal/chest pain, acute focal neurological deficits or urinary sxs however he exhibited slurred speech with intermittent lethargy after which, EMS was notified and he was sent to the ED for further evaluation. Per chart review, the pt was recently admitted to  2 weeks prior for Acute Cystitis 2/2 Proteus Mirabilis complicated by Bacteremia and Hydronephrosis; for which he was initiated on a course of IV Meropenum and discharge home on a 10day course of PO Ceftin, at that time out patient follow up with Urology was recommended for possible Cystoscopy to identify any structural pathologies.     Subjective: Patient seen and examined at bedside. No acute distress, no pain. Patient VSS. Patient with clots in his adult diapers. Awaiting Urology evaluation for inpatient cystoscopy    REVIEW OF SYSTEMS:  CONSTITUTIONAL: No weakness, fevers or chills  EYES/ENT: No visual changes;  No vertigo or throat pain   NECK: No pain or stiffness  RESPIRATORY: No cough, wheezing, hemoptysis; No shortness of breath  CARDIOVASCULAR: No chest pain or palpitations  GASTROINTESTINAL: No abdominal or epigastric pain. No nausea, vomiting, or hematemesis; No diarrhea or constipation. No melena or hematochezia.  GENITOURINARY: No dysuria, frequency or hematuria  NEUROLOGICAL: No numbness or weakness  SKIN: No itching, burning, rashes, or lesions   All other review of systems is negative unless indicated above    PHYSICAL EXAM:  Vital Signs Last 24 Hrs  T(C): 37.3 (16 Oct 2021 08:20), Max: 39 (15 Oct 2021 17:00)  T(F): 99.1 (16 Oct 2021 08:20), Max: 102.2 (15 Oct 2021 17:00)  HR: 78 (16 Oct 2021 08:20) (74 - 88)  BP: 98/42 (16 Oct 2021 08:20) (98/42 - 127/56)  RR: 18 (16 Oct 2021 08:20) (17 - 19)  SpO2: 90% (16 Oct 2021 08:20) (90% - 99%)    Constitutional: Pt lying in bed, awake and alert, NAD  HEENT: EOMI, normal hearing, moist mucous membranes  Neck: Soft and supple, no JVD  Respiratory: CTABL, No wheezing, rales or rhonchi  Cardiovascular: S1S2+, RRR, no M/G/R  Gastrointestinal: BS+, soft, NT/ND, no guarding, no rebound  Extremities: No peripheral edema  Vascular: 2+ peripheral pulses  Neurological: AAOx3, no focal deficits  Skin: No rashes    MEDICATIONS:  MEDICATIONS  (STANDING):  aspirin enteric coated 81 milliGRAM(s) Oral daily  atorvastatin 40 milliGRAM(s) Oral at bedtime  clopidogrel Tablet 75 milliGRAM(s) Oral daily  cyanocobalamin 1000 MICROGram(s) Oral daily  DULoxetine 60 milliGRAM(s) Oral daily  ferrous    sulfate 325 milliGRAM(s) Oral daily  furosemide    Tablet 40 milliGRAM(s) Oral two times a day  gabapentin 300 milliGRAM(s) Oral two times a day  gabapentin 600 milliGRAM(s) Oral at bedtime  meropenem  IVPB      meropenem  IVPB 500 milliGRAM(s) IV Intermittent every 8 hours  metoprolol succinate ER 75 milliGRAM(s) Oral daily  multivitamin 1 Tablet(s) Oral daily  pantoprazole    Tablet 40 milliGRAM(s) Oral before breakfast  tamsulosin 0.4 milliGRAM(s) Oral at bedtime      LABS: All Labs Reviewed:                        7.8    8.20  )-----------( 204      ( 16 Oct 2021 09:20 )             25.0     10-16    141  |  107  |  32<H>  ----------------------------<  101<H>  3.9   |  28  |  2.12<H>    Ca    8.0<L>      16 Oct 2021 09:20  Phos  3.2     10-16  Mg     2.1     10-16    TPro  5.9<L>  /  Alb  2.5<L>  /  TBili  0.5  /  DBili  x   /  AST  12<L>  /  ALT  10<L>  /  AlkPhos  63  10-16    PT/INR - ( 15 Oct 2021 17:06 )   PT: 12.4 sec;   INR: 1.06 ratio         PTT - ( 15 Oct 2021 17:06 )  PTT:30.7 sec      Blood Culture:   I&O's Summary    15 Oct 2021 07:01  -  16 Oct 2021 07:00  --------------------------------------------------------  IN: 375 mL / OUT: 0 mL / NET: 375 mL      CAPILLARY BLOOD GLUCOSE  RADIOLOGY/EKG:  < from: Xray Chest 1 View-PORTABLE IMMEDIATE (10.15.21 @ 17:59) >  Impression: Clear lungs, grossly unchanged.    < end of copied text >  < from: US Kidney and Bladder (10.16.21 @ 11:50) >  Right kidney: 11.2 cm. No renal mass, no change in mild-moderate hydronephrosis, no calculi.    Left kidney: 9.6 cm. No renal mass, hydronephrosis or calculi.    Urinary bladder: Signs of chronic outlet obstruction again noted    IMPRESSION:    No significant change since 9/29/2021    < end of copied text >

## 2021-10-16 NOTE — PROGRESS NOTE ADULT - ASSESSMENT
89M with an extensive PMH, most significant for BPH, CKD IV and recent admission to  2 weeks prior for Acute Cystitis 2/2 Proteus Mirabilis complicated by Bacteremia and Hydronephrosis; for which he was initiated on a course of IV Meropenum and discharge home on a 10day course of PO Ceftin, he returns to ED with progressive confusion and lethargy. In the ED he was found to be febrile, with a Tmax of 102.2 F with Laboratory findings significant for Leukocytosis, WBC 11.74, LA of 2, Cr of 2/23 with UA detecting Moderate LE's and Blood. Pt was given a 1 time dose of IV Azactam as well as a 2L NS Bolus, then admitted to the floor with Metabolic Encephalopathy in the setting of UroSepsis, with urology consulted.         #Metabolic Encephalopathy in the setting of UTI (hemorrhagic cystitis)/sepsis  -SIRS: Tmax 102.2 F with WBC 11.74,   -Pt with H/o ESBL UTI and recent admission in September for Proteus Bacteremia/Cystis, not ESBL  -UA: (+) Moderate LE's, Moderate Blood   -H/H on Admission 9.4/30.4  -Baseline H/H 9/28  -S/p IV Azectam x 1   -S/p 2L NS IVF bolus   -Continued on a 75cc/hr IVF NS Bolus   -Continue with IV Meropenum   -F/u Urine Culture  -F/u Blood Culture  -Consult ID   -Consult Urology   -Renal US : Right kidney: 11.2 cm. No renal mass, no change in mild-moderate hydronephrosis, no calculi.    Left kidney: 9.6 cm. No renal mass, hydronephrosis or calculi.    Urinary bladder: Signs of chronic outlet obstruction again noted    -Consider CT abd/pel in search for Nephrolithiasis         #RASHAUN on CKD-III c/b Chronic Anemia   -Cr on admission 2.23  -Baseline Cr~2.0  -Cr on 9/30: 1.53  -Caution with nephrotoxic agents   -Gentle diureses, s/p 2L NS Boulus   -Continue to trend Cr, f/u am BMP   -H/H on Admission 9.4/30.4  -Baseline H/H 9/28  -Continue to monitor, f/u am CBC  -Continue with daily Iron supplementation       #Neuropathy   -Fall Risk Precautions   -Consult PT once improved  -Continue with Gabapentin 300mg BID (9am/6pm)   -Continue with Gabapentin 600mg qHs   -Continue with Duloxetine 50mg qD         #HTN/HFPEF  -Caution with Antihypertensives (with parameters) as DBP low on admission in the setting of sepsis  -Continue Lasix 4mg BID   -Continue Metoprolol Succinate 75mg qD   -DASH Diet   -TTE 4/21: LVEF 60-65%  -Pt currently Euvolemic, with no signs of fluid overload       #CAD   -S/p PCI with stent placement in January 2018  -Continue with ASA qD  -Continue with Plavix qD    #HLD  -Continue home dose Statin   -Atorvastatin       #Advanced Directives   -DNR/DNI  -Pt without Capacity at the moment    -MOLST updated verbally on admission with son    -HCP: Demian Ly-Son- [phone]    #PPX  -DVT PPX: SubQ Heparin   -GI PPX: Pantoprazole 40mg qD       case d/w Dr. Nye 89M with an extensive PMH, most significant for BPH, CKD IV and recent admission to  2 weeks prior for Acute Cystitis 2/2 Proteus Mirabilis complicated by Bacteremia and Hydronephrosis; for which he was initiated on a course of IV Meropenum and discharge home on a 10day course of PO Ceftin, he returns to ED with progressive confusion and lethargy. In the ED he was found to be febrile, with a Tmax of 102.2 F with Laboratory findings significant for Leukocytosis, WBC 11.74, LA of 2, Cr of 2/23 with UA detecting Moderate LE's and Blood. Pt was given a 1 time dose of IV Azactam as well as a 2L NS Bolus, then admitted to the floor with Metabolic Encephalopathy in the setting of UroSepsis, with urology consulted.         #Metabolic Encephalopathy in the setting of UTI (hemorrhagic cystitis)/sepsis  -SIRS: Tmax 102.2 F with WBC 11.74,   -Pt with H/o ESBL UTI and recent admission in September for Proteus Bacteremia/Cystis, not ESBL  -UA: (+) Moderate LE's, Moderate Blood   -H/H on Admission 9.4/30.4  -Baseline H/H 9/28  -S/p IV Azectam x 1   -S/p 2L NS IVF bolus   -Continued on a 75cc/hr IVF NS Bolus   -Continue with IV Meropenum   -F/u Urine Culture  -F/u Blood Culture  -Consult ID   -Consult Urology : Patient originally scheduled for outpatient cystoscopy with urology but keeps being admitted for similar  issues ( 4 admissions in 6 weeks). Patient will need inpatient cystoscopy.   -Renal US : Right kidney: 11.2 cm. No renal mass, no change in mild-moderate hydronephrosis, no calculi. Left kidney: 9.6 cm. No renal mass, hydronephrosis or calculi. Urinary bladder: Signs of chronic outlet obstruction again noted  -Consider CT abd/pel in search for Nephrolithiasis         #RASHAUN on CKD-III c/b Chronic Anemia   -Cr on admission 2.23  -Baseline Cr~2.0  -Cr on 9/30: 1.53  -Caution with nephrotoxic agents   -Gentle diureses, s/p 2L NS Boulus   -Continue to trend Cr, f/u am BMP   -H/H on Admission 9.4/30.4  -Baseline H/H 9/28  -Continue to monitor, f/u am CBC  -Continue with daily Iron supplementation       #Neuropathy   -Fall Risk Precautions   -Consult PT once improved  -Continue with Gabapentin 300mg BID (9am/6pm)   -Continue with Gabapentin 600mg qHs   -Continue with Duloxetine 50mg qD         #HTN/HFPEF  -Caution with Antihypertensives (with parameters) as DBP low on admission in the setting of sepsis  -Continue Lasix 4mg BID   -Continue Metoprolol Succinate 75mg qD   -DASH Diet   -TTE 4/21: LVEF 60-65%  -Pt currently Euvolemic, with no signs of fluid overload       #CAD   -S/p PCI with stent placement in January 2018  -Continue with ASA qD  -Continue with Plavix qD    #HLD  -Continue home dose Statin   -Atorvastatin       #Advanced Directives   -DNR/DNI  -Pt without Capacity at the moment    -MOLST updated verbally on admission with son    -HCP: Demian Ly-Son- [phone]    #PPX  -DVT PPX: SubQ Heparin   -GI PPX: Pantoprazole 40mg qD       case d/w Dr. Nye

## 2021-10-16 NOTE — CHART NOTE - NSCHARTNOTEFT_GEN_A_CORE
Pt seen and examined with house staff.  Plan formulated and reviewed on rounds     Briefly,  89 y/o male with multiple medical issues admitted for AMS found to have recurrent UTI sepsis.  He was D/C on 9/30 with same.  + h/o ESBL E. coli    Renal US on 9/29 revealed R mod hydro  He is awake and alert this morning.  States this is his 4th admission in 6 weeks for same problem  ROS o/w negative     NAD  Stable vitals  No fevers    Grossly non focal   Abd soft    Labs reviewed    WBC 8  Hb 7.8  Cr 2.1    Recurrent UTI sepsis with h/o ESBL E. coli  Toxic Metabolic Encephalopathy     Cont with Johnny (1)  Follow up Cxs   eval.  Would benefit from definitive Rx during this admission to prevent re admission  OOB  DVT prophy    Med Surg

## 2021-10-17 LAB
ANION GAP SERPL CALC-SCNC: 3 MMOL/L — LOW (ref 5–17)
BASOPHILS # BLD AUTO: 0.01 K/UL — SIGNIFICANT CHANGE UP (ref 0–0.2)
BASOPHILS NFR BLD AUTO: 0.2 % — SIGNIFICANT CHANGE UP (ref 0–2)
BUN SERPL-MCNC: 34 MG/DL — HIGH (ref 7–23)
CALCIUM SERPL-MCNC: 8.2 MG/DL — LOW (ref 8.5–10.1)
CHLORIDE SERPL-SCNC: 107 MMOL/L — SIGNIFICANT CHANGE UP (ref 96–108)
CO2 SERPL-SCNC: 30 MMOL/L — SIGNIFICANT CHANGE UP (ref 22–31)
CREAT SERPL-MCNC: 1.9 MG/DL — HIGH (ref 0.5–1.3)
EOSINOPHIL # BLD AUTO: 0.21 K/UL — SIGNIFICANT CHANGE UP (ref 0–0.5)
EOSINOPHIL NFR BLD AUTO: 4.1 % — SIGNIFICANT CHANGE UP (ref 0–6)
GLUCOSE SERPL-MCNC: 102 MG/DL — HIGH (ref 70–99)
HCT VFR BLD CALC: 25.9 % — LOW (ref 39–50)
HGB BLD-MCNC: 7.9 G/DL — LOW (ref 13–17)
IMM GRANULOCYTES NFR BLD AUTO: 0.2 % — SIGNIFICANT CHANGE UP (ref 0–1.5)
LYMPHOCYTES # BLD AUTO: 0.95 K/UL — LOW (ref 1–3.3)
LYMPHOCYTES # BLD AUTO: 18.5 % — SIGNIFICANT CHANGE UP (ref 13–44)
MCHC RBC-ENTMCNC: 27.9 PG — SIGNIFICANT CHANGE UP (ref 27–34)
MCHC RBC-ENTMCNC: 30.5 GM/DL — LOW (ref 32–36)
MCV RBC AUTO: 91.5 FL — SIGNIFICANT CHANGE UP (ref 80–100)
MONOCYTES # BLD AUTO: 0.55 K/UL — SIGNIFICANT CHANGE UP (ref 0–0.9)
MONOCYTES NFR BLD AUTO: 10.7 % — SIGNIFICANT CHANGE UP (ref 2–14)
NEUTROPHILS # BLD AUTO: 3.41 K/UL — SIGNIFICANT CHANGE UP (ref 1.8–7.4)
NEUTROPHILS NFR BLD AUTO: 66.3 % — SIGNIFICANT CHANGE UP (ref 43–77)
PLATELET # BLD AUTO: 199 K/UL — SIGNIFICANT CHANGE UP (ref 150–400)
POTASSIUM SERPL-MCNC: 3.7 MMOL/L — SIGNIFICANT CHANGE UP (ref 3.5–5.3)
POTASSIUM SERPL-SCNC: 3.7 MMOL/L — SIGNIFICANT CHANGE UP (ref 3.5–5.3)
RBC # BLD: 2.83 M/UL — LOW (ref 4.2–5.8)
RBC # FLD: 16 % — HIGH (ref 10.3–14.5)
SODIUM SERPL-SCNC: 140 MMOL/L — SIGNIFICANT CHANGE UP (ref 135–145)
WBC # BLD: 5.14 K/UL — SIGNIFICANT CHANGE UP (ref 3.8–10.5)
WBC # FLD AUTO: 5.14 K/UL — SIGNIFICANT CHANGE UP (ref 3.8–10.5)

## 2021-10-17 PROCEDURE — 99232 SBSQ HOSP IP/OBS MODERATE 35: CPT | Mod: GC

## 2021-10-17 RX ORDER — POLYETHYLENE GLYCOL 3350 17 G/17G
17 POWDER, FOR SOLUTION ORAL DAILY
Refills: 0 | Status: DISCONTINUED | OUTPATIENT
Start: 2021-10-17 | End: 2021-10-18

## 2021-10-17 RX ORDER — HEPARIN SODIUM 5000 [USP'U]/ML
5000 INJECTION INTRAVENOUS; SUBCUTANEOUS EVERY 12 HOURS
Refills: 0 | Status: DISCONTINUED | OUTPATIENT
Start: 2021-10-17 | End: 2021-10-20

## 2021-10-17 RX ADMIN — Medication 81 MILLIGRAM(S): at 09:53

## 2021-10-17 RX ADMIN — Medication 75 MILLIGRAM(S): at 09:53

## 2021-10-17 RX ADMIN — TAMSULOSIN HYDROCHLORIDE 0.4 MILLIGRAM(S): 0.4 CAPSULE ORAL at 21:12

## 2021-10-17 RX ADMIN — Medication 40 MILLIGRAM(S): at 09:53

## 2021-10-17 RX ADMIN — Medication 1 TABLET(S): at 09:53

## 2021-10-17 RX ADMIN — MEROPENEM 100 MILLIGRAM(S): 1 INJECTION INTRAVENOUS at 13:26

## 2021-10-17 RX ADMIN — FINASTERIDE 5 MILLIGRAM(S): 5 TABLET, FILM COATED ORAL at 09:53

## 2021-10-17 RX ADMIN — MEROPENEM 100 MILLIGRAM(S): 1 INJECTION INTRAVENOUS at 06:03

## 2021-10-17 RX ADMIN — PANTOPRAZOLE SODIUM 40 MILLIGRAM(S): 20 TABLET, DELAYED RELEASE ORAL at 06:03

## 2021-10-17 RX ADMIN — Medication 40 MILLIGRAM(S): at 17:05

## 2021-10-17 RX ADMIN — MEROPENEM 100 MILLIGRAM(S): 1 INJECTION INTRAVENOUS at 21:13

## 2021-10-17 RX ADMIN — ATORVASTATIN CALCIUM 40 MILLIGRAM(S): 80 TABLET, FILM COATED ORAL at 21:12

## 2021-10-17 RX ADMIN — DULOXETINE HYDROCHLORIDE 60 MILLIGRAM(S): 30 CAPSULE, DELAYED RELEASE ORAL at 09:53

## 2021-10-17 RX ADMIN — GABAPENTIN 300 MILLIGRAM(S): 400 CAPSULE ORAL at 13:26

## 2021-10-17 RX ADMIN — HEPARIN SODIUM 5000 UNIT(S): 5000 INJECTION INTRAVENOUS; SUBCUTANEOUS at 21:13

## 2021-10-17 RX ADMIN — GABAPENTIN 300 MILLIGRAM(S): 400 CAPSULE ORAL at 06:03

## 2021-10-17 RX ADMIN — PREGABALIN 1000 MICROGRAM(S): 225 CAPSULE ORAL at 09:53

## 2021-10-17 RX ADMIN — GABAPENTIN 600 MILLIGRAM(S): 400 CAPSULE ORAL at 21:19

## 2021-10-17 RX ADMIN — Medication 325 MILLIGRAM(S): at 09:53

## 2021-10-17 NOTE — CHART NOTE - NSCHARTNOTEFT_GEN_A_CORE
Pt seen and examined with house staff.  Plan formulated and reviewed on rounds    Briefly,  89 y/o male with multiple medical issues admitted for AMS found to have recurrent UTI sepsis.  He was D/C on 9/30 with same.  + h/o ESBL E. coli    Renal US on 9/29 revealed R mod hydro.  Repeat Sono (10/16) reveal same findings   Appreciate AVINASH eastman (seen by Dr Dumont covering Dr Dunbar)  He is awake and alert this morning.  States this is his 4th admission in 6 weeks for same problem  ROS o/w negative     NAD  Stable vitals  No fevers    Grossly non focal   Abd soft    Labs reviewed    WBC 5  Hb 7.9  Cr 1.9    Recurrent UTI sepsis with h/o ESBL E. coli  Toxic Metabolic Encephalopathy     Cont with Johnny (2)  Follow up Cxs--NTD  Would benefit from definitive Rx during this admission to prevent re admission--Will wait for Dr Dunbar to see this week  OOB  DVT prophy    Med Surg

## 2021-10-17 NOTE — CONSULT NOTE ADULT - ASSESSMENT
Reviewed previous notes.   Bladder thickening could be 2/2 BPH from chronic outlet obstruction Vs Cystitis vs underdistended bladder.   Hematuria/Clots possibly 2/2 UTI.   Would not recommend Cystoscopy at this point with concern for active UTI and hematuria/clots.   Will defer decision to Dr Dunbar.   Will maximize medical therapy for BPH. Continue Flomax, will add Finasteride.   Will check PVR, if significant will need indwelling Flores catheter.   Continue antibiotics, follow cultures, treat per sensitivity.   
89M with a PMH significant for CAD s/p PCI/Stent, HTN, BPH, Chronic LLE Hip Pain 2/2 OA, HLD, CKD-IV, AAA, PAD and BPH, admitted from snf on 10/15 for evaluation of worsening confusion; patient had recent hospitalization of Proteus mirabilis sepsis or urinary origin, but was not ESBL and unable to find any MDRO organisms in the medical record. History per medical record as patient is poor historian.     1. Patient admitted with urinary tract infection; allergy to penicillin, though unclear given poor historian, the patient if found to have UTI with gram negative rods  - follow up cultures to identification and sensitivity  - iv hydration and supportive care   - serial cbc and monitor temperature   - reviewed prior medical records to evaluate for resistant or atypical pathogens and did not find any ESBL or MDRO organisms  - will continue meropenem for now and expect to review further if there are outpatient labs from snf  - on isolation  2. other issues: per medicine

## 2021-10-17 NOTE — PROGRESS NOTE ADULT - ASSESSMENT
89M with an extensive PMH, most significant for BPH, CKD IV and recent admission to  2 weeks prior for Acute Cystitis 2/2 Proteus Mirabilis complicated by Bacteremia and Hydronephrosis; for which he was initiated on a course of IV Meropenum and discharge home on a 10day course of PO Ceftin, he returns to ED with progressive confusion and lethargy. In the ED he was found to be febrile, with a Tmax of 102.2 F with Laboratory findings significant for Leukocytosis, WBC 11.74, LA of 2, Cr of 2/23 with UA detecting Moderate LE's and Blood. Pt was given a 1 time dose of IV Azactam as well as a 2L NS Bolus, then admitted to the floor with Metabolic Encephalopathy in the setting of UroSepsis, with urology consulted.         #Metabolic Encephalopathy in the setting of UTI (hemorrhagic cystitis)/sepsis  -recent admission in September for Proteus Bacteremia/Cystis, not ESBL  -UA: (+) Moderate LE's, Moderate Blood   -H/H on Admission 9.4/30.4  -Baseline H/H 9/28  -H/H today 7.9  -S/p IV Azectam x 1   -ID consult appreciated   -Continue with IV Meropenum   -UCX growing gram - rods  -BCx negative. Prelim.   -Uro consult appreciated: no cytoscopy at this time 2/2 active UTI with hematuria/clots   -Renal US : Right kidney: 11.2 cm. No renal mass, no change in mild-moderate hydronephrosis, no calculi. Left kidney: 9.6 cm. No renal mass, hydronephrosis or calculi. Urinary bladder: Signs of chronic outlet obstruction again noted          #RASHAUN on CKD-III c/b Chronic Anemia   -Cr on admission 2.23  -Baseline Cr~2.0  -Today 1.9  -H/H on Admission 9.4/30.4  -Baseline H/H 9/28  -Continue to monitor, f/u am CBC  -Continue with daily Iron supplementation         #Neuropathy   -Fall Risk Precautions   -Consult PT once improved  -Continue with Gabapentin 300mg BID (9am/6pm)   -Continue with Gabapentin 600mg qHs   -Continue with Duloxetine 50mg qD         #HTN/HFPEF  -Caution with Antihypertensives (with parameters) as DBP low on admission in the setting of sepsis  -Continue Lasix 4mg BID   -Continue Metoprolol Succinate 75mg qD   -DASH Diet   -TTE 4/21: LVEF 60-65%  -Pt currently Euvolemic, with no signs of fluid overload       #CAD   -S/p PCI with stent placement in January 2018  -Continue with ASA qD  -PCI placement more than 3 yrs ago, doubt need for DAPT  -Pt with hematuria on admission, s/p admission this year for GI bleed, will discontinue Plavix  -Follow up outpnt with cardiologist regarding medication management     #Constipation  -Pt reports last BM ~ 1 week ago  -Dullness to percussion on PE  -Start MiraLaX prn     #HLD  -Atorvastatin 40 qD      #Advanced Directives   -DNR/DNI  -Pt without Capacity at the moment    -MOLST updated verbally on admission with son    -HCP: Demian Ly-Son- [phone]    #PPX  -DVT PPX: SubQ Heparin   -GI PPX: Pantoprazole 40mg qD       case d/w Dr. Nye

## 2021-10-17 NOTE — CONSULT NOTE ADULT - SUBJECTIVE AND OBJECTIVE BOX
CHIEF COMPLAINT:  Recurrent UTIs    HISTORY OF PRESENT ILLNESS:  89 yo male admitted to the hospital after presenting with progressive confusion.   Pt was recently admitted to [facility] 2 weeks ago for Acute Cystitis 2/2 Proteus Mirabilis complicated by Bacteremia. He was initiated on a course of IV Meropenum and discharge home on a 10day course of PO Ceftin  On CT scan was found to have hydronephrosis and bladder wall thickening.   Seen by Dr Dunbar who recommended out patient Cystoscopy.   C/o Constipation.   No difficulty with urinating but has oa and off hematuria and passing occasional clots, especially on straining.     PAST MEDICAL & SURGICAL HISTORY:  Leg swelling  related to cellulitis of LLE    CKD (chronic kidney disease) stage 3, GFR 30-59 ml/min    Cellulitis  BL    BPH (benign prostatic hyperplasia)    Colon polyp    Stented coronary artery    HTN (hypertension)    HLD (hyperlipidemia)    CAD (coronary artery disease)    OA (osteoarthritis)    PAD (peripheral artery disease)    AAA (abdominal aortic aneurysm) without rupture    S/P hemorrhoidectomy    H/O inguinal hernia  repair    History of colonoscopy    History of coronary artery stent placement        REVIEW OF SYSTEMS:   All other review of systems is negative unless indicated above.    MEDICATIONS  (STANDING):  aspirin enteric coated 81 milliGRAM(s) Oral daily  atorvastatin 40 milliGRAM(s) Oral at bedtime  clopidogrel Tablet 75 milliGRAM(s) Oral daily  cyanocobalamin 1000 MICROGram(s) Oral daily  DULoxetine 60 milliGRAM(s) Oral daily  ferrous    sulfate 325 milliGRAM(s) Oral daily  furosemide    Tablet 40 milliGRAM(s) Oral two times a day  gabapentin 300 milliGRAM(s) Oral two times a day  gabapentin 600 milliGRAM(s) Oral at bedtime  meropenem  IVPB      meropenem  IVPB 500 milliGRAM(s) IV Intermittent every 8 hours  metoprolol succinate ER 75 milliGRAM(s) Oral daily  multivitamin 1 Tablet(s) Oral daily  pantoprazole    Tablet 40 milliGRAM(s) Oral before breakfast  tamsulosin 0.4 milliGRAM(s) Oral at bedtime    MEDICATIONS  (PRN):  acetaminophen   Tablet .. 650 milliGRAM(s) Oral every 6 hours PRN Temp greater or equal to 38C (100.4F), Mild Pain (1 - 3)      Allergies    penicillin (Angioedema)    Intolerances        SOCIAL HISTORY:    FAMILY HISTORY:  Family history unobtainable  d/t dementia        Vital Signs Last 24 Hrs  T(C): 36.5 (16 Oct 2021 16:52), Max: 37.3 (16 Oct 2021 08:20)  T(F): 97.7 (16 Oct 2021 16:52), Max: 99.1 (16 Oct 2021 08:20)  HR: 70 (16 Oct 2021 16:52) (70 - 78)  BP: 110/46 (16 Oct 2021 16:52) (98/42 - 110/46)  BP(mean): --  RR: 17 (16 Oct 2021 16:52) (17 - 18)  SpO2: 97% (16 Oct 2021 16:52) (90% - 97%)    PHYSICAL EXAM:    Constitutional: No acute distress  HEENT: EOMI, Normal Hearing  Neck: Supple  Back: No costovertebral angle tenderness  Respiratory: Normal respiratory effort    Cardiovascular: Normal peripheral circulation   Abd: Soft, non distended, non tender  : Normal urethra, penis, bilateral testis normal to palpate  Extremities: No peripheral edema  Neurological: No focal deficits  Psychiatric: Normal mood, normal affect  Musculoskeletal: Moving all 4 extremities  Skin: No rashes    LABS:                        7.8    8.20  )-----------( 204      ( 16 Oct 2021 09:20 )             25.0     10-16    141  |  107  |  32<H>  ----------------------------<  101<H>  3.9   |  28  |  2.12<H>    Ca    8.0<L>      16 Oct 2021 09:20  Phos  3.2     10-16  Mg     2.1     10-16    TPro  5.9<L>  /  Alb  2.5<L>  /  TBili  0.5  /  DBili  x   /  AST  12<L>  /  ALT  10<L>  /  AlkPhos  63  10-16    PT/INR - ( 15 Oct 2021 17:06 )   PT: 12.4 sec;   INR: 1.06 ratio         PTT - ( 15 Oct 2021 17:06 )  PTT:30.7 sec  Urinalysis Basic - ( 15 Oct 2021 17:06 )    Color: Yellow / Appearance: very cloudy / S.005 / pH: x  Gluc: x / Ketone: Negative  / Bili: Negative / Urobili: Negative mg/dL   Blood: x / Protein: 30 mg/dL / Nitrite: Negative   Leuk Esterase: Moderate / RBC: 11-25 /HPF / WBC >50   Sq Epi: x / Non Sq Epi: Negative / Bacteria: Few    us< from: US Kidney and Bladder (10.16.21 @ 11:50) >  EXAM:  US KIDNEYS AND BLADDER                            PROCEDURE DATE:  10/16/2021          INTERPRETATION:  CLINICAL INFORMATION: Recurrent UTI, acute kidney injury    COMPARISON: 2021    TECHNIQUE: Sonography of the kidneys and bladder.    FINDINGS:    Right kidney: 11.2 cm. No renal mass, no change in mild-moderate hydronephrosis, no calculi.    Left kidney: 9.6 cm. No renal mass, hydronephrosis or calculi.    Urinary bladder: Signs of chronic outlet obstruction again noted    IMPRESSION:    No significant change since 2021        < end of copied text >  
Patient is a 89y old  Male who presents with a chief complaint of Progressive AMS (16 Oct 2021 19:54)    HPI:  89M with a PMH significant for CAD s/p PCI/Stent, HTN, BPH, Chronic LLE Hip Pain 2/2 OA, HLD, CKD-IV, AAA, PAD and BPH, admitted from snf on 10/15 for evaluation of worsening confusion; patient had recent hospitalization of Proteus mirabilis sepsis or urinary origin, but was not ESBL and unable to find any MDRO organisms in the medical record. History per medical record as patient is poor historian.           PMH: as above  PSH: as above  Meds: per reconciliation sheet, noted below  MEDICATIONS  (STANDING):  aspirin enteric coated 81 milliGRAM(s) Oral daily  atorvastatin 40 milliGRAM(s) Oral at bedtime  cyanocobalamin 1000 MICROGram(s) Oral daily  DULoxetine 60 milliGRAM(s) Oral daily  ferrous    sulfate 325 milliGRAM(s) Oral daily  finasteride 5 milliGRAM(s) Oral daily  furosemide    Tablet 40 milliGRAM(s) Oral two times a day  gabapentin 300 milliGRAM(s) Oral two times a day  gabapentin 600 milliGRAM(s) Oral at bedtime  heparin   Injectable 5000 Unit(s) SubCutaneous every 12 hours  meropenem  IVPB      meropenem  IVPB 500 milliGRAM(s) IV Intermittent every 8 hours  metoprolol succinate ER 75 milliGRAM(s) Oral daily  multivitamin 1 Tablet(s) Oral daily  pantoprazole    Tablet 40 milliGRAM(s) Oral before breakfast  tamsulosin 0.4 milliGRAM(s) Oral at bedtime    MEDICATIONS  (PRN):  acetaminophen   Tablet .. 650 milliGRAM(s) Oral every 6 hours PRN Temp greater or equal to 38C (100.4F), Mild Pain (1 - 3)  polyethylene glycol 3350 17 Gram(s) Oral daily PRN Constipation    Allergies    penicillin (Angioedema)    Intolerances      Social: no smoking, no alcohol, no illegal drugs; no recent travel, no exposure to TB  FAMILY HISTORY:  Family history unobtainable  d/t dementia       ROS unable to obtain secondary to patient medical condition     Vital Signs Last 24 Hrs  T(C): 36 (17 Oct 2021 15:55), Max: 37.3 (17 Oct 2021 09:47)  T(F): 96.8 (17 Oct 2021 15:55), Max: 99.1 (17 Oct 2021 09:47)  HR: 65 (17 Oct 2021 15:55) (65 - 84)  BP: 130/48 (17 Oct 2021 15:55) (110/46 - 132/56)  BP(mean): --  RR: 18 (17 Oct 2021 15:55) (17 - 18)  SpO2: 95% (17 Oct 2021 15:55) (95% - 97%)  Daily     Daily     PE:    Constitutional: frail looking  HEENT: NC/AT, EOMI, PERRLA, conjunctivae clear; ears and nose atraumatic; pharynx clear  Neck: supple; thyroid not palpable  Back: no tenderness  Respiratory: respiratory effort normal; clear to auscultation  Cardiovascular: S1S2 regular, no murmurs  Abdomen: soft, not tender, not distended, positive BS; no liver or spleen organomegaly  Genitourinary: no suprapubic tenderness  Musculoskeletal: no muscle tenderness, no joint swelling or tenderness  Neurological/ Psychiatric:  moving all extremities  Skin: no rashes; no palpable lesions    Labs: all available labs reviewed                        7.9    5.14  )-----------( 199      ( 17 Oct 2021 07:21 )             25.9     10-17    140  |  107  |  34<H>  ----------------------------<  102<H>  3.7   |  30  |  1.90<H>    Ca    8.2<L>      17 Oct 2021 07:21  Phos  3.2     10-16  Mg     2.1     10-16    TPro  5.9<L>  /  Alb  2.5<L>  /  TBili  0.5  /  DBili  x   /  AST  12<L>  /  ALT  10<L>  /  AlkPhos  63  10-16     LIVER FUNCTIONS - ( 16 Oct 2021 09:20 )  Alb: 2.5 g/dL / Pro: 5.9 gm/dL / ALK PHOS: 63 U/L / ALT: 10 U/L / AST: 12 U/L / GGT: x           Urinalysis Basic - ( 15 Oct 2021 17:06 )    Color: Yellow / Appearance: very cloudy / S.005 / pH: x  Gluc: x / Ketone: Negative  / Bili: Negative / Urobili: Negative mg/dL   Blood: x / Protein: 30 mg/dL / Nitrite: Negative   Leuk Esterase: Moderate / RBC: 11-25 /HPF / WBC >50   Sq Epi: x / Non Sq Epi: Negative / Bacteria: Few    Culture - Urine (10.15.21 @ 17:06)    Specimen Source: Clean Catch Clean Catch (Midstream)    Culture Results:   >100,000 CFU/ml Gram Negative Rods          Radiology: all available radiological tests reviewed    Advanced directives addressed: full resuscitation

## 2021-10-18 PROCEDURE — 99233 SBSQ HOSP IP/OBS HIGH 50: CPT | Mod: GC

## 2021-10-18 RX ORDER — SENNA PLUS 8.6 MG/1
2 TABLET ORAL AT BEDTIME
Refills: 0 | Status: DISCONTINUED | OUTPATIENT
Start: 2021-10-18 | End: 2021-10-20

## 2021-10-18 RX ORDER — POLYETHYLENE GLYCOL 3350 17 G/17G
17 POWDER, FOR SOLUTION ORAL DAILY
Refills: 0 | Status: DISCONTINUED | OUTPATIENT
Start: 2021-10-18 | End: 2021-10-20

## 2021-10-18 RX ORDER — CEFTRIAXONE 500 MG/1
1000 INJECTION, POWDER, FOR SOLUTION INTRAMUSCULAR; INTRAVENOUS EVERY 24 HOURS
Refills: 0 | Status: DISCONTINUED | OUTPATIENT
Start: 2021-10-18 | End: 2021-10-20

## 2021-10-18 RX ADMIN — FINASTERIDE 5 MILLIGRAM(S): 5 TABLET, FILM COATED ORAL at 10:28

## 2021-10-18 RX ADMIN — Medication 1 TABLET(S): at 10:28

## 2021-10-18 RX ADMIN — PANTOPRAZOLE SODIUM 40 MILLIGRAM(S): 20 TABLET, DELAYED RELEASE ORAL at 05:08

## 2021-10-18 RX ADMIN — TAMSULOSIN HYDROCHLORIDE 0.4 MILLIGRAM(S): 0.4 CAPSULE ORAL at 22:07

## 2021-10-18 RX ADMIN — GABAPENTIN 600 MILLIGRAM(S): 400 CAPSULE ORAL at 22:07

## 2021-10-18 RX ADMIN — PREGABALIN 1000 MICROGRAM(S): 225 CAPSULE ORAL at 10:29

## 2021-10-18 RX ADMIN — Medication 40 MILLIGRAM(S): at 10:28

## 2021-10-18 RX ADMIN — GABAPENTIN 300 MILLIGRAM(S): 400 CAPSULE ORAL at 13:45

## 2021-10-18 RX ADMIN — GABAPENTIN 300 MILLIGRAM(S): 400 CAPSULE ORAL at 05:08

## 2021-10-18 RX ADMIN — HEPARIN SODIUM 5000 UNIT(S): 5000 INJECTION INTRAVENOUS; SUBCUTANEOUS at 10:29

## 2021-10-18 RX ADMIN — Medication 40 MILLIGRAM(S): at 22:07

## 2021-10-18 RX ADMIN — HEPARIN SODIUM 5000 UNIT(S): 5000 INJECTION INTRAVENOUS; SUBCUTANEOUS at 22:07

## 2021-10-18 RX ADMIN — MEROPENEM 100 MILLIGRAM(S): 1 INJECTION INTRAVENOUS at 05:08

## 2021-10-18 RX ADMIN — Medication 75 MILLIGRAM(S): at 10:28

## 2021-10-18 RX ADMIN — DULOXETINE HYDROCHLORIDE 60 MILLIGRAM(S): 30 CAPSULE, DELAYED RELEASE ORAL at 10:29

## 2021-10-18 RX ADMIN — Medication 81 MILLIGRAM(S): at 10:28

## 2021-10-18 RX ADMIN — MEROPENEM 100 MILLIGRAM(S): 1 INJECTION INTRAVENOUS at 13:45

## 2021-10-18 RX ADMIN — CEFTRIAXONE 1000 MILLIGRAM(S): 500 INJECTION, POWDER, FOR SOLUTION INTRAMUSCULAR; INTRAVENOUS at 18:21

## 2021-10-18 RX ADMIN — Medication 325 MILLIGRAM(S): at 10:28

## 2021-10-18 RX ADMIN — ATORVASTATIN CALCIUM 40 MILLIGRAM(S): 80 TABLET, FILM COATED ORAL at 22:07

## 2021-10-18 NOTE — PROGRESS NOTE ADULT - SUBJECTIVE AND OBJECTIVE BOX
Patient seen at bedside, pt without any acute complaints. Reports he is feeling well and voiding freely. Reports some constipation.    PE  General: No distress, No anxiety  VITALS  T(C): 36.9 (10-17-21 @ 21:24), Max: 36.9 (10-17-21 @ 21:24)  HR: 68 (10-17-21 @ 21:24) (65 - 68)  BP: 119/59 (10-17-21 @ 21:24) (119/59 - 130/48)  RR: 16 (10-17-21 @ 21:24) (16 - 18)  SpO2: 96% (10-17-21 @ 21:24) (95% - 96%)            Skin     : No jaundice, No lesions, No swelling  HEENT: Normocephalic, no icterus , EOM full , No epistaxis  Lung    : No resp distress  Abdo:   : Soft, Non tender, No guarding, No distension   Back    : No CVAT b/l  Extremity: No calf tenderness   Genitalia Male: No Flores  Neuro   : A&Ox3    LABS                        7.9    5.14  )-----------( 199      ( 17 Oct 2021 07:21 )             25.9   10-17    140  |  107  |  34<H>  ----------------------------<  102<H>  3.7   |  30  |  1.90<H>    Ca    8.2<L>      17 Oct 2021 07:21

## 2021-10-18 NOTE — ED PROVIDER NOTE - DISPOSITION TYPE
Pt presents to the ED pt states the pain started Friday and radiates up her left arm and down her back. Pt states it has gotten worse over the weekend, EKG done in triage. Pt on the monitor, pt daughter Asya at bedside. Pt describes pain as throbbing.   
ADMIT

## 2021-10-18 NOTE — PROGRESS NOTE ADULT - SUBJECTIVE AND OBJECTIVE BOX
Date of service: 10-18-21 @ 15:37    Patient lying in bed; had munoz catheter removed  Afebrile        ROS unable to obtain secondary to patient medical condition     MEDICATIONS  (STANDING):  aspirin enteric coated 81 milliGRAM(s) Oral daily  atorvastatin 40 milliGRAM(s) Oral at bedtime  cyanocobalamin 1000 MICROGram(s) Oral daily  DULoxetine 60 milliGRAM(s) Oral daily  ferrous    sulfate 325 milliGRAM(s) Oral daily  finasteride 5 milliGRAM(s) Oral daily  furosemide    Tablet 40 milliGRAM(s) Oral two times a day  gabapentin 300 milliGRAM(s) Oral two times a day  gabapentin 600 milliGRAM(s) Oral at bedtime  heparin   Injectable 5000 Unit(s) SubCutaneous every 12 hours  meropenem  IVPB      meropenem  IVPB 500 milliGRAM(s) IV Intermittent every 8 hours  metoprolol succinate ER 75 milliGRAM(s) Oral daily  multivitamin 1 Tablet(s) Oral daily  pantoprazole    Tablet 40 milliGRAM(s) Oral before breakfast  polyethylene glycol 3350 17 Gram(s) Oral daily  senna 2 Tablet(s) Oral at bedtime  tamsulosin 0.4 milliGRAM(s) Oral at bedtime    MEDICATIONS  (PRN):  acetaminophen   Tablet .. 650 milliGRAM(s) Oral every 6 hours PRN Temp greater or equal to 38C (100.4F), Mild Pain (1 - 3)      Vital Signs Last 24 Hrs  T(C): 36.9 (17 Oct 2021 21:24), Max: 36.9 (17 Oct 2021 21:24)  T(F): 98.5 (17 Oct 2021 21:24), Max: 98.5 (17 Oct 2021 21:24)  HR: 68 (17 Oct 2021 21:24) (65 - 68)  BP: 119/59 (17 Oct 2021 21:24) (119/59 - 130/48)  BP(mean): 73 (17 Oct 2021 21:24) (73 - 73)  RR: 16 (17 Oct 2021 21:24) (16 - 18)  SpO2: 96% (17 Oct 2021 21:24) (95% - 96%)        Physical Exam:        Constitutional: frail looking  HEENT: NC/AT, EOMI, PERRLA, conjunctivae clear; ears and nose atraumatic; pharynx clear  Neck: supple; thyroid not palpable  Back: no tenderness  Respiratory: respiratory effort normal; clear to auscultation  Cardiovascular: S1S2 regular, no murmurs  Abdomen: soft, not tender, not distended, positive BS; no liver or spleen organomegaly  Genitourinary: no suprapubic tenderness  Musculoskeletal: no muscle tenderness, no joint swelling or tenderness  Neurological/ Psychiatric:  moving all extremities  Skin: no rashes; no palpable lesions    Labs: all available labs reviewed                      Labs:                        7.9    5.14  )-----------( 199      ( 17 Oct 2021 07:21 )             25.9     10-17    140  |  107  |  34<H>  ----------------------------<  102<H>  3.7   |  30  |  1.90<H>    Ca    8.2<L>      17 Oct 2021 07:21             Cultures:       Culture - Urine (collected 10-15-21 @ 17:06)  Source: Clean Catch Clean Catch (Midstream)  Final Report (10-18-21 @ 14:37):    >100,000 CFU/ml Proteus mirabilis  Organism: Proteus mirabilis (10-18-21 @ 14:37)  Organism: Proteus mirabilis (10-18-21 @ 14:37)      -  Amikacin: S <=16      -  Amoxicillin/Clavulanic Acid: S <=8/4      -  Ampicillin: S <=8 These ampicillin results predict results for amoxicillin      -  Ampicillin/Sulbactam: S <=4/2 Enterobacter, Citrobacter, and Serratia may develop resistance during prolonged therapy (3-4 days)      -  Aztreonam: S <=4      -  Cefazolin: S <=2 (MIC_CL_COM_ENTERIC_CEFAZU) For uncomplicated UTI with K. pneumoniae, E. coli, or P. mirablis: MAULIK <=16 is sensitive and MAULIK >=32 is resistant. This also predicts results for oral agents cefaclor, cefdinir, cefpodoxime, cefprozil, cefuroxime axetil, cephalexin and locarbef for uncomplicated UTI. Note that some isolates may be susceptible to these agents while testing resistant to cefazolin.      -  Cefepime: S <=2      -  Cefoxitin: S <=8      -  Ceftriaxone: S <=1 Enterobacter, Citrobacter, and Serratia may develop resistance during prolonged therapy      -  Ciprofloxacin: R >2      -  Ertapenem: S <=0.5      -  Gentamicin: S <=2      -  Levofloxacin: R 2      -  Meropenem: S <=1      -  Nitrofurantoin: R >64 Should not be used to treat pyelonephritis      -  Piperacillin/Tazobactam: S <=8      -  Tobramycin: S <=2      -  Trimethoprim/Sulfamethoxazole: S 1/19      Method Type: MAULIK    Culture - Blood (collected 10-15-21 @ 17:06)  Source: .Blood Blood-Peripheral  Preliminary Report (10-17-21 @ 02:01):    No growth to date.    Culture - Blood (collected 10-15-21 @ 17:06)  Source: .Blood Blood-Peripheral  Preliminary Report (10-17-21 @ 02:01):    No growth to date.              Radiology: all available radiological tests reviewed    Advanced directives addressed: full resuscitation

## 2021-10-18 NOTE — PROVIDER CONTACT NOTE (OTHER) - SITUATION
notified Dr Perez's office of admission please fax over d.c paperwork to [phone] once pt is d.c

## 2021-10-18 NOTE — PROGRESS NOTE ADULT - ASSESSMENT
89 yo male admitted with progressive confusion.   Pt was recently admitted to [facility] 2 weeks ago for Acute Cystitis. On CT scan was found to have hydronephrosis and bladder wall thickening with plan for outpatient cystoscopy with Dr. Dunbar.  Bladder thickening could be 2/2 BPH from chronic outlet obstruction Vs Cystitis vs underdistended bladder. Hematuria/Clots possibly 2/2 UTI.   Cystoscopy not recommended at this time due to active UTI   Recommend  Continue Flomax and Finasteride.   Check PVR, if significant will need indwelling Flores catheter.   Continue antibiotics, follow cultures, treat per sensitivity.   Follow up with Dr. Dunbar outpatient for cystoscopy/ further work up.    Case discussed with Dr. Dunbar

## 2021-10-18 NOTE — PROGRESS NOTE ADULT - SUBJECTIVE AND OBJECTIVE BOX
89M with a PMH significant for CAD s/p PCI/Stent, HTN, BPH, Chronic LLE Hip Pain 2/2 OA, HLD, CKD-IV, AAA, PAD and BPH, who presented to  from [facility] for progressive confusion. Per his daughter and Son he was at his baseline mentation 1 day before admission. At that time he denied any sxs of fevers, abdominal/chest pain, acute focal neurological deficits or urinary sxs however he exhibited slurred speech with intermittent lethargy after which, EMS was notified and he was sent to the ED for further evaluation. Per chart review, the pt was recently admitted to  2 weeks prior for Acute Cystitis 2/2 Proteus Mirabilis complicated by Bacteremia and Hydronephrosis; for which he was initiated on a course of IV Meropenum and discharge home on a 10day course of PO Ceftin, at that time out patient follow up with Urology was recommended for possible Cystoscopy to identify any structural pathologies.     Subjective: Patient seen and examined at bedside. No acute distress, no pain.  No hematuria overnight or in AM.     REVIEW OF SYSTEMS:  CONSTITUTIONAL: No weakness, fevers or chills  EYES/ENT: No visual changes;  No vertigo or throat pain   NECK: No pain or stiffness  RESPIRATORY: No cough, wheezing, hemoptysis; No shortness of breath  CARDIOVASCULAR: No chest pain or palpitations  GASTROINTESTINAL: No abdominal or epigastric pain. No nausea, vomiting, or hematemesis; No diarrhea or constipation. No melena or hematochezia.  GENITOURINARY: No dysuria, frequency or hematuria  NEUROLOGICAL: No numbness or weakness  SKIN: No itching, burning, rashes, or lesions   All other review of systems is negative unless indicated above    Vital Signs Last 24 Hrs  T(C): 36.5 (18 Oct 2021 15:37), Max: 36.9 (17 Oct 2021 21:24)  T(F): 97.7 (18 Oct 2021 15:37), Max: 98.5 (17 Oct 2021 21:24)  HR: 65 (18 Oct 2021 15:37) (65 - 68)  BP: 130/49 (18 Oct 2021 15:37) (119/59 - 130/49)  BP(mean): 73 (17 Oct 2021 21:24) (73 - 73)  RR: 18 (18 Oct 2021 15:37) (16 - 18)  SpO2: 97% (18 Oct 2021 15:37) (96% - 97%)    Physical Exam     Constitutional: Pt lying in bed, awake and alert, NAD  HEENT: EOMI, normal hearing, moist mucous membranes  Neck: Soft and supple, no JVD  Respiratory: CTABL, No wheezing, rales or rhonchi  Cardiovascular: S1S2+, RRR, no M/G/R  Gastrointestinal: BS+, soft, NT/ND, no guarding, no rebound. Dull to percussion lwr quadrants.   Extremities: No peripheral edema  Vascular: 2+ peripheral pulses  Neurological: AAOx3, no focal deficits  Skin: No rashes    MEDICATIONS:  MEDICATIONS  (STANDING):  aspirin enteric coated 81 milliGRAM(s) Oral daily  atorvastatin 40 milliGRAM(s) Oral at bedtime  clopidogrel Tablet 75 milliGRAM(s) Oral daily  cyanocobalamin 1000 MICROGram(s) Oral daily  DULoxetine 60 milliGRAM(s) Oral daily  ferrous    sulfate 325 milliGRAM(s) Oral daily  furosemide    Tablet 40 milliGRAM(s) Oral two times a day  gabapentin 300 milliGRAM(s) Oral two times a day  gabapentin 600 milliGRAM(s) Oral at bedtime  meropenem  IVPB      meropenem  IVPB 500 milliGRAM(s) IV Intermittent every 8 hours  metoprolol succinate ER 75 milliGRAM(s) Oral daily  multivitamin 1 Tablet(s) Oral daily  pantoprazole    Tablet 40 milliGRAM(s) Oral before breakfast  tamsulosin 0.4 milliGRAM(s) Oral at bedtime      LABS: All Labs Reviewed:                                              7.9    5.14  )-----------( 199      ( 17 Oct 2021 07:21 )             25.9     10-17    140  |  107  |  34<H>  ----------------------------<  102<H>  3.7   |  30  |  1.90<H>    Ca    8.2<L>      17 Oct 2021 07:21                 PTT - ( 15 Oct 2021 17:06 )  PTT:30.7 sec      Blood Culture:   I&O's Summary    15 Oct 2021 07:01  -  16 Oct 2021 07:00  --------------------------------------------------------  IN: 375 mL / OUT: 0 mL / NET: 375 mL      CAPILLARY BLOOD GLUCOSE  RADIOLOGY/EKG:  < from: Xray Chest 1 View-PORTABLE IMMEDIATE (10.15.21 @ 17:59) >  Impression: Clear lungs, grossly unchanged.    < end of copied text >  < from: US Kidney and Bladder (10.16.21 @ 11:50) >  Right kidney: 11.2 cm. No renal mass, no change in mild-moderate hydronephrosis, no calculi.    Left kidney: 9.6 cm. No renal mass, hydronephrosis or calculi.    Urinary bladder: Signs of chronic outlet obstruction again noted    IMPRESSION:    No significant change since 9/29/2021    < end of copied text >

## 2021-10-18 NOTE — PROGRESS NOTE ADULT - ASSESSMENT
89M with a PMH significant for CAD s/p PCI/Stent, HTN, BPH, Chronic LLE Hip Pain 2/2 OA, HLD, CKD-IV, AAA, PAD and BPH, admitted from snf on 10/15 for evaluation of worsening confusion; patient had recent hospitalization of Proteus mirabilis sepsis or urinary origin, but was not ESBL and unable to find any MDRO organisms in the medical record. History per medical record as patient is poor historian.     1. Patient admitted with urinary tract infection; allergy to penicillin, though unclear given poor historian, the patient if found to have UTI with gram negative rods  - follow up cultures to identification and sensitivity  - iv hydration and supportive care   - serial cbc and monitor temperature   - will change to ceftriaxone given Proteus in urine  - can stop isolation    2. other issues: per medicine

## 2021-10-18 NOTE — PROGRESS NOTE ADULT - ASSESSMENT
89M with an extensive PMH, most significant for BPH, CKD IV and recent admission to  2 weeks prior for Acute Cystitis 2/2 Proteus Mirabilis complicated by Bacteremia and Hydronephrosis; for which he was initiated on a course of IV Meropenum and discharge home on a 10day course of PO Ceftin, he returns to ED with progressive confusion and lethargy. In the ED he was found to be febrile, with a Tmax of 102.2 F with Laboratory findings significant for Leukocytosis, WBC 11.74, LA of 2, Cr of 2/23 with UA detecting Moderate LE's and Blood. Pt was given a 1 time dose of IV Azactam as well as a 2L NS Bolus, then admitted to the floor with Metabolic Encephalopathy in the setting of UroSepsis, with urology consulted.         #Metabolic Encephalopathy in the setting of UTI (hemorrhagic cystitis)/sepsis  -recent admission in September for Proteus Bacteremia/Cystis, not ESBL  -UA: (+) Moderate LE's, Moderate Blood   -H/H on Admission 9.4/30.4  -Baseline H/H 9/28  -S/p IV Azectam x 1   -s/p meropenem   -UCX grew P. Miraballis   -ID recs appreciated: abx changed to Ceftriaxone   -BCx negative. Prelim.   -Uro consult appreciated: no cytoscopy at this time 2/2 active UTI with hematuria/clots   -Renal US : Right kidney: 11.2 cm. No renal mass, no change in mild-moderate hydronephrosis, no calculi. Left kidney: 9.6 cm. No renal mass, hydronephrosis or calculi. Urinary bladder: Signs of chronic outlet obstruction again noted  -plan for PICC line and outpatient infusion as suspicion for prostatitis given recurrent UTIs          #RASHAUN on CKD-III c/b Chronic Anemia   -Cr on admission 2.23  -Baseline Cr~2.0  -Today 1.9  -H/H on Admission 9.4/30.4  -Baseline H/H 9/28  -Continue to monitor, f/u am CBC  -Continue with daily Iron supplementation         #Neuropathy   -Fall Risk Precautions   -Consult PT   -Continue with Gabapentin 300mg BID (9am/6pm)   -Continue with Gabapentin 600mg qHs   -Continue with Duloxetine 50mg qD         #HTN/HFPEF  -Caution with Antihypertensives (with parameters) as DBP low on admission in the setting of sepsis  -Continue Lasix 4mg BID   -Continue Metoprolol Succinate 75mg qD   -DASH Diet   -TTE 4/21: LVEF 60-65%  -Pt currently Euvolemic, with no signs of fluid overload       #CAD   -S/p PCI with stent placement in January 2018  -Continue with ASA qD  -PCI placement more than 3 yrs ago, doubt need for DAPT  -Pt with hematuria on admission, s/p admission this year for GI bleed, Plavix was discontinued   -Follow up outpnt with cardiologist regarding medication management     #Constipation  -Pt reports last BM ~ 1 week ago  -Dullness to percussion on PE  -reports voluntary holding of stool   -MiraLax and senna for bowel regimen     #HLD  -Atorvastatin 40 qD      #Advanced Directives   -DNR/DNI  -MOLST updated verbally on admission with son    -HCP: Demian Aliyah-Son- [phone]    #PPX  -DVT PPX: SubQ Heparin   -GI PPX: Pantoprazole 40mg qD       case d/w Dr. Cazares

## 2021-10-19 ENCOUNTER — TRANSCRIPTION ENCOUNTER (OUTPATIENT)
Age: 86
End: 2021-10-19

## 2021-10-19 LAB
ANION GAP SERPL CALC-SCNC: 3 MMOL/L — LOW (ref 5–17)
BUN SERPL-MCNC: 27 MG/DL — HIGH (ref 7–23)
CALCIUM SERPL-MCNC: 8.6 MG/DL — SIGNIFICANT CHANGE UP (ref 8.5–10.1)
CHLORIDE SERPL-SCNC: 104 MMOL/L — SIGNIFICANT CHANGE UP (ref 96–108)
CO2 SERPL-SCNC: 34 MMOL/L — HIGH (ref 22–31)
CREAT SERPL-MCNC: 1.57 MG/DL — HIGH (ref 0.5–1.3)
GLUCOSE SERPL-MCNC: 101 MG/DL — HIGH (ref 70–99)
HCT VFR BLD CALC: 27.8 % — LOW (ref 39–50)
HGB BLD-MCNC: 8.7 G/DL — LOW (ref 13–17)
MAGNESIUM SERPL-MCNC: 2.1 MG/DL — SIGNIFICANT CHANGE UP (ref 1.6–2.6)
MCHC RBC-ENTMCNC: 28.5 PG — SIGNIFICANT CHANGE UP (ref 27–34)
MCHC RBC-ENTMCNC: 31.3 GM/DL — LOW (ref 32–36)
MCV RBC AUTO: 91.1 FL — SIGNIFICANT CHANGE UP (ref 80–100)
PHOSPHATE SERPL-MCNC: 2.9 MG/DL — SIGNIFICANT CHANGE UP (ref 2.5–4.5)
PLATELET # BLD AUTO: 229 K/UL — SIGNIFICANT CHANGE UP (ref 150–400)
POTASSIUM SERPL-MCNC: 3.7 MMOL/L — SIGNIFICANT CHANGE UP (ref 3.5–5.3)
POTASSIUM SERPL-SCNC: 3.7 MMOL/L — SIGNIFICANT CHANGE UP (ref 3.5–5.3)
RBC # BLD: 3.05 M/UL — LOW (ref 4.2–5.8)
RBC # FLD: 15.6 % — HIGH (ref 10.3–14.5)
SODIUM SERPL-SCNC: 141 MMOL/L — SIGNIFICANT CHANGE UP (ref 135–145)
WBC # BLD: 4.55 K/UL — SIGNIFICANT CHANGE UP (ref 3.8–10.5)
WBC # FLD AUTO: 4.55 K/UL — SIGNIFICANT CHANGE UP (ref 3.8–10.5)

## 2021-10-19 PROCEDURE — 99233 SBSQ HOSP IP/OBS HIGH 50: CPT | Mod: GC

## 2021-10-19 RX ADMIN — PREGABALIN 1000 MICROGRAM(S): 225 CAPSULE ORAL at 09:02

## 2021-10-19 RX ADMIN — FINASTERIDE 5 MILLIGRAM(S): 5 TABLET, FILM COATED ORAL at 09:02

## 2021-10-19 RX ADMIN — Medication 325 MILLIGRAM(S): at 09:02

## 2021-10-19 RX ADMIN — POLYETHYLENE GLYCOL 3350 17 GRAM(S): 17 POWDER, FOR SOLUTION ORAL at 09:02

## 2021-10-19 RX ADMIN — HEPARIN SODIUM 5000 UNIT(S): 5000 INJECTION INTRAVENOUS; SUBCUTANEOUS at 21:10

## 2021-10-19 RX ADMIN — GABAPENTIN 600 MILLIGRAM(S): 400 CAPSULE ORAL at 21:11

## 2021-10-19 RX ADMIN — Medication 81 MILLIGRAM(S): at 09:02

## 2021-10-19 RX ADMIN — TAMSULOSIN HYDROCHLORIDE 0.4 MILLIGRAM(S): 0.4 CAPSULE ORAL at 21:10

## 2021-10-19 RX ADMIN — HEPARIN SODIUM 5000 UNIT(S): 5000 INJECTION INTRAVENOUS; SUBCUTANEOUS at 09:02

## 2021-10-19 RX ADMIN — CEFTRIAXONE 1000 MILLIGRAM(S): 500 INJECTION, POWDER, FOR SOLUTION INTRAMUSCULAR; INTRAVENOUS at 17:09

## 2021-10-19 RX ADMIN — ATORVASTATIN CALCIUM 40 MILLIGRAM(S): 80 TABLET, FILM COATED ORAL at 21:10

## 2021-10-19 RX ADMIN — GABAPENTIN 300 MILLIGRAM(S): 400 CAPSULE ORAL at 04:54

## 2021-10-19 RX ADMIN — DULOXETINE HYDROCHLORIDE 60 MILLIGRAM(S): 30 CAPSULE, DELAYED RELEASE ORAL at 09:02

## 2021-10-19 RX ADMIN — Medication 40 MILLIGRAM(S): at 19:55

## 2021-10-19 RX ADMIN — Medication 1 TABLET(S): at 09:02

## 2021-10-19 RX ADMIN — SENNA PLUS 2 TABLET(S): 8.6 TABLET ORAL at 21:10

## 2021-10-19 RX ADMIN — PANTOPRAZOLE SODIUM 40 MILLIGRAM(S): 20 TABLET, DELAYED RELEASE ORAL at 04:54

## 2021-10-19 RX ADMIN — GABAPENTIN 300 MILLIGRAM(S): 400 CAPSULE ORAL at 13:03

## 2021-10-19 RX ADMIN — Medication 40 MILLIGRAM(S): at 09:02

## 2021-10-19 NOTE — PROGRESS NOTE ADULT - SUBJECTIVE AND OBJECTIVE BOX
89M with a PMH significant for CAD s/p PCI/Stent, HTN, BPH, Chronic LLE Hip Pain 2/2 OA, HLD, CKD-IV, AAA, PAD and BPH, who presented to  from [facility] for progressive confusion. Per his daughter and Son he was at his baseline mentation 1 day before admission. At that time he denied any sxs of fevers, abdominal/chest pain, acute focal neurological deficits or urinary sxs however he exhibited slurred speech with intermittent lethargy after which, EMS was notified and he was sent to the ED for further evaluation. Per chart review, the pt was recently admitted to  2 weeks prior for Acute Cystitis 2/2 Proteus Mirabilis complicated by Bacteremia and Hydronephrosis; for which he was initiated on a course of IV Meropenum and discharge home on a 10day course of PO Ceftin, at that time out patient follow up with Urology was recommended for possible Cystoscopy to identify any structural pathologies.     Subjective: Patient seen and examined at bedside. No acute distress, no pain.  No recent BM.     REVIEW OF SYSTEMS:  CONSTITUTIONAL: No weakness, fevers or chills  EYES/ENT: No visual changes;  No vertigo or throat pain   NECK: No pain or stiffness  RESPIRATORY: No cough, wheezing, hemoptysis; No shortness of breath  CARDIOVASCULAR: No chest pain or palpitations  GASTROINTESTINAL: No abdominal or epigastric pain. No nausea, vomiting, or hematemesis; No diarrhea or constipation. No melena or hematochezia.  GENITOURINARY: No dysuria, frequency or hematuria  NEUROLOGICAL: No numbness or weakness  SKIN: No itching, burning, rashes, or lesions   All other review of systems is negative unless indicated above    Vital Signs Last 24 Hrs  T(C): 36.4 (19 Oct 2021 08:27), Max: 36.7 (18 Oct 2021 22:00)  T(F): 97.6 (19 Oct 2021 08:27), Max: 98.1 (18 Oct 2021 22:00)  HR: 61 (19 Oct 2021 08:27) (61 - 71)  BP: 117/58 (19 Oct 2021 08:27) (117/58 - 130/49)  BP(mean): --  RR: 18 (19 Oct 2021 08:27) (18 - 18)  SpO2: 97% (19 Oct 2021 08:27) (97% - 99%)    Physical Exam     Constitutional: Pt lying in bed, awake and alert, NAD  HEENT: EOMI, normal hearing, moist mucous membranes  Neck: Soft and supple, no JVD  Respiratory: CTABL, No wheezing, rales or rhonchi  Cardiovascular: S1S2+, RRR, no M/G/R  Gastrointestinal: BS+, soft, NT/ND, no guarding, no rebound. Dull to percussion lwr quadrants.   Extremities: No peripheral edema  Vascular: 2+ peripheral pulses  Neurological: AAOx3, no focal deficits  Skin: No rashes    MEDICATIONS:  MEDICATIONS  (STANDING):  aspirin enteric coated 81 milliGRAM(s) Oral daily  atorvastatin 40 milliGRAM(s) Oral at bedtime  clopidogrel Tablet 75 milliGRAM(s) Oral daily  cyanocobalamin 1000 MICROGram(s) Oral daily  DULoxetine 60 milliGRAM(s) Oral daily  ferrous    sulfate 325 milliGRAM(s) Oral daily  furosemide    Tablet 40 milliGRAM(s) Oral two times a day  gabapentin 300 milliGRAM(s) Oral two times a day  gabapentin 600 milliGRAM(s) Oral at bedtime  meropenem  IVPB      meropenem  IVPB 500 milliGRAM(s) IV Intermittent every 8 hours  metoprolol succinate ER 75 milliGRAM(s) Oral daily  multivitamin 1 Tablet(s) Oral daily  pantoprazole    Tablet 40 milliGRAM(s) Oral before breakfast  tamsulosin 0.4 milliGRAM(s) Oral at bedtime      LABS: All Labs Reviewed:                                                         8.7    4.55  )-----------( 229      ( 19 Oct 2021 05:59 )             27.8     10-19    141  |  104  |  27<H>  ----------------------------<  101<H>  3.7   |  34<H>  |  1.57<H>    Ca    8.6      19 Oct 2021 05:59  Phos  2.9     10-19  Mg     2.1     10-19                   PTT - ( 15 Oct 2021 17:06 )  PTT:30.7 sec      Blood Culture:   I&O's Summary    15 Oct 2021 07:01  -  16 Oct 2021 07:00  --------------------------------------------------------  IN: 375 mL / OUT: 0 mL / NET: 375 mL      CAPILLARY BLOOD GLUCOSE  RADIOLOGY/EKG:  < from: Xray Chest 1 View-PORTABLE IMMEDIATE (10.15.21 @ 17:59) >  Impression: Clear lungs, grossly unchanged.    < end of copied text >  < from: US Kidney and Bladder (10.16.21 @ 11:50) >  Right kidney: 11.2 cm. No renal mass, no change in mild-moderate hydronephrosis, no calculi.    Left kidney: 9.6 cm. No renal mass, hydronephrosis or calculi.    Urinary bladder: Signs of chronic outlet obstruction again noted    IMPRESSION:    No significant change since 9/29/2021    < end of copied text >

## 2021-10-19 NOTE — DISCHARGE NOTE PROVIDER - HOSPITAL COURSE
89M with a PMH significant for CAD s/p PCI/Stent, HTN, BPH, Chronic LLE Hip Pain 2/2 OA, HLD, CKD-IV, AAA, PAD and BPH, who presented to  from [facility] for progressive confusion. Per chart review, the pt was recently admitted to  2 weeks prior for Acute Cystitis 2/2 Proteus Mirabilis complicated by Bacteremia and Hydronephrosis; for which he was initiated on a course of IV Meropenem and discharge home on a 10day course of PO Ceftin, at that time out patient follow up with Urology was recommended for possible Cystoscopy to identify any structural pathologies. Urinalysis in the ED with moderate leukocyte esterase, few bacteria, and pyuria. Pt admitted for UTI, and started on IV meropenem due to his past hx of UTI with Proteus. Renal USA showed unchanged rt mild hydronephrosis, possibly due to overflow incontinence; no calculi. Urine culture was done on admission and grew Proteus Mirabilis. ID who was consulted, changed the antibiotic to Ceftriaxone. Urology was consulted, due to recurrent UTIs, and reported cystoscopy not recommended at the moment due to active infection. Pt will benefit from extended therapy (28 days) of IV antibiotic via midline, in order to eradicate possible prostatic origin of organism; which will explain its recurrence after short course treatment. [facility] does not manage midlines or iv infusions, so pt will need to go to [facility] to complete treatment.    89M with a PMH significant for CAD s/p PCI/Stent, HTN, BPH, Chronic LLE Hip Pain 2/2 OA, HLD, CKD-IV, AAA, PAD and BPH, who presented to  from [facility] for progressive confusion. Per chart review, the pt was recently admitted to  2 weeks prior for Acute Cystitis 2/2 Proteus Mirabilis complicated by Bacteremia and Hydronephrosis; for which he was initiated on a course of IV Meropenem and discharge home on a 10day course of PO Ceftin, at that time out patient follow up with Urology was recommended for possible Cystoscopy to identify any structural pathologies. Urinalysis in the ED with moderate leukocyte esterase, few bacteria, and pyuria. Pt admitted for UTI, and started on IV meropenem due to his past hx of UTI with Proteus. Renal USA showed unchanged rt mild hydronephrosis, possibly due to overflow incontinence; no calculi. Urine culture was done on admission and grew Proteus Mirabilis. ID who was consulted, changed the antibiotic to Ceftriaxone. Urology was consulted, due to recurrent UTIs, and reported cystoscopy not recommended at the moment due to active infection. Pt will benefit from extended therapy (28 days) of IV antibiotic via midline, in order to eradicate possible prostatic origin of organism; which will explain its recurrence after short course treatment. [facility] does not manage midlines or iv infusions, so pt will need to go to [facility] to complete treatment.     Subjective: Patient seen and examined at bedside. No acute distress, no pain.  No recent BM.      REVIEW OF SYSTEMS:  CONSTITUTIONAL: No weakness, fevers or chills  EYES/ENT: No visual changes;  No vertigo or throat pain   NECK: No pain or stiffness  RESPIRATORY: No cough, wheezing, hemoptysis; No shortness of breath  CARDIOVASCULAR: No chest pain or palpitations  GASTROINTESTINAL: No abdominal or epigastric pain. No nausea, vomiting, or hematemesis; No diarrhea or constipation. No melena or hematochezia.  GENITOURINARY: No dysuria, frequency or hematuria  NEUROLOGICAL: No numbness or weakness  SKIN: No itching, rashes    Vital Signs Last 24 Hrs  T(C): 36.6 (20 Oct 2021 08:07), Max: 36.9 (19 Oct 2021 19:51)  T(F): 97.8 (20 Oct 2021 08:07), Max: 98.4 (19 Oct 2021 19:51)  HR: 83 (20 Oct 2021 08:07) (75 - 83)  BP: 147/54 (20 Oct 2021 08:07) (105/47 - 147/54)  BP(mean): --  RR: 17 (20 Oct 2021 08:07) (17 - 18)  SpO2: 93% (20 Oct 2021 08:07) (93% - 100%)    Physical Exam     Constitutional: Pt lying in bed, awake and alert, NAD  HEENT: EOMI, normal hearing, moist mucous membranes  Neck: Soft and supple, no JVD  Respiratory: CTABL, No wheezing, rales or rhonchi  Cardiovascular: S1S2+, RRR, no M/G/R  Gastrointestinal: BS+, soft, NT/ND, no guarding, no rebound. Dull to percussion lwr quadrants.   Extremities: No peripheral edema  Vascular: 2+ peripheral pulses  Neurological: AAOx3, no focal deficits  Skin: No rashes   89M with a PMH significant for CAD s/p PCI/Stent, HTN, BPH, Chronic LLE Hip Pain 2/2 OA, HLD, CKD-IV, AAA, PAD and BPH, who presented to  from [facility] for progressive confusion. Per chart review, the pt was recently admitted to  2 weeks prior for Acute Cystitis 2/2 Proteus Mirabilis complicated by Bacteremia and Hydronephrosis; for which he was initiated on a course of IV Meropenem and discharge home on a 10day course of PO Ceftin, at that time out patient follow up with Urology was recommended for possible Cystoscopy to identify any structural pathologies. Urinalysis in the ED with moderate leukocyte esterase, few bacteria, and pyuria. Pt admitted for UTI, and started on IV meropenem due to his past hx of UTI with Proteus. Renal USA showed unchanged rt mild hydronephrosis, possibly due to overflow incontinence; no calculi. Urine culture was done on admission and grew Proteus Mirabilis. ID who was consulted, changed the antibiotic to Ceftriaxone. Urology was consulted, due to recurrent UTIs, and reported cystoscopy not recommended at the moment due to active infection. Pt will benefit from extended therapy (28 days) of IV antibiotic via midline, in order to eradicate possible prostatic origin of organism; which will explain its recurrence after short course treatment. [facility] does not manage midlines or iv infusions, so pt will need to go to [facility] to complete treatment.     Subjective: Patient seen and examined at bedside. No acute distress, no pain.  At this time, pt is stable to be discharged to [facility] to continue IV abx infusion. Should follow with ID Dr Duque and with urologist Dr Dunbar after abx treatment for subsequent procedures and tests. As per uro, will continue bethanecol and finasteride.      REVIEW OF SYSTEMS:  CONSTITUTIONAL: No weakness, fevers or chills  EYES/ENT: No visual changes;  No vertigo or throat pain   NECK: No pain or stiffness  RESPIRATORY: No cough, wheezing, hemoptysis; No shortness of breath  CARDIOVASCULAR: No chest pain or palpitations  GASTROINTESTINAL: No abdominal or epigastric pain. No nausea, vomiting, or hematemesis; No diarrhea or constipation. No melena or hematochezia.  GENITOURINARY: No dysuria, frequency or hematuria  NEUROLOGICAL: No numbness or weakness  SKIN: No itching, rashes    Vital Signs Last 24 Hrs  T(C): 36.6 (20 Oct 2021 08:07), Max: 36.9 (19 Oct 2021 19:51)  T(F): 97.8 (20 Oct 2021 08:07), Max: 98.4 (19 Oct 2021 19:51)  HR: 83 (20 Oct 2021 08:07) (75 - 83)  BP: 147/54 (20 Oct 2021 08:07) (105/47 - 147/54)  BP(mean): --  RR: 17 (20 Oct 2021 08:07) (17 - 18)  SpO2: 93% (20 Oct 2021 08:07) (93% - 100%)    Physical Exam     Constitutional: Pt lying in bed, awake and alert, NAD  HEENT: EOMI, normal hearing, moist mucous membranes  Neck: Soft and supple, no JVD  Respiratory: CTABL, No wheezing, rales or rhonchi  Cardiovascular: S1S2+, RRR, no M/G/R  Gastrointestinal: BS+, soft, NT/ND, no guarding, no rebound. Dull to percussion lwr quadrants.   Extremities: No peripheral edema  Vascular: 2+ peripheral pulses  Neurological: AAOx3, no focal deficits  Skin: No rashes   89M with a PMH significant for CAD s/p PCI/Stent, HTN, BPH, Chronic LLE Hip Pain 2/2 OA, HLD, CKD-IV, AAA, PAD and BPH, who presented to  from [facility] for progressive confusion. Per chart review, the pt was recently admitted to  2 weeks prior for Acute Cystitis 2/2 Proteus Mirabilis complicated by Bacteremia and Hydronephrosis; for which he was initiated on a course of IV Meropenem and discharge home on a 10day course of PO Ceftin, at that time out patient follow up with Urology was recommended for possible Cystoscopy to identify any structural pathologies. Urinalysis in the ED with moderate leukocyte esterase, few bacteria, and pyuria. Pt admitted for UTI, and started on IV meropenem due to his past hx of UTI with Proteus. Renal USA showed unchanged rt mild hydronephrosis, possibly due to overflow incontinence; no calculi. Urine culture was done on admission and grew Proteus Mirabilis. ID who was consulted, changed the antibiotic to Ceftriaxone. Urology was consulted, due to recurrent UTIs, and reported cystoscopy not recommended at the moment due to active infection. Pt will benefit from extended therapy (28 days) of IV antibiotic via midline, in order to eradicate possible prostatic origin of organism; which will explain its recurrence after short course treatment. [facility] does not manage midlines or iv infusions, so pt will need to go to [facility] to complete treatment.     Subjective: Patient seen and examined at bedside. No acute distress, no pain.  At this time, pt is stable to be discharged to [facility] to continue IV abx infusion. Should follow with ID Dr Duque and with urologist Dr Dunbar after abx treatment for subsequent procedures and tests. As per uro, will continue bethanecol and finasteride.      REVIEW OF SYSTEMS:  CONSTITUTIONAL: No weakness, fevers or chills  EYES/ENT: No visual changes;  No vertigo or throat pain   NECK: No pain or stiffness  RESPIRATORY: No cough, wheezing, hemoptysis; No shortness of breath  CARDIOVASCULAR: No chest pain or palpitations  GASTROINTESTINAL: No abdominal or epigastric pain. No nausea, vomiting, or hematemesis; No diarrhea or constipation. No melena or hematochezia.  GENITOURINARY: No dysuria, frequency or hematuria  NEUROLOGICAL: No numbness or weakness  SKIN: No itching, rashes    Vital Signs Last 24 Hrs  T(C): 36.6 (20 Oct 2021 08:07), Max: 36.9 (19 Oct 2021 19:51)  T(F): 97.8 (20 Oct 2021 08:07), Max: 98.4 (19 Oct 2021 19:51)  HR: 83 (20 Oct 2021 08:07) (75 - 83)  BP: 147/54 (20 Oct 2021 08:07) (105/47 - 147/54)  BP(mean): --  RR: 17 (20 Oct 2021 08:07) (17 - 18)  SpO2: 93% (20 Oct 2021 08:07) (93% - 100%)    Physical Exam     Constitutional: Pt lying in bed, awake and alert, NAD  HEENT: EOMI, normal hearing, moist mucous membranes  Neck: Soft and supple, no JVD  Respiratory: CTABL, No wheezing, rales or rhonchi  Cardiovascular: S1S2+, RRR, no M/G/R  Gastrointestinal: BS+, soft, NT/ND, no guarding, no rebound. Dull to percussion lwr quadrants.   Extremities: No peripheral edema  Vascular: 2+ peripheral pulses  Neurological: AAOx3, no focal deficits  Skin: No rashes        medicine attending addendum- this pleasant man p/w his 4th uti in ~6weeks. we suspect the prostate may be a nidus of infection. alternative casues may be incomplete voiding (PVR~200ml) or bladder pathology (hydroureter on right to level of bladder). our plan is to treat for 4 weeks with ceftriaxone (he grew a proteus susceptible ot it) then to see  for urodynamics and cysto. in addition we connected him to GI for evaluation of his iron deficiency. we discussed the risks and benefits of pursuing these investigations. at his age he may consider simply watchful waiting, recognizing there may be underlying neoplasia of the bladder and/or GI tract.  he will consider these issues while in [place] completing his course of ABX's and schedule appointments with the specialists we connected him to if he decides to proceed. total time ~35 min

## 2021-10-19 NOTE — DISCHARGE NOTE PROVIDER - CARE PROVIDERS DIRECT ADDRESSES
,DirectAddress_Unknown,[email or web address]

## 2021-10-19 NOTE — DISCHARGE NOTE PROVIDER - CARE PROVIDER_API CALL
Traci Duque (DO)  Infectious Disease; Internal Medicine  [address] [facility], [address]  [address] [place] [address]  Phone: [phone]  Fax: [phone]  Follow Up Time:     Ankit Dunbar)  Urology  [address] [facility] [facility] [address], [address] [place] [address]  Phone: [phone]  Fax: [phone]  Follow Up Time:

## 2021-10-19 NOTE — PHYSICAL THERAPY INITIAL EVALUATION ADULT - GENERAL OBSERVATIONS, REHAB EVAL
Pt was found lying in bed, eating breakfast, pt is pleasant and willing to participate in PT, pt has a h/o chronic left hip arthritis

## 2021-10-19 NOTE — PROGRESS NOTE ADULT - ASSESSMENT
89M with an extensive PMH, most significant for BPH, CKD IV and recent admission to  2 weeks prior for Acute Cystitis 2/2 Proteus Mirabilis complicated by Bacteremia and Hydronephrosis; for which he was initiated on a course of IV Meropenum and discharge home on a 10day course of PO Ceftin, he returns to ED with progressive confusion and lethargy. In the ED he was found to be febrile, with a Tmax of 102.2 F with Laboratory findings significant for Leukocytosis, WBC 11.74, LA of 2, Cr of 2/23 with UA detecting Moderate LE's and Blood. Pt was given a 1 time dose of IV Azactam as well as a 2L NS Bolus, then admitted to the floor with Metabolic Encephalopathy in the setting of UroSepsis, with urology consulted.         #Metabolic Encephalopathy in the setting of UTI (hemorrhagic cystitis)/sepsis  -recent admission in September for Proteus Bacteremia/Cystis, not ESBL  -UA: (+) Moderate LE's, Moderate Blood   -H/H on Admission 9.4/30.4  -Baseline H/H 9/28  -S/p IV Azectam x 1   -s/p meropenem   -UCX grew P. Miraballis   -ID recs appreciated: abx changed to Ceftriaxone   -BCx negative. Prelim.   -Uro consult appreciated: no cytoscopy at this time 2/2 active UTI with hematuria/clots   -Renal US : Right kidney: 11.2 cm. No renal mass, no change in mild-moderate hydronephrosis, no calculi. Left kidney: 9.6 cm. No renal mass, hydronephrosis or calculi. Urinary bladder: Signs of chronic outlet obstruction again noted  -plan for PICC line and outpatient infusion as suspicion for prostatitis given recurrent UTIs          #RASHAUN on CKD-III c/b Chronic Anemia   -Cr on admission 2.23  -Baseline Cr~2.0  -Today 1.57  -H/H on Admission 9.4/30.4  -Baseline H/H 9/28  -Continue to monitor, f/u am CBC  -Continue with daily Iron supplementation         #Neuropathy   -Fall Risk Precautions   -Consult PT   -Continue with Gabapentin 300mg BID (9am/6pm)   -Continue with Gabapentin 600mg qHs   -Continue with Duloxetine 50mg qD         #HTN/HFPEF  -Caution with Antihypertensives (with parameters) as DBP low on admission in the setting of sepsis  -Continue Lasix 4mg BID   -Continue Metoprolol Succinate 75mg qD   -DASH Diet   -TTE 4/21: LVEF 60-65%  -Pt currently Euvolemic, with no signs of fluid overload       #CAD   -S/p PCI with stent placement in January 2018  -Continue with ASA qD  -PCI placement more than 3 yrs ago, doubt need for DAPT  -Pt with hematuria on admission, s/p admission this year for GI bleed, Plavix was discontinued   -Follow up outpnt with cardiologist regarding medication management     #Constipation  -Pt reports last BM ~ 1 week ago  -Dullness to percussion on PE  -reports voluntary holding of stool   -MiraLax and senna for bowel regimen     #HLD  -Atorvastatin 40 qD      #Advanced Directives   -DNR/DNI  -MOLST updated verbally on admission with son    -HCP: Demian Aliyah-Son- [phone]    #PPX  -DVT PPX: SubQ Heparin   -GI PPX: Pantoprazole 40mg qD       case d/w Dr. Cazares  89M with an extensive PMH, most significant for BPH, CKD IV and recent admission to  2 weeks prior for Acute Cystitis 2/2 Proteus Mirabilis complicated by Bacteremia and Hydronephrosis; for which he was initiated on a course of IV Meropenum and discharge home on a 10day course of PO Ceftin, he returns to ED with progressive confusion and lethargy. In the ED he was found to be febrile, with a Tmax of 102.2 F with Laboratory findings significant for Leukocytosis, WBC 11.74, LA of 2, Cr of 2/23 with UA detecting Moderate LE's and Blood. Pt was given a 1 time dose of IV Azactam as well as a 2L NS Bolus, then admitted to the floor with Metabolic Encephalopathy in the setting of UroSepsis, with urology consulted.         #Metabolic Encephalopathy in the setting of UTI (hemorrhagic cystitis)/sepsis  -recent admission in September for Proteus Bacteremia/Cystis, not ESBL  -UA: (+) Moderate LE's, Moderate Blood   -H/H on Admission 9.4/30.4  -Baseline H/H 9/28  -S/p IV Azectam x 1   -s/p meropenem   -UCX grew P. Miraballis   -ID recs appreciated: abx changed to Ceftriaxone   -BCx negative. Prelim.   -Uro consult appreciated: no cytoscopy at this time 2/2 active UTI with hematuria/clots   -Renal US : Right kidney: 11.2 cm. No renal mass, no change in mild-moderate hydronephrosis, no calculi. Left kidney: 9.6 cm. No renal mass, hydronephrosis or calculi. Urinary bladder: Signs of chronic outlet obstruction again noted  -plan for mid  line and outpatient infusion as suspicion for prostatitis given recurrent UTIs          #RASHAUN on CKD-III c/b Chronic Anemia   -Cr on admission 2.23  -Baseline Cr~2.0  -Today 1.57  -H/H on Admission 9.4/30.4  -Baseline H/H 9/28  -Continue to monitor, f/u am CBC  -Continue with daily Iron supplementation         #Neuropathy   -Fall Risk Precautions   -Consult PT   -Continue with Gabapentin 300mg BID (9am/6pm)   -Continue with Gabapentin 600mg qHs   -Continue with Duloxetine 50mg qD         #HTN/HFPEF  -Caution with Antihypertensives (with parameters) as DBP low on admission in the setting of sepsis  -Continue Lasix 4mg BID   -Continue Metoprolol Succinate 75mg qD   -DASH Diet   -TTE 4/21: LVEF 60-65%  -Pt currently Euvolemic, with no signs of fluid overload       #CAD   -S/p PCI with stent placement in January 2018  -Continue with ASA qD  -PCI placement more than 3 yrs ago, doubt need for DAPT  -Pt with hematuria on admission, s/p admission this year for GI bleed, Plavix was discontinued   -Follow up outpnt with cardiologist regarding medication management     #Constipation  -Pt reports last BM ~ 1 week ago  -Dullness to percussion on PE  -reports voluntary holding of stool   -MiraLax and senna for bowel regimen     #HLD  -Atorvastatin 40 qD      #Advanced Directives   -DNR/DNI  -MOLST updated verbally on admission with son    -HCP: Demian Ly-Son- [phone]    #PPX  -DVT PPX: SubQ Heparin   -GI PPX: Pantoprazole 40mg qD       case d/w Dr. Cazares

## 2021-10-19 NOTE — PHYSICAL THERAPY INITIAL EVALUATION ADULT - PERTINENT HX OF CURRENT PROBLEM, REHAB EVAL
Pt presented to  from [facility] for progressive confusion. Per his daughter and Son he was at his baseline mentation 1 day before admission. he exhibited slurred speech with intermittent lethargy after which, EMS was notified and he was sent to the ED for further evaluation,

## 2021-10-19 NOTE — PROGRESS NOTE ADULT - SUBJECTIVE AND OBJECTIVE BOX
Date of service: 10-19-21 @ 15:21      Patient sitting in chair; afebrile, notes he was able to urinate after munoz removed      ROS: no fever or chills; denies dizziness, no HA, no SOB or cough, no abdominal pain, no diarrhea or constipation; no dysuria, no urinary frequency, no legs pain, no rashes    MEDICATIONS  (STANDING):  aspirin enteric coated 81 milliGRAM(s) Oral daily  atorvastatin 40 milliGRAM(s) Oral at bedtime  cefTRIAXone Injectable. 1000 milliGRAM(s) IV Push every 24 hours  cyanocobalamin 1000 MICROGram(s) Oral daily  DULoxetine 60 milliGRAM(s) Oral daily  ferrous    sulfate 325 milliGRAM(s) Oral daily  finasteride 5 milliGRAM(s) Oral daily  furosemide    Tablet 40 milliGRAM(s) Oral two times a day  gabapentin 300 milliGRAM(s) Oral two times a day  gabapentin 600 milliGRAM(s) Oral at bedtime  heparin   Injectable 5000 Unit(s) SubCutaneous every 12 hours  metoprolol succinate ER 75 milliGRAM(s) Oral daily  multivitamin 1 Tablet(s) Oral daily  pantoprazole    Tablet 40 milliGRAM(s) Oral before breakfast  polyethylene glycol 3350 17 Gram(s) Oral daily  senna 2 Tablet(s) Oral at bedtime  tamsulosin 0.4 milliGRAM(s) Oral at bedtime    MEDICATIONS  (PRN):  acetaminophen   Tablet .. 650 milliGRAM(s) Oral every 6 hours PRN Temp greater or equal to 38C (100.4F), Mild Pain (1 - 3)      Vital Signs Last 24 Hrs  T(C): 36.4 (19 Oct 2021 08:27), Max: 36.7 (18 Oct 2021 22:00)  T(F): 97.6 (19 Oct 2021 08:27), Max: 98.1 (18 Oct 2021 22:00)  HR: 61 (19 Oct 2021 08:27) (61 - 71)  BP: 117/58 (19 Oct 2021 08:27) (117/58 - 130/49)  BP(mean): --  RR: 18 (19 Oct 2021 08:27) (18 - 18)  SpO2: 97% (19 Oct 2021 08:27) (97% - 99%)        Physical Exam:        Constitutional: frail looking  HEENT: NC/AT, EOMI, PERRLA, conjunctivae clear; ears and nose atraumatic; pharynx clear  Neck: supple; thyroid not palpable  Back: no tenderness  Respiratory: respiratory effort normal; clear to auscultation  Cardiovascular: S1S2 regular, no murmurs  Abdomen: soft, not tender, not distended, positive BS; no liver or spleen organomegaly  Genitourinary: no suprapubic tenderness  Musculoskeletal: no muscle tenderness, no joint swelling or tenderness  Neurological/ Psychiatric:  moving all extremities  Skin: no rashes; no palpable lesions    Labs: all available labs reviewed                      Labs:              Labs:                        8.7    4.55  )-----------( 229      ( 19 Oct 2021 05:59 )             27.8     10-19    141  |  104  |  27<H>  ----------------------------<  101<H>  3.7   |  34<H>  |  1.57<H>    Ca    8.6      19 Oct 2021 05:59  Phos  2.9     10-19  Mg     2.1     10-19             Cultures:       Culture - Urine (collected 10-15-21 @ 17:06)  Source: Clean Catch Clean Catch (Midstream)  Final Report (10-18-21 @ 14:37):    >100,000 CFU/ml Proteus mirabilis  Organism: Proteus mirabilis (10-18-21 @ 14:37)  Organism: Proteus mirabilis (10-18-21 @ 14:37)      -  Amikacin: S <=16      -  Amoxicillin/Clavulanic Acid: S <=8/4      -  Ampicillin: S <=8 These ampicillin results predict results for amoxicillin      -  Ampicillin/Sulbactam: S <=4/2 Enterobacter, Citrobacter, and Serratia may develop resistance during prolonged therapy (3-4 days)      -  Aztreonam: S <=4      -  Cefazolin: S <=2 (MIC_CL_COM_ENTERIC_CEFAZU) For uncomplicated UTI with K. pneumoniae, E. coli, or P. mirablis: MAULIK <=16 is sensitive and MAULIK >=32 is resistant. This also predicts results for oral agents cefaclor, cefdinir, cefpodoxime, cefprozil, cefuroxime axetil, cephalexin and locarbef for uncomplicated UTI. Note that some isolates may be susceptible to these agents while testing resistant to cefazolin.      -  Cefepime: S <=2      -  Cefoxitin: S <=8      -  Ceftriaxone: S <=1 Enterobacter, Citrobacter, and Serratia may develop resistance during prolonged therapy      -  Ciprofloxacin: R >2      -  Ertapenem: S <=0.5      -  Gentamicin: S <=2      -  Levofloxacin: R 2      -  Meropenem: S <=1      -  Nitrofurantoin: R >64 Should not be used to treat pyelonephritis      -  Piperacillin/Tazobactam: S <=8      -  Tobramycin: S <=2      -  Trimethoprim/Sulfamethoxazole: S 1/19      Method Type: MAULIK    Culture - Blood (collected 10-15-21 @ 17:06)  Source: .Blood Blood-Peripheral  Preliminary Report (10-17-21 @ 02:01):    No growth to date.    Culture - Blood (collected 10-15-21 @ 17:06)  Source: .Blood Blood-Peripheral  Preliminary Report (10-17-21 @ 02:01):    No growth to date.                  Radiology: all available radiological tests reviewed    Advanced directives addressed: full resuscitation

## 2021-10-19 NOTE — PROGRESS NOTE ADULT - ATTENDING COMMENTS
and ID input appreciated  no suprapubic or CVA tenderness    imp- recurrent ESBL uti- suspect this is due to incomplete drainage/structural abnormality noted, possibly bladder ca, or prostate as a focus.  plan- extended parenteral abx therapy with a cysto near the end of the course
As above, pt seen and examined with house staff and treatment plan formulated on rounds    See Above and Attending Note
abd- NT,    proteus pan sensitive    imp- recurrent UTI- likely prostatic focus +/- incomplete voiding.  plan- complete 4 weeks abx's, then urodynamic and cysto eval  also- iron deficient- raises cncern for GI lesion, though NL colonoscopy 2 years ago per patient.  discussed further w/u and he wishes to pursue it- will ask GI to arrange outpt f/u as not urgent
As above, pt seen and examined with house staff and treatment plan formulated on rounds    See Above and Attending Note

## 2021-10-19 NOTE — PROGRESS NOTE ADULT - ASSESSMENT
89M with a PMH significant for CAD s/p PCI/Stent, HTN, BPH, Chronic LLE Hip Pain 2/2 OA, HLD, CKD-IV, AAA, PAD and BPH, admitted from snf on 10/15 for evaluation of worsening confusion; patient had recent hospitalization of Proteus mirabilis sepsis or urinary origin, but was not ESBL and unable to find any MDRO organisms in the medical record. History per medical record as patient is poor historian.     1. Patient admitted with urinary tract infection; allergy to penicillin, though unclear given poor historian, the patient if found to have UTI with gram negative rods  - follow up cultures to identification and sensitivity  - iv hydration and supportive care   - serial cbc and monitor temperature   - will change to ceftriaxone given Proteus in urine  - tolerating antibiotics without rashes or side effects   - have ordered midline and continue ceftriaxone one gram daily until 11/16 with weekly cbc, cmp, esr, crp  - case management informed me the patient is from assisted living and cannot go there with home iv antibiotics and he does not want [facility], he has refused rehab, family is involved    2. other issues: per medicine

## 2021-10-19 NOTE — DISCHARGE NOTE PROVIDER - NSDCCPCAREPLAN_GEN_ALL_CORE_FT
PRINCIPAL DISCHARGE DIAGNOSIS  Diagnosis: Acute UTI  Assessment and Plan of Treatment: You were brought to the hospital due to altered mental status. In the ED, urinalysis was suggestive of urinary tract infection. Given your recent history of urinary tract infections with resistant organisms, you were started on IV Meropenem, ID was consulted who agreed with treatment. Urology was consulted given your recurrent infections, which is not normal, and reported they can not do any procedure with an active infection. Urine culture which was ordered on admission came back positive for Proteus Mirabalis, which is the same organism you had on last admission. Antibiotic was switched to a more specific one.       PRINCIPAL DISCHARGE DIAGNOSIS  Diagnosis: Acute UTI  Assessment and Plan of Treatment: You were brought to the hospital due to altered mental status. In the ED, urinalysis was suggestive of urinary tract infection. Given your recent history of urinary tract infections with resistant organisms, you were started on IV Meropenem, ID was consulted who agreed with treatment. Urology was consulted given your recurrent infections, which is not normal, and reported they can not do any procedure with an active infection. Urine culture which was ordered on admission came back positive for Proteus Mirabalis, which is the same organism you had on last admission. Antibiotic was switched to a more specific one. Due to the recurrence of this infection, you will need long term IV antibiotic treatment for 4 weeks. Your nursing home is not equipped to handled IV infusions, so you will be discharged to rehab facility to receive your treatment.  Reccomendations:  -Continue IV antibiotics at rehab   -Follow up with infectious disease specialist Dr Duque  -After IV antibiotic treatment, follow with urologist Dr Dunbar for cystoscopy   -follow with primary md Dr Morales for long term care         SECONDARY DISCHARGE DIAGNOSES  Diagnosis: BPH with obstruction/lower urinary tract symptoms  Assessment and Plan of Treatment: You suffer from enlarged prostate, which is causing you to retain some urine. Should follow at urologist Dr Dunbar's office after antibiotic treatment. Continue bethanecol and finasteride as prescribed.    Diagnosis: Hydronephrosis, right  Assessment and Plan of Treatment: Renal sonnogram showed backing of urine in your right ureter, possibly due to BPH and urine retention. Pearl keith with Dr Dunbar.

## 2021-10-19 NOTE — PHYSICAL THERAPY INITIAL EVALUATION ADULT - PLANNED THERAPY INTERVENTIONS, PT EVAL
balance training/bed mobility training/gait training/ROM/strengthening/transfer training/wheelchair management/propulsion training

## 2021-10-19 NOTE — PHYSICAL THERAPY INITIAL EVALUATION ADULT - RANGE OF MOTION EXAMINATION, REHAB EVAL
Left Hip ranges limited due to chronic hip arthritis, left hip in Ext rotation and flex/abd position during amb, other joints WFL/deficits as listed below

## 2021-10-19 NOTE — DISCHARGE NOTE PROVIDER - NSDCMRMEDTOKEN_GEN_ALL_CORE_FT
acetaminophen 325 mg oral tablet: 2 tab(s) orally every 6 hours, As Needed - for mild pain  aspirin 81 mg oral delayed release tablet: 1 tab(s) orally once a day  atorvastatin 40 mg oral tablet: 1 tab(s) orally once a day (at bedtime)  cefuroxime 250 mg oral tablet: 1 tab(s) orally every 12 hours  clopidogrel 75 mg oral tablet: 1 tab(s) orally once a day; start on 4/4/21  cyanocobalamin 1000 mcg oral tablet: 1 tab(s) orally once a day  DULoxetine 60 mg oral delayed release capsule: 1 cap(s) orally once a day  famotidine 20 mg oral tablet: 1 tab(s) orally 2 times a day  ferrous sulfate 325 mg (65 mg elemental iron) oral tablet: 1 tab(s) orally once a day  furosemide 40 mg oral tablet: 1 tab(s) orally 2 times a day  gabapentin 300 mg oral capsule: 1 cap(s) orally 2 times a day in the morning and evening   ***9am, 6pm***  gabapentin 600 mg oral tablet: 1 tab(s) orally once a day (at bedtime)  metoprolol succinate 50 mg oral tablet, extended release: 1.5 tab(s) orally once a day  Multiple Vitamins oral tablet: 1 tab(s) orally once a day  pantoprazole 40 mg oral delayed release tablet: 1 tab(s) orally 2 times a day  tamsulosin 0.4 mg oral capsule: 1 cap(s) orally once a day (at bedtime)   acetaminophen 325 mg oral tablet: 2 tab(s) orally every 6 hours, As Needed - for mild pain  aspirin 81 mg oral delayed release tablet: 1 tab(s) orally once a day  atorvastatin 40 mg oral tablet: 1 tab(s) orally once a day (at bedtime)  bethanechol 50 mg oral tablet: 1 tab(s) orally 2 times a day  bethanechol 50 mg oral tablet: 1 tab(s) orally 2 times a day  cefTRIAXone: 1 gram(s) intravenous once a day  cyanocobalamin 1000 mcg oral tablet: 1 tab(s) orally once a day  DULoxetine 60 mg oral delayed release capsule: 1 cap(s) orally once a day  famotidine 20 mg oral tablet: 1 tab(s) orally 2 times a day  ferrous sulfate 325 mg (65 mg elemental iron) oral tablet: 1 tab(s) orally once a day  finasteride 5 mg oral tablet: 1 tab(s) orally once a day  finasteride 5 mg oral tablet: 1 tab(s) orally once a day  furosemide 40 mg oral tablet: 1 tab(s) orally 2 times a day  gabapentin 300 mg oral capsule: 1 cap(s) orally 2 times a day in the morning and evening   ***9am, 6pm***  gabapentin 600 mg oral tablet: 1 tab(s) orally once a day (at bedtime)  metoprolol succinate 50 mg oral tablet, extended release: 1.5 tab(s) orally once a day  Multiple Vitamins oral tablet: 1 tab(s) orally once a day  pantoprazole 40 mg oral delayed release tablet: 1 tab(s) orally 2 times a day

## 2021-10-19 NOTE — PHYSICAL THERAPY INITIAL EVALUATION ADULT - DIAGNOSIS, PT EVAL
Metabolic Encephalopathy in the setting of UTI (hemorrhagic cystitis)/sepsis, chronic anemia, feels better now

## 2021-10-20 ENCOUNTER — TRANSCRIPTION ENCOUNTER (OUTPATIENT)
Age: 86
End: 2021-10-20

## 2021-10-20 VITALS
TEMPERATURE: 97 F | DIASTOLIC BLOOD PRESSURE: 54 MMHG | OXYGEN SATURATION: 97 % | RESPIRATION RATE: 17 BRPM | SYSTOLIC BLOOD PRESSURE: 125 MMHG | HEART RATE: 70 BPM

## 2021-10-20 LAB — SARS-COV-2 RNA SPEC QL NAA+PROBE: SIGNIFICANT CHANGE UP

## 2021-10-20 PROCEDURE — 99239 HOSP IP/OBS DSCHRG MGMT >30: CPT

## 2021-10-20 RX ORDER — FINASTERIDE 5 MG/1
1 TABLET, FILM COATED ORAL
Qty: 0 | Refills: 0 | DISCHARGE
Start: 2021-10-20

## 2021-10-20 RX ORDER — BETHANECHOL CHLORIDE 25 MG
50 TABLET ORAL
Refills: 0 | Status: DISCONTINUED | OUTPATIENT
Start: 2021-10-20 | End: 2021-10-20

## 2021-10-20 RX ORDER — CEFTRIAXONE 500 MG/1
1 INJECTION, POWDER, FOR SOLUTION INTRAMUSCULAR; INTRAVENOUS
Qty: 0 | Refills: 0 | DISCHARGE
Start: 2021-10-20

## 2021-10-20 RX ORDER — FINASTERIDE 5 MG/1
1 TABLET, FILM COATED ORAL
Qty: 30 | Refills: 0
Start: 2021-10-20

## 2021-10-20 RX ORDER — BETHANECHOL CHLORIDE 25 MG
1 TABLET ORAL
Qty: 30 | Refills: 1
Start: 2021-10-20

## 2021-10-20 RX ORDER — BETHANECHOL CHLORIDE 25 MG
1 TABLET ORAL
Qty: 0 | Refills: 0 | DISCHARGE
Start: 2021-10-20

## 2021-10-20 RX ADMIN — HEPARIN SODIUM 5000 UNIT(S): 5000 INJECTION INTRAVENOUS; SUBCUTANEOUS at 08:25

## 2021-10-20 RX ADMIN — Medication 1 TABLET(S): at 08:26

## 2021-10-20 RX ADMIN — POLYETHYLENE GLYCOL 3350 17 GRAM(S): 17 POWDER, FOR SOLUTION ORAL at 08:23

## 2021-10-20 RX ADMIN — Medication 75 MILLIGRAM(S): at 08:23

## 2021-10-20 RX ADMIN — PREGABALIN 1000 MICROGRAM(S): 225 CAPSULE ORAL at 08:23

## 2021-10-20 RX ADMIN — GABAPENTIN 300 MILLIGRAM(S): 400 CAPSULE ORAL at 05:15

## 2021-10-20 RX ADMIN — Medication 40 MILLIGRAM(S): at 08:23

## 2021-10-20 RX ADMIN — DULOXETINE HYDROCHLORIDE 60 MILLIGRAM(S): 30 CAPSULE, DELAYED RELEASE ORAL at 08:23

## 2021-10-20 RX ADMIN — PANTOPRAZOLE SODIUM 40 MILLIGRAM(S): 20 TABLET, DELAYED RELEASE ORAL at 05:15

## 2021-10-20 RX ADMIN — Medication 325 MILLIGRAM(S): at 08:25

## 2021-10-20 RX ADMIN — Medication 81 MILLIGRAM(S): at 08:24

## 2021-10-20 RX ADMIN — Medication 50 MILLIGRAM(S): at 11:16

## 2021-10-20 RX ADMIN — FINASTERIDE 5 MILLIGRAM(S): 5 TABLET, FILM COATED ORAL at 08:25

## 2021-10-20 NOTE — DISCHARGE NOTE NURSING/CASE MANAGEMENT/SOCIAL WORK - NSDCVIVACCINE_GEN_ALL_CORE_FT
Td (adult) preservative free; 16-Feb-2020 18:27; Eder Hunter (ANGELICA); [employer]; [id number] (Exp. Date: 22-Oct-2021); IntraMuscular; Deltoid Left.; 0.5 milliLiter(s); VIS (VIS Published: 24-Feb-2025, VIS Presented: 16-Feb-2020);

## 2021-10-20 NOTE — DISCHARGE NOTE NURSING/CASE MANAGEMENT/SOCIAL WORK - PATIENT PORTAL LINK FT
You can access the FollowMyHealth Patient Portal offered by [facility] by registering at the following website: [email or web address]. By joining [facility]’s FollowMyHealth portal, you will also be able to view your health information using other applications (apps) compatible with our system.

## 2021-10-20 NOTE — PROGRESS NOTE ADULT - PROVIDER SPECIALTY LIST ADULT
Urology
Urology
Family Medicine
Family Medicine
Infectious Disease
Family Medicine
Family Medicine
Infectious Disease

## 2021-10-20 NOTE — PROGRESS NOTE ADULT - SUBJECTIVE AND OBJECTIVE BOX
Patient seen at bedside, pt without any acute complaints. He denies any abd/flank pain, fevers, chills, n/v.     PE  Vital Signs Last 24 Hrs  T(C): 36.6 (20 Oct 2021 08:07), Max: 36.9 (19 Oct 2021 19:51)  T(F): 97.8 (20 Oct 2021 08:07), Max: 98.4 (19 Oct 2021 19:51)  HR: 83 (20 Oct 2021 08:07) (75 - 83)  BP: 147/54 (20 Oct 2021 08:07) (105/47 - 147/54)  BP(mean): --  RR: 17 (20 Oct 2021 08:07) (17 - 18)  SpO2: 93% (20 Oct 2021 08:07) (93% - 100%)            Skin     : No jaundice, No lesions, No swelling  HEENT: Normocephalic, no icterus , EOM full , No epistaxis  Lung    : No resp distress  Abdo:   : Soft, Non tender, No guarding, No distension   Back    : No CVAT b/l  Extremity: No calf tenderness   Genitalia Male: No Flores  Neuro   : A&Ox3    LABS                                   8.7    4.55  )-----------( 229      ( 19 Oct 2021 05:59 )             27.8   10-19    141  |  104  |  27<H>  ----------------------------<  101<H>  3.7   |  34<H>  |  1.57<H>    Ca    8.6      19 Oct 2021 05:59  Phos  2.9     10-19  Mg     2.1     10-19

## 2021-10-20 NOTE — PROGRESS NOTE ADULT - REASON FOR ADMISSION
Progressive AMS

## 2021-10-20 NOTE — PROGRESS NOTE ADULT - ASSESSMENT
89 yo male admitted with progressive confusion.   Pt was recently admitted to [facility] 2 weeks ago for Acute Cystitis. On CT scan was found to have hydronephrosis and bladder wall thickening with plan for outpatient cystoscopy with Dr. Dunbar.  Bladder thickening could be 2/2 BPH from chronic outlet obstruction Vs Cystitis vs underdistended bladder. Hematuria/Clots possibly 2/2 UTI.   Cystoscopy not recommended at this time due to active UTI   Recommend  Start Bethanechol 50 mg BID and continue Flomax and Finasteride  Pt to continue Bethanechol/ Flomax and Finasteride at Rehab  Continue antibiotics, follow cultures, treat per sensitivity.   Follow up with Dr. Dunbar outpatient for urodynamics and cystoscopy      Case discussed with Dr. Dunbar

## 2021-10-20 NOTE — ADVANCED PRACTICE NURSE CONSULT - ASSESSMENT
Pt arousable and alert. Risks and benefits of midline catheter explained, verbal consent obtained. ARROW endurance extended dwell midline catheter, 18g, 8cm lot # [id number] placed in right cephalic vein under ultrasound guidance and sterile precautions. Brisk blood return, flushes well with 20ml normal saline, Okay to use.

## 2021-10-26 DIAGNOSIS — A41.59 OTHER GRAM-NEGATIVE SEPSIS: ICD-10-CM

## 2021-10-26 DIAGNOSIS — M16.0 BILATERAL PRIMARY OSTEOARTHRITIS OF HIP: ICD-10-CM

## 2021-10-26 DIAGNOSIS — Z79.82 LONG TERM (CURRENT) USE OF ASPIRIN: ICD-10-CM

## 2021-10-26 DIAGNOSIS — G92.8 OTHER TOXIC ENCEPHALOPATHY: ICD-10-CM

## 2021-10-26 DIAGNOSIS — N18.30 CHRONIC KIDNEY DISEASE, STAGE 3 UNSPECIFIED: ICD-10-CM

## 2021-10-26 DIAGNOSIS — Z66 DO NOT RESUSCITATE: ICD-10-CM

## 2021-10-26 DIAGNOSIS — N30.91 CYSTITIS, UNSPECIFIED WITH HEMATURIA: ICD-10-CM

## 2021-10-26 DIAGNOSIS — Z95.5 PRESENCE OF CORONARY ANGIOPLASTY IMPLANT AND GRAFT: ICD-10-CM

## 2021-10-26 DIAGNOSIS — I73.9 PERIPHERAL VASCULAR DISEASE, UNSPECIFIED: ICD-10-CM

## 2021-10-26 DIAGNOSIS — I50.30 UNSPECIFIED DIASTOLIC (CONGESTIVE) HEART FAILURE: ICD-10-CM

## 2021-10-26 DIAGNOSIS — R31.0 GROSS HEMATURIA: ICD-10-CM

## 2021-10-26 DIAGNOSIS — G62.9 POLYNEUROPATHY, UNSPECIFIED: ICD-10-CM

## 2021-10-26 DIAGNOSIS — I25.10 ATHEROSCLEROTIC HEART DISEASE OF NATIVE CORONARY ARTERY WITHOUT ANGINA PECTORIS: ICD-10-CM

## 2021-10-26 DIAGNOSIS — I13.0 HYPERTENSIVE HEART AND CHRONIC KIDNEY DISEASE WITH HEART FAILURE AND STAGE 1 THROUGH STAGE 4 CHRONIC KIDNEY DISEASE, OR UNSPECIFIED CHRONIC KIDNEY DISEASE: ICD-10-CM

## 2021-10-26 DIAGNOSIS — R33.8 OTHER RETENTION OF URINE: ICD-10-CM

## 2021-10-26 DIAGNOSIS — Z88.0 ALLERGY STATUS TO PENICILLIN: ICD-10-CM

## 2021-10-26 DIAGNOSIS — K59.00 CONSTIPATION, UNSPECIFIED: ICD-10-CM

## 2021-10-26 DIAGNOSIS — D63.1 ANEMIA IN CHRONIC KIDNEY DISEASE: ICD-10-CM

## 2021-10-26 DIAGNOSIS — N40.1 BENIGN PROSTATIC HYPERPLASIA WITH LOWER URINARY TRACT SYMPTOMS: ICD-10-CM

## 2021-10-26 DIAGNOSIS — B96.4 PROTEUS (MIRABILIS) (MORGANII) AS THE CAUSE OF DISEASES CLASSIFIED ELSEWHERE: ICD-10-CM

## 2021-10-26 DIAGNOSIS — R65.20 SEVERE SEPSIS WITHOUT SEPTIC SHOCK: ICD-10-CM

## 2021-10-26 DIAGNOSIS — E78.5 HYPERLIPIDEMIA, UNSPECIFIED: ICD-10-CM

## 2021-10-26 DIAGNOSIS — I71.4 ABDOMINAL AORTIC ANEURYSM, WITHOUT RUPTURE: ICD-10-CM

## 2021-10-26 DIAGNOSIS — N13.8 OTHER OBSTRUCTIVE AND REFLUX UROPATHY: ICD-10-CM

## 2021-10-26 DIAGNOSIS — I12.9 HYPERTENSIVE CHRONIC KIDNEY DISEASE WITH STAGE 1 THROUGH STAGE 4 CHRONIC KIDNEY DISEASE, OR UNSPECIFIED CHRONIC KIDNEY DISEASE: ICD-10-CM

## 2021-10-26 DIAGNOSIS — N17.9 ACUTE KIDNEY FAILURE, UNSPECIFIED: ICD-10-CM

## 2021-11-10 NOTE — PHYSICAL THERAPY INITIAL EVALUATION ADULT - DISCHARGE DISPOSITION, PT EVAL
: Yes
back to [facility] but may need help getting OOB to , in house PT at [facility].  vs. [facility] TBD on progress.

## 2021-11-23 NOTE — DISCHARGE NOTE PROVIDER - NSDCQMSTROKE_NEU_ALL_CORE
-- DO NOT REPLY / DO NOT REPLY ALL --  -- Message is from the [facility]--    Patient is requesting a medication refill - medication is on active medication list    Patient is currently OUT of the requested medication.    Was Medication Pended?  Yes.    Rx Name and Dose:      meclizine (ANTIVERT) 25 MG tablet    Patient requesting additional refills.    Duration: 30 days    Pharmacy  [id number] [id number] [place] [address] At [facility] [address]    Patient confirmed the above pharmacy as correct?  Yes    Does this request need an existing or new prescription at a pharmacy to be sent to a new pharmacy location?   No    Caller Information       Type Contact Phone    11/23/2021 08:30 AM CST Phone (Incoming) Sue Malin (Self) [phone] (M)          Alternative phone number: n/a    Turnaround time given to caller:   \"This message will be sent to [state Provider's name]. The clinical team will fulfill your request as soon as they review your message.\"   No

## 2021-11-30 ENCOUNTER — APPOINTMENT (OUTPATIENT)
Dept: UROLOGY | Facility: CLINIC | Age: 86
End: 2021-11-30
Payer: MEDICARE

## 2021-11-30 ENCOUNTER — APPOINTMENT (OUTPATIENT)
Dept: UROLOGY | Facility: CLINIC | Age: 86
End: 2021-11-30

## 2021-11-30 VITALS
HEIGHT: 69 IN | HEART RATE: 65 BPM | WEIGHT: 155 LBS | DIASTOLIC BLOOD PRESSURE: 63 MMHG | BODY MASS INDEX: 22.96 KG/M2 | SYSTOLIC BLOOD PRESSURE: 126 MMHG

## 2021-11-30 DIAGNOSIS — N40.1 BENIGN PROSTATIC HYPERPLASIA WITH LOWER URINARY TRACT SYMPMS: ICD-10-CM

## 2021-11-30 DIAGNOSIS — N13.8 BENIGN PROSTATIC HYPERPLASIA WITH LOWER URINARY TRACT SYMPMS: ICD-10-CM

## 2021-11-30 PROCEDURE — 99213 OFFICE O/P EST LOW 20 MIN: CPT

## 2021-11-30 RX ORDER — TAMSULOSIN HYDROCHLORIDE 0.4 MG/1
0.4 CAPSULE ORAL
Qty: 180 | Refills: 3 | Status: ACTIVE | COMMUNITY
Start: 2021-11-30 | End: 1900-01-01

## 2021-11-30 RX ORDER — BETHANECHOL CHLORIDE 50 MG/1
50 TABLET ORAL
Qty: 180 | Refills: 3 | Status: ACTIVE | COMMUNITY
Start: 2021-11-30 | End: 1900-01-01

## 2021-11-30 RX ORDER — FINASTERIDE 5 MG/1
5 TABLET, FILM COATED ORAL
Qty: 90 | Refills: 3 | Status: ACTIVE | COMMUNITY
Start: 2021-11-30 | End: 1900-01-01

## 2021-11-30 RX ORDER — NITROFURANTOIN MACROCRYSTALS 100 MG/1
100 CAPSULE ORAL
Qty: 45 | Refills: 3 | Status: ACTIVE | COMMUNITY
Start: 2021-11-30 | End: 1900-01-01

## 2021-11-30 NOTE — PHYSICAL EXAM
[General Appearance - Well Developed] : well developed [General Appearance - Well Nourished] : well nourished [Normal Appearance] : normal appearance [Well Groomed] : well groomed [General Appearance - In No Acute Distress] : no acute distress [Edema] : no peripheral edema [Respiration, Rhythm And Depth] : normal respiratory rhythm and effort [Exaggerated Use Of Accessory Muscles For Inspiration] : no accessory muscle use [Abdomen Soft] : soft [Abdomen Tenderness] : non-tender [Costovertebral Angle Tenderness] : no ~M costovertebral angle tenderness [Urethral Meatus] : meatus normal [Urinary Bladder Findings] : the bladder was normal on palpation [Scrotum] : the scrotum was normal [Testes Mass (___cm)] : there were no testicular masses [No Prostate Nodules] : no prostate nodules [Normal Station and Gait] : the gait and station were normal for the patient's age [FreeTextEntry1] : Wheelchair-bound [] : no rash [No Focal Deficits] : no focal deficits [Oriented To Time, Place, And Person] : oriented to person, place, and time [Affect] : the affect was normal [Mood] : the mood was normal [Not Anxious] : not anxious [No Palpable Adenopathy] : no palpable adenopathy

## 2021-11-30 NOTE — HISTORY OF PRESENT ILLNESS
[FreeTextEntry1] : This patient presents with his son.  He has recently been hospitalized for urinary sepsis and also spent 28 days in rehabilitation for intravenous antibiotics.  At this point he wears diapers and rarely urinates on his own.  Sonography from his recent hospitalization shows a right hydronephrosis and a chronically thickened bladder consistent with obstruction.  He is on tamsulosin 0.4 mg nightly.

## 2021-11-30 NOTE — ASSESSMENT
[FreeTextEntry1] : Chronic outlet obstruction leading to deterioration of renal function and recurrent infections.

## 2021-11-30 NOTE — DISCHARGE NOTE ADULT - COMPLETE THE FOLLOWING IF THE PATIENT REFUSES THE INFLUENZA VACCINE:
Additional Notes: Patient consent was obtained to proceed with the visit and recommended plan of care after discussion of all risks and benefits, including the risks of COVID-19 exposure.
Detail Level: Simple
Risks/benefits discussed with patient/surrogate/Vaccine Information Sheet (VIS) provided-VIS date: 8/07/15

## 2021-11-30 NOTE — REVIEW OF SYSTEMS
[Feeling Tired] : feeling tired [Eyesight Problems] : eyesight problems [Shortness Of Breath] : shortness of breath [Wheezing] : wheezing [Constipation] : constipation [Urine Infection (bladder/kidney)] : bladder/kidney infection [Joint Pain] : joint pain [Joint Swelling] : joint swelling [Difficulty Walking] : difficulty walking [see HPI] : see HPI [Negative] : Neurological [FreeTextEntry3] : leak urine

## 2021-11-30 NOTE — END OF VISIT
[FreeTextEntry3] : Under the circumstances a slight Flores catheterization was a reasonable option and that it would relieve the obstruction and drain the bladder.  After having been on an intense course of intravenous antibiotics if there are bacterial left these would have mutated to become much more virulent.  The son and patient discussed the matter decided they did not want Flores catheterization at this time.  Thus I will prescribe bethanechol 50 mg twice daily, finasteride 5 mg daily, nitrofurantoin 100 mg p.o. q. other day, and I will increase the tamsulosin 0.8 mg daily

## 2021-12-06 ENCOUNTER — NON-APPOINTMENT (OUTPATIENT)
Age: 86
End: 2021-12-06

## 2021-12-07 RX ORDER — METHENAMINE MANDELATE 1 G
1 TABLET ORAL
Qty: 0 | Refills: 0 | DISCHARGE
Start: 2021-12-07

## 2021-12-08 ENCOUNTER — INPATIENT (INPATIENT)
Facility: HOSPITAL | Age: 86
LOS: 6 days | Discharge: NURSING, REHAB OR LONG-TERM CARE | DRG: 871 | End: 2021-12-15
Attending: HOSPITALIST | Admitting: EMERGENCY MEDICINE
Payer: MEDICARE

## 2021-12-08 VITALS
RESPIRATION RATE: 18 BRPM | HEART RATE: 105 BPM | HEIGHT: 70 IN | DIASTOLIC BLOOD PRESSURE: 81 MMHG | OXYGEN SATURATION: 85 % | SYSTOLIC BLOOD PRESSURE: 176 MMHG

## 2021-12-08 DIAGNOSIS — Z95.5 PRESENCE OF CORONARY ANGIOPLASTY IMPLANT AND GRAFT: Chronic | ICD-10-CM

## 2021-12-08 DIAGNOSIS — A41.9 SEPSIS, UNSPECIFIED ORGANISM: ICD-10-CM

## 2021-12-08 DIAGNOSIS — Z98.89 OTHER SPECIFIED POSTPROCEDURAL STATES: Chronic | ICD-10-CM

## 2021-12-08 DIAGNOSIS — Z98.890 OTHER SPECIFIED POSTPROCEDURAL STATES: Chronic | ICD-10-CM

## 2021-12-08 DIAGNOSIS — Z87.19 PERSONAL HISTORY OF OTHER DISEASES OF THE DIGESTIVE SYSTEM: Chronic | ICD-10-CM

## 2021-12-08 LAB
ALBUMIN SERPL ELPH-MCNC: 3.1 G/DL — LOW (ref 3.3–5)
ALP SERPL-CCNC: 89 U/L — SIGNIFICANT CHANGE UP (ref 40–120)
ALT FLD-CCNC: 14 U/L — SIGNIFICANT CHANGE UP (ref 12–78)
ANION GAP SERPL CALC-SCNC: 4 MMOL/L — LOW (ref 5–17)
APPEARANCE UR: SIGNIFICANT CHANGE UP
APTT BLD: 29.2 SEC — SIGNIFICANT CHANGE UP (ref 27.5–35.5)
AST SERPL-CCNC: 17 U/L — SIGNIFICANT CHANGE UP (ref 15–37)
BASOPHILS # BLD AUTO: 0.01 K/UL — SIGNIFICANT CHANGE UP (ref 0–0.2)
BASOPHILS NFR BLD AUTO: 0.2 % — SIGNIFICANT CHANGE UP (ref 0–2)
BILIRUB SERPL-MCNC: 0.4 MG/DL — SIGNIFICANT CHANGE UP (ref 0.2–1.2)
BILIRUB UR-MCNC: NEGATIVE — SIGNIFICANT CHANGE UP
BUN SERPL-MCNC: 42 MG/DL — HIGH (ref 7–23)
CALCIUM SERPL-MCNC: 8.3 MG/DL — LOW (ref 8.5–10.1)
CHLORIDE SERPL-SCNC: 100 MMOL/L — SIGNIFICANT CHANGE UP (ref 96–108)
CO2 SERPL-SCNC: 30 MMOL/L — SIGNIFICANT CHANGE UP (ref 22–31)
COLOR SPEC: YELLOW — SIGNIFICANT CHANGE UP
CREAT SERPL-MCNC: 2.67 MG/DL — HIGH (ref 0.5–1.3)
DIFF PNL FLD: ABNORMAL
EOSINOPHIL # BLD AUTO: 0.04 K/UL — SIGNIFICANT CHANGE UP (ref 0–0.5)
EOSINOPHIL NFR BLD AUTO: 0.7 % — SIGNIFICANT CHANGE UP (ref 0–6)
GLUCOSE SERPL-MCNC: 127 MG/DL — HIGH (ref 70–99)
GLUCOSE UR QL: NEGATIVE MG/DL — SIGNIFICANT CHANGE UP
HCT VFR BLD CALC: 27.8 % — LOW (ref 39–50)
HGB BLD-MCNC: 8.6 G/DL — LOW (ref 13–17)
IMM GRANULOCYTES NFR BLD AUTO: 0.3 % — SIGNIFICANT CHANGE UP (ref 0–1.5)
INR BLD: 1.03 RATIO — SIGNIFICANT CHANGE UP (ref 0.88–1.16)
KETONES UR-MCNC: NEGATIVE — SIGNIFICANT CHANGE UP
LACTATE SERPL-SCNC: 1.1 MMOL/L — SIGNIFICANT CHANGE UP (ref 0.7–2)
LEUKOCYTE ESTERASE UR-ACNC: ABNORMAL
LYMPHOCYTES # BLD AUTO: 0.55 K/UL — LOW (ref 1–3.3)
LYMPHOCYTES # BLD AUTO: 9.4 % — LOW (ref 13–44)
MAGNESIUM SERPL-MCNC: 2 MG/DL — SIGNIFICANT CHANGE UP (ref 1.6–2.6)
MCHC RBC-ENTMCNC: 28.6 PG — SIGNIFICANT CHANGE UP (ref 27–34)
MCHC RBC-ENTMCNC: 30.9 GM/DL — LOW (ref 32–36)
MCV RBC AUTO: 92.4 FL — SIGNIFICANT CHANGE UP (ref 80–100)
MONOCYTES # BLD AUTO: 0.36 K/UL — SIGNIFICANT CHANGE UP (ref 0–0.9)
MONOCYTES NFR BLD AUTO: 6.2 % — SIGNIFICANT CHANGE UP (ref 2–14)
NEUTROPHILS # BLD AUTO: 4.87 K/UL — SIGNIFICANT CHANGE UP (ref 1.8–7.4)
NEUTROPHILS NFR BLD AUTO: 83.2 % — HIGH (ref 43–77)
NITRITE UR-MCNC: NEGATIVE — SIGNIFICANT CHANGE UP
PH UR: 7 — SIGNIFICANT CHANGE UP (ref 5–8)
PHOSPHATE SERPL-MCNC: 3.5 MG/DL — SIGNIFICANT CHANGE UP (ref 2.5–4.5)
PLATELET # BLD AUTO: 294 K/UL — SIGNIFICANT CHANGE UP (ref 150–400)
POTASSIUM SERPL-MCNC: 4.3 MMOL/L — SIGNIFICANT CHANGE UP (ref 3.5–5.3)
POTASSIUM SERPL-SCNC: 4.3 MMOL/L — SIGNIFICANT CHANGE UP (ref 3.5–5.3)
PROT SERPL-MCNC: 7.6 GM/DL — SIGNIFICANT CHANGE UP (ref 6–8.3)
PROT UR-MCNC: 100 MG/DL
PROTHROM AB SERPL-ACNC: 11.9 SEC — SIGNIFICANT CHANGE UP (ref 10.6–13.6)
RBC # BLD: 3.01 M/UL — LOW (ref 4.2–5.8)
RBC # FLD: 15.5 % — HIGH (ref 10.3–14.5)
SODIUM SERPL-SCNC: 134 MMOL/L — LOW (ref 135–145)
SP GR SPEC: 1 — LOW (ref 1.01–1.02)
TROPONIN I, HIGH SENSITIVITY RESULT: 61.24 NG/L — SIGNIFICANT CHANGE UP
UROBILINOGEN FLD QL: NEGATIVE MG/DL — SIGNIFICANT CHANGE UP
WBC # BLD: 5.85 K/UL — SIGNIFICANT CHANGE UP (ref 3.8–10.5)
WBC # FLD AUTO: 5.85 K/UL — SIGNIFICANT CHANGE UP (ref 3.8–10.5)

## 2021-12-08 PROCEDURE — 36430 TRANSFUSION BLD/BLD COMPNT: CPT

## 2021-12-08 PROCEDURE — 36415 COLL VENOUS BLD VENIPUNCTURE: CPT

## 2021-12-08 PROCEDURE — 85014 HEMATOCRIT: CPT

## 2021-12-08 PROCEDURE — U0005: CPT

## 2021-12-08 PROCEDURE — 86923 COMPATIBILITY TEST ELECTRIC: CPT

## 2021-12-08 PROCEDURE — 99223 1ST HOSP IP/OBS HIGH 75: CPT

## 2021-12-08 PROCEDURE — 87040 BLOOD CULTURE FOR BACTERIA: CPT

## 2021-12-08 PROCEDURE — 97116 GAIT TRAINING THERAPY: CPT | Mod: GP

## 2021-12-08 PROCEDURE — 93010 ELECTROCARDIOGRAM REPORT: CPT

## 2021-12-08 PROCEDURE — 99285 EMERGENCY DEPT VISIT HI MDM: CPT | Mod: CS

## 2021-12-08 PROCEDURE — P9016: CPT

## 2021-12-08 PROCEDURE — 80048 BASIC METABOLIC PNL TOTAL CA: CPT

## 2021-12-08 PROCEDURE — 74176 CT ABD & PELVIS W/O CONTRAST: CPT | Mod: 26

## 2021-12-08 PROCEDURE — 85027 COMPLETE CBC AUTOMATED: CPT

## 2021-12-08 PROCEDURE — 86850 RBC ANTIBODY SCREEN: CPT

## 2021-12-08 PROCEDURE — 74176 CT ABD & PELVIS W/O CONTRAST: CPT | Mod: MA

## 2021-12-08 PROCEDURE — 85018 HEMOGLOBIN: CPT

## 2021-12-08 PROCEDURE — 80053 COMPREHEN METABOLIC PANEL: CPT

## 2021-12-08 PROCEDURE — 86901 BLOOD TYPING SEROLOGIC RH(D): CPT

## 2021-12-08 PROCEDURE — 70450 CT HEAD/BRAIN W/O DYE: CPT | Mod: 26,MA

## 2021-12-08 PROCEDURE — 85025 COMPLETE CBC W/AUTO DIFF WBC: CPT

## 2021-12-08 PROCEDURE — 71045 X-RAY EXAM CHEST 1 VIEW: CPT | Mod: 26

## 2021-12-08 PROCEDURE — 97162 PT EVAL MOD COMPLEX 30 MIN: CPT | Mod: GP

## 2021-12-08 PROCEDURE — 86900 BLOOD TYPING SEROLOGIC ABO: CPT

## 2021-12-08 PROCEDURE — U0003: CPT

## 2021-12-08 RX ORDER — FUROSEMIDE 40 MG
40 TABLET ORAL
Refills: 0 | Status: DISCONTINUED | OUTPATIENT
Start: 2021-12-08 | End: 2021-12-10

## 2021-12-08 RX ORDER — DULOXETINE HYDROCHLORIDE 30 MG/1
60 CAPSULE, DELAYED RELEASE ORAL DAILY
Refills: 0 | Status: DISCONTINUED | OUTPATIENT
Start: 2021-12-08 | End: 2021-12-15

## 2021-12-08 RX ORDER — TAMSULOSIN HYDROCHLORIDE 0.4 MG/1
1 CAPSULE ORAL
Qty: 0 | Refills: 0 | DISCHARGE

## 2021-12-08 RX ORDER — ACETAMINOPHEN 500 MG
1000 TABLET ORAL ONCE
Refills: 0 | Status: COMPLETED | OUTPATIENT
Start: 2021-12-08 | End: 2021-12-08

## 2021-12-08 RX ORDER — TRAMADOL HYDROCHLORIDE 50 MG/1
25 TABLET ORAL
Refills: 0 | Status: DISCONTINUED | OUTPATIENT
Start: 2021-12-08 | End: 2021-12-08

## 2021-12-08 RX ORDER — GABAPENTIN 400 MG/1
300 CAPSULE ORAL
Refills: 0 | Status: DISCONTINUED | OUTPATIENT
Start: 2021-12-08 | End: 2021-12-15

## 2021-12-08 RX ORDER — ACETAMINOPHEN 500 MG
650 TABLET ORAL EVERY 6 HOURS
Refills: 0 | Status: DISCONTINUED | OUTPATIENT
Start: 2021-12-08 | End: 2021-12-15

## 2021-12-08 RX ORDER — LANOLIN ALCOHOL/MO/W.PET/CERES
3 CREAM (GRAM) TOPICAL AT BEDTIME
Refills: 0 | Status: DISCONTINUED | OUTPATIENT
Start: 2021-12-08 | End: 2021-12-15

## 2021-12-08 RX ORDER — PIPERACILLIN AND TAZOBACTAM 4; .5 G/20ML; G/20ML
3.38 INJECTION, POWDER, LYOPHILIZED, FOR SOLUTION INTRAVENOUS EVERY 8 HOURS
Refills: 0 | Status: DISCONTINUED | OUTPATIENT
Start: 2021-12-08 | End: 2021-12-10

## 2021-12-08 RX ORDER — ACETAMINOPHEN 500 MG
2 TABLET ORAL
Qty: 0 | Refills: 0 | DISCHARGE

## 2021-12-08 RX ORDER — VANCOMYCIN HCL 1 G
1250 VIAL (EA) INTRAVENOUS ONCE
Refills: 0 | Status: COMPLETED | OUTPATIENT
Start: 2021-12-08 | End: 2021-12-08

## 2021-12-08 RX ORDER — ATORVASTATIN CALCIUM 80 MG/1
40 TABLET, FILM COATED ORAL AT BEDTIME
Refills: 0 | Status: DISCONTINUED | OUTPATIENT
Start: 2021-12-08 | End: 2021-12-15

## 2021-12-08 RX ORDER — METOPROLOL TARTRATE 50 MG
75 TABLET ORAL DAILY
Refills: 0 | Status: DISCONTINUED | OUTPATIENT
Start: 2021-12-08 | End: 2021-12-13

## 2021-12-08 RX ORDER — SODIUM CHLORIDE 9 MG/ML
2500 INJECTION INTRAMUSCULAR; INTRAVENOUS; SUBCUTANEOUS ONCE
Refills: 0 | Status: COMPLETED | OUTPATIENT
Start: 2021-12-08 | End: 2021-12-08

## 2021-12-08 RX ORDER — TAMSULOSIN HYDROCHLORIDE 0.4 MG/1
0.8 CAPSULE ORAL AT BEDTIME
Refills: 0 | Status: DISCONTINUED | OUTPATIENT
Start: 2021-12-08 | End: 2021-12-15

## 2021-12-08 RX ORDER — ONDANSETRON 8 MG/1
4 TABLET, FILM COATED ORAL EVERY 8 HOURS
Refills: 0 | Status: DISCONTINUED | OUTPATIENT
Start: 2021-12-08 | End: 2021-12-15

## 2021-12-08 RX ORDER — HEPARIN SODIUM 5000 [USP'U]/ML
5000 INJECTION INTRAVENOUS; SUBCUTANEOUS EVERY 12 HOURS
Refills: 0 | Status: DISCONTINUED | OUTPATIENT
Start: 2021-12-08 | End: 2021-12-10

## 2021-12-08 RX ORDER — ASPIRIN/CALCIUM CARB/MAGNESIUM 324 MG
81 TABLET ORAL DAILY
Refills: 0 | Status: DISCONTINUED | OUTPATIENT
Start: 2021-12-08 | End: 2021-12-15

## 2021-12-08 RX ORDER — CEFEPIME 1 G/1
1000 INJECTION, POWDER, FOR SOLUTION INTRAMUSCULAR; INTRAVENOUS ONCE
Refills: 0 | Status: DISCONTINUED | OUTPATIENT
Start: 2021-12-08 | End: 2021-12-08

## 2021-12-08 RX ORDER — VANCOMYCIN HCL 1 G
1250 VIAL (EA) INTRAVENOUS ONCE
Refills: 0 | Status: DISCONTINUED | OUTPATIENT
Start: 2021-12-08 | End: 2021-12-09

## 2021-12-08 RX ORDER — PREGABALIN 225 MG/1
1 CAPSULE ORAL
Qty: 0 | Refills: 0 | DISCHARGE

## 2021-12-08 RX ORDER — GABAPENTIN 400 MG/1
600 CAPSULE ORAL AT BEDTIME
Refills: 0 | Status: DISCONTINUED | OUTPATIENT
Start: 2021-12-08 | End: 2021-12-15

## 2021-12-08 RX ORDER — SODIUM CHLORIDE 9 MG/ML
1000 INJECTION INTRAMUSCULAR; INTRAVENOUS; SUBCUTANEOUS
Refills: 0 | Status: DISCONTINUED | OUTPATIENT
Start: 2021-12-08 | End: 2021-12-10

## 2021-12-08 RX ORDER — FAMOTIDINE 10 MG/ML
1 INJECTION INTRAVENOUS
Qty: 0 | Refills: 0 | DISCHARGE

## 2021-12-08 RX ORDER — CEFEPIME 1 G/1
1000 INJECTION, POWDER, FOR SOLUTION INTRAMUSCULAR; INTRAVENOUS ONCE
Refills: 0 | Status: COMPLETED | OUTPATIENT
Start: 2021-12-08 | End: 2021-12-08

## 2021-12-08 RX ADMIN — Medication 1000 MILLIGRAM(S): at 19:02

## 2021-12-08 RX ADMIN — TAMSULOSIN HYDROCHLORIDE 0.8 MILLIGRAM(S): 0.4 CAPSULE ORAL at 23:20

## 2021-12-08 RX ADMIN — GABAPENTIN 600 MILLIGRAM(S): 400 CAPSULE ORAL at 23:21

## 2021-12-08 RX ADMIN — Medication 40 MILLIGRAM(S): at 23:21

## 2021-12-08 RX ADMIN — HEPARIN SODIUM 5000 UNIT(S): 5000 INJECTION INTRAVENOUS; SUBCUTANEOUS at 23:21

## 2021-12-08 RX ADMIN — GABAPENTIN 300 MILLIGRAM(S): 400 CAPSULE ORAL at 23:21

## 2021-12-08 RX ADMIN — Medication 250 MILLIGRAM(S): at 17:23

## 2021-12-08 RX ADMIN — SODIUM CHLORIDE 100 MILLILITER(S): 9 INJECTION INTRAMUSCULAR; INTRAVENOUS; SUBCUTANEOUS at 23:20

## 2021-12-08 RX ADMIN — Medication 400 MILLIGRAM(S): at 17:23

## 2021-12-08 RX ADMIN — Medication 3 MILLIGRAM(S): at 23:21

## 2021-12-08 RX ADMIN — SODIUM CHLORIDE 2500 MILLILITER(S): 9 INJECTION INTRAMUSCULAR; INTRAVENOUS; SUBCUTANEOUS at 17:24

## 2021-12-08 RX ADMIN — CEFEPIME 1000 MILLIGRAM(S): 1 INJECTION, POWDER, FOR SOLUTION INTRAMUSCULAR; INTRAVENOUS at 17:23

## 2021-12-08 RX ADMIN — ATORVASTATIN CALCIUM 40 MILLIGRAM(S): 80 TABLET, FILM COATED ORAL at 23:21

## 2021-12-08 NOTE — H&P ADULT - GENERAL COMMENTS
lost 11 lbs after cmpleting 4 weeks ABx lost 11 lbs after completing 4 weeks ABx;  also reports that the food is terrible where he is

## 2021-12-08 NOTE — ED ADULT NURSE NOTE - OBJECTIVE STATEMENT
pt. coming in minimally responsive to voice, pt. brought in from [facility] for SOB. pt. has  increased work of breathing and oxygen is 85% on room, air, pt. placed on NRB 15L by EMS PTA, pt. o2 improved to 100%, pt. placed on NC 4L and o2=96%, after IVF infusion pt. mentation improved and pt. A&ox2 speaking in full sentences. Pt. vomited in CT scan and has mild abd. distension but no pain upon palpation.

## 2021-12-08 NOTE — H&P ADULT - RS GEN PE MLT RESP DETAILS PC
breath sounds equal/respirations non-labored/clear to auscultation bilaterally/no chest wall tenderness/no rales/no rhonchi/no wheezes

## 2021-12-08 NOTE — ED PROVIDER NOTE - OBJECTIVE STATEMENT
89 y/o M w/ a PMHx of CAD s/p PCI/Stent, HTN, BPH, Chronic LLE Hip Pain 2/2 OA, HLD, CKD-IV, AAA, PAD and BPH, who presented to  from [email or web address] for AMS. As per EMS pt was talking to staff when he suddenly stopped talking. 89 y/o M w/ a PMHx of CAD s/p PCI/Stent, HTN, BPH, Chronic LLE Hip Pain 2/2 OA, HLD, CKD-IV, AAA, PAD and BPH, who presented to  from [facility] for AMS. As per EMS pt was talking to staff when he suddenly became altered and felt cold and lethargic so pt was sent to ED further evaluation. Upon ED arrival pt was tachycardic, hypoxic to 85$% on room air, and w/ a rectal temp of 103.9.

## 2021-12-08 NOTE — ED ADULT NURSE REASSESSMENT NOTE - NS ED NURSE REASSESS COMMENT FT1
munoz placed per MD orders.  16f, pt tolerated well, small blood clots with dark yellow urine draining.

## 2021-12-08 NOTE — PHARMACOTHERAPY INTERVENTION NOTE - COMMENTS
Medication reconciliation completed.  Reviewed Medication list and confirmed med allergies with list from [facility]; confirmed with Dr. [facility] Medjose francisco.

## 2021-12-08 NOTE — H&P ADULT - ASSESSMENT
89 year old male w CAD s/p PCI/Stent, HTN, BPH, Chronic LLE Hip Pain 2/2 OA, HLD, CKD II-IV, AAA, PAD who presented to  from [facility] by EMS for AMS.   Found to be tachycardic 105 w rectal temp 103.9      #Sepsis  #due to UTI  #AMS likely due to metabolic encephalopathy which has improved after treatment  1. admit to med  2. I/O q 8 hrs  3. follow up cx  4. continue zosyn q 8 hrs  5. continue vanco 1250 mg  6. ID consulted  7. follow up CT abd/pelvis      #BPH w obstruction  pt has been given tamsulosin 0.8 mg QOD and nitrofurantoin QOD  1. tamsulsin 0.8 mg q HS  2. I/O  3. bladder scan q 8 hrs  4. munoz if volume > or = 500 cc      #RASHAUN  Cr 2.67 vs 1.57 on 10-  pt reported anorexia and poor intake for awhile  1. s/p NS 2500 cc in ED  2.  cc/hr      #Anemia  stable  guaiac neg in ED   1. trend      #Cardiovascular  Hx CAD w stent  HTN  HLD  1. asa 81 mg qd  2. metoprolol 75 mg qd w parameters  3. atorvastatin 40 mg      #Chronic pain  1. gabapentin 300 mg BID  2. gabapentin 600 mg q HS  3. acetaminophen prn mild pain  4. tramadol 25 mg BID prn moderate      #Depression   1. cymbalta 60 mg      #VTE  IMPROVE VTE Individual Risk Assessment    RISK                                                                Points    [  ] Previous VTE                                                  3    [  ] Thrombophilia                                               2    [  ] Lower limb paralysis                                      2        (unable to hold up >15 seconds)      [  ] Current Cancer                                              2         (within 6 months)    [  ] Immobilization > 24 hrs                                1    [  ] ICU/CCU stay > 24 hours                              1    [ X ] Age > 60                                                      1    IMPROVE VTE Score ___1______    IMPROVE Score 0-1: Low Risk, No VTE prophylaxis required for most patients, encourage ambulation.   IMPROVE Score 2-3: At risk, pharmacologic VTE prophylaxis is indicated for most patients (in the absence of a contraindication)  IMPROVE Score > or = 4: High Risk, pharmacologic VTE prophylaxis is indicated for most patients (in the absence of a contraindication)      VTE sq heparin 5000 units q 12 hrs 89 year old male w CAD s/p PCI/Stent, HTN, BPH, Chronic LLE Hip Pain 2/2 OA, HLD, CKD II-IV, AAA, PAD who presented to  from [facility] by EMS for AMS.   Found to be tachycardic 105 w rectal temp 103.9      #Sepsis  #due to UTI  #AMS likely due to metabolic encephalopathy which has improved after treatment  1. admit to med  2. I/O q 8 hrs  3. follow up cx  4. continue zosyn q 8 hrs  5. continue vanco 1250 mg  6. ID consulted  7. follow up CT abd/pelvis      #BPH w obstruction  CT w bilateral hydronephrosis  pt has been given tamsulosin 0.8 mg QOD and nitrofurantoin QOD  1. tamsulosin 0.8 mg q HS  2. I/O  3. munoz to gravity    #RASHAUN  Cr 2.67 vs 1.57 on 10-  abd CT scan w bilat hydro and prostate enlargement as mass effect on bladder  1. s/p NS 2500 cc in ED  2.  cc/hr      #Anemia  stable  guaiac neg in ED   1. trend      #Cardiovascular  Hx CAD w stent  HTN  HLD  1. asa 81 mg qd  2. metoprolol 75 mg qd w parameters  3. atorvastatin 40 mg  4. lasix 40 mg BID w parameters      #Chronic pain  1. gabapentin 300 mg BID  2. gabapentin 600 mg q HS  3. acetaminophen prn mild pain  4. tramadol 25 mg BID prn moderate      #Depression   1. cymbalta 60 mg      #VTE  IMPROVE VTE Individual Risk Assessment    RISK                                                                Points    [  ] Previous VTE                                                  3    [  ] Thrombophilia                                               2    [  ] Lower limb paralysis                                      2        (unable to hold up >15 seconds)      [  ] Current Cancer                                              2         (within 6 months)    [  ] Immobilization > 24 hrs                                1    [  ] ICU/CCU stay > 24 hours                              1    [ X ] Age > 60                                                      1    IMPROVE VTE Score ___1______    IMPROVE Score 0-1: Low Risk, No VTE prophylaxis required for most patients, encourage ambulation.   IMPROVE Score 2-3: At risk, pharmacologic VTE prophylaxis is indicated for most patients (in the absence of a contraindication)  IMPROVE Score > or = 4: High Risk, pharmacologic VTE prophylaxis is indicated for most patients (in the absence of a contraindication)      VTE sq heparin 5000 units q 12 hrs 89 year old male w CAD s/p PCI/Stent, HTN, BPH, Chronic LLE Hip Pain 2/2 OA, HLD, CKD II-IV, AAA, PAD who presented to  from [facility] by EMS for AMS.   Found to be tachycardic 105 w rectal temp 103.9      #Sepsis  #due to UTI  #AMS likely due to metabolic encephalopathy which has improved after treatment  1. admit to med  2. I/O q 8 hrs  3. follow up cx  4. continue zosyn q 8 hrs  5. continue vanco 1250 mg  6. ID consulted  7. follow up CT abd/pelvis      #BPH w obstruction  CT w bilateral hydronephrosis  pt has been given tamsulosin 0.8 mg QOD and nitrofurantoin QOD  1. tamsulosin 0.8 mg q HS  2. I/O  3. munoz to gravity    #RASHAUN  Cr 2.67 vs 1.57 on 10-  abd CT scan w bilat hydro and prostate enlargement as mass effect on bladder  1. s/p NS 2500 cc in ED  2.  cc/hr    #AAA  s/p repair w stablel size 5.5      #Anemia  stable  guaiac neg in ED   1. trend      #Cardiovascular  Hx CAD w stent  HTN  HLD  HFpEF  1. asa 81 mg qd  2. metoprolol 75 mg qd w parameters  3. atorvastatin 40 mg  4. lasix 40 mg BID w parameters  5. EKG w 1st degree block LAD w IVCD  no sig change when I compared to EKG 10-      #Chronic pain  1. gabapentin 300 mg BID  2. gabapentin 600 mg q HS  3. acetaminophen prn mild pain  4. tramadol 25 mg BID prn moderate      #Depression   1. cymbalta 60 mg      #VTE  IMPROVE VTE Individual Risk Assessment    RISK                                                                Points    [  ] Previous VTE                                                  3    [  ] Thrombophilia                                               2    [  ] Lower limb paralysis                                      2        (unable to hold up >15 seconds)      [  ] Current Cancer                                              2         (within 6 months)    [  ] Immobilization > 24 hrs                                1    [  ] ICU/CCU stay > 24 hours                              1    [ X ] Age > 60                                                      1    IMPROVE VTE Score ___1______    IMPROVE Score 0-1: Low Risk, No VTE prophylaxis required for most patients, encourage ambulation.   IMPROVE Score 2-3: At risk, pharmacologic VTE prophylaxis is indicated for most patients (in the absence of a contraindication)  IMPROVE Score > or = 4: High Risk, pharmacologic VTE prophylaxis is indicated for most patients (in the absence of a contraindication)      VTE sq heparin 5000 units q 12 hrs

## 2021-12-08 NOTE — H&P ADULT - NSHPLABSRESULTS_GEN_ALL_CORE
ACC: [id number] EXAM:  CT ABDOMEN AND PELVIS                          PROCEDURE DATE:  12/08/2021          INTERPRETATION:  CLINICAL INFORMATION: Fever, urinary tract infection,   evaluate for hydronephrosis    COMPARISON: Renal ultrasound 10/16/2021. CT chest abdomen pelvis 9/3/2021    CONTRAST/COMPLICATIONS:  IV Contrast: NONE  Oral Contrast: NONE  Complications: None reported at time of study completion    PROCEDURE:  CT of the Abdomen and Pelvis was performed.  Sagittal and coronal reformats were performed.    FINDINGS:  LOWER CHEST: Patient limited evaluation. Trace left pleural effusion.   Cardiomegaly. Coronary artery calcifications. Moderate-sized hiatal   hernia.    LIVER: Within normal limits.  BILE DUCTS: Normal caliber.  GALLBLADDER: Within normal limits.  SPLEEN: Within normal limits.  PANCREAS: Within normal limits.  ADRENALS: Within normal limits.  KIDNEYS/URETERS: Moderate right hydronephrosis and hydroureter and mild   left hydronephrosis and hydroureter to the level of the urinary bladder.    BLADDER: Diffuse bladder wall thickening which is indeterminant.  REPRODUCTIVE ORGANS: Enlarged prostate with mass effect on the urinary   bladder    BOWEL: Moderate sized hiatal hernia. No bowel obstruction. Appendix is normal. Diverticulosis.  PERITONEUM: No ascites.  VESSELS: Status post aortic biiliac aneurysm repair. Abdominal aorta   aneurysmal sac measuring 5.5 cm, unchanged.  RETROPERITONEUM/LYMPH NODES: No lymphadenopathy.  ABDOMINAL WALL: Bilateral fat-containing inguinal hernias. Postsurgical   changes in the right inguinal region.  BONES: Degenerative changes.    IMPRESSION:    Moderate right hydronephrosis and hydroureter and mild left   hydronephrosis and hydroureter to the level of the urinary bladder.    Diffuse bladder wall thickening which is indeterminant. Inserted direct   visualization.    Enlarged prostate gland with mass effect on the urinary bladder.    --- End of Report ---    MASSIMO GARCIA MD; Attending Radiologist  This document has been electronically signed. Dec  8 2021  7:47PM

## 2021-12-08 NOTE — ED ADULT NURSE NOTE - NSIMPLEMENTINTERV_GEN_ALL_ED
Implemented All Fall Risk Interventions:  [place] to call system. Call bell, personal items and telephone within reach. Instruct patient to call for assistance. Room bathroom lighting operational. Non-slip footwear when patient is off stretcher. Physically safe environment: no spills, clutter or unnecessary equipment. Stretcher in lowest position, wheels locked, appropriate side rails in place. Provide visual cue, wrist band, yellow gown, etc. Monitor gait and stability. Monitor for mental status changes and reorient to person, place, and time. Review medications for side effects contributing to fall risk. Reinforce activity limits and safety measures with patient and family.

## 2021-12-08 NOTE — ED PROVIDER NOTE - CLINICAL SUMMARY MEDICAL DECISION MAKING FREE TEXT BOX
Pt febrile on arrival.  O2 sat initially reading low, but 2/2 decreased perfusion of fingers, normal currently to 96%.  CXR clear.  Swabs pending.  UA positive for UTI.  RASHAUN on CKD.  CT head negative.  Apparently had episode of vomiting in CT.  Will obtain CT abdomen to ensure no other acute cause of infection.  Admit to Dr. Rendon medicine team.

## 2021-12-08 NOTE — H&P ADULT - NSHPOUTPATIENTPROVIDERS_GEN_ALL_CORE
Dr. Perez [phone]  ID Dr. Duque [phone]   Dr. Ankit Dunbar [phone] Dr. Morales [phone]  ID Dr. Duque [phone]   Dr. Ankit Dunbar [phone]

## 2021-12-08 NOTE — ED PROVIDER NOTE - PROGRESS NOTE DETAILS
Juan MAXWELL for ED attending, Dr. Wright: Stool guaiac performed by Dr. Wright. Lot #: [id number]; Expiration: 8/31/2022; Result: ; guaiac negative.

## 2021-12-08 NOTE — ED ADULT NURSE NOTE - NSICDXPASTSURGICALHX_GEN_ALL_CORE_FT
No fungal growth currently  Ok to send letter PAST SURGICAL HISTORY:  H/O inguinal hernia repair    History of colonoscopy     History of coronary artery stent placement     S/P hemorrhoidectomy

## 2021-12-08 NOTE — H&P ADULT - HISTORY OF PRESENT ILLNESS
89 year old male w CAD s/p PCI/Stent, HTN, BPH, Chronic LLE Hip Pain 2/2 OA, HLD, CKD II-IV, AAA, PAD who presented to  from [facility] by EMS for AMS.     According to [facility] notes he was last known well 15 minutes prior to onset of lethargy and AMS    As per EMS (to ED)  pt was talking to staff when he suddenly became altered and felt cold and lethargic so pt was sent to ED further evaluation.    He was last admitted to   for UTI due to proteus mirabilis complicated by bacteremia and R hydronephrosis  Treated w meropenem then ceftriaxone x4 weeks     11- was seen by  where munoz was recommended     In ED /81    RR 18   T 103.9 rectal  sat 85% RA; repeat 96% on 4 L nc  vomited in CT w abd distension and no pain on palpation·     acetaminophen 1000 mg IV  NS 2500 cc as 31 cc/kg  cefepime 1000 mg  vanco 1250 mg  head CT no acute changes, CXR no PNA  stool guiaic neg  CT abd/pelvis ordered    PAST MEDICAL HX  AAA (abdominal aortic aneurysm) without rupture  Anemia   BPH (benign prostatic hyperplasia)   CAD (coronary artery disease)   Cellulitis BL  CKD (chronic kidney disease) stage 3, GFR 30-59 ml/min   Colon polyp   HLD (hyperlipidemia)   HTN (hypertension)   Leg swelling related to cellulitis of LLE  OA (osteoarthritis)   PAD (peripheral artery disease)   Stented coronary artery.     PAST SURGICAL HX:  H/O inguinal hernia repair  History of colonoscopy   History of coronary artery stent placement   S/P hemorrhoidectomy.     FAMILY HX  Family history unobtainable, d/t dementia.      SOCIAL HX  [facility]  nonsmoker       89 year old male w CAD s/p PCI/Stent, HTN, BPH, Chronic LLE Hip Pain 2/2 OA, HLD, CKD II-IV, AAA, PAD who presented to  from [facility] by EMS for AMS.     According to [facility] notes he was last known well 15 minutes prior to onset of lethargy and AMS    As per EMS (to ED)  pt was talking to staff when he suddenly became altered and felt cold and lethargic so pt was sent to ED further evaluation.    He was last admitted to   for UTI due to proteus mirabilis complicated by bacteremia and R hydronephrosis  Treated w meropenem then ceftriaxone x4 weeks     11- was seen by  where munoz was recommended     In ED /81    RR 18   T 103.9 rectal  sat 85% RA; repeat 96% on 4 L nc  vomited in CT w abd distension and no pain on palpation·     acetaminophen 1000 mg IV  NS 2500 cc as 31 cc/kg  cefepime 1000 mg  vanco 1250 mg  head CT no acute changes, CXR no PNA  stool guiaic neg  CT abd/pelvis ordered    PAST MEDICAL HX  AAA (abdominal aortic aneurysm) without rupture  Anemia   BPH (benign prostatic hyperplasia)   CAD (coronary artery disease)   Cellulitis BL  CKD (chronic kidney disease) stage 3, GFR 30-59 ml/min   Colon polyp   HLD (hyperlipidemia)   HTN (hypertension)   Leg swelling related to cellulitis of LLE  OA (osteoarthritis)   PAD (peripheral artery disease)   Stented coronary artery.     PAST SURGICAL HX:  H/O inguinal hernia repair  History of colonoscopy   History of coronary artery stent placement   S/P hemorrhoidectomy.     FAMILY HX  Family history unobtainable, d/t dementia.      SOCIAL HX  [facility]  nonsmoker

## 2021-12-08 NOTE — H&P ADULT - NSHPPHYSICALEXAM_GEN_ALL_CORE
Vital Signs Last 24 Hrs  T(C): 39.9 (08 Dec 2021 16:30), Max: 39.9 (08 Dec 2021 16:30)  T(F): 103.9 (08 Dec 2021 16:30), Max: 103.9 (08 Dec 2021 16:30)  HR: 105 (08 Dec 2021 16:13) (105 - 105)  BP: 176/81 (08 Dec 2021 16:13) (176/81 - 176/81)  BP(mean): --  RR: 18 (08 Dec 2021 16:13) (18 - 18)  SpO2: 96% (08 Dec 2021 17:56) (85% - 96%)

## 2021-12-08 NOTE — ED ADULT TRIAGE NOTE - CHIEF COMPLAINT QUOTE
pt presents to ED from [facility] with complaints of sudden onset AMS. pt was speaking to staff when he suddenly became AMS. EMS was called at that time. LKW 15 mins prior to EMS arrival. pt's 85% on RA. pt placed in toom on NRB.  unable to obtain temp in triage. RN Daniel aware.  pt responsive in triage to MD Wright. no CODE STROKE per MD Wright.

## 2021-12-08 NOTE — ED PROVIDER NOTE - CPE EDP NEURO NORM
RX PROGRESS NOTE: Vancomycin Therapeutic Drug Monitoring      Indication for therapy: Non-necrotizing SSTI    ALLERGIES:  No Known Allergies    Most recent height and weight information:  Weight: (!) 149.7 kg (03/18/21 0500)  Height: 5' 9.02\" (175.3 cm) (03/18/21 0500)    The Following are the calculated  Current Weights for Judy Dorsey            Adjusted Ideal    99.6 kg 66.2 kg             Labs:  Serum Creatinine and Creatinine Clearance:  Serum creatinine: 0.55 mg/dL 03/18/21 0804  Estimated creatinine clearance: 120 mL/min    Maximum Temperature (last 24 hours)     Value Max    Temp  99 °F (37.2 °C)        WBC (K/mcL)   Date/Time Value   03/18/2021 0804 10.5   03/17/2021 2018 11.8 (H)     Microbiology Results     None            Assessment/Plan:  Briefly, this is a 33 year old female started on vancomycin for {  Initial dosing regimen will be 1500 mg every 12 hours (maintenance dose).    Pharmacy will monitor levels as appropriate.    Pharmacy will continue to monitor patient (renal function, microbiology data, risk factors for adverse events, appropriate duration of therapy), will order/monitor serum levels as appropriate, and will adjust dose if/when necessary.      Thank you,    Oracio Addison, PHARMD  3/18/2021 8:39 AM     normal...

## 2021-12-09 DIAGNOSIS — R31.9 HEMATURIA, UNSPECIFIED: ICD-10-CM

## 2021-12-09 DIAGNOSIS — R33.9 RETENTION OF URINE, UNSPECIFIED: ICD-10-CM

## 2021-12-09 LAB
-  ESBL: SIGNIFICANT CHANGE UP
ANION GAP SERPL CALC-SCNC: 5 MMOL/L — SIGNIFICANT CHANGE UP (ref 5–17)
BASOPHILS # BLD AUTO: 0.02 K/UL — SIGNIFICANT CHANGE UP (ref 0–0.2)
BASOPHILS NFR BLD AUTO: 0.4 % — SIGNIFICANT CHANGE UP (ref 0–2)
BUN SERPL-MCNC: 36 MG/DL — HIGH (ref 7–23)
CALCIUM SERPL-MCNC: 8.3 MG/DL — LOW (ref 8.5–10.1)
CHLORIDE SERPL-SCNC: 108 MMOL/L — SIGNIFICANT CHANGE UP (ref 96–108)
CO2 SERPL-SCNC: 27 MMOL/L — SIGNIFICANT CHANGE UP (ref 22–31)
CREAT SERPL-MCNC: 2.19 MG/DL — HIGH (ref 0.5–1.3)
EOSINOPHIL # BLD AUTO: 0.09 K/UL — SIGNIFICANT CHANGE UP (ref 0–0.5)
EOSINOPHIL NFR BLD AUTO: 1.9 % — SIGNIFICANT CHANGE UP (ref 0–6)
GLUCOSE SERPL-MCNC: 123 MG/DL — HIGH (ref 70–99)
GRAM STN FLD: SIGNIFICANT CHANGE UP
GRAM STN FLD: SIGNIFICANT CHANGE UP
HCT VFR BLD CALC: 25.5 % — LOW (ref 39–50)
HGB BLD-MCNC: 8 G/DL — LOW (ref 13–17)
IMM GRANULOCYTES NFR BLD AUTO: 0.4 % — SIGNIFICANT CHANGE UP (ref 0–1.5)
LYMPHOCYTES # BLD AUTO: 0.54 K/UL — LOW (ref 1–3.3)
LYMPHOCYTES # BLD AUTO: 11.6 % — LOW (ref 13–44)
M. MORGANII DNA BLD POS QL NAA+PROBE: SIGNIFICANT CHANGE UP
MCHC RBC-ENTMCNC: 29.3 PG — SIGNIFICANT CHANGE UP (ref 27–34)
MCHC RBC-ENTMCNC: 31.4 GM/DL — LOW (ref 32–36)
MCV RBC AUTO: 93.4 FL — SIGNIFICANT CHANGE UP (ref 80–100)
METHOD TYPE: SIGNIFICANT CHANGE UP
MONOCYTES # BLD AUTO: 0.54 K/UL — SIGNIFICANT CHANGE UP (ref 0–0.9)
MONOCYTES NFR BLD AUTO: 11.6 % — SIGNIFICANT CHANGE UP (ref 2–14)
NEUTROPHILS # BLD AUTO: 3.44 K/UL — SIGNIFICANT CHANGE UP (ref 1.8–7.4)
NEUTROPHILS NFR BLD AUTO: 74.1 % — SIGNIFICANT CHANGE UP (ref 43–77)
PLATELET # BLD AUTO: 232 K/UL — SIGNIFICANT CHANGE UP (ref 150–400)
POTASSIUM SERPL-MCNC: 3.7 MMOL/L — SIGNIFICANT CHANGE UP (ref 3.5–5.3)
POTASSIUM SERPL-SCNC: 3.7 MMOL/L — SIGNIFICANT CHANGE UP (ref 3.5–5.3)
RBC # BLD: 2.73 M/UL — LOW (ref 4.2–5.8)
RBC # FLD: 15.5 % — HIGH (ref 10.3–14.5)
SODIUM SERPL-SCNC: 140 MMOL/L — SIGNIFICANT CHANGE UP (ref 135–145)
SPECIMEN SOURCE: SIGNIFICANT CHANGE UP
SPECIMEN SOURCE: SIGNIFICANT CHANGE UP
WBC # BLD: 4.65 K/UL — SIGNIFICANT CHANGE UP (ref 3.8–10.5)
WBC # FLD AUTO: 4.65 K/UL — SIGNIFICANT CHANGE UP (ref 3.8–10.5)

## 2021-12-09 PROCEDURE — 99232 SBSQ HOSP IP/OBS MODERATE 35: CPT

## 2021-12-09 PROCEDURE — 99233 SBSQ HOSP IP/OBS HIGH 50: CPT

## 2021-12-09 RX ORDER — VANCOMYCIN HCL 1 G
1250 VIAL (EA) INTRAVENOUS ONCE
Refills: 0 | Status: COMPLETED | OUTPATIENT
Start: 2021-12-09 | End: 2021-12-09

## 2021-12-09 RX ADMIN — PIPERACILLIN AND TAZOBACTAM 25 GRAM(S): 4; .5 INJECTION, POWDER, LYOPHILIZED, FOR SOLUTION INTRAVENOUS at 21:54

## 2021-12-09 RX ADMIN — PIPERACILLIN AND TAZOBACTAM 25 GRAM(S): 4; .5 INJECTION, POWDER, LYOPHILIZED, FOR SOLUTION INTRAVENOUS at 00:12

## 2021-12-09 RX ADMIN — HEPARIN SODIUM 5000 UNIT(S): 5000 INJECTION INTRAVENOUS; SUBCUTANEOUS at 09:22

## 2021-12-09 RX ADMIN — Medication 1 TABLET(S): at 09:22

## 2021-12-09 RX ADMIN — GABAPENTIN 600 MILLIGRAM(S): 400 CAPSULE ORAL at 21:53

## 2021-12-09 RX ADMIN — SODIUM CHLORIDE 100 MILLILITER(S): 9 INJECTION INTRAMUSCULAR; INTRAVENOUS; SUBCUTANEOUS at 14:11

## 2021-12-09 RX ADMIN — Medication 81 MILLIGRAM(S): at 09:22

## 2021-12-09 RX ADMIN — Medication 75 MILLIGRAM(S): at 09:25

## 2021-12-09 RX ADMIN — GABAPENTIN 300 MILLIGRAM(S): 400 CAPSULE ORAL at 18:09

## 2021-12-09 RX ADMIN — GABAPENTIN 300 MILLIGRAM(S): 400 CAPSULE ORAL at 09:28

## 2021-12-09 RX ADMIN — ATORVASTATIN CALCIUM 40 MILLIGRAM(S): 80 TABLET, FILM COATED ORAL at 21:53

## 2021-12-09 RX ADMIN — HEPARIN SODIUM 5000 UNIT(S): 5000 INJECTION INTRAVENOUS; SUBCUTANEOUS at 21:53

## 2021-12-09 RX ADMIN — Medication 166.67 MILLIGRAM(S): at 06:04

## 2021-12-09 RX ADMIN — PIPERACILLIN AND TAZOBACTAM 25 GRAM(S): 4; .5 INJECTION, POWDER, LYOPHILIZED, FOR SOLUTION INTRAVENOUS at 15:28

## 2021-12-09 RX ADMIN — Medication 40 MILLIGRAM(S): at 09:24

## 2021-12-09 RX ADMIN — TAMSULOSIN HYDROCHLORIDE 0.8 MILLIGRAM(S): 0.4 CAPSULE ORAL at 21:53

## 2021-12-09 RX ADMIN — Medication 40 MILLIGRAM(S): at 18:09

## 2021-12-09 RX ADMIN — Medication 3 MILLIGRAM(S): at 21:53

## 2021-12-09 RX ADMIN — PIPERACILLIN AND TAZOBACTAM 25 GRAM(S): 4; .5 INJECTION, POWDER, LYOPHILIZED, FOR SOLUTION INTRAVENOUS at 09:22

## 2021-12-09 RX ADMIN — Medication 650 MILLIGRAM(S): at 09:48

## 2021-12-09 RX ADMIN — DULOXETINE HYDROCHLORIDE 60 MILLIGRAM(S): 30 CAPSULE, DELAYED RELEASE ORAL at 09:25

## 2021-12-09 RX ADMIN — SODIUM CHLORIDE 100 MILLILITER(S): 9 INJECTION INTRAMUSCULAR; INTRAVENOUS; SUBCUTANEOUS at 22:05

## 2021-12-09 NOTE — PROVIDER CONTACT NOTE (CRITICAL VALUE NOTIFICATION) - SITUATION
As per Lab- blood cx collected on 12/8 at 1630 resulted w/growth in anaerobic bottle/ Gram variable rods. Also blood cx collected on 12/8 at 1638 resulted w/growth in both anaerobic and aerobic bottles/ gram variable rods.

## 2021-12-09 NOTE — PROGRESS NOTE ADULT - SUBJECTIVE AND OBJECTIVE BOX
CHIEF COMPLAINT:Same previous    HISTORY OF PRESENT ILLNESS:Same previous    PAST MEDICAL & SURGICAL HISTORY:  Leg swelling  related to cellulitis of LLE    CKD (chronic kidney disease) stage 3, GFR 30-59 ml/min    Cellulitis  BL    BPH (benign prostatic hyperplasia)    Colon polyp    Stented coronary artery    HTN (hypertension)    HLD (hyperlipidemia)    CAD (coronary artery disease)    OA (osteoarthritis)    PAD (peripheral artery disease)    AAA (abdominal aortic aneurysm) without rupture    S/P hemorrhoidectomy    H/O inguinal hernia  repair    History of colonoscopy    History of coronary artery stent placement        REVIEW OF SYSTEMS:Same previous  MEDICATIONS  (STANDING):  aspirin enteric coated 81 milliGRAM(s) Oral daily  atorvastatin 40 milliGRAM(s) Oral at bedtime  DULoxetine 60 milliGRAM(s) Oral daily  furosemide    Tablet 40 milliGRAM(s) Oral two times a day  gabapentin 300 milliGRAM(s) Oral two times a day  gabapentin 600 milliGRAM(s) Oral at bedtime  heparin   Injectable 5000 Unit(s) SubCutaneous every 12 hours  metoprolol succinate ER 75 milliGRAM(s) Oral daily  multivitamin 1 Tablet(s) Oral daily  piperacillin/tazobactam IVPB.. 3.375 Gram(s) IV Intermittent every 8 hours  sodium chloride 0.9%. 1000 milliLiter(s) (100 mL/Hr) IV Continuous <Continuous>  tamsulosin 0.8 milliGRAM(s) Oral at bedtime    MEDICATIONS  (PRN):  acetaminophen     Tablet .. 650 milliGRAM(s) Oral every 6 hours PRN Temp greater or equal to 38C (100.4F), Mild Pain (1 - 3)  melatonin 3 milliGRAM(s) Oral at bedtime PRN Insomnia  ondansetron Injectable 4 milliGRAM(s) IV Push every 8 hours PRN Nausea and/or Vomiting  traMADol 25 milliGRAM(s) Oral two times a day PRN Moderate Pain (4 - 6)      PE:Same Previous    Allergies      Intolerances        SOCIAL HISTORY:Same previous    FAMILY HISTORY:  Family history unobtainable  d/t dementia        Vital Signs Last 24 Hrs  T(C): 36.2 (09 Dec 2021 16:18), Max: 37.2 (08 Dec 2021 20:00)  T(F): 97.2 (09 Dec 2021 16:18), Max: 98.9 (08 Dec 2021 20:00)  HR: 64 (09 Dec 2021 16:18) (64 - 82)  BP: 104/43 (09 Dec 2021 16:18) (104/43 - 113/67)  BP(mean): 68 (08 Dec 2021 20:00) (68 - 68)  RR: 17 (09 Dec 2021 16:18) (16 - 18)  SpO2: 93% (09 Dec 2021 16:18) (93% - 100%)    PHYSICAL EXAM:Same previous    LABS:                        8.0    4.65  )-----------( 232      ( 09 Dec 2021 07:10 )             25.5     12-09    140  |  108  |  36<H>  ----------------------------<  123<H>  3.7   |  27  |  2.19<H>    Ca    8.3<L>      09 Dec 2021 07:10  Phos  3.5     12-08  Mg     2.0     12-08    TPro  7.6  /  Alb  3.1<L>  /  TBili  0.4  /  DBili  x   /  AST  17  /  ALT  14  /  AlkPhos  89  12-08    PT/INR - ( 08 Dec 2021 16:38 )   PT: 11.9 sec;   INR: 1.03 ratio         PTT - ( 08 Dec 2021 16:38 )  PTT:29.2 sec  Urinalysis Basic - ( 08 Dec 2021 16:42 )    Color: Yellow / Appearance: cloudy / S.005 / pH: x  Gluc: x / Ketone: Negative  / Bili: Negative / Urobili: Negative mg/dL   Blood: x / Protein: 100 mg/dL / Nitrite: Negative   Leuk Esterase: Moderate / RBC: 6-10 /HPF / WBC >50   Sq Epi: x / Non Sq Epi: Occasional / Bacteria: Moderate      Urine Culture:     RADIOLOGY & ADDITIONAL STUDIES:

## 2021-12-09 NOTE — CONSULT NOTE ADULT - ASSESSMENT
89 y/o male with h/o CAD s/p PCI/Stent, HTN, BPH, urinary retention with munoz in place, chronic LLE Hip Pain 2/2 OA, HLD, CKD II-IV, AAA, PAD was admitted on 12/8 from [facility] by EMS for worsening confusion. According to [facility] notes he was last known well 15 minutes prior to onset of lethargy and AMS. As per EMS (to ED)  pt was talking to staff when he suddenly became altered and felt cold and lethargic so pt was sent to ED further evaluation. In ER he was noted febrile and received zosyn.    1. Febrile syndrome. Possible sepsis. Pyuria. Probable UTI. BPH, Urinary retention with munoz. ARF on CRF stage 3. Encephalopathy. Allergy to PCN.  -obtain BC x 2, urine c/s  -the pateint has been on zosyn despite his PCN allergy history; no reactions/ side effects  -agree with zosyn 3.375 gm IV q8h   -reason for abx use and side effects reviewed with patient; monitor BMP   -old chart reviewed to assess prior cultures  -monitor temps  -f/u CBC  -supportive care  2. Other issues:   -care per medicine   89 y/o male with h/o CAD s/p PCI/Stent, HTN, BPH, urinary retention with munoz in place, chronic LLE Hip Pain 2/2 OA, HLD, CKD II-IV, AAA, PAD was admitted on 12/8 from [facility] by EMS for worsening confusion. According to [facility] notes he was last known well 15 minutes prior to onset of lethargy and AMS. As per EMS (to ED)  pt was talking to staff when he suddenly became altered and felt cold and lethargic so pt was sent to ED further evaluation. In ER he was noted febrile and received zosyn.    1. Febrile syndrome. Possible sepsis. Pyuria. Probable UTI. BPH, Urinary retention with munoz. ARF on CRF stage 3. Encephalopathy. Allergy to PCN.  -obtain BC x 2, urine c/s  -the patient has been on zosyn despite his PCN allergy history; no reactions/ side effects  -doubt that patient is allergic to zosyn; will monitor  -agree with zosyn 3.375 gm IV q8h   -reason for abx use and side effects reviewed with patient; monitor BMP   -monitor closely in [place] of PCN allergy history  -old chart reviewed to assess prior cultures  -monitor temps  -f/u CBC  -supportive care  2. Other issues:   -care per medicine

## 2021-12-09 NOTE — PROGRESS NOTE ADULT - ASSESSMENT
89 year old male w CAD s/p PCI/Stent, HTN, BPH, Chronic LLE Hip Pain 2/2 OA, HLD, CKD II-IV, AAA, PAD who presented to  from [facility] by EMS for AMS.   Found to be tachycardic 105 w rectal temp 103.9      #Sepsis  #due to UTI/Hemorrhagic cystitis?  #AMS likely due to metabolic encephalopathy which has improved after treatment  -cont with zosyn  -f/u culture  data  -appreciated ID/ reccs  -cont munoz for now  -CT c/w b/l hydro and enlarge prostate  - recc munoz, proscar/flomax    #BPH w obstruction/Obstructive uropathy  CT w bilateral hydronephrosis  -munoz  -proscar  -flomax  - followup    #RASHAUN 2/2 to above  Cr 2.67 vs 1.57 on 10-  abd CT scan w bilat hydro and prostate enlargement as mass effect on bladder  -Scr 2.19  -cont ivf and monitor Scr  -monitor UOP    #AAA  s/p repair w stable size 5.5      #Anemia/chronic  stable  guaiac neg in ED   -Hgb 8 today  -mild hematuria , no clots noted  -if continues to worsen-<  to make further reccs if CBI is needed  -baseline 7-9      #Cardiovascular  Hx CAD w stent  HTN  HLD  HFpEF-stable  1. asa 81 mg qd  2. metoprolol 75 mg qd w parameters  3. atorvastatin 40 mg  4. lasix 40 mg BID w parameters  5. EKG w 1st degree block LAD w IVCD  no sig change when I compared to EKG 10-      #Chronic pain  1. gabapentin 300 mg BID  2. gabapentin 600 mg q HS  3. acetaminophen prn mild pain  4. tramadol 25 mg BID prn moderate      #Depression   1. cymbalta 60 mg      #VTE  IMPROVE VTE Individual Risk Assessment    RISK                                                                Points    [  ] Previous VTE                                                  3    [  ] Thrombophilia                                               2    [  ] Lower limb paralysis                                      2        (unable to hold up >15 seconds)      [  ] Current Cancer                                              2         (within 6 months)    [  ] Immobilization > 24 hrs                                1    [  ] ICU/CCU stay > 24 hours                              1    [ X ] Age > 60                                                      1    IMPROVE VTE Score ___1______    IMPROVE Score 0-1: Low Risk, No VTE prophylaxis required for most patients, encourage ambulation.   IMPROVE Score 2-3: At risk, pharmacologic VTE prophylaxis is indicated for most patients (in the absence of a contraindication)  IMPROVE Score > or = 4: High Risk, pharmacologic VTE prophylaxis is indicated for most patients (in the absence of a contraindication)      VTE sq heparin 5000 units q 12 hrs-may need to temporarily hold if hematuria worsens

## 2021-12-09 NOTE — PATIENT PROFILE ADULT - FALL HARM RISK - HARM RISK INTERVENTIONS
Assistance with ambulation/Assistance OOB with selected safe patient handling equipment/Communicate Risk of Fall with Harm to all staff/Discuss with provider need for PT consult/Monitor gait and stability/Reinforce activity limits and safety measures with patient and family/Tailored Fall Risk Interventions/Visual Cue: Yellow wristband and red socks/Bed in lowest position, wheels locked, appropriate side rails in place/Call bell, personal items and telephone in reach/Instruct patient to call for assistance before getting out of bed or chair/Non-slip footwear when patient is out of bed/[place] to call system/Physically safe environment - no spills, clutter or unnecessary equipment/Purposeful Proactive Rounding/Room/bathroom lighting operational, light cord in reach

## 2021-12-09 NOTE — PATIENT PROFILE ADULT - PATIENT'S PREFERRED PRONOUN
June 30, 2021       Davi Barnes MD  [address]  [facility] [place] [address]  Via In Basket      Patient: Nissa Fair   YOB: 1946   Date of Visit: 6/30/2021       Dear Dr. Barnes:    Thank you for referring Nissa Fair to me for evaluation. Below are my notes for this visit with her.    If you have questions, please do not hesitate to call me. I look forward to following your patient along with you.      Sincerely,        Michael Montelongo MD        CC: No Recipients  Michael Montelongo MD  6/30/2021 12:35 PM  Signed    CONSULTATION NOTE - CARDIOLOGY    Chief Complaint   Patient presents with   Coronary artery disease  Hypertension  Hyperlipidemia     Referring physician: Davi Barnes MD    HISTORY OF PRESENT ILLNESS  Ms. Fair is a 74 year old female with:    · Coronary artery disease with hx of CABG and PCI to RCA vein graft in 2004  · Carotid artery stenosis last ultrasound September 2019 showing less than 60% stenosis  · Hypertension  · Hyperlipidemia not optimally controlled.  Uncertain compliance with statins  · Chronic venous insufficiency     Patient was last seen on 12/30/2020. Patient was up-titrated to metoprolol 200 mg for hypertension. Prior lipid studies showed elevated LDL levels which were previously well controlled. Ezetimibe was discontinued in favor of alternative statin therapy. Repeat lipid panel was ordered which remains active as of today. Following a telephone encounter, patient was initiated on atorvastatin 80 mg and isosorbide 30 mg on 01/13/2021.    Today, patient is doing fine. She states she can walk a couple blocks without having any trouble. She has joint pains from previous injuries. Denies arthritic pains. Patient reports occasional dizzy spells. She was diagnosed with vertigo previously and underwent vestibular therapy for it. Doing well since. Denies any chest pain, shortness of breath or palpitations. Denies any other sx at this time.       MEDICATIONS:  Outpatient  Medications Prior to Visit   Medication Sig Dispense Refill   • isosorbide mononitrate (IMDUR) 30 MG 24 hr tablet TAKE 1 TABLET EVERY DAY 90 tablet 2   • atorvastatin (LIPITOR) 80 MG tablet TAKE 1 TABLET EVERY DAY 90 tablet 2   • metoPROLOL succinate (TOPROL-XL) 200 MG 24 hr tablet Take 1 tablet by mouth daily. 90 tablet 3   • insulin degludec (TRESIBA FLEXTOUCH) 100 UNIT/ML pen-injector Inject into the skin daily.     • insulin aspart (NOVOLOG FLEXPEN) 100 UNIT/ML pen-injector      • levothyroxine (SYNTHROID, LEVOTHROID) 137 MCG tablet Take 137 mcg by mouth daily.      • sitaGLIPtin-metFORMIN (JANUMET XR)  MG TABLET SR 24 HR Take 1 tablet by mouth daily (with dinner).     • fluticasone (FLONASE) 50 MCG/ACT nasal spray Spray in each nostril daily.       No facility-administered medications prior to visit.     ALLERGIES:  ALLERGIES:   Allergen Reactions   • Iodine Other (See Comments)     hypotension   • Iodine   (Environmental Or Med) Other (See Comments)     hypotension   • Latex RASH        Past Medical History:  Past Medical History:   Diagnosis Date   • Carotid artery stenosis     mild. under surveillance   • Chronic venous insufficiency     Prior endovenous ablation   • Contrast media allergy    • COPD (chronic obstructive pulmonary disease) (CMS/[facility])    • Coronary artery disease      Bypass surgery and subsequent PCI to the RCA vein graft in 2004. 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 Last angiogram 2015.   • Hyperlipidemia with target LDL less than 70    • Hypertension, essential    • Vasodepressor syncope     and syncope in the past     Past surgical history:  No past surgical history on file.    Family History:  Family History   Adopted: Yes   Problem Relation Age of Onset   • Patient is unaware of any medical problems Mother    • Patient is unaware of any medical problems Father      Social History:  Social History     Tobacco Use   • Smoking  status: Former Smoker     Quit date:      Years since quittin.5   • Smokeless tobacco: Never Used   Vaping Use   • Vaping Use: never used   Substance Use Topics   • Alcohol use: Yes     Alcohol/week: 1.0 standard drinks     Types: 1 Glasses of wine per week     Comment: occ wine   • Drug use: No      SUBJECTIVE  Review of Systems   Constitution: Negative for decreased appetite, fever, weight gain or weight loss greater than 10 pounds.   HENT: Negative for hoarse voice and nosebleeds.    Eyes: Negative for blurred vision and visual disturbance.   Cardiovascular: Negative for chest discomfort on exertion, dyspnea on exertion, near-syncope, orthopnea and palpitations.   Respiratory: Negative for cough or hemoptysis.    Endocrine: Negative for cold intolerance or tremor.   Hematologic/Lymphatic: Negative for adenopathy.   Skin: Negative for rash and suspicious lesions.   Musculoskeletal: Negative for inflamed painful joints.   Gastrointestinal: Negative for abdominal pain, dysphagia, heartburn, nausea and vomiting.   Genitourinary: Negative for hematuria.   Neurological: Negative for focal weakness, unusual headaches, light-headedness, loss of balance and vertigo.   Psychiatric/Behavioral: No hallucinations.    All other systems reviewed and are negative.    OBJECTIVE   Physical Exam   Visit Vitals  /62 (BP Location: RUE - Right upper extremity)   Pulse 91   Temp 96.4 °F (35.8 °C) (Temporal)   Ht 5' 2\" (1.575 m)   Wt 67.6 kg (149 lb)   BMI 27.25 kg/m²     Constitutional: She appears healthy.  HENT: Normocephalic, atraumatic  Eyes: Conjunctivae are normal.   Neck: Thyroid normal. No JVD present. No neck adenopathy.   Pulmonary/Chest: Breath sounds normal. No rales. No tenderness. Bilateral brhonchi.   Cardiac: Normal first and second heart sounds. No murmurs. No rubs or gallops.   Peripheral vessels: Normal upper extremity pulses. Femoral and popliteal pulses palpable. No femoral bruits. Good pedal  pulses.  Abdominal: Soft. No distension and no mass. There is no splenomegaly or hepatomegaly. There is no tenderness.   Musculoskeletal: Normal range of motion.   Neurological:  Alert and oriented to person, place, and time. Intact cranial nerves.   Skin: Skin is warm.     CARDIAC TESTING/IMAGING  I have reviewed the pertinent imaging study reports. These are the pertinent findings:    CHEMISTRY  CREATININE       Date Value Reference Range   03/05/2020 0.91 0.67 - 1.30 mg/dL      POTASSIUM        Date Value Reference Range   03/05/2020 4.4 3.4 - 5.1 mmol/L      HEMOGLOBIN       Date Value Reference Range   03/05/2020 14.0 12.0 - 16.0 g/dL      LIPID PANEL  CHOLESTEROL       Date Value Reference Range   06/24/2020 244 (H) <200 mg/dL      LDL       Date Value Reference Range   06/24/2020 172 (H) <100 mg/dL      HDL       Date Value Reference Range   06/24/2020 51 > 39 mg/dL      TRIGLYCERIDES       Date Value Reference Range   06/24/2020 104 <150 mg/dL      PROCEDURES  · Carotid Ultrasound - 09/05/2019: \"LICA proximal 50-69% stenosis.\"      · NM Myocardial Perfusion Imaging - 04/18/2018: Exercised for 6 minutes and 13 seconds on a Too protocol achieving 7.0 METs. \"Myocardial perfusion imaging is within normal limits. Left ventricular wall motion and ejection fraction are normal. Electrocardiographic portion of the exercise stress test is negative for ischemia.\"     · Coronary Angiography  - 10/05/2015: Normal systolic left ventricular function.  No regional wall motion abnormality. Anomalous coronary arteries. The circumflex coronary artery originates from the proximal RCA. Significant disease in the LAD before it is totally occluded. This jeopardizes to some extent the diagonal branches but the lesion is not severe.  The mid LAD is occluded with a patent mammary artery graft. The anomalous circumflex originates from the proximal RCA which is totally occluded. The vein graft vessel is also totally occluded.  It is  partially collateralized. The RCA itself is a large and very dominant vessel. There are 2 lesions, 1 proximally and 1 distally. The proximal lesion is about 70%, the distal 1 about 80%, but neither one is changed from a year ago.  The posterior descending branch is occluded as is the 1st left ventricular branch. The bypass graft to the 2 vessels is patent with excellent flow.     ASSESSMENT/PLAN  Coronary artery disease  Remote hx of CABG and PCI to RCA vein graft in 2004. Stable by angiogram in 2015. Maintained on isosorbide and metoprolol, tolerating well. Stable coronary artery disease.  Preventive aspects of coronary artery disease were discussed.  These include blood pressure goal of less than 130/80, LDL cholesterol of less than 70, 30 minutes of moderate intensity exercise every day, maintenance of ideal body weight, and optimal management of diabetes.      Stenosis of left carotid artery  Last Carotid US was 09/05/2019. This showed 50-69% stenosis in the left internal carotid. Repeat US Carotid Duplex ordered.    Hypertension, essential  Patient's BP in the office is 113/62. Patient does not check her blood pressure regularly. She was advised to keep a logged record of her home blood pressure readings and bring it during the next visit. The patient was advised that the blood pressure goal was less than 130/80. Blood pressure medications were reviewed.  Patient was provided with instructions on self monitoring of blood pressure.  They should contact me before the next visit if blood pressure is greater than 130/80 except occasionally.       Hyperlipidemia LDL goal <70  Patient's last LDL from 06/24/2020 was elevated at 172. Her LDL has been controlled in the past. Ezetimibe was discontinued during the previous visit in favor of atorvastatin 80 mg. She has not repeated her lipid studies since. She is hesitant to do any repeat blood testing due to unclear reasons, however she has agreed to schedule her repeat  labs.     Diabetes (CMS/[facility])  The patient should be considered for a GLP-1 receptor agonist due to its more favorable cardiovascular risk reduction profile.  She has not followed up with endocrinology in the last year.       Summary of your Discharge Medications          Accurate as of June 30, 2021  9:39 AM. Always use your most recent med list.            Take these Medications      Details   atorvastatin 80 MG tablet  Commonly known as: LIPITOR   TAKE 1 TABLET EVERY DAY     fluticasone 50 MCG/ACT nasal spray  Commonly known as: FLONASE   Spray in each nostril daily.     insulin aspart 100 UNIT/ML pen-injector  Commonly known as: NovoLOG FLEXPEN      insulin degludec 100 UNIT/ML pen-injector  Commonly known as: TRESIBA FLEXTOUCH   Inject into the skin daily.     isosorbide mononitrate 30 MG 24 hr tablet  Commonly known as: IMDUR   TAKE 1 TABLET EVERY DAY     levothyroxine 137 MCG tablet   Take 137 mcg by mouth daily.     metoPROLOL succinate 200 MG 24 hr tablet  Commonly known as: TOPROL-XL   Take 1 tablet by mouth daily.     sitaGLIPtin-metFORMIN  MG TABLET SR 24 HR  Commonly known as: JANUMET XR   Take 1 tablet by mouth daily (with dinner).          There are no discontinued medications.  Nissa was seen today for follow-up.    Diagnoses and all orders for this visit:    Coronary artery disease involving coronary bypass graft of native heart without angina pectoris    Stenosis of left carotid artery    Hypertension, essential    Hyperlipidemia LDL goal <70    Type 2 diabetes mellitus without complication, with long-term current use of insulin (CMS/[facility])      Plan of care discussed with the patient.  All questions were answered.  Patient verbalized understanding and agrees with the plan. Primary care issues are being followed by the primary care physician. A letter has been sent to the referring physician.    No follow-ups on file.    On 6/30/2021, Juilet BEAULIEU scribed the services personally performed by   Reginald.    I have reviewed and revised the note above. The documentation recorded by the scribe accurately reflects history obtained, actions preformed and decisions made by me.     Michael Montelongo MD  [facility]  [facility]                  [place]

## 2021-12-09 NOTE — CONSULT NOTE ADULT - SUBJECTIVE AND OBJECTIVE BOX
CHIEF COMPLAINT:Chronic retention    HISTORY OF PRESENT ILLNESS:same    PAST MEDICAL & SURGICAL HISTORY:  Leg swelling  related to cellulitis of LLE    CKD (chronic kidney disease) stage 3, GFR 30-59 ml/min    Cellulitis  BL    BPH (benign prostatic hyperplasia)    Colon polyp    Stented coronary artery    HTN (hypertension)    HLD (hyperlipidemia)    CAD (coronary artery disease)    OA (osteoarthritis)    PAD (peripheral artery disease)    AAA (abdominal aortic aneurysm) without rupture    S/P hemorrhoidectomy    H/O inguinal hernia  repair    History of colonoscopy    History of coronary artery stent placement        REVIEW OF SYSTEMS:    CONSTITUTIONAL: No weakness, fevers or chills  EYES/ENT: No visual changes;  No vertigo or throat pain   NECK: No pain or stiffness  RESPIRATORY: No cough, wheezing, hemoptysis; No shortness of breath  CARDIOVASCULAR: No chest pain or palpitations  GASTROINTESTINAL: No abdominal or epigastric pain. No nausea, vomiting, or hematemesis; No diarrhea or constipation. No melena or hematochezia.  GENITOURINARY: No dysuria, frequency or hematuria  NEUROLOGICAL: No numbness or weakness  SKIN: No itching, burning, rashes, or lesions   All other review of systems is negative unless indicated above.    MEDICATIONS  (STANDING):  aspirin enteric coated 81 milliGRAM(s) Oral daily  atorvastatin 40 milliGRAM(s) Oral at bedtime  DULoxetine 60 milliGRAM(s) Oral daily  furosemide    Tablet 40 milliGRAM(s) Oral two times a day  gabapentin 300 milliGRAM(s) Oral two times a day  gabapentin 600 milliGRAM(s) Oral at bedtime  heparin   Injectable 5000 Unit(s) SubCutaneous every 12 hours  metoprolol succinate ER 75 milliGRAM(s) Oral daily  multivitamin 1 Tablet(s) Oral daily  piperacillin/tazobactam IVPB.. 3.375 Gram(s) IV Intermittent every 8 hours  sodium chloride 0.9%. 1000 milliLiter(s) (100 mL/Hr) IV Continuous <Continuous>  tamsulosin 0.8 milliGRAM(s) Oral at bedtime    MEDICATIONS  (PRN):  acetaminophen     Tablet .. 650 milliGRAM(s) Oral every 6 hours PRN Temp greater or equal to 38C (100.4F), Mild Pain (1 - 3)  aluminum hydroxide/magnesium hydroxide/simethicone Suspension 30 milliLiter(s) Oral every 4 hours PRN Dyspepsia  melatonin 3 milliGRAM(s) Oral at bedtime PRN Insomnia  ondansetron Injectable 4 milliGRAM(s) IV Push every 8 hours PRN Nausea and/or Vomiting  traMADol 25 milliGRAM(s) Oral two times a day PRN Moderate Pain (4 - 6)      Allergies    penicillin (Angioedema)    Intolerances        SOCIAL HISTORY:    FAMILY HISTORY:  Family history unobtainable  d/t dementia        Vital Signs Last 24 Hrs  T(C): 36.4 (09 Dec 2021 08:43), Max: 39.9 (08 Dec 2021 16:30)  T(F): 97.5 (09 Dec 2021 08:43), Max: 103.9 (08 Dec 2021 16:30)  HR: 67 (09 Dec 2021 08:43) (67 - 105)  BP: 113/67 (09 Dec 2021 08:43) (109/70 - 176/81)  BP(mean): 68 (08 Dec 2021 20:00) (68 - 68)  RR: 17 (09 Dec 2021 08:43) (16 - 18)  SpO2: 100% (08 Dec 2021 20:00) (85% - 100%)    PHYSICAL EXAM:    Constitutional: NAD, well-developed  HEENT: JULISSA, EOMI, Normal Hearing, MMM  Neck: No LAD, No JVD  Back: Normal spine flexure, No CVA tenderness  Respiratory: CTAB   Cardiovascular: S1 and S2, RRR, no M/G/R  Abd: BS+, soft, NT/ND, No CVAT  : Normal phallus,open meatus,bilateral descended testes, no masses  MARIANELA: Normal prostate, no masses  Extremities: No peripheral edema  Vascular: 2+ peripheral pulses  Neurological: A/O x 3, no focal deficits  Psychiatric: Normal mood, normal affect  Musculoskeletal: 5/5 strength b/l upper and lower extremities  Skin: No rashes    LABS:                        8.0    4.65  )-----------( 232      ( 09 Dec 2021 07:10 )             25.5     12-    140  |  108  |  36<H>  ----------------------------<  123<H>  3.7   |  27  |  2.19<H>    Ca    8.3<L>      09 Dec 2021 07:10  Phos  3.5     12-08  Mg     2.0     12-08    TPro  7.6  /  Alb  3.1<L>  /  TBili  0.4  /  DBili  x   /  AST  17  /  ALT  14  /  AlkPhos  89  12-    PT/INR - ( 08 Dec 2021 16:38 )   PT: 11.9 sec;   INR: 1.03 ratio         PTT - ( 08 Dec 2021 16:38 )  PTT:29.2 sec  Urinalysis Basic - ( 08 Dec 2021 16:42 )    Color: Yellow / Appearance: cloudy / S.005 / pH: x  Gluc: x / Ketone: Negative  / Bili: Negative / Urobili: Negative mg/dL   Blood: x / Protein: 100 mg/dL / Nitrite: Negative   Leuk Esterase: Moderate / RBC: 6-10 /HPF / WBC >50   Sq Epi: x / Non Sq Epi: Occasional / Bacteria: Moderate      Urine Culture:     RADIOLOGY & ADDITIONAL STUDIES:

## 2021-12-09 NOTE — CONSULT NOTE ADULT - SUBJECTIVE AND OBJECTIVE BOX
Patient is a 89y old  Male who presents with a chief complaint of sepsis, UTI    HPI:  89 y/o male with h/o CAD s/p PCI/Stent, HTN, BPH, urinary retention with munoz in place, chronic LLE Hip Pain 2/2 OA, HLD, CKD II-IV, AAA, PAD was admitted on  from [facility] by EMS for worsening confusion. According to [facility] notes he was last known well 15 minutes prior to onset of lethargy and AMS. As per EMS (to ED)  pt was talking to staff when he suddenly became altered and felt cold and lethargic so pt was sent to ED further evaluation. In ER he was noted febrile and received zosyn.     PAST MEDICAL HX  He was last admitted to   for UTI due to proteus mirabilis complicated by bacteremia and R hydronephrosis. Treated w meropenem then ceftriaxone x4 weeks   2021 was seen by  where munoz was recommended   AAA (abdominal aortic aneurysm) without rupture  Anemia   BPH (benign prostatic hyperplasia)   CAD (coronary artery disease)   Cellulitis BL  CKD (chronic kidney disease) stage 3, GFR 30-59 ml/min   Colon polyp   HLD (hyperlipidemia)   HTN (hypertension)   Leg swelling related to cellulitis of LLE  OA (osteoarthritis)   PAD (peripheral artery disease)   Stented coronary artery.     PAST SURGICAL HX:  H/O inguinal hernia repair  History of colonoscopy   History of coronary artery stent placement   S/P hemorrhoidectomy.     Meds: per reconciliation sheet, noted below  MEDICATIONS  (STANDING):  aspirin enteric coated 81 milliGRAM(s) Oral daily  atorvastatin 40 milliGRAM(s) Oral at bedtime  DULoxetine 60 milliGRAM(s) Oral daily  furosemide    Tablet 40 milliGRAM(s) Oral two times a day  gabapentin 300 milliGRAM(s) Oral two times a day  gabapentin 600 milliGRAM(s) Oral at bedtime  heparin   Injectable 5000 Unit(s) SubCutaneous every 12 hours  metoprolol succinate ER 75 milliGRAM(s) Oral daily  multivitamin 1 Tablet(s) Oral daily  piperacillin/tazobactam IVPB.. 3.375 Gram(s) IV Intermittent every 8 hours  sodium chloride 0.9%. 1000 milliLiter(s) (100 mL/Hr) IV Continuous <Continuous>  tamsulosin 0.8 milliGRAM(s) Oral at bedtime    MEDICATIONS  (PRN):  acetaminophen     Tablet .. 650 milliGRAM(s) Oral every 6 hours PRN Temp greater or equal to 38C (100.4F), Mild Pain (1 - 3)  melatonin 3 milliGRAM(s) Oral at bedtime PRN Insomnia  ondansetron Injectable 4 milliGRAM(s) IV Push every 8 hours PRN Nausea and/or Vomiting  traMADol 25 milliGRAM(s) Oral two times a day PRN Moderate Pain (4 - 6)    Allergies  penicillin (Angioedema)  Intolerances      Social: no smoking, no alcohol, no illegal drugs; no recent travel, no exposure to TB  FAMILY HISTORY:  Family history unobtainable  d/t dementia      no history of premature cardiovascular disease in first degree relatives    ROS: the patient denies HA, no seizures, no dizziness, no sore throat, no nasal congestion, no blurry vision, no CP, no palpitations, no SOB, no cough, no abdominal pain, no diarrhea, no N/V, has dysuria, no leg pain, no claudication, no rash, no joint aches, no rectal pain or bleeding, no night sweats  All other systems reviewed and are negative    Vital Signs Last 24 Hrs  T(C): 36.4 (09 Dec 2021 08:43), Max: 39.9 (08 Dec 2021 16:30)  T(F): 97.5 (09 Dec 2021 08:43), Max: 103.9 (08 Dec 2021 16:30)  HR: 67 (09 Dec 2021 08:43) (67 - 105)  BP: 113/67 (09 Dec 2021 08:43) (109/70 - 176/81)  BP(mean): 68 (08 Dec 2021 20:00) (68 - 68)  RR: 17 (09 Dec 2021 08:43) (16 - 18)  SpO2: 100% (08 Dec 2021 20:00) (85% - 100%)  Daily Height in cm: 177.8 (08 Dec 2021 16:13)    Daily     PE:    Constitutional:  No acute distress  HEENT: NC/AT, EOMI, PERRLA, conjunctivae clear; ears and nose atraumatic; pharynx benign  Neck: supple; thyroid not palpable  Back: no tenderness  Respiratory: respiratory effort normal; clear to auscultation  Cardiovascular: S1S2 regular, no murmurs  Abdomen: soft, not tender, not distended, positive BS; no liver or spleen organomegaly  Genitourinary: no suprapubic tenderness  Lymphatic: no LN palpable  Musculoskeletal: no muscle tenderness, no joint swelling or tenderness  Extremities: no pedal edema  Neurological/ Psychiatric: Alert, judgement and insight impaired; moving all extremities  Skin: no rashes; no palpable lesions    Labs: all available labs reviewed                        8.0    4.65  )-----------( 232      ( 09 Dec 2021 07:10 )             25.5     12    140  |  108  |  36<H>  ----------------------------<  123<H>  3.7   |  27  |  2.19<H>    Ca    8.3<L>      09 Dec 2021 07:10  Phos  3.5       Mg     2.0         TPro  7.6  /  Alb  3.1<L>  /  TBili  0.4  /  DBili  x   /  AST  17  /  ALT  14  /  AlkPhos  89  12     LIVER FUNCTIONS - ( 08 Dec 2021 16:38 )  Alb: 3.1 g/dL / Pro: 7.6 gm/dL / ALK PHOS: 89 U/L / ALT: 14 U/L / AST: 17 U/L / GGT: x           Urinalysis Basic - ( 08 Dec 2021 16:42 )    Color: Yellow / Appearance: cloudy / S.005 / pH: x  Gluc: x / Ketone: Negative  / Bili: Negative / Urobili: Negative mg/dL   Blood: x / Protein: 100 mg/dL / Nitrite: Negative   Leuk Esterase: Moderate / RBC: 6-10 /HPF / WBC >50   Sq Epi: x / Non Sq Epi: Occasional / Bacteria: Moderate    COVID-19 PCR: NotDetec (21 @ 16:38)    Radiology: all available radiological tests reviewed    < from: CT Abdomen and Pelvis No Cont (21 @ 19:34) >  Moderate right hydronephrosis and hydroureter and mild left   hydronephrosis and hydroureter to the level of the urinary bladder.  Diffuse bladder wall thickening which is indeterminant. Inserted direct   visualization.  Enlarged prostate gland with mass effect on the urinary bladder.  < end of copied text >      Advanced directives addressed: full resuscitation Patient is a 89y old  Male who presents with a chief complaint of sepsis, UTI    HPI:  89 y/o male with h/o CAD s/p PCI/Stent, HTN, BPH, urinary retention with munoz in place, chronic LLE Hip Pain 2/2 OA, HLD, CKD II-IV, AAA, PAD was admitted on  from [facility] by EMS for worsening confusion. According to [facility] notes he was last known well 15 minutes prior to onset of lethargy and AMS. As per EMS (to ED)  pt was talking to staff when he suddenly became altered and felt cold and lethargic so pt was sent to ED further evaluation. In ER he was noted febrile and received zosyn.     PAST MEDICAL HX  He was last admitted to   for UTI due to proteus mirabilis complicated by bacteremia and R hydronephrosis. Treated w meropenem then ceftriaxone x4 weeks   2021 was seen by  where munoz was recommended   AAA (abdominal aortic aneurysm) without rupture  Anemia   BPH (benign prostatic hyperplasia)   CAD (coronary artery disease)   Cellulitis BL  CKD (chronic kidney disease) stage 3, GFR 30-59 ml/min   Colon polyp   HLD (hyperlipidemia)   HTN (hypertension)   Leg swelling related to cellulitis of LLE  OA (osteoarthritis)   PAD (peripheral artery disease)   Stented coronary artery.     PAST SURGICAL HX:  H/O inguinal hernia repair  History of colonoscopy   History of coronary artery stent placement   S/P hemorrhoidectomy.     Meds: per reconciliation sheet, noted below  MEDICATIONS  (STANDING):  aspirin enteric coated 81 milliGRAM(s) Oral daily  atorvastatin 40 milliGRAM(s) Oral at bedtime  DULoxetine 60 milliGRAM(s) Oral daily  furosemide    Tablet 40 milliGRAM(s) Oral two times a day  gabapentin 300 milliGRAM(s) Oral two times a day  gabapentin 600 milliGRAM(s) Oral at bedtime  heparin   Injectable 5000 Unit(s) SubCutaneous every 12 hours  metoprolol succinate ER 75 milliGRAM(s) Oral daily  multivitamin 1 Tablet(s) Oral daily  piperacillin/tazobactam IVPB.. 3.375 Gram(s) IV Intermittent every 8 hours  sodium chloride 0.9%. 1000 milliLiter(s) (100 mL/Hr) IV Continuous <Continuous>  tamsulosin 0.8 milliGRAM(s) Oral at bedtime    MEDICATIONS  (PRN):  acetaminophen     Tablet .. 650 milliGRAM(s) Oral every 6 hours PRN Temp greater or equal to 38C (100.4F), Mild Pain (1 - 3)  melatonin 3 milliGRAM(s) Oral at bedtime PRN Insomnia  ondansetron Injectable 4 milliGRAM(s) IV Push every 8 hours PRN Nausea and/or Vomiting  traMADol 25 milliGRAM(s) Oral two times a day PRN Moderate Pain (4 - 6)    Allergies  penicillin (Angioedema)  Intolerances      Social: no smoking, no alcohol, no illegal drugs; no recent travel, no exposure to TB  FAMILY HISTORY:  Family history unobtainable  d/t dementia      no history of premature cardiovascular disease in first degree relatives    ROS: the patient is mild confused; limited;  denies HA, no dizziness, no sore throat, no nasal congestion, no blurry vision, no CP, no palpitations, no SOB, no cough, no abdominal pain, no diarrhea, no N/V, has dysuria, no leg pain, no joint aches, no rectal pain or bleeding, no night sweats; feels weak  All other systems reviewed and are negative    Vital Signs Last 24 Hrs  T(C): 36.4 (09 Dec 2021 08:43), Max: 39.9 (08 Dec 2021 16:30)  T(F): 97.5 (09 Dec 2021 08:43), Max: 103.9 (08 Dec 2021 16:30)  HR: 67 (09 Dec 2021 08:43) (67 - 105)  BP: 113/67 (09 Dec 2021 08:43) (109/70 - 176/81)  BP(mean): 68 (08 Dec 2021 20:00) (68 - 68)  RR: 17 (09 Dec 2021 08:43) (16 - 18)  SpO2: 100% (08 Dec 2021 20:00) (85% - 100%)  Daily Height in cm: 177.8 (08 Dec 2021 16:13)    Daily     PE:    Constitutional:  No acute distress  HEENT: NC/AT, EOMI, PERRLA, conjunctivae clear; ears and nose atraumatic; pharynx benign  Neck: supple; thyroid not palpable  Back: no tenderness  Respiratory: respiratory effort normal; clear to auscultation  Cardiovascular: S1S2 regular, no murmurs  Abdomen: soft, not tender, not distended, positive BS; no liver or spleen organomegaly  Genitourinary: has suprapubic tenderness  munoz with dark urine in bag  Lymphatic: no LN palpable  Musculoskeletal: no muscle tenderness, no joint swelling or tenderness  Extremities: no pedal edema  Neurological/ Psychiatric: Alert, judgement and insight impaired; moving all extremities  Skin: no rashes; no palpable lesions    Labs: all available labs reviewed                        8.0    4.65  )-----------( 232      ( 09 Dec 2021 07:10 )             25.5     12    140  |  108  |  36<H>  ----------------------------<  123<H>  3.7   |  27  |  2.19<H>    Ca    8.3<L>      09 Dec 2021 07:10  Phos  3.5       Mg     2.0         TPro  7.6  /  Alb  3.1<L>  /  TBili  0.4  /  DBili  x   /  AST  17  /  ALT  14  /  AlkPhos  89       LIVER FUNCTIONS - ( 08 Dec 2021 16:38 )  Alb: 3.1 g/dL / Pro: 7.6 gm/dL / ALK PHOS: 89 U/L / ALT: 14 U/L / AST: 17 U/L / GGT: x           Urinalysis Basic - ( 08 Dec 2021 16:42 )    Color: Yellow / Appearance: cloudy / S.005 / pH: x  Gluc: x / Ketone: Negative  / Bili: Negative / Urobili: Negative mg/dL   Blood: x / Protein: 100 mg/dL / Nitrite: Negative   Leuk Esterase: Moderate / RBC: 6-10 /HPF / WBC >50   Sq Epi: x / Non Sq Epi: Occasional / Bacteria: Moderate    COVID-19 PCR: NotDetec (21 @ 16:38)    Radiology: all available radiological tests reviewed    < from: CT Abdomen and Pelvis No Cont (21 @ 19:34) >  Moderate right hydronephrosis and hydroureter and mild left   hydronephrosis and hydroureter to the level of the urinary bladder.  Diffuse bladder wall thickening which is indeterminant. Inserted direct   visualization.  Enlarged prostate gland with mass effect on the urinary bladder.  < end of copied text >      Advanced directives addressed: full resuscitation

## 2021-12-09 NOTE — PROGRESS NOTE ADULT - SUBJECTIVE AND OBJECTIVE BOX
Patient is a 89y old  Male who presents with a chief complaint of sepsis  UTI  AMS (09 Dec 2021 12:12)      SUBJECTIVE:   HPI:    89 year old male w CAD s/p PCI/Stent, HTN, BPH, Chronic LLE Hip Pain 2/2 OA, HLD, CKD II-IV, AAA, PAD who presented to  from [facility] by EMS for AMS.   According to [facility] notes he was last known well 15 minutes prior to onset of lethargy and AMS  As per EMS (to ED)  pt was talking to staff when he suddenly became altered and felt cold and lethargic so pt was sent to ED further evaluation.  He was last admitted to   for UTI due to proteus mirabilis complicated by bacteremia and R hydronephrosis  Treated w meropenem then ceftriaxone x4 weeks   11- was seen by  where munoz was recommended . munoz replaced in ED  In ED /81    RR 18   T 103.9 rectal  sat 85% RA; repeat 96% on 4 L nc  vomited in CT w abd distension and no pain on palpation·           sub: pt resting with no complaints to offer. Munoz with bloody output, no clots, Hgb 8, baseline 8-9.         PAST MEDICAL HX  AAA (abdominal aortic aneurysm) without rupture  Anemia   BPH (benign prostatic hyperplasia)   CAD (coronary artery disease)   Cellulitis BL  CKD (chronic kidney disease) stage 3, GFR 30-59 ml/min   Colon polyp   HLD (hyperlipidemia)   HTN (hypertension)   Leg swelling related to cellulitis of LLE  OA (osteoarthritis)   PAD (peripheral artery disease)   Stented coronary artery.     PAST SURGICAL HX:  H/O inguinal hernia repair  History of colonoscopy   History of coronary artery stent placement   S/P hemorrhoidectomy.     FAMILY HX  Family history unobtainable, d/t dementia.      SOCIAL HX  [facility]  nonsmoker     (08 Dec 2021 19:56)        REVIEW OF SYSTEMS:    CONSTITUTIONAL: No weakness, fevers or chills  EYES/ENT: No visual changes;  No vertigo or throat pain   NECK: No pain or stiffness  RESPIRATORY: No cough, wheezing, hemoptysis; No shortness of breath  CARDIOVASCULAR: No chest pain or palpitations  GASTROINTESTINAL: No abdominal or epigastric pain. No nausea, vomiting, or hematemesis; No diarrhea or constipation. No melena or hematochezia.  GENITOURINARY: No dysuria, frequency or hematuria  NEUROLOGICAL: No numbness or weakness  SKIN: No itching, burning, rashes, or lesions   All other review of systems is negative unless indicated above    ICU Vital Signs Last 24 Hrs  T(C): 36.4 (09 Dec 2021 08:43), Max: 39.9 (08 Dec 2021 16:30)  T(F): 97.5 (09 Dec 2021 08:43), Max: 103.9 (08 Dec 2021 16:30)  HR: 67 (09 Dec 2021 08:43) (67 - 105)  BP: 113/67 (09 Dec 2021 08:43) (109/70 - 176/81)  BP(mean): 68 (08 Dec 2021 20:00) (68 - 68)  ABP: --  ABP(mean): --  RR: 17 (09 Dec 2021 08:43) (16 - 18)  SpO2: 100% (08 Dec 2021 20:00) (85% - 100%)    CAPILLARY BLOOD GLUCOSE      POCT Blood Glucose.: 82 mg/dL (08 Dec 2021 16:14)      PHYSICAL EXAM:    Constitutional: NAD, awake and alert,   HEENT: PERR, EOMI, Normal Hearing, MMM  Neck: Soft and supple, No LAD, No JVD  Respiratory: Breath sounds are clear bilaterally, No wheezing, rales or rhonchi  Cardiovascular: S1 and S2, regular rate and rhythm, no Murmurs, gallops or rubs  Gastrointestinal: Bowel Sounds present, soft, nontender, nondistended, no guarding, no rebound  Extremities: No peripheral edema  Vascular: 2+ peripheral pulses  Neurological: A/O x 2-3, no focal deficits  Musculoskeletal: 5/5 strength b/l upper and lower extremities  Skin: No rashes    MEDICATIONS:  MEDICATIONS  (STANDING):  aspirin enteric coated 81 milliGRAM(s) Oral daily  atorvastatin 40 milliGRAM(s) Oral at bedtime  DULoxetine 60 milliGRAM(s) Oral daily  furosemide    Tablet 40 milliGRAM(s) Oral two times a day  gabapentin 300 milliGRAM(s) Oral two times a day  gabapentin 600 milliGRAM(s) Oral at bedtime  heparin   Injectable 5000 Unit(s) SubCutaneous every 12 hours  metoprolol succinate ER 75 milliGRAM(s) Oral daily  multivitamin 1 Tablet(s) Oral daily  piperacillin/tazobactam IVPB.. 3.375 Gram(s) IV Intermittent every 8 hours  sodium chloride 0.9%. 1000 milliLiter(s) (100 mL/Hr) IV Continuous <Continuous>  tamsulosin 0.8 milliGRAM(s) Oral at bedtime      LABS: All Labs Reviewed:                        8.0    4.65  )-----------( 232      ( 09 Dec 2021 07:10 )             25.5     12-09    140  |  108  |  36<H>  ----------------------------<  123<H>  3.7   |  27  |  2.19<H>    Ca    8.3<L>      09 Dec 2021 07:10  Phos  3.5     12-08  Mg     2.0     12-08    TPro  7.6  /  Alb  3.1<L>  /  TBili  0.4  /  DBili  x   /  AST  17  /  ALT  14  /  AlkPhos  89  12-08    PT/INR - ( 08 Dec 2021 16:38 )   PT: 11.9 sec;   INR: 1.03 ratio         PTT - ( 08 Dec 2021 16:38 )  PTT:29.2 sec          Blood Culture:     RADIOLOGY/EKG: reviewed      < from: CT Head No Cont (12.08.21 @ 16:56) >  IMPRESSION:    1)  scattered chronic ischemic changes with volume loss. Borderline   prominent ventricles, without transependymal migration of CSF. No CT   evidence of an acute infarct or hemorrhage.  2)  follow-up MR imaging of the brain may be considered for further   assessment.    < end of copied text >  < from: Xray Chest 1 View- PORTABLE-Urgent (12.08.21 @ 17:17) >  IMPRESSION:   Small LEFT pleural effusion..    --- End of Report ---      < end of copied text >    < from: CT Abdomen and Pelvis No Cont (12.08.21 @ 19:34) >  IMPRESSION:    Moderate right hydronephrosis and hydroureter and mild left   hydronephrosis and hydroureter to the level of the urinary bladder.    Diffuse bladder wall thickening which is indeterminant. Inserted direct   visualization.    Enlarged prostate gland with mass effect on the urinary bladder.      < end of copied text >

## 2021-12-10 LAB
ANION GAP SERPL CALC-SCNC: 4 MMOL/L — LOW (ref 5–17)
BUN SERPL-MCNC: 35 MG/DL — HIGH (ref 7–23)
CALCIUM SERPL-MCNC: 7.6 MG/DL — LOW (ref 8.5–10.1)
CHLORIDE SERPL-SCNC: 106 MMOL/L — SIGNIFICANT CHANGE UP (ref 96–108)
CO2 SERPL-SCNC: 29 MMOL/L — SIGNIFICANT CHANGE UP (ref 22–31)
CREAT SERPL-MCNC: 1.99 MG/DL — HIGH (ref 0.5–1.3)
GLUCOSE SERPL-MCNC: 105 MG/DL — HIGH (ref 70–99)
HCT VFR BLD CALC: 23.1 % — LOW (ref 39–50)
HCT VFR BLD CALC: 24.1 % — LOW (ref 39–50)
HGB BLD-MCNC: 7.2 G/DL — LOW (ref 13–17)
HGB BLD-MCNC: 7.6 G/DL — LOW (ref 13–17)
MCHC RBC-ENTMCNC: 28.8 PG — SIGNIFICANT CHANGE UP (ref 27–34)
MCHC RBC-ENTMCNC: 31.2 GM/DL — LOW (ref 32–36)
MCV RBC AUTO: 92.4 FL — SIGNIFICANT CHANGE UP (ref 80–100)
PLATELET # BLD AUTO: 247 K/UL — SIGNIFICANT CHANGE UP (ref 150–400)
POTASSIUM SERPL-MCNC: 3.6 MMOL/L — SIGNIFICANT CHANGE UP (ref 3.5–5.3)
POTASSIUM SERPL-SCNC: 3.6 MMOL/L — SIGNIFICANT CHANGE UP (ref 3.5–5.3)
RBC # BLD: 2.5 M/UL — LOW (ref 4.2–5.8)
RBC # FLD: 15.5 % — HIGH (ref 10.3–14.5)
SODIUM SERPL-SCNC: 139 MMOL/L — SIGNIFICANT CHANGE UP (ref 135–145)
WBC # BLD: 4.55 K/UL — SIGNIFICANT CHANGE UP (ref 3.8–10.5)
WBC # FLD AUTO: 4.55 K/UL — SIGNIFICANT CHANGE UP (ref 3.8–10.5)

## 2021-12-10 PROCEDURE — 99232 SBSQ HOSP IP/OBS MODERATE 35: CPT

## 2021-12-10 PROCEDURE — 99223 1ST HOSP IP/OBS HIGH 75: CPT

## 2021-12-10 PROCEDURE — 99498 ADVNCD CARE PLAN ADDL 30 MIN: CPT

## 2021-12-10 PROCEDURE — 99497 ADVNCD CARE PLAN 30 MIN: CPT | Mod: 25

## 2021-12-10 RX ORDER — MEROPENEM 1 G/30ML
1000 INJECTION INTRAVENOUS EVERY 12 HOURS
Refills: 0 | Status: DISCONTINUED | OUTPATIENT
Start: 2021-12-10 | End: 2021-12-15

## 2021-12-10 RX ORDER — FINASTERIDE 5 MG/1
5 TABLET, FILM COATED ORAL DAILY
Refills: 0 | Status: DISCONTINUED | OUTPATIENT
Start: 2021-12-10 | End: 2021-12-15

## 2021-12-10 RX ORDER — HEPARIN SODIUM 5000 [USP'U]/ML
5000 INJECTION INTRAVENOUS; SUBCUTANEOUS EVERY 12 HOURS
Refills: 0 | Status: DISCONTINUED | OUTPATIENT
Start: 2021-12-10 | End: 2021-12-15

## 2021-12-10 RX ORDER — POLYETHYLENE GLYCOL 3350 17 G/17G
17 POWDER, FOR SOLUTION ORAL ONCE
Refills: 0 | Status: COMPLETED | OUTPATIENT
Start: 2021-12-10 | End: 2021-12-10

## 2021-12-10 RX ADMIN — POLYETHYLENE GLYCOL 3350 17 GRAM(S): 17 POWDER, FOR SOLUTION ORAL at 06:00

## 2021-12-10 RX ADMIN — DULOXETINE HYDROCHLORIDE 60 MILLIGRAM(S): 30 CAPSULE, DELAYED RELEASE ORAL at 09:20

## 2021-12-10 RX ADMIN — Medication 75 MILLIGRAM(S): at 09:20

## 2021-12-10 RX ADMIN — GABAPENTIN 600 MILLIGRAM(S): 400 CAPSULE ORAL at 22:21

## 2021-12-10 RX ADMIN — PIPERACILLIN AND TAZOBACTAM 25 GRAM(S): 4; .5 INJECTION, POWDER, LYOPHILIZED, FOR SOLUTION INTRAVENOUS at 05:23

## 2021-12-10 RX ADMIN — Medication 81 MILLIGRAM(S): at 09:20

## 2021-12-10 RX ADMIN — SODIUM CHLORIDE 100 MILLILITER(S): 9 INJECTION INTRAMUSCULAR; INTRAVENOUS; SUBCUTANEOUS at 07:47

## 2021-12-10 RX ADMIN — HEPARIN SODIUM 5000 UNIT(S): 5000 INJECTION INTRAVENOUS; SUBCUTANEOUS at 22:21

## 2021-12-10 RX ADMIN — MEROPENEM 100 MILLIGRAM(S): 1 INJECTION INTRAVENOUS at 22:22

## 2021-12-10 RX ADMIN — TAMSULOSIN HYDROCHLORIDE 0.8 MILLIGRAM(S): 0.4 CAPSULE ORAL at 22:21

## 2021-12-10 RX ADMIN — Medication 1 TABLET(S): at 09:21

## 2021-12-10 RX ADMIN — Medication 10 MILLIGRAM(S): at 13:10

## 2021-12-10 RX ADMIN — GABAPENTIN 300 MILLIGRAM(S): 400 CAPSULE ORAL at 17:04

## 2021-12-10 RX ADMIN — GABAPENTIN 300 MILLIGRAM(S): 400 CAPSULE ORAL at 09:20

## 2021-12-10 RX ADMIN — MEROPENEM 100 MILLIGRAM(S): 1 INJECTION INTRAVENOUS at 10:54

## 2021-12-10 RX ADMIN — ATORVASTATIN CALCIUM 40 MILLIGRAM(S): 80 TABLET, FILM COATED ORAL at 22:21

## 2021-12-10 RX ADMIN — FINASTERIDE 5 MILLIGRAM(S): 5 TABLET, FILM COATED ORAL at 10:54

## 2021-12-10 NOTE — PHYSICAL THERAPY INITIAL EVALUATION ADULT - PATIENT PROFILE REVIEW, REHAB EVAL
CT abdomen/Pelvis: Moderate right hydronephrosis and hydroureter and mild left. Diffuse bladder wall thickening which is indeterminant. Inserted direct visualization.Enlarged prostate gland with mass effect on the urinary bladder. Chest Xray: small L pleural effusion. CT Head: scattered chronic ischemic changes with volume loss. Borderline prominent ventricles, without transependymal migration of CSF. No CT evidence of an acute infarct or hemorrhage./yes

## 2021-12-10 NOTE — PHYSICAL THERAPY INITIAL EVALUATION ADULT - GENERAL OBSERVATIONS, REHAB EVAL
Patient received in supine on 5S in NAD w/ +munoz. Patient denies pain/discomfort other than pain in his LLE w/ activity due to hip arthritis. Patient's son in room reporting patient has peripheral neuropathy. Patient received in supine on 5S in NAD w/ +munoz. +edema B U&LEs. Patient denies pain/discomfort other than pain in his LLE w/ activity due to hip arthritis. Patient's son in room reporting patient has peripheral neuropathy.

## 2021-12-10 NOTE — CONSULT NOTE ADULT - ASSESSMENT
HPI: Pt is a 89y old Male with hx of 89 year old male w CAD s/p PCI/Stent, HTN, BPH, Chronic LLE Hip Pain 2/2 OA, HLD, CKD II-IV, AAA, PAD who presented to  from [facility] for AMS. He was last admitted  for UTI due to proteus mirabilis complicated by bacteremia and R hydronephrosis and was treated with meropenem then ceftriaxone x4 weeks 11- was seen by  and munoz cath was recommended. He now presents with AMS R/T a UTI. Palliative Consult to establish GOC.  12/10/21 Seen and examined at bedside with no family present. Pt C/O chronic pain LLE. Denies dyspnea. Endorses constipation    Assessment and Plan:    1) AMS  -R/T UTI  -Chronic munoz cath  - eval noted  -Blood C&S +  -Recent completion of 4 weeks abx  -ID eval noted     2) BPH w obstruction  -CT w bilateral hydronephrosis  -Has been given tamsulosin 0.8 mg QOD and nitrofurantoin QOD  -Increase tamsulosin 0.8 mg q HS  -Maintain munoz cath  - eval noted    3) RASHAUN  -Creat > 1.99  -gentle hydration  -CT abd noted for bilat hydro and prostate enlargement as mass effect on bladder    4) Debility  -Recent hospitalization  -Repeat UTI  -anemia  -chronic LE pain  -PT eval    5) CAD  -stent  -HTN  -HLD  -cont ASA, BB, statin, diuretics as tolerated    6) Chronic pain  -R/T OA  -cont gabapentin 300 mg BID. gabapentin 600 mg q HS  -cont acetaminophen prn mild pain  -cont tramadol 25 mg BID prn moderate  -supportive care    7) Advanced Directives  -Pt without capacity  -Son Joseph surrogate  -MOLST DNR/DNI on chart  -Will schedule GOC discussion

## 2021-12-10 NOTE — PROGRESS NOTE ADULT - SUBJECTIVE AND OBJECTIVE BOX
Date of service: 12-10-21 @ 11:42    Lying in bed in NAD  Has urinary frequency  Weak looking  Reported with ESBL organism in blood    ROS: no fever or chills; poorly verbal    MEDICATIONS  (STANDING):  aspirin enteric coated 81 milliGRAM(s) Oral daily  atorvastatin 40 milliGRAM(s) Oral at bedtime  bisacodyl Suppository 10 milliGRAM(s) Rectal once  DULoxetine 60 milliGRAM(s) Oral daily  finasteride 5 milliGRAM(s) Oral daily  gabapentin 300 milliGRAM(s) Oral two times a day  gabapentin 600 milliGRAM(s) Oral at bedtime  meropenem  IVPB 1000 milliGRAM(s) IV Intermittent every 12 hours  metoprolol succinate ER 75 milliGRAM(s) Oral daily  multivitamin 1 Tablet(s) Oral daily  tamsulosin 0.8 milliGRAM(s) Oral at bedtime    Vital Signs Last 24 Hrs  T(C): 36.4 (10 Dec 2021 08:07), Max: 36.6 (09 Dec 2021 20:58)  T(F): 97.6 (10 Dec 2021 08:07), Max: 97.9 (09 Dec 2021 20:58)  HR: 78 (10 Dec 2021 08:07) (61 - 78)  BP: 123/55 (10 Dec 2021 08:07) (104/43 - 123/55)  BP(mean): --  RR: 18 (10 Dec 2021 08:07) (17 - 18)  SpO2: 100% (10 Dec 2021 08:07) (93% - 100%)     Physical exam:    Constitutional:  No acute distress  HEENT: NC/AT, EOMI, PERRLA, conjunctivae clear; ears and nose atraumatic  Neck: supple; thyroid not palpable  Back: no tenderness  Respiratory: respiratory effort normal; clear to auscultation  Cardiovascular: S1S2 regular, no murmurs  Abdomen: soft, not tender, not distended, positive BS  Genitourinary: has suprapubic tenderness  munoz with dark urine in bag  Lymphatic: no LN palpable  Musculoskeletal: no muscle tenderness, no joint swelling or tenderness  Extremities: no pedal edema  Neurological/ Psychiatric: Alert, moving all extremities  Skin: no rashes; no palpable lesions    Labs: all available labs reviewed                        8.0    4.65  )-----------( 232      ( 09 Dec 2021 07:10 )             25.5     12-09    140  |  108  |  36<H>  ----------------------------<  123<H>  3.7   |  27  |  2.19<H>    Ca    8.3<L>      09 Dec 2021 07:10  Phos  3.5     12-  Mg     2.0     12    TPro  7.6  /  Alb  3.1<L>  /  TBili  0.4  /  DBili  x   /  AST  17  /  ALT  14  /  AlkPhos  89  12-08     LIVER FUNCTIONS - ( 08 Dec 2021 16:38 )  Alb: 3.1 g/dL / Pro: 7.6 gm/dL / ALK PHOS: 89 U/L / ALT: 14 U/L / AST: 17 U/L / GGT: x           Urinalysis Basic - ( 08 Dec 2021 16:42 )    Color: Yellow / Appearance: cloudy / S.005 / pH: x  Gluc: x / Ketone: Negative  / Bili: Negative / Urobili: Negative mg/dL   Blood: x / Protein: 100 mg/dL / Nitrite: Negative   Leuk Esterase: Moderate / RBC: 6-10 /HPF / WBC >50   Sq Epi: x / Non Sq Epi: Occasional / Bacteria: Moderate    COVID-19 PCR: NotDetec (21 @ 16:38)      Culture - Blood (collected 08 Dec 2021 16:38)  Source: .Blood None  Gram Stain (09 Dec 2021 19:20):    Growth in aerobic and anaerobic bottles: Gram Variable Rods  Preliminary Report (09 Dec 2021 21:49):    Growth in aerobic and anaerobic bottles: Gram Variable Rods  Organism: Blood Culture PCR (09 Dec 2021 22:31)      -  ESBL: Detec      -  Morganella morganii: Detec      Method Type: PCR    Culture - Blood (collected 08 Dec 2021 16:36)  Source: .Blood Blood-Peripheral  Gram Stain (09 Dec 2021 19:21):    Growth in anaerobic bottle: Gram Variable Rods  Preliminary Report (09 Dec 2021 19:21):    Growth in anaerobic bottle: Gram Variable Rods        Radiology: all available radiological tests reviewed    < from: CT Abdomen and Pelvis No Cont (21 @ 19:34) >  Moderate right hydronephrosis and hydroureter and mild left   hydronephrosis and hydroureter to the level of the urinary bladder.  Diffuse bladder wall thickening which is indeterminant. Inserted direct   visualization.  Enlarged prostate gland with mass effect on the urinary bladder.  < end of copied text >      Advanced directives addressed: full resuscitation

## 2021-12-10 NOTE — PROGRESS NOTE ADULT - ASSESSMENT
89 y/o male with h/o CAD s/p PCI/Stent, HTN, BPH, urinary retention with munoz in place, chronic LLE Hip Pain 2/2 OA, HLD, CKD II-IV, AAA, PAD was admitted on 12/8 from [facility] by EMS for worsening confusion. According to [facility] notes he was last known well 15 minutes prior to onset of lethargy and AMS. As per EMS (to ED)  pt was talking to staff when he suddenly became altered and felt cold and lethargic so pt was sent to ED further evaluation. In ER he was noted febrile and received zosyn.    1. Febrile syndrome. Sepsis with ESBL SHUKRI. Pyuria. Probable UTI. BPH, Urinary retention with munoz. ARF on CRF stage 3. Encephalopathy. Allergy to PCN.  -frail  -BC x 2, urine c/s noted  -the patient has been on zosyn despite his PCN allergy history; no reactions/ side effects  -doubt that patient is allergic to zosyn; will monitor  -on zosyn 3.375 gm IV q8h # 2  -tolerating abx well so far; no side effects noted  -change abx to meropenem 1 gm IV q12h  -reason for abx use and side effects reviewed with patient; monitor BMP   -monitor closely in [place] of PCN allergy history  -contact isolation  -monitor temps  -f/u CBC  -supportive care  2. Other issues:   -care per medicine    d/w medical team

## 2021-12-10 NOTE — PROGRESS NOTE ADULT - ASSESSMENT
89 year old male w CAD s/p PCI/Stent, HTN, BPH, Chronic LLE Hip Pain 2/2 OA, HLD, CKD II-IV, AAA, PAD who presented to  from [facility] by EMS for AMS.   Found to be tachycardic 105 w rectal temp 103.9      #Sepsis/bacteremia - poa and not related to MUNOZ - munoz placed in ED  #due to UTI/Hemorrhagic cystitis?  #AMS likely due to metabolic encephalopathy which has improved after treatment  -cont with zosyn  -f/u culture  data  -CT c/w b/l hydro and enlarge prostate  - recc munoz, proscar/flomax  -D/W urology and patient/family at length  -No viable options for treatment given his age and underlying comorbidities  -Ultimately his BPH will cause recurrent issues ie AUR and UTIs  -Will need chronic munoz placment, son and pt understand that there is risk of infection with munoz  -SW/CM to determine if pt can go back to [facility] with munoz otherwise needs higher level of care      #BPH w obstruction/Obstructive uropathy  CT w bilateral hydronephrosis  -munoz  -proscar  -flomax  - followup    #RASHAUN 2/2 to obstructive uropathy  Cr 2.67 vs 1.57 on 10-  abd CT scan w bilat hydro and prostate enlargement as mass effect on bladder  -Scr 2.19-> 1.9  -cont ivf and monitor Scr  -monitor UOP    #AAA  s/p repair w stable size 5.5      #Acute on chronic anemia 2/2 to hematuria  -stable  -guaiac neg in ED   -Hgb 7.6 today - asymptomatic  -Consent for PRBC obtained and in chart  -Transfuse for Hgb < 7 or if symptomatics  -mild hematuria , no clots noted. Resolved  -baseline 7-9      #Cardiovascular  Hx CAD w stent  HTN  HLD  HFpEF-stable  1. asa 81 mg qd - can continue with use  2. metoprolol 75 mg qd w parameters  3. atorvastatin 40 mg  4. lasix 40 mg BID w parameters  5. EKG w 1st degree block LAD w IVCD  no sig change when I compared to EKG 10-      #Chronic pain  1. gabapentin 300 mg BID  2. gabapentin 600 mg q HS  3. acetaminophen prn mild pain  4. tramadol 25 mg BID prn moderate      #Depression   1. cymbalta 60 mg      #VTE  IMPROVE VTE Individual Risk Assessment    RISK                                                                Points    [  ] Previous VTE                                                  3    [  ] Thrombophilia                                               2    [  ] Lower limb paralysis                                      2        (unable to hold up >15 seconds)      [  ] Current Cancer                                              2         (within 6 months)    [  ] Immobilization > 24 hrs                                1    [  ] ICU/CCU stay > 24 hours                              1    [ X ] Age > 60                                                      1    IMPROVE VTE Score ___1______    IMPROVE Score 0-1: Low Risk, No VTE prophylaxis required for most patients, encourage ambulation.   IMPROVE Score 2-3: At risk, pharmacologic VTE prophylaxis is indicated for most patients (in the absence of a contraindication)  IMPROVE Score > or = 4: High Risk, pharmacologic VTE prophylaxis is indicated for most patients (in the absence of a contraindication)      VTE sq heparin 5000 units q 12 hrs    GOC discussion took place with son and patient. They would like to review their options and requesting Pall care consult. For now remains DNR/I as previously d/w with admitting doctor.          Dispo: Sandra. Abx. Bacteremia/Sepsis. Repeat abx and monitor clearance. ID follow up. Monitor h/h from hematuria

## 2021-12-10 NOTE — CONSULT NOTE ADULT - SUBJECTIVE AND OBJECTIVE BOX
HPI: Pt is a 89y old Male with hx of 89 year old male w CAD s/p PCI/Stent, HTN, BPH, Chronic LLE Hip Pain 2/2 OA, HLD, CKD II-IV, AAA, PAD who presented to  from [facility] for AMS. He was last admitted  for UTI due to proteus mirabilis complicated by bacteremia and R hydronephrosis and was treated with meropenem then ceftriaxone x4 weeks 11- was seen by  and munoz cath was recommended. He now presents with AMS R/T a UTI. Palliative Consult to establish GOC.  12/10/21 Seen and examined at bedside with no family present. Pt C/O chronic pain LLE. Denies dyspnea. Endorses constipation      PAIN: (X)Yes   ( )No  Level: mild  Location: LLE  Intensity:   3 /10  Quality: ache  Aggravating Factors: movement  Alleviating Factors: Tylenol  Impact on ADLs: mild    DYSPNEA: ( ) Yes  ( X) No  Level:    PAST MEDICAL & SURGICAL HISTORY:  Leg swelling  related to cellulitis of LLE  CKD (chronic kidney disease) stage 3, GFR 30-59 ml/min  Cellulitis  BL  BPH (benign prostatic hyperplasia)  Colon polyp  Stented coronary artery  HTN (hypertension)  HLD (hyperlipidemia)  CAD (coronary artery disease)  OA (osteoarthritis)  PAD (peripheral artery disease)  AAA (abdominal aortic aneurysm) without rupture  S/P hemorrhoidectomy  H/O inguinal hernia  repair  History of colonoscopy  History of coronary artery stent placement        SOCIAL HX:  Lives in [facility]  Recently completed abx [facility]  Hx opiate tolerance ( )YES  (X )NO    Baseline ADLs  (Prior to Admission)  ( ) Independent   (X )Dependent    FAMILY HISTORY:  Family history unobtainable due to AMS          Review of Systems:    Anxiety-denies  Depression-situational  Physical Discomfort-mild  Dyspnea-denies  Constipation-yes  Anorexia-yes  Weight Loss-   Cough-denies  Fatigue-severe  Weakness-severe    All other systems reviewed and negative      PHYSICAL EXAM:    Vital Signs Last 24 Hrs  T(C): 36.4 (10 Dec 2021 08:07), Max: 36.6 (09 Dec 2021 20:58)  T(F): 97.6 (10 Dec 2021 08:07), Max: 97.9 (09 Dec 2021 20:58)  HR: 78 (10 Dec 2021 08:07) (61 - 78)  BP: 123/55 (10 Dec 2021 08:07) (104/43 - 123/55)  RR: 18 (10 Dec 2021 08:07) (17 - 18)  SpO2: 100% (10 Dec 2021 08:07) (93% - 100%)    PPSV2: 40  %    General: Frail elderly male in bed in NAD  Mental Status: Awake, A&O X2. poor historian   HEENT: oral mucosa dry  Lungs: clear diminished  Cardiac: S1S2+  GI: abd soft NT + BS  : munoz cath  Ext: moves on bed  Neuro: AMS      LABS:                        7.2    4.55  )-----------( 247      ( 10 Dec 2021 06:31 )             23.1     12-10    139  |  106  |  35<H>  ----------------------------<  105<H>  3.6   |  29  |  1.99<H>    Ca    7.6<L>      10 Dec 2021 06:31  Phos  3.5     12-08  Mg     2.0     12-08    TPro  7.6  /  Alb  3.1<L>  /  TBili  0.4  /  DBili  x   /  AST  17  /  ALT  14  /  AlkPhos  89  12-08    PT/INR - ( 08 Dec 2021 16:38 )   PT: 11.9 sec;   INR: 1.03 ratio         PTT - ( 08 Dec 2021 16:38 )  PTT:29.2 sec  Albumin: Albumin, Serum: 3.1 g/dL (12-08 @ 16:38)      Allergies    No Known Allergies    Intolerances      MEDICATIONS  (STANDING):  aspirin enteric coated 81 milliGRAM(s) Oral daily  atorvastatin 40 milliGRAM(s) Oral at bedtime  bisacodyl Suppository 10 milliGRAM(s) Rectal once  DULoxetine 60 milliGRAM(s) Oral daily  finasteride 5 milliGRAM(s) Oral daily  gabapentin 300 milliGRAM(s) Oral two times a day  gabapentin 600 milliGRAM(s) Oral at bedtime  meropenem  IVPB 1000 milliGRAM(s) IV Intermittent every 12 hours  metoprolol succinate ER 75 milliGRAM(s) Oral daily  multivitamin 1 Tablet(s) Oral daily  tamsulosin 0.8 milliGRAM(s) Oral at bedtime    MEDICATIONS  (PRN):  acetaminophen     Tablet .. 650 milliGRAM(s) Oral every 6 hours PRN Temp greater or equal to 38C (100.4F), Mild Pain (1 - 3)  melatonin 3 milliGRAM(s) Oral at bedtime PRN Insomnia  ondansetron Injectable 4 milliGRAM(s) IV Push every 8 hours PRN Nausea and/or Vomiting  traMADol 25 milliGRAM(s) Oral two times a day PRN Moderate Pain (4 - 6)      RADIOLOGY/ADDITIONAL STUDIES:  < from: CT Abdomen and Pelvis No Cont (12.08.21 @ 19:34) >  ACC: [id number] EXAM:  CT ABDOMEN AND PELVIS                          PROCEDURE DATE:  12/08/2021          INTERPRETATION:  CLINICAL INFORMATION: Fever, urinary tract infection,   evaluate for hydronephrosis    COMPARISON: Renal ultrasound 10/16/2021. CT chest abdomen pelvis 9/3/2021    CONTRAST/COMPLICATIONS:  IV Contrast: NONE  Oral Contrast: NONE  Complications: None reported at time of study completion    PROCEDURE:  CT of the Abdomen and Pelvis was performed.  Sagittal and coronal reformats were performed.    FINDINGS:  LOWER CHEST: Patient limited evaluation. Trace left pleural effusion.   Cardiomegaly. Coronary artery calcifications. Moderate-sized hiatal   hernia.    LIVER: Within normal limits.  BILE DUCTS: Normal caliber.  GALLBLADDER: Within normal limits.  SPLEEN: Within normal limits.  PANCREAS: Within normal limits.  ADRENALS: Within normal limits.  KIDNEYS/URETERS: Moderate right hydronephrosis and hydroureter and mild   left hydronephrosis and hydroureter to the level of the urinary bladder.    BLADDER: Diffuse bladder wall thickening which is indeterminant.  REPRODUCTIVE ORGANS: Enlarged prostate with mass effect on the urinary   bladder    BOWEL: Moderate sized hiatal hernia. No bowel obstruction. Appendix is   normal. Diverticulosis.  PERITONEUM: No ascites.  VESSELS: Status post aortic biiliac aneurysm repair. Abdominal aorta   aneurysmal sac measuring 5.5 cm, unchanged.  RETROPERITONEUM/LYMPH NODES: No lymphadenopathy.  ABDOMINAL WALL: Bilateral fat-containinginguinal hernias. Postsurgical   changes in the right inguinal region.  BONES: Degenerative changes.    IMPRESSION:    Moderate right hydronephrosis and hydroureter and mild left   hydronephrosis and hydroureter to the level of the urinary bladder.    Diffuse bladder wall thickening which is indeterminant. Inserted direct   visualization.    Enlarged prostate gland with mass effect on the urinary bladder.    < from: Xray Chest 1 View- PORTABLE-Urgent (12.08.21 @ 17:17) >    ACC: [id number] EXAM:  XR CHEST PORTABLE URGENT 1V                          PROCEDURE DATE:  12/08/2021          INTERPRETATION:  Portable chest radiograph    CLINICAL INFORMATION: Sepsis    TECHNIQUE:  Portable  AP view of the chest.    COMPARISON: 3/31/2021 chest available for review.    FINDINGS:  LEFT basilar small effusion. Remaining lung parenchyma clear. Remaining   lung parenchyma clear.  .   No pneumothorax.    The heart and mediastinum size and configuration are within normal limits.    Visualized osseous structures are intact.    IMPRESSION:   Small LEFT pleural effusion..    --- End of Report ---      < from: CT Head No Cont (12.08.21 @ 16:56) >    ACC: [id number] EXAM:  CT BRAIN                          PROCEDURE DATE:  12/08/2021          INTERPRETATION:  INDICATION:  Altered mental status  TECHNIQUE:  A non contrast 2.5 or 3 mm axial CT study of the brain was   performed from skull base to vertex. Coronal and sagittal reformations   were generated from the axial data.  COMPARISON EXAMINATION:  CT dated 9/26/2021    FINDINGS:    HEMISPHERES:  Chronic ischemic changes are scattered in both hemispheres   with volume loss. Similar findings were noted on prior study. No acute   infarct, hemorrhage, or mass lesion noted.  VENTRICLES:  Mildly prominent, but stable compared with prior CT.  POSTERIOR FOSSA:  The brain stem and cerebellum are unremarkable.  No CP   angle lesion noted.  EXTRACEREBRAL SPACES:  No subdural or epidural collections are noted.  SKULL BASE AND CALVARIUM:  Appears intact.  No fracture or destructive   lesion is identified.  SINUSES AND MASTOIDS:  Clear.  MISCELLANEOUS:  No orbital or suprasellar abnormality noted.    IMPRESSION:    1)  scattered chronic ischemic changes with volume loss. Borderline   prominent ventricles, without transependymal migration of CSF. No CT   evidence of an acute infarct or hemorrhage.  2)  follow-up MR imaging of the brain may be considered for further   assessment.

## 2021-12-10 NOTE — PHYSICAL THERAPY INITIAL EVALUATION ADULT - ACTIVE RANGE OF MOTION EXAMINATION, REHAB EVAL
Patient refused to move LLE due to arthritic pain./bilateral upper extremity Active ROM was WFL (within functional limits)/Right LE Active ROM was WFL (within functional limits)

## 2021-12-10 NOTE — PHYSICAL THERAPY INITIAL EVALUATION ADULT - LEVEL OF INDEPENDENCE: SIT/SUPINE, REHAB EVAL
Patient refused out of bed assessment due to "having a bad day." Will follow. Patient refused out of bed assessment due to "having a bad day." H/H low at 7.6/24.1.  Will follow.

## 2021-12-10 NOTE — PROGRESS NOTE ADULT - SUBJECTIVE AND OBJECTIVE BOX
Patient is a 89y old  Male who presents with a chief complaint of sepsis  UTI  AMS (09 Dec 2021 12:12)      SUBJECTIVE:   HPI:    89 year old male w CAD s/p PCI/Stent, HTN, BPH, Chronic LLE Hip Pain 2/2 OA, HLD, CKD II-IV, AAA, PAD who presented to  from [facility] by EMS for AMS.   According to [facility] notes he was last known well 15 minutes prior to onset of lethargy and AMS  As per EMS (to ED)  pt was talking to staff when he suddenly became altered and felt cold and lethargic so pt was sent to ED further evaluation.  He was last admitted to   for UTI due to proteus mirabilis complicated by bacteremia and R hydronephrosis  Treated w meropenem then ceftriaxone x4 weeks   11- was seen by  where munoz was recommended . munoz replaced in ED  In ED /81    RR 18   T 103.9 rectal  sat 85% RA; repeat 96% on 4 L nc  vomited in CT w abd distension and no pain on palpation·           sub: pt c/o constipation, no BM in 4 days. Says usual for him. Munoz now iwth clear urine. Hgb 7.6        PAST MEDICAL HX  AAA (abdominal aortic aneurysm) without rupture  Anemia   BPH (benign prostatic hyperplasia)   CAD (coronary artery disease)   Cellulitis BL  CKD (chronic kidney disease) stage 3, GFR 30-59 ml/min   Colon polyp   HLD (hyperlipidemia)   HTN (hypertension)   Leg swelling related to cellulitis of LLE  OA (osteoarthritis)   PAD (peripheral artery disease)   Stented coronary artery.     PAST SURGICAL HX:  H/O inguinal hernia repair  History of colonoscopy   History of coronary artery stent placement   S/P hemorrhoidectomy.     FAMILY HX  Family history unobtainable, d/t dementia.      SOCIAL HX  [facility]  nonsmoker     (08 Dec 2021 19:56)        REVIEW OF SYSTEMS:    CONSTITUTIONAL: No weakness, fevers or chills  EYES/ENT: No visual changes;  No vertigo or throat pain   NECK: No pain or stiffness  RESPIRATORY: No cough, wheezing, hemoptysis; No shortness of breath  CARDIOVASCULAR: No chest pain or palpitations  GASTROINTESTINAL: No abdominal or epigastric pain. No nausea, vomiting, or hematemesis; No diarrhea or constipation. No melena or hematochezia.  GENITOURINARY: No dysuria, frequency or hematuria  NEUROLOGICAL: No numbness or weakness  SKIN: No itching, burning, rashes, or lesions   All other review of systems is negative unless indicated above    ICU Vital Signs Last 24 Hrs  T(C): 36.4 (10 Dec 2021 08:07), Max: 36.6 (09 Dec 2021 20:58)  T(F): 97.6 (10 Dec 2021 08:07), Max: 97.9 (09 Dec 2021 20:58)  HR: 78 (10 Dec 2021 08:07) (61 - 78)  BP: 123/55 (10 Dec 2021 08:07) (104/43 - 123/55)  BP(mean): --  ABP: --  ABP(mean): --  RR: 18 (10 Dec 2021 08:07) (17 - 18)  SpO2: 100% (10 Dec 2021 08:07) (93% - 100%)    CAPILLARY BLOOD GLUCOSE      POCT Blood Glucose.: 82 mg/dL (08 Dec 2021 16:14)      PHYSICAL EXAM:    Constitutional: NAD, awake and alert,   HEENT: PERR, EOMI, Normal Hearing, MMM  Neck: Soft and supple, No LAD, No JVD  Respiratory: Breath sounds are clear bilaterally, No wheezing, rales or rhonchi  Cardiovascular: S1 and S2, regular rate and rhythm, no Murmurs, gallops or rubs  Gastrointestinal: Bowel Sounds present, soft, nontender, nondistended, no guarding, no rebound  Extremities: No peripheral edema  Vascular: 2+ peripheral pulses  Neurological: A/O x 2-3, no focal deficits  Musculoskeletal: 5/5 strength b/l upper and lower extremities  Skin: No rashes    MEDICATIONS:  MEDICATIONS  (STANDING):  aspirin enteric coated 81 milliGRAM(s) Oral daily  atorvastatin 40 milliGRAM(s) Oral at bedtime  DULoxetine 60 milliGRAM(s) Oral daily  furosemide    Tablet 40 milliGRAM(s) Oral two times a day  gabapentin 300 milliGRAM(s) Oral two times a day  gabapentin 600 milliGRAM(s) Oral at bedtime  heparin   Injectable 5000 Unit(s) SubCutaneous every 12 hours  metoprolol succinate ER 75 milliGRAM(s) Oral daily  multivitamin 1 Tablet(s) Oral daily  piperacillin/tazobactam IVPB.. 3.375 Gram(s) IV Intermittent every 8 hours  sodium chloride 0.9%. 1000 milliLiter(s) (100 mL/Hr) IV Continuous <Continuous>  tamsulosin 0.8 milliGRAM(s) Oral at bedtime      LABS: All Labs Reviewed:                        8.0    4.65  )-----------( 232      ( 09 Dec 2021 07:10 )             25.5     12-09    140  |  108  |  36<H>  ----------------------------<  123<H>  3.7   |  27  |  2.19<H>    Ca    8.3<L>      09 Dec 2021 07:10  Phos  3.5     12-08  Mg     2.0     12-08    TPro  7.6  /  Alb  3.1<L>  /  TBili  0.4  /  DBili  x   /  AST  17  /  ALT  14  /  AlkPhos  89  12-08    PT/INR - ( 08 Dec 2021 16:38 )   PT: 11.9 sec;   INR: 1.03 ratio         PTT - ( 08 Dec 2021 16:38 )  PTT:29.2 sec          Blood Culture:     RADIOLOGY/EKG: reviewed      < from: CT Head No Cont (12.08.21 @ 16:56) >  IMPRESSION:    1)  scattered chronic ischemic changes with volume loss. Borderline   prominent ventricles, without transependymal migration of CSF. No CT   evidence of an acute infarct or hemorrhage.  2)  follow-up MR imaging of the brain may be considered for further   assessment.    < end of copied text >  < from: Xray Chest 1 View- PORTABLE-Urgent (12.08.21 @ 17:17) >  IMPRESSION:   Small LEFT pleural effusion..    --- End of Report ---      < end of copied text >    < from: CT Abdomen and Pelvis No Cont (12.08.21 @ 19:34) >  IMPRESSION:    Moderate right hydronephrosis and hydroureter and mild left   hydronephrosis and hydroureter to the level of the urinary bladder.    Diffuse bladder wall thickening which is indeterminant. Inserted direct   visualization.    Enlarged prostate gland with mass effect on the urinary bladder.      < end of copied text >

## 2021-12-10 NOTE — PROVIDER CONTACT NOTE (MEDICATION) - NAME OF MD/NP/PA/DO NOTIFIED:
SUBJECTIVE:     Mary Dudley is a 2 month old female, here for a routine health maintenance visit.    Patient was roomed by: Yadira Laird LPN    Doing well.  Dad, Jose, does smoke 1-1/2 packs/day- NRT was prescribed for him and he was encouraged to establish with a PCP.     Well Child    Social History  Patient accompanied by:  Mother and father  Questions or concerns?: No    Forms to complete? No  Child lives with::  Mother, father and sisters  Who takes care of your child?:  Mother, father and   Languages spoken in the home:  English  Recent family changes/ special stressors?:  None noted    Safety / Health Risk  Is your child around anyone who smokes?  YES; passive exposure from smoking outside home    TB Exposure:     No TB exposure    Car seat < 6 years old, in  back seat, rear-facing, 5-point restraint? Yes    Home Safety Survey:      Firearms in the home?: No      Hearing / Vision  Hearing or vision concerns?  No concerns, hearing and vision subjectively normal    Daily Activities    Water source:  Well water and bottled water with fluoride  Nutrition:  Formula and breastmilk  Breastfeeding concerns?  None, breastfeeding going well; no concerns  Formula:  [place]  Vitamins & Supplements:  Yes      Vitamin type: D only    Elimination       Urinary frequency:more than 6 times per 24 hours     Stool frequency: 1-3 times per 24 hours     Stool consistency: soft     Elimination problems:  None    Sleep      Sleep arrangement:bassinet    Sleep position:  On back    Sleep pattern: SLEEPS THROUGH NIGHT    [place]  Depression Scale (EPDS) Risk Assessment: Completed [place]    BIRTH HISTORY  [place] metabolic screening: All components normal    DEVELOPMENT  No screening tool used  Milestones (by observation/ exam/ report) 75-90% ile  PERSONAL/ SOCIAL/COGNITIVE:    Regards face    Smiles responsively  LANGUAGE:    Vocalizes    Responds to sound  GROSS MOTOR:    Lift head when prone     "Kicks / equal movements  FINE MOTOR/ ADAPTIVE:    Eyes follow past midline    Reflexive grasp    PROBLEM LIST  Patient Active Problem List   Diagnosis     Liveborn infant, born in hospital,  delivery     Positive direct Bk test     Secondhand smoke exposure     MEDICATIONS  Current Outpatient Medications   Medication Sig Dispense Refill     cholecalciferol (D-VI-SOL, VITAMIN D3) 10 mcg/mL (400 units/mL) LIQD liquid Take 1 mL (10 mcg) by mouth daily 1 mL 0     Cholecalciferol (VITAMIN D3) 10 MCG/ML LIQD Take 1 mL (10 mcg) by mouth daily (Patient not taking: Reported on 2020)        ALLERGY  No Known Allergies    IMMUNIZATIONS  Immunization History   Administered Date(s) Administered     DTaP / Hep B / IPV 2021     Hep B, Peds or Adolescent 2020     Hib (PRP-T) 2021     Pneumo Conj 13-V (2010&after) 2021     Rotavirus, monovalent, 2-dose 2021       HEALTH HISTORY SINCE LAST VISIT  No surgery, major illness or injury since last physical exam    ROS  Constitutional, eye, ENT, skin, respiratory, cardiac, and GI are normal except as otherwise noted.    OBJECTIVE:   EXAM  Pulse 136   Temp 98.3  F (36.8  C) (Axillary)   Resp (!) 34   Ht 0.61 m (2')   Wt 5.33 kg (11 lb 12 oz)   HC 39.4 cm (15.5\")   BMI 14.34 kg/m    78 %ile (Z= 0.78) based on WHO (Girls, 0-2 years) head circumference-for-age based on Head Circumference recorded on 2021.  56 %ile (Z= 0.15) based on WHO (Girls, 0-2 years) weight-for-age data using vitals from 2021.  96 %ile (Z= 1.72) based on WHO (Girls, 0-2 years) Length-for-age data based on Length recorded on 2021.  6 %ile (Z= -1.55) based on WHO (Girls, 0-2 years) weight-for-recumbent length data based on body measurements available as of 2021.  GENERAL: Active, alert,  no  distress.  SKIN: Clear. No significant rash, abnormal pigmentation or lesions.  HEAD: Normocephalic. Normal fontanels and sutures.  EYES: Conjunctivae and cornea " FARIHA Glaser normal. Red reflexes present bilaterally.  EARS: normal: no effusions, no erythema, normal landmarks  NOSE: Normal without discharge.  MOUTH/THROAT: Clear. No oral lesions.  NECK: Supple, no masses.  LYMPH NODES: No adenopathy  LUNGS: Clear. No rales, rhonchi, wheezing or retractions  HEART: Regular rate and rhythm. Normal S1/S2. No murmurs. Normal femoral pulses.  ABDOMEN: Soft, non-tender, not distended, no masses or hepatosplenomegaly. Normal umbilicus and bowel sounds.   GENITALIA: Normal female external genitalia. Rajan stage I,  No inguinal herniae are present.  EXTREMITIES: Hips normal with negative Ortolani and Bishop. Symmetric creases and  no deformities  NEUROLOGIC: Normal tone throughout. Normal reflexes for age    ASSESSMENT/PLAN:       ICD-10-CM    1. Encounter for routine child health examination w/o abnormal findings  Z00.129 MATERNAL HEALTH RISK ASSESSMENT (78195)- EPDS   2. Secondhand smoke exposure  Z77.22    Dad, Jose, does smoke 1-1/2 packs/day- NRT was prescribed for him and he was encouraged to establish with a PCP.     Anticipatory Guidance  Reviewed Anticipatory Guidance in patient instructions    Preventive Care Plan  Immunizations     I provided face to face vaccine counseling, answered questions, and explained the benefits and risks of the vaccine components ordered today  Referrals/Ongoing Specialty care: No   See other orders in EpicCare    Resources:  [place] Child and Teen Checkups (C&TC) Schedule of Age-Related Screening Standards    FOLLOW-UP:    4 month Preventive Care visit    Emerita Haney MD  [facility] AND [facility]

## 2021-12-10 NOTE — PHYSICAL THERAPY INITIAL EVALUATION ADULT - ADDITIONAL COMMENTS
Patient from [facility] and reports being WC bound. Son in room and reports patient ambulates minimally w/ RW and staff from [facility].

## 2021-12-10 NOTE — PHYSICAL THERAPY INITIAL EVALUATION ADULT - MODALITIES TREATMENT COMMENTS
Patient left as found in bed w/ all lines intact, call bell in reach, and bed alarm active. Donned Cair boot to LLE to prevent heel ulcer. ZACHARY Spaulding in room w/ patient and soon discussing patient's future. RN informed of session/status. Patient left as found in bed w/ all lines intact, call bell in reach, and bed alarm active. Donned Cair boot to LLE to prevent heel ulcer. ZACHARY Spaulding in room w/ patient and son discussing patient's future. RN informed of session/status.

## 2021-12-10 NOTE — PHYSICAL THERAPY INITIAL EVALUATION ADULT - PERTINENT HX OF CURRENT PROBLEM, REHAB EVAL
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 According to [facility] notes he was last known well 15 minutes prior to onset of lethargy and AMS. As per EMS (to ED)  pt was talking to staff when he suddenly became altered and felt cold and lethargic so pt was sent to ED further evaluation.

## 2021-12-10 NOTE — PHYSICAL THERAPY INITIAL EVALUATION ADULT - MANUAL MUSCLE TESTING RESULTS, REHAB EVAL
except RLE hip flexion 3+/5 and LLE not tested due to L hip arthritic pain/no strength deficits were identified

## 2021-12-11 LAB
-  AMIKACIN: SIGNIFICANT CHANGE UP
-  AMPICILLIN/SULBACTAM: SIGNIFICANT CHANGE UP
-  AMPICILLIN: SIGNIFICANT CHANGE UP
-  AZTREONAM: SIGNIFICANT CHANGE UP
-  CEFAZOLIN: SIGNIFICANT CHANGE UP
-  CEFEPIME: SIGNIFICANT CHANGE UP
-  CEFOXITIN: SIGNIFICANT CHANGE UP
-  CEFTAZIDIME/AVIBACTAM: SIGNIFICANT CHANGE UP
-  CEFTOLOZANE/TAZOBACTAM: SIGNIFICANT CHANGE UP
-  CEFTRIAXONE: SIGNIFICANT CHANGE UP
-  CIPROFLOXACIN: SIGNIFICANT CHANGE UP
-  ERTAPENEM: SIGNIFICANT CHANGE UP
-  GENTAMICIN: SIGNIFICANT CHANGE UP
-  IMIPENEM: SIGNIFICANT CHANGE UP
-  LEVOFLOXACIN: SIGNIFICANT CHANGE UP
-  MEROPENEM: SIGNIFICANT CHANGE UP
-  PIPERACILLIN/TAZOBACTAM: SIGNIFICANT CHANGE UP
-  TIGECYCLINE: SIGNIFICANT CHANGE UP
-  TOBRAMYCIN: SIGNIFICANT CHANGE UP
-  TRIMETHOPRIM/SULFAMETHOXAZOLE: SIGNIFICANT CHANGE UP
ANION GAP SERPL CALC-SCNC: 5 MMOL/L — SIGNIFICANT CHANGE UP (ref 5–17)
BUN SERPL-MCNC: 29 MG/DL — HIGH (ref 7–23)
CALCIUM SERPL-MCNC: 8.3 MG/DL — LOW (ref 8.5–10.1)
CHLORIDE SERPL-SCNC: 110 MMOL/L — HIGH (ref 96–108)
CO2 SERPL-SCNC: 28 MMOL/L — SIGNIFICANT CHANGE UP (ref 22–31)
CREAT SERPL-MCNC: 1.79 MG/DL — HIGH (ref 0.5–1.3)
CULTURE RESULTS: SIGNIFICANT CHANGE UP
CULTURE RESULTS: SIGNIFICANT CHANGE UP
GLUCOSE SERPL-MCNC: 99 MG/DL — SIGNIFICANT CHANGE UP (ref 70–99)
HCT VFR BLD CALC: 22.3 % — LOW (ref 39–50)
HGB BLD-MCNC: 7.1 G/DL — LOW (ref 13–17)
MCHC RBC-ENTMCNC: 29.2 PG — SIGNIFICANT CHANGE UP (ref 27–34)
MCHC RBC-ENTMCNC: 31.8 GM/DL — LOW (ref 32–36)
MCV RBC AUTO: 91.8 FL — SIGNIFICANT CHANGE UP (ref 80–100)
METHOD TYPE: SIGNIFICANT CHANGE UP
ORGANISM # SPEC MICROSCOPIC CNT: SIGNIFICANT CHANGE UP
PLATELET # BLD AUTO: 237 K/UL — SIGNIFICANT CHANGE UP (ref 150–400)
POTASSIUM SERPL-MCNC: 3.3 MMOL/L — LOW (ref 3.5–5.3)
POTASSIUM SERPL-SCNC: 3.3 MMOL/L — LOW (ref 3.5–5.3)
RBC # BLD: 2.43 M/UL — LOW (ref 4.2–5.8)
RBC # FLD: 15.4 % — HIGH (ref 10.3–14.5)
SODIUM SERPL-SCNC: 143 MMOL/L — SIGNIFICANT CHANGE UP (ref 135–145)
SPECIMEN SOURCE: SIGNIFICANT CHANGE UP
SPECIMEN SOURCE: SIGNIFICANT CHANGE UP
WBC # BLD: 5.58 K/UL — SIGNIFICANT CHANGE UP (ref 3.8–10.5)
WBC # FLD AUTO: 5.58 K/UL — SIGNIFICANT CHANGE UP (ref 3.8–10.5)

## 2021-12-11 PROCEDURE — 99232 SBSQ HOSP IP/OBS MODERATE 35: CPT

## 2021-12-11 RX ORDER — POTASSIUM CHLORIDE 20 MEQ
20 PACKET (EA) ORAL
Refills: 0 | Status: COMPLETED | OUTPATIENT
Start: 2021-12-11 | End: 2021-12-11

## 2021-12-11 RX ADMIN — Medication 75 MILLIGRAM(S): at 11:05

## 2021-12-11 RX ADMIN — GABAPENTIN 600 MILLIGRAM(S): 400 CAPSULE ORAL at 21:25

## 2021-12-11 RX ADMIN — Medication 81 MILLIGRAM(S): at 11:02

## 2021-12-11 RX ADMIN — Medication 1 TABLET(S): at 11:02

## 2021-12-11 RX ADMIN — DULOXETINE HYDROCHLORIDE 60 MILLIGRAM(S): 30 CAPSULE, DELAYED RELEASE ORAL at 11:03

## 2021-12-11 RX ADMIN — HEPARIN SODIUM 5000 UNIT(S): 5000 INJECTION INTRAVENOUS; SUBCUTANEOUS at 21:25

## 2021-12-11 RX ADMIN — ATORVASTATIN CALCIUM 40 MILLIGRAM(S): 80 TABLET, FILM COATED ORAL at 21:25

## 2021-12-11 RX ADMIN — TAMSULOSIN HYDROCHLORIDE 0.8 MILLIGRAM(S): 0.4 CAPSULE ORAL at 21:25

## 2021-12-11 RX ADMIN — GABAPENTIN 300 MILLIGRAM(S): 400 CAPSULE ORAL at 11:01

## 2021-12-11 RX ADMIN — FINASTERIDE 5 MILLIGRAM(S): 5 TABLET, FILM COATED ORAL at 11:03

## 2021-12-11 RX ADMIN — MEROPENEM 100 MILLIGRAM(S): 1 INJECTION INTRAVENOUS at 11:05

## 2021-12-11 RX ADMIN — Medication 20 MILLIEQUIVALENT(S): at 11:08

## 2021-12-11 RX ADMIN — Medication 20 MILLIEQUIVALENT(S): at 13:35

## 2021-12-11 RX ADMIN — Medication 3 MILLIGRAM(S): at 21:25

## 2021-12-11 RX ADMIN — MEROPENEM 100 MILLIGRAM(S): 1 INJECTION INTRAVENOUS at 21:24

## 2021-12-11 RX ADMIN — HEPARIN SODIUM 5000 UNIT(S): 5000 INJECTION INTRAVENOUS; SUBCUTANEOUS at 11:02

## 2021-12-11 NOTE — PROGRESS NOTE ADULT - ASSESSMENT
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   Found to be tachycardic 105 w rectal temp 103.9      #Sepsis/bacteremia - poa and not related to MUNOZ - munoz placed in ED  #due to UTI/Hemorrhagic cystitis?  #AMS likely due to metabolic encephalopathy which has improved after treatment  - ucx - ESBl Morganella - abx changed to meropenem on 12/10 as per ID recs.    -CT c/w b/l hydro and enlarge prostate  - recc munoz, proscar/flomax  -D/W urology and patient/family at length  -No viable options for treatment given his age and underlying comorbidities  -Ultimately his BPH will cause recurrent issues ie AUR and UTIs  -Will need chronic munoz placements son and pt understand that there is risk of infection with munoz  -SW/CM to determine if pt can go back to [facility] with munoz otherwise needs higher level of care      #BPH w obstruction/Obstructive uropathy  CT w bilateral hydronephrosis  -munoz  -proscar  -flomax  - followup    #RASHAUN 2/2 to obstructive uropathy - improving   Cr 2.67 vs 1.57 on 10-  abd CT scan w bilat hydro and prostate enlargement as mass effect on bladder  -Scr 2.19-> 1.9  -cont ivf and monitor Scr  -monitor UOP    #AAA  s/p repair w stable size 5.5      #Acute on chronic anemia 2/2 to hematuria - transfuse 1 unit PRBC given underlying CAD and dropping hgb. no obvious source of bleeding.   -stable  -guaiac neg in ED   -Hgb 7.6 today - asymptomatic  -Consent for PRBC obtained and in chart  -Transfuse for Hgb < 7 or if symptomatics  -mild hematuria , no clots noted. Resolved  -baseline 7-9      #Cardiovascular  Hx CAD w stent  HTN  HLD  HFpEF-stable  1. asa 81 mg qd - can continue with use  2. metoprolol 75 mg qd w parameters  3. atorvastatin 40 mg  4. lasix 40 mg BID w parameters  5. EKG w 1st degree block LAD w IVCD  no sig change when I compared to EKG 10-      #Chronic pain  1. gabapentin 300 mg BID  2. gabapentin 600 mg q HS  3. acetaminophen prn mild pain  4. tramadol 25 mg BID prn moderate      #Depression   1. cymbalta 60 mg      #VTE  IMPROVE VTE Individual Risk Assessment    RISK                                                                Points    [  ] Previous VTE                                                  3    [  ] Thrombophilia                                               2    [  ] Lower limb paralysis                                      2        (unable to hold up >15 seconds)      [  ] Current Cancer                                              2         (within 6 months)    [  ] Immobilization > 24 hrs                                1    [  ] ICU/CCU stay > 24 hours                              1    [ X ] Age > 60                                                      1    IMPROVE VTE Score ___1______    IMPROVE Score 0-1: Low Risk, No VTE prophylaxis required for most patients, encourage ambulation.   IMPROVE Score 2-3: At risk, pharmacologic VTE prophylaxis is indicated for most patients (in the absence of a contraindication)  IMPROVE Score > or = 4: High Risk, pharmacologic VTE prophylaxis is indicated for most patients (in the absence of a contraindication)      VTE sq heparin 5000 units q 12 hrs    GOC discussion took place with son and patient. They would like to review their options and requesting Pall care consult. For now remains DNR/I as previously d/w with admitting doctor.          Dispo: Sandra. Abx. Bacteremia/Sepsis. Repeat abx and monitor clearance. ID follow up. Monitor h/h from hematuria

## 2021-12-11 NOTE — PROGRESS NOTE ADULT - SUBJECTIVE AND OBJECTIVE BOX
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   According to [facility] notes he was last known well 15 minutes prior to onset of lethargy and AMS  As per EMS (to ED)  pt was talking to staff when he suddenly became altered and felt cold and lethargic so pt was sent to ED further evaluation.  He was last admitted to   for UTI due to proteus mirabilis complicated by bacteremia and R hydronephrosis  Treated w meropenem then ceftriaxone x4 weeks   11- was seen by [facility] where munoz was recommended . munoz replaced in ED  In ED /81    RR 18   T 103.9 rectal  sat 85% RA; repeat 96% on 4 L nc  vomited in CT w abd distension and no pain on palpation·       12/10 -pt c/o constipation, no BM in 4 days. Says usual for him. Munoz now with clear urine. Hgb 7.6  12/11 - no events overnight.      ROS:   All 10 systems reviewed and found to be negative with the exception of what has been described above.    Vital Signs Last 24 Hrs  T(C): 36.7 (11 Dec 2021 08:42), Max: 36.8 (10 Dec 2021 22:20)  T(F): 98.1 (11 Dec 2021 08:42), Max: 98.2 (10 Dec 2021 22:20)  HR: 70 (11 Dec 2021 08:42) (62 - 79)  BP: 113/43 (11 Dec 2021 08:42) (113/43 - 115/54)  BP(mean): --  RR: 18 (11 Dec 2021 08:42) (18 - 18)  SpO2: 93% (11 Dec 2021 08:42) (93% - 96%)  GEN: lying in bed, NAD  HEENT:   NC/AT, pupils equal and reactive, EOMI  CV:  +S1, +S2, RRR  RESP:   lungs clear to auscultation bilaterally, no wheeze, rales, rhonchi   BREAST:  not examined  GI:  abdomen soft, non-tender, non-distended, normoactive BS  RECTAL:  not examined  :  not examined  MSK:   normal muscle tone  EXT:  no edema  NEURO:  AAOX3, no focal neurological deficits  SKIN:  no rashes                              7.1    5.58  )-----------( 237      ( 11 Dec 2021 06:45 )             22.3     12-11    143  |  110<H>  |  29<H>  ----------------------------<  99  3.3<L>   |  28  |  1.79<H>    Ca    8.3<L>      11 Dec 2021 06:45         from: CT Head No Cont (12.08.21 @ 16:56) >  IMPRESSION:    1)  scattered chronic ischemic changes with volume loss. Borderline   prominent ventricles, without transependymal migration of CSF. No CT   evidence of an acute infarct or hemorrhage.  2)  follow-up MR imaging of the brain may be considered for further   assessment.    < end of copied text >  < from: Xray Chest 1 View- PORTABLE-Urgent (12.08.21 @ 17:17) >  IMPRESSION:   Small LEFT pleural effusion..    --- End of Report ---      < end of copied text >    < from: CT Abdomen and Pelvis No Cont (12.08.21 @ 19:34) >  IMPRESSION:    Moderate right hydronephrosis and hydroureter and mild left   hydronephrosis and hydroureter to the level of the urinary bladder.    Diffuse bladder wall thickening which is indeterminant. Inserted direct   visualization.    Enlarged prostate gland with mass effect on the urinary bladder.      < end of copied text >

## 2021-12-11 NOTE — PROGRESS NOTE ADULT - SUBJECTIVE AND OBJECTIVE BOX
Date of service: 21 @ 13:41    Lying in bed in NAD  Weak looking  No fever  Alert and confused    ROS: no fever or chills; poorly verbal    MEDICATIONS  (STANDING):  aspirin enteric coated 81 milliGRAM(s) Oral daily  atorvastatin 40 milliGRAM(s) Oral at bedtime  DULoxetine 60 milliGRAM(s) Oral daily  finasteride 5 milliGRAM(s) Oral daily  gabapentin 300 milliGRAM(s) Oral two times a day  gabapentin 600 milliGRAM(s) Oral at bedtime  heparin   Injectable 5000 Unit(s) SubCutaneous every 12 hours  meropenem  IVPB 1000 milliGRAM(s) IV Intermittent every 12 hours  metoprolol succinate ER 75 milliGRAM(s) Oral daily  multivitamin 1 Tablet(s) Oral daily  tamsulosin 0.8 milliGRAM(s) Oral at bedtime    Vital Signs Last 24 Hrs  T(C): 36.7 (11 Dec 2021 08:42), Max: 36.8 (10 Dec 2021 22:20)  T(F): 98.1 (11 Dec 2021 08:42), Max: 98.2 (10 Dec 2021 22:20)  HR: 70 (11 Dec 2021 08:42) (62 - 79)  BP: 113/43 (11 Dec 2021 08:42) (113/43 - 115/54)  BP(mean): --  RR: 18 (11 Dec 2021 08:42) (18 - 18)  SpO2: 93% (11 Dec 2021 08:42) (93% - 96%)     Physical exam:    Constitutional:  No acute distress  HEENT: NC/AT, EOMI, PERRLA, conjunctivae clear; ears and nose atraumatic  Neck: supple; thyroid not palpable  Back: no tenderness  Respiratory: respiratory effort normal; clear to auscultation  Cardiovascular: S1S2 regular, no murmurs  Abdomen: soft, not tender, not distended, positive BS  Genitourinary: has suprapubic tenderness  umnoz with dark urine in bag  Lymphatic: no LN palpable  Musculoskeletal: no muscle tenderness, no joint swelling or tenderness  Extremities: no pedal edema  Neurological/ Psychiatric: Alert, moving all extremities  Skin: no rashes; no palpable lesions    Labs: all available labs reviewed                        8.0    4.65  )-----------( 232      ( 09 Dec 2021 07:10 )             25.5     12-09    140  |  108  |  36<H>  ----------------------------<  123<H>  3.7   |  27  |  2.19<H>    Ca    8.3<L>      09 Dec 2021 07:10  Phos  3.5     12  Mg     2.0     12    TPro  7.6  /  Alb  3.1<L>  /  TBili  0.4  /  DBili  x   /  AST  17  /  ALT  14  /  AlkPhos  89  12-     LIVER FUNCTIONS - ( 08 Dec 2021 16:38 )  Alb: 3.1 g/dL / Pro: 7.6 gm/dL / ALK PHOS: 89 U/L / ALT: 14 U/L / AST: 17 U/L / GGT: x           Urinalysis Basic - ( 08 Dec 2021 16:42 )    Color: Yellow / Appearance: cloudy / S.005 / pH: x  Gluc: x / Ketone: Negative  / Bili: Negative / Urobili: Negative mg/dL   Blood: x / Protein: 100 mg/dL / Nitrite: Negative   Leuk Esterase: Moderate / RBC: 6-10 /HPF / WBC >50   Sq Epi: x / Non Sq Epi: Occasional / Bacteria: Moderate    COVID-19 PCR: NotDetec (21 @ 16:38)      Culture - Urine (collected 08 Dec 2021 16:42)  Source: Clean Catch Clean Catch (Midstream)  Preliminary Report (11 Dec 2021 10:55):    >100,000 CFU/ml Gram Negative Rods    Culture - Blood (collected 08 Dec 2021 16:38)  Source: .Blood None  Gram Stain (09 Dec 2021 19:20):    Growth in aerobic and anaerobic bottles: Gram Variable Rods  Preliminary Report (10 Dec 2021 20:02):    Growth in aerobic and anaerobic bottles: Morganella morganii    MDRO detected in BCID PCR, resistance marker = CTX-M (ESBL)  Organism: Blood Culture PCR (09 Dec 2021 22:31)      -  ESBL: Detec      -  Morganella morganii: Detec      Method Type: PCR    Culture - Blood (collected 08 Dec 2021 16:36)  Source: .Blood Blood-Peripheral  Gram Stain (09 Dec 2021 19:21):    Growth in anaerobic bottle: Gram Variable Rods  Preliminary Report (10 Dec 2021 20:05):    Growth in anaerobic bottle: Morganella morganii    See previous culture [id number]        Radiology: all available radiological tests reviewed    < from: CT Abdomen and Pelvis No Cont (21 @ 19:34) >  Moderate right hydronephrosis and hydroureter and mild left   hydronephrosis and hydroureter to the level of the urinary bladder.  Diffuse bladder wall thickening which is indeterminant. Inserted direct   visualization.  Enlarged prostate gland with mass effect on the urinary bladder.  < end of copied text >      Advanced directives addressed: full resuscitation

## 2021-12-11 NOTE — PROGRESS NOTE ADULT - ASSESSMENT
89 y/o male with h/o CAD s/p PCI/Stent, HTN, BPH, urinary retention with munoz in place, chronic LLE Hip Pain 2/2 OA, HLD, CKD II-IV, AAA, PAD was admitted on 12/8 from [facility] by EMS for worsening confusion. According to [facility] notes he was last known well 15 minutes prior to onset of lethargy and AMS. As per EMS (to ED)  pt was talking to staff when he suddenly became altered and felt cold and lethargic so pt was sent to ED further evaluation. In ER he was noted febrile and received zosyn.    1. Febrile syndrome. Sepsis with ESBL SHUKRI. UTI with GNR. BPH, Urinary retention with munoz. ARF on CRF stage 3. Encephalopathy. Allergy to PCN.  -frail  -BC x 2, urine c/s noted  -s/p zosyn 3.375 gm IV q8h # 2  -on meropenem 1 gm IV q12h # 2  -tolerating abx well so far; no side effects noted  -monitor closely in [place] of PCN allergy history  -continue abx coverage   -contact isolation  -monitor temps  -f/u CBC  -supportive care  2. Other issues:   -care per medicine    d/w medical team

## 2021-12-12 LAB
-  AMIKACIN: SIGNIFICANT CHANGE UP
-  AMOXICILLIN/CLAVULANIC ACID: SIGNIFICANT CHANGE UP
-  AMPICILLIN/SULBACTAM: SIGNIFICANT CHANGE UP
-  AMPICILLIN: SIGNIFICANT CHANGE UP
-  AZTREONAM: SIGNIFICANT CHANGE UP
-  CEFAZOLIN: SIGNIFICANT CHANGE UP
-  CEFEPIME: SIGNIFICANT CHANGE UP
-  CEFOXITIN: SIGNIFICANT CHANGE UP
-  CEFTRIAXONE: SIGNIFICANT CHANGE UP
-  CIPROFLOXACIN: SIGNIFICANT CHANGE UP
-  ERTAPENEM: SIGNIFICANT CHANGE UP
-  GENTAMICIN: SIGNIFICANT CHANGE UP
-  IMIPENEM: SIGNIFICANT CHANGE UP
-  LEVOFLOXACIN: SIGNIFICANT CHANGE UP
-  MEROPENEM: SIGNIFICANT CHANGE UP
-  NITROFURANTOIN: SIGNIFICANT CHANGE UP
-  PIPERACILLIN/TAZOBACTAM: SIGNIFICANT CHANGE UP
-  TIGECYCLINE: SIGNIFICANT CHANGE UP
-  TOBRAMYCIN: SIGNIFICANT CHANGE UP
-  TRIMETHOPRIM/SULFAMETHOXAZOLE: SIGNIFICANT CHANGE UP
ALBUMIN SERPL ELPH-MCNC: 2.3 G/DL — LOW (ref 3.3–5)
ALP SERPL-CCNC: 69 U/L — SIGNIFICANT CHANGE UP (ref 40–120)
ALT FLD-CCNC: 14 U/L — SIGNIFICANT CHANGE UP (ref 12–78)
ANION GAP SERPL CALC-SCNC: 7 MMOL/L — SIGNIFICANT CHANGE UP (ref 5–17)
AST SERPL-CCNC: 17 U/L — SIGNIFICANT CHANGE UP (ref 15–37)
BILIRUB SERPL-MCNC: 0.3 MG/DL — SIGNIFICANT CHANGE UP (ref 0.2–1.2)
BUN SERPL-MCNC: 24 MG/DL — HIGH (ref 7–23)
CALCIUM SERPL-MCNC: 8.7 MG/DL — SIGNIFICANT CHANGE UP (ref 8.5–10.1)
CHLORIDE SERPL-SCNC: 110 MMOL/L — HIGH (ref 96–108)
CO2 SERPL-SCNC: 29 MMOL/L — SIGNIFICANT CHANGE UP (ref 22–31)
CREAT SERPL-MCNC: 1.59 MG/DL — HIGH (ref 0.5–1.3)
CULTURE RESULTS: SIGNIFICANT CHANGE UP
GLUCOSE SERPL-MCNC: 98 MG/DL — SIGNIFICANT CHANGE UP (ref 70–99)
HCT VFR BLD CALC: 25.8 % — LOW (ref 39–50)
HGB BLD-MCNC: 8.3 G/DL — LOW (ref 13–17)
MCHC RBC-ENTMCNC: 29.4 PG — SIGNIFICANT CHANGE UP (ref 27–34)
MCHC RBC-ENTMCNC: 32.2 GM/DL — SIGNIFICANT CHANGE UP (ref 32–36)
MCV RBC AUTO: 91.5 FL — SIGNIFICANT CHANGE UP (ref 80–100)
METHOD TYPE: SIGNIFICANT CHANGE UP
ORGANISM # SPEC MICROSCOPIC CNT: SIGNIFICANT CHANGE UP
ORGANISM # SPEC MICROSCOPIC CNT: SIGNIFICANT CHANGE UP
PLATELET # BLD AUTO: 284 K/UL — SIGNIFICANT CHANGE UP (ref 150–400)
POTASSIUM SERPL-MCNC: 3.5 MMOL/L — SIGNIFICANT CHANGE UP (ref 3.5–5.3)
POTASSIUM SERPL-SCNC: 3.5 MMOL/L — SIGNIFICANT CHANGE UP (ref 3.5–5.3)
PROT SERPL-MCNC: 6.4 GM/DL — SIGNIFICANT CHANGE UP (ref 6–8.3)
RBC # BLD: 2.82 M/UL — LOW (ref 4.2–5.8)
RBC # FLD: 15.1 % — HIGH (ref 10.3–14.5)
SODIUM SERPL-SCNC: 146 MMOL/L — HIGH (ref 135–145)
SPECIMEN SOURCE: SIGNIFICANT CHANGE UP
WBC # BLD: 6.89 K/UL — SIGNIFICANT CHANGE UP (ref 3.8–10.5)
WBC # FLD AUTO: 6.89 K/UL — SIGNIFICANT CHANGE UP (ref 3.8–10.5)

## 2021-12-12 PROCEDURE — 99232 SBSQ HOSP IP/OBS MODERATE 35: CPT

## 2021-12-12 RX ORDER — ALBUTEROL 90 UG/1
2 AEROSOL, METERED ORAL EVERY 6 HOURS
Refills: 0 | Status: DISCONTINUED | OUTPATIENT
Start: 2021-12-12 | End: 2021-12-15

## 2021-12-12 RX ADMIN — MEROPENEM 100 MILLIGRAM(S): 1 INJECTION INTRAVENOUS at 22:35

## 2021-12-12 RX ADMIN — GABAPENTIN 300 MILLIGRAM(S): 400 CAPSULE ORAL at 17:03

## 2021-12-12 RX ADMIN — Medication 1 TABLET(S): at 09:44

## 2021-12-12 RX ADMIN — HEPARIN SODIUM 5000 UNIT(S): 5000 INJECTION INTRAVENOUS; SUBCUTANEOUS at 09:44

## 2021-12-12 RX ADMIN — DULOXETINE HYDROCHLORIDE 60 MILLIGRAM(S): 30 CAPSULE, DELAYED RELEASE ORAL at 09:43

## 2021-12-12 RX ADMIN — Medication 81 MILLIGRAM(S): at 09:44

## 2021-12-12 RX ADMIN — GABAPENTIN 300 MILLIGRAM(S): 400 CAPSULE ORAL at 09:44

## 2021-12-12 RX ADMIN — Medication 75 MILLIGRAM(S): at 09:39

## 2021-12-12 RX ADMIN — MEROPENEM 100 MILLIGRAM(S): 1 INJECTION INTRAVENOUS at 10:22

## 2021-12-12 RX ADMIN — FINASTERIDE 5 MILLIGRAM(S): 5 TABLET, FILM COATED ORAL at 09:44

## 2021-12-12 NOTE — PROGRESS NOTE ADULT - ASSESSMENT
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 by EMS for AMS.   Found to be tachycardic 105 w rectal temp 103.9      #Sepsis/bacteremia - poa and not related to MUNOZ - munoz placed in ED  #due to UTI/Hemorrhagic cystitis?  #AMS likely due to metabolic encephalopathy which has improved after treatment  - ucx - ESBl Morganella - abx changed to meropenem on 12/10 as per ID recs.    -CT c/w b/l hydro and enlarge prostate  - recc munoz, proscar/flomax  -D/W urology and patient/family at length  -No viable options for treatment given his age and underlying comorbidities  -Ultimately his BPH will cause recurrent issues ie AUR and UTIs  -Will need chronic munoz placements son and pt understand that there is risk of infection with munoz  -SW/CM to determine if pt can go back to [facility] with munoz otherwise needs higher level of care  -      #BPH w obstruction/Obstructive uropathy  CT w bilateral hydronephrosis  -munoz  -proscar  -flomax  - followup    #RASHAUN 2/2 to obstructive uropathy - improving   Cr 2.67 vs 1.57 on 10-  abd CT scan w bilat hydro and prostate enlargement as mass effect on bladder  -Scr 2.19-> 1.9  -cont ivf and monitor Scr  -monitor UOP    #AAA  s/p repair w stable size 5.5      #Acute on chronic anemia 2/2 to hematuria - transfuse 1 unit PRBC given underlying CAD and dropping hgb. no obvious source of bleeding.   -stable  -guaiac neg in ED   -Hgb 7.6 today - asymptomatic  -Consent for PRBC obtained and in chart  -Transfuse for Hgb < 7 or if symptomatics  -mild hematuria , no clots noted. Resolved  -baseline 7-9  -Likely RASHAUN due to decreased perfusion elevated BUN/Cr mild improvement with PRBC       #Cardiovascular  Hx CAD w stent  HTN  HLD  HFpEF-stable  1. asa 81 mg qd - can continue with use  2. metoprolol 75 mg qd w parameters  3. atorvastatin 40 mg  4. lasix 40 mg BID w parameters  5. EKG w 1st degree block LAD w IVCD  no sig change when I compared to EKG 10-      #Chronic pain  1. gabapentin 300 mg BID  2. gabapentin 600 mg q HS  3. acetaminophen prn mild pain  4. tramadol 25 mg BID prn moderate      #Depression   1. cymbalta 60 mg      #VTE  IMPROVE VTE Individual Risk Assessment    RISK                                                                Points    [  ] Previous VTE                                                  3    [  ] Thrombophilia                                               2    [  ] Lower limb paralysis                                      2        (unable to hold up >15 seconds)      [  ] Current Cancer                                              2         (within 6 months)    [  ] Immobilization > 24 hrs                                1    [  ] ICU/CCU stay > 24 hours                              1    [ X ] Age > 60                                                      1    IMPROVE VTE Score ___1______    IMPROVE Score 0-1: Low Risk, No VTE prophylaxis required for most patients, encourage ambulation.   IMPROVE Score 2-3: At risk, pharmacologic VTE prophylaxis is indicated for most patients (in the absence of a contraindication)  IMPROVE Score > or = 4: High Risk, pharmacologic VTE prophylaxis is indicated for most patients (in the absence of a contraindication)      VTE sq heparin 5000 units q 12 hrs    GOC discussion took place with son and patient. They would like to review their options and requesting Pall care consult. For now remains DNR/I as previously d/w with admitting doctor.          Dispo: Sandra. Abx. Bacteremia/Sepsis. Repeat abx and monitor clearance. ID follow up. 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   Found to be tachycardic 105 w rectal temp 103.9      #Sepsis/bacteremia - poa and not related to MUNOZ - munoz placed in ED  #due to UTI/Hemorrhagic cystitis?  #AMS likely due to metabolic encephalopathy which has improved after treatment  - ucx - ESBl Morganella - abx changed to meropenem on 12/10 as per ID recs.    -CT c/w b/l hydro and enlarge prostate  - recc munoz, proscar/flomax  -D/W urology and patient/family at length  -No viable options for treatment given his age and underlying comorbidities  -Ultimately his BPH will cause recurrent issues ie AUR and UTIs  -Will need chronic munoz placements son and pt understand that there is risk of infection with munoz  -SW/CM to determine if pt can go back to [facility] with munoz otherwise needs higher level of care  -BC x 2, urine c/s noted  -ID consult this morning 12/12/21 appreciated   -s/p zosyn 3.375 gm IV q8h # 2  -on meropenem 1 gm IV q12h # 3  -tolerating abx well so far; no side effects noted  -monitor closely in [place] of PCN allergy history  -repeat BC in AM  continue abx coverage   -contact isolation  -monitor temps  -f/u CBC  -supportive care          #BPH w obstruction/Obstructive uropathy  CT w bilateral hydronephrosis  -munoz  -proscar  -flomax  - followup    #RASHAUN 2/2 to obstructive uropathy - improving   Cr 2.67 vs 1.57 on 10-  abd CT scan w bilat hydro and prostate enlargement as mass effect on bladder  -Scr 2.19-> 1.9  -cont ivf and monitor Scr  -monitor UOP    #AAA  s/p repair w stable size 5.5      #Acute on chronic anemia 2/2 to hematuria - transfuse 1 unit PRBC given underlying CAD and dropping hgb. no obvious source of bleeding.   -stable  -guaiac neg in ED   -Hgb 7.6 today - asymptomatic  -Consent for PRBC obtained and in chart  -Transfuse for Hgb < 7 or if symptomatics  -mild hematuria , no clots noted. Resolved  -baseline 7-9  -Likely RASHAUN due to decreased perfusion elevated BUN/Cr mild improvement with PRBC       #Cardiovascular  Hx CAD w stent  HTN  HLD  HFpEF-stable  1. asa 81 mg qd - can continue with use  2. metoprolol 75 mg qd w parameters  3. atorvastatin 40 mg  4. lasix 40 mg BID w parameters  5. EKG w 1st degree block LAD w IVCD  no sig change when I compared to EKG 10-      #Chronic pain  1. gabapentin 300 mg BID  2. gabapentin 600 mg q HS  3. acetaminophen prn mild pain  4. tramadol 25 mg BID prn moderate      #Depression   1. cymbalta 60 mg      #VTE  IMPROVE VTE Individual Risk Assessment    RISK                                                                Points    [  ] Previous VTE                                                  3    [  ] Thrombophilia                                               2    [  ] Lower limb paralysis                                      2        (unable to hold up >15 seconds)      [  ] Current Cancer                                              2         (within 6 months)    [  ] Immobilization > 24 hrs                                1    [  ] ICU/CCU stay > 24 hours                              1    [ X ] Age > 60                                                      1    IMPROVE VTE Score ___1______    IMPROVE Score 0-1: Low Risk, No VTE prophylaxis required for most patients, encourage ambulation.   IMPROVE Score 2-3: At risk, pharmacologic VTE prophylaxis is indicated for most patients (in the absence of a contraindication)  IMPROVE Score > or = 4: High Risk, pharmacologic VTE prophylaxis is indicated for most patients (in the absence of a contraindication)      VTE sq heparin 5000 units q 12 hrs    GOC discussion took place with son and patient. They would like to review their options and requesting [facility] care consult. For now remains DNR/I as previously d/w with admitting doctor.          Dispo: [facility]. Abx. Bacteremia/Sepsis. Repeat abx and monitor clearance. ID consult this morning appreciated. Monitor h/h from hematuria. continue antibiotic treatment/supportive care.  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   Found to be tachycardic 105 w rectal temp 103.9      #Sepsis/bacteremia - poa and not related to MUNOZ - munoz placed in ED  #due to UTI/Hemorrhagic cystitis?  #AMS likely due to metabolic encephalopathy which has improved after treatment  - ucx - ESBl Morganella - abx changed to meropenem on 12/10 as per ID recs.    -CT c/w b/l hydro and enlarge prostate  - recc munoz, proscar/flomax as not a candidate for surgical intervention.    -D/W urology and patient/family at length  -No viable options for treatment given his age and underlying comorbidities - dtr was concerned about munoz and i asked her to call dr barth to speak with him further   -Ultimately his BPH will cause recurrent issues ie AUR and UTIs  -Will need chronic munoz placements son and pt understand that there is risk of infection with munoz  -SW/CM to determine if pt can go back to [facility] with munoz otherwise needs higher level of care  -BC x 2, urine c/s noted  -ID consult this morning 12/12/21 appreciated   -s/p zosyn 3.375 gm IV q8h # 2  -on meropenem 1 gm IV q12h # 3  -tolerating abx well so far; no side effects noted  -monitor closely in [place] of PCN allergy history  -repeat BC in AM  continue abx coverage   -contact isolation  -monitor temps  -f/u CBC  -supportive care      #BPH w obstruction/Obstructive uropathy  CT w bilateral hydronephrosis  -munoz placed and will need to remain n place indefinitely since he is not a surgical candidate as per urology   -proscar  -flomax  - followup    #RASHAUN 2/2 to obstructive uropathy - improving   Cr 2.67 vs 1.57 on 10-  abd CT scan w bilat hydro and prostate enlargement as mass effect on bladder  -Scr 2.19-> 1.9  -cont ivf and monitor Scr  -monitor UOP    #AAA  s/p repair w stable size 5.5      #Acute on chronic anemia 2/2 to hematuria - transfuse 1 unit PRBC given underlying CAD and dropping hgb. no obvious source of bleeding.   -stable  -guaiac neg in ED   -Hgb 7.6 today - asymptomatic  -Consent for PRBC obtained and in chart  -Transfuse for Hgb < 7 or if symptomatics  -mild hematuria , no clots noted. Resolved  -baseline 7-9  -Likely RASHAUN due to decreased perfusion elevated BUN/Cr mild improvement with PRBC     #Cardiovascular  Hx CAD w stent  HTN  HLD  HFpEF-stable  1. asa 81 mg qd - can continue with use  2. metoprolol 75 mg qd w parameters  3. atorvastatin 40 mg  4. lasix 40 mg BID w parameters  5. EKG w 1st degree block LAD w IVCD  no sig change when I compared to EKG 10-      #Chronic pain  1. gabapentin 300 mg BID  2. gabapentin 600 mg q HS  3. acetaminophen prn mild pain  4. tramadol 25 mg BID prn moderate      #Depression   1. cymbalta 60 mg      #VTE  IMPROVE VTE Individual Risk Assessment    RISK                                                                Points    [  ] Previous VTE                                                  3    [  ] Thrombophilia                                               2    [  ] Lower limb paralysis                                      2        (unable to hold up >15 seconds)      [  ] Current Cancer                                              2         (within 6 months)    [  ] Immobilization > 24 hrs                                1    [  ] ICU/CCU stay > 24 hours                              1    [ X ] Age > 60                                                      1    IMPROVE VTE Score ___1______    IMPROVE Score 0-1: Low Risk, No VTE prophylaxis required for most patients, encourage ambulation.   IMPROVE Score 2-3: At risk, pharmacologic VTE prophylaxis is indicated for most patients (in the absence of a contraindication)  IMPROVE Score > or = 4: High Risk, pharmacologic VTE prophylaxis is indicated for most patients (in the absence of a contraindication)      VTE sq heparin 5000 units q 12 hrs    GOC discussion took place with son and patient. They would like to review their options and requesting Pall care consult. For now remains DNR/I as previously d/w with admitting doctor.          Dispo: [facility]. Abx. Bacteremia/Sepsis. Repeat abx and monitor clearance. ID consult this morning appreciated. Monitor h/h from hematuria. continue antibiotic treatment/supportive care.

## 2021-12-12 NOTE — PROGRESS NOTE ADULT - SUBJECTIVE AND OBJECTIVE BOX
Patient is a 89y old  Male who presents with a chief complaint of sepsis  UTI  AMS (09 Dec 2021 12:12)      SUBJECTIVE:   HPI:    89 year old male w CAD s/p PCI/Stent, HTN, BPH, Chronic LLE Hip Pain 2/2 OA, HLD, CKD II-IV, AAA, PAD who presented to  from [facility] by EMS for AMS.   According to [facility] notes he was last known well 15 minutes prior to onset of lethargy and AMS  As per EMS (to ED)  pt was talking to staff when he suddenly became altered and felt cold and lethargic so pt was sent to ED further evaluation.  He was last admitted to   for UTI due to proteus mirabilis complicated by bacteremia and R hydronephrosis  Treated w meropenem then ceftriaxone x4 weeks   11- was seen by [facility] where munoz was recommended . munoz replaced in ED  In ED /81    RR 18   T 103.9 rectal  sat 85% RA; repeat 96% on 4 L nc  vomited in CT w abd distension and no pain on palpation·       12/10 -pt c/o constipation, no BM in 4 days. Says usual for him. Munoz now with clear urine. Hgb 7.6  12/11 - no events overnight.    12/12: Pt seen and evaluated at bedside no acute events overnight. NAD, Pt received one additonal unit PRBC last night     ROS:   All 10 systems reviewed and found to be negative with the exception of what has been described above.    PAST MEDICAL & SURGICAL HISTORY:  Leg swelling  related to cellulitis of LLE    CKD (chronic kidney disease) stage 3, GFR 30-59 ml/min    Cellulitis  BL    BPH (benign prostatic hyperplasia)    Colon polyp    Stented coronary artery    HTN (hypertension)    HLD (hyperlipidemia)    CAD (coronary artery disease)    OA (osteoarthritis)    PAD (peripheral artery disease)    AAA (abdominal aortic aneurysm) without rupture    S/P hemorrhoidectomy    H/O inguinal hernia  repair    History of colonoscopy    History of coronary artery stent placement      ICU Vital Signs Last 24 Hrs  T(C): 36.4 (11 Dec 2021 19:38), Max: 36.8 (11 Dec 2021 15:42)  T(F): 97.5 (11 Dec 2021 19:38), Max: 98.2 (11 Dec 2021 15:42)  HR: 83 (12 Dec 2021 09:57) (74 - 86)  BP: 170/61 (12 Dec 2021 09:57) (124/45 - 170/61)  BP(mean): --  ABP: --  ABP(mean): --  RR: 17 (12 Dec 2021 09:57) (16 - 18)  SpO2: 100% (12 Dec 2021 09:57) (95% - 100%)    GEN: lying in bed, NAD  HEENT:   NC/AT, pupils equal and reactive, EOMI  CV:  +S1, +S2, RRR  RESP:   lungs clear to auscultation bilaterally, no wheeze, rales, rhonchi   BREAST:  not examined  GI:  abdomen soft, non-tender, non-distended, normoactive BS  RECTAL:  not examined  :  not examined  MSK:   normal muscle tone  EXT:  no edema  NEURO:  AAOX3, no focal neurological deficits  SKIN:  no rashes                            8.3    6.89  )-----------( 284      ( 12 Dec 2021 06:17 )             25.8                   12-12    146<H>  |  110<H>  |  24<H>  ----------------------------<  98  3.5   |  29  |  1.59<H>    Ca    8.7      12 Dec 2021 06:17    TPro  6.4  /  Alb  2.3<L>  /  TBili  0.3  /  DBili  x   /  AST  17  /  ALT  14  /  AlkPhos  69  12-12      from: CT Head No Cont (12.08.21 @ 16:56) >  IMPRESSION:    1)  scattered chronic ischemic changes with volume loss. Borderline   prominent ventricles, without transependymal migration of CSF. No CT   evidence of an acute infarct or hemorrhage.  2)  follow-up MR imaging of the brain may be considered for further   assessment.    < end of copied text >  < from: Xray Chest 1 View- PORTABLE-Urgent (12.08.21 @ 17:17) >  IMPRESSION:   Small LEFT pleural effusion..    --- End of Report ---      < end of copied text >    < from: CT Abdomen and Pelvis No Cont (12.08.21 @ 19:34) >  IMPRESSION:    Moderate right hydronephrosis and hydroureter and mild left   hydronephrosis and hydroureter to the level of the urinary bladder.    Diffuse bladder wall thickening which is indeterminant. Inserted direct   visualization.    Enlarged prostate gland with mass effect on the urinary bladder.      < end of copied text >       Patient is a 89y old  Male who presents with a chief complaint of sepsis  UTI  AMS (09 Dec 2021 12:12)      SUBJECTIVE:   HPI:    89 year old male w CAD s/p PCI/Stent, HTN, BPH, Chronic LLE Hip Pain 2/2 OA, HLD, CKD II-IV, AAA, PAD who presented to  from [facility] by EMS for AMS.   According to [facility] notes he was last known well 15 minutes prior to onset of lethargy and AMS  As per EMS (to ED)  pt was talking to staff when he suddenly became altered and felt cold and lethargic so pt was sent to ED further evaluation.  He was last admitted to   for UTI due to proteus mirabilis complicated by bacteremia and R hydronephrosis  Treated w meropenem then ceftriaxone x4 weeks   11- was seen by [facility] where munoz was recommended . munoz replaced in ED  In ED /81    RR 18   T 103.9 rectal  sat 85% RA; repeat 96% on 4 L nc  vomited in CT w abd distension and no pain on palpation·       12/10 -pt c/o constipation, no BM in 4 days. Says usual for him. Munoz now with clear urine. Hgb 7.6  12/11 - no events overnight.    12/12: Pt seen and evaluated at bedside no acute events overnight. NAD, Pt received one additonal unit PRBC last night after hgb dropped to 7.1     ROS:   All 10 systems reviewed and found to be negative with the exception of what has been described above.    PAST MEDICAL & SURGICAL HISTORY:  Leg swelling  related to cellulitis of LLE    CKD (chronic kidney disease) stage 3, GFR 30-59 ml/min    Cellulitis  BL    BPH (benign prostatic hyperplasia)    Colon polyp    Stented coronary artery    HTN (hypertension)    HLD (hyperlipidemia)    CAD (coronary artery disease)    OA (osteoarthritis)    PAD (peripheral artery disease)    AAA (abdominal aortic aneurysm) without rupture    S/P hemorrhoidectomy    H/O inguinal hernia  repair    History of colonoscopy    History of coronary artery stent placement      ICU Vital Signs Last 24 Hrs  T(C): 36.4 (11 Dec 2021 19:38), Max: 36.8 (11 Dec 2021 15:42)  T(F): 97.5 (11 Dec 2021 19:38), Max: 98.2 (11 Dec 2021 15:42)  HR: 83 (12 Dec 2021 09:57) (74 - 86)  BP: 170/61 (12 Dec 2021 09:57) (124/45 - 170/61)  BP(mean): --  ABP: --  ABP(mean): --  RR: 17 (12 Dec 2021 09:57) (16 - 18)  SpO2: 100% (12 Dec 2021 09:57) (95% - 100%)    GEN: lying in bed, NAD  HEENT:   NC/AT, pupils equal and reactive, EOMI  CV:  +S1, +S2, RRR  RESP:   lungs clear to auscultation bilaterally, no wheeze, rales, rhonchi   BREAST:  not examined  GI:  abdomen soft, non-tender, non-distended, normoactive BS  RECTAL:  not examined  :  not examined  MSK:   normal muscle tone  EXT:  no edema  NEURO:  AAOX3, no focal neurological deficits  SKIN:  no rashes                            8.3    6.89  )-----------( 284      ( 12 Dec 2021 06:17 )             25.8                   12-12    146<H>  |  110<H>  |  24<H>  ----------------------------<  98  3.5   |  29  |  1.59<H>    Ca    8.7      12 Dec 2021 06:17    TPro  6.4  /  Alb  2.3<L>  /  TBili  0.3  /  DBili  x   /  AST  17  /  ALT  14  /  AlkPhos  69  12-12      from: CT Head No Cont (12.08.21 @ 16:56) >  IMPRESSION:    1)  scattered chronic ischemic changes with volume loss. Borderline   prominent ventricles, without transependymal migration of CSF. No CT   evidence of an acute infarct or hemorrhage.  2)  follow-up MR imaging of the brain may be considered for further   assessment.    < end of copied text >  < from: Xray Chest 1 View- PORTABLE-Urgent (12.08.21 @ 17:17) >  IMPRESSION:   Small LEFT pleural effusion..    --- End of Report ---      < end of copied text >    < from: CT Abdomen and Pelvis No Cont (12.08.21 @ 19:34) >  IMPRESSION:    Moderate right hydronephrosis and hydroureter and mild left   hydronephrosis and hydroureter to the level of the urinary bladder.    Diffuse bladder wall thickening which is indeterminant. Inserted direct   visualization.    Enlarged prostate gland with mass effect on the urinary bladder.      < end of copied text >       Patient is a 89y old  Male who presents with a chief complaint of sepsis  UTI  AMS (09 Dec 2021 12:12)      SUBJECTIVE:   HPI:    89 year old male w CAD s/p PCI/Stent, HTN, BPH, Chronic LLE Hip Pain 2/2 OA, HLD, CKD II-IV, AAA, PAD who presented to  from [facility] by EMS for AMS.   According to [facility] notes he was last known well 15 minutes prior to onset of lethargy and AMS  As per EMS (to ED)  pt was talking to staff when he suddenly became altered and felt cold and lethargic so pt was sent to ED further evaluation.  He was last admitted to   for UTI due to proteus mirabilis complicated by bacteremia and R hydronephrosis  Treated w meropenem then ceftriaxone x4 weeks   11- was seen by [facility] where munoz was recommended . munoz replaced in ED  In ED /81    RR 18   T 103.9 rectal  sat 85% RA; repeat 96% on 4 L nc  vomited in CT w abd distension and no pain on palpation·       12/10 -pt c/o constipation, no BM in 4 days. Says usual for him. Munoz now with clear urine. Hgb 7.6  12/11 - no events overnight.    12/12: Pt seen and evaluated at bedside no acute events overnight. NAD, Pt received one additonal unit PRBC last night after hgb dropped to 7.1.    ROS:   All 10 systems reviewed and found to be negative with the exception of what has been described above.    PAST MEDICAL & SURGICAL HISTORY:  Leg swelling  related to cellulitis of LLE    CKD (chronic kidney disease) stage 3, GFR 30-59 ml/min    Cellulitis  BL    BPH (benign prostatic hyperplasia)    Colon polyp    Stented coronary artery    HTN (hypertension)    HLD (hyperlipidemia)    CAD (coronary artery disease)    OA (osteoarthritis)    PAD (peripheral artery disease)    AAA (abdominal aortic aneurysm) without rupture    S/P hemorrhoidectomy    H/O inguinal hernia  repair    History of colonoscopy    History of coronary artery stent placement      ICU Vital Signs Last 24 Hrs  T(C): 36.4 (11 Dec 2021 19:38), Max: 36.8 (11 Dec 2021 15:42)  T(F): 97.5 (11 Dec 2021 19:38), Max: 98.2 (11 Dec 2021 15:42)  HR: 83 (12 Dec 2021 09:57) (74 - 86)  BP: 170/61 (12 Dec 2021 09:57) (124/45 - 170/61)  BP(mean): --  ABP: --  ABP(mean): --  RR: 17 (12 Dec 2021 09:57) (16 - 18)  SpO2: 100% (12 Dec 2021 09:57) (95% - 100%)    GEN: lying in bed, NAD  HEENT:   NC/AT, pupils equal and reactive, EOMI  CV:  +S1, +S2, RRR  RESP:   lungs clear to auscultation bilaterally, no wheeze, rales, rhonchi   BREAST:  not examined  GI:  abdomen soft, non-tender, non-distended, normoactive BS  RECTAL:  not examined  :  not examined  MSK:   normal muscle tone  EXT:  no edema  NEURO:  AAOX3, no focal neurological deficits  SKIN:  no rashes                            8.3    6.89  )-----------( 284      ( 12 Dec 2021 06:17 )             25.8                   12-12    146<H>  |  110<H>  |  24<H>  ----------------------------<  98  3.5   |  29  |  1.59<H>    Ca    8.7      12 Dec 2021 06:17    TPro  6.4  /  Alb  2.3<L>  /  TBili  0.3  /  DBili  x   /  AST  17  /  ALT  14  /  AlkPhos  69  12-12      from: CT Head No Cont (12.08.21 @ 16:56) >  IMPRESSION:    1)  scattered chronic ischemic changes with volume loss. Borderline   prominent ventricles, without transependymal migration of CSF. No CT   evidence of an acute infarct or hemorrhage.  2)  follow-up MR imaging of the brain may be considered for further   assessment.    < end of copied text >  < from: Xray Chest 1 View- PORTABLE-Urgent (12.08.21 @ 17:17) >  IMPRESSION:   Small LEFT pleural effusion..    --- End of Report ---      < end of copied text >    < from: CT Abdomen and Pelvis No Cont (12.08.21 @ 19:34) >  IMPRESSION:    Moderate right hydronephrosis and hydroureter and mild left   hydronephrosis and hydroureter to the level of the urinary bladder.    Diffuse bladder wall thickening which is indeterminant. Inserted direct   visualization.    Enlarged prostate gland with mass effect on the urinary bladder.      < end of copied text >    Infectious Disease Consult 12.12.21   1. Febrile syndrome improving. Sepsis with ESBL SHUKRI. UTI with ESBL SHUKRI. BPH, Urinary retention with munoz. ARF on CRF stage 3. Encephalopathy. Allergy to PCN.  -frail  -BC x 2, urine c/s noted  -s/p zosyn 3.375 gm IV q8h # 2  -on meropenem 1 gm IV q12h # 3  -tolerating abx well so far; no side effects noted  -monitor closely in [place] of PCN allergy history  -repeat BC in AM  -continue abx coverage   -contact isolation  -monitor temps  -f/u CBC  -supportive care  2. Other issues:   -care per medicine    d/w medical team  Electronic Signatures:  Jonathon Mcmahon)  (Signed 12-Dec-2021 12:52)  	Authored: Progress Note, Reason for Admission, Subjective and Objective, Assessment and Plan       Patient is a 89y old  Male who presents with a chief complaint of sepsis  UTI  AMS (09 Dec 2021 12:12)      SUBJECTIVE:   HPI:    89 year old male w CAD s/p PCI/Stent, HTN, BPH, Chronic LLE Hip Pain 2/2 OA, HLD, CKD II-IV, AAA, PAD who presented to  from [facility] by EMS for AMS.   According to [facility] notes he was last known well 15 minutes prior to onset of lethargy and AMS  As per EMS (to ED)  pt was talking to staff when he suddenly became altered and felt cold and lethargic so pt was sent to ED further evaluation.  He was last admitted to   for UTI due to proteus mirabilis complicated by bacteremia and R hydronephrosis  Treated w meropenem then ceftriaxone x4 weeks   11- was seen by [facility] where munoz was recommended . munoz replaced in ED  In ED /81    RR 18   T 103.9 rectal  sat 85% RA; repeat 96% on 4 L nc  vomited in CT w abd distension and no pain on palpation·       12/10 -pt c/o constipation, no BM in 4 days. Says usual for him. Munoz now with clear urine. Hgb 7.6  12/11 - no events overnight.    12/12: Pt seen and evaluated at bedside no acute events overnight. NAD, Pt received one additonal unit PRBC last night after hgb dropped to 7.1.    ROS:   All 10 systems reviewed and found to be negative with the exception of what has been described above.    PAST MEDICAL & SURGICAL HISTORY:  Leg swelling  related to cellulitis of LLE    CKD (chronic kidney disease) stage 3, GFR 30-59 ml/min    Cellulitis  BL    BPH (benign prostatic hyperplasia)    Colon polyp    Stented coronary artery    HTN (hypertension)    HLD (hyperlipidemia)    CAD (coronary artery disease)    OA (osteoarthritis)    PAD (peripheral artery disease)    AAA (abdominal aortic aneurysm) without rupture    S/P hemorrhoidectomy    H/O inguinal hernia  repair    History of colonoscopy    History of coronary artery stent placement      ICU Vital Signs Last 24 Hrs  T(C): 36.4 (11 Dec 2021 19:38), Max: 36.8 (11 Dec 2021 15:42)  T(F): 97.5 (11 Dec 2021 19:38), Max: 98.2 (11 Dec 2021 15:42)  HR: 83 (12 Dec 2021 09:57) (74 - 86)  BP: 170/61 (12 Dec 2021 09:57) (124/45 - 170/61)  BP(mean): --  ABP: --  ABP(mean): --  RR: 17 (12 Dec 2021 09:57) (16 - 18)  SpO2: 100% (12 Dec 2021 09:57) (95% - 100%)    GEN: lying in bed, NAD  HEENT:   NC/AT, pupils equal and reactive, EOMI  CV:  +S1, +S2, RRR  RESP:   lungs clear to auscultation bilaterally, no wheeze, rales, rhonchi   BREAST:  not examined  GI:  abdomen soft, non-tender, non-distended, normoactive BS  RECTAL:  not examined  :  not examined  MSK:   normal muscle tone  EXT:  no edema  NEURO:  AAOX3, no focal neurological deficits  SKIN:  no rashes                            8.3    6.89  )-----------( 284      ( 12 Dec 2021 06:17 )             25.8                   12-12    146<H>  |  110<H>  |  24<H>  ----------------------------<  98  3.5   |  29  |  1.59<H>    Ca    8.7      12 Dec 2021 06:17    TPro  6.4  /  Alb  2.3<L>  /  TBili  0.3  /  DBili  x   /  AST  17  /  ALT  14  /  AlkPhos  69  12-12      from: CT Head No Cont (12.08.21 @ 16:56) >  IMPRESSION:    1)  scattered chronic ischemic changes with volume loss. Borderline   prominent ventricles, without transependymal migration of CSF. No CT   evidence of an acute infarct or hemorrhage.  2)  follow-up MR imaging of the brain may be considered for further   assessment.    < end of copied text >  < from: Xray Chest 1 View- PORTABLE-Urgent (12.08.21 @ 17:17) >  IMPRESSION:   Small LEFT pleural effusion..    --- End of Report ---      < end of copied text >    < from: CT Abdomen and Pelvis No Cont (12.08.21 @ 19:34) >  IMPRESSION:    Moderate right hydronephrosis and hydroureter and mild left   hydronephrosis and hydroureter to the level of the urinary bladder.    Diffuse bladder wall thickening which is indeterminant. Inserted direct   visualization.    Enlarged prostate gland with mass effect on the urinary bladder.      < end of copied text >

## 2021-12-12 NOTE — PROGRESS NOTE ADULT - ASSESSMENT
89 y/o male with h/o CAD s/p PCI/Stent, HTN, BPH, urinary retention with munoz in place, chronic LLE Hip Pain 2/2 OA, HLD, CKD II-IV, AAA, PAD was admitted on 12/8 from [facility] by EMS for worsening confusion. According to [facility] notes he was last known well 15 minutes prior to onset of lethargy and AMS. As per EMS (to ED)  pt was talking to staff when he suddenly became altered and felt cold and lethargic so pt was sent to ED further evaluation. In ER he was noted febrile and received zosyn.    1. Febrile syndrome improving. Sepsis with ESBL SHUKRI. UTI with ESBL SHUKRI. BPH, Urinary retention with munoz. ARF on CRF stage 3. Encephalopathy. Allergy to PCN.  -frail  -BC x 2, urine c/s noted  -s/p zosyn 3.375 gm IV q8h # 2  -on meropenem 1 gm IV q12h # 3  -tolerating abx well so far; no side effects noted  -monitor closely in [place] of PCN allergy history  -repeat BC in AM  -continue abx coverage   -contact isolation  -monitor temps  -f/u CBC  -supportive care  2. Other issues:   -care per medicine    d/w medical team

## 2021-12-12 NOTE — PROGRESS NOTE ADULT - SUBJECTIVE AND OBJECTIVE BOX
Date of service: 21 @ 12:49    Lying in bed in NAD  Weak looking  Alert and confused  Dose not seem in pain    ROS: no fever or chills; limited    MEDICATIONS  (STANDING):  aspirin enteric coated 81 milliGRAM(s) Oral daily  atorvastatin 40 milliGRAM(s) Oral at bedtime  DULoxetine 60 milliGRAM(s) Oral daily  finasteride 5 milliGRAM(s) Oral daily  gabapentin 300 milliGRAM(s) Oral two times a day  gabapentin 600 milliGRAM(s) Oral at bedtime  heparin   Injectable 5000 Unit(s) SubCutaneous every 12 hours  meropenem  IVPB 1000 milliGRAM(s) IV Intermittent every 12 hours  metoprolol succinate ER 75 milliGRAM(s) Oral daily  multivitamin 1 Tablet(s) Oral daily  tamsulosin 0.8 milliGRAM(s) Oral at bedtime    Vital Signs Last 24 Hrs  T(C): 36.4 (11 Dec 2021 19:38), Max: 36.8 (11 Dec 2021 15:42)  T(F): 97.5 (11 Dec 2021 19:38), Max: 98.2 (11 Dec 2021 15:42)  HR: 83 (12 Dec 2021 09:57) (74 - 86)  BP: 170/61 (12 Dec 2021 09:57) (124/45 - 170/61)  BP(mean): --  RR: 17 (12 Dec 2021 09:57) (16 - 18)  SpO2: 100% (12 Dec 2021 09:57) (95% - 100%)     Physical exam:    Constitutional:  No acute distress  HEENT: NC/AT, EOMI, PERRLA, conjunctivae clear; ears and nose atraumatic  Neck: supple; thyroid not palpable  Back: no tenderness  Respiratory: respiratory effort normal; clear to auscultation  Cardiovascular: S1S2 regular, no murmurs  Abdomen: soft, not tender, not distended, positive BS  Genitourinary: has suprapubic tenderness  munoz with dark urine in bag  Lymphatic: no LN palpable  Musculoskeletal: no muscle tenderness, no joint swelling or tenderness  Extremities: no pedal edema  Neurological/ Psychiatric: Alert, moving all extremities  Skin: no rashes; no palpable lesions    Labs: reviewed                        8.3    6.89  )-----------( 284      ( 12 Dec 2021 06:17 )             25.8     12-12    146<H>  |  110<H>  |  24<H>  ----------------------------<  98  3.5   |  29  |  1.59<H>    Ca    8.7      12 Dec 2021 06:17    TPro  6.4  /  Alb  2.3<L>  /  TBili  0.3  /  DBili  x   /  AST  17  /  ALT  14  /  AlkPhos  69  1212                        8.0    4.65  )-----------( 232      ( 09 Dec 2021 07:10 )             25.5     12-    140  |  108  |  36<H>  ----------------------------<  123<H>  3.7   |  27  |  2.19<H>    Ca    8.3<L>      09 Dec 2021 07:10  Phos  3.5     12-08  Mg     2.0     12-    TPro  7.6  /  Alb  3.1<L>  /  TBili  0.4  /  DBili  x   /  AST  17  /  ALT  14  /  AlkPhos  89  12-08     LIVER FUNCTIONS - ( 08 Dec 2021 16:38 )  Alb: 3.1 g/dL / Pro: 7.6 gm/dL / ALK PHOS: 89 U/L / ALT: 14 U/L / AST: 17 U/L / GGT: x           Urinalysis Basic - ( 08 Dec 2021 16:42 )    Color: Yellow / Appearance: cloudy / S.005 / pH: x  Gluc: x / Ketone: Negative  / Bili: Negative / Urobili: Negative mg/dL   Blood: x / Protein: 100 mg/dL / Nitrite: Negative   Leuk Esterase: Moderate / RBC: 6-10 /HPF / WBC >50   Sq Epi: x / Non Sq Epi: Occasional / Bacteria: Moderate    COVID-19 PCR: NotDetec (21 @ 16:38)      Culture - Urine (collected 08 Dec 2021 16:42)  Source: Clean Catch Clean Catch (Midstream)  Preliminary Report (11 Dec 2021 18:40):    >100,000 CFU/ml Morganella morganii    Culture - Blood (collected 08 Dec 2021 16:38)  Source: .Blood None  Gram Stain (09 Dec 2021 19:20):    Growth in aerobic and anaerobic bottles: Gram Variable Rods  Final Report (11 Dec 2021 15:49):    Growth in aerobic and anaerobic bottles: Morganella morganii    MDRO detected in BCID PCR, resistance marker = CTX-M (ESBL)  Morganella morganii (11 Dec 2021 15:49)  Organism: Morganella morganii (11 Dec 2021 15:49)      -  Amikacin: S <=16      -  Ampicillin: R >16 These ampicillin results predict results for amoxicillin      -  Ampicillin/Sulbactam: I 16/8 Enterobacter, Klebsiella aerogenes, Citrobacter, and Serratia may develop resistance during prolonged therapy (3-4 days)      -  Aztreonam: R >16      -  Cefazolin: R >16 Enterobacter, Klebsiella aerogenes, Citrobacter, and Serratia may develop resistance during prolonged therapy (3-4 days)      -  Cefepime: R >16      -  Cefoxitin: S <=8      -  Ceftazidime/Avibactam: S <=4      -  Ceftolozane/tazobactam: S <=2      -  Ceftriaxone: R >32 Enterobacter, Klebsiella aerogenes, Citrobacter, and Serratia may develop resistance during prolonged therapy      -  Ciprofloxacin: S <=0.25      -  Ertapenem: S <=0.5      -  Gentamicin: S <=2      -  Imipenem: I 2      -  Levofloxacin: S <=0.5      -  Meropenem: S <=1      -  Piperacillin/Tazobactam: S <=8      -  Tigecycline: R <=2      -  Tobramycin: S <=2      -  Trimethoprim/Sulfamethoxazole: R >2/38      Method Type: MAULIK  Organism: Blood Culture PCR (11 Dec 2021 15:49)      -  ESBL: Detec      -  Morganella morganii: Detec      Method Type: PCR    Culture - Blood (collected 08 Dec 2021 16:36)  Source: .Blood Blood-Peripheral  Gram Stain (09 Dec 2021 19:21):    Growth in anaerobic bottle: Gram Variable Rods  Final Report (11 Dec 2021 15:49):    Growth in anaerobic bottle: Morganella morganii    See previous culture [id number]        Radiology: all available radiological tests reviewed    < from: CT Abdomen and Pelvis No Cont (21 @ 19:34) >  Moderate right hydronephrosis and hydroureter and mild left   hydronephrosis and hydroureter to the level of the urinary bladder.  Diffuse bladder wall thickening which is indeterminant. Inserted direct   visualization.  Enlarged prostate gland with mass effect on the urinary bladder.  < end of copied text >      Advanced directives addressed: full resuscitation

## 2021-12-13 PROCEDURE — 99232 SBSQ HOSP IP/OBS MODERATE 35: CPT

## 2021-12-13 RX ORDER — METOPROLOL TARTRATE 50 MG
100 TABLET ORAL DAILY
Refills: 0 | Status: DISCONTINUED | OUTPATIENT
Start: 2021-12-14 | End: 2021-12-15

## 2021-12-13 RX ORDER — HYDRALAZINE HCL 50 MG
25 TABLET ORAL EVERY 6 HOURS
Refills: 0 | Status: DISCONTINUED | OUTPATIENT
Start: 2021-12-13 | End: 2021-12-13

## 2021-12-13 RX ORDER — METOPROLOL TARTRATE 50 MG
25 TABLET ORAL ONCE
Refills: 0 | Status: COMPLETED | OUTPATIENT
Start: 2021-12-13 | End: 2021-12-13

## 2021-12-13 RX ADMIN — GABAPENTIN 300 MILLIGRAM(S): 400 CAPSULE ORAL at 17:54

## 2021-12-13 RX ADMIN — Medication 1 TABLET(S): at 10:23

## 2021-12-13 RX ADMIN — FINASTERIDE 5 MILLIGRAM(S): 5 TABLET, FILM COATED ORAL at 10:23

## 2021-12-13 RX ADMIN — Medication 81 MILLIGRAM(S): at 10:23

## 2021-12-13 RX ADMIN — DULOXETINE HYDROCHLORIDE 60 MILLIGRAM(S): 30 CAPSULE, DELAYED RELEASE ORAL at 10:23

## 2021-12-13 RX ADMIN — HEPARIN SODIUM 5000 UNIT(S): 5000 INJECTION INTRAVENOUS; SUBCUTANEOUS at 10:24

## 2021-12-13 RX ADMIN — Medication 75 MILLIGRAM(S): at 10:23

## 2021-12-13 RX ADMIN — MEROPENEM 100 MILLIGRAM(S): 1 INJECTION INTRAVENOUS at 10:26

## 2021-12-13 RX ADMIN — Medication 25 MILLIGRAM(S): at 14:45

## 2021-12-13 RX ADMIN — GABAPENTIN 300 MILLIGRAM(S): 400 CAPSULE ORAL at 10:23

## 2021-12-13 NOTE — PROGRESS NOTE ADULT - ATTENDING COMMENTS
patient seen and examined with PA student Samantha Gerardo,  I  was physically present for the key portions of the evaluation and management (E/M) service provided.  I agree with the above history, physical, and plan which I have reviewed and edited where appropriate.  - continue meropenem day #4  - continue munoz for bph and hydronephrosis, not a candidate for surgical intervention as per   - metabolic encephalopathy improving.   - pt unable to return to [facility] with munoz and may need SNF placement
patient seen and examined with PA student Samantha Gerardo,  I  was physically present for the key portions of the evaluation and management (E/M) service provided.  I agree with the above history, physical, and plan which I have reviewed and edited where appropriate.  - s/p 1 unit prbc with appropriate rise in hgb noted  - monitor and transfuse prn  - case d/w dr kim and he will need total of 14 days of abx.  plan for midline on monday and dc to rehab.  - long d/w family regarding plan and need for munoz catheter.  hcp understands but sister is concerned and I have asked her to speak with dr barth for clarification if she wishes.    - pt remains confused and will need munoz at time of dc.  plan to dc to [facility] if they can accommodate munoz otherwise will need SNF palcement

## 2021-12-13 NOTE — CHART NOTE - NSCHARTNOTEFT_GEN_A_CORE
HPI:    89 year old male w CAD s/p PCI/Stent, HTN, BPH, Chronic LLE Hip Pain 2/2 OA, HLD, CKD II-IV, AAA, PAD who presented to  from [facility] by EMS for AMS.   PAST MEDICAL HX  AAA (abdominal aortic aneurysm) without rupture  Anemia   BPH (benign prostatic hyperplasia)   CAD (coronary artery disease)   Cellulitis BL  CKD (chronic kidney disease) stage 3, GFR 30-59 ml/min   Colon polyp   HLD (hyperlipidemia)   HTN (hypertension)   Leg swelling related to cellulitis of LLE  OA (osteoarthritis)   PAD (peripheral artery disease)   Stented coronary artery.     PAST SURGICAL HX:  H/O inguinal hernia repair  History of colonoscopy   History of coronary artery stent placement   S/P hemorrhoidectomy.      (08 Dec 2021 19:56)      PERTINENT PMH REVIEWED:  [ X ] YES [ ] NO           Primary Contact:    Son Joseph surrogate and daughter     HCP [  ] Surrogate [  X ] Guardian [   ]    Mental Status: [ ] Alert  [  ] Oriented [  ] Confused [ X ] Lethargic [  ]  Concerns of Depression [  ]- unable to assess  Anxiety [   ]- unable to assess  Baseline ADLs (prior to admission):  Independent [ X ] moderately [ ] fully   Dependent   [ ] moderately [ ]fully    Family Meeting: Follow up meeting tomorrow- son to call with time     Anticipated Grief: Patient [  ] Family [ X ]    Caregiver [place] Assessed: Yes [ X ] No [  ]    Episcopalian: Jainism     Spiritual Concerns: Not identified     Goals of Care: likely SHERWIN to be further determined     Previous Services: [facility]     ADVANCE DIRECTIVES: MOLST DNR/DNI on chart      Anticipated D/C Plan: Possibly SHERWIN for LT Antibiotics                      Summary:    This SW met with pt. at bedside however, pt. was confused and could not engage in a meaningful conversation. This SW spoke with pts son Joseph via phone to follow up and offer support. Pt. was residing at [facility] prior to admission. Pts son shared that he has been through this before with the infection needing LT antibiotics and eventually he improves and becomes less confused. He reports that he spoke with the Doctor and understands that this si a cycle that likely will continue to happen. We discussed that pts needs are exceeding currently what can be done at [facility], as pt. may need Flores (which they cannot accommodate) and they cannot do LT antibiotics. Pts son reports that he and his sister are open to SHERWIN and hoping that pt. will improve and be lucid enough to make be apart of the decision making. We did discuss the option of a comfort focus and home hospice possibly at [facility] if pt. chose at nay point not to continue with LT antibiotics or to not do so in the future and just focus on his comfort. Pts son expressed understanding of this but it does not seem they are ready for it at this time. Pts son requested family meeting with his sister as well tomorrow to review options again together. He will contact this SW will a time. Plan will likely be for SHERWIN. Emotional support provided. Our team will continue to follow.

## 2021-12-13 NOTE — PROGRESS NOTE ADULT - SUBJECTIVE AND OBJECTIVE BOX
HPI: patient seen and examined with no family at bedside, patient is alert to self only at this time, patient denies any pain and appears comfortable, as per RN Trinidad patient was having agitation today seems to be better.     PAIN: (X)Yes   ( )No  Level: mild  Location: LLE  Intensity:   3 /10  Quality: ache  Aggravating Factors: movement  Alleviating Factors: Tylenol  Impact on ADLs: mild    DYSPNEA: ( ) Yes  ( X) No  Level:    Review of Systems:    Anxiety-denies  Depression-situational  Physical Discomfort-mild  Dyspnea-denies  Constipation-yes  Anorexia-yes  Weight Loss-   Cough-denies  Fatigue-severe  Weakness-severe    All other systems reviewed and negative    PHYSICAL EXAM:    Vital Signs Last 24 Hrs  T(C): 36.4 (13 Dec 2021 08:28), Max: 37.1 (12 Dec 2021 15:44)  T(F): 97.6 (13 Dec 2021 08:28), Max: 98.8 (12 Dec 2021 15:44)  HR: 72 (13 Dec 2021 08:28) (72 - 81)  BP: 170/77 (13 Dec 2021 08:28) (149/65 - 170/77)  RR: 18 (13 Dec 2021 08:28) (17 - 18)  SpO2: 96% (13 Dec 2021 08:28) (96% - 97%)    PPSV2: 40  %    General: Frail elderly male in bed in NAD  Mental Status: Awake, A&O X2. poor historian   HEENT: oral mucosa dry  Lungs: clear diminished  Cardiac: S1S2+  GI: abd soft NT + BS  : munoz cath  Ext: moves on bed  Neuro: AMS      LABS:                        8.3    6.89  )-----------( 284      ( 12 Dec 2021 06:17 )             25.8     12-12    146<H>  |  110<H>  |  24<H>  ----------------------------<  98  3.5   |  29  |  1.59<H>    Ca    8.7      12 Dec 2021 06:17    TPro  6.4  /  Alb  2.3<L>  /  TBili  0.3  /  DBili  x   /  AST  17  /  ALT  14  /  AlkPhos  69  12-12    Albumin: Albumin, Serum: 2.3 g/dL (12-12 @ 06:17)    Allergies- No Known Allergies    MEDICATIONS  (STANDING):  aspirin enteric coated 81 milliGRAM(s) Oral daily  atorvastatin 40 milliGRAM(s) Oral at bedtime  DULoxetine 60 milliGRAM(s) Oral daily  finasteride 5 milliGRAM(s) Oral daily  gabapentin 300 milliGRAM(s) Oral two times a day  gabapentin 600 milliGRAM(s) Oral at bedtime  heparin   Injectable 5000 Unit(s) SubCutaneous every 12 hours  meropenem  IVPB 1000 milliGRAM(s) IV Intermittent every 12 hours  metoprolol succinate ER 75 milliGRAM(s) Oral daily  multivitamin 1 Tablet(s) Oral daily  tamsulosin 0.8 milliGRAM(s) Oral at bedtime    MEDICATIONS  (PRN):  acetaminophen     Tablet .. 650 milliGRAM(s) Oral every 6 hours PRN Temp greater or equal to 38C (100.4F), Mild Pain (1 - 3)  ALBUTerol    90 MICROgram(s) HFA Inhaler 2 Puff(s) Inhalation every 6 hours PRN Shortness of Breath and/or Wheezing  melatonin 3 milliGRAM(s) Oral at bedtime PRN Insomnia  ondansetron Injectable 4 milliGRAM(s) IV Push every 8 hours PRN Nausea and/or Vomiting  traMADol 25 milliGRAM(s) Oral two times a day PRN Moderate Pain (4 - 6)

## 2021-12-13 NOTE — PROGRESS NOTE ADULT - ASSESSMENT
89 y/o male with h/o CAD s/p PCI/Stent, HTN, BPH, urinary retention with munoz in place, chronic LLE Hip Pain 2/2 OA, HLD, CKD II-IV, AAA, PAD was admitted on 12/8 from [facility] by EMS for worsening confusion. According to [facility] notes he was last known well 15 minutes prior to onset of lethargy and AMS. As per EMS (to ED)  pt was talking to staff when he suddenly became altered and felt cold and lethargic so pt was sent to ED further evaluation. In ER he was noted febrile and received zosyn.    1. Febrile syndrome improving. Sepsis with ESBL SHUKRI. UTI with ESBL SHUKRI. BPH, Urinary retention with munoz. ARF on CRF stage 3. Encephalopathy. Allergy to PCN.  -frail  -BC x 2, urine c/s noted  -s/p zosyn 3.375 gm IV q8h # 2  -on meropenem 1 gm IV q12h # 4  -tolerating abx well so far; no side effects noted  -monitor closely in [place] of PCN allergy history  -f/u repeat BC   -continue abx coverage   -contact isolation  -monitor temps  -f/u CBC  -supportive care  2. Other issues:   -care per medicine    d/w medical team

## 2021-12-13 NOTE — PROGRESS NOTE ADULT - ASSESSMENT
89 year old male w CAD s/p PCI/Stent, HTN, BPH, Chronic LLE Hip Pain 2/2 OA, HLD, CKD II-IV, AAA, PAD who [facility] for AMS.   Found to be tachycardic 105 w rectal temp 103.9      #Sepsis/bacteremia - poa and not related to MUNOZ - munoz placed in ED  #due to UTI/Hemorrhagic cystitis?  #AMS likely due to metabolic encephalopathy which has improved after treatment  - ucx - ESBl Morganella - abx changed to meropenem on 12/10 as per ID recs.    -CT c/w b/l hydro and enlarge prostate  - recc munoz, proscar/flomax as not a candidate for surgical intervention.    -D/W urology and patient/family at length  -No viable options for treatment given his age and underlying comorbidities - dtr was concerned about munoz and i asked her to call dr barth to speak with him further   -Ultimately his BPH will cause recurrent issues ie AUR and UTIs  -Will need chronic munoz placements for continued urinary retention due to BPH. son and pt understand that there is risk of infection with munoz  -SW/CM to determine if pt can go back to [facility] with munoz otherwise needs higher level of care  -BC x 2, urine c/s noted  -ID consult this morning 12/12/21 appreciated   -s/p zosyn 3.375 gm IV q8h # 2  -on meropenem 1 gm IV q12h # 4  -tolerating abx well so far; no side effects noted  -monitor closely in [place] of PCN allergy history  -repeat BC in AM  continue abx coverage   -contact isolation  -monitor temps  -f/u CBC  -supportive care      #BPH w obstruction/Obstructive uropathy  CT w bilateral hydronephrosis  -munoz placed and will need to remain n place indefinitely since he is not a surgical candidate as per urology   -proscar  -flomax  - followup    #RASHAUN 2/2 to obstructive uropathy - improving   Cr 2.67 vs 1.57 on 10-  abd CT scan w bilat hydro and prostate enlargement as mass effect on bladder  -Scr 2.19-> 1.9  -cont ivf and monitor Scr  -monitor UOP    #AAA  s/p repair w stable size 5.5      #Acute on chronic anemia 2/2 to hematuria - transfuse 1 unit PRBC given underlying CAD and dropping hgb. no obvious source of bleeding.   -stable  -guaiac neg in ED   -Hgb 7.6 today - asymptomatic  -Consent for PRBC obtained and in chart  -Transfuse for Hgb < 7 or if symptomatics  -mild hematuria , no clots noted. Resolved  -baseline 7-9  -Likely RASHAUN due to decreased perfusion elevated BUN/Cr mild improvement with PRBC     #Cardiovascular  Hx CAD w stent  HTN  HLD  HFpEF-stable  1. asa 81 mg qd - can continue with use  2. metoprolol 75 mg qd w parameters  3. atorvastatin 40 mg  4. lasix 40 mg BID w parameters  5. EKG w 1st degree block LAD w IVCD  no sig change when I compared to EKG 10-      #Chronic pain  1. gabapentin 300 mg BID  2. gabapentin 600 mg q HS  3. acetaminophen prn mild pain  4. tramadol 25 mg BID prn moderate      #Depression   1. cymbalta 60 mg      #VTE  IMPROVE VTE Individual Risk Assessment    RISK                                                                Points    [  ] Previous VTE                                                  3    [  ] Thrombophilia                                               2    [  ] Lower limb paralysis                                      2        (unable to hold up >15 seconds)      [  ] Current Cancer                                              2         (within 6 months)    [  ] Immobilization > 24 hrs                                1    [  ] ICU/CCU stay > 24 hours                              1    [ X ] Age > 60                                                      1    IMPROVE VTE Score ___1______    IMPROVE Score 0-1: Low Risk, No VTE prophylaxis required for most patients, encourage ambulation.   IMPROVE Score 2-3: At risk, pharmacologic VTE prophylaxis is indicated for most patients (in the absence of a contraindication)  IMPROVE Score > or = 4: High Risk, pharmacologic VTE prophylaxis is indicated for most patients (in the absence of a contraindication)      VTE sq heparin 5000 units q 12 hrs    GOC discussion took place with son and patient. They would like to review their options and requesting Pall care consult. For now remains DNR/I as previously d/w with admitting doctor. Patient seen by Palliative today.       Dispo: Sandra. Abx. Bacteremia/Sepsis. Repeat abx and monitor clearance. ID consult 12/12/21 appreciated. Monitor h/h from hematuria. continue antibiotic treatment/supportive care.

## 2021-12-13 NOTE — PROGRESS NOTE ADULT - SUBJECTIVE AND OBJECTIVE BOX
Date of service: 21 @ 15:33    Lying in bed in NAD  Weak looking  Alert and confused  Dose not seem in pain    ROS: no fever or chills; poorly verbal    MEDICATIONS  (STANDING):  aspirin enteric coated 81 milliGRAM(s) Oral daily  atorvastatin 40 milliGRAM(s) Oral at bedtime  DULoxetine 60 milliGRAM(s) Oral daily  finasteride 5 milliGRAM(s) Oral daily  gabapentin 300 milliGRAM(s) Oral two times a day  gabapentin 600 milliGRAM(s) Oral at bedtime  heparin   Injectable 5000 Unit(s) SubCutaneous every 12 hours  meropenem  IVPB 1000 milliGRAM(s) IV Intermittent every 12 hours  multivitamin 1 Tablet(s) Oral daily  tamsulosin 0.8 milliGRAM(s) Oral at bedtime    Vital Signs Last 24 Hrs  T(C): 36.4 (13 Dec 2021 08:28), Max: 37.1 (12 Dec 2021 15:44)  T(F): 97.6 (13 Dec 2021 08:28), Max: 98.8 (12 Dec 2021 15:44)  HR: 72 (13 Dec 2021 08:28) (72 - 81)  BP: 170/77 (13 Dec 2021 08:28) (149/65 - 170/77)  BP(mean): --  RR: 18 (13 Dec 2021 08:28) (17 - 18)  SpO2: 96% (13 Dec 2021 08:28) (96% - 97%)     Physical exam:    Constitutional:  No acute distress  HEENT: NC/AT, EOMI, PERRLA, conjunctivae clear; ears and nose atraumatic  Neck: supple; thyroid not palpable  Back: no tenderness  Respiratory: respiratory effort normal; clear to auscultation  Cardiovascular: S1S2 regular, no murmurs  Abdomen: soft, not tender, not distended, positive BS  Genitourinary: has suprapubic tenderness  munoz with dark urine in bag  Lymphatic: no LN palpable  Musculoskeletal: no muscle tenderness, no joint swelling or tenderness  Extremities: no pedal edema  Neurological/ Psychiatric: Alert, moving all extremities  Skin: no rashes; no palpable lesions    Labs: reviewed                        8.3    6.89  )-----------( 284      ( 12 Dec 2021 06:17 )             25.8         146<H>  |  110<H>  |  24<H>  ----------------------------<  98  3.5   |  29  |  1.59<H>    Ca    8.7      12 Dec 2021 06:17    TPro  6.4  /  Alb  2.3<L>  /  TBili  0.3  /  DBili  x   /  AST  17  /  ALT  14  /  AlkPhos  69  12                        8.0    4.65  )-----------( 232      ( 09 Dec 2021 07:10 )             25.5     12    140  |  108  |  36<H>  ----------------------------<  123<H>  3.7   |  27  |  2.19<H>    Ca    8.3<L>      09 Dec 2021 07:10  Phos  3.5     12-08  Mg     2.0     1208    TPro  7.6  /  Alb  3.1<L>  /  TBili  0.4  /  DBili  x   /  AST  17  /  ALT  14  /  AlkPhos  89  12-08     LIVER FUNCTIONS - ( 08 Dec 2021 16:38 )  Alb: 3.1 g/dL / Pro: 7.6 gm/dL / ALK PHOS: 89 U/L / ALT: 14 U/L / AST: 17 U/L / GGT: x           Urinalysis Basic - ( 08 Dec 2021 16:42 )    Color: Yellow / Appearance: cloudy / S.005 / pH: x  Gluc: x / Ketone: Negative  / Bili: Negative / Urobili: Negative mg/dL   Blood: x / Protein: 100 mg/dL / Nitrite: Negative   Leuk Esterase: Moderate / RBC: 6-10 /HPF / WBC >50   Sq Epi: x / Non Sq Epi: Occasional / Bacteria: Moderate    COVID-19 PCR: NotDetec (21 @ 16:38)      Culture - Urine (collected 08 Dec 2021 16:42)  Source: Clean Catch Clean Catch (Midstream)  Preliminary Report (11 Dec 2021 18:40):    >100,000 CFU/ml Morganella morganii    Culture - Blood (collected 08 Dec 2021 16:38)  Source: .Blood None  Gram Stain (09 Dec 2021 19:20):    Growth in aerobic and anaerobic bottles: Gram Variable Rods  Final Report (11 Dec 2021 15:49):    Growth in aerobic and anaerobic bottles: Morganella morganii    MDRO detected in BCID PCR, resistance marker = CTX-M (ESBL)  Morganella morganii (11 Dec 2021 15:49)  Organism: Morganella morganii (11 Dec 2021 15:49)      -  Amikacin: S <=16      -  Ampicillin: R >16 These ampicillin results predict results for amoxicillin      -  Ampicillin/Sulbactam: I 16/8 Enterobacter, Klebsiella aerogenes, Citrobacter, and Serratia may develop resistance during prolonged therapy (3-4 days)      -  Aztreonam: R >16      -  Cefazolin: R >16 Enterobacter, Klebsiella aerogenes, Citrobacter, and Serratia may develop resistance during prolonged therapy (3-4 days)      -  Cefepime: R >16      -  Cefoxitin: S <=8      -  Ceftazidime/Avibactam: S <=4      -  Ceftolozane/tazobactam: S <=2      -  Ceftriaxone: R >32 Enterobacter, Klebsiella aerogenes, Citrobacter, and Serratia may develop resistance during prolonged therapy      -  Ciprofloxacin: S <=0.25      -  Ertapenem: S <=0.5      -  Gentamicin: S <=2      -  Imipenem: I 2      -  Levofloxacin: S <=0.5      -  Meropenem: S <=1      -  Piperacillin/Tazobactam: S <=8      -  Tigecycline: R <=2      -  Tobramycin: S <=2      -  Trimethoprim/Sulfamethoxazole: R >2/38      Method Type: MAULIK  Organism: Blood Culture PCR (11 Dec 2021 15:49)      -  ESBL: Detec      -  Morganella morganii: Detec      Method Type: PCR    Culture - Blood (collected 08 Dec 2021 16:36)  Source: .Blood Blood-Peripheral  Gram Stain (09 Dec 2021 19:21):    Growth in anaerobic bottle: Gram Variable Rods  Final Report (11 Dec 2021 15:49):    Growth in anaerobic bottle: Morganella morganii    See previous culture [id number]        Radiology: all available radiological tests reviewed    < from: CT Abdomen and Pelvis No Cont (21 @ 19:34) >  Moderate right hydronephrosis and hydroureter and mild left   hydronephrosis and hydroureter to the level of the urinary bladder.  Diffuse bladder wall thickening which is indeterminant. Inserted direct   visualization.  Enlarged prostate gland with mass effect on the urinary bladder.  < end of copied text >      Advanced directives addressed: full resuscitation

## 2021-12-13 NOTE — PROGRESS NOTE ADULT - ASSESSMENT
Pt is a 89y old Male with hx of 89 year old male w CAD s/p PCI/Stent, HTN, BPH, Chronic LLE Hip Pain 2/2 OA, HLD, CKD II-IV, AAA, PAD who presented to  from [facility] for AMS. He was last admitted  for UTI due to proteus mirabilis complicated by bacteremia and R hydronephrosis and was treated with meropenem then ceftriaxone x4 weeks 11- was seen by  and munoz cath was recommended    1) AMS  -R/T UTI  -Chronic munoz cath  - eval noted  -Blood C&S +  -Recent completion of 4 weeks abx  -ID eval noted  -c/w antibiotics      2) BPH w obstruction  -CT w bilateral hydronephrosis  -Has been given tamsulosin 0.8 mg QOD and nitrofurantoin QOD  -Increase tamsulosin 0.8 mg q HS  -Maintain munoz cath  - eval noted    3) RASHAUN  -Creat > 1.99  -gentle hydration  -CT abd noted for bilat hydro and prostate enlargement as mass effect on bladder    4) Debility  -Recent hospitalization  -Repeat UTI  -anemia  -chronic LE pain  -PT eval    5) CAD  -stent  -HTN  -HLD  -cont ASA, BB, statin, diuretics as tolerated    6) Chronic pain  -R/T OA  -cont gabapentin 300 mg BID. gabapentin 600 mg q HS  -cont acetaminophen prn mild pain  -cont tramadol 25 mg BID prn moderate  -supportive care    7) Advanced Directives  -Pt without capacity  -Son Joseph surrogate  -MOLST DNR/DNI on chart  -Will schedule GOC discussion

## 2021-12-13 NOTE — CHART NOTE - NSCHARTNOTEFT_GEN_A_CORE
This SW attempted to meet with pt. however, he was confused and could not participate in meaningful conversation. This SW reached out to pts son Joseph to follow up and offer support. He was on the phone with the hospital and requested this SW call back. This SW called back at a later time however, he did not answer. Message left and awaiting call back. Our team will continue to follow.

## 2021-12-13 NOTE — PROGRESS NOTE ADULT - SUBJECTIVE AND OBJECTIVE BOX
Patient is a 89y old  Male who presents with a chief complaint of sepsis  UTI  AMS (09 Dec 2021 12:12)      SUBJECTIVE:   HPI:    89 year old male w CAD s/p PCI/Stent, HTN, BPH, Chronic LLE Hip Pain 2/2 OA, HLD, CKD II-IV, AAA, PAD who presented to  from [facility] by EMS for AMS.   According to [facility] notes he was last known well 15 minutes prior to onset of lethargy and AMS  As per EMS (to ED)  pt was talking to staff when he suddenly became altered and felt cold and lethargic so pt was sent to ED further evaluation.  He was last admitted to   for UTI due to proteus mirabilis complicated by bacteremia and R hydronephrosis  Treated w meropenem then ceftriaxone x4 weeks   11- was seen by [facility] where munoz was recommended . munoz replaced in ED  In ED /81    RR 18   T 103.9 rectal  sat 85% RA; repeat 96% on 4 L nc  vomited in CT w abd distension and no pain on palpation·       12/10 -pt c/o constipation, no BM in 4 days. Says usual for him. Munoz now with clear urine. Hgb 7.6  12/11 - no events overnight.    12/12: Pt seen and evaluated at bedside no acute events overnight. NAD, Pt received one additonal unit PRBC last night after hgb dropped to 7.1.  12/13: Pt seen and evaluated at bedside  no overnight events, NAD,     ROS:   All 10 systems reviewed and found to be negative with the exception of what has been described above.    PAST MEDICAL & SURGICAL HISTORY:  Leg swelling  related to cellulitis of LLE    CKD (chronic kidney disease) stage 3, GFR 30-59 ml/min    Cellulitis  BL    BPH (benign prostatic hyperplasia)    Colon polyp    Stented coronary artery    HTN (hypertension)    HLD (hyperlipidemia)    CAD (coronary artery disease)    OA (osteoarthritis)    PAD (peripheral artery disease)    AAA (abdominal aortic aneurysm) without rupture    S/P hemorrhoidectomy    H/O inguinal hernia  repair    History of colonoscopy    History of coronary artery stent placement      ICU Vital Signs Last 24 Hrs  ICU Vital Signs Last 24 Hrs  T(C): 36.4 (13 Dec 2021 08:28), Max: 37.1 (12 Dec 2021 15:44)  T(F): 97.6 (13 Dec 2021 08:28), Max: 98.8 (12 Dec 2021 15:44)  HR: 72 (13 Dec 2021 08:28) (72 - 81)  BP: 170/77 (13 Dec 2021 08:28) (149/65 - 170/77)  BP(mean): --  ABP: --  ABP(mean): --  RR: 18 (13 Dec 2021 08:28) (17 - 18)  SpO2: 96% (13 Dec 2021 08:28) (96% - 97%)      GEN: lying in bed, NAD  HEENT:   NC/AT, pupils equal and reactive, EOMI  CV:  +S1, +S2, RRR  RESP:   lungs clear to auscultation bilaterally, no wheeze, rales, rhonchi   BREAST:  not examined  GI:  abdomen soft, non-tender, non-distended, normoactive BS  RECTAL:  not examined  :  not examined  MSK:   normal muscle tone  EXT:  no edema  NEURO:  no focal neurological deficits  SKIN:  no rashes         Lab:                   8.3    6.89  )-----------( 284      ( 12 Dec 2021 06:17 )             25.8                   12-12    146<H>  |  110<H>  |  24<H>  ----------------------------<  98  3.5   |  29  |  1.59<H>    Ca    8.7      12 Dec 2021 06:17    TPro  6.4  /  Alb  2.3<L>  /  TBili  0.3  /  DBili  x   /  AST  17  /  ALT  14  /  AlkPhos  69  12-12      from: CT Head No Cont (12.08.21 @ 16:56) >  IMPRESSION:    1)  scattered chronic ischemic changes with volume loss. Borderline   prominent ventricles, without transependymal migration of CSF. No CT   evidence of an acute infarct or hemorrhage.  2)  follow-up MR imaging of the brain may be considered for further   assessment.    < end of copied text >  < from: Xray Chest 1 View- PORTABLE-Urgent (12.08.21 @ 17:17) >  IMPRESSION:   Small LEFT pleural effusion..    --- End of Report ---      < end of copied text >    < from: CT Abdomen and Pelvis No Cont (12.08.21 @ 19:34) >  IMPRESSION:    Moderate right hydronephrosis and hydroureter and mild left   hydronephrosis and hydroureter to the level of the urinary bladder.    Diffuse bladder wall thickening which is indeterminant. Inserted direct   visualization.    Enlarged prostate gland with mass effect on the urinary bladder.      < end of copied text >

## 2021-12-14 DIAGNOSIS — A41.50 GRAM-NEGATIVE SEPSIS, UNSPECIFIED: ICD-10-CM

## 2021-12-14 PROCEDURE — 99232 SBSQ HOSP IP/OBS MODERATE 35: CPT

## 2021-12-14 RX ADMIN — TAMSULOSIN HYDROCHLORIDE 0.8 MILLIGRAM(S): 0.4 CAPSULE ORAL at 21:40

## 2021-12-14 RX ADMIN — Medication 81 MILLIGRAM(S): at 12:12

## 2021-12-14 RX ADMIN — Medication 3 MILLIGRAM(S): at 21:40

## 2021-12-14 RX ADMIN — MEROPENEM 100 MILLIGRAM(S): 1 INJECTION INTRAVENOUS at 12:14

## 2021-12-14 RX ADMIN — HEPARIN SODIUM 5000 UNIT(S): 5000 INJECTION INTRAVENOUS; SUBCUTANEOUS at 21:40

## 2021-12-14 RX ADMIN — ATORVASTATIN CALCIUM 40 MILLIGRAM(S): 80 TABLET, FILM COATED ORAL at 21:40

## 2021-12-14 RX ADMIN — HEPARIN SODIUM 5000 UNIT(S): 5000 INJECTION INTRAVENOUS; SUBCUTANEOUS at 12:15

## 2021-12-14 RX ADMIN — GABAPENTIN 600 MILLIGRAM(S): 400 CAPSULE ORAL at 21:40

## 2021-12-14 RX ADMIN — GABAPENTIN 300 MILLIGRAM(S): 400 CAPSULE ORAL at 18:25

## 2021-12-14 RX ADMIN — DULOXETINE HYDROCHLORIDE 60 MILLIGRAM(S): 30 CAPSULE, DELAYED RELEASE ORAL at 12:14

## 2021-12-14 RX ADMIN — Medication 100 MILLIGRAM(S): at 12:13

## 2021-12-14 RX ADMIN — FINASTERIDE 5 MILLIGRAM(S): 5 TABLET, FILM COATED ORAL at 12:14

## 2021-12-14 RX ADMIN — Medication 1 TABLET(S): at 12:13

## 2021-12-14 RX ADMIN — MEROPENEM 100 MILLIGRAM(S): 1 INJECTION INTRAVENOUS at 22:08

## 2021-12-14 RX ADMIN — GABAPENTIN 300 MILLIGRAM(S): 400 CAPSULE ORAL at 12:13

## 2021-12-14 NOTE — PROGRESS NOTE ADULT - ASSESSMENT
89 year old male w CAD s/p PCI/Stent, HTN, BPH, Chronic LLE Hip Pain 2/2 OA, HLD, CKD II-IV, AAA, PAD who presented to  from [facility] by EMS for AMS.   Found to be tachycardic 105 w rectal temp 103.9      #Sepsis/bacteremia - poa and not related to MUNOZ - munoz placed in ED  #due to UTI/Hemorrhagic cystitis?  #AMS likely due to metabolic encephalopathy which has improved after treatment  - ucx - ESBl Morganella - abx changed to meropenem on 12/10 as per ID recs.    -CT c/w b/l hydro and enlarge prostate  - recc munoz, proscar/flomax as not a candidate for surgical intervention.    -D/W urology and patient/family at length  -No viable options for treatment given his age and underlying comorbidities - dtr was concerned about munoz and i asked her to call dr barth to speak with him further   -Ultimately his BPH will cause recurrent issues ie AUR and UTIs  -Will need chronic munoz placements for continued urinary retention due to BPH. son and pt understand that there is risk of infection with munoz  -SW/CM to determine if pt can go back to [facility] with munoz otherwise needs higher level of care  -BC x 2, urine c/s noted  -ID consult this morning 12/12/21 appreciated   -s/p zosyn 3.375 gm IV q8h # 2  -on meropenem 1 gm IV q12h # 4  -tolerating abx well so far; no side effects noted  -monitor closely in [place] of PCN allergy history  -repeat BC in AM  continue abx coverage   -contact isolation  -monitor temps  -f/u CBC  -supportive care      #BPH w obstruction/Obstructive uropathy  CT w bilateral hydronephrosis  -munoz placed and will need to remain n place indefinitely since he is not a surgical candidate as per urology   -proscar  -flomax  - followup    #RASHAUN 2/2 to obstructive uropathy - improving   Cr 2.67 vs 1.57 on 10-  abd CT scan w bilat hydro and prostate enlargement as mass effect on bladder  -Scr 2.19-> 1.9  -cont ivf and monitor Scr  -monitor UOP    #AAA  s/p repair w stable size 5.5      #Acute on chronic anemia 2/2 to hematuria - transfuse 1 unit PRBC given underlying CAD and dropping hgb. no obvious source of bleeding.   -stable  -guaiac neg in ED   -Hgb 7.6 today - asymptomatic  -Consent for PRBC obtained and in chart  -Transfuse for Hgb < 7 or if symptomatics  -mild hematuria , no clots noted. Resolved  -baseline 7-9  -Likely RASHAUN due to decreased perfusion elevated BUN/Cr mild improvement with PRBC     #Cardiovascular  Hx CAD w stent  HTN  HLD  HFpEF-stable  1. asa 81 mg qd - can continue with use  2. metoprolol 75 mg qd w parameters  3. atorvastatin 40 mg  4. lasix 40 mg BID w parameters  5. EKG w 1st degree block LAD w IVCD  no sig change when I compared to EKG 10-      #Chronic pain  1. gabapentin 300 mg BID  2. gabapentin 600 mg q HS  3. acetaminophen prn mild pain  4. tramadol 25 mg BID prn moderate      #Depression   1. cymbalta 60 mg      #VTE  IMPROVE VTE Individual Risk Assessment    RISK                                                                Points    [  ] Previous VTE                                                  3    [  ] Thrombophilia                                               2    [  ] Lower limb paralysis                                      2        (unable to hold up >15 seconds)      [  ] Current Cancer                                              2         (within 6 months)    [  ] Immobilization > 24 hrs                                1    [  ] ICU/CCU stay > 24 hours                              1    [ X ] Age > 60                                                      1    IMPROVE VTE Score ___1______    IMPROVE Score 0-1: Low Risk, No VTE prophylaxis required for most patients, encourage ambulation.   IMPROVE Score 2-3: At risk, pharmacologic VTE prophylaxis is indicated for most patients (in the absence of a contraindication)  IMPROVE Score > or = 4: High Risk, pharmacologic VTE prophylaxis is indicated for most patients (in the absence of a contraindication)      VTE sq heparin 5000 units q 12 hrs    GOC discussion took place with son and patient. They would like to review their options and requesting Pall care consult. For now remains DNR/I as previously d/w with admitting doctor. Patient seen by Palliative today.       Dispo: Munoz. Abx. Bacteremia/Sepsis. Repeat abx and monitor clearance. ID consult 12/12/21 appreciated. Monitor h/h from hematuria. continue antibiotic treatment/supportive care.   - continue meropenem day #5  - continue munoz for bph and hydronephrosis, not a candidate for surgical intervention as per   - metabolic encephalopathy improving.   - pt unable to return to [facility] with munoz and may need SNF placement .

## 2021-12-14 NOTE — GOALS OF CARE CONVERSATION - ADVANCED CARE PLANNING - CONVERSATION DETAILS
The role of Palliative medicine was reviewed. The patient and his son discussed his recurrent UTI's and 4 hospitalizations including 4 weeks of ABX at [facility] only to return to [facility] and have recurrence within 2 weeks. He has urinary retention and hydronephrosis,  recommending chronic munoz cath. Pt is questioning a leg bag at [facility] and they are not sure if they will accept him with that. The pt agrees to [facility] if recommended for ABX again as it is likely he will require ( as per Dr Mcmahon) . I recommended hospice care at [facility] if he does not want to cont to pursue aggressive treatment as it is likely this is going to be a recurring problem. His son will discuss further with the medical and SW team as well as siblings. Will follow
This SW met with pt. at bedside. Pt. is much more alert today and was able to explain why he was in the hospital. Dr. Dunbar visited during our conversation and explained that he will need the catheter and even with it is still at a risk for recurrent infections. Pt. expressed understanding. He is aware that he cannot go back to [facility] with LT antibiotics, as he would like to finish this course, and a munoz. He is agreeable to [facility] and from there knows he will need a plan. We discussed option of comfort focus and hospice at [facility] if they would be willing to take with a 24/7 aide VS LTC. Pt. deferred meeting to his children but reports that he will speak with them about the options after [facility] when they come later today.    This SW spoke with pts children regarding plan and informed them of above. They recognize that this will be a recurrent issue, even with the munoz and plan to discuss with pt. if after this course of antibiotics if he is in agreement with a comfort focus. We discussed two pathways either continuing to come back and forth to the hospital VS not pursing LT antibiotics in the future and just focusing on his comfort with the support of hospice after this course of antibiotics have finished. Pts children have a call with [facility] today and will inquire if they will consider taking pt. back after [facility] with Munoz if he agrees to home hospice and they provide a private hire aide. They report that pt. had always made clear that he does not want to reside in a SNF therefore, if [facility] cannot take they may see if another [facility] like [facility] could accommodate however, this SW explained it would be up to them to if they can manage pts needs. They both also report they cannot take pt. to their homes with private aide and hospice as they do not have the room.     Pts family reports that they will speak with pt. later today about his goals for after [facility] and when the course of antibiotics are finished. They are aware that they can discuss the d/c plan from rehab with the  there and if they decide on hospice they can assist with the referral. We also discussed option of comfort [facility] if pt. decided at rehab at any point he just wanted to focus on comfort. At this point the plan is for pt. to go to [facility] and will discuss with family about the goals and plan for after. All options discussed in detail.     Plan at this time is for [facility] and family will discuss with pt. the plan for after with pt. Emotional support provided. Our team will continue to follow.

## 2021-12-14 NOTE — PROGRESS NOTE ADULT - SUBJECTIVE AND OBJECTIVE BOX
CHIEF COMPLAINT:REcurrent  sepsis    HISTORY OF PRESENT ILLNESS:Dispostion being contemplated    PAST MEDICAL & SURGICAL HISTORY:  Leg swelling  related to cellulitis of LLE    CKD (chronic kidney disease) stage 3, GFR 30-59 ml/min    Cellulitis  BL    BPH (benign prostatic hyperplasia)    Colon polyp    Stented coronary artery    HTN (hypertension)    HLD (hyperlipidemia)    CAD (coronary artery disease)    OA (osteoarthritis)    PAD (peripheral artery disease)    AAA (abdominal aortic aneurysm) without rupture    S/P hemorrhoidectomy    H/O inguinal hernia  repair    History of colonoscopy    History of coronary artery stent placement        REVIEW OF SYSTEMS:    CONSTITUTIONAL: No weakness, fevers or chills/debility of age and disease  EYES/ENT: No visual changes;  No vertigo or throat pain   NECK: No pain or stiffness  RESPIRATORY: No cough, wheezing, hemoptysis; No shortness of breath  CARDIOVASCULAR: No chest pain or palpitations  GASTROINTESTINAL: No abdominal or epigastric pain. No nausea, vomiting, or hematemesis; No diarrhea or constipation. No melena or hematochezia.  GENITOURINARY: No dysuria, frequency or hematuria/retention, infection  NEUROLOGICAL: No numbness or weakness  SKIN: No itching, burning, rashes, or lesions   All other review of systems is negative unless indicated above.    MEDICATIONS  (STANDING):  aspirin enteric coated 81 milliGRAM(s) Oral daily  atorvastatin 40 milliGRAM(s) Oral at bedtime  DULoxetine 60 milliGRAM(s) Oral daily  finasteride 5 milliGRAM(s) Oral daily  gabapentin 300 milliGRAM(s) Oral two times a day  gabapentin 600 milliGRAM(s) Oral at bedtime  heparin   Injectable 5000 Unit(s) SubCutaneous every 12 hours  meropenem  IVPB 1000 milliGRAM(s) IV Intermittent every 12 hours  metoprolol succinate  milliGRAM(s) Oral daily  multivitamin 1 Tablet(s) Oral daily  tamsulosin 0.8 milliGRAM(s) Oral at bedtime    MEDICATIONS  (PRN):  acetaminophen     Tablet .. 650 milliGRAM(s) Oral every 6 hours PRN Temp greater or equal to 38C (100.4F), Mild Pain (1 - 3)  ALBUTerol    90 MICROgram(s) HFA Inhaler 2 Puff(s) Inhalation every 6 hours PRN Shortness of Breath and/or Wheezing  melatonin 3 milliGRAM(s) Oral at bedtime PRN Insomnia  ondansetron Injectable 4 milliGRAM(s) IV Push every 8 hours PRN Nausea and/or Vomiting  traMADol 25 milliGRAM(s) Oral two times a day PRN Moderate Pain (4 - 6)      Allergies    No Known Allergies    Intolerances        SOCIAL HISTORY:    FAMILY HISTORY:  Family history unobtainable  d/t dementia        Vital Signs Last 24 Hrs  T(C): 36.7 (14 Dec 2021 08:27), Max: 36.7 (14 Dec 2021 08:27)  T(F): 98.1 (14 Dec 2021 08:27), Max: 98.1 (14 Dec 2021 08:27)  HR: 72 (14 Dec 2021 08:27) (68 - 72)  BP: 146/59 (14 Dec 2021 08:27) (114/50 - 146/59)  BP(mean): --  RR: 18 (14 Dec 2021 08:27) (18 - 18)  SpO2: 98% (14 Dec 2021 08:27) (98% - 98%)    PHYSICAL EXAM:    Constitutional: NAD, well-developed/debility of age and disease, bed ridden  HEENT: JULISSA, EOMI, Normal Hearing, MMM  Neck: No LAD, No JVD  Back: Normal spine flexure, No CVA tenderness  Respiratory: CTAB   Cardiovascular: S1 and S2, RRR, no M/G/R  Abd: BS+, soft, NT/ND, No CVAT  : Normal phallus,open meatus,bilateral descended testes, no masses  MARIANELA: Normal prostate, no masses  Extremities: No peripheral edema  Vascular: 2+ peripheral pulses  Neurological: A/O x 3, no focal deficits  Psychiatric: Normal mood, normal affect  Musculoskeletal: 5/5 strength b/l upper and lower extremities  Skin: No rashes    LABS:              Urine Culture:     RADIOLOGY & ADDITIONAL STUDIES:

## 2021-12-14 NOTE — GOALS OF CARE CONVERSATION - ADVANCED CARE PLANNING - PARTICIPANTS
pt. deferred to his children for the meeting but wishes for them to inform him of the options when they visit later today/Family/Staff
Patient/Family/Staff

## 2021-12-14 NOTE — PROGRESS NOTE ADULT - ASSESSMENT
89 y/o male with h/o CAD s/p PCI/Stent, HTN, BPH, urinary retention with munoz in place, chronic LLE Hip Pain 2/2 OA, HLD, CKD II-IV, AAA, PAD was admitted on 12/8 from [facility] by EMS for worsening confusion. According to [facility] notes he was last known well 15 minutes prior to onset of lethargy and AMS. As per EMS (to ED)  pt was talking to staff when he suddenly became altered and felt cold and lethargic so pt was sent to ED further evaluation. In ER he was noted febrile and received zosyn.    1. Febrile syndrome improving. Sepsis with ESBL SHUKRI. UTI with ESBL SHUKRI. BPH, Urinary retention with munoz. ARF on CRF stage 3. Encephalopathy. Allergy to PCN.  -frail  -BC x 2, urine c/s noted  -s/p zosyn 3.375 gm IV q8h # 2  -on meropenem 1 gm IV q12h # 5  -tolerating abx well so far; no side effects noted  -monitor closely in [place] of PCN allergy history  -repeat BC are negative to date   -continue abx coverage   -contact isolation  -monitor temps  -f/u CBC  -supportive care  2. Other issues:   -care per medicine    d/w medical team

## 2021-12-14 NOTE — PROGRESS NOTE ADULT - SUBJECTIVE AND OBJECTIVE BOX
89 year old male w CAD s/p PCI/Stent, HTN, BPH, Chronic LLE Hip Pain 2/2 OA, HLD, CKD II-IV, AAA, PAD who presented to  from [facility] by EMS for AMS.   According to [facility] notes he was last known well 15 minutes prior to onset of lethargy and AMS  As per EMS (to ED)  pt was talking to staff when he suddenly became altered and felt cold and lethargic so pt was sent to ED further evaluation.  He was last admitted to   for UTI due to proteus mirabilis complicated by bacteremia and R hydronephrosis  Treated w meropenem then ceftriaxone x4 weeks   11- was seen by  where munoz was recommended . munoz replaced in ED  In ED /81    RR 18   T 103.9 rectal  sat 85% RA; repeat 96% on 4 L nc  vomited in CT w abd distension and no pain on palpation·       12/10 -pt c/o constipation, no BM in 4 days. Says usual for him. Munoz now with clear urine. Hgb 7.6  12/11 - no events overnight.    12/12: Pt seen and evaluated at bedside no acute events overnight. NAD, Pt received one additonal unit PRBC last night after hgb dropped to 7.1.  12/14: Pt seen and evaluated at bedside  no overnight events, NAD,     ROS:   All 10 systems reviewed and found to be negative with the exception of what has been described above.  GEN: lying in bed, NAD  HEENT:   NC/AT, pupils equal and reactive, EOMI  CV:  +S1, +S2, RRR  RESP:   lungs clear to auscultation bilaterally, no wheeze, rales, rhonchi   BREAST:  not examined  GI:  abdomen soft, non-tender, non-distended, normoactive BS  RECTAL:  not examined  :  not examined  MSK:   normal muscle tone  EXT:  no edema  NEURO:  no focal neurological deficits  SKIN:  no rashes    labs reviewed

## 2021-12-14 NOTE — PROGRESS NOTE ADULT - SUBJECTIVE AND OBJECTIVE BOX
Date of service: 21 @ 12:04    Lying in bed in NAD  Alert and confused  Dose not seem in pain    ROS: no fever or chills; poorly verbal    MEDICATIONS  (STANDING):  aspirin enteric coated 81 milliGRAM(s) Oral daily  atorvastatin 40 milliGRAM(s) Oral at bedtime  DULoxetine 60 milliGRAM(s) Oral daily  finasteride 5 milliGRAM(s) Oral daily  gabapentin 300 milliGRAM(s) Oral two times a day  gabapentin 600 milliGRAM(s) Oral at bedtime  heparin   Injectable 5000 Unit(s) SubCutaneous every 12 hours  meropenem  IVPB 1000 milliGRAM(s) IV Intermittent every 12 hours  metoprolol succinate  milliGRAM(s) Oral daily  multivitamin 1 Tablet(s) Oral daily  tamsulosin 0.8 milliGRAM(s) Oral at bedtime    Vital Signs Last 24 Hrs  T(C): 36.7 (14 Dec 2021 08:27), Max: 36.7 (14 Dec 2021 08:27)  T(F): 98.1 (14 Dec 2021 08:27), Max: 98.1 (14 Dec 2021 08:27)  HR: 72 (14 Dec 2021 08:27) (68 - 72)  BP: 146/59 (14 Dec 2021 08:27) (114/50 - 146/59)  BP(mean): --  RR: 18 (14 Dec 2021 08:27) (18 - 18)  SpO2: 98% (14 Dec 2021 08:27) (98% - 98%)     Physical exam:    Constitutional:  No acute distress  HEENT: NC/AT, EOMI, PERRLA, conjunctivae clear; ears and nose atraumatic  Neck: supple; thyroid not palpable  Back: no tenderness  Respiratory: respiratory effort normal; clear to auscultation  Cardiovascular: S1S2 regular, no murmurs  Abdomen: soft, not tender, not distended, positive BS  Genitourinary: has suprapubic tenderness  munoz with dark urine in bag  Lymphatic: no LN palpable  Musculoskeletal: no muscle tenderness, no joint swelling or tenderness  Extremities: no pedal edema  Neurological/ Psychiatric: Alert, moving all extremities  Skin: no rashes; no palpable lesions    Labs: reviewed                        8.3    6.89  )-----------( 284      ( 12 Dec 2021 06:17 )             25.8     12-12    146<H>  |  110<H>  |  24<H>  ----------------------------<  98  3.5   |  29  |  1.59<H>    Ca    8.7      12 Dec 2021 06:17    TPro  6.4  /  Alb  2.3<L>  /  TBili  0.3  /  DBili  x   /  AST  17  /  ALT  14  /  AlkPhos  69  1212                        8.0    4.65  )-----------( 232      ( 09 Dec 2021 07:10 )             25.5     12-    140  |  108  |  36<H>  ----------------------------<  123<H>  3.7   |  27  |  2.19<H>    Ca    8.3<L>      09 Dec 2021 07:10  Phos  3.5     12-  Mg     2.0     12    TPro  7.6  /  Alb  3.1<L>  /  TBili  0.4  /  DBili  x   /  AST  17  /  ALT  14  /  AlkPhos  89  12-08     LIVER FUNCTIONS - ( 08 Dec 2021 16:38 )  Alb: 3.1 g/dL / Pro: 7.6 gm/dL / ALK PHOS: 89 U/L / ALT: 14 U/L / AST: 17 U/L / GGT: x           Urinalysis Basic - ( 08 Dec 2021 16:42 )    Color: Yellow / Appearance: cloudy / S.005 / pH: x  Gluc: x / Ketone: Negative  / Bili: Negative / Urobili: Negative mg/dL   Blood: x / Protein: 100 mg/dL / Nitrite: Negative   Leuk Esterase: Moderate / RBC: 6-10 /HPF / WBC >50   Sq Epi: x / Non Sq Epi: Occasional / Bacteria: Moderate    COVID-19 PCR: NotDetec (21 @ 16:38)      Culture - Blood (collected 13 Dec 2021 06:28)  Source: .Blood None  Preliminary Report (14 Dec 2021 10:01):    No growth to date.    Culture - Blood (collected 13 Dec 2021 06:28)  Source: .Blood None  Preliminary Report (14 Dec 2021 10:01):    No growth to date.    Culture - Urine (collected 08 Dec 2021 16:42)  Source: Clean Catch Clean Catch (Midstream)  Final Report (12 Dec 2021 18:23):    >100,000 CFU/ml Morganella morganii  Organism: Morganella morganii (12 Dec 2021 18:23)  Organism: Morganella morganii (12 Dec 2021 18:23)      -  Amikacin: S <=16      -  Amoxicillin/Clavulanic Acid: R >16/8      -  Ampicillin: R >16 These ampicillin results predict results for amoxicillin      -  Ampicillin/Sulbactam: S 8/4 Enterobacter, Klebsiella aerogenes, Citrobacter, and Serratia may develop resistance during prolonged therapy (3-4 days)      -  Aztreonam: I 16      -  Cefazolin: R >16      -  Cefepime: R >16      -  Cefoxitin: S <=8      -  Ceftriaxone: R >32 Enterobacter, Klebsiella aerogenes, Citrobacter, and Serratia may develop resistance during prolonged therapy      -  Ciprofloxacin: S <=0.25      -  Ertapenem: S <=0.5      -  Gentamicin: S <=2      -  Imipenem: I 2      -  Levofloxacin: S <=0.5      -  Meropenem: S <=1      -  Nitrofurantoin: R >64 Should not be used to treat pyelonephritis      -  Piperacillin/Tazobactam: S <=8      -  Tigecycline: R <=2      -  Tobramycin: S <=2      -  Trimethoprim/Sulfamethoxazole: R >2/38      Method Type: MUALIK    Culture - Blood (collected 08 Dec 2021 16:38)  Source: .Blood None  Gram Stain (09 Dec 2021 19:20):    Growth in aerobic and anaerobic bottles: Gram Variable Rods  Final Report (11 Dec 2021 15:49):    Growth in aerobic and anaerobic bottles: Morganella morganii    MDRO detected in BCID PCR, resistance marker = CTX-M (ESBL)  Organism: Blood Culture PCR  Morganella morganii (11 Dec 2021 15:49)  Organism: Morganella morganii (11 Dec 2021 15:49)      -  Amikacin: S <=16      -  Ampicillin: R >16 These ampicillin results predict results for amoxicillin      -  Ampicillin/Sulbactam: I 16/8 Enterobacter, Klebsiella aerogenes, Citrobacter, and Serratia may develop resistance during prolonged therapy (3-4 days)      -  Aztreonam: R >16      -  Cefazolin: R >16 Enterobacter, Klebsiella aerogenes, Citrobacter, and Serratia may develop resistance during prolonged therapy (3-4 days)      -  Cefepime: R >16      -  Cefoxitin: S <=8      -  Ceftazidime/Avibactam: S <=4      -  Ceftolozane/tazobactam: S <=2      -  Ceftriaxone: R >32 Enterobacter, Klebsiella aerogenes, Citrobacter, and Serratia may develop resistance during prolonged therapy      -  Ciprofloxacin: S <=0.25      -  Ertapenem: S <=0.5      -  Gentamicin: S <=2      -  Imipenem: I 2      -  Levofloxacin: S <=0.5      -  Meropenem: S <=1      -  Piperacillin/Tazobactam: S <=8      -  Tigecycline: R <=2      -  Tobramycin: S <=2      -  Trimethoprim/Sulfamethoxazole: R >2/38      Method Type: MAULIK  Organism: Blood Culture PCR (11 Dec 2021 15:49)      -  ESBL: Detec      -  Morganella morganii: Detec      Method Type: PCR    Culture - Blood (collected 08 Dec 2021 16:36)  Source: .Blood Blood-Peripheral  Gram Stain (09 Dec 2021 19:21):    Growth in anaerobic bottle: Gram Variable Rods  Final Report (11 Dec 2021 15:49):    Growth in anaerobic bottle: Morganella morganii    See previous culture [id number]        Radiology: all available radiological tests reviewed    < from: CT Abdomen and Pelvis No Cont (21 @ 19:34) >  Moderate right hydronephrosis and hydroureter and mild left   hydronephrosis and hydroureter to the level of the urinary bladder.  Diffuse bladder wall thickening which is indeterminant. Inserted direct   visualization.  Enlarged prostate gland with mass effect on the urinary bladder.  < end of copied text >      Advanced directives addressed: full resuscitation

## 2021-12-14 NOTE — PROGRESS NOTE ADULT - PROBLEM SELECTOR PLAN 1
I spoke at length with son Joseph regarding Pallliative Care.  He felt that a DNR-DNI was in place and will check on that tomorrow.  He will discuss these issues with his siblings.  I let him know that from a Urology standpoint, there is nothing that we can do to improve this patient's quality of life.
There are few options at this time.  The munoz must remain indwelling to lessen chances of further sepsis and damage to kidneys.   At this time palliative care is and option and I discussed  this with the CSW from Palliative Care and the patient.  I have gone over this with the son in detail and the CSW is going to review this with he and his sister alice.  I don't think he is a candidate for assisted living.

## 2021-12-15 ENCOUNTER — TRANSCRIPTION ENCOUNTER (OUTPATIENT)
Age: 86
End: 2021-12-15

## 2021-12-15 VITALS
OXYGEN SATURATION: 98 % | SYSTOLIC BLOOD PRESSURE: 115 MMHG | TEMPERATURE: 98 F | RESPIRATION RATE: 17 BRPM | DIASTOLIC BLOOD PRESSURE: 53 MMHG | HEART RATE: 66 BPM

## 2021-12-15 LAB — SARS-COV-2 RNA SPEC QL NAA+PROBE: SIGNIFICANT CHANGE UP

## 2021-12-15 PROCEDURE — 99239 HOSP IP/OBS DSCHRG MGMT >30: CPT

## 2021-12-15 RX ORDER — ERTAPENEM SODIUM 1 G/1
1000 INJECTION, POWDER, LYOPHILIZED, FOR SOLUTION INTRAMUSCULAR; INTRAVENOUS EVERY 24 HOURS
Refills: 0 | Status: DISCONTINUED | OUTPATIENT
Start: 2021-12-15 | End: 2021-12-15

## 2021-12-15 RX ORDER — FUROSEMIDE 40 MG
1 TABLET ORAL
Qty: 0 | Refills: 0 | DISCHARGE

## 2021-12-15 RX ORDER — TAMSULOSIN HYDROCHLORIDE 0.4 MG/1
2 CAPSULE ORAL
Qty: 0 | Refills: 0 | DISCHARGE

## 2021-12-15 RX ORDER — FINASTERIDE 5 MG/1
1 TABLET, FILM COATED ORAL
Qty: 0 | Refills: 0 | DISCHARGE
Start: 2021-12-15

## 2021-12-15 RX ORDER — TAMSULOSIN HYDROCHLORIDE 0.4 MG/1
2 CAPSULE ORAL
Qty: 0 | Refills: 0 | DISCHARGE
Start: 2021-12-15

## 2021-12-15 RX ORDER — ERTAPENEM SODIUM 1 G/1
1 INJECTION, POWDER, LYOPHILIZED, FOR SOLUTION INTRAMUSCULAR; INTRAVENOUS
Qty: 0 | Refills: 0 | DISCHARGE
Start: 2021-12-15

## 2021-12-15 RX ORDER — METOPROLOL TARTRATE 50 MG
1 TABLET ORAL
Qty: 0 | Refills: 0 | DISCHARGE
Start: 2021-12-15

## 2021-12-15 RX ORDER — ALBUTEROL 90 UG/1
2 AEROSOL, METERED ORAL
Qty: 0 | Refills: 0 | DISCHARGE
Start: 2021-12-15

## 2021-12-15 RX ADMIN — HEPARIN SODIUM 5000 UNIT(S): 5000 INJECTION INTRAVENOUS; SUBCUTANEOUS at 10:29

## 2021-12-15 RX ADMIN — FINASTERIDE 5 MILLIGRAM(S): 5 TABLET, FILM COATED ORAL at 10:28

## 2021-12-15 RX ADMIN — MEROPENEM 100 MILLIGRAM(S): 1 INJECTION INTRAVENOUS at 10:29

## 2021-12-15 RX ADMIN — Medication 81 MILLIGRAM(S): at 10:27

## 2021-12-15 RX ADMIN — GABAPENTIN 300 MILLIGRAM(S): 400 CAPSULE ORAL at 10:27

## 2021-12-15 RX ADMIN — GABAPENTIN 300 MILLIGRAM(S): 400 CAPSULE ORAL at 16:45

## 2021-12-15 RX ADMIN — Medication 1 TABLET(S): at 10:29

## 2021-12-15 RX ADMIN — DULOXETINE HYDROCHLORIDE 60 MILLIGRAM(S): 30 CAPSULE, DELAYED RELEASE ORAL at 10:28

## 2021-12-15 RX ADMIN — ERTAPENEM SODIUM 120 MILLIGRAM(S): 1 INJECTION, POWDER, LYOPHILIZED, FOR SOLUTION INTRAMUSCULAR; INTRAVENOUS at 14:32

## 2021-12-15 RX ADMIN — Medication 100 MILLIGRAM(S): at 10:29

## 2021-12-15 NOTE — DISCHARGE NOTE PROVIDER - HOSPITAL COURSE
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   According to [facility] notes he was last known well 15 minutes prior to onset of lethargy and AMS  As per EMS (to ED)  pt was talking to staff when he suddenly became altered and felt cold and lethargic so pt was sent to ED further evaluation.  He was last admitted to   for UTI due to proteus mirabilis complicated by bacteremia and R hydronephrosis  Treated w meropenem then ceftriaxone x4 weeks   11- was seen by  where munoz was recommended . 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   Found to be tachycardic 105 w rectal temp 103.9      #ESBL Sepsis/bacteremia - poa and not related to MUNOZ - munoz placed in ED  # /l hydro and enlarge prostate  #due to UTI/Hemorrhagic cystitis?  #AMS likely due to metabolic encephalopathy which has improved after treatment   ID recommends switch to ivanz 1 gm q 24 hrs for 14 days until 12/27 , midline placed   medically stable for discharge to rehab   per  -no   intervention recommended   -CT c/w b/l hydro and enlarge prostate  - recc munoz, proscar/flomax as not a candidate for surgical intervention.  -D/W urology and patient/family at length  -No viable options for treatment given his age and underlying comorbidities -    -Ultimately his BPH will cause recurrent issues ie AUR and UTIs  -Will need chronic munoz placements for continued urinary retention due to BPH. son and pt understand that there is risk of infection with munoz  -SW/CM to determine if pt can go back to [facility] with munoz otherwise needs higher level of care  -BC x 2, urine c/s noted, repeat blood cx [id number]/13  -ID consult this morning 12/12/21 appreciated   -s/p zosyn 3.375 gm IV q8h # 2  -on meropenem 1 gm IV q12h # 6        #BPH w obstruction/Obstructive uropathy  CT w bilateral hydronephrosis  -munoz placed and will need to remain n place indefinitely since he is not a surgical candidate as per urology   -proscar  -flomax  - followup as outpatient    #RASHAUN 2/2 to obstructive uropathy -  improved   Cr 2.67 vs 1.57 on 10-  abd CT scan w bilat hydro and prostate enlargement as mass effect on bladder  -Scr 2.19-> 1.9 cr improved to 1.5   s/p  ivf and monitor Scr  recheck BMP in 3 days , resume lasix at lower dose 40mg daily in 2 to3 days if cr remains stable   (Home dose lasix 40mg BID  was reduced in dose at time of dicharge)    #AAA  s/p repair w stable size 5.5      #Acute on chronic anemia 2/2 to hematuria - transfuse 1 unit PRBC given underlying CAD and dropping hgb. no obvious source of bleeding.   -stable  -guaiac neg in ED   mild hematuria , no clots noted. Resolved  -baseline 7-9      #Cardiovascular  Hx CAD w stent  HTN  HLD  HFpEF-stable  1. asa 81 mg qd - can continue with use  2. metoprolol 75 mg qd w parameters  3. atorvastatin 40 mg  4. lasix 40 mg BID w parameters  5. EKG w 1st degree block LAD w IVCD  no sig change when I compared to EKG 10-      #Chronic pain and depression  continue home meds     DNR/I as previously d/w with admitting doctor. Patient seen by Palliative today.         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   According to [facility] notes he was last known well 15 minutes prior to onset of lethargy and AMS  As per EMS (to ED)  pt was talking to staff when he suddenly became altered and felt cold and lethargic so pt was sent to ED further evaluation.  He was last admitted to   for UTI due to proteus mirabilis complicated by bacteremia and R hydronephrosis  Treated w meropenem then ceftriaxone x4 weeks   11- was seen by  where munoz was recommended . 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   Found to be tachycardic 105 w rectal temp 103.9      #ESBL Sepsis/bacteremia - poa and not related to MUNOZ - munoz placed in ED  # /l hydro and enlarge prostate  #due to UTI/Hemorrhagic cystitis?  #AMS likely due to metabolic encephalopathy which has improved after treatment   ID recommends switch to ivanz 1 gm q 24 hrs for 14 days until 12/27 , midline placed   medically stable for discharge to rehab   per  -no   intervention recommended   -CT c/w b/l hydro and enlarge prostate  - recc munoz, proscar/flomax as not a candidate for surgical intervention.  -D/W urology and patient/family at length  -No viable options for treatment given his age and underlying comorbidities -    -Ultimately his BPH will cause recurrent issues ie AUR and UTIs  -Will need chronic munoz placements for continued urinary retention due to BPH. son and pt understand that there is risk of infection with munoz  -SW/CM to determine if pt can go back to [facility] with munoz otherwise needs higher level of care  -BC x 2, urine c/s noted, repeat blood cx [id number]/13  -ID consult this morning 12/12/21 appreciated   -s/p zosyn 3.375 gm IV q8h # 2  -on meropenem 1 gm IV q12h # 6        #BPH w obstruction/Obstructive uropathy  CT w bilateral hydronephrosis  -munoz placed and will need to remain n place indefinitely since he is not a surgical candidate as per urology   -proscar  -flomax  - followup as outpatient    #RASHAUN 2/2 to obstructive uropathy -  improved   Cr 2.67 vs 1.57 on 10-  abd CT scan w bilat hydro and prostate enlargement as mass effect on bladder  -Scr 2.19-> 1.9 cr improved to 1.5   s/p  ivf and monitor Scr  recheck BMP in 3 days , resume lasix at lower dose 40mg daily and recheck BMP  in 2 to3 days and  if cr remains stable may resume home dose 40 BID  (Home dose lasix 40mg BID  was reduced in dose at time of dicharge)    #AAA  s/p repair w stable size 5.5      #Acute on chronic anemia 2/2 to hematuria - transfuse 1 unit PRBC given underlying CAD and dropping hgb. no obvious source of bleeding.   -stable  -guaiac neg in ED   mild hematuria , no clots noted. Resolved  -baseline 7-9      #Cardiovascular  Hx CAD w stent  HTN  HLD  HFpEF-stable  1. asa 81 mg qd - can continue with use  2. metoprolol 75 mg qd w parameters  3. atorvastatin 40 mg  4lasix resumed   5. EKG w 1st degree block LAD w IVCD  no sig change when I compared to EKG 10-      #Chronic pain and depression  continue home meds     DNR/I as previously d/w with admitting doctor    palliative care consult appreciated     12/14: Pt seen and evaluated at bedside  no overnight events, NAD,   GEN: lying in bed, NAD  HEENT:   NC/AT, pupils equal and reactive, EOMI  CV:  +S1, +S2, RRR  RESP:   lungs clear to auscultation bilaterally, no wheeze, rales, rhonchi   BREAST:  not examined  GI:  abdomen soft, non-tender, non-distended, normoactive BS  RECTAL:  not examined  :  not examined  MSK:   normal muscle tone  EXT:  no edema

## 2021-12-15 NOTE — CHART NOTE - NSCHARTNOTEFT_GEN_A_CORE
Plan discussed with Dr. Dobbins will sign off at this time as plan is clear and patient to go to rehab for long term antibiotics. Will reconsult if needed. Thanky you!

## 2021-12-15 NOTE — PROGRESS NOTE ADULT - REASON FOR ADMISSION
sepsis  UTI  AMS

## 2021-12-15 NOTE — DISCHARGE NOTE PROVIDER - PROVIDER TOKENS
FREE:[LAST:[bodi],PHONE:[(   )    -],FAX:[(   )    -]],PROVIDER:[TOKEN:[[id number]]],FREE:[LAST:[primary doctor],PHONE:[(   )    -],FAX:[(   )    -]]

## 2021-12-15 NOTE — DISCHARGE NOTE NURSING/CASE MANAGEMENT/SOCIAL WORK - PATIENT PORTAL LINK FT
You can access the FollowMyHealth Patient Portal offered by [facility] by registering at the following website: [email or web address]. By joining [employer]’s FollowMyHealth portal, you will also be able to view your health information using other applications (apps) compatible with our system.

## 2021-12-15 NOTE — PROGRESS NOTE ADULT - SUBJECTIVE AND OBJECTIVE BOX
Date of service: 12-15-21 @ 14:11      Patient lying in bed; afebrile, to be able to discharge to rehab    ROS unable to obtain secondary to patient medical condition     MEDICATIONS  (STANDING):  aspirin enteric coated 81 milliGRAM(s) Oral daily  atorvastatin 40 milliGRAM(s) Oral at bedtime  DULoxetine 60 milliGRAM(s) Oral daily  ertapenem  IVPB 1000 milliGRAM(s) IV Intermittent every 24 hours  finasteride 5 milliGRAM(s) Oral daily  gabapentin 300 milliGRAM(s) Oral two times a day  gabapentin 600 milliGRAM(s) Oral at bedtime  heparin   Injectable 5000 Unit(s) SubCutaneous every 12 hours  metoprolol succinate  milliGRAM(s) Oral daily  multivitamin 1 Tablet(s) Oral daily  tamsulosin 0.8 milliGRAM(s) Oral at bedtime    MEDICATIONS  (PRN):  acetaminophen     Tablet .. 650 milliGRAM(s) Oral every 6 hours PRN Temp greater or equal to 38C (100.4F), Mild Pain (1 - 3)  ALBUTerol    90 MICROgram(s) HFA Inhaler 2 Puff(s) Inhalation every 6 hours PRN Shortness of Breath and/or Wheezing  melatonin 3 milliGRAM(s) Oral at bedtime PRN Insomnia  ondansetron Injectable 4 milliGRAM(s) IV Push every 8 hours PRN Nausea and/or Vomiting  traMADol 25 milliGRAM(s) Oral two times a day PRN Moderate Pain (4 - 6)      Vital Signs Last 24 Hrs  T(C): 36.6 (15 Dec 2021 08:15), Max: 36.6 (14 Dec 2021 21:00)  T(F): 97.8 (15 Dec 2021 08:15), Max: 97.9 (14 Dec 2021 21:00)  HR: 67 (15 Dec 2021 08:15) (67 - 75)  BP: 124/56 (15 Dec 2021 08:15) (100/45 - 124/56)  BP(mean): --  RR: 18 (15 Dec 2021 08:15) (18 - 18)  SpO2: 96% (15 Dec 2021 08:15) (96% - 99%)        Physical Exam:      Constitutional:  No acute distress  HEENT: NC/AT, EOMI, PERRLA, conjunctivae clear; ears and nose atraumatic  Neck: supple; thyroid not palpable  Back: no tenderness  Respiratory: respiratory effort normal; clear to auscultation  Cardiovascular: S1S2 regular, no murmurs  Abdomen: soft, not tender, not distended, positive BS  Genitourinary: has suprapubic tenderness  munoz with dark urine in bag  Musculoskeletal: no muscle tenderness, no joint swelling or tenderness  Extremities: no pedal edema  Neurological/ Psychiatric: Alert, moving all extremities  Skin: no rashes; no palpable lesions    Labs: reviewed               Labs:                 Cultures:       Culture - Blood (collected 12-13-21 @ 06:28)  Source: .Blood None  Preliminary Report (12-14-21 @ 10:01):    No growth to date.    Culture - Blood (collected 12-13-21 @ 06:28)  Source: .Blood None  Preliminary Report (12-14-21 @ 10:01):    No growth to date.    Culture - Urine (collected 12-08-21 @ 16:42)  Source: Clean Catch Clean Catch (Midstream)  Final Report (12-12-21 @ 18:23):    >100,000 CFU/ml Morganella morganii  Organism: Morganella morganii (12-12-21 @ 18:23)  Organism: Morganella morganii (12-12-21 @ 18:23)      -  Amikacin: S <=16      -  Amoxicillin/Clavulanic Acid: R >16/8      -  Ampicillin: R >16 These ampicillin results predict results for amoxicillin      -  Ampicillin/Sulbactam: S 8/4 Enterobacter, Klebsiella aerogenes, Citrobacter, and Serratia may develop resistance during prolonged therapy (3-4 days)      -  Aztreonam: I 16      -  Cefazolin: R >16      -  Cefepime: R >16      -  Cefoxitin: S <=8      -  Ceftriaxone: R >32 Enterobacter, Klebsiella aerogenes, Citrobacter, and Serratia may develop resistance during prolonged therapy      -  Ciprofloxacin: S <=0.25      -  Ertapenem: S <=0.5      -  Gentamicin: S <=2      -  Imipenem: I 2      -  Levofloxacin: S <=0.5      -  Meropenem: S <=1      -  Nitrofurantoin: R >64 Should not be used to treat pyelonephritis      -  Piperacillin/Tazobactam: S <=8      -  Tigecycline: R <=2      -  Tobramycin: S <=2      -  Trimethoprim/Sulfamethoxazole: R >2/38      Method Type: MAULIK    Culture - Blood (collected 12-08-21 @ 16:38)  Source: .Blood None  Gram Stain (12-09-21 @ 19:20):    Growth in aerobic and anaerobic bottles: Gram Variable Rods  Final Report (12-11-21 @ 15:49):    Growth in aerobic and anaerobic bottles: Morganella morganii    MDRO detected in BCID PCR, resistance marker = CTX-M (ESBL)    ***Blood Panel PCR results on this specimen are available    approximately 3 hours after the Gram stain result.***    Gram stain,PCR, and/or culture results may not always    correspond due to difference in methodologies.    ************************************************************    This PCR assay was performed by multiplex PCR. This    Assay tests for 66 bacterial and resistancegene targets.    Please refer to the [facility] test directory    at [email or web address] for details.  Organism: Blood Culture PCR  Morganella morganii (12-11-21 @ 15:49)  Organism: Morganella morganii (12-11-21 @ 15:49)      -  Amikacin: S <=16      -  Ampicillin: R >16 These ampicillin results predict results for amoxicillin      -  Ampicillin/Sulbactam: I 16/8 Enterobacter, Klebsiella aerogenes, Citrobacter, and Serratia may develop resistance during prolonged therapy (3-4 days)      -  Aztreonam: R >16      -  Cefazolin: R >16 Enterobacter, Klebsiella aerogenes, Citrobacter, and Serratia may develop resistance during prolonged therapy (3-4 days)      -  Cefepime: R >16      -  Cefoxitin: S <=8      -  Ceftazidime/Avibactam: S <=4      -  Ceftolozane/tazobactam: S <=2      -  Ceftriaxone: R >32 Enterobacter, Klebsiella aerogenes, Citrobacter, and Serratia may develop resistance during prolonged therapy      -  Ciprofloxacin: S <=0.25      -  Ertapenem: S <=0.5      -  Gentamicin: S <=2      -  Imipenem: I 2      -  Levofloxacin: S <=0.5      -  Meropenem: S <=1      -  Piperacillin/Tazobactam: S <=8      -  Tigecycline: R <=2      -  Tobramycin: S <=2      -  Trimethoprim/Sulfamethoxazole: R >2/38      Method Type: MAULIK  Organism: Blood Culture PCR (12-11-21 @ 15:49)      -  ESBL: Detec      -  Morganella morganii: Detec      Method Type: PCR    Culture - Blood (collected 12-08-21 @ 16:36)  Source: .Blood Blood-Peripheral  Gram Stain (12-09-21 @ 19:21):    Growth in anaerobic bottle: Gram Variable Rods  Final Report (12-11-21 @ 15:49):    Growth in anaerobic bottle: Morganella morganii    See previous culture [id number]            Radiology: all available radiological tests reviewed    < from: CT Abdomen and Pelvis No Cont (12.08.21 @ 19:34) >  Moderate right hydronephrosis and hydroureter and mild left   hydronephrosis and hydroureter to the level of the urinary bladder.  Diffuse bladder wall thickening which is indeterminant. Inserted direct   visualization.  Enlarged prostate gland with mass effect on the urinary bladder.  < end of copied text >      Advanced directives addressed: full resuscitation

## 2021-12-15 NOTE — DISCHARGE NOTE PROVIDER - NSDCMRMEDTOKEN_GEN_ALL_CORE_FT
acetaminophen 325 mg oral tablet: 2 tab(s) orally every 6 hours, As Needed - for mild pain  albuterol 90 mcg/inh inhalation aerosol: 2 puff(s) inhaled every 6 hours, As needed, Shortness of Breath and/or Wheezing  aspirin 81 mg oral delayed release tablet: 1 tab(s) orally once a day  atorvastatin 40 mg oral tablet: 1 tab(s) orally once a day (at bedtime)  DULoxetine 60 mg oral delayed release capsule: 1 cap(s) orally once a day  ertapenem 1 g injection: 1 gram(s) intravenous once a day until 12/27   finasteride 5 mg oral tablet: 1 tab(s) orally once a day  furosemide 40 mg oral tablet: 1 tab(s) orally daily resume in 3 days if cr remains stable   gabapentin 300 mg oral capsule: 1 cap(s) orally 2 times a day in the morning and evening   ***9am, 6pm***  gabapentin 600 mg oral tablet: 1 tab(s) orally once a day (at bedtime)  metoprolol succinate 100 mg oral tablet, extended release: 1 tab(s) orally once a day  Multiple Vitamins oral tablet: 1 tab(s) orally once a day  tamsulosin 0.4 mg oral capsule: 2 cap(s) orally once a day (at bedtime)  Ultracet 37.5 mg-325 mg oral tablet: 1 tab(s) orally 2 times a day, As Needed   acetaminophen 325 mg oral tablet: 2 tab(s) orally every 6 hours, As Needed - for mild pain  albuterol 90 mcg/inh inhalation aerosol: 2 puff(s) inhaled every 6 hours, As needed, Shortness of Breath and/or Wheezing  aspirin 81 mg oral delayed release tablet: 1 tab(s) orally once a day  atorvastatin 40 mg oral tablet: 1 tab(s) orally once a day (at bedtime)  DULoxetine 60 mg oral delayed release capsule: 1 cap(s) orally once a day  ertapenem 1 g injection: 1 gram(s) intravenous once a day until 12/27   finasteride 5 mg oral tablet: 1 tab(s) orally once a day  furosemide 40 mg oral tablet: 1 tab(s) orally once a day , recheck BMP in 3 days , if remains stable , may increase dose to home dose   gabapentin 300 mg oral capsule: 1 cap(s) orally 2 times a day in the morning and evening   ***9am, 6pm***  gabapentin 600 mg oral tablet: 1 tab(s) orally once a day (at bedtime)  metoprolol succinate 100 mg oral tablet, extended release: 1 tab(s) orally once a day  Multiple Vitamins oral tablet: 1 tab(s) orally once a day  tamsulosin 0.4 mg oral capsule: 2 cap(s) orally once a day (at bedtime)  Ultracet 37.5 mg-325 mg oral tablet: 1 tab(s) orally 2 times a day, As Needed

## 2021-12-15 NOTE — DISCHARGE NOTE NURSING/CASE MANAGEMENT/SOCIAL WORK - NSDCVIVACCINE_GEN_ALL_CORE_FT
Td (adult) preservative free; 16-Feb-2020 18:27; Eder Hunter (ANGELICA); [place]; [id number] (Exp. Date: 22-Oct-2021); IntraMuscular; Deltoid Left.; 0.5 milliLiter(s); VIS (VIS Published: 24-Feb-2025, VIS Presented: 16-Feb-2020);

## 2021-12-15 NOTE — PROGRESS NOTE ADULT - ASSESSMENT
89 y/o male with h/o CAD s/p PCI/Stent, HTN, BPH, urinary retention with munoz in place, chronic LLE Hip Pain 2/2 OA, HLD, CKD II-IV, AAA, PAD was admitted on 12/8 from [facility] by EMS for worsening confusion. According to [facility] notes he was last known well 15 minutes prior to onset of lethargy and AMS. As per EMS (to ED)  pt was talking to staff when he suddenly became altered and felt cold and lethargic so pt was sent to ED further evaluation. In ER he was noted febrile and received zosyn.    1. Febrile syndrome improving. Sepsis with ESBL SHUKRI. UTI with ESBL SHUKRI. BPH, Urinary retention with munoz. ARF on CRF stage 3. Encephalopathy. Allergy to PCN.  -frail  -BC x 2, urine c/s noted  -tolerating abx well so far; no side effects noted  -monitor closely in [place] of PCN allergy history  -repeat BC are negative to date   - have optimized antibiotics to ertapenem one gram daily and ordered midline   - will continue antibiotics until 12/27 with weekly cbc, cmp, esr, crp  - discharge planning  -continue abx coverage   -contact isolation  -monitor temps  -f/u CBC  -supportive care  2. Other issues:   -care per medicine    d/w medical team

## 2021-12-15 NOTE — DISCHARGE NOTE PROVIDER - CARE PROVIDER_API CALL
bodi,   Phone: (   )    -  Fax: (   )    -  Follow Up Time:     Ankit Dunbar (MD)  Urology  [address], [address] [place] [address]  Phone: [phone]  Fax: [phone]  Follow Up Time:     primary doctor,   Phone: (   )    -  Fax: (   )    -  Follow Up Time:

## 2021-12-15 NOTE — DISCHARGE NOTE PROVIDER - CARE PROVIDERS DIRECT ADDRESSES
,DirectAddress_Unknown,[email or web address],DirectAddress_Unknown

## 2021-12-15 NOTE — ADVANCED PRACTICE NURSE CONSULT - ASSESSMENT
Pt awake and alert. Risks and benefits of midline catheter explained, verbal consent obtained. ARROW endurance extended dwell midline catheter 20g, 8cm, lot # [id number] placed in right forearm under ultrasound guidance and sterile precautions. Brisk blood return, flushes well with 20ml normal saline. Okay to use.

## 2021-12-15 NOTE — DISCHARGE NOTE NURSING/CASE MANAGEMENT/SOCIAL WORK - NSDCPEFALRISK_GEN_ALL_CORE
For information on Fall & Injury Prevention, visit: [email or web address] OR  [email or web address] OR  https://www.cdc.gov/steadi/patient.html

## 2021-12-15 NOTE — DISCHARGE NOTE PROVIDER - NSDCCPCAREPLAN_GEN_ALL_CORE_FT
PRINCIPAL DISCHARGE DIAGNOSIS  Diagnosis: Sepsis  Assessment and Plan of Treatment: please follow up with primary doctor to recheck BMP in 3 days to consider resuming lasix if kidney function stable   follow up with urology as outpatient for munoz care      SECONDARY DISCHARGE DIAGNOSES  Diagnosis: Acute UTI  Assessment and Plan of Treatment:      PRINCIPAL DISCHARGE DIAGNOSIS  Diagnosis: Sepsis  Assessment and Plan of Treatment: please follow up with primary doctor to recheck BMP in 3 days , to consider increasing lasix dose to prior home dose if BP and creatinine allow  follow up with urology as outpatient for munoz care      SECONDARY DISCHARGE DIAGNOSES  Diagnosis: Acute UTI  Assessment and Plan of Treatment:

## 2021-12-21 DIAGNOSIS — R31.9 HEMATURIA, UNSPECIFIED: ICD-10-CM

## 2021-12-21 DIAGNOSIS — M16.12 UNILATERAL PRIMARY OSTEOARTHRITIS, LEFT HIP: ICD-10-CM

## 2021-12-21 DIAGNOSIS — N40.0 BENIGN PROSTATIC HYPERPLASIA WITHOUT LOWER URINARY TRACT SYMPTOMS: ICD-10-CM

## 2021-12-21 DIAGNOSIS — I13.0 HYPERTENSIVE HEART AND CHRONIC KIDNEY DISEASE WITH HEART FAILURE AND STAGE 1 THROUGH STAGE 4 CHRONIC KIDNEY DISEASE, OR UNSPECIFIED CHRONIC KIDNEY DISEASE: ICD-10-CM

## 2021-12-21 DIAGNOSIS — I71.4 ABDOMINAL AORTIC ANEURYSM, WITHOUT RUPTURE: ICD-10-CM

## 2021-12-21 DIAGNOSIS — I73.9 PERIPHERAL VASCULAR DISEASE, UNSPECIFIED: ICD-10-CM

## 2021-12-21 DIAGNOSIS — A41.50 GRAM-NEGATIVE SEPSIS, UNSPECIFIED: ICD-10-CM

## 2021-12-21 DIAGNOSIS — Z16.12 EXTENDED SPECTRUM BETA LACTAMASE (ESBL) RESISTANCE: ICD-10-CM

## 2021-12-21 DIAGNOSIS — Z86.010 PERSONAL HISTORY OF COLONIC POLYPS: ICD-10-CM

## 2021-12-21 DIAGNOSIS — D64.9 ANEMIA, UNSPECIFIED: ICD-10-CM

## 2021-12-21 DIAGNOSIS — N17.9 ACUTE KIDNEY FAILURE, UNSPECIFIED: ICD-10-CM

## 2021-12-21 DIAGNOSIS — Z98.890 OTHER SPECIFIED POSTPROCEDURAL STATES: ICD-10-CM

## 2021-12-21 DIAGNOSIS — N13.6 PYONEPHROSIS: ICD-10-CM

## 2021-12-21 DIAGNOSIS — R33.8 OTHER RETENTION OF URINE: ICD-10-CM

## 2021-12-21 DIAGNOSIS — Z95.5 PRESENCE OF CORONARY ANGIOPLASTY IMPLANT AND GRAFT: ICD-10-CM

## 2021-12-21 DIAGNOSIS — F32.A DEPRESSION, UNSPECIFIED: ICD-10-CM

## 2021-12-21 DIAGNOSIS — E78.5 HYPERLIPIDEMIA, UNSPECIFIED: ICD-10-CM

## 2021-12-21 DIAGNOSIS — N18.30 CHRONIC KIDNEY DISEASE, STAGE 3 UNSPECIFIED: ICD-10-CM

## 2021-12-21 DIAGNOSIS — G89.29 OTHER CHRONIC PAIN: ICD-10-CM

## 2021-12-21 DIAGNOSIS — Z88.0 ALLERGY STATUS TO PENICILLIN: ICD-10-CM

## 2021-12-21 DIAGNOSIS — I50.32 CHRONIC DIASTOLIC (CONGESTIVE) HEART FAILURE: ICD-10-CM

## 2021-12-21 DIAGNOSIS — F03.90 UNSPECIFIED DEMENTIA WITHOUT BEHAVIORAL DISTURBANCE: ICD-10-CM

## 2021-12-21 DIAGNOSIS — G93.41 METABOLIC ENCEPHALOPATHY: ICD-10-CM

## 2021-12-21 DIAGNOSIS — Z66 DO NOT RESUSCITATE: ICD-10-CM

## 2021-12-21 DIAGNOSIS — Z79.82 LONG TERM (CURRENT) USE OF ASPIRIN: ICD-10-CM

## 2021-12-21 DIAGNOSIS — I25.10 ATHEROSCLEROTIC HEART DISEASE OF NATIVE CORONARY ARTERY WITHOUT ANGINA PECTORIS: ICD-10-CM

## 2022-01-01 NOTE — ED PROVIDER NOTE - NS ED MD DISPO SPECIAL CONSIDERATION1
The night, run a vaporizer at his bedside,, keep the head of the crib elevated, record a video of how he sounds to show us what is happening in the middle of the night when he is symptomatic.  Lungs are completely clear on examination now and he has no nasal congestion.  No further treatment at this time.    Thank you for choosing [facility] for your healthcare needs.    We strive to provide you with excellent service and hope that we have exceeded your expectations.     If you receive a survey in the mail, we hope that you will complete it and agree that we have provided \"Very Good\" care today.  If you would like to speak to us about your visit, please contact our center at:    [facility]  [phone]    [facility]  [phone]    [facility]  [phone]    
Low Suspicion of COVID-19

## 2022-01-01 NOTE — PATIENT PROFILE ADULT. - FUNCTIONAL LEVEL PRIOR: TOILETING
Problem: Potential for Hypothermia Related to Thermoregulation  Goal: Monroe will maintain body temperature between 97.6 degrees axillary F and 99.6 degrees axillary F in an open crib  Outcome: Progressing     Problem: Potential for Impaired Gas Exchange  Goal: Monroe will not exhibit signs/symptoms of respiratory distress  Outcome: Progressing     Problem: Safety  Goal: Patient will remain free from falls and accidental injury  Outcome: Progressing     Problem: Knowledge Deficit - NICU  Goal: Family/caregivers will demonstrate understanding of plan of care, disease process/condition, diagnostic tests, medications and unit policies and procedures  Outcome: Progressing     Problem: Psychosocial / Developmental  Goal: An environment to support developmental growth and neurophysiologic needs will be supported and maintained  Outcome: Progressing     Problem: Potential for Impaired Gas Exchange  Goal: Monroe will not exhibit signs/symptoms of respiratory distress  Outcome: Progressing     Problem: Safety  Goal: Patient will remain free from falls and accidental injury  Outcome: Progressing     Problem: Knowledge Deficit - NICU  Goal: Family/caregivers will demonstrate understanding of plan of care, disease process/condition, diagnostic tests, medications and unit policies and procedures  Outcome: Progressing     Problem: Psychosocial / Developmental  Goal: An environment to support developmental growth and neurophysiologic needs will be supported and maintained  Outcome: Progressing     The patient is Watcher - Medium risk of patient condition declining or worsening    Shift Goals  Clinical Goals: tolerate feeds  Patient Goals: NA  Family Goals: Updates on POC    Progress made toward(s) clinical / shift goals:  Patient has maintained temperature WDL in open crib. Patient has remained on room air within defined limits.     Patient is not progressing towards the following goals: N/A       (1) assistive equipment

## 2022-01-24 NOTE — ED ADULT NURSE NOTE - CADM POA PRESS ULCER
60yo female with uti symptoms. pt c/o painful urination, hematuria fequency, no back pain no nausea or vomiting no other complaints
No

## 2022-03-23 NOTE — ED ADULT NURSE NOTE - OBJECTIVE STATEMENT
BIB ems from  [facility] assisted living c/o penis pain,groin pain, anus pain, lower abdominal pain. Pt no Bowel movement for 3 days, munoz leg bag  x 46 weeks. s/p rehab 3 days ago. denies cp/sob/n/v/d/f

## 2022-03-23 NOTE — ED ADULT NURSE NOTE - CHIEF COMPLAINT QUOTE
Pt BIBEMS, c/o groin/ anal pain that radiates to the back x1hr PTA. Pt arrives from [facility] with chronic munoz. As per EMS, pt was recently d/c'd from rehab 3days ago. Pt also c/o constipation x2days. Denies fever/ chills. B/l feet swelling observed at this time. Pt hypotensive in triage.

## 2022-03-24 ENCOUNTER — INPATIENT (INPATIENT)
Facility: HOSPITAL | Age: 87
LOS: 3 days | Discharge: HOSPICE | DRG: 394 | End: 2022-03-28
Attending: HOSPITALIST | Admitting: INTERNAL MEDICINE
Payer: MEDICARE

## 2022-03-24 VITALS
WEIGHT: 156.97 LBS | HEART RATE: 118 BPM | SYSTOLIC BLOOD PRESSURE: 88 MMHG | TEMPERATURE: 98 F | OXYGEN SATURATION: 97 % | RESPIRATION RATE: 16 BRPM | HEIGHT: 70 IN | DIASTOLIC BLOOD PRESSURE: 60 MMHG

## 2022-03-24 DIAGNOSIS — Z95.5 PRESENCE OF CORONARY ANGIOPLASTY IMPLANT AND GRAFT: Chronic | ICD-10-CM

## 2022-03-24 DIAGNOSIS — Z98.89 OTHER SPECIFIED POSTPROCEDURAL STATES: Chronic | ICD-10-CM

## 2022-03-24 DIAGNOSIS — D64.9 ANEMIA, UNSPECIFIED: ICD-10-CM

## 2022-03-24 DIAGNOSIS — Z98.890 OTHER SPECIFIED POSTPROCEDURAL STATES: Chronic | ICD-10-CM

## 2022-03-24 DIAGNOSIS — Z87.19 PERSONAL HISTORY OF OTHER DISEASES OF THE DIGESTIVE SYSTEM: Chronic | ICD-10-CM

## 2022-03-24 LAB
ADD ON TEST-SPECIMEN IN LAB: SIGNIFICANT CHANGE UP
ALBUMIN SERPL ELPH-MCNC: 3.1 G/DL — LOW (ref 3.3–5)
ALP SERPL-CCNC: 84 U/L — SIGNIFICANT CHANGE UP (ref 40–120)
ALT FLD-CCNC: 14 U/L — SIGNIFICANT CHANGE UP (ref 12–78)
ANION GAP SERPL CALC-SCNC: 4 MMOL/L — LOW (ref 5–17)
ANION GAP SERPL CALC-SCNC: 6 MMOL/L — SIGNIFICANT CHANGE UP (ref 5–17)
APPEARANCE UR: CLEAR — SIGNIFICANT CHANGE UP
APTT BLD: 27.4 SEC — LOW (ref 27.5–35.5)
AST SERPL-CCNC: 15 U/L — SIGNIFICANT CHANGE UP (ref 15–37)
BASOPHILS # BLD AUTO: 0.02 K/UL — SIGNIFICANT CHANGE UP (ref 0–0.2)
BASOPHILS NFR BLD AUTO: 0.2 % — SIGNIFICANT CHANGE UP (ref 0–2)
BILIRUB SERPL-MCNC: 0.3 MG/DL — SIGNIFICANT CHANGE UP (ref 0.2–1.2)
BILIRUB UR-MCNC: NEGATIVE — SIGNIFICANT CHANGE UP
BUN SERPL-MCNC: 36 MG/DL — HIGH (ref 7–23)
BUN SERPL-MCNC: 38 MG/DL — HIGH (ref 7–23)
CALCIUM SERPL-MCNC: 8.3 MG/DL — LOW (ref 8.5–10.1)
CALCIUM SERPL-MCNC: 8.6 MG/DL — SIGNIFICANT CHANGE UP (ref 8.5–10.1)
CHLORIDE SERPL-SCNC: 106 MMOL/L — SIGNIFICANT CHANGE UP (ref 96–108)
CHLORIDE SERPL-SCNC: 107 MMOL/L — SIGNIFICANT CHANGE UP (ref 96–108)
CO2 SERPL-SCNC: 24 MMOL/L — SIGNIFICANT CHANGE UP (ref 22–31)
CO2 SERPL-SCNC: 29 MMOL/L — SIGNIFICANT CHANGE UP (ref 22–31)
COLOR SPEC: YELLOW — SIGNIFICANT CHANGE UP
CREAT SERPL-MCNC: 1.81 MG/DL — HIGH (ref 0.5–1.3)
CREAT SERPL-MCNC: 1.89 MG/DL — HIGH (ref 0.5–1.3)
DIFF PNL FLD: ABNORMAL
EGFR: 33 ML/MIN/1.73M2 — LOW
EGFR: 35 ML/MIN/1.73M2 — LOW
EOSINOPHIL # BLD AUTO: 0.09 K/UL — SIGNIFICANT CHANGE UP (ref 0–0.5)
EOSINOPHIL NFR BLD AUTO: 0.9 % — SIGNIFICANT CHANGE UP (ref 0–6)
GLUCOSE SERPL-MCNC: 113 MG/DL — HIGH (ref 70–99)
GLUCOSE SERPL-MCNC: 115 MG/DL — HIGH (ref 70–99)
GLUCOSE UR QL: NEGATIVE — SIGNIFICANT CHANGE UP
HCT VFR BLD CALC: 20.6 % — CRITICAL LOW (ref 39–50)
HCT VFR BLD CALC: 23.4 % — LOW (ref 39–50)
HGB BLD-MCNC: 6.2 G/DL — CRITICAL LOW (ref 13–17)
HGB BLD-MCNC: 7.5 G/DL — LOW (ref 13–17)
IMM GRANULOCYTES NFR BLD AUTO: 0.4 % — SIGNIFICANT CHANGE UP (ref 0–1.5)
INR BLD: 0.97 RATIO — SIGNIFICANT CHANGE UP (ref 0.88–1.16)
KETONES UR-MCNC: NEGATIVE — SIGNIFICANT CHANGE UP
LACTATE SERPL-SCNC: 1.4 MMOL/L — SIGNIFICANT CHANGE UP (ref 0.7–2)
LEUKOCYTE ESTERASE UR-ACNC: ABNORMAL
LYMPHOCYTES # BLD AUTO: 0.77 K/UL — LOW (ref 1–3.3)
LYMPHOCYTES # BLD AUTO: 8.1 % — LOW (ref 13–44)
MAGNESIUM SERPL-MCNC: 2 MG/DL — SIGNIFICANT CHANGE UP (ref 1.6–2.6)
MCHC RBC-ENTMCNC: 27.3 PG — SIGNIFICANT CHANGE UP (ref 27–34)
MCHC RBC-ENTMCNC: 30.1 GM/DL — LOW (ref 32–36)
MCV RBC AUTO: 90.7 FL — SIGNIFICANT CHANGE UP (ref 80–100)
MONOCYTES # BLD AUTO: 0.44 K/UL — SIGNIFICANT CHANGE UP (ref 0–0.9)
MONOCYTES NFR BLD AUTO: 4.6 % — SIGNIFICANT CHANGE UP (ref 2–14)
NEUTROPHILS # BLD AUTO: 8.16 K/UL — HIGH (ref 1.8–7.4)
NEUTROPHILS NFR BLD AUTO: 85.8 % — HIGH (ref 43–77)
NITRITE UR-MCNC: POSITIVE
PH UR: 5 — SIGNIFICANT CHANGE UP (ref 5–8)
PLATELET # BLD AUTO: 407 K/UL — HIGH (ref 150–400)
POTASSIUM SERPL-MCNC: 4.3 MMOL/L — SIGNIFICANT CHANGE UP (ref 3.5–5.3)
POTASSIUM SERPL-MCNC: 4.4 MMOL/L — SIGNIFICANT CHANGE UP (ref 3.5–5.3)
POTASSIUM SERPL-SCNC: 4.3 MMOL/L — SIGNIFICANT CHANGE UP (ref 3.5–5.3)
POTASSIUM SERPL-SCNC: 4.4 MMOL/L — SIGNIFICANT CHANGE UP (ref 3.5–5.3)
PROT SERPL-MCNC: 7.3 GM/DL — SIGNIFICANT CHANGE UP (ref 6–8.3)
PROT UR-MCNC: 15
PROTHROM AB SERPL-ACNC: 11.2 SEC — SIGNIFICANT CHANGE UP (ref 10.5–13.4)
RBC # BLD: 2.27 M/UL — LOW (ref 4.2–5.8)
RBC # FLD: 16.4 % — HIGH (ref 10.3–14.5)
RETICS #: 99.3 K/UL — SIGNIFICANT CHANGE UP (ref 25–125)
RETICS/RBC NFR: 4.2 % — HIGH (ref 0.5–2.5)
SARS-COV-2 RNA SPEC QL NAA+PROBE: SIGNIFICANT CHANGE UP
SODIUM SERPL-SCNC: 136 MMOL/L — SIGNIFICANT CHANGE UP (ref 135–145)
SODIUM SERPL-SCNC: 140 MMOL/L — SIGNIFICANT CHANGE UP (ref 135–145)
SP GR SPEC: 1.01 — SIGNIFICANT CHANGE UP (ref 1.01–1.02)
UROBILINOGEN FLD QL: NEGATIVE — SIGNIFICANT CHANGE UP
WBC # BLD: 9.52 K/UL — SIGNIFICANT CHANGE UP (ref 3.8–10.5)
WBC # FLD AUTO: 9.52 K/UL — SIGNIFICANT CHANGE UP (ref 3.8–10.5)

## 2022-03-24 PROCEDURE — 74176 CT ABD & PELVIS W/O CONTRAST: CPT | Mod: 26,MA

## 2022-03-24 PROCEDURE — 85045 AUTOMATED RETICULOCYTE COUNT: CPT

## 2022-03-24 PROCEDURE — 93306 TTE W/DOPPLER COMPLETE: CPT | Mod: 26

## 2022-03-24 PROCEDURE — 71045 X-RAY EXAM CHEST 1 VIEW: CPT | Mod: 26

## 2022-03-24 PROCEDURE — 82746 ASSAY OF FOLIC ACID SERUM: CPT

## 2022-03-24 PROCEDURE — 85018 HEMOGLOBIN: CPT

## 2022-03-24 PROCEDURE — 36415 COLL VENOUS BLD VENIPUNCTURE: CPT

## 2022-03-24 PROCEDURE — 82728 ASSAY OF FERRITIN: CPT

## 2022-03-24 PROCEDURE — 93306 TTE W/DOPPLER COMPLETE: CPT

## 2022-03-24 PROCEDURE — 85025 COMPLETE CBC W/AUTO DIFF WBC: CPT

## 2022-03-24 PROCEDURE — 71045 X-RAY EXAM CHEST 1 VIEW: CPT

## 2022-03-24 PROCEDURE — 86923 COMPATIBILITY TEST ELECTRIC: CPT

## 2022-03-24 PROCEDURE — 36430 TRANSFUSION BLD/BLD COMPNT: CPT

## 2022-03-24 PROCEDURE — 83540 ASSAY OF IRON: CPT

## 2022-03-24 PROCEDURE — 85027 COMPLETE CBC AUTOMATED: CPT

## 2022-03-24 PROCEDURE — 85014 HEMATOCRIT: CPT

## 2022-03-24 PROCEDURE — 83550 IRON BINDING TEST: CPT

## 2022-03-24 PROCEDURE — 83735 ASSAY OF MAGNESIUM: CPT

## 2022-03-24 PROCEDURE — 93010 ELECTROCARDIOGRAM REPORT: CPT

## 2022-03-24 PROCEDURE — 99222 1ST HOSP IP/OBS MODERATE 55: CPT

## 2022-03-24 PROCEDURE — U0005: CPT

## 2022-03-24 PROCEDURE — 84100 ASSAY OF PHOSPHORUS: CPT

## 2022-03-24 PROCEDURE — U0003: CPT

## 2022-03-24 PROCEDURE — 82607 VITAMIN B-12: CPT

## 2022-03-24 PROCEDURE — 99285 EMERGENCY DEPT VISIT HI MDM: CPT

## 2022-03-24 PROCEDURE — 80048 BASIC METABOLIC PNL TOTAL CA: CPT

## 2022-03-24 PROCEDURE — 82272 OCCULT BLD FECES 1-3 TESTS: CPT

## 2022-03-24 PROCEDURE — 80053 COMPREHEN METABOLIC PANEL: CPT

## 2022-03-24 PROCEDURE — P9016: CPT

## 2022-03-24 RX ORDER — SODIUM CHLORIDE 9 MG/ML
1000 INJECTION INTRAMUSCULAR; INTRAVENOUS; SUBCUTANEOUS ONCE
Refills: 0 | Status: DISCONTINUED | OUTPATIENT
Start: 2022-03-24 | End: 2022-03-24

## 2022-03-24 RX ORDER — DULOXETINE HYDROCHLORIDE 30 MG/1
60 CAPSULE, DELAYED RELEASE ORAL DAILY
Refills: 0 | Status: DISCONTINUED | OUTPATIENT
Start: 2022-03-24 | End: 2022-03-28

## 2022-03-24 RX ORDER — GABAPENTIN 400 MG/1
300 CAPSULE ORAL
Refills: 0 | Status: DISCONTINUED | OUTPATIENT
Start: 2022-03-24 | End: 2022-03-28

## 2022-03-24 RX ORDER — GABAPENTIN 400 MG/1
1 CAPSULE ORAL
Qty: 0 | Refills: 0 | DISCHARGE

## 2022-03-24 RX ORDER — ONDANSETRON 8 MG/1
4 TABLET, FILM COATED ORAL EVERY 8 HOURS
Refills: 0 | Status: DISCONTINUED | OUTPATIENT
Start: 2022-03-24 | End: 2022-03-28

## 2022-03-24 RX ORDER — SODIUM CHLORIDE 9 MG/ML
500 INJECTION INTRAMUSCULAR; INTRAVENOUS; SUBCUTANEOUS ONCE
Refills: 0 | Status: COMPLETED | OUTPATIENT
Start: 2022-03-24 | End: 2022-03-24

## 2022-03-24 RX ORDER — FUROSEMIDE 40 MG
40 TABLET ORAL DAILY
Refills: 0 | Status: DISCONTINUED | OUTPATIENT
Start: 2022-03-24 | End: 2022-03-28

## 2022-03-24 RX ORDER — POLYETHYLENE GLYCOL 3350 17 G/17G
17 POWDER, FOR SOLUTION ORAL DAILY
Refills: 0 | Status: DISCONTINUED | OUTPATIENT
Start: 2022-03-24 | End: 2022-03-28

## 2022-03-24 RX ORDER — FUROSEMIDE 40 MG
1 TABLET ORAL
Qty: 0 | Refills: 0 | DISCHARGE

## 2022-03-24 RX ORDER — POLYETHYLENE GLYCOL 3350 17 G/17G
17 POWDER, FOR SOLUTION ORAL ONCE
Refills: 0 | Status: COMPLETED | OUTPATIENT
Start: 2022-03-24 | End: 2022-03-24

## 2022-03-24 RX ORDER — LANOLIN ALCOHOL/MO/W.PET/CERES
3 CREAM (GRAM) TOPICAL AT BEDTIME
Refills: 0 | Status: DISCONTINUED | OUTPATIENT
Start: 2022-03-24 | End: 2022-03-28

## 2022-03-24 RX ORDER — MEROPENEM 1 G/30ML
500 INJECTION INTRAVENOUS EVERY 12 HOURS
Refills: 0 | Status: DISCONTINUED | OUTPATIENT
Start: 2022-03-24 | End: 2022-03-28

## 2022-03-24 RX ORDER — ASPIRIN/CALCIUM CARB/MAGNESIUM 324 MG
81 TABLET ORAL DAILY
Refills: 0 | Status: DISCONTINUED | OUTPATIENT
Start: 2022-03-24 | End: 2022-03-28

## 2022-03-24 RX ORDER — MEROPENEM 1 G/30ML
1000 INJECTION INTRAVENOUS ONCE
Refills: 0 | Status: COMPLETED | OUTPATIENT
Start: 2022-03-24 | End: 2022-03-24

## 2022-03-24 RX ORDER — ACETAMINOPHEN 500 MG
650 TABLET ORAL EVERY 6 HOURS
Refills: 0 | Status: DISCONTINUED | OUTPATIENT
Start: 2022-03-24 | End: 2022-03-28

## 2022-03-24 RX ORDER — FUROSEMIDE 40 MG
80 TABLET ORAL ONCE
Refills: 0 | Status: COMPLETED | OUTPATIENT
Start: 2022-03-24 | End: 2022-03-24

## 2022-03-24 RX ADMIN — MEROPENEM 100 MILLIGRAM(S): 1 INJECTION INTRAVENOUS at 09:54

## 2022-03-24 RX ADMIN — SODIUM CHLORIDE 500 MILLILITER(S): 9 INJECTION INTRAMUSCULAR; INTRAVENOUS; SUBCUTANEOUS at 09:53

## 2022-03-24 RX ADMIN — Medication 40 MILLIGRAM(S): at 06:26

## 2022-03-24 RX ADMIN — POLYETHYLENE GLYCOL 3350 17 GRAM(S): 17 POWDER, FOR SOLUTION ORAL at 06:25

## 2022-03-24 RX ADMIN — DULOXETINE HYDROCHLORIDE 60 MILLIGRAM(S): 30 CAPSULE, DELAYED RELEASE ORAL at 09:53

## 2022-03-24 RX ADMIN — Medication 81 MILLIGRAM(S): at 09:53

## 2022-03-24 RX ADMIN — MEROPENEM 100 MILLIGRAM(S): 1 INJECTION INTRAVENOUS at 21:02

## 2022-03-24 RX ADMIN — GABAPENTIN 300 MILLIGRAM(S): 400 CAPSULE ORAL at 21:02

## 2022-03-24 RX ADMIN — MEROPENEM 100 MILLIGRAM(S): 1 INJECTION INTRAVENOUS at 01:58

## 2022-03-24 RX ADMIN — GABAPENTIN 300 MILLIGRAM(S): 400 CAPSULE ORAL at 09:53

## 2022-03-24 NOTE — ED PROVIDER NOTE - PROGRESS NOTE DETAILS
Bladder fullness palpated on exam with TTP.  Only small amount of output in munoz bag.  Bedside US shows distended bladder with 165 cc.  Repeat BP in room 117/62 (MAP 77).  Plan for labs, EKG, rectal exam, munoz replacement, labs, likely CT abdomen/pelvis.  Will reevaluate. Mac Wright D.O. Patient with rectal stool impaction, manual disimpaction performed.  Mac Wright D.O. Hemoglobin 6.2.  Prior hemoglobins 7.0.0-9.0 from chart review.  No signs of bleeding on exam. Plan for PRBC infusion. Mac Wright D.O. Cancelled 1000 cc NS bolus prior to giving as BP WNL now, and patient appears fluid overloaded. Already receiving PRBC.  Mac Wright D.O.

## 2022-03-24 NOTE — H&P ADULT - ASSESSMENT
A/P:    1.  Anemia  -no acute bleeding  -Guaiac negative  -getting PRBC transfusion  -follow CBC    2.  CHF exacerbation  -on IV lasix  -follow Is/Os  -daily weight    3.  Chronic urinary retention  -on chronic munoz cath    4.  UTI  -h/o ESBL  -started on IV Meropenem  -follow cultures  -follow ID consult    5.  SCD for DVT ppx    6.  Code status  -Patient is DNR/DNI. MOLST form is in chart.  A/P:    1.  Anemia  -no acute bleeding  -Guaiac negative  -getting PRBC transfusion  -follow CBC    2.  CHF exacerbation  -on IV lasix  -follow Is/Os  -daily weight    3.  Chronic urinary retention  -on chronic munoz cath    4.  UTI  -h/o ESBL  -started on IV Meropenem  -follow cultures  -follow ID consult    5.  SCD for DVT ppx    6.  Code status  -Patient is DNR/DNI. MOLST form is in chart.     7.  Constipation  -on Miralax and Dulcolax suppository

## 2022-03-24 NOTE — CONSULT NOTE ADULT - SUBJECTIVE AND OBJECTIVE BOX
HPI:  89 yo Male with PMHx of CAD s/p PCI/Stent, CHF with preserved EF, HTN, BPH with hx of chronic indwelling munoz, CKD II-IV, AAA, presented with complain of "anus pain, pain in my penis".  Patient arrived via EMS.  Patient stated symptoms started 45 minutes prior. Patient also Stateed that he had pain in his penis, his anus, and is lower abdomen which radiated to his back.  He complain of sensation of hard stool in rectum, and last BM was 3 days ago.  He denieed fever, chills, chest pain, SOB, n/v/d or any other symptoms currently. No fever. Here UA positive, munoz changed, was given IV merrem d/t hx of MDR/ESBL orgs in past.       PMH: as above  PSH: as above  Meds: per reconciliation sheet, noted below  MEDICATIONS  (STANDING):  aspirin enteric coated 81 milliGRAM(s) Oral daily  DULoxetine 60 milliGRAM(s) Oral daily  furosemide   Injectable 40 milliGRAM(s) IV Push daily  gabapentin 300 milliGRAM(s) Oral two times a day  meropenem  IVPB 500 milliGRAM(s) IV Intermittent every 12 hours  polyethylene glycol 3350 17 Gram(s) Oral daily      Allergies    No Known Allergies    Intolerances      Social: no smoking, no alcohol, no illegal drugs; no recent travel, no exposure to TB  FAMILY HISTORY:  Family history unobtainable  d/t dementia       no history of premature cardiovascular disease in first degree relatives    ROS: the patient denies fever, no chills, no HA, no dizziness, no sore throat, no blurry vision, no CP, no palpitations, no SOB, no cough, no abdominal pain, no diarrhea, no N/V, no dysuria, no leg pain, no claudication, no rash, no joint aches, no rectal pain or bleeding, no night sweats    All other systems reviewed and are negative    Vital Signs Last 24 Hrs  T(C): 37.1 (24 Mar 2022 12:31), Max: 37.1 (24 Mar 2022 12:31)  T(F): 98.8 (24 Mar 2022 12:31), Max: 98.8 (24 Mar 2022 12:31)  HR: 89 (24 Mar 2022 12:31) (89 - 118)  BP: 117/44 (24 Mar 2022 12:31) (86/34 - 137/61)  BP(mean): 60 (24 Mar 2022 06:15) (60 - 77)  RR: 18 (24 Mar 2022 12:31) (16 - 20)  SpO2: 96% (24 Mar 2022 12:31) (92% - 100%)  Daily Height in cm: 177.8 (24 Mar 2022 00:17)    Daily Weight in k.5 (24 Mar 2022 06:13)    PE:    Constitutional: frail looking  HEENT: NC/AT, EOMI, PERRLA, conjunctivae clear; ears and nose atraumatic; pharynx benign  Neck: supple; thyroid not palpable  Back: no tenderness  Respiratory: respiratory effort normal; clear to auscultation  Cardiovascular: S1S2 regular, no murmurs  Abdomen: soft, not tender, not distended, positive BS; liver and spleen WNL  Genitourinary: no suprapubic tenderness munoz in place  Lymphatic: no LN palpable  Musculoskeletal: no muscle tenderness, no joint swelling or tenderness  Extremities: no pedal edema  Neurological/ Psychiatric: moving all extremities  Skin: no rashes; no palpable lesions    Labs: all available labs reviewed                        6.6    9.92  )-----------( 318      ( 24 Mar 2022 08:26 )             21.1     03-24    136  |  106  |  38<H>  ----------------------------<  115<H>  4.4   |  24  |  1.89<H>    Ca    8.3<L>      24 Mar 2022 07:27  Mg     2.0     -24    TPro  7.3  /  Alb  3.1<L>  /  TBili  0.3  /  DBili  x   /  AST  15  /  ALT  14  /  AlkPhos  84  03-24     LIVER FUNCTIONS - ( 24 Mar 2022 00:25 )  Alb: 3.1 g/dL / Pro: 7.3 gm/dL / ALK PHOS: 84 U/L / ALT: 14 U/L / AST: 15 U/L / GGT: x           Urinalysis Basic - ( 24 Mar 2022 01:03 )    Color: Yellow / Appearance: Clear / S.010 / pH: x  Gluc: x / Ketone: Negative  / Bili: Negative / Urobili: Negative   Blood: x / Protein: 15 / Nitrite: Positive   Leuk Esterase: Moderate / RBC: 0-2 /HPF / WBC 11-25   Sq Epi: x / Non Sq Epi: Occasional / Bacteria: Moderate          Radiology: all available radiological tests reviewed  < from: Xray Chest 1 View- PORTABLE-Urgent (22 @ 07:40) >    ACC: [id number] EXAM:  XR CHEST PORTABLE URGENT 1V                          PROCEDURE DATE:  2022          INTERPRETATION:  Clinical history: 89-year-old male, sepsis.    Single view of the chest is correlated with a concurrent abdominal CT.    FINDINGS: Normal cardiac silhouette and normal pulmonary vasculature with   no lobar consolidation, pneumothorax or acute osseous finding.    Bilateral pleural effusions, left greater than right and a moderate-sized   hiatal hernia are characterized on concurrent CT.    IMPRESSION:  Bilateral pleural effusions, left greater than right and a moderate-sized   hiatal hernia, characterized on concurrent abdominal CT    --- End of Report ---    < end of copied text >  < from: CT Abdomen and Pelvis w/ Oral Cont (22 @ 03:25) >    ACC: [id number] EXAM:  CT ABDOMEN AND PELVIS OC                          PROCEDURE DATE:  2022          INTERPRETATION:  CLINICAL INFORMATION: Abdominal pain    COMPARISON: CT abdomen pelvis 2021    CONTRAST/COMPLICATIONS:  IV Contrast: NONE  Oral Contrast: NONE  Complications: None reported at time of study completion    PROCEDURE:  CT of the Abdomen and Pelvis was performed.  Sagittal and coronal reformats were performed.    FINDINGS:  LOWER CHEST: Small right and small-to-moderate left pleural effusions   with adjacent compressive atelectasis. Trace pericardial effusion.   Moderate hiatal hernia.    LIVER: Within normal limits.  BILE DUCTS: Normal caliber.  GALLBLADDER: Layering sludge in the gallbladder lumen.  SPLEEN: Within normal limits.  PANCREAS: Within normal limits.  ADRENALS: Within normal limits.  KIDNEYS/URETERS: Mild to moderate bilateral hydroureteronephrosis is   again seen to the level of the urinary bladder. No obstructing stones..    BLADDER: Munoz catheter in an underdistended urinary bladder.  REPRODUCTIVE ORGANS: Prostate within normal limits.    BOWEL: No bowel obstruction. Rectal distention with stool. Trace   presacral edema. Normal appendix. Mild diverticulosis coli.  PERITONEUM: No ascites.  VESSELS: Infrarenal abdominal aortic aneurysm measures 5.6 cm. There is   an aortobiiliac stent graft..  RETROPERITONEUM/LYMPH NODES: No lymphadenopathy.  ABDOMINAL WALL: Small bilateral fat-containing inguinal hernias.   Postsurgical change in the right inguinal region..  BONES: Degenerative changes.    IMPRESSION:  Rectal distention with stool. No bowel obstruction.    Redemonstrated mild to moderate bilateral hydroureteronephrosis to the   level of the decompressed urinary bladder. No evidence for obstructing   stones.    Small right and small-to-moderate left pleural effusions. Trace   pericardial effusion.      Advanced directives addressed: full resuscitation

## 2022-03-24 NOTE — H&P ADULT - NEGATIVE GENERAL GENITOURINARY SYMPTOMS
Please see notes from same date of service 10/19/18 re: Chronic Use of Opiate Drugs for Therapeutic purposes.   no hematuria/no renal colic

## 2022-03-24 NOTE — ED PROVIDER NOTE - PHYSICAL EXAMINATION
Physical Exam:  Gen: NAD, non-toxic appearing, appears pale  Head: NCAT  HEENT: EOMI, PEERLA, normal conjunctiva, tongue midline, oral mucosa moist  Lung: CTAB, no respiratory distress, no wheezes/rhonchi/rales B/L, speaking in full sentences  CV: RRR, no murmurs, rubs or gallops, distal pulses 2+ b/l  Abd: soft, fullness palpated at level of the bladder with reproducible TTP, no guarding, no rigidity, no rebound tenderness; rectal exam with hard stool; light brown stool, no melena, no BRBPR  Genitourinary: munoz catheter in place, approximately 50 cc dark urine in munoz bag.    MSK: no visible deformities, ROM normal in UE/LE  Skin: Warm, well perfused, no rash  Psych: normal affect, calm Physical Exam:  Gen: NAD, non-toxic appearing, appears pale  Head: NCAT  HEENT: EOMI, PEERLA, normal conjunctiva, tongue midline, oral mucosa moist  Lung: CTAB, no respiratory distress, no wheezes/rhonchi/rales B/L, speaking in full sentences  CV: RRR, no murmurs, rubs or gallops, distal pulses 2+ b/l; 1+ pitting edema BLE  Abd: soft, fullness palpated at level of the bladder with reproducible TTP, no guarding, no rigidity, no rebound tenderness; rectal exam with hard stool; light brown stool, no melena, no BRBPR  Genitourinary: munoz catheter in place, approximately 50 cc dark urine in munoz bag.    MSK: no visible deformities, ROM normal in UE/LE  Skin: Warm, well perfused, no rash  Psych: normal affect, calm

## 2022-03-24 NOTE — ED PROVIDER NOTE - OBJECTIVE STATEMENT
89 yo M PMHx of CAD s/p PCI/Stent, CHF with preserved EF, HTN, BPH with hx of chronic indwelling munoz, Chronic LLE Hip Pain 2/2 OA, HLD, CKD II-IV, AAA, PAD, presents with CC "anus pain, pain in my penis".  Patient arrived via EMS.  Patient states symptoms started 45 minutes prior.  States that he has pain in his penis, his anus, and is lower abdomen which radiates to his back.  He c/o sensation of hard stool in rectum.  He denies fever, chills, chest pain, SOB, n/v/d, lightheadedness, dizziness, or any other symptoms currently.  Per EMS VS were WNL.  On arrival initial BP 88/60, . 89 yo M PMHx of CAD s/p PCI/Stent, CHF with preserved EF, HTN, BPH with hx of chronic indwelling munoz, Chronic LLE Hip Pain 2/2 OA, HLD, CKD II-IV, AAA, PAD, presents with CC "anus pain, pain in my penis".  Patient arrived via EMS.  Patient states symptoms started 45 minutes prior.  States that he has pain in his penis, his anus, and is lower abdomen which radiates to his back.  He c/o sensation of hard stool in rectum, and last BM was 3 days ago.  He denies fever, chills, chest pain, SOB, n/v/d, lightheadedness, dizziness, or any other symptoms currently.  Per EMS VS were WNL.  On arrival initial BP 88/60, .

## 2022-03-24 NOTE — PHYSICAL THERAPY INITIAL EVALUATION ADULT - PERTINENT HX OF CURRENT PROBLEM, REHAB EVAL
Pt admitted to  secondary to pain in penis, anus, and lower abd radiating to low back. CT abd/pelvis: rectal distention with mild-mod bilateral hydroureteronephrosis. Small right and small to mod left pleural effusion. Trace pericardial effusion.  H/H- 6.6/21.1.

## 2022-03-24 NOTE — ED ADULT NURSE REASSESSMENT NOTE - NS ED NURSE REASSESS COMMENT FT1
pt resting comfortably vital signs stable. no s&s of distress. PRBC continuing with no complaints. WCTM.

## 2022-03-24 NOTE — PHYSICAL THERAPY INITIAL EVALUATION ADULT - GENERAL OBSERVATIONS, REHAB EVAL
Pt found supine in bed with IV/blood transfusion running, tele monitor, and bed alarm activated. Pt c/o pain left LE 5/10.

## 2022-03-24 NOTE — H&P ADULT - HISTORY OF PRESENT ILLNESS
89 yo Male with PMHx of CAD s/p PCI/Stent, CHF with preserved EF, HTN, BPH with hx of chronic indwelling munoz, CKD II-IV, AAA, presented with complain of "anus pain, pain in my penis".  Patient arrived via EMS.  Patient stateed symptoms started 45 minutes prior. Patient also Stateed that he had pain in his penis, his anus, and is lower abdomen which radiated to his back.  He complain of sensation of hard stool in rectum, and last BM was 3 days ago.  He denieed fever, chills, chest pain, SOB, n/v/d or any other symptoms currently. No fever.

## 2022-03-24 NOTE — ED PROVIDER NOTE - NS ED ROS FT
Physical Exam:  Gen: NAD, non-toxic appearing, appears pale  Head: NCAT  HEENT: EOMI, PEERLA, normal conjunctiva, tongue midline, oral mucosa moist  Lung: CTAB, no respiratory distress, no wheezes/rhonchi/rales B/L, speaking in full sentences  CV: RRR, no murmurs, rubs or gallops, distal pulses 2+ b/l  Abd: soft, fullness palpated at level of the bladder with reproducible TTP, no guarding, no rigidity, no rebound tenderness  Genitourinary: munoz catheter in place, approximately 50 cc dark urine in munoz bag.    MSK: no visible deformities, ROM normal in UE/LE  Skin: Warm, well perfused, no rash  Psych: normal affect, calm

## 2022-03-24 NOTE — ED PROVIDER NOTE - CARE PLAN
Principal Discharge DX:	Anemia  Secondary Diagnosis:	CHF (congestive heart failure)  Secondary Diagnosis:	Urinary retention  Secondary Diagnosis:	Acute UTI  Secondary Diagnosis:	History of infection due to extended spectrum beta lactamase (ESBL) producing bacteria  Secondary Diagnosis:	Hydronephrosis  Secondary Diagnosis:	Constipation   1

## 2022-03-24 NOTE — H&P ADULT - NSHPPHYSICALEXAM_GEN_ALL_CORE
Vital Signs Last 24 Hrs  T(C): 36.6 (24 Mar 2022 06:00), Max: 36.7 (24 Mar 2022 00:17)  T(F): 97.9 (24 Mar 2022 06:00), Max: 98.1 (24 Mar 2022 00:17)  HR: 94 (24 Mar 2022 06:00) (94 - 118)  BP: 128/58 (24 Mar 2022 06:00) (88/60 - 137/61)  BP(mean): 74 (24 Mar 2022 02:37) (65 - 77)  RR: 20 (24 Mar 2022 06:00) (16 - 20)  SpO2: 100% (24 Mar 2022 06:00) (97% - 100%)

## 2022-03-24 NOTE — ED PROVIDER NOTE - GASTROINTESTINAL NEGATIVE STATEMENT, MLM
no abdominal pain, no bloating, no constipation, no diarrhea, no nausea and no vomiting. +rectal pain

## 2022-03-24 NOTE — PHYSICAL THERAPY INITIAL EVALUATION ADULT - ADDITIONAL COMMENTS
Patient from [facility] GERA and reports being WC bound. Son in room and reports patient ambulates minimally w/ RW and staff from [facility]. Info obtained from eval 12/2021 Patient from [facility] and reports being WC bound. Son in room and reports patient ambulates minimally w/ RW and staff from [facility]. Info obtained from eval 12/2021. Update 3/24/2022: Pt states he is w/c bound mainly and ambulates ~ 15' with RW.

## 2022-03-24 NOTE — ED PROVIDER NOTE - CLINICAL SUMMARY MEDICAL DECISION MAKING FREE TEXT BOX
Stable. Pertinent findings  VS on arrival: , 88/60, RR 16, Temp 36.7, 97% on RA  Labs: H/H: 6.2/20.6, BUN/Cr 36/1.81, UA positive nitrite/leukocytes, moderate blood, 11-25 WBCs, no epithelials, moderate bacteria  Imaging: CXR; cardiomegaly, small left pleural effusion; unchanged from prior (official report pending)  Incidental findings on imaging:  Interventions:  Patient reevaluation:  VS on reevaluation:    Diagnosis: Pertinent findings  VS on arrival: , 88/60, RR 16, Temp 36.7, 97% on RA  Labs: H/H: 6.2/20.6, BUN/Cr 36/1.81, UA positive nitrite/leukocytes, moderate blood, 11-25 WBCs, no epithelials, moderate bacteria  Imaging: CXR; cardiomegaly, small left pleural effusion; unchanged from prior (official report pending)  Incidental findings on imaging: CXR: none appreciated, pending official report  Interventions: Stool disimpaction; Meropenum 1000 mg IV, PRBC 1 Unit  Patient reevaluation:  VS on reevaluation:    Diagnosis: Pertinent findings  VS on arrival: , 88/60, RR 16, Temp 36.7, 97% on RA  Labs: H/H: 6.2/20.6, BUN/Cr 36/1.81, UA positive nitrite/leukocytes, moderate blood, 11-25 WBCs, no epithelials, moderate bacteria  Imaging: CXR; cardiomegaly, small left pleural effusion; unchanged from prior (official report pending)  Incidental findings on imaging: CXR: none appreciated, pending official report  Interventions: Stool disimpaction; Meropenum 1000 mg IV, PRBC 1 Unit  Patient reevaluation:  VS on reevaluation:    Diagnosis:   Anemia, acute on chronic  RASHAUN on CKD  Left pleural effusion  UTI hx of ESBL  Urinary retention s/p munoz  Rectal impaction  Transient hypotension (question spurious) Pertinent findings  VS on arrival: , 88/60, RR 16, Temp 36.7, 97% on RA  Labs: H/H: 6.2/20.6, BUN/Cr 36/1.81, UA positive nitrite/leukocytes, moderate blood, 11-25 WBCs, no epithelials, moderate bacteria  Imaging: CXR; cardiomegaly, small left pleural effusion; unchanged from prior (official report pending)  Incidental findings on imaging: CXR: none appreciated, pending official report  Interventions: Stool disimpaction; Meropenum 1000 mg IV, PRBC 1 Unit  Patient reevaluation:  VS on reevaluation:    Diagnosis:   Anemia, acute on chronic  CKD (baseline Cr 1.9-2.0 per nephrology note September 30, 2017)  Left pleural effusion  UTI hx of ESBL  Urinary retention s/p munoz  Rectal impaction  Transient hypotension (question spurious) Pertinent findings  VS on arrival: , 88/60, RR 16, Temp 36.7, 97% on RA  Labs: H/H: 6.2/20.6, BUN/Cr 36/1.81, UA positive nitrite/leukocytes, moderate blood, 11-25 WBCs, no epithelials, moderate bacteria  Imaging: CXR; cardiomegaly, small left pleural effusion; unchanged from prior (official report pending)  Incidental findings on imaging: CXR: none appreciated, pending official report  Interventions: Stool disimpaction, Enema, Meropenum 1000 mg IV, PRBC 1 Unit, Lasix 80 mg IV, Miralax  Patient reevaluation:  VS on reevaluation:    Diagnosis:   Anemia, acute on chronic; question possibly 2/2 fluid overload state  CKD (baseline Cr 1.9-2.0 per nephrology note September 30, 2017)  Left pleural effusion, lower extremity likely 2/2 CHF  UTI hx of ESBL  Urinary retention s/p munoz  Rectal impaction  Transient hypotension (question spurious), repeat BPs WNL    Admit to medicine tele bed.  Discussed with Dr. Hyatt. Pertinent findings  VS on arrival: , 88/60, RR 16, Temp 36.7, 97% on RA  Labs: H/H: 6.2/20.6, BUN/Cr 36/1.81, UA positive nitrite/leukocytes, moderate blood, 11-25 WBCs, no epithelials, moderate bacteria  Imaging: CXR; cardiomegaly, small left pleural effusion; unchanged from prior (official report pending); CT with rectal stool impaction; bilateral hydroureteronephrosis similar to prior; bilateral pleural effusions left greater than right  Incidental findings on imaging: CXR: none appreciated, pending official report  Interventions: Stool disimpaction, Enema, Meropenum 1000 mg IV, PRBC 1 Unit, Lasix 80 mg IV, Miralax  Patient reevaluation:  VS on reevaluation:    Diagnosis:   Anemia, acute on chronic; question possibly 2/2 fluid overload state  CKD (baseline Cr 1.9-2.0 per nephrology note September 30, 2017)  Left pleural effusion, lower extremity likely 2/2 CHF  UTI hx of ESBL  Urinary retention s/p munoz  Rectal impaction  Transient hypotension (question spurious), repeat BPs WNL    Admit to medicine tele bed.  Discussed with Dr. Hyatt.

## 2022-03-24 NOTE — ED ADULT NURSE REASSESSMENT NOTE - NS ED NURSE REASSESS COMMENT FT1
blood consent obtained, order for prbc placed by md, blood transfusion started, see blood transfusion flow sheet.

## 2022-03-24 NOTE — ED PROVIDER NOTE - SECONDARY DIAGNOSIS.
CHF (congestive heart failure) Acute UTI History of infection due to extended spectrum beta lactamase (ESBL) producing bacteria Hydronephrosis Constipation Urinary retention

## 2022-03-24 NOTE — ED ADULT NURSE REASSESSMENT NOTE - NS ED NURSE REASSESS COMMENT FT1
blood transfusion complete, no transfusion reaction. VSS, munoz in place draining without incidence. Report given to inpatient rn. Pt ready for transport.

## 2022-03-24 NOTE — PHYSICAL THERAPY INITIAL EVALUATION ADULT - MANUAL MUSCLE TESTING RESULTS, REHAB EVAL
Bilateral UE strength 3+/5 throughout grossly. Right LE strength 3+/5 throughout grossly. Left LE strength not fully assessed at this time secondary to pain. Left DF 2+/5. All MMT performed within available ROM.

## 2022-03-24 NOTE — PHYSICAL THERAPY INITIAL EVALUATION ADULT - ACTIVE RANGE OF MOTION EXAMINATION, REHAB EVAL
Left Le not fully assessed secondary to pain, DF ~ -15 degrees. Right DF neutral./Left UE Active ROM was WFL (within functional limits)/Right UE Active ROM was WFL (within functional limits)/Right LE Active ROM was WFL (within functional limits)

## 2022-03-24 NOTE — PROVIDER CONTACT NOTE (OTHER) - SITUATION
notified md of h+h result s/p 2 units of PRBCs. pt is not actively bleeding. VSS. will follow up with h+h in the morning. will continue to monitor patient for any signs of bleeding safety maintained.

## 2022-03-24 NOTE — PHYSICAL THERAPY INITIAL EVALUATION ADULT - LEVEL OF INDEPENDENCE: SUPINE/SIT, REHAB EVAL
Did not attempt OOB at this time secondary to pt receiving blood transfusion. Will attempt to increase mobility on 3/25/2022.

## 2022-03-24 NOTE — ED ADULT TRIAGE NOTE - CHIEF COMPLAINT QUOTE
Pt BIBEMS, c/o groin/ anal pain that radiates to the back x1hr PTA. Pt arrives from [facility] with chronic munoz. As per EMS, pt was recently d/c'd from rehab 3days ago. Pt also c/o constipation x2days. Denies fever/ chills. B/l feet swelling observed at this time. Pt hypotensive in triage.

## 2022-03-24 NOTE — PATIENT PROFILE ADULT - FALL HARM RISK - HARM RISK INTERVENTIONS

## 2022-03-24 NOTE — PHYSICAL THERAPY INITIAL EVALUATION ADULT - PLANNED THERAPY INTERVENTIONS, PT EVAL
Increase mobility if possible. Pt left in bed with all observation section equipment/material intact and bed alarm activated. Pt with no c/o pain. Will cont to follow pt and increase as tolerated. Below is for goal purposes./bed mobility training/gait training/ROM/strengthening/transfer training

## 2022-03-25 LAB
ALBUMIN SERPL ELPH-MCNC: 2.6 G/DL — LOW (ref 3.3–5)
ALP SERPL-CCNC: 66 U/L — SIGNIFICANT CHANGE UP (ref 40–120)
ALT FLD-CCNC: 11 U/L — LOW (ref 12–78)
ANION GAP SERPL CALC-SCNC: 4 MMOL/L — LOW (ref 5–17)
AST SERPL-CCNC: 12 U/L — LOW (ref 15–37)
BASOPHILS # BLD AUTO: 0.02 K/UL — SIGNIFICANT CHANGE UP (ref 0–0.2)
BASOPHILS NFR BLD AUTO: 0.3 % — SIGNIFICANT CHANGE UP (ref 0–2)
BILIRUB SERPL-MCNC: 0.3 MG/DL — SIGNIFICANT CHANGE UP (ref 0.2–1.2)
BUN SERPL-MCNC: 35 MG/DL — HIGH (ref 7–23)
CALCIUM SERPL-MCNC: 8.6 MG/DL — SIGNIFICANT CHANGE UP (ref 8.5–10.1)
CHLORIDE SERPL-SCNC: 106 MMOL/L — SIGNIFICANT CHANGE UP (ref 96–108)
CO2 SERPL-SCNC: 30 MMOL/L — SIGNIFICANT CHANGE UP (ref 22–31)
CREAT SERPL-MCNC: 1.69 MG/DL — HIGH (ref 0.5–1.3)
EGFR: 38 ML/MIN/1.73M2 — LOW
EOSINOPHIL # BLD AUTO: 0.18 K/UL — SIGNIFICANT CHANGE UP (ref 0–0.5)
EOSINOPHIL NFR BLD AUTO: 3 % — SIGNIFICANT CHANGE UP (ref 0–6)
FERRITIN SERPL-MCNC: 55 NG/ML — SIGNIFICANT CHANGE UP (ref 30–400)
FOLATE SERPL-MCNC: 4.6 NG/ML — LOW
GLUCOSE SERPL-MCNC: 96 MG/DL — SIGNIFICANT CHANGE UP (ref 70–99)
HCT VFR BLD CALC: 25.1 % — LOW (ref 39–50)
HGB BLD-MCNC: 8 G/DL — LOW (ref 13–17)
IMM GRANULOCYTES NFR BLD AUTO: 0.3 % — SIGNIFICANT CHANGE UP (ref 0–1.5)
IRON SATN MFR SERPL: 12 UG/DL — LOW (ref 45–165)
IRON SATN MFR SERPL: 4 % — LOW (ref 16–55)
LYMPHOCYTES # BLD AUTO: 0.77 K/UL — LOW (ref 1–3.3)
LYMPHOCYTES # BLD AUTO: 12.7 % — LOW (ref 13–44)
MAGNESIUM SERPL-MCNC: 2.2 MG/DL — SIGNIFICANT CHANGE UP (ref 1.6–2.6)
MCHC RBC-ENTMCNC: 27.4 PG — SIGNIFICANT CHANGE UP (ref 27–34)
MCHC RBC-ENTMCNC: 31.9 GM/DL — LOW (ref 32–36)
MCV RBC AUTO: 86 FL — SIGNIFICANT CHANGE UP (ref 80–100)
MONOCYTES # BLD AUTO: 0.55 K/UL — SIGNIFICANT CHANGE UP (ref 0–0.9)
MONOCYTES NFR BLD AUTO: 9.1 % — SIGNIFICANT CHANGE UP (ref 2–14)
NEUTROPHILS # BLD AUTO: 4.53 K/UL — SIGNIFICANT CHANGE UP (ref 1.8–7.4)
NEUTROPHILS NFR BLD AUTO: 74.6 % — SIGNIFICANT CHANGE UP (ref 43–77)
PHOSPHATE SERPL-MCNC: 3.3 MG/DL — SIGNIFICANT CHANGE UP (ref 2.5–4.5)
PLATELET # BLD AUTO: 277 K/UL — SIGNIFICANT CHANGE UP (ref 150–400)
POTASSIUM SERPL-MCNC: 4.1 MMOL/L — SIGNIFICANT CHANGE UP (ref 3.5–5.3)
POTASSIUM SERPL-SCNC: 4.1 MMOL/L — SIGNIFICANT CHANGE UP (ref 3.5–5.3)
PROT SERPL-MCNC: 6.2 GM/DL — SIGNIFICANT CHANGE UP (ref 6–8.3)
RBC # BLD: 2.92 M/UL — LOW (ref 4.2–5.8)
RBC # FLD: 16.4 % — HIGH (ref 10.3–14.5)
SODIUM SERPL-SCNC: 140 MMOL/L — SIGNIFICANT CHANGE UP (ref 135–145)
TIBC SERPL-MCNC: 291 UG/DL — SIGNIFICANT CHANGE UP (ref 220–430)
UIBC SERPL-MCNC: 279 UG/DL — SIGNIFICANT CHANGE UP (ref 110–370)
VIT B12 SERPL-MCNC: 537 PG/ML — SIGNIFICANT CHANGE UP (ref 232–1245)
WBC # BLD: 6.07 K/UL — SIGNIFICANT CHANGE UP (ref 3.8–10.5)
WBC # FLD AUTO: 6.07 K/UL — SIGNIFICANT CHANGE UP (ref 3.8–10.5)

## 2022-03-25 PROCEDURE — 99223 1ST HOSP IP/OBS HIGH 75: CPT

## 2022-03-25 PROCEDURE — 99232 SBSQ HOSP IP/OBS MODERATE 35: CPT

## 2022-03-25 RX ORDER — HYDROCORTISONE 1 %
1 OINTMENT (GRAM) TOPICAL
Refills: 0 | Status: DISCONTINUED | OUTPATIENT
Start: 2022-03-25 | End: 2022-03-28

## 2022-03-25 RX ORDER — FOLIC ACID 0.8 MG
1 TABLET ORAL DAILY
Refills: 0 | Status: DISCONTINUED | OUTPATIENT
Start: 2022-03-25 | End: 2022-03-28

## 2022-03-25 RX ORDER — IRON SUCROSE 20 MG/ML
200 INJECTION, SOLUTION INTRAVENOUS ONCE
Refills: 0 | Status: COMPLETED | OUTPATIENT
Start: 2022-03-25 | End: 2022-03-25

## 2022-03-25 RX ADMIN — GABAPENTIN 300 MILLIGRAM(S): 400 CAPSULE ORAL at 10:33

## 2022-03-25 RX ADMIN — Medication 1 SUPPOSITORY(S): at 21:22

## 2022-03-25 RX ADMIN — Medication 1 MILLIGRAM(S): at 20:21

## 2022-03-25 RX ADMIN — POLYETHYLENE GLYCOL 3350 17 GRAM(S): 17 POWDER, FOR SOLUTION ORAL at 10:33

## 2022-03-25 RX ADMIN — MEROPENEM 100 MILLIGRAM(S): 1 INJECTION INTRAVENOUS at 10:32

## 2022-03-25 RX ADMIN — DULOXETINE HYDROCHLORIDE 60 MILLIGRAM(S): 30 CAPSULE, DELAYED RELEASE ORAL at 10:33

## 2022-03-25 RX ADMIN — Medication 81 MILLIGRAM(S): at 10:33

## 2022-03-25 RX ADMIN — IRON SUCROSE 200 MILLIGRAM(S): 20 INJECTION, SOLUTION INTRAVENOUS at 20:21

## 2022-03-25 RX ADMIN — MEROPENEM 100 MILLIGRAM(S): 1 INJECTION INTRAVENOUS at 21:22

## 2022-03-25 RX ADMIN — Medication 40 MILLIGRAM(S): at 10:34

## 2022-03-25 RX ADMIN — GABAPENTIN 300 MILLIGRAM(S): 400 CAPSULE ORAL at 21:22

## 2022-03-25 NOTE — GOALS OF CARE CONVERSATION - ADVANCED CARE PLANNING - CONVERSATION DETAILS
HPI:  89 yo Male with PMHx of CAD s/p PCI/Stent, CHF with preserved EF, HTN, BPH with hx of chronic indwelling munoz, CKD II-IV, AAA, presented with complain of "anus pain, pain in my penis".   (24 Mar 2022 06:37)      PERTINENT PMH REVIEWED:  [ X ] YES [ ] NO           Primary Contact: Son Joseph surrogate and daughter     HCP [  ] Surrogate [  X ] Guardian [   ]    Mental Status: [ X ] Alert  [ X  ] Oriented [  ] Confused [  ] Lethargic [  ]  Concerns of Depression [  ]- not identified  Anxiety [   ]- denies feeling anxious   Baseline ADLs (prior to admission):  Independent [  ] moderately [ ] fully   Dependent   [ X ] moderately [ ]fully    Anticipated Grief: Patient [ X ] Family [ X ]    Caregiver Chloride Assessed: Yes [ X ] No [  ]    Nondenominational: Scientologist     Spiritual Concerns: Not identified     Goals of Care: Hospice care     Previous Services: [facility]     ADVANCE DIRECTIVES: MOLST DNR/DNI on chart      Anticipated D/C Plan: Back to [place] with Hospice ([facility]) if [facility] would accept                      Summary:    This SW met with pt. at bedside to offer support and discuss goals. Pt. known to team from a previous admission. Pt. has been at [facility] where he was recently moved to from [facility] due to him needing a higher level of care. Pt. shared that he has decided that he no longer wants to have to keep coming back and forth to the hospital and receiving IV antibiotics when he knows that this issue is going to continue to recur. He shared that he is in his 90s and if its his time he is at peace with that. Feelings explored and support provided.     We discussed the option of a comfort focus and returning to [place] with hospice services. This SW explained that if he chose hospice care that would mean no further hospitalizations and IV antibiotics. Pt. agree with this and says that he just wants to be comfortable and allow a natural process. He reports he discussed this with his children as well and gave this SW permission to speak with them to finalize the plan.    This SW spoke with pts children Joseph and Delmi via phone and explained above. Pts children are in agreement with pt. and support his decision. They would like a referral placed for home hospice through [facility] with [facility]. Pts children are aware that [facility] must make final decision if they will take pt. back with hospice.     Plan at this time is for pt. to return to [place] with [facility] home hospice (if [facility] allows). Message left for [facility] to place referral. Emotional support provided. Our team will continue to follow.

## 2022-03-25 NOTE — CONSULT NOTE ADULT - SUBJECTIVE AND OBJECTIVE BOX
HPI: Pt is a 89y old Male with hx of       PAIN: ( )Yes   ( )No  Level:  Location:  Intensity:    /10  Quality:  Aggravating Factors:  Alleviating Factors:  Radiation:  Duration/Timing:  Impact on ADLs:    DYSPNEA: ( ) Yes  ( ) No  Level:    PAST MEDICAL & SURGICAL HISTORY:  Leg swelling  related to cellulitis of LLE    CKD (chronic kidney disease) stage 3, GFR 30-59 ml/min    Cellulitis  BL    BPH (benign prostatic hyperplasia)    Colon polyp    Stented coronary artery    HTN (hypertension)    HLD (hyperlipidemia)    CAD (coronary artery disease)    OA (osteoarthritis)    PAD (peripheral artery disease)    AAA (abdominal aortic aneurysm) without rupture    S/P hemorrhoidectomy    H/O inguinal hernia  repair    History of colonoscopy    History of coronary artery stent placement        SOCIAL HX:    Hx opiate tolerance ( )YES  ( )NO    Baseline ADLs  (Prior to Admission)  ( ) Independent   ( )Dependent    FAMILY HISTORY:  Family history unobtainable  d/t dementia        Review of Systems:    Anxiety-  Depression-  Physical Discomfort-  Dyspnea-  Constipation-  Diarrhea-  Nausea-  Vomiting-  Anorexia-  Weight Loss-   Cough-  Secretions-  Fatigue-  Weakness-  Delirium-    All other systems reviewed and negative  Unable to obtain/Limited due to:      PHYSICAL EXAM:    Vital Signs Last 24 Hrs  T(C): 36.7 (25 Mar 2022 08:04), Max: 37.1 (24 Mar 2022 12:31)  T(F): 98 (25 Mar 2022 08:04), Max: 98.8 (24 Mar 2022 12:31)  HR: 80 (25 Mar 2022 08:04) (80 - 89)  BP: 153/62 (25 Mar 2022 08:04) (106/41 - 153/62)  BP(mean): --  RR: 18 (25 Mar 2022 08:04) (18 - 18)  SpO2: 99% (25 Mar 2022 08:04) (94% - 100%)  Daily     Daily Weight in k.2 (25 Mar 2022 06:08)    PPSV2:   %  FAST:    General:  Mental Status:  HEENT:  Lungs:  Cardiac:  GI:  :  Ext:  Neuro:      LABS:                        8.0    6.07  )-----------( 277      ( 25 Mar 2022 07:33 )             25.1     03-25    140  |  106  |  35<H>  ----------------------------<  96  4.1   |  30  |  1.69<H>    Ca    8.6      25 Mar 2022 07:33  Phos  3.3       Mg     2.2         TPro  6.2  /  Alb  2.6<L>  /  TBili  0.3  /  DBili  x   /  AST  12<L>  /  ALT  11<L>  /  AlkPhos  66      PT/INR - ( 24 Mar 2022 00:25 )   PT: 11.2 sec;   INR: 0.97 ratio         PTT - ( 24 Mar 2022 00:25 )  PTT:27.4 sec  Albumin: Albumin, Serum: 2.6 g/dL ( @ 07:33)      Allergies    No Known Allergies    Intolerances      MEDICATIONS  (STANDING):  aspirin enteric coated 81 milliGRAM(s) Oral daily  DULoxetine 60 milliGRAM(s) Oral daily  furosemide   Injectable 40 milliGRAM(s) IV Push daily  gabapentin 300 milliGRAM(s) Oral two times a day  meropenem  IVPB 500 milliGRAM(s) IV Intermittent every 12 hours  polyethylene glycol 3350 17 Gram(s) Oral daily    MEDICATIONS  (PRN):  acetaminophen     Tablet .. 650 milliGRAM(s) Oral every 6 hours PRN Temp greater or equal to 38C (100.4F), Mild Pain (1 - 3)  bisacodyl Suppository 10 milliGRAM(s) Rectal daily PRN Constipation  melatonin 3 milliGRAM(s) Oral at bedtime PRN Insomnia  ondansetron Injectable 4 milliGRAM(s) IV Push every 8 hours PRN Nausea and/or Vomiting      RADIOLOGY/ADDITIONAL STUDIES: HPI: CAD s/p PCI/Stent, HTN, BPH, Chronic LLE Hip Pain 2/2 OA, HLD, CKD II-IV, AAA, PAD who presented to  from [facility]isted [facility] Facility for pain in rectum and around penis. He was last admitted  and 2021 for UTI due to proteus mirabilis complicated by bacteremia and R hydronephrosis and was treated with meropenem then ceftriaxone  was seen by  and has chronic Munoz cath.  Palliative Consult to establish GOC.    3/25/2022 patient seen and examined with no family at bedside, patient states that he has recurrent UTI and at this time he has decided he wants to just focus on his comfort that he does not want to continue to go back and forth to the hospital. Patient states that he has spoken to his children about this plan and that they are in agreement, but was agreeable to out team reaching out to discuss further.  Patient states that pain is better now that he has a BM but at times feels he gets rectal pain when constipated         PAIN: (x )Yes   ( )No  Level: mild- moderate   Location: rectal pain at times, requesting stool softener, better now that he has had a BM     DYSPNEA: ( ) Yes  (x ) No  Level:    PAST MEDICAL & SURGICAL HISTORY:  Leg swelling  related to cellulitis of LLE  CKD (chronic kidney disease) stage 3, GFR 30-59 ml/min  Cellulitis BL  BPH (benign prostatic hyperplasia)  Colon polyp  Stented coronary artery  HTN (hypertension)  HLD (hyperlipidemia)  CAD (coronary artery disease)  OA (osteoarthritis)  PAD (peripheral artery disease)  AAA (abdominal aortic aneurysm) without rupture  S/P hemorrhoidectomy  H/O inguinal hernia repair  History of colonoscopy  History of coronary artery stent placement    SOCIAL HX: Lives at [facility]     Hx opiate tolerance ( )YES  (x )NO    Baseline ADLs  (Prior to Admission)  ( ) Independent   (x )Dependent    FAMILY HISTORY:  Family history unobtainable  d/t dementia    Review of Systems:    Anxiety-denies  Depression-situational  Physical Discomfort-mild  Dyspnea-denies  Constipation-yes  Anorexia-yes  Weight Loss-   Cough-denies  Fatigue-severe  Weakness-severe    All other systems reviewed and negative    PHYSICAL EXAM:    Vital Signs Last 24 Hrs  T(C): 36.7 (25 Mar 2022 08:04), Max: 37.1 (24 Mar 2022 12:31)  T(F): 98 (25 Mar 2022 08:04), Max: 98.8 (24 Mar 2022 12:31)  HR: 80 (25 Mar 2022 08:04) (80 - 89)  BP: 153/62 (25 Mar 2022 08:04) (106/41 - 153/62)  RR: 18 (25 Mar 2022 08:04) (18 - 18)  SpO2: 99% (25 Mar 2022 08:04) (94% - 100%)  Daily Weight in k.2 (25 Mar 2022 06:08)    PPSV2: 40  %    General: Frail elderly male in bed in NAD  Mental Status: Awake, A&O X2. poor historian   HEENT: oral mucosa dry  Lungs: clear diminished  Cardiac: S1S2+  GI: abd soft NT + BS  : munoz cath  Ext: moves on bed  Neuro: AMS    LABS:                        8.0    6.07  )-----------( 277      ( 25 Mar 2022 07:33 )             25.1         140  |  106  |  35<H>  ----------------------------<  96  4.1   |  30  |  1.69<H>    Ca    8.6      25 Mar 2022 07:33  Phos  3.3       < from: Xray Chest 1 View- PORTABLE-Urgent (22 @ 07:40) >  Mg     2.2         TPro  6.2  /  Alb  2.6<L>  /  TBili  0.3  /  DBili  x   /  AST  12<L>  /  ALT  11<L>  /  AlkPhos  66      PT/INR - ( 24 Mar 2022 00:25 )   PT: 11.2 sec;   INR: 0.97 ratio         PTT - ( 24 Mar 2022 00:25 )  PTT:27.4 sec  Albumin: Albumin, Serum: 2.6 g/dL ( @ 07:33)      Allergies    No Known Allergies    Intolerances      MEDICATIONS  (STANDING):  aspirin enteric coated 81 milliGRAM(s) Oral daily  DULoxetine 60 milliGRAM(s) Oral daily  furosemide   Injectable 40 milliGRAM(s) IV Push daily  gabapentin 300 milliGRAM(s) Oral two times a day  meropenem  IVPB 500 milliGRAM(s) IV Intermittent every 12 hours  polyethylene glycol 3350 17 Gram(s) Oral daily    MEDICATIONS  (PRN):  acetaminophen     Tablet .. 650 milliGRAM(s) Oral every 6 hours PRN Temp greater or equal to 38C (100.4F), Mild Pain (1 - 3)  bisacodyl Suppository 10 milliGRAM(s) Rectal daily PRN Constipation  melatonin 3 milliGRAM(s) Oral at bedtime PRN Insomnia  ondansetron Injectable 4 milliGRAM(s) IV Push every 8 hours PRN Nausea and/or Vomiting      RADIOLOGY/ADDITIONAL STUDIES:    ACC: [id number] EXAM:  XR CHEST PORTABLE URGENT 1V                        PROCEDURE DATE:  2022    INTERPRETATION:  Clinical history: 89-year-old male, sepsis.  Single view of the chest is correlated with a concurrent abdominal CT.  FINDINGS: Normal cardiac silhouette and normal pulmonary vasculature with   no lobar consolidation, pneumothorax or acute osseous finding.  Bilateral pleural effusions, left greater than right and a moderate-sized   hiatal hernia are characterized on concurrent CT.    IMPRESSION:  Bilateral pleural effusions, left greater than right and a moderate-sized   hiatal hernia, characterized on concurrent abdominal CT      ACC: [id number] EXAM:  CT ABDOMEN AND PELVIS OC                        PROCEDURE DATE:  2022    INTERPRETATION:  CLINICAL INFORMATION: Abdominal pain  COMPARISON: CT abdomen pelvis 2021  CONTRAST/COMPLICATIONS:  IV Contrast: NONE  Oral Contrast: NONE  Complications: None reported at time of study completion  PROCEDURE:  CT of the Abdomen and Pelvis was performed.  Sagittal and coronal reformats were performed.  FINDINGS:  LOWER CHEST: Small right and small-to-moderate left pleural effusions   with adjacent compressive atelectasis. Trace pericardial effusion.   Moderate hiatal hernia.  LIVER: Within normal limits.  BILE DUCTS: Normal caliber.  GALLBLADDER: Layering sludge in the gallbladder lumen.  SPLEEN: Within normal limits.  PANCREAS: Within normal limits.  ADRENALS: Within normal limits.  KIDNEYS/URETERS: Mild to moderate bilateral hydroureteronephrosis is   again seen to the level of the urinary bladder. No obstructing stones..  BLADDER: Munoz catheter in an underdistended urinary bladder.  REPRODUCTIVE ORGANS: Prostate within normal limits.  BOWEL: No bowel obstruction. Rectal distention with stool. Trace   presacral edema. Normal appendix. Mild diverticulosis coli.  PERITONEUM: No ascites.  VESSELS: Infrarenal abdominal aortic aneurysm measures 5.6 cm. There is   an aortobiiliac stent graft..  RETROPERITONEUM/LYMPH NODES: No lymphadenopathy.  ABDOMINAL WALL: Small bilateral fat-containing inguinal hernias.   Postsurgical change in the right inguinal region..  BONES: Degenerative changes.    IMPRESSION:  Rectal distention with stool. No bowel obstruction.  Redemonstrated mild to moderate bilateral hydroureteronephrosis to the   level of the decompressed urinary bladder. No evidence for obstructing   stones.  Small right and small-to-moderate left pleural effusions. Trace   pericardial effusion.

## 2022-03-25 NOTE — CONSULT NOTE ADULT - ASSESSMENT
89 yo Male with PMHx of CAD s/p PCI/Stent, CHF with preserved EF, HTN, BPH with hx of chronic indwelling munoz, CKD II-IV, AAA, presented with complain of "anus pain, pain in my penis".  Patient arrived via EMS.  Patient stated symptoms started 45 minutes prior. Patient also Stated that he had pain in his penis, his anus, and is lower abdomen which radiated to his back.  He complain of sensation of hard stool in rectum, and last BM was 3 days ago.  He denied fever, chills, chest pain, SOB, n/v/d or any other symptoms currently. No fever. Here UA positive, munoz changed, was given IV merrem d/t hx of MDR/ESBL orgs in past.     1. pyuria. possible UTI. BPH with chronic munoz. CKD   - f/u urine cx, blood cx  - on merrem [id number]  - continue with abx coverage   - isolation precautions  - monitor temps  - tolerating abx well so far; no side effects noted  - reason for abx use and side effects reviewed with patient  - supportive care  - fu cbc    2. other issues - care per medicine 
CAD s/p PCI/Stent, HTN, BPH, Chronic LLE Hip Pain 2/2 OA, HLD, CKD II-IV, AAA, PAD who presented to  from [facility] for pain in rectum and around penis. He was last admitted  and 12/2021 for UTI due to proteus mirabilis complicated by bacteremia and R hydronephrosis and was treated with meropenem then ceftriaxone  was seen by  and has chronic Munoz cath.  Palliative Consult to establish GOC.    1) Anemia  - monitor H/H   - Guaiac negative on admission  - s/p 2 unit of RBC   - monitor for bleeding     2) Chronic heart Failure with Mild Aortic Stenosis and history of CAD   - chest Xray - Bilateral pleural effusions, left greater than right and a moderate-sized   - history of stent  -cont ASA, BB, statin, diuretics as tolerated    3) Chronic Urinary retention   - history of MDR UTI's  - chronic munoz cath  - Follow cultures  - ID consulted  - patient has had 2 rounds of long term antibiotics states does not want more     Process of Care  --Reviewed dx/treatment problems and alignment with Goals of Care    Physical Aspects of Care  --Pain - chronic related to OA   c/w current management    --Bowel Regimen  - as per patient BM today   risk for constipation d/t immobility  daily dulcolax and Miralax     --Dyspnea  No SOB at this time  comfortable and in NAD    --Nausea Vomiting  denies    --Weakness  PT as tolerated - patient stated that he does not think he want to do PT anymore as he cant walk     Psychological and Psychiatric Aspects of Care:   --Greif/Bereavment: emotional support provided  --Hx of psychiatric dx: none  -Pastoral Care Available PRN     Social Aspects of Care  -SW involved     Cultural Aspects  -Primary Language: English    Goals of Care:     We discussed Palliative Care team being a supportive team when a patient has ongoing illnesses.  We also discussed that it is not an end of life care service, but can help navigate symptoms and emotional support througout their hospital stay here.    Hospice was explained as well  as an end of life care philosophy.  When a disease cannot be cured, or family/patient decide the treatment burdens out weigh the risk and one choses to change focus of treatment from cure to quality/comfort.       Prognosis: Death can occur at anytime, but if disease continues to progress naturally patient likely has days to weeks.    Ethical and Legal Aspects:   NA    Capacity- patient appears to have capacity at this time - but defers further discussion to his Son   HCP/Surrogate: Son Joseph     Code Status- DNR/I   MOLST-   Dispo Plan- home with hospice     Discussed With: Dr. Rubalcava coordinated with attending and SW and RN

## 2022-03-26 LAB
BASOPHILS # BLD AUTO: 0.02 K/UL — SIGNIFICANT CHANGE UP (ref 0–0.2)
BASOPHILS NFR BLD AUTO: 0.3 % — SIGNIFICANT CHANGE UP (ref 0–2)
EOSINOPHIL # BLD AUTO: 0.21 K/UL — SIGNIFICANT CHANGE UP (ref 0–0.5)
EOSINOPHIL NFR BLD AUTO: 3.3 % — SIGNIFICANT CHANGE UP (ref 0–6)
HCT VFR BLD CALC: 26.4 % — LOW (ref 39–50)
HCT VFR BLD CALC: 27.6 % — LOW (ref 39–50)
HGB BLD-MCNC: 8.1 G/DL — LOW (ref 13–17)
HGB BLD-MCNC: 8.6 G/DL — LOW (ref 13–17)
IMM GRANULOCYTES NFR BLD AUTO: 0.3 % — SIGNIFICANT CHANGE UP (ref 0–1.5)
LYMPHOCYTES # BLD AUTO: 1.03 K/UL — SIGNIFICANT CHANGE UP (ref 1–3.3)
LYMPHOCYTES # BLD AUTO: 16 % — SIGNIFICANT CHANGE UP (ref 13–44)
MCHC RBC-ENTMCNC: 27.3 PG — SIGNIFICANT CHANGE UP (ref 27–34)
MCHC RBC-ENTMCNC: 31.2 GM/DL — LOW (ref 32–36)
MCV RBC AUTO: 87.6 FL — SIGNIFICANT CHANGE UP (ref 80–100)
MONOCYTES # BLD AUTO: 0.62 K/UL — SIGNIFICANT CHANGE UP (ref 0–0.9)
MONOCYTES NFR BLD AUTO: 9.7 % — SIGNIFICANT CHANGE UP (ref 2–14)
NEUTROPHILS # BLD AUTO: 4.52 K/UL — SIGNIFICANT CHANGE UP (ref 1.8–7.4)
NEUTROPHILS NFR BLD AUTO: 70.4 % — SIGNIFICANT CHANGE UP (ref 43–77)
OB PNL STL: POSITIVE
PLATELET # BLD AUTO: 304 K/UL — SIGNIFICANT CHANGE UP (ref 150–400)
RBC # BLD: 3.15 M/UL — LOW (ref 4.2–5.8)
RBC # FLD: 16.2 % — HIGH (ref 10.3–14.5)
WBC # BLD: 6.42 K/UL — SIGNIFICANT CHANGE UP (ref 3.8–10.5)
WBC # FLD AUTO: 6.42 K/UL — SIGNIFICANT CHANGE UP (ref 3.8–10.5)

## 2022-03-26 PROCEDURE — 99232 SBSQ HOSP IP/OBS MODERATE 35: CPT

## 2022-03-26 RX ORDER — METHOCARBAMOL 500 MG/1
500 TABLET, FILM COATED ORAL EVERY 8 HOURS
Refills: 0 | Status: DISCONTINUED | OUTPATIENT
Start: 2022-03-26 | End: 2022-03-28

## 2022-03-26 RX ADMIN — GABAPENTIN 300 MILLIGRAM(S): 400 CAPSULE ORAL at 09:36

## 2022-03-26 RX ADMIN — METHOCARBAMOL 500 MILLIGRAM(S): 500 TABLET, FILM COATED ORAL at 21:18

## 2022-03-26 RX ADMIN — MEROPENEM 100 MILLIGRAM(S): 1 INJECTION INTRAVENOUS at 09:37

## 2022-03-26 RX ADMIN — Medication 1 SUPPOSITORY(S): at 09:36

## 2022-03-26 RX ADMIN — Medication 81 MILLIGRAM(S): at 09:36

## 2022-03-26 RX ADMIN — MEROPENEM 100 MILLIGRAM(S): 1 INJECTION INTRAVENOUS at 21:19

## 2022-03-26 RX ADMIN — POLYETHYLENE GLYCOL 3350 17 GRAM(S): 17 POWDER, FOR SOLUTION ORAL at 09:37

## 2022-03-26 RX ADMIN — Medication 1 MILLIGRAM(S): at 09:36

## 2022-03-26 RX ADMIN — DULOXETINE HYDROCHLORIDE 60 MILLIGRAM(S): 30 CAPSULE, DELAYED RELEASE ORAL at 09:36

## 2022-03-26 RX ADMIN — METHOCARBAMOL 500 MILLIGRAM(S): 500 TABLET, FILM COATED ORAL at 16:12

## 2022-03-26 RX ADMIN — Medication 1 SUPPOSITORY(S): at 21:19

## 2022-03-26 RX ADMIN — Medication 40 MILLIGRAM(S): at 09:37

## 2022-03-26 RX ADMIN — GABAPENTIN 300 MILLIGRAM(S): 400 CAPSULE ORAL at 21:18

## 2022-03-27 LAB
-  AMIKACIN: SIGNIFICANT CHANGE UP
-  AMIKACIN: SIGNIFICANT CHANGE UP
-  AMOXICILLIN/CLAVULANIC ACID: SIGNIFICANT CHANGE UP
-  AMOXICILLIN/CLAVULANIC ACID: SIGNIFICANT CHANGE UP
-  AMPICILLIN/SULBACTAM: SIGNIFICANT CHANGE UP
-  AMPICILLIN/SULBACTAM: SIGNIFICANT CHANGE UP
-  AMPICILLIN: SIGNIFICANT CHANGE UP
-  AMPICILLIN: SIGNIFICANT CHANGE UP
-  AZTREONAM: SIGNIFICANT CHANGE UP
-  AZTREONAM: SIGNIFICANT CHANGE UP
-  CEFAZOLIN: SIGNIFICANT CHANGE UP
-  CEFAZOLIN: SIGNIFICANT CHANGE UP
-  CEFEPIME: SIGNIFICANT CHANGE UP
-  CEFEPIME: SIGNIFICANT CHANGE UP
-  CEFOXITIN: SIGNIFICANT CHANGE UP
-  CEFOXITIN: SIGNIFICANT CHANGE UP
-  CEFTRIAXONE: SIGNIFICANT CHANGE UP
-  CEFTRIAXONE: SIGNIFICANT CHANGE UP
-  CIPROFLOXACIN: SIGNIFICANT CHANGE UP
-  CIPROFLOXACIN: SIGNIFICANT CHANGE UP
-  ERTAPENEM: SIGNIFICANT CHANGE UP
-  ERTAPENEM: SIGNIFICANT CHANGE UP
-  GENTAMICIN: SIGNIFICANT CHANGE UP
-  GENTAMICIN: SIGNIFICANT CHANGE UP
-  IMIPENEM: SIGNIFICANT CHANGE UP
-  IMIPENEM: SIGNIFICANT CHANGE UP
-  LEVOFLOXACIN: SIGNIFICANT CHANGE UP
-  LEVOFLOXACIN: SIGNIFICANT CHANGE UP
-  MEROPENEM: SIGNIFICANT CHANGE UP
-  MEROPENEM: SIGNIFICANT CHANGE UP
-  NITROFURANTOIN: SIGNIFICANT CHANGE UP
-  NITROFURANTOIN: SIGNIFICANT CHANGE UP
-  PIPERACILLIN/TAZOBACTAM: SIGNIFICANT CHANGE UP
-  PIPERACILLIN/TAZOBACTAM: SIGNIFICANT CHANGE UP
-  TIGECYCLINE: SIGNIFICANT CHANGE UP
-  TIGECYCLINE: SIGNIFICANT CHANGE UP
-  TOBRAMYCIN: SIGNIFICANT CHANGE UP
-  TOBRAMYCIN: SIGNIFICANT CHANGE UP
-  TRIMETHOPRIM/SULFAMETHOXAZOLE: SIGNIFICANT CHANGE UP
-  TRIMETHOPRIM/SULFAMETHOXAZOLE: SIGNIFICANT CHANGE UP
ANION GAP SERPL CALC-SCNC: 5 MMOL/L — SIGNIFICANT CHANGE UP (ref 5–17)
BASOPHILS # BLD AUTO: 0.02 K/UL — SIGNIFICANT CHANGE UP (ref 0–0.2)
BASOPHILS NFR BLD AUTO: 0.4 % — SIGNIFICANT CHANGE UP (ref 0–2)
BUN SERPL-MCNC: 34 MG/DL — HIGH (ref 7–23)
CALCIUM SERPL-MCNC: 8.6 MG/DL — SIGNIFICANT CHANGE UP (ref 8.5–10.1)
CHLORIDE SERPL-SCNC: 102 MMOL/L — SIGNIFICANT CHANGE UP (ref 96–108)
CO2 SERPL-SCNC: 32 MMOL/L — HIGH (ref 22–31)
CREAT SERPL-MCNC: 1.54 MG/DL — HIGH (ref 0.5–1.3)
CULTURE RESULTS: SIGNIFICANT CHANGE UP
EGFR: 42 ML/MIN/1.73M2 — LOW
EOSINOPHIL # BLD AUTO: 0.23 K/UL — SIGNIFICANT CHANGE UP (ref 0–0.5)
EOSINOPHIL NFR BLD AUTO: 4.1 % — SIGNIFICANT CHANGE UP (ref 0–6)
GLUCOSE SERPL-MCNC: 99 MG/DL — SIGNIFICANT CHANGE UP (ref 70–99)
HCT VFR BLD CALC: 26.8 % — LOW (ref 39–50)
HGB BLD-MCNC: 8.5 G/DL — LOW (ref 13–17)
IMM GRANULOCYTES NFR BLD AUTO: 0.2 % — SIGNIFICANT CHANGE UP (ref 0–1.5)
LYMPHOCYTES # BLD AUTO: 0.95 K/UL — LOW (ref 1–3.3)
LYMPHOCYTES # BLD AUTO: 16.8 % — SIGNIFICANT CHANGE UP (ref 13–44)
MAGNESIUM SERPL-MCNC: 2.2 MG/DL — SIGNIFICANT CHANGE UP (ref 1.6–2.6)
MCHC RBC-ENTMCNC: 27.5 PG — SIGNIFICANT CHANGE UP (ref 27–34)
MCHC RBC-ENTMCNC: 31.7 GM/DL — LOW (ref 32–36)
MCV RBC AUTO: 86.7 FL — SIGNIFICANT CHANGE UP (ref 80–100)
METHOD TYPE: SIGNIFICANT CHANGE UP
METHOD TYPE: SIGNIFICANT CHANGE UP
MONOCYTES # BLD AUTO: 0.52 K/UL — SIGNIFICANT CHANGE UP (ref 0–0.9)
MONOCYTES NFR BLD AUTO: 9.2 % — SIGNIFICANT CHANGE UP (ref 2–14)
NEUTROPHILS # BLD AUTO: 3.92 K/UL — SIGNIFICANT CHANGE UP (ref 1.8–7.4)
NEUTROPHILS NFR BLD AUTO: 69.3 % — SIGNIFICANT CHANGE UP (ref 43–77)
ORGANISM # SPEC MICROSCOPIC CNT: SIGNIFICANT CHANGE UP
PHOSPHATE SERPL-MCNC: 3.5 MG/DL — SIGNIFICANT CHANGE UP (ref 2.5–4.5)
PLATELET # BLD AUTO: 319 K/UL — SIGNIFICANT CHANGE UP (ref 150–400)
POTASSIUM SERPL-MCNC: 3.7 MMOL/L — SIGNIFICANT CHANGE UP (ref 3.5–5.3)
POTASSIUM SERPL-SCNC: 3.7 MMOL/L — SIGNIFICANT CHANGE UP (ref 3.5–5.3)
RBC # BLD: 3.09 M/UL — LOW (ref 4.2–5.8)
RBC # FLD: 16 % — HIGH (ref 10.3–14.5)
SARS-COV-2 RNA SPEC QL NAA+PROBE: SIGNIFICANT CHANGE UP
SODIUM SERPL-SCNC: 139 MMOL/L — SIGNIFICANT CHANGE UP (ref 135–145)
SPECIMEN SOURCE: SIGNIFICANT CHANGE UP
WBC # BLD: 5.65 K/UL — SIGNIFICANT CHANGE UP (ref 3.8–10.5)
WBC # FLD AUTO: 5.65 K/UL — SIGNIFICANT CHANGE UP (ref 3.8–10.5)

## 2022-03-27 PROCEDURE — 99232 SBSQ HOSP IP/OBS MODERATE 35: CPT

## 2022-03-27 RX ADMIN — DULOXETINE HYDROCHLORIDE 60 MILLIGRAM(S): 30 CAPSULE, DELAYED RELEASE ORAL at 10:08

## 2022-03-27 RX ADMIN — GABAPENTIN 300 MILLIGRAM(S): 400 CAPSULE ORAL at 21:21

## 2022-03-27 RX ADMIN — MEROPENEM 100 MILLIGRAM(S): 1 INJECTION INTRAVENOUS at 10:12

## 2022-03-27 RX ADMIN — Medication 81 MILLIGRAM(S): at 10:06

## 2022-03-27 RX ADMIN — POLYETHYLENE GLYCOL 3350 17 GRAM(S): 17 POWDER, FOR SOLUTION ORAL at 10:07

## 2022-03-27 RX ADMIN — METHOCARBAMOL 500 MILLIGRAM(S): 500 TABLET, FILM COATED ORAL at 05:43

## 2022-03-27 RX ADMIN — Medication 40 MILLIGRAM(S): at 10:06

## 2022-03-27 RX ADMIN — MEROPENEM 100 MILLIGRAM(S): 1 INJECTION INTRAVENOUS at 21:20

## 2022-03-27 RX ADMIN — Medication 1 SUPPOSITORY(S): at 21:21

## 2022-03-27 RX ADMIN — GABAPENTIN 300 MILLIGRAM(S): 400 CAPSULE ORAL at 10:06

## 2022-03-27 RX ADMIN — METHOCARBAMOL 500 MILLIGRAM(S): 500 TABLET, FILM COATED ORAL at 15:16

## 2022-03-27 RX ADMIN — METHOCARBAMOL 500 MILLIGRAM(S): 500 TABLET, FILM COATED ORAL at 21:21

## 2022-03-27 RX ADMIN — Medication 1 SUPPOSITORY(S): at 10:06

## 2022-03-27 RX ADMIN — Medication 1 MILLIGRAM(S): at 10:06

## 2022-03-28 ENCOUNTER — TRANSCRIPTION ENCOUNTER (OUTPATIENT)
Age: 87
End: 2022-03-28

## 2022-03-28 VITALS
RESPIRATION RATE: 18 BRPM | TEMPERATURE: 97 F | OXYGEN SATURATION: 98 % | SYSTOLIC BLOOD PRESSURE: 122 MMHG | HEART RATE: 72 BPM | DIASTOLIC BLOOD PRESSURE: 48 MMHG

## 2022-03-28 PROCEDURE — 99239 HOSP IP/OBS DSCHRG MGMT >30: CPT

## 2022-03-28 RX ORDER — GABAPENTIN 400 MG/1
1 CAPSULE ORAL
Qty: 0 | Refills: 0 | DISCHARGE

## 2022-03-28 RX ORDER — METHOCARBAMOL 500 MG/1
1 TABLET, FILM COATED ORAL
Qty: 0 | Refills: 0 | DISCHARGE
Start: 2022-03-28

## 2022-03-28 RX ORDER — DULOXETINE HYDROCHLORIDE 30 MG/1
1 CAPSULE, DELAYED RELEASE ORAL
Qty: 30 | Refills: 0
Start: 2022-03-28 | End: 2022-04-26

## 2022-03-28 RX ORDER — TRAMADOL HYDROCHLORIDE 50 MG/1
1 TABLET ORAL
Qty: 28 | Refills: 0
Start: 2022-03-28 | End: 2022-04-03

## 2022-03-28 RX ORDER — FUROSEMIDE 40 MG
1 TABLET ORAL
Qty: 0 | Refills: 0 | DISCHARGE

## 2022-03-28 RX ORDER — POLYETHYLENE GLYCOL 3350 17 G/17G
17 POWDER, FOR SOLUTION ORAL
Qty: 0 | Refills: 0 | DISCHARGE
Start: 2022-03-28

## 2022-03-28 RX ORDER — ACETAMINOPHEN 500 MG
2 TABLET ORAL
Qty: 0 | Refills: 0 | DISCHARGE

## 2022-03-28 RX ORDER — POLYETHYLENE GLYCOL 3350 17 G/17G
17 POWDER, FOR SOLUTION ORAL
Qty: 510 | Refills: 0
Start: 2022-03-28 | End: 2022-04-26

## 2022-03-28 RX ORDER — CEFUROXIME AXETIL 250 MG
500 TABLET ORAL EVERY 24 HOURS
Refills: 0 | Status: DISCONTINUED | OUTPATIENT
Start: 2022-03-28 | End: 2022-03-28

## 2022-03-28 RX ORDER — CEFUROXIME AXETIL 250 MG
1 TABLET ORAL
Qty: 5 | Refills: 0
Start: 2022-03-28 | End: 2022-04-01

## 2022-03-28 RX ORDER — HYDROCORTISONE 1 %
1 OINTMENT (GRAM) TOPICAL
Qty: 60 | Refills: 0
Start: 2022-03-28 | End: 2022-04-26

## 2022-03-28 RX ORDER — FOLIC ACID 0.8 MG
1 TABLET ORAL
Qty: 30 | Refills: 0
Start: 2022-03-28 | End: 2022-04-26

## 2022-03-28 RX ORDER — ACETAMINOPHEN 500 MG
2 TABLET ORAL
Qty: 240 | Refills: 0
Start: 2022-03-28 | End: 2022-04-26

## 2022-03-28 RX ORDER — SENNA PLUS 8.6 MG/1
2 TABLET ORAL
Qty: 60 | Refills: 0
Start: 2022-03-28 | End: 2022-04-26

## 2022-03-28 RX ORDER — FUROSEMIDE 40 MG
1 TABLET ORAL
Qty: 30 | Refills: 0
Start: 2022-03-28 | End: 2022-04-26

## 2022-03-28 RX ORDER — HYDROCORTISONE 1 %
1 OINTMENT (GRAM) TOPICAL
Qty: 0 | Refills: 0 | DISCHARGE
Start: 2022-03-28

## 2022-03-28 RX ORDER — DULOXETINE HYDROCHLORIDE 30 MG/1
1 CAPSULE, DELAYED RELEASE ORAL
Qty: 0 | Refills: 0 | DISCHARGE

## 2022-03-28 RX ORDER — FOLIC ACID 0.8 MG
1 TABLET ORAL
Qty: 0 | Refills: 0 | DISCHARGE
Start: 2022-03-28

## 2022-03-28 RX ORDER — CEFTRIAXONE 500 MG/1
1000 INJECTION, POWDER, FOR SOLUTION INTRAMUSCULAR; INTRAVENOUS EVERY 24 HOURS
Refills: 0 | Status: DISCONTINUED | OUTPATIENT
Start: 2022-03-28 | End: 2022-03-28

## 2022-03-28 RX ORDER — GABAPENTIN 400 MG/1
1 CAPSULE ORAL
Qty: 60 | Refills: 0
Start: 2022-03-28 | End: 2022-04-26

## 2022-03-28 RX ORDER — ASPIRIN/CALCIUM CARB/MAGNESIUM 324 MG
1 TABLET ORAL
Qty: 30 | Refills: 0
Start: 2022-03-28 | End: 2022-04-26

## 2022-03-28 RX ORDER — FUROSEMIDE 40 MG
40 TABLET ORAL DAILY
Refills: 0 | Status: DISCONTINUED | OUTPATIENT
Start: 2022-03-28 | End: 2022-03-28

## 2022-03-28 RX ORDER — METHOCARBAMOL 500 MG/1
1 TABLET, FILM COATED ORAL
Qty: 90 | Refills: 0
Start: 2022-03-28 | End: 2022-04-26

## 2022-03-28 RX ADMIN — DULOXETINE HYDROCHLORIDE 60 MILLIGRAM(S): 30 CAPSULE, DELAYED RELEASE ORAL at 09:59

## 2022-03-28 RX ADMIN — Medication 650 MILLIGRAM(S): at 15:18

## 2022-03-28 RX ADMIN — Medication 81 MILLIGRAM(S): at 10:00

## 2022-03-28 RX ADMIN — GABAPENTIN 300 MILLIGRAM(S): 400 CAPSULE ORAL at 11:25

## 2022-03-28 RX ADMIN — METHOCARBAMOL 500 MILLIGRAM(S): 500 TABLET, FILM COATED ORAL at 05:34

## 2022-03-28 RX ADMIN — METHOCARBAMOL 500 MILLIGRAM(S): 500 TABLET, FILM COATED ORAL at 13:01

## 2022-03-28 RX ADMIN — Medication 1 SUPPOSITORY(S): at 09:59

## 2022-03-28 RX ADMIN — POLYETHYLENE GLYCOL 3350 17 GRAM(S): 17 POWDER, FOR SOLUTION ORAL at 10:00

## 2022-03-28 RX ADMIN — Medication 40 MILLIGRAM(S): at 09:59

## 2022-03-28 RX ADMIN — Medication 1 MILLIGRAM(S): at 09:58

## 2022-03-28 RX ADMIN — Medication 500 MILLIGRAM(S): at 13:01

## 2022-03-28 NOTE — DISCHARGE NOTE NURSING/CASE MANAGEMENT/SOCIAL WORK - PATIENT PORTAL LINK FT
You can access the FollowMyHealth Patient Portal offered by [facility] by registering at the following website: [email or web address]. By joining [employer]’s FollowMyHealth portal, you will also be able to view your health information using other applications (apps) compatible with our system.

## 2022-03-28 NOTE — PROGRESS NOTE ADULT - SUBJECTIVE AND OBJECTIVE BOX
CHIEF COMPLAINT: Dysuria/Penile pain    SUBJECTIVE: Pain improved. Denies fevers, chills, chest pain, SOB, nausea, vomiting, abdominal pain/discomfort    SIGNIFICANT INTERVAL EVENTS/OVERNIGHT EVENTS:   3/24: Hypotensive->s/p fluid bolus with subsequent stablization. Hgb low after 1 unit of pRBCs @ 6.6. Another unit ordered. Stool occult ordered.     Review of Systems: 14 Point review of systems reviewed and reported as negative unless otherwise stated in HPI    FROM H&P:  "89 yo Male with PMHx of CAD s/p PCI/Stent, CHF with preserved EF, HTN, BPH with hx of chronic indwelling munoz, CKD II-IV, AAA, presented with complain of "anus pain, pain in my penis".  Patient arrived via EMS.  Patient stateed symptoms started 45 minutes prior. Patient also Stateed that he had pain in his penis, his anus, and is lower abdomen which radiated to his back.  He complain of sensation of hard stool in rectum, and last BM was 3 days ago.  He denieed fever, chills, chest pain, SOB, n/v/d or any other symptoms currently. No fever. "    PHYSICAL EXAM:    T(C): 36.7 (03-25-22 @ 08:04), Max: 36.7 (03-24-22 @ 19:41)  HR: 80 (03-25-22 @ 08:04) (80 - 86)  BP: 153/62 (03-25-22 @ 08:04) (119/50 - 153/62)  RR: 18 (03-25-22 @ 08:04) (18 - 18)  SpO2: 99% (03-25-22 @ 08:04) (99% - 100%)    General: AAOx3; NAD; Frail appearing  Head: AT/NC  ENT: Moist Mucous Membranes; No Injury  Eyes: EOMI; PERRL  Neck: Non-tender; No JVD  CVS: RRR, S1&S2, Murmur +, LE No edema  Respiratory: Decreased basilar breath sounds; Normal Respiratory Effort and saturation on RA  Abdomen/GI: Soft, non-tender, non-distended, no guarding, no rebound, normal bowel sounds  : No bladder distention, Munoz in place  Extremities: No cyanosis, No clubbing, LE edema  MSK: No CVA tenderness, Normal ROM, No injury  Neuro: AAOx3, CNII-XII grossly intact,   Psych: Appropriate, Cooperative,  Skin: Clean, Dry and Intact      LABS:                          8.0    6.07  )-----------( 277      ( 25 Mar 2022 07:33 )             25.1     03-25    140  |  106  |  35<H>  ----------------------------<  96  4.1   |  30  |  1.69<H>    Ca    8.6      25 Mar 2022 07:33  Phos  3.3     03-25  Mg     2.2     03-25    TPro  6.2  /  Alb  2.6<L>  /  TBili  0.3  /  DBili  x   /  AST  12<L>  /  ALT  11<L>  /  AlkPhos  66  03-25    COVID-19 PCR: NotDetec (24 Mar 2022 00:25)  COVID-19 PCR: NotDetec (15 Dec 2021 05:57)  COVID-19 PCR: NotDetec (08 Dec 2021 16:38)  COVID-19 PCR: NotDetec (19 Oct 2021 08:30)  SARS-CoV-2: NotDetec (15 Oct 2021 17:06)  COVID-19 PCR: NotDetec (30 Sep 2021 09:35)    CAPILLARY BLOOD GLUCOSE          Culture - Blood (collected 24 Mar 2022 00:25)  Source: .Blood None  Preliminary Report (25 Mar 2022 07:01):    No growth to date.    Culture - Blood (collected 24 Mar 2022 00:25)  Source: .Blood None  Preliminary Report (25 Mar 2022 07:01):    No growth to date.                            6.6    9.92  )-----------( 318      ( 24 Mar 2022 08:26 )             21.1     03-24    136  |  106  |  38<H>  ----------------------------<  115<H>  4.4   |  24  |  1.89<H>    Ca    8.3<L>      24 Mar 2022 07:27  Mg     2.0     03-24    TPro  7.3  /  Alb  3.1<L>  /  TBili  0.3  /  DBili  x   /  AST  15  /  ALT  14  /  AlkPhos  84  03-24    COVID-19 PCR: NotDetec (24 Mar 2022 00:25)  COVID-19 PCR: NotDetec (15 Dec 2021 05:57)  COVID-19 PCR: NotDetec (08 Dec 2021 16:38)  COVID-19 PCR: NotDetec (19 Oct 2021 08:30)  SARS-CoV-2: NotDetec (15 Oct 2021 17:06)  COVID-19 PCR: NotDetec (30 Sep 2021 09:35)  COVID-19 PCR: NotDetec (26 Sep 2021 01:49)    CAPILLARY BLOOD GLUCOSE    Reticulocyte Count (03.24.22 @ 08:26)   Reticulocyte Percent: 4.2 %   Absolute Reticulocytes: 99.3 K/uL Urinalysis (03.24.22 @ 01:03)   pH Urine: 5.0   Blood, Urine: Moderate   Glucose Qualitative, Urine: Negative   Color: Yellow   Urine Appearance: Clear   Bilirubin: Negative   Ketone - Urine: Negative   Specific Gravity: 1.010   Protein, Urine: 15   Urobilinogen: Negative   Nitrite: Positive   Leukocyte Esterase Concentration: Moderate Urine Microscopic-Add On (NC) (03.24.22 @ 01:03)   Epithelial Cells: Occasional   Bacteria: Moderate   Red Blood Cell - Urine: 0-2 /HPF   White Blood Cell - Urine: 11-25 Troponin I, High Sensitivity Result: 49.09:Serum Pro-Brain Natriuretic Peptide: 3578 pg/mL (03.24.22 @ 00:25)   Serum Pro-Brain Natriuretic Peptide: 4035 pg/mL (09.06.21 @ 08:38)   Serum Pro-Brain Natriuretic Peptide: 2408 pg/mL (03.31.21 @ 11:45)   Serum Pro-Brain Natriuretic Peptide: 1565 pg/mL (12.05.19 @ 23:57)           RADIOLOGY:  < from: Xray Chest 1 View- PORTABLE-Urgent (03.24.22 @ 07:40) >    IMPRESSION:  Bilateral pleural effusions, left greater than right and a moderate-sized   hiatal hernia, characterized on concurrent abdominal CT    < end of copied text >  < from: CT Abdomen and Pelvis w/ Oral Cont (03.24.22 @ 03:25) >  IMPRESSION:  Rectal distention with stool. No bowel obstruction.    Redemonstrated mild to moderate bilateral hydroureteronephrosis to the   level of the decompressed urinary bladder. No evidence for obstructing   stones.    Small right and small-to-moderate left pleural effusions. Trace   pericardial effusion.    < end of copied text >      EKG:  < from: 12 Lead ECG (03.24.22 @ 00:25) >    Diagnosis Line Sinus rhythm [id number] degree A-V block  Right bundle branch block  Left anterior fascicular block  *** Bifascicular block ***  Anterolateral infarct (cited on or before 01-JUL-2019)    < end of copied text >      ECHO:  < from: TTE Echo Complete w/o Contrast w/ Doppler (03.24.22 @ 13:02) >   Mild mitral annular calcification is present.   There is mild calcification of mitral valve leaflets. The leaflet opening   is normal.   EA reversal of the mitral inflow consistent with reduced compliance of   the   left ventricle.   Moderate aortic sclerosis is present with restricted valvular opening.   Mild aortic stenosis is present.   The tricuspidvalve leaflets are thin and pliable; valve motion is   normal.   Trace tricuspid valve regurgitation is present.   a sigmoid septum is noted.   Mild concentric left ventricular hypertrophy is present.   Estimated left ventricular ejection fraction is>70 %.   Normal appearing right ventricle structure and function.    < end of copied text >      PROCEDURES:        I personally reviewed labs, imaging, ekg, orders and vitals.    Discussed case with:  [X]RN  [X]GINNY/SHAUN  [X]Patient  [X]Family: Dru and son  [X]Specialist: Palliative care        MEDICATIONS  (STANDING):  aspirin enteric coated 81 milliGRAM(s) Oral daily  DULoxetine 60 milliGRAM(s) Oral daily  furosemide   Injectable 40 milliGRAM(s) IV Push daily  gabapentin 300 milliGRAM(s) Oral two times a day  meropenem  IVPB 500 milliGRAM(s) IV Intermittent every 12 hours  polyethylene glycol 3350 17 Gram(s) Oral daily    MEDICATIONS  (PRN):  acetaminophen     Tablet .. 650 milliGRAM(s) Oral every 6 hours PRN Temp greater or equal to 38C (100.4F), Mild Pain (1 - 3)  aluminum hydroxide/magnesium hydroxide/simethicone Suspension 30 milliLiter(s) Oral every 4 hours PRN Dyspepsia  bisacodyl Suppository 10 milliGRAM(s) Rectal daily PRN Constipation  melatonin 3 milliGRAM(s) Oral at bedtime PRN Insomnia  ondansetron Injectable 4 milliGRAM(s) IV Push every 8 hours PRN Nausea and/or Vomiting    
Date of service: 22 @ 10:37    pt seen and examined  weak appearing  laying in bed, nad  no fevers     ROS: no fever or chills; denies dizziness, no HA, no SOB or cough, no abdominal pain, no diarrhea or constipation; no dysuria, no urinary frequency, no legs pain, no rashes    MEDICATIONS  (STANDING):  aspirin enteric coated 81 milliGRAM(s) Oral daily  cefuroxime   Tablet 500 milliGRAM(s) Oral every 12 hours  DULoxetine 60 milliGRAM(s) Oral daily  folic acid 1 milliGRAM(s) Oral daily  furosemide    Tablet 40 milliGRAM(s) Oral daily  gabapentin 300 milliGRAM(s) Oral two times a day  hydrocortisone hemorrhoidal Suppository 1 Suppository(s) Rectal two times a day  methocarbamol 500 milliGRAM(s) Oral every 8 hours  polyethylene glycol 3350 17 Gram(s) Oral daily      Vital Signs Last 24 Hrs  T(C): 36.3 (28 Mar 2022 08:23), Max: 36.3 (28 Mar 2022 08:23)  T(F): 97.4 (28 Mar 2022 08:23), Max: 97.4 (28 Mar 2022 08:23)  HR: 74 (28 Mar 2022 08:23) (74 - 89)  BP: 125/48 (28 Mar 2022 08:23) (114/48 - 125/48)  BP(mean): --  RR: 18 (28 Mar 2022 08:23) (18 - 18)  SpO2: 97% (28 Mar 2022 08:23) (97% - 98%)      PE:  Constitutional: frail looking  HEENT: NC/AT, EOMI, PERRLA, conjunctivae clear; ears and nose atraumatic; pharynx benign  Neck: supple; thyroid not palpable  Back: no tenderness  Respiratory: respiratory effort normal; clear to auscultation  Cardiovascular: S1S2 regular, no murmurs  Abdomen: soft, not tender, not distended, positive BS; liver and spleen WNL  Genitourinary: no suprapubic tenderness munoz in place  Lymphatic: no LN palpable  Musculoskeletal: no muscle tenderness, no joint swelling or tenderness  Extremities: no pedal edema  Neurological/ Psychiatric: moving all extremities  Skin: no rashes; no palpable lesions    Labs: all available labs reviewed                                              8.5    5.65  )-----------( 319      ( 27 Mar 2022 07:13 )             26.8         139  |  102  |  34<H>  ----------------------------<  99  3.7   |  32<H>  |  1.54<H>    Ca    8.6      27 Mar 2022 07:13  Phos  3.5       Mg     2.2           Cultures:     Ferritin, Serum: 55 ng/mL (22 @ 07:33)      Urinalysis Basic - ( 24 Mar 2022 01:03 )    Color: Yellow / Appearance: Clear / S.010 / pH: x  Gluc: x / Ketone: Negative  / Bili: Negative / Urobili: Negative   Blood: x / Protein: 15 / Nitrite: Positive   Leuk Esterase: Moderate / RBC: 0-2 /HPF / WBC 11-25   Sq Epi: x / Non Sq Epi: Occasional / Bacteria: Moderate    Culture- Blood (22 @ 00:25)   Specimen Source: .Blood None   Culture Results:   No growth to date.   Culture - Blood (22 @ 00:25)   Specimen Source: .Blood None   Culture Results:   No growth to date.   Radiology: all available radiological tests reviewed  < from: Xray Chest 1 View- PORTABLE-Urgent (22 @ 07:40) >    ACC: [id number] EXAM:  XR CHEST PORTABLE URGENT 1V                          PROCEDURE DATE:  2022          INTERPRETATION:  Clinical history: 89-year-old male, sepsis.    Single view of the chest is correlated with a concurrent abdominal CT.    FINDINGS: Normal cardiac silhouette and normal pulmonary vasculature with   no lobar consolidation, pneumothorax or acute osseous finding.    Bilateral pleural effusions, left greater than right and a moderate-sized   hiatal hernia are characterized on concurrent CT.    IMPRESSION:  Bilateral pleural effusions, left greater than right and a moderate-sized   hiatal hernia, characterized on concurrent abdominal CT    --- End of Report ---    < end of copied text >  < from: CT Abdomen and Pelvis w/ Oral Cont (22 @ 03:25) >    ACC: [id number] EXAM:  CT ABDOMEN AND PELVIS OC                          PROCEDURE DATE:  2022          INTERPRETATION:  CLINICAL INFORMATION: Abdominal pain    COMPARISON: CT abdomen pelvis 2021    CONTRAST/COMPLICATIONS:  IV Contrast: NONE  Oral Contrast: NONE  Complications: None reported at time of study completion    PROCEDURE:  CT of the Abdomen and Pelvis was performed.  Sagittal and coronal reformats were performed.    FINDINGS:  LOWER CHEST: Small right and small-to-moderate left pleural effusions   with adjacent compressive atelectasis. Trace pericardial effusion.   Moderate hiatal hernia.    LIVER: Within normal limits.  BILE DUCTS: Normal caliber.  GALLBLADDER: Layering sludge in the gallbladder lumen.  SPLEEN: Within normal limits.  PANCREAS: Within normal limits.  ADRENALS: Within normal limits.  KIDNEYS/URETERS: Mild to moderate bilateral hydroureteronephrosis is   again seen to the level of the urinary bladder. No obstructing stones..    BLADDER: Munoz catheter in an underdistended urinary bladder.  REPRODUCTIVE ORGANS: Prostate within normal limits.    BOWEL: No bowel obstruction. Rectal distention with stool. Trace   presacral edema. Normal appendix. Mild diverticulosis coli.  PERITONEUM: No ascites.  VESSELS: Infrarenal abdominal aortic aneurysm measures 5.6 cm. There is   an aortobiiliac stent graft..  RETROPERITONEUM/LYMPH NODES: No lymphadenopathy.  ABDOMINAL WALL: Small bilateral fat-containing inguinal hernias.   Postsurgical change in the right inguinal region..  BONES: Degenerative changes.    IMPRESSION:  Rectal distention with stool. No bowel obstruction.    Redemonstrated mild to moderate bilateral hydroureteronephrosis to the   level of the decompressed urinary bladder. No evidence for obstructing   stones.    Small right and small-to-moderate left pleural effusions. Trace   pericardial effusion.      Advanced directives addressed: full resuscitation
Date of service: 22 @ 11:35    pt seen and examined  laying in bed, no distress  no overnight events   afebrile     ROS: no fever or chills; denies dizziness, no HA, no SOB or cough, no abdominal pain, no diarrhea or constipation; no dysuria, no urinary frequency, no legs pain, no rashes    MEDICATIONS  (STANDING):  aspirin enteric coated 81 milliGRAM(s) Oral daily  DULoxetine 60 milliGRAM(s) Oral daily  furosemide   Injectable 40 milliGRAM(s) IV Push daily  gabapentin 300 milliGRAM(s) Oral two times a day  meropenem  IVPB 500 milliGRAM(s) IV Intermittent every 12 hours  polyethylene glycol 3350 17 Gram(s) Oral daily    Vital Signs Last 24 Hrs  T(C): 36.7 (25 Mar 2022 08:04), Max: 37.1 (24 Mar 2022 12:31)  T(F): 98 (25 Mar 2022 08:04), Max: 98.8 (24 Mar 2022 12:31)  HR: 80 (25 Mar 2022 08:04) (80 - 89)  BP: 153/62 (25 Mar 2022 08:04) (106/41 - 153/62)  BP(mean): --  RR: 18 (25 Mar 2022 08:04) (18 - 18)  SpO2: 99% (25 Mar 2022 08:04) (94% - 100%)            PE:  Constitutional: frail looking  HEENT: NC/AT, EOMI, PERRLA, conjunctivae clear; ears and nose atraumatic; pharynx benign  Neck: supple; thyroid not palpable  Back: no tenderness  Respiratory: respiratory effort normal; clear to auscultation  Cardiovascular: S1S2 regular, no murmurs  Abdomen: soft, not tender, not distended, positive BS; liver and spleen WNL  Genitourinary: no suprapubic tenderness munoz in place  Lymphatic: no LN palpable  Musculoskeletal: no muscle tenderness, no joint swelling or tenderness  Extremities: no pedal edema  Neurological/ Psychiatric: moving all extremities  Skin: no rashes; no palpable lesions    Labs: all available labs reviewed                                   8.0    6.07  )-----------( 277      ( 25 Mar 2022 07:33 )             25.1         140  |  106  |  35<H>  ----------------------------<  96  4.1   |  30  |  1.69<H>    Ca    8.6      25 Mar 2022 07:33  Phos  3.3       Mg     2.2         TPro  6.2  /  Alb  2.6<L>  /  TBili  0.3  /  DBili  x   /  AST  12<L>  /  ALT  11<L>  /  AlkPhos  66             Urinalysis Basic - ( 24 Mar 2022 01:03 )    Color: Yellow / Appearance: Clear / S.010 / pH: x  Gluc: x / Ketone: Negative  / Bili: Negative / Urobili: Negative   Blood: x / Protein: 15 / Nitrite: Positive   Leuk Esterase: Moderate / RBC: 0-2 /HPF / WBC 11-25   Sq Epi: x / Non Sq Epi: Occasional / Bacteria: Moderate    Culture- Blood (22 @ 00:25)   Specimen Source: .Blood None   Culture Results:   No growth to date.   Culture - Blood (22 @ 00:25)   Specimen Source: .Blood None   Culture Results:   No growth to date.   Radiology: all available radiological tests reviewed  < from: Xray Chest 1 View- PORTABLE-Urgent (22 @ 07:40) >    ACC: [id number] EXAM:  XR CHEST PORTABLE URGENT 1V                          PROCEDURE DATE:  2022          INTERPRETATION:  Clinical history: 89-year-old male, sepsis.    Single view of the chest is correlated with a concurrent abdominal CT.    FINDINGS: Normal cardiac silhouette and normal pulmonary vasculature with   no lobar consolidation, pneumothorax or acute osseous finding.    Bilateral pleural effusions, left greater than right and a moderate-sized   hiatal hernia are characterized on concurrent CT.    IMPRESSION:  Bilateral pleural effusions, left greater than right and a moderate-sized   hiatal hernia, characterized on concurrent abdominal CT    --- End of Report ---    < end of copied text >  < from: CT Abdomen and Pelvis w/ Oral Cont (22 @ 03:25) >    ACC: [id number] EXAM:  CT ABDOMEN AND PELVIS OC                          PROCEDURE DATE:  2022          INTERPRETATION:  CLINICAL INFORMATION: Abdominal pain    COMPARISON: CT abdomen pelvis 2021    CONTRAST/COMPLICATIONS:  IV Contrast: NONE  Oral Contrast: NONE  Complications: None reported at time of study completion    PROCEDURE:  CT of the Abdomen and Pelvis was performed.  Sagittal and coronal reformats were performed.    FINDINGS:  LOWER CHEST: Small right and small-to-moderate left pleural effusions   with adjacent compressive atelectasis. Trace pericardial effusion.   Moderate hiatal hernia.    LIVER: Within normal limits.  BILE DUCTS: Normal caliber.  GALLBLADDER: Layering sludge in the gallbladder lumen.  SPLEEN: Within normal limits.  PANCREAS: Within normal limits.  ADRENALS: Within normal limits.  KIDNEYS/URETERS: Mild to moderate bilateral hydroureteronephrosis is   again seen to the level of the urinary bladder. No obstructing stones..    BLADDER: Munoz catheter in an underdistended urinary bladder.  REPRODUCTIVE ORGANS: Prostate within normal limits.    BOWEL: No bowel obstruction. Rectal distention with stool. Trace   presacral edema. Normal appendix. Mild diverticulosis coli.  PERITONEUM: No ascites.  VESSELS: Infrarenal abdominal aortic aneurysm measures 5.6 cm. There is   an aortobiiliac stent graft..  RETROPERITONEUM/LYMPH NODES: No lymphadenopathy.  ABDOMINAL WALL: Small bilateral fat-containing inguinal hernias.   Postsurgical change in the right inguinal region..  BONES: Degenerative changes.    IMPRESSION:  Rectal distention with stool. No bowel obstruction.    Redemonstrated mild to moderate bilateral hydroureteronephrosis to the   level of the decompressed urinary bladder. No evidence for obstructing   stones.    Small right and small-to-moderate left pleural effusions. Trace   pericardial effusion.      Advanced directives addressed: full resuscitation
CHIEF COMPLAINT: Dysuria/Penile pain    SUBJECTIVE: Pain improved. Denies fevers, chills, chest pain, SOB, nausea, vomiting, abdominal pain/discomfort    SIGNIFICANT INTERVAL EVENTS/OVERNIGHT EVENTS:   3/24: Hypotensive->s/p fluid bolus with subsequent stablization. Hgb low after 1 unit of pRBCs @ 6.6. Another unit ordered. Stool occult ordered.     Review of Systems: 14 Point review of systems reviewed and reported as negative unless otherwise stated in HPI    FROM H&P:  "89 yo Male with PMHx of CAD s/p PCI/Stent, CHF with preserved EF, HTN, BPH with hx of chronic indwelling munoz, CKD II-IV, AAA, presented with complain of "anus pain, pain in my penis".  Patient arrived via EMS.  Patient stateed symptoms started 45 minutes prior. Patient also Stateed that he had pain in his penis, his anus, and is lower abdomen which radiated to his back.  He complain of sensation of hard stool in rectum, and last BM was 3 days ago.  He denieed fever, chills, chest pain, SOB, n/v/d or any other symptoms currently. No fever. "    PHYSICAL EXAM:    T(C): 36.7 (03-24-22 @ 19:41), Max: 37.1 (03-24-22 @ 12:31)  HR: 86 (03-24-22 @ 19:41) (85 - 118)  BP: 119/50 (03-24-22 @ 19:41) (86/34 - 137/61)  RR: 18 (03-24-22 @ 19:41) (16 - 20)  SpO2: 100% (03-24-22 @ 19:41) (92% - 100%)    General: AAOx3; NAD; Frail appearing  Head: AT/NC  ENT: Moist Mucous Membranes; No Injury  Eyes: EOMI; PERRL  Neck: Non-tender; No JVD  CVS: RRR, S1&S2, Murmur +, LE No edema  Respiratory: Decreased basilar breath sounds; Normal Respiratory Effort and saturation on RA  Abdomen/GI: Soft, non-tender, non-distended, no guarding, no rebound, normal bowel sounds  : No bladder distention, Munoz in place  Extremities: No cyanosis, No clubbing, LE edema  MSK: No CVA tenderness, Normal ROM, No injury  Neuro: AAOx3, CNII-XII grossly intact,   Psych: Appropriate, Cooperative,  Skin: Clean, Dry and Intact      LABS:                          6.6    9.92  )-----------( 318      ( 24 Mar 2022 08:26 )             21.1     03-24    136  |  106  |  38<H>  ----------------------------<  115<H>  4.4   |  24  |  1.89<H>    Ca    8.3<L>      24 Mar 2022 07:27  Mg     2.0     03-24    TPro  7.3  /  Alb  3.1<L>  /  TBili  0.3  /  DBili  x   /  AST  15  /  ALT  14  /  AlkPhos  84  03-24    COVID-19 PCR: NotDetec (24 Mar 2022 00:25)  COVID-19 PCR: NotDetec (15 Dec 2021 05:57)  COVID-19 PCR: NotDetec (08 Dec 2021 16:38)  COVID-19 PCR: NotDetec (19 Oct 2021 08:30)  SARS-CoV-2: NotDetec (15 Oct 2021 17:06)  COVID-19 PCR: NotDetec (30 Sep 2021 09:35)  COVID-19 PCR: NotDetec (26 Sep 2021 01:49)    CAPILLARY BLOOD GLUCOSE    Reticulocyte Count (03.24.22 @ 08:26)   Reticulocyte Percent: 4.2 %   Absolute Reticulocytes: 99.3 K/uL Urinalysis (03.24.22 @ 01:03)   pH Urine: 5.0   Blood, Urine: Moderate   Glucose Qualitative, Urine: Negative   Color: Yellow   Urine Appearance: Clear   Bilirubin: Negative   Ketone - Urine: Negative   Specific Gravity: 1.010   Protein, Urine: 15   Urobilinogen: Negative   Nitrite: Positive   Leukocyte Esterase Concentration: Moderate Urine Microscopic-Add On (NC) (03.24.22 @ 01:03)   Epithelial Cells: Occasional   Bacteria: Moderate   Red Blood Cell - Urine: 0-2 /HPF   White Blood Cell - Urine: 11-25 Troponin I, High Sensitivity Result: 49.09:Serum Pro-Brain Natriuretic Peptide: 3578 pg/mL (03.24.22 @ 00:25)   Serum Pro-Brain Natriuretic Peptide: 4035 pg/mL (09.06.21 @ 08:38)   Serum Pro-Brain Natriuretic Peptide: 2408 pg/mL (03.31.21 @ 11:45)   Serum Pro-Brain Natriuretic Peptide: 1565 pg/mL (12.05.19 @ 23:57)           RADIOLOGY:  < from: Xray Chest 1 View- PORTABLE-Urgent (03.24.22 @ 07:40) >    IMPRESSION:  Bilateral pleural effusions, left greater than right and a moderate-sized   hiatal hernia, characterized on concurrent abdominal CT    < end of copied text >  < from: CT Abdomen and Pelvis w/ Oral Cont (03.24.22 @ 03:25) >  IMPRESSION:  Rectal distention with stool. No bowel obstruction.    Redemonstrated mild to moderate bilateral hydroureteronephrosis to the   level of the decompressed urinary bladder. No evidence for obstructing   stones.    Small right and small-to-moderate left pleural effusions. Trace   pericardial effusion.    < end of copied text >      EKG:  < from: 12 Lead ECG (03.24.22 @ 00:25) >    Diagnosis Line Sinus rhythm [id number] degree A-V block  Right bundle branch block  Left anterior fascicular block  *** Bifascicular block ***  Anterolateral infarct (cited on or before 01-JUL-2019)    < end of copied text >      ECHO:  < from: TTE Echo Complete w/o Contrast w/ Doppler (03.24.22 @ 13:02) >   Mild mitral annular calcification is present.   There is mild calcification of mitral valve leaflets. The leaflet opening   is normal.   EA reversal of the mitral inflow consistent with reduced compliance of   the   left ventricle.   Moderate aortic sclerosis is present with restricted valvular opening.   Mild aortic stenosis is present.   The tricuspidvalve leaflets are thin and pliable; valve motion is   normal.   Trace tricuspid valve regurgitation is present.   a sigmoid septum is noted.   Mild concentric left ventricular hypertrophy is present.   Estimated left ventricular ejection fraction is>70 %.   Normal appearing right ventricle structure and function.    < end of copied text >      PROCEDURES:        I personally reviewed labs, imaging, ekg, orders and vitals.    Discussed case with:  [X]RN  [X]CM/SW  [X]Patient  [X]Family: Duaghter and son  []Specialist:        MEDICATIONS  (STANDING):  aspirin enteric coated 81 milliGRAM(s) Oral daily  DULoxetine 60 milliGRAM(s) Oral daily  furosemide   Injectable 40 milliGRAM(s) IV Push daily  gabapentin 300 milliGRAM(s) Oral two times a day  meropenem  IVPB 500 milliGRAM(s) IV Intermittent every 12 hours  polyethylene glycol 3350 17 Gram(s) Oral daily    MEDICATIONS  (PRN):  acetaminophen     Tablet .. 650 milliGRAM(s) Oral every 6 hours PRN Temp greater or equal to 38C (100.4F), Mild Pain (1 - 3)  aluminum hydroxide/magnesium hydroxide/simethicone Suspension 30 milliLiter(s) Oral every 4 hours PRN Dyspepsia  bisacodyl Suppository 10 milliGRAM(s) Rectal daily PRN Constipation  melatonin 3 milliGRAM(s) Oral at bedtime PRN Insomnia  ondansetron Injectable 4 milliGRAM(s) IV Push every 8 hours PRN Nausea and/or Vomiting    
CHIEF COMPLAINT: Dysuria/Penile pain    SUBJECTIVE: Pain improved. Denies fevers, chills, chest pain, SOB, nausea, vomiting, abdominal pain/discomfort    SIGNIFICANT INTERVAL EVENTS/OVERNIGHT EVENTS:   3/24: Hypotensive->s/p fluid bolus with subsequent stablization. Hgb low after 1 unit of pRBCs @ 6.6. Another unit ordered. Stool occult ordered.     Review of Systems: 14 Point review of systems reviewed and reported as negative unless otherwise stated in HPI    FROM H&P:  "89 yo Male with PMHx of CAD s/p PCI/Stent, CHF with preserved EF, HTN, BPH with hx of chronic indwelling munoz, CKD II-IV, AAA, presented with complain of "anus pain, pain in my penis".  Patient arrived via EMS.  Patient stateed symptoms started 45 minutes prior. Patient also Stateed that he had pain in his penis, his anus, and is lower abdomen which radiated to his back.  He complain of sensation of hard stool in rectum, and last BM was 3 days ago.  He denieed fever, chills, chest pain, SOB, n/v/d or any other symptoms currently. No fever. "    PHYSICAL EXAM:    T(C): 37.4 (03-26-22 @ 14:27), Max: 37.4 (03-26-22 @ 14:27)  HR: 89 (03-26-22 @ 14:27) (89 - 91)  BP: 116/48 (03-26-22 @ 14:27) (116/48 - 116/49)  RR: 18 (03-26-22 @ 14:27) (18 - 18)  SpO2: 97% (03-26-22 @ 14:27) (96% - 97%)    General: AAOx3; NAD; Frail appearing  Head: AT/NC  ENT: Moist Mucous Membranes; No Injury  Eyes: EOMI; PERRL  Neck: Non-tender; No JVD  CVS: RRR, S1&S2, Murmur +, LE No edema  Respiratory: Decreased basilar breath sounds; Normal Respiratory Effort and saturation on RA  Abdomen/GI: Soft, non-tender, non-distended, no guarding, no rebound, normal bowel sounds  : No bladder distention, Munoz in place  Extremities: No cyanosis, No clubbing, LE edema  MSK: No CVA tenderness, Normal ROM, No injury  Neuro: AAOx3, CNII-XII grossly intact,   Psych: Appropriate, Cooperative,  Skin: Clean, Dry and Intact      LABS:                          8.6    6.42  )-----------( 304      ( 26 Mar 2022 08:08 )             27.6     03-25    140  |  106  |  35<H>  ----------------------------<  96  4.1   |  30  |  1.69<H>    Ca    8.6      25 Mar 2022 07:33  Phos  3.3     03-25  Mg     2.2     03-25    TPro  6.2  /  Alb  2.6<L>  /  TBili  0.3  /  DBili  x   /  AST  12<L>  /  ALT  11<L>  /  AlkPhos  66  03-25    COVID-19 PCR: NotDetec (24 Mar 2022 00:25)  COVID-19 PCR: NotDetec (15 Dec 2021 05:57)  COVID-19 PCR: NotDetec (08 Dec 2021 16:38)  COVID-19 PCR: NotDetec (19 Oct 2021 08:30)  SARS-CoV-2: NotDetec (15 Oct 2021 17:06)  COVID-19 PCR: NotDetec (30 Sep 2021 09:35)    CAPILLARY BLOOD GLUCOSE          Culture - Urine (collected 24 Mar 2022 00:27)  Source: Clean Catch None  Preliminary Report (26 Mar 2022 06:48):    >100,000 CFU/ml Gram Negative Rods    Culture - Blood (collected 24 Mar 2022 00:25)  Source: .Blood None  Preliminary Report (25 Mar 2022 07:01):    No growth to date.    Culture - Blood (collected 24 Mar 2022 00:25)  Source: .Blood None  Preliminary Report (25 Mar 2022 07:01):    No growth to date.             Reticulocyte Count (03.24.22 @ 08:26)   Reticulocyte Percent: 4.2 %   Absolute Reticulocytes: 99.3 K/uL Urinalysis (03.24.22 @ 01:03)   pH Urine: 5.0   Blood, Urine: Moderate   Glucose Qualitative, Urine: Negative   Color: Yellow   Urine Appearance: Clear   Bilirubin: Negative   Ketone - Urine: Negative   Specific Gravity: 1.010   Protein, Urine: 15   Urobilinogen: Negative   Nitrite: Positive   Leukocyte Esterase Concentration: Moderate Urine Microscopic-Add On (NC) (03.24.22 @ 01:03)   Epithelial Cells: Occasional   Bacteria: Moderate   Red Blood Cell - Urine: 0-2 /HPF   White Blood Cell - Urine: 11-25 Troponin I, High Sensitivity Result: 49.09:Serum Pro-Brain Natriuretic Peptide: 3578 pg/mL (03.24.22 @ 00:25)   Serum Pro-Brain Natriuretic Peptide: 4035 pg/mL (09.06.21 @ 08:38)   Serum Pro-Brain Natriuretic Peptide: 2408 pg/mL (03.31.21 @ 11:45)   Serum Pro-Brain Natriuretic Peptide: 1565 pg/mL (12.05.19 @ 23:57)           RADIOLOGY:  < from: Xray Chest 1 View- PORTABLE-Urgent (03.24.22 @ 07:40) >    IMPRESSION:  Bilateral pleural effusions, left greater than right and a moderate-sized   hiatal hernia, characterized on concurrent abdominal CT    < end of copied text >  < from: CT Abdomen and Pelvis w/ Oral Cont (03.24.22 @ 03:25) >  IMPRESSION:  Rectal distention with stool. No bowel obstruction.    Redemonstrated mild to moderate bilateral hydroureteronephrosis to the   level of the decompressed urinary bladder. No evidence for obstructing   stones.    Small right and small-to-moderate left pleural effusions. Trace   pericardial effusion.    < end of copied text >      EKG:  < from: 12 Lead ECG (03.24.22 @ 00:25) >    Diagnosis Line Sinus rhythm [id number] degree A-V block  Right bundle branch block  Left anterior fascicular block  *** Bifascicular block ***  Anterolateral infarct (cited on or before 01-JUL-2019)    < end of copied text >      ECHO:  < from: TTE Echo Complete w/o Contrast w/ Doppler (03.24.22 @ 13:02) >   Mild mitral annular calcification is present.   There is mild calcification of mitral valve leaflets. The leaflet opening   is normal.   EA reversal of the mitral inflow consistent with reduced compliance of   the   left ventricle.   Moderate aortic sclerosis is present with restricted valvular opening.   Mild aortic stenosis is present.   The tricuspidvalve leaflets are thin and pliable; valve motion is   normal.   Trace tricuspid valve regurgitation is present.   a sigmoid septum is noted.   Mild concentric left ventricular hypertrophy is present.   Estimated left ventricular ejection fraction is>70 %.   Normal appearing right ventricle structure and function.    < end of copied text >      PROCEDURES:        I personally reviewed labs, imaging, ekg, orders and vitals.    Discussed case with:  [X]RN  [X]GINNY/SHAUN  [X]Patient  [X]Family: Dru and son  [X]Specialist: Palliative care        MEDICATIONS  (STANDING):  aspirin enteric coated 81 milliGRAM(s) Oral daily  DULoxetine 60 milliGRAM(s) Oral daily  furosemide   Injectable 40 milliGRAM(s) IV Push daily  gabapentin 300 milliGRAM(s) Oral two times a day  meropenem  IVPB 500 milliGRAM(s) IV Intermittent every 12 hours  polyethylene glycol 3350 17 Gram(s) Oral daily    MEDICATIONS  (PRN):  acetaminophen     Tablet .. 650 milliGRAM(s) Oral every 6 hours PRN Temp greater or equal to 38C (100.4F), Mild Pain (1 - 3)  aluminum hydroxide/magnesium hydroxide/simethicone Suspension 30 milliLiter(s) Oral every 4 hours PRN Dyspepsia  bisacodyl Suppository 10 milliGRAM(s) Rectal daily PRN Constipation  melatonin 3 milliGRAM(s) Oral at bedtime PRN Insomnia  ondansetron Injectable 4 milliGRAM(s) IV Push every 8 hours PRN Nausea and/or Vomiting    
CHIEF COMPLAINT: Dysuria/Penile pain    SUBJECTIVE: Pain improved. Denies fevers, chills, chest pain, SOB, nausea, vomiting, abdominal pain/discomfort    SIGNIFICANT INTERVAL EVENTS/OVERNIGHT EVENTS:   3/24: Hypotensive->s/p fluid bolus with subsequent stablization. Hgb low after 1 unit of pRBCs @ 6.6. Another unit ordered. Stool occult ordered.     Review of Systems: 14 Point review of systems reviewed and reported as negative unless otherwise stated in HPI    FROM H&P:  "89 yo Male with PMHx of CAD s/p PCI/Stent, CHF with preserved EF, HTN, BPH with hx of chronic indwelling munoz, CKD II-IV, AAA, presented with complain of "anus pain, pain in my penis".  Patient arrived via EMS.  Patient stateed symptoms started 45 minutes prior. Patient also Stateed that he had pain in his penis, his anus, and is lower abdomen which radiated to his back.  He complain of sensation of hard stool in rectum, and last BM was 3 days ago.  He denieed fever, chills, chest pain, SOB, n/v/d or any other symptoms currently. No fever. "    PHYSICAL EXAM:    T(C): 37.4 (03-26-22 @ 14:27), Max: 37.4 (03-26-22 @ 14:27)  HR: 89 (03-26-22 @ 14:27) (89 - 91)  BP: 116/48 (03-26-22 @ 14:27) (116/48 - 116/49)  RR: 18 (03-26-22 @ 14:27) (18 - 18)  SpO2: 97% (03-26-22 @ 14:27) (96% - 97%)    General: AAOx3; NAD; Frail appearing  Head: AT/NC  ENT: Moist Mucous Membranes; No Injury  Eyes: EOMI; PERRL  Neck: Non-tender; No JVD  CVS: RRR, S1&S2, Murmur +, LE No edema  Respiratory: Decreased basilar breath sounds; Normal Respiratory Effort and saturation on RA  Abdomen/GI: Soft, non-tender, non-distended, no guarding, no rebound, normal bowel sounds  : No bladder distention, Munoz in place  Extremities: No cyanosis, No clubbing, LE edema  MSK: No CVA tenderness, Normal ROM, No injury  Neuro: AAOx3, CNII-XII grossly intact,   Psych: Appropriate, Cooperative,  Skin: Clean, Dry and Intact      LABS:                          8.6    6.42  )-----------( 304      ( 26 Mar 2022 08:08 )             27.6     03-25    140  |  106  |  35<H>  ----------------------------<  96  4.1   |  30  |  1.69<H>    Ca    8.6      25 Mar 2022 07:33  Phos  3.3     03-25  Mg     2.2     03-25    TPro  6.2  /  Alb  2.6<L>  /  TBili  0.3  /  DBili  x   /  AST  12<L>  /  ALT  11<L>  /  AlkPhos  66  03-25    COVID-19 PCR: NotDetec (24 Mar 2022 00:25)  COVID-19 PCR: NotDetec (15 Dec 2021 05:57)  COVID-19 PCR: NotDetec (08 Dec 2021 16:38)  COVID-19 PCR: NotDetec (19 Oct 2021 08:30)  SARS-CoV-2: NotDetec (15 Oct 2021 17:06)  COVID-19 PCR: NotDetec (30 Sep 2021 09:35)    CAPILLARY BLOOD GLUCOSE          Culture - Urine (collected 24 Mar 2022 00:27)  Source: Clean Catch None  Preliminary Report (26 Mar 2022 06:48):    >100,000 CFU/ml Gram Negative Rods    Culture - Blood (collected 24 Mar 2022 00:25)  Source: .Blood None  Preliminary Report (25 Mar 2022 07:01):    No growth to date.    Culture - Blood (collected 24 Mar 2022 00:25)  Source: .Blood None  Preliminary Report (25 Mar 2022 07:01):    No growth to date.             Reticulocyte Count (03.24.22 @ 08:26)   Reticulocyte Percent: 4.2 %   Absolute Reticulocytes: 99.3 K/uL Urinalysis (03.24.22 @ 01:03)   pH Urine: 5.0   Blood, Urine: Moderate   Glucose Qualitative, Urine: Negative   Color: Yellow   Urine Appearance: Clear   Bilirubin: Negative   Ketone - Urine: Negative   Specific Gravity: 1.010   Protein, Urine: 15   Urobilinogen: Negative   Nitrite: Positive   Leukocyte Esterase Concentration: Moderate Urine Microscopic-Add On (NC) (03.24.22 @ 01:03)   Epithelial Cells: Occasional   Bacteria: Moderate   Red Blood Cell - Urine: 0-2 /HPF   White Blood Cell - Urine: 11-25 Troponin I, High Sensitivity Result: 49.09:Serum Pro-Brain Natriuretic Peptide: 3578 pg/mL (03.24.22 @ 00:25)   Serum Pro-Brain Natriuretic Peptide: 4035 pg/mL (09.06.21 @ 08:38)   Serum Pro-Brain Natriuretic Peptide: 2408 pg/mL (03.31.21 @ 11:45)   Serum Pro-Brain Natriuretic Peptide: 1565 pg/mL (12.05.19 @ 23:57)           RADIOLOGY:  < from: Xray Chest 1 View- PORTABLE-Urgent (03.24.22 @ 07:40) >    IMPRESSION:  Bilateral pleural effusions, left greater than right and a moderate-sized   hiatal hernia, characterized on concurrent abdominal CT    < end of copied text >  < from: CT Abdomen and Pelvis w/ Oral Cont (03.24.22 @ 03:25) >  IMPRESSION:  Rectal distention with stool. No bowel obstruction.    Redemonstrated mild to moderate bilateral hydroureteronephrosis to the   level of the decompressed urinary bladder. No evidence for obstructing   stones.    Small right and small-to-moderate left pleural effusions. Trace   pericardial effusion.    < end of copied text >      EKG:  < from: 12 Lead ECG (03.24.22 @ 00:25) >    Diagnosis Line Sinus rhythm [id number] degree A-V block  Right bundle branch block  Left anterior fascicular block  *** Bifascicular block ***  Anterolateral infarct (cited on or before 01-JUL-2019)    < end of copied text >      ECHO:  < from: TTE Echo Complete w/o Contrast w/ Doppler (03.24.22 @ 13:02) >   Mild mitral annular calcification is present.   There is mild calcification of mitral valve leaflets. The leaflet opening   is normal.   EA reversal of the mitral inflow consistent with reduced compliance of   the   left ventricle.   Moderate aortic sclerosis is present with restricted valvular opening.   Mild aortic stenosis is present.   The tricuspidvalve leaflets are thin and pliable; valve motion is   normal.   Trace tricuspid valve regurgitation is present.   a sigmoid septum is noted.   Mild concentric left ventricular hypertrophy is present.   Estimated left ventricular ejection fraction is>70 %.   Normal appearing right ventricle structure and function.    < end of copied text >      PROCEDURES:        I personally reviewed labs, imaging, ekg, orders and vitals.    Discussed case with:  [X]RN  [X]GINNY/SHAUN  [X]Patient  [X]Family: Dru and son  [X]Specialist: Palliative care        MEDICATIONS  (STANDING):  aspirin enteric coated 81 milliGRAM(s) Oral daily  DULoxetine 60 milliGRAM(s) Oral daily  furosemide   Injectable 40 milliGRAM(s) IV Push daily  gabapentin 300 milliGRAM(s) Oral two times a day  meropenem  IVPB 500 milliGRAM(s) IV Intermittent every 12 hours  polyethylene glycol 3350 17 Gram(s) Oral daily    MEDICATIONS  (PRN):  acetaminophen     Tablet .. 650 milliGRAM(s) Oral every 6 hours PRN Temp greater or equal to 38C (100.4F), Mild Pain (1 - 3)  aluminum hydroxide/magnesium hydroxide/simethicone Suspension 30 milliLiter(s) Oral every 4 hours PRN Dyspepsia  bisacodyl Suppository 10 milliGRAM(s) Rectal daily PRN Constipation  melatonin 3 milliGRAM(s) Oral at bedtime PRN Insomnia  ondansetron Injectable 4 milliGRAM(s) IV Push every 8 hours PRN Nausea and/or Vomiting

## 2022-03-28 NOTE — DISCHARGE NOTE NURSING/CASE MANAGEMENT/SOCIAL WORK - NSDCVIVACCINE_GEN_ALL_CORE_FT
Td (adult) preservative free; 16-Feb-2020 18:27; Eder Hunter (ANGELICA); [employer]; [id number] (Exp. Date: 22-Oct-2021); IntraMuscular; Deltoid Left.; 0.5 milliLiter(s); VIS (VIS Published: 24-Feb-2025, VIS Presented: 16-Feb-2020);

## 2022-03-28 NOTE — DISCHARGE NOTE PROVIDER - NSDCFUADDINST_GEN_ALL_CORE_FT
You are receiving new prescriptions for both old and new medications. In order for you to be discharged and your GERA (Assisted Living Facility) to accept your back, your GERA is requiring new prescriptions even for your old medications (prior to this admission). As such the supply of medication that will be available for the old medications will include the GERA's supply prior to admission to [facility] in addition to new prescriptions sent at discharge.

## 2022-03-28 NOTE — DISCHARGE NOTE PROVIDER - CARE PROVIDER_API CALL
Primary Medical Doctor,   Phone: (   )    -  Fax: (   )    -  Follow Up Time: 1 week    Hospice Care Team,   Phone: (   )    -  Fax: (   )    -  Follow Up Time: 1-3 days

## 2022-03-28 NOTE — PROGRESS NOTE ADULT - ASSESSMENT
89 yo Male with PMHx of CAD s/p PCI/Stent, CHF with preserved EF, HTN, BPH with hx of chronic indwelling munoz, CKD II-IV, AAA, presented with complain of "anus pain, pain in my penis".  Patient arrived via EMS.  Patient stated symptoms started 45 minutes prior. Patient also Stated that he had pain in his penis, his anus, and is lower abdomen which radiated to his back.  He complain of sensation of hard stool in rectum, and last BM was 3 days ago.  He denied fever, chills, chest pain, SOB, n/v/d or any other symptoms currently. No fever. Here UA positive, munoz changed, was given IV merrem d/t hx of MDR/ESBL orgs in past.     1. pyuria. possible UTI. BPH with chronic munoz. CKD   - f/u urine cx, blood cx no growth   - on merrem [id number] #[id number]  - continue with abx coverage   - isolation precautions  - monitor temps  - tolerating abx well so far; no side effects noted  - reason for abx use and side effects reviewed with patient  - supportive care  - fu cbc    2. other issues - care per medicine 
A/P:    Normocytic Anemia  -no acute bleeding on admission however reported intermittent rectal/hemorrhoidal bleeding  -Guaiac negative on admission  -s/p 1 unit of pRBC (3/24)->Repeat Hgb 6.6->Another unit of pRBCs ordered  -follow CBC  -Reticulocyte index 1.48 (Hypoproliferation)  -Iron, B12, Folate levels ordered.     Chronic Diastolic Heart Failure   Bilateral pleural effusion without respiratory distress and/or hypoxia  Mild Aortic Stenosis  -Unable to clinically determine exacerbation or as presenting symptoms did not report worsening CHF symptoms  -BNP unreliable in CKD and in addition close to patient's baseline  -IV Lasix administered->Patient hypotensive. Hold diuresis at this time. S/p fluid bolus 3/24 500cc. Continue to monitor BP    Chronic urinary retention  Possible UTI POA. No sepsis POA  Personal history of MDR UTI's  -on chronic munoz cath  -IV merrem.   -Follow cultures  -ID consulted    Constipation  -on Miralax and Dulcolax suppository   -Titrate Bowel REgimen    Code status/GOC  -Patient is DNR/DNI. MOLST form is in chart.   -Palliative care consult as patient tired of admissions and would like more comfort approach. DIscussed and updated family 3/24. They are in agreement to open up that conversation.       
A/P:    Normocytic Anemia. Now stable  -no acute bleeding on admission however reported intermittent rectal/hemorrhoidal bleeding  -Guaiac negative on admission  -s/p 1 unit of pRBC (3/24)->Repeat Hgb 6.6->Another unit of pRBCs ->subsequent stable hgb  -follow CBC  -Reticulocyte index 1.48 (Hypoproliferation)  -Iron, B12, Folate levels indicate low normal iron and low folate. Replace    Chronic Diastolic Heart Failure   Bilateral pleural effusion without respiratory distress and/or hypoxia  Mild Aortic Stenosis  -Unable to clinically determine exacerbation or as presenting symptoms did not report worsening CHF symptoms  -BNP unreliable in CKD and in addition close to patient's baseline  -IV Lasix administered->Patient hypotensive. Hold diuresis at this time. S/p fluid bolus 3/24 500cc. Continue to monitor BP    Chronic urinary retention  GNR UTI due to Chronic Indwelling Munoz catheter. POA. No sepsis POA  Personal history of MDR UTI's  -on chronic munoz cath  -IV merrem.   -Follow cultures  -ID consulted    Constipation  -on Miralax and Dulcolax suppository   -Titrate Bowel REgimen  -ANusol suppository     Code status/GOC  -Patient is DNR/DNI. MOLST form is in chart.   -Palliative care consult as patient tired of admissions and would like more comfort approach. Evaluation completed. Tentative discharge back to [facility] with home hospice 3/28/2022      
89 yo Male with PMHx of CAD s/p PCI/Stent, CHF with preserved EF, HTN, BPH with hx of chronic indwelling munoz, CKD II-IV, AAA, presented with complain of "anus pain, pain in my penis".  Patient arrived via EMS.  Patient stated symptoms started 45 minutes prior. Patient also Stated that he had pain in his penis, his anus, and is lower abdomen which radiated to his back.  He complain of sensation of hard stool in rectum, and last BM was 3 days ago.  He denied fever, chills, chest pain, SOB, n/v/d or any other symptoms currently. No fever. Here UA positive, munoz changed, was given IV merrem d/t hx of MDR/ESBL orgs in past.     1. Pyuria. Ecoli UTI. BPH with chronic munoz. CKD   - blood cx no growth   - completed merrem [id number] #4-5  - de-escalate abx to ceftin [id number] 3 more days 7 day course  - continue with abx coverage   - isolation precautions  - monitor temps  - tolerating abx well so far; no side effects noted  - reason for abx use and side effects reviewed with patient  - supportive care  - fu cbc    2. other issues - care per medicine 
A/P:    Normocytic Anemia. Now stable  -no acute bleeding on admission however reported intermittent rectal/hemorrhoidal bleeding  -Guaiac negative on admission  -s/p 1 unit of pRBC (3/24)->Repeat Hgb 6.6->Another unit of pRBCs ->subsequent stable hgb  -follow CBC  -Reticulocyte index 1.48 (Hypoproliferation)  -Iron, B12, Folate levels indicate low normal iron and low folate. Replace    Chronic Diastolic Heart Failure   Bilateral pleural effusion without respiratory distress and/or hypoxia  Mild Aortic Stenosis  -Unable to clinically determine exacerbation or as presenting symptoms did not report worsening CHF symptoms  -BNP unreliable in CKD and in addition close to patient's baseline  -IV Lasix administered->Patient hypotensive. Hold diuresis at this time. S/p fluid bolus 3/24 500cc. Continue to monitor BP    Chronic urinary retention  GNR UTI due to Chronic Indwelling Munoz catheter. POA. No sepsis POA  Personal history of MDR UTI's  -on chronic munoz cath  -IV merrem.   -Follow cultures  -ID consulted    Constipation  -on Miralax and Dulcolax suppository   -Titrate Bowel REgimen  -ANusol suppository     Code status/GOC  -Patient is DNR/DNI. MOLST form is in chart.   -Palliative care consult as patient tired of admissions and would like more comfort approach. Evaluation completed. Tentative discharge back to [facility] with home hospice 3/28/2022      
A/P:    Normocytic Anemia. Now stable  -no acute bleeding on admission however reported intermittent rectal/hemorrhoidal bleeding  -Guaiac negative on admission  -s/p 1 unit of pRBC (3/24)->Repeat Hgb 6.6->Another unit of pRBCs ->subsequent stable hgb  -follow CBC  -Reticulocyte index 1.48 (Hypoproliferation)  -Iron, B12, Folate levels indicate low normal iron and low folate. Replace    Chronic Diastolic Heart Failure   Bilateral pleural effusion without respiratory distress and/or hypoxia  Mild Aortic Stenosis  -Unable to clinically determine exacerbation or as presenting symptoms did not report worsening CHF symptoms  -BNP unreliable in CKD and in addition close to patient's baseline  -IV Lasix administered->Patient hypotensive. Hold diuresis at this time. S/p fluid bolus 3/24 500cc. Continue to monitor BP    Chronic urinary retention  Possible UTI POA. No sepsis POA  Personal history of MDR UTI's  -on chronic munoz cath  -IV merrem.   -Follow cultures  -ID consulted    Constipation  -on Miralax and Dulcolax suppository   -Titrate Bowel REgimen  -ANusol suppository     Code status/GOC  -Patient is DNR/DNI. MOLST form is in chart.   -Palliative care consult as patient tired of admissions and would like more comfort approach. Evaluation completed. Tentative discharge back to [facility] with home hospice 3/28/2022

## 2022-03-28 NOTE — DISCHARGE NOTE PROVIDER - HOSPITAL COURSE
FROM H&P:    "89 yo Male with PMHx of CAD s/p PCI/Stent, CHF with preserved EF, HTN, BPH with hx of chronic indwelling munoz, CKD II-IV, AAA, presented with complain of "anus pain, pain in my penis".  Patient arrived via EMS.  Patient stateed symptoms started 45 minutes prior. Patient also Stateed that he had pain in his penis, his anus, and is lower abdomen which radiated to his back.  He complain of sensation of hard stool in rectum, and last BM was 3 days ago.  He denieed fever, chills, chest pain, SOB, n/v/d or any other symptoms currently. No fever."    Normocytic Anemia. Now stable  -no acute bleeding on admission however reported intermittent rectal/hemorrhoidal bleeding. Unlikely acute GIB. Likely to have chronic bleed component but unable to clinically determine.   -Guaiac negative on admission. Positive after but Hgb stable.   -s/p 1 unit of pRBC (3/24)->Repeat Hgb 6.6->Another unit of pRBCs ->subsequent stable hgb  -follow CBC  -Reticulocyte index 1.48 (Hypoproliferation)  -Iron, B12, Folate levels indicate low normal iron and low folate. Replace    Chronic Diastolic Heart Failure   Bilateral pleural effusion without respiratory distress and/or hypoxia  Mild Aortic Stenosis  -Unable to clinically determine exacerbation or as presenting symptoms did not report worsening CHF symptoms  -BNP unreliable in CKD and in addition close to patient's baseline  -IV Lasix administered->Patient hypotensive. Hold diuresis at this time. S/p fluid bolus 3/24 500cc. Continue to monitor BP    Chronic urinary retention  EColi (Not ESBL on this admission) UTI POA due to Chronic Indwelling Munoz catheter. POA. No sepsis POA  Personal history of MDR UTI's  -on chronic munoz cath  -IV merrem.   -Follow cultures  -ID consulted    Constipation  Hemorrhoids  -on Miralax and Dulcolax suppository   -Titrate Bowel REgimen  -ANusol suppository     Code status/GOC  -Patient is DNR/DNI. MOLST form is in chart.   -Palliative care consult as patient tired of admissions and would like more comfort approach. Evaluation completed. Patient remains stable. Discharge to home hospice @ [facility] in stable condition  T(C): 36.3 (03-28-22 @ 08:23), Max: 36.3 (03-28-22 @ 08:23)  HR: 74 (03-28-22 @ 08:23) (74 - 89)  BP: 125/48 (03-28-22 @ 08:23) (114/48 - 125/48)  RR: 18 (03-28-22 @ 08:23) (18 - 18)  SpO2: 97% (03-28-22 @ 08:23) (97% - 98%)  General: AAOx3; NAD; Frail appearing  Head: AT/NC  ENT: Moist Mucous Membranes; No Injury  Eyes: EOMI; PERRL  Neck: Non-tender; No JVD  CVS: RRR, S1&S2, Murmur +, LE No edema  Respiratory: Decreased basilar breath sounds; Normal Respiratory Effort and saturation on RA  Abdomen/GI: Soft, non-tender, non-distended, no guarding, no rebound, normal bowel sounds  : No bladder distention, Munoz in place  Extremities: No cyanosis, No clubbing, LE edema  MSK: No CVA tenderness, Normal ROM, No injury  Neuro: AAOx3, CNII-XII grossly intact,   Psych: Appropriate, Cooperative,  Skin: Clean, Dry and Intact  Discharge Management: 38 minutes  Date of Discharge/Service: 3/28/2022

## 2022-03-28 NOTE — DISCHARGE NOTE PROVIDER - PROVIDER TOKENS
FREE:[LAST:[Primary Medical Doctor],PHONE:[(   )    -],FAX:[(   )    -],FOLLOWUP:[1 week]],FREE:[LAST:[Hospice Care Team],PHONE:[(   )    -],FAX:[(   )    -],FOLLOWUP:[1-3 days]]

## 2022-03-28 NOTE — DISCHARGE NOTE PROVIDER - NSDCCPCAREPLAN_GEN_ALL_CORE_FT
PRINCIPAL DISCHARGE DIAGNOSIS  Diagnosis: Anemia  Assessment and Plan of Treatment: Likely due to chronic hemorrhoidal bleeding.   Also with low normal iron and folate levels. Continue supplementation.      SECONDARY DISCHARGE DIAGNOSES  Diagnosis: CHF (congestive heart failure)  Assessment and Plan of Treatment: Chronic Diastolic Heart Failure. Stable on admission and at discharge. To continue diuresis as prescribed    Diagnosis: Urinary retention  Assessment and Plan of Treatment:     Diagnosis: Acute UTI  Assessment and Plan of Treatment: E. Coli Complicated UTI due to indwelling munoz catheter. Continue course of oral antibiotics.    Diagnosis: History of infection due to extended spectrum beta lactamase (ESBL) producing bacteria  Assessment and Plan of Treatment:     Diagnosis: Hydronephrosis  Assessment and Plan of Treatment:     Diagnosis: Constipation  Assessment and Plan of Treatment:

## 2022-03-28 NOTE — DISCHARGE NOTE NURSING/CASE MANAGEMENT/SOCIAL WORK - SOCIAL WORKER'S NAME
Sharda Tom, hospitalsW
Quality [facility]: Preventive Care And Screening: Influenza Immunization: Influenza immunization was not ordered or administered, reason not given
Detail Level: Detailed

## 2022-03-28 NOTE — DISCHARGE NOTE NURSING/CASE MANAGEMENT/SOCIAL WORK - NSDCPEFALRISK_GEN_ALL_CORE
For information on Fall & Injury Prevention, visit: [email or web address] OR  [email or web address] OR  https://www.cdc.gov/steadi/patient.html

## 2022-03-28 NOTE — DISCHARGE NOTE PROVIDER - NSDCMRMEDTOKEN_GEN_ALL_CORE_FT
acetaminophen 325 mg oral tablet: 2 tab(s) orally every 6 hours, As Needed - for mild pain  aspirin 81 mg oral delayed release tablet: 1 tab(s) orally once a day  bisacodyl 10 mg rectal suppository: 1 suppository(ies) rectal once a day, As needed, Constipation  cefuroxime 500 mg oral tablet: 1 tab(s) orally every 24 hours  DULoxetine 60 mg oral delayed release capsule: 1 cap(s) orally once a day  folic acid 1 mg oral tablet: 1 tab(s) orally once a day  furosemide 40 mg oral tablet: 1 tab(s) orally once a day  gabapentin 300 mg oral capsule: 1 cap(s) orally 2 times a day in the morning and evening     hydrocortisone 25 mg rectal suppository: 1 suppository(ies) rectal 2 times a day  methocarbamol 500 mg oral tablet: 1 tab(s) orally every 8 hours  polyethylene glycol 3350 oral powder for reconstitution: 17 gram(s) orally once a day   acetaminophen 325 mg oral tablet: 2 tab(s) orally every 6 hours, As Needed - for mild pain  aspirin 81 mg oral delayed release tablet: 1 tab(s) orally once a day  cefuroxime 500 mg oral tablet: 1 tab(s) orally every 24 hours  DULoxetine 60 mg oral delayed release capsule: 1 cap(s) orally once a day  folic acid 1 mg oral tablet: 1 tab(s) orally once a day  furosemide 40 mg oral tablet: 1 tab(s) orally once a day  gabapentin 300 mg oral capsule: 1 cap(s) orally 2 times a day in the morning and evening     methocarbamol 500 mg oral tablet: 1 tab(s) orally every 8 hours  polyethylene glycol 3350 oral powder for reconstitution: 17 gram(s) orally once a day  senna oral tablet: 2 tab(s) orally once a day (at bedtime)   traMADol 50 mg oral tablet: 1 tab(s) orally every 6 hours, As Needed -for severe pain MDD:4 tablets

## 2022-03-30 DIAGNOSIS — I35.0 NONRHEUMATIC AORTIC (VALVE) STENOSIS: ICD-10-CM

## 2022-03-30 DIAGNOSIS — K59.00 CONSTIPATION, UNSPECIFIED: ICD-10-CM

## 2022-03-30 DIAGNOSIS — I13.0 HYPERTENSIVE HEART AND CHRONIC KIDNEY DISEASE WITH HEART FAILURE AND STAGE 1 THROUGH STAGE 4 CHRONIC KIDNEY DISEASE, OR UNSPECIFIED CHRONIC KIDNEY DISEASE: ICD-10-CM

## 2022-03-30 DIAGNOSIS — B96.20 UNSPECIFIED ESCHERICHIA COLI [E. COLI] AS THE CAUSE OF DISEASES CLASSIFIED ELSEWHERE: ICD-10-CM

## 2022-03-30 DIAGNOSIS — N18.4 CHRONIC KIDNEY DISEASE, STAGE 4 (SEVERE): ICD-10-CM

## 2022-03-30 DIAGNOSIS — M19.90 UNSPECIFIED OSTEOARTHRITIS, UNSPECIFIED SITE: ICD-10-CM

## 2022-03-30 DIAGNOSIS — D64.9 ANEMIA, UNSPECIFIED: ICD-10-CM

## 2022-03-30 DIAGNOSIS — K64.9 UNSPECIFIED HEMORRHOIDS: ICD-10-CM

## 2022-03-30 DIAGNOSIS — I73.9 PERIPHERAL VASCULAR DISEASE, UNSPECIFIED: ICD-10-CM

## 2022-03-30 DIAGNOSIS — I71.4 ABDOMINAL AORTIC ANEURYSM, WITHOUT RUPTURE: ICD-10-CM

## 2022-03-30 DIAGNOSIS — N40.1 BENIGN PROSTATIC HYPERPLASIA WITH LOWER URINARY TRACT SYMPTOMS: ICD-10-CM
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
## 2022-03-30 DIAGNOSIS — N39.0 URINARY TRACT INFECTION, SITE NOT SPECIFIED: ICD-10-CM

## 2022-03-30 DIAGNOSIS — I25.10 ATHEROSCLEROTIC HEART DISEASE OF NATIVE CORONARY ARTERY WITHOUT ANGINA PECTORIS: ICD-10-CM

## 2022-03-30 DIAGNOSIS — T83.511A INFECTION AND INFLAMMATORY REACTION DUE TO INDWELLING URETHRAL CATHETER, INITIAL ENCOUNTER: ICD-10-CM

## 2022-03-30 DIAGNOSIS — E78.5 HYPERLIPIDEMIA, UNSPECIFIED: ICD-10-CM

## 2022-03-30 DIAGNOSIS — R33.9 RETENTION OF URINE, UNSPECIFIED: ICD-10-CM

## 2022-03-30 DIAGNOSIS — N13.30 UNSPECIFIED HYDRONEPHROSIS: ICD-10-CM

## 2022-03-30 DIAGNOSIS — I50.32 CHRONIC DIASTOLIC (CONGESTIVE) HEART FAILURE: ICD-10-CM
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
## 2022-03-30 DIAGNOSIS — I95.9 HYPOTENSION, UNSPECIFIED: ICD-10-CM

## 2022-03-30 DIAGNOSIS — Z66 DO NOT RESUSCITATE: ICD-10-CM

## 2022-05-18 NOTE — ED ADULT NURSE NOTE - NSSEPSISSUSPECTED_ED_A_ED
Patient attended Phase 2 Cardiac Rehab Exercise Session. Further documentation will be completed in Cardiac Science/[employer] System and will be scanned into the medical record upon discharge.     No

## 2022-05-24 NOTE — ED ADULT NURSE NOTE - NSIMPLEMENTINTERV_GEN_ALL_ED
Patient is a 82y old  Female who presents with a chief complaint of ARF  Severe nausea   Weakness and Dizziness  Hypertension  Adrenal Insufficiency (24 May 2022 12:50)    HPI:  Pt is a pleasant  81 y/o  Female with PMhx of  HTN on enalapril, Coreg, triamterene, dyslipidemia, OA/RA on Plaquenil, prednisone and tramadol, breast cancer s/p lumpectomy, MVR, Loop recorder  who was sent to [facility]  by Dr. Campbell for TRACIE,  weakness and  complaint of  feeling dizzy, weak and nauseous for the last several weeks.   Pt had a creatinine of 1.9 on 5/20/22 and received a L of NS at Dr. Campbell's office.  She was recently taken off enalapril , triamterene, meloxicam by Dr. Campbell.  Pt reports she stopped Celebrex 3 weeks ago and she herself stopped Cymbalta a month ago.  She denies current NSAID use. Five weeks ago her Rheumatologist increased her prednisone from 5mg to 10 mg for a Polymyalgia Flare.  Pt reports having a sensation of her ears feeling full and dizziness for the last 4 months and had  an unremarkable work up for this by Dr. Pena.    Pt reports she had 2 months of  nausea which recently became severe and is associated with abdominal discomfort.  Pt reports she feels so nauseous now that she cannot walk.  She denies vomiting.    She denies early satiety.  She reports intermittent constipation and reports she had a bowel movement yesterday and that her last bowel movement was otherwise a week ago.  She denies recent diarrhea but admits having loose stools for some days two weeks ago. Pt reports six months of exertional dyspnea.  She  denies fever/chills, no URI complaints, no chest pain, no HA, no rash,  no sick contacts.  She c/o urinary urgency and was treated for a UTI until 5/21 with amoxicillin. Per Dr. Campbell an outpatient random cortisol test result was low at  2 and an outpatient CT chest/abd/pelvis shows new 0.7x 0.6 cm LLL and 0.7 x 0.5 cm RLL pulmonary nodules with an unchanged 0.8 cm RUL nodule.      Seen at bedside this morning. Still with + nausea and endorses "I cannot function any more because of this." Reports intermittent nausea and "the spins," starting after sustaining a concussion about a year ago. Reports it worsening over past three months with an entirely negative workup including CT abd, MRI brain, neuro workup, ENT workup, opthalmologic exam, and cardiac evaluation. She reports her last colonoscopy was in 2016 by Dr. Kelsey.  Reports only 1-2x vomiting but does report "bloating," and "belching" for past several months in addition to constipation. +BM yesterday small and brown, per patient. Denies any over s/s of GI bleed including hematemesis, hematochezia, or melena.  Spoke with Dr. Campbell---> normal cardiac stress evaluation. CT showed bilateral pulmonary nodules.      PAST MEDICAL & SURGICAL HISTORY:  Mitral valve disease  RA (rheumatoid arthritis)  OA (osteoarthritis)  Migraine      HLD (hyperlipidemia)      Breast cancer  left 1990 treated with RT and surgery      Chronic pain  secondary to OA and RA      History of lumpectomy of left breast  with sentinal node biopsy 1990      History of bilateral knee replacement  right 2009, left 2011      H/O spinal fusion  lumbar 2005      History of tonsillectomy      S/P bunionectomy  left x 2 2007      History of left hip replacement  2017      H/O mitral valve replacement      History of cataract surgery  left cataract sx          MEDICATIONS  (STANDING):  aspirin enteric coated 81 milliGRAM(s) Oral daily  atorvastatin 10 milliGRAM(s) Oral at bedtime  carvedilol 6.25 milliGRAM(s) Oral every 12 hours  cholecalciferol 1000 Unit(s) Oral daily  enoxaparin Injectable 30 milliGRAM(s) SubCutaneous every 24 hours  pantoprazole    Tablet 40 milliGRAM(s) Oral before breakfast  predniSONE   Tablet 10 milliGRAM(s) Oral daily  sodium chloride 0.9%. 500 milliLiter(s) (75 mL/Hr) IV Continuous <Continuous>  sucralfate suspension 1 Gram(s) Oral four times a day    MEDICATIONS  (PRN):  acetaminophen     Tablet .. 650 milliGRAM(s) Oral every 6 hours PRN Mild Pain (1 - 3)  ondansetron Injectable 4 milliGRAM(s) IV Push every 6 hours PRN Nausea and/or Vomiting  traMADol 50 milliGRAM(s) Oral every 6 hours PRN Mild Pain (1 - 3)      Allergies    No Known Allergies    Intolerances        SOCIAL HISTORY:    FAMILY HISTORY:  Family history of stomach cancer (Mother)  smoker    Family history of colon cancer in father (Father)  smoker    REVIEW OF SYSTEMS:    CONSTITUTIONAL: No weakness, fevers or chills  EYES/ENT: No visual changes;  No vertigo or throat pain   NECK: No pain or stiffness  RESPIRATORY: No cough, wheezing, hemoptysis; No shortness of breath  CARDIOVASCULAR: No chest pain or palpitations  GASTROINTESTINAL: See above  GENITOURINARY: No dysuria, frequency or hematuria  NEUROLOGICAL: No numbness or weakness  SKIN: No itching, burning, rashes, or lesions   PSYCH: Normal mood and affect  All other review of systems is negative unless indicated above.    Vital Signs Last 24 Hrs  T(C): 36.5 (24 May 2022 08:45), Max: 36.8 (23 May 2022 21:22)  T(F): 97.7 (24 May 2022 08:45), Max: 98.3 (23 May 2022 21:22)  HR: 103 (24 May 2022 08:45) (74 - 103)  BP: 126/75 (24 May 2022 08:45) (126/75 - 151/79)  BP(mean): --  RR: 16 (24 May 2022 08:45) (16 - 16)  SpO2: 99% (24 May 2022 08:45) (98% - 99%)    PHYSICAL EXAM:    Constitutional: No acute distress, well-developed, non-toxic appearing  HEENT: masked, good phonation, not icteric  Neck: supple, no lymphadenopathy  Respiratory: clear to ascultation bilaterally, no wheezing  Cardiovascular: S1 and S2, regular rate and rhythm, no murmurs rubs or gallops  Gastrointestinal: soft, non-tender, mildly distended, +bowel sounds, no rebound or guarding, no surgical scars, no drains  Extremities: No peripheral edema, no cyanosis or clubbing  Vascular: 2+ peripheral pulses, no venous stasis  Neurological: A/O x 3, no focal deficits, no asterixis  Psychiatric: Normal mood, normal affect  Skin: No rashes, not jaundiced    LABS:                        13.6   8.99  )-----------( 154      ( 24 May 2022 11:50 )             41.9     05-24    139  |  107  |  20  ----------------------------<  85  3.8   |  27  |  1.29    Ca    8.6      24 May 2022 11:50  Phos  3.2     05-23  Mg     1.9     05-23    TPro  6.7  /  Alb  3.3  /  TBili  0.4  /  DBili  x   /  AST  49<H>  /  ALT  41  /  AlkPhos  52  05-23      LIVER FUNCTIONS - ( 23 May 2022 13:36 )  Alb: 3.3 g/dL / Pro: 6.7 gm/dL / ALK PHOS: 52 U/L / ALT: 41 U/L / AST: 49 U/L / GGT: x             RADIOLOGY & ADDITIONAL STUDIES: Patient is a 82y old  Female who presents with a chief complaint of ARF  Severe nausea   Weakness and Dizziness  Hypertension  Adrenal Insufficiency (24 May 2022 12:50)    HPI:  Pt is a pleasant  81 y/o  Female with PMhx of  HTN on enalapril, Coreg, triamterene, dyslipidemia, OA/RA on Plaquenil, prednisone and tramadol, breast cancer s/p lumpectomy, MVR, Loop recorder  who was sent to [place] [facility]  by Dr. Campbell for TRACIE,  weakness and  complaint of  feeling dizzy, weak and nauseous for the last several weeks.          Pt reports she has had a year of nauesa which over the past two month months  recently became severe and  associated with abdominal discomfort. Discomfort described as dull, epigastric, non radiating, not really pain though. No alleviating or exacerbating factors to the nausea. Pt reports she feels so nauseous now that she cannot walk.  She denies vomiting.    She denies early satiety.  She reports intermittent constipation and reports she had a bowel movement yesterday and that her last bowel movement was otherwise a week ago.  Seen at bedside this morning. Still with + nausea and endorses "I cannot function any more because of this." Reports intermittent nausea and "the spins," starting after sustaining a concussion about a year ago. Reports it worsening over past three months with an entirely negative workup including CT abd, MRI brain, neuro workup, ENT workup, opthalmologic exam, and cardiac evaluation. She reports her last colonoscopy was in 2016 by Dr. Kelsey.  Reports only 1-2x vomiting but does report "bloating," and "belching" for past several months in addition to constipation. +BM yesterday small and brown, per patient. Denies any over s/s of GI bleed including hematemesis, hematochezia, or melena.    Spoke with Dr. Campbell---> normal cardiac stress evaluation. CT showed bilateral pulmonary nodules.      PAST MEDICAL & SURGICAL HISTORY:  Mitral valve disease  RA (rheumatoid arthritis)  OA (osteoarthritis)  Migraine      HLD (hyperlipidemia)      Breast cancer  left 1990 treated with RT and surgery      Chronic pain  secondary to OA and RA      History of lumpectomy of left breast  with sentinal node biopsy 1990      History of bilateral knee replacement  right 2009, left 2011      H/O spinal fusion  lumbar 2005      History of tonsillectomy      S/P bunionectomy  left x 2 2007      History of left hip replacement  2017      H/O mitral valve replacement      History of cataract surgery  left cataract sx          MEDICATIONS  (STANDING):  aspirin enteric coated 81 milliGRAM(s) Oral daily  atorvastatin 10 milliGRAM(s) Oral at bedtime  carvedilol 6.25 milliGRAM(s) Oral every 12 hours  cholecalciferol 1000 Unit(s) Oral daily  enoxaparin Injectable 30 milliGRAM(s) SubCutaneous every 24 hours  pantoprazole    Tablet 40 milliGRAM(s) Oral before breakfast  predniSONE   Tablet 10 milliGRAM(s) Oral daily  sodium chloride 0.9%. 500 milliLiter(s) (75 mL/Hr) IV Continuous <Continuous>  sucralfate suspension 1 Gram(s) Oral four times a day    MEDICATIONS  (PRN):  acetaminophen     Tablet .. 650 milliGRAM(s) Oral every 6 hours PRN Mild Pain (1 - 3)  ondansetron Injectable 4 milliGRAM(s) IV Push every 6 hours PRN Nausea and/or Vomiting  traMADol 50 milliGRAM(s) Oral every 6 hours PRN Mild Pain (1 - 3)      Allergies    No Known Allergies    Intolerances        SOCIAL HISTORY:  no smoking, drinking or drugs    FAMILY HISTORY:  Family history of stomach cancer (Mother)  smoker    Family history of colon cancer in father (Father)  smoker    REVIEW OF SYSTEMS:    CONSTITUTIONAL: No weakness, fevers or chills  EYES/ENT: No visual changes;  No vertigo or throat pain   NECK: No pain or stiffness  RESPIRATORY: No cough, wheezing, hemoptysis; No shortness of breath  CARDIOVASCULAR: No chest pain or palpitations  GASTROINTESTINAL: See above  GENITOURINARY: No dysuria, frequency or hematuria  NEUROLOGICAL: No numbness or weakness  SKIN: No itching, burning, rashes, or lesions   PSYCH: Normal mood and affect  All other review of systems is negative unless indicated above.    Vital Signs Last 24 Hrs  T(C): 36.5 (24 May 2022 08:45), Max: 36.8 (23 May 2022 21:22)  T(F): 97.7 (24 May 2022 08:45), Max: 98.3 (23 May 2022 21:22)  HR: 103 (24 May 2022 08:45) (74 - 103)  BP: 126/75 (24 May 2022 08:45) (126/75 - 151/79)  BP(mean): --  RR: 16 (24 May 2022 08:45) (16 - 16)  SpO2: 99% (24 May 2022 08:45) (98% - 99%)    PHYSICAL EXAM:    Constitutional: in mild distress, well-developed, non-toxic appearing  HEENT: masked, good phonation, not icteric  Neck: supple, no lymphadenopathy  Respiratory: clear to ascultation bilaterally, no wheezing  Cardiovascular: S1 and S2, regular rate and rhythm, no murmurs rubs or gallops  Gastrointestinal: soft, non-tender, mildly distended, +bowel sounds, no rebound or guarding, no surgical scars, no drains  Extremities: No peripheral edema, no cyanosis or clubbing  Vascular: 2+ peripheral pulses, no venous stasis  Neurological: A/O x 3, no focal deficits, no asterixis  Psychiatric: sad mood, normal affect  Skin: No rashes, not jaundiced    LABS:                        13.6   8.99  )-----------( 154      ( 24 May 2022 11:50 )             41.9     05-24    139  |  107  |  20  ----------------------------<  85  3.8   |  27  |  1.29    Ca    8.6      24 May 2022 11:50  Phos  3.2     05-23  Mg     1.9     05-23    TPro  6.7  /  Alb  3.3  /  TBili  0.4  /  DBili  x   /  AST  49<H>  /  ALT  41  /  AlkPhos  52  05-23      LIVER FUNCTIONS - ( 23 May 2022 13:36 )  Alb: 3.3 g/dL / Pro: 6.7 gm/dL / ALK PHOS: 52 U/L / ALT: 41 U/L / AST: 49 U/L / GGT: x             RADIOLOGY & ADDITIONAL STUDIES: Implemented All Fall with Harm Risk Interventions:  [place] to call system. Call bell, personal items and telephone within reach. Instruct patient to call for assistance. Room bathroom lighting operational. Non-slip footwear when patient is off stretcher. Physically safe environment: no spills, clutter or unnecessary equipment. Stretcher in lowest position, wheels locked, appropriate side rails in place. Provide visual cue, wrist band, yellow gown, etc. Monitor gait and stability. Monitor for mental status changes and reorient to person, place, and time. Review medications for side effects contributing to fall risk. Reinforce activity limits and safety measures with patient and family. Provide visual clues: red socks.

## 2022-06-21 NOTE — ED ADULT TRIAGE NOTE - SPO2 (%)
96 Debridement Text: The wound edges were debrided prior to proceeding with the closure to facilitate wound healing.

## 2022-07-19 NOTE — DISCHARGE NOTE ADULT - PATIENT PORTAL LINK FT
You can access the FollowMyHealth Patient Portal offered by [facility] by registering at the following website: [email or web address]. By joining [employer]’s FollowMyHealth portal, you will also be able to view your health information using other applications (apps) compatible with our system.
You can access the [facility], offered by [facility], by registering with the following website: [email or web address]

## 2022-08-03 NOTE — ED PROVIDER NOTE - CARE PLAN
Billing: [id number] (Total area less than 250 cm2) Billing: [id number] (Total area less than 250 cm2) Principal Discharge DX:	Chest pain

## 2022-08-23 NOTE — ED ADULT NURSE NOTE - BEFAST BALANCE
Upper GI XRay with no leak - ok to start full liquid diet and discontinue NG tube per general surgery. Patient tolerating full liquids ok. Lost both peripheral access sites (infiltrated). New MICHELLE extended length IV inserted by vascular access team - labs sent. Sodium elevated - nephrology notified. New orders to increase IV D5 rate + encourage PO water intake. Patient refusing to get up the chair this evening but states she will get up tomorrow.     Problem: RESPIRATORY - ADULT  Goal: Achieves optimal ventilation and oxygenation  Description: INTERVENTIONS:  - Assess for changes in respiratory status  - Assess for changes in mentation and behavior  - Position to facilitate oxygenation and minimize respiratory effort  - Oxygen supplementation based on oxygen saturation or ABGs  - Provide Smoking Cessation handout, if applicable  - Encourage broncho-pulmonary hygiene including cough, deep breathe, Incentive Spirometry  - Assess the need for suctioning and perform as needed  - Assess and instruct to report SOB or any respiratory difficulty  - Respiratory Therapy support as indicated  - Manage/alleviate anxiety  - Monitor for signs/symptoms of CO2 retention  Outcome: Progressing     Problem: METABOLIC/FLUID AND ELECTROLYTES - ADULT  Goal: Electrolytes maintained within normal limits  Description: INTERVENTIONS:  - Monitor labs and rhythm and assess patient for signs and symptoms of electrolyte imbalances  - Administer electrolyte replacement as ordered  - Monitor response to electrolyte replacements, including rhythm and repeat lab results as appropriate  - Fluid restriction as ordered  - Instruct patient on fluid and nutrition restrictions as appropriate  Outcome: Not Progressing No

## 2022-09-30 NOTE — DISCHARGE NOTE ADULT - FUNCTIONAL STATUS DATE
Please let Diamondmacrina Chantale know I decided I would like her to see a heart doctor. I do not think there is anything acute to worry about with her heart, but I would like a cardiologist to see her to see if anything needs managed long term. 12-Jan-2018

## 2022-11-02 NOTE — PATIENT PROFILE ADULT - NSPRONUTRITIONRISK_GEN_A_NUR
Do not start antibiotics and steroids until you have tried the below treatments and do not have improvements. Near Saturday if not better. Recommended over the counter medications/treatments: The use of an antihistamine (Claritin or Zyrtec) and a nasal steroid spray (Flonase or Nasacort) may help with sinus congestion and drainage. Mucinex will also help thin secretions and improve congestion. Works best if drinking plenty of water. Honey with or without Lemon may also help with coughing. Probiotics daily may help boost immune system. Alternating hot liquids with cold liquids helps to sooth sore throat  Use Acetaminophen (Tylenol) to help relieve fever, chills or body aches. If allowed, you may alternate using ibuprofen (Motrin) and Tylenol. Do not exceed 3,000mg of Tylenol in a 24 hour time period. Make sure to stay well hydrated by drinking plenty of water. Follow up with primary care provider in 1 to 2 days or as needed if no improvement or worsening of your symptoms.
No indicators present

## 2022-12-21 NOTE — PROGRESS NOTE ADULT - SUBJECTIVE AND OBJECTIVE BOX
----- Message from Radha Escobedo sent at 12/21/2022 10:49 AM CST -----  Contact: Alessia Ashton wonders if she's up to date on pap/mammo. Please call her back at [phone] (home)           Thanks  DD       89M with a PMH significant for CAD s/p PCI/Stent, HTN, BPH, Chronic LLE Hip Pain 2/2 OA, HLD, CKD-IV, AAA, PAD and BPH, who presented to  from [facility] for progressive confusion. Per his daughter and Son he was at his baseline mentation 1 day before admission. At that time he denied any sxs of fevers, abdominal/chest pain, acute focal neurological deficits or urinary sxs however he exhibited slurred speech with intermittent lethargy after which, EMS was notified and he was sent to the ED for further evaluation. Per chart review, the pt was recently admitted to  2 weeks prior for Acute Cystitis 2/2 Proteus Mirabilis complicated by Bacteremia and Hydronephrosis; for which he was initiated on a course of IV Meropenum and discharge home on a 10day course of PO Ceftin, at that time out patient follow up with Urology was recommended for possible Cystoscopy to identify any structural pathologies.     Subjective: Patient seen and examined at bedside. No acute distress, no pain.  No hematuria overnight or in AM.     REVIEW OF SYSTEMS:  CONSTITUTIONAL: No weakness, fevers or chills  EYES/ENT: No visual changes;  No vertigo or throat pain   NECK: No pain or stiffness  RESPIRATORY: No cough, wheezing, hemoptysis; No shortness of breath  CARDIOVASCULAR: No chest pain or palpitations  GASTROINTESTINAL: No abdominal or epigastric pain. No nausea, vomiting, or hematemesis; No diarrhea or constipation. No melena or hematochezia.  GENITOURINARY: No dysuria, frequency or hematuria  NEUROLOGICAL: No numbness or weakness  SKIN: No itching, burning, rashes, or lesions   All other review of systems is negative unless indicated above    Vital Signs Last 24 Hrs  T(C): 36 (17 Oct 2021 15:55), Max: 37.3 (17 Oct 2021 09:47)  T(F): 96.8 (17 Oct 2021 15:55), Max: 99.1 (17 Oct 2021 09:47)  HR: 65 (17 Oct 2021 15:55) (65 - 84)  BP: 130/48 (17 Oct 2021 15:55) (112/44 - 132/56)  BP(mean): --  RR: 18 (17 Oct 2021 15:55) (18 - 18)  SpO2: 95% (17 Oct 2021 15:55) (95% - 97%)    Physical Exam     Constitutional: Pt lying in bed, awake and alert, NAD  HEENT: EOMI, normal hearing, moist mucous membranes  Neck: Soft and supple, no JVD  Respiratory: CTABL, No wheezing, rales or rhonchi  Cardiovascular: S1S2+, RRR, no M/G/R  Gastrointestinal: BS+, soft, NT/ND, no guarding, no rebound. Dull to percussion lwr quadrants.   Extremities: No peripheral edema  Vascular: 2+ peripheral pulses  Neurological: AAOx3, no focal deficits  Skin: No rashes    MEDICATIONS:  MEDICATIONS  (STANDING):  aspirin enteric coated 81 milliGRAM(s) Oral daily  atorvastatin 40 milliGRAM(s) Oral at bedtime  clopidogrel Tablet 75 milliGRAM(s) Oral daily  cyanocobalamin 1000 MICROGram(s) Oral daily  DULoxetine 60 milliGRAM(s) Oral daily  ferrous    sulfate 325 milliGRAM(s) Oral daily  furosemide    Tablet 40 milliGRAM(s) Oral two times a day  gabapentin 300 milliGRAM(s) Oral two times a day  gabapentin 600 milliGRAM(s) Oral at bedtime  meropenem  IVPB      meropenem  IVPB 500 milliGRAM(s) IV Intermittent every 8 hours  metoprolol succinate ER 75 milliGRAM(s) Oral daily  multivitamin 1 Tablet(s) Oral daily  pantoprazole    Tablet 40 milliGRAM(s) Oral before breakfast  tamsulosin 0.4 milliGRAM(s) Oral at bedtime      LABS: All Labs Reviewed:                                   7.9    5.14  )-----------( 199      ( 17 Oct 2021 07:21 )             25.9     10-17    140  |  107  |  34<H>  ----------------------------<  102<H>  3.7   |  30  |  1.90<H>    Ca    8.2<L>      17 Oct 2021 07:21  Phos  3.2     10-16  Mg     2.1     10-16    TPro  5.9<L>  /  Alb  2.5<L>  /  TBili  0.5  /  DBili  x   /  AST  12<L>  /  ALT  10<L>  /  AlkPhos  63  10-16             PTT - ( 15 Oct 2021 17:06 )  PTT:30.7 sec      Blood Culture:   I&O's Summary    15 Oct 2021 07:01  -  16 Oct 2021 07:00  --------------------------------------------------------  IN: 375 mL / OUT: 0 mL / NET: 375 mL      CAPILLARY BLOOD GLUCOSE  RADIOLOGY/EKG:  < from: Xray Chest 1 View-PORTABLE IMMEDIATE (10.15.21 @ 17:59) >  Impression: Clear lungs, grossly unchanged.    < end of copied text >  < from: US Kidney and Bladder (10.16.21 @ 11:50) >  Right kidney: 11.2 cm. No renal mass, no change in mild-moderate hydronephrosis, no calculi.    Left kidney: 9.6 cm. No renal mass, hydronephrosis or calculi.    Urinary bladder: Signs of chronic outlet obstruction again noted    IMPRESSION:    No significant change since 9/29/2021    < end of copied text >

## 2023-01-01 NOTE — ED STATDOCS - OBJECTIVE STATEMENT
88 y/o male with a PMHx of CAD, CKD, HTN, HLD presents to the ED c/o laceration to right lower leg. Pt states he hit his leg on a door while walking yesterday afternoon at 4pm. No bleeding at this time. Unknown if tetanus is up to date.
- - -

## 2023-05-12 NOTE — ED ADULT NURSE NOTE - NS ED NOTE  TALK SOMEONE YN
No Hypertriglyceridemia Monitoring: I explained this is common when taking isotretinoin. If this worsens they will contact us.

## 2023-06-06 NOTE — ED PROVIDER NOTE - WR ORDER DATE AND TIME 1
[facility]  ELECTROPHYSIOLOGY Follow up Visit    06/06/23    Tammie Tai  1943    LAST VISIT: 4/10/2023     Chief complaint:   Chief Complaint   Patient presents with   • Follow-up         HISTORY OF PRESENT ILLNESS     Patient is here for a hospital follow up to review cardiac event monitor ordered after CVA. She has a PMH of acute ischemic stroke, HTN, type 2 DM, CKD, GI bleed and bilateral carotid artery stenosis. .     Patient's caregiver is translating for pt today.    Pt states that she is feeling okay today. She denies lightheadedness, syncope, SOB, palpitations, and chest pain.  No further complaints at this time.      PAST MEDICAL, FAMILY AND SOCIAL HISTORY     Medications:  Current Outpatient Medications   Medication Sig Dispense Refill   • furosemide (LASIX) 40 MG tablet Take 1 tablet by mouth 2 times daily. 60 tablet 0   • oxyCODONE-acetaminophen (PERCOCET) 5-325 MG per tablet Take 1 tablet by mouth every 8 hours as needed for Pain. Take ! Tablet by mouth every 8 hours as needed for pain, chronic pain max daily amount 3 tablets     • pregabalin (LYRICA) 25 MG capsule Take 1 capsule by mouth 2 times daily.     • ondansetron (ZOFRAN) 4 MG tablet Take 1 tablet by mouth every 8 hours as needed for Nausea.     • atorvastatin (LIPITOR) 40 MG tablet Take 40 mg by mouth daily.     • folic acid (FOLATE) 1 MG tablet Take 1,000 mcg by mouth daily.     • metoCLOPramide (REGLAN) 5 MG tablet Take 1 tablet by mouth 3 times daily (before meals).     • clopidogrel (PLAVIX) 75 MG tablet Take 75 mg by mouth daily.     • Vitamin D, Ergocalciferol, 1.25 mg (50,000 units) capsule Take 1.25 mg by mouth 1 day a week.     • pantoprazole (PROTONIX) 40 MG tablet Take 1 tablet by mouth 2 times daily. 60 tablet 2   • Lantus SoloStar 100 UNIT/ML pen-injector Inject 20 U Lantus subcutaneously in am and 12 Units nightly. (Patient taking differently: Inject 8 Units into the skin nightly.) 15 mL 12     No  current facility-administered medications for this visit.       Allergies:  ALLERGIES:   Allergen Reactions   • Dulaglutide Nausea & Vomiting   • Metformin Nausea & Vomiting       Past Medical  History/Surgeries:  Past Medical History:   Diagnosis Date   • CKD (chronic kidney disease), stage III (CMD) 08/07/2017   • Depression    • Diabetes mellitus (CMD)    • Essential (primary) hypertension    • HTN (hypertension) 07/08/2014    Diagnosed 2004   • Old myocardial infarction    • Stomach problems was hospitalized salmonella infection gastroenteritis at the end of June 2012.  continues to have diarrhea.   • Vitamin D deficiency 08/07/2017       Past Surgical History:   Procedure Laterality Date   • Cardiac surgery     • Hysterectomy      Partial   • Joint replacement      Left    • Removal gallbladder     • Toe amputation Right     all toes       Family History:  No family history on file.    Social History:  Social History     Tobacco Use   • Smoking status: Never   • Smokeless tobacco: Never   Substance Use Topics   • Alcohol use: No       REVIEW OF SYSTEMS     A complete review of systems was performed, and aside from what is mentioned in HPI, was negative.     PHYSICAL EXAM     Physical Exam:  Visit Vitals  /64   Pulse 82   Ht 4' 10\" (1.473 m)   Wt 64.4 kg (142 lb)   BMI 29.68 kg/m²      General: In no acute distress, normal body habitus, well developed.  Head/Eyes: EOMI (extraocular movements intact)  Cardiovascular system: RRR  Respiratory: Anterior/posterior: Non labored breathing.  Psychiatric: Alert, oriented to time, place, and person. Mood and affect appropriate.  Skin: Warm and dry. No rashes.  Neurological: Moves all extremities.     LABORATORY DATA     WBC (K/mcL)   Date Value   03/22/2023 7.9   03/21/2023 7.1   03/20/2023 6.8     HGB (g/dL)   Date Value   03/22/2023 9.0 (L)   03/21/2023 9.0 (L)   03/20/2023 10.4 (L)     PLT (K/mcL)   Date Value   03/22/2023 224   03/21/2023 221   03/20/2023 204      Potassium (mmol/L)   Date Value   03/21/2023 3.7     Magnesium (mg/dL)   Date Value   03/20/2023 1.8     Creatinine (mg/dL)   Date Value   03/21/2023 0.99 (H)     GOT/AST (Units/L)   Date Value   03/21/2023 22     GPT/ALT (Units/L)   Date Value   03/21/2023 15     TSH (mcUnits/mL)   Date Value   02/20/2023 3.944     Prothrombin Time (sec)   Date Value   03/17/2023 10.9     INR (no units)   Date Value   03/17/2023 1.1         DIAGNOSTIC TESTING     EKG  Results for orders placed or performed during the hospital encounter of 03/17/23   ECG   Result Value Ref Range    Systolic Blood Pressure 116     Diastolic Blood Pressure 73     Ventricular Rate EKG/Min (BPM) 86     Atrial Rate (BPM) 86     IN-Interval (MSEC) 166     QRS-Interval (MSEC) 98     QT-Interval (MSEC) 384     QTc 459     P Axis (Degrees) 32     R Axis (Degrees) 71     T Axis (Degrees) -42     REPORT TEXT       Normal sinus rhythm  ST And  T wave abnormality, consider inferolateral ischemia  Abnormal ECG  When compared with ECG of  17-MAR-2023 13:25,  No significant change was found  Confirmed by DEJA BROWN MD ([id number]) on 3/17/2023 5:49:22 PM         Holter  4/18/23 (30 day)  Impression    Normal sinus rhythm with rare PACs         Echo  Ejection Fraction   Date Value Ref Range Status   02/21/2023 70 % Final         Stress Testing  none    MRI Cardiac Imaging  none    Coronary CTA  none    Telemetry  SR (personally reviewed and interpreted by Dr. 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 Risks, benefits and alternatives were discussed with the patient. Risks include bleeding, infection and erosion.  She will discuss with her family and call us with her decision.  Otherwise follow-up in 1 year    On 6/6/2023, IFelisa scribed the services personally performed by Gerard Silva, DO        The documentation recorded by the scribe accurately and completely reflects the service(s) I personally performed and the decisions made by me.        08-Dec-2021 16:16

## 2023-07-06 NOTE — H&P ADULT - NSHPPOAPULMEMBOLUS_GEN_A_CORE
Chief Complaint   Patient presents with   • Ear Pain     Left ear pain started yesterday.        HPI:  Garrett Chowdary is a 18 year old male who left ear pain yesterday and today.  Little bit less painful today but still bothering him.  He denies any cough runny nose or congestion.  He does use ear buds frequently, he denies Q-tip use or swimming recently.      ROS:  Otherwise unremarkable      PMH:  Otherwise noncontributory    ALLERGIES  ALLERGIES:  Patient has no known allergies.      EXAM:  Visit Vitals  /80 (BP Location: RUE - Right upper extremity, Patient Position: Sitting, Cuff Size: Regular)   Pulse 76   Temp 98.4 °F (36.9 °C) (Tympanic)   Resp 16   SpO2 99%     General: He is not in acute distress  HEENT: His right ear is clear, his left external canal is mildly erythematous circumferentially, there is no gross lesions or swelling.  His TM looks normal.  His oropharynx is clear  Neck: Supple without palp lymphadenopathy or recurrent swelling or tenderness      ASSESSMENT  Acute left swimmer's ear  PLAN  Take drops as prescribed she will avoid any foreign bodies in his ear for next several days at least he will follow-up with ENT if symptoms persist despite these measures.    
no

## 2023-07-13 NOTE — PATIENT PROFILE ADULT - LIVING ENVIRONMENT
This patient has been assessed with a concern for Malnutrition and was treated during this hospitalization for the following Nutrition diagnosis/diagnoses:     -  07/09/2023: Moderate protein-calorie malnutrition   no

## 2023-07-28 NOTE — INPATIENT CERTIFICATION FOR MEDICARE PATIENTS - THE STATUS OF COMORBIDITIES.
2. The status of comorbities. (See ED/admit documents)
How Severe Is Your Skin Lesion?: moderate
Has Your Skin Lesion Been Treated?: not been treated
Is This A New Presentation, Or A Follow-Up?: Skin Lesions
No

## 2023-09-03 NOTE — INPATIENT CERTIFICATION FOR MEDICARE PATIENTS - THE STATUS OF COMORBIDITIES.
Problem: Knowledge Deficit - Standard  Goal: Patient and family/care givers will demonstrate understanding of plan of care, disease process/condition, diagnostic tests and medications  Outcome: Progressing     Problem: Mobility  Goal: Patient's capacity to carry out activities will improve  Outcome: Progressing     Problem: Wound/ / Incision Healing  Goal: Patient's wound/surgical incision will decrease in size and heals properly  Outcome: Progressing     The patient is Stable - Low risk of patient condition declining or worsening    Shift Goals  Clinical Goals: Ensure administration of IV antibiotics; monitor for fevers; control pain  Patient Goals: Stay involved in care planning  Family Goals: n/a    Progress made toward(s) clinical / shift goals:  Pt remained afebrile throughout shift. Pain controlled under current regimen. Wound RN adjusted dressing change protocol, pt tolerating dressing changes and aware of overall care plan including IV abx and wound care.    Patient is not progressing towards the following goals: n/a      
The patient is Stable - Low risk of patient condition declining or worsening    Shift Goals  Clinical Goals: Monitor swelling/redness to RLE. Pt's pain will remain tolerable and will report pain less than 5/10.  Patient Goals: pain control, sleep and rest  Family Goals: n/a    Progress made toward(s) clinical / shift goals:  Swelling and redness still noted on RLE. Awaiting wound consult on wound on left tibia. Pt denies any pain. Pt did not require prn pain medication within the shift. Pt seen sleeping comfortably in between rounds. Scheduled medications given as per MAR. Hourly rounding in progress.    Patient is not progressing towards the following goals:n/a      
The patient is Stable - Low risk of patient condition declining or worsening    Shift Goals  Clinical Goals: Pt will remain comfortable and report pain less than 3/10. Pt will continue antibiotics within the shift.  Patient Goals: sleep and rest  Family Goals: n/a    Progress made toward(s) clinical / shift goals:  Pt did not require prn pain medication within the shift. Scheduled antibiotics given as per MAR. Hourly rounding in progress.     Patient is not progressing towards the following goals: n/a      
The patient is Stable - Low risk of patient condition declining or worsening    Shift Goals  Clinical Goals: continue iv abx, monitor swelling/redness to RLE, comfort  Patient Goals: comfort, abx, wound care    Progress made toward(s) clinical / shift goals:  iv abx infusing, comfort measures provided. Progressing on other goals.     Patient is not progressing towards the following goals:      
2. The status of comorbities. (See ED/admit documents)

## 2023-09-11 NOTE — PATIENT PROFILE ADULT - DO YOU FEEL LIKE HURTING OTHERS?
Nursing Home Progress Note        Rafiqgay Baron    [address] [place] [address] Phone: [phone]  Fax: [phone]     PATIENT NAME: Millicent Mckeon                                                                          YOB: 1958           DATE OF SERVICE: 09/12/2023  FACILITY:  [facility]  ______________________________________________________________________     CHIEF COMPLAINT:  Chronic Medical Management      HISTORY OF PRESENT ILLNESS:   Patient was seen for routine compliance visit.  Nurses report that her glucose levels have been significantly improved ever since she was started on higher doses of Ozempic.  Weight remains stable and she has not been requiring much of any sliding scale insulin.  Patient seems comfortable and content.  Upon asking her about her medications, she stated that she did not feel depressed or anxious and was open to the idea of reducing some of her medications.    PAST MEDICAL & SURGICAL HISTORY:   Past Medical History:   Diagnosis Date    A-fib     Anemia 10/02/2018    Diabetes mellitus     Disease of thyroid gland     GERD (gastroesophageal reflux disease)     Gout     History of transfusion     Hypertension     Impaired functional mobility, balance, gait, and endurance       Past Surgical History:   Procedure Laterality Date    ENDOSCOPY N/A 5/2/2022    Procedure: ESOPHAGOGASTRODUODENOSCOPY WITH BIOPSY;  Surgeon: Jacob Chance MD;  Location: [facility] ENDOSCOPY;  Service: Gastroenterology;  Laterality: N/A;    EYE SURGERY      INCISION AND DRAINAGE LEG Right 1/14/2019    Procedure: Right heel incision and drainage with graft application;  Surgeon: Ar Rueda DPM;  Location: [facility] OR;  Service: Podiatry    INSERTION HEMODIALYSIS CATHETER N/A 4/5/2022    Procedure: HEMODIALYSIS CATHETER INSERTION;  Surgeon: Jean Carlos Guadalupe MD;  Location: [facility] OR;  Service: General;  Laterality: N/A;    LEG SURGERY           MEDICATIONS:  I have  reviewed and reconciled the patients medication list in the patients chart at the skilled nursing facility today, 09/12/2023.      ALLERGIES:  Allergies   Allergen Reactions    Metformin And Related Anaphylaxis     HA, diarrhea, throat swelling    Metformin GI Intolerance         SOCIAL HISTORY:  Social History     Socioeconomic History    Marital status: Single   Tobacco Use    Smoking status: Never    Smokeless tobacco: Never   Vaping Use    Vaping Use: Never used   Substance and Sexual Activity    Alcohol use: No    Drug use: No    Sexual activity: Defer       FAMILY HISTORY:  Family History   Problem Relation Age of Onset    Asthma Mother     Hypertension Father     Stroke Father        REVIEW OF SYSTEMS:  Review of Systems   Constitutional:  Negative for chills, fatigue and fever.   HENT:  Negative for congestion, ear pain, rhinorrhea, sinus pressure and sore throat.    Eyes:  Negative for visual disturbance.   Respiratory:  Negative for cough, chest tightness, shortness of breath and wheezing.    Cardiovascular:  Negative for chest pain, palpitations and leg swelling.   Gastrointestinal:  Negative for abdominal pain, blood in stool, constipation, diarrhea, nausea and vomiting.   Endocrine: Negative for polydipsia and polyuria.   Genitourinary:  Negative for dysuria and hematuria.   Musculoskeletal:  Negative for arthralgias and back pain.   Skin:  Negative for rash.   Neurological:  Negative for dizziness, light-headedness, numbness and headaches.   Psychiatric/Behavioral:  Negative for dysphoric mood and sleep disturbance. The patient is not nervous/anxious.         PHYSICAL EXAMINATION:     VITAL SIGNS:  /84   Pulse 104   Temp 97.3 °F (36.3 °C)   Resp 18   Wt 110 kg (242 lb 4.8 oz)   SpO2 96%   BMI 39.11 kg/m²     Physical Exam  Vitals and nursing note reviewed.   Constitutional:       Appearance: Normal appearance. She is well-developed. She is obese.   HENT:      Head: Normocephalic and  atraumatic.      Nose: Nose normal.      Mouth/Throat:      Mouth: Mucous membranes are moist.      Pharynx: No oropharyngeal exudate.   Eyes:      General: No scleral icterus.     Conjunctiva/sclera: Conjunctivae normal.      Pupils: Pupils are equal, round, and reactive to light.   Neck:      Thyroid: No thyromegaly.   Cardiovascular:      Rate and Rhythm: Normal rate. Rhythm irregular.      Heart sounds: Normal heart sounds. No murmur heard.    No friction rub. No gallop.   Pulmonary:      Effort: Pulmonary effort is normal. No respiratory distress.      Breath sounds: Normal breath sounds. No wheezing.   Abdominal:      General: Bowel sounds are normal. There is no distension.      Palpations: Abdomen is soft.      Tenderness: There is no abdominal tenderness.   Musculoskeletal:         General: No deformity or signs of injury.      Cervical back: Normal range of motion and neck supple.      Right lower leg: Edema present.      Left lower leg: Edema present.      Comments: Left upper extremity AV fistula    Lymphadenopathy:      Cervical: No cervical adenopathy.   Skin:     General: Skin is warm and dry.      Findings: No rash.   Neurological:      Mental Status: She is alert and oriented to person, place, and time.   Psychiatric:         Mood and Affect: Mood normal.         Behavior: Behavior normal.       RECORDS REVIEW:   Labs: 8/17/2023 CBC CMP reviewed  Hemoglobin 9.1, A1c 6.6    ASSESSMENT     Diagnoses and all orders for this visit:    1. 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 Essential hypertension    3. Gastroesophageal reflux disease with esophagitis and hemorrhage    4. Chronic diastolic congestive heart failure    5. ESRD (end stage renal disease)    6. Acquired hypothyroidism    7. Chronic anemia    8. Bilateral edema of lower extremity        PLAN  Anxiety  -Has been stable for quite a while on Lexapro and Seroquel.  After discussion, patient was open to the idea of reducing  medications.  Reduce Seroquel to 12.5 mg nightly for 1 week then discontinue.  In a couple of months, we can consider reducing Lexapro.    Type 2 diabetes mellitus with hypoglycemia  - Ozempic at current doses is working very well for the patient's glucose control.  -Continue Levemir 10 units daily with sliding scale.  Fortunately, she has not been requiring much sliding scale.    End-stage renal disease  - Continue hemodialysis as scheduled 3 times a week.  -CBC, CMP, A1c every 3 months.    Vaginal Bleeding  - CT imaging 5/7/22 was suggesting calcified uterine fibroids.  No signs of recurrence at this time.  We will continue to monitor.     Diarrhea  -  C. difficile was negative, appears to be secondary to dialysis.  Continue supportive care.     Bilateral Lower Extremity Edema  -Fair control with elevation and compression     Acute blood loss anemia secondary to esophageal ulcers  -Cont iron supplements.  CBC being monitored by nephrology regularly.      GERD  - Continue PPI.     Diabetic gastroparesis  - Well controlled with Reglan.     Essential hypertension  - Stable controlled with current medication regimen.     Atrial fibrillation  - Stable control of rhythm.  Continue cardiac medications.  - Continue Eliquis.     Hypothyroidism  - TSH is at goal.  Continue current dose of Synthroid.        [x]  Discussed Patient in detail with nursing/staff, addressed all needs today.     [x]  Plan of Care Reviewed   []  PT/OT Reviewed   [x]  Order Changes  []  Discharge Plans Reviewed  [x]  Advance Directive on file with Nursing Home.   [x]  POA on file with Nursing Home.    [x]  Code Status listed and reviewed.     I confirm accuracy of unchanged data/findings including physical exam and plan which have been carried forward from previous visit, as well as I have updated appropriately those that have changed        Rafiq Baron DO.  9/12/2023     no

## 2023-09-19 NOTE — PROVIDER CONTACT NOTE (OTHER) - ACTION/TREATMENT ORDERED:
NP made aware. NP to order labs. Continue to monitor patient.

## 2023-11-08 NOTE — PROVIDER CONTACT NOTE (OTHER) - BACKGROUND
"GERD (Adult)    The esophagus is a tube that carries food from the mouth to the stomach. A valve at the lower end of the esophagus prevents stomach acid from flowing upward. When this valve doesn't work properly, stomach contents may repeatedly flow back up (reflux) into the esophagus. This is called gastroesophageal reflux disease (GERD). GERD can irritate the esophagus. It can cause problems with swallowing or breathing. In severe cases, GERD can cause recurrent pneumonia or other serious problems.  Symptoms of reflux include burning, pressure or sharp pain in the upper abdomen or mid to lower chest. The pain can spread to the neck, back, or shoulder. There may be belching, an acid taste in the back of the throat, chronic cough, or sore throat or hoarseness. GERD symptoms often occur during the day after a big meal. They can also occur at night when lying down.   Home care  Lifestyle changes can help reduce symptoms. If needed, medicines may be prescribed. Symptoms often improve with treatment, but if treatment is stopped, the symptoms often return after a few months. So most persons with GERD will need to continue treatment.  Lifestyle changes  Limit or avoid fatty, fried, and spicy foods, as well as coffee, chocolate, mint, and foods with high acid content such as tomatoes and citrus fruit and juices (orange, grapefruit, lemon).  Dont eat large meals, especially at night. Frequent, smaller meals are best. Do not lie down right after eating. And dont eat anything 3 hours before going to bed.  Avoid drinking alcohol and smoking. As much as possible, stay away from second hand smoke.  If you are overweight, losing weight will reduce symptoms.   Avoid wearing tight clothing around your stomach area.  If your symptoms occur during sleep, use a foam wedge to elevate your upper body (not just your head.) Or, place 4" blocks under the head of your bed.  Medicines  If needed, medicines can help relieve the symptoms of " GERD and prevent damage to the esophagus. Discuss a medicine plan with your healthcare provider. This may include one or more of the following medicines:  Antacids to help neutralize the normal acids in your stomach.  Acid blockers (H2 blockers) to decrease acid production.  Acid inhibitors (PPIs) to decrease acid production in a different way than the blockers. They may work better, but can take a little longer to take effect.  Take an antacid 30-60 minutes after eating and at bedtime, but not at the same time as an acid blocker.  Try not to take medicines such as ibuprofen and aspirin. If you are taking aspirin for your heart or other medical reasons, talk to your healthcare provider about stopping it.  Follow-up care  Follow up with your healthcare provider or as advised by our staff.  When to seek medical advice  Call your healthcare provider if any of the following occur:  Stomach pain gets worse or moves to the lower right abdomen (appendix area)  Chest pain appears or gets worse, or spreads to the back, neck, shoulder, or arm  Frequent vomiting (cant keep down liquids)  Blood in the stool or vomit (red or black in color)  Feeling weak or dizzy  Fever of 100.4ºF (38ºC) or higher, or as directed by your healthcare provider  Date Last Reviewed: 6/23/2015  © 5581-9238 [employer]. [address] [place] [address], [place] [address]. All rights reserved. This information is not intended as a substitute for professional medical care. Always follow your healthcare professional's instructions.           Abdominal Pain    Abdominal pain is pain in the stomach or belly area. Everyone has this pain from time to time. In many cases it goes away on its own. But abdominal pain can sometimes be due to a serious problem, such as appendicitis. So its important to know when to seek help.  Causes of abdominal pain  There are many possible causes of abdominal pain. Common causes in adults include:  Constipation, diarrhea,  or gas  Stomach acid flowing back up into the esophagus (acid reflux or heartburn)  Severe acid reflux, called GERD (gastroesophageal reflux disease)  A sore in the lining of the stomach or small intestine (peptic ulcer)  Inflammation of the gallbladder, liver, or pancreas  Gallstones or kidney stones  Appendicitis   Intestinal blockage   An internal organ pushing through a muscle or other tissue (hernia)  Urinary tract infections  In women, menstrual cramps, fibroids, or endometriosis  Inflammation or infection of the intestines  Diagnosing the cause of abdominal pain  Your healthcare provider will do a physical exam help find the cause of your pain. If needed, tests will be ordered. Belly pain has many possible causes. So it can be hard to find the reason for your pain. Giving details about your pain can help. Tell your provider where and when you feel the pain, and what makes it better or worse. Also let your provider know if you have other symptoms such as:  Fever  Tiredness  Upset stomach (nausea)  Vomiting  Changes in bathroom habits  Treating abdominal pain  Some causes of pain need emergency medical treatment right away. These include appendicitis or a bowel blockage. Other problems can be treated with rest, fluids, or medicines. Your healthcare provider can give you specific instructions for treatment or self-care based on what is causing your pain.  If you have vomiting or diarrhea, sip water or other clear fluids. When you are ready to eat solid foods again, start with small amounts of easy-to-digest, low-fat foods. These include apple sauce, toast, or crackers.   When to seek medical care  Call 911 or go to the hospital right away if you:  Cant pass stool and are vomiting  Are vomiting blood or have bloody diarrhea or black, tarry diarrhea  Have chest, neck, or shoulder pain  Feel like you might pass out  Have pain in your shoulder blades with nausea  Have sudden, severe belly pain  Have new, severe  pain unlike any you have felt before  Have a belly that is rigid, hard, and tender to touch  Call your healthcare provider if you have:  Pain for more than 5 days  Bloating for more than 2 days  Diarrhea for more than 5 days  A fever of 100.4°F (38.0°C) or higher, or as directed by your provider  Pain that gets worse  Weight loss for no reason  Continued lack of appetite  Blood in your stool  How to prevent abdominal pain  Here are some tips to help prevent abdominal pain:  Eat smaller amounts of food at one time.  Avoid greasy, fried, or other high-fat foods.  Avoid foods that give you gas.  Exercise regularly.  Drink plenty of fluids.  To help prevent GERD symptoms:  Quit smoking.  Reduce alcohol and certain foods that increase stomach acid.  Avoid aspirin and over-the-counter pain and fever medicines (NSAIDS or nonsteroidal anti-inflammatory drugs), if possible  Lose extra weight.  Finish eating at least 2 hours before you go to bed or lie down.  Raise the head of your bed.  Date Last Reviewed: 7/1/2016 © 2000-2017 [employer]. [address] [place] [address]. All rights reserved. This information is not intended as a substitute for professional medical care. Always follow your healthcare professional's instructions.         Constipation (Adult)  Constipation means that you have bowel movements that are less frequent than usual. Stools often become very hard and difficult to pass.  Constipation is very common. At some point in life it affects almost everyone. Since everyone's bowel habits are different, what is constipation to one person may not be to another. Your healthcare provider may do tests to diagnose constipation. It depends on what he or she finds when evaluating you.    Symptoms of constipation include:  Abdominal pain  Bloating  Vomiting  Painful bowel movements  Itching, swelling, bleeding, or pain around the anus  Causes  Constipation can have many causes. These  Pt came in for low back pain include:  Diet low in fiber  Too much dairy  Not drinking enough liquids  Lack of exercise or physical activity. This is especially true for older adults.  Changes in lifestyle or daily routine, including pregnancy, aging, work, and travel  Frequent use or misuse of laxatives  Ignoring the urge to have a bowel movement or delaying it until later  Medicines, such as certain prescription pain medicines, iron supplements, antacids, certain antidepressants, and calcium supplements  Diseases like irritable bowel syndrome, bowel obstructions, stroke, diabetes, thyroid disease, Parkinson disease, hemorrhoids, and colon cancer  Complications  Potential complications of constipation can include:  Hemorrhoids  Rectal bleeding from hemorrhoids or anal fissures (skin tears)  Hernias  Dependency on laxatives  Chronic constipation  Fecal impaction  Bowel obstruction or perforation  Home care  All treatment should be done after talking with your healthcare provider. This is especially true if you have another medical problems, are taking prescription medicines, or are an older adult. Treatment most often involves lifestyle changes. You may also need medicines. Your healthcare provider will tell you which will work best for you. Follow the advice below to help avoid this problem in the future.  Lifestyle changes  These lifestyle changes can help prevent constipation:  Diet. Eat a high-fiber diet, with fresh fruit and vegetables, and reduce dairy intake, meats, and processed foods  Fluids. It's important to get enough fluids each day. Drink plenty of water when you eat more fiber. If you are on diet that limits the amount of fluid you can have, talk about this with your healthcare provider.  Regular exercise. Check with your healthcare provider first.  Medications  Take any medicines as directed. Some laxatives are safe to use only every now and then. Others can be taken on a regular basis. Talk with your doctor or pharmacist if you  have questions.  Prescription pain medicines can cause constipation. If you are taking this kind of medicine, ask your healthcare provider if you should also take a stool softener.  Medicines you may take to treat constipation include:  Fiber supplements  Stool softeners  Laxatives  Enemas  Rectal suppositories  Follow-up care  Follow up with your healthcare provider if symptoms don't get better in the next few days. You may need to have more tests or see a specialist.  Call 911  Call 911 if any of these occur:  Trouble breathing  Stiff, rigid abdomen that is severely painful to touch  Confusion  Fainting or loss of consciousness  Rapid heart rate  Chest pain  When to seek medical advice  Call your healthcare provider right away if any of these occur:  Fever over 100.4°F (38°C)  Failure to resume normal bowel movements  Pain in your abdomen or back gets worse  Nausea or vomiting  Swelling in your abdomen  Blood in the stool  Black, tarry stool  Involuntary weight loss  Weakness  Date Last Reviewed: 12/30/2015  © 6995-0137 [employer]. [address] [place] [address], [place] [address]. All rights reserved. This information is not intended as a substitute for professional medical care. Always follow your healthcare professional's instructions.

## 2023-11-21 NOTE — PATIENT PROFILE ADULT - NSPROMEDSHERBAL_GEN_A_NUR
Initiate Treatment: Clobetasol 0.05% scalp solution 2-3 times per week, can apply 1-2 times daily until clear (no more than 2 weeks/month), OTC Head & Shoulders shampoo: leave on for 5 mins before washing off
Plan: Last visit with Dr. Iraheta pt says she never ended up picking the Rx. We re-sent the rx today and also gave her a printed copy in case there are any issues. Follow up if condition worsening or not improving with treatment
Render In Strict Bullet Format?: No
Detail Level: Simple
no

## 2023-12-08 NOTE — H&P ADULT - NSHPPOAURINARYCATHETER_GEN_ALL_CORE
no Works as a  at [employer]. Works from home. Lives alone. No children. Works as a  at [employer]. Works from home. Lives alone. No children.

## 2023-12-18 NOTE — ED STATDOCS - MUSCULOSKELETAL NEGATIVE STATEMENT, MLM
Established Patient    Referred by: No referring provider defined for this encounter. CHIEF COMPLAINT: Lesion of concern     HISTORY OF PRESENT ILLNESS: Aj [place] is a 76year old male here for evaluation of lesion of concern. 1. Growth   Location: left forearm  Duration: 1 week  Signs and symptoms: none  Current treatment: none  Past treatments: none      Personal Dermatologic History  History of skin cancer: Yes--BCC  History of  atypical moles: No    FAMILY HISTORY:  History of melanoma: No    Past Medical History  Past Medical History:   Diagnosis Date    Asthma     BCC (basal cell carcinoma) 2021    right medial cheek    Bell's palsy     Calculus of kidney     Cancer ([facility] [address] May, 2019    Bladder cancer. Patient of Dr. Anayeli Srinivasan    Diabetes [facility])     Essential hypertension     Glaucoma ~2015 12/31/20 1st visit w/ MARIBEL, patient was on Latanoprost qhs OU since around 2015 per Dr. Danna Olmstead     High blood pressure     High cholesterol     Hyperlipidemia     Kidney stone     Pyloric stenosis     Gavino Hunt syndrome (geniculate herpes zoster) 2016    Rx with prednisone    Screen for colon cancer 2021    repeat CLN in 5-7 years    Skin cancer 2021    left shoulder        REVIEW OF SYSTEMS:  Constitutional: Denies fever, chills, unintentional weight loss. Skin as per HPI    Medications  Current Outpatient Medications   Medication Sig Dispense Refill    JANUVIA 100 MG Oral Tab TAKE 1 TABLET DAILY 90 tablet 3    Fenofibrate 134 MG Oral Cap Take 134 mg by mouth daily. 90 capsule 0    metFORMIN  MG Oral Tablet 24 Hr Take 2 tablets (1,000 mg total) by mouth 2 (two) times daily with meals. 360 tablet 0    levothyroxine 25 MCG Oral Tab Take 1 tablet (25 mcg total) by mouth before breakfast. 90 tablet 1    lisinopril 40 MG Oral Tab Take 1 tablet (40 mg total) by mouth daily. 90 tablet 0    atenolol 50 MG Oral Tab Take 1 tablet (50 mg total) by mouth 2 (two) times daily.  180 tablet 1 azelastine 0.1 % Nasal Solution 2 sprays by Nasal route 2 (two) times daily. 30 mL 3    Glucose Blood (FREESTYLE LITE TEST) In Vitro Strip Use test strips to check blood sugar 3 times per day as directed. 300 strip 1    FreeStyle Lancets Does not apply Misc Use lancets to check blood sugar 3 times per day as directed. 300 each 1    hydrALAZINE 10 MG Oral Tab Take 1 tablet (10 mg total) by mouth 3 (three) times daily. 270 tablet 3    Icosapent Ethyl (VASCEPA) 1 g Oral Cap Take 1 capsule by mouth 2 (two) times daily. 180 capsule 3    amLODIPine 5 MG Oral Tab Take 1 tablet (5 mg total) by mouth 2 (two) times daily. 180 tablet 3    meclizine 25 MG Oral Tab Take 1 tablet (25 mg total) by mouth 3 (three) times daily as needed. 15 tablet 0    latanoprost 0.005 % Ophthalmic Solution INSTILL 1 DROP INTO BOTH EYES EVERY EVEING 3 each 3    pravastatin 40 MG Oral Tab Take 1 tablet (40 mg total) by mouth nightly. 90 tablet 1    ketoconazole 2 % External Cream Apply to feet 3 times weekly 60 g 11    glimepiride 1 MG Oral Tab Take 1 tablet (1 mg total) by mouth daily with breakfast.      montelukast 10 MG Oral Tab Take 1 tablet (10 mg total) by mouth nightly. 30 tablet 3       PHYSICAL EXAM:  General: awake, alert, no acute distress  Neuropsych: appropriate mood and affect  Eyes: Sclerae anicteric, without conjunctival injection, eyelids unremarkable  Skin: Skin exam was performed today including the following: face, chest, and L forearm. Pertinent findings include:   - cheat and face with stellate light brown macules and cherry red papules    ASSESSMENT & PLAN:  Pathophysiology of diagnoses discussed with patient. Therapeutic options reviewed. Risks, benefits, and alternatives discussed with patient. Instructions reviewed at length. #Lentigines  #Cherry angiomas   - Reassurance provided regarding the benign nature of these lesions.  - Discussed that treatment considered cosmetic and not covered by insurance.      #Neoplasm(s) of uncertain behavior of skin  - Shave biopsy performed today   - Will notify patient with results and arrange for appropriate definitive treatment, if indicated. Shave of lesion to establish and confirm diagnosis:  Photo taken: Yes    Risks, benefits, alternatives and personnel required for shave biopsy reviewed with patient. Risks discussed include, but not limited to: pain, bleeding, infection, scar, reaction to anesthetic, and recurrence/need for further treatment. Patient and physician agree as to site(s) to be biopsied. Patient verbalizes understanding and wishes to proceed. Site(s) prepped with alcohol and anesthetized with 1% lidocaine with epinephrine. Shave of lesion(s) performed to the level of the dermis. Specimen(s) from A. L forearm  sent for pathology to r/o  cherry angioma  50% ALCL and bandaging applied. Written and verbal wound care instructions provided to patient, understanding verbalized.       Return to clinic: pending biopsy and yearly for skin check or sooner if something concerning arises     Fabio Flores MD no back pain, no gout, no musculoskeletal pain, no neck pain, and no weakness.

## 2024-01-25 NOTE — PATIENT PROFILE ADULT - BRAND OF FIRST COVID-19 BOOSTER
[employer]
What Type Of Note Output Would You Prefer (Optional)?: Bullet Format
Hpi Title: Evaluation of Skin Lesions
Family Member: Grandfather

## 2024-01-28 NOTE — PHYSICAL THERAPY INITIAL EVALUATION ADULT - DID THE PATIENT HAVE SURGERY?
yes/S/p Endovascular Abdominal Aortic Aneurysm Mother S/p Endovascular Abdominal Aortic Aneurysm Repair/yes

## 2024-03-13 NOTE — ED ADULT NURSE NOTE - NSIMPLEMENTINTERV_GEN_ALL_ED
No
Implemented All Fall Risk Interventions:  [place] to call system. Call bell, personal items and telephone within reach. Instruct patient to call for assistance. Room bathroom lighting operational. Non-slip footwear when patient is off stretcher. Physically safe environment: no spills, clutter or unnecessary equipment. Stretcher in lowest position, wheels locked, appropriate side rails in place. Provide visual cue, wrist band, yellow gown, etc. Monitor gait and stability. Monitor for mental status changes and reorient to person, place, and time. Review medications for side effects contributing to fall risk. Reinforce activity limits and safety measures with patient and family.

## 2024-03-26 NOTE — ED PROVIDER NOTE - PHYSICAL EXAMINATION
3/26/2024         RE: Jon Toribio  [address] [facility] [address]        Dear Colleague,    Thank you for referring your patient, Jon Toribio, to the [facility]. Please see a copy of my visit note below.    FOOT AND ANKLE SURGERY/PODIATRY CONSULT NOTE         ASSESSMENT:   Turf toe left great toe      TREATMENT:  I recommended the patient ice and take ibuprofen as needed.  He may continue to participate in soccer without restriction.  I told the patient that his pain should improve over the next several weeks.  If he notices an increase in pain, redness, swelling he is to return to clinic for follow-up visit.        HPI:Jon Toribio presented to the clinic today along with his father complaining of some mild to moderate pain on the top of the first metatarsal phalangeal joint left foot.  The patient stated he has had this pain for several weeks.  He participates in soccer.  There is a supportive choice.  He stated he believes he injured the toe while playing.  He does not recall any severe direct trauma to the toe.  He describes it as a aching pain when he hyperextends the toe.  He has no pain when flexing the toe.  He has not had any associated redness or swelling.  He has no pain while resting.  The pain is a slightly aggravated with weightbearing ambulation and while running.  He denies any other previous treatment    Past Medical History:   Diagnosis Date    AOM (acute otitis media) 7 months    RAD (reactive airway disease)     Uncomplicated asthma 2009    It went away       Social History     Socioeconomic History    Marital status: Single     Spouse name: Not on file    Number of children: Not on file    Years of education: Not on file    Highest education level: Not on file   Occupational History    Not on file   Tobacco Use    Smoking status: Never     Passive exposure: Never    Smokeless tobacco: Never   Substance and Sexual Activity     Alcohol use: No    Drug use: No    Sexual activity: Never   Other Topics Concern     Service Not Asked    Blood Transfusions Not Asked    Caffeine Concern Not Asked    Occupational Exposure Not Asked    Hobby Hazards Not Asked    Sleep Concern Not Asked    Stress Concern Not Asked    Weight Concern Not Asked    Special Diet Not Asked    Back Care Not Asked    Exercise Not Asked    Bike Helmet Not Asked    Seat Belt Yes    Self-Exams Not Asked   Social History Narrative    Lives at home with MOM and DAD,  Is at  5 days a week with about 5 other kids     Social Determinants of Health     Financial Resource Strain: Not on file   Food Insecurity: Not on file   Transportation Needs: Not on file   Physical Activity: Not on file   Stress: Not on file   Interpersonal Safety: Not on file   Housing Stability: Unknown (6/6/2022)    Housing Stability Vital Sign     Unable to Pay for Housing in the Last Year: No     Number of Places Lived in the Last Year: Not on file     Unstable Housing in the Last Year: No        No Known Allergies       Current Outpatient Medications:     Fluocinolone Acetonide Scalp 0.01 % OIL oil, Use once weekly at night, Disp: 120 mL, Rfl: 1    ketoconazole (NIZORAL) 2 % external shampoo, Use twice weekly for 4 weeks.  Leave on for 5 min before rinsing off., Disp: 120 mL, Rfl: 1     Family History   Problem Relation Age of Onset    Cancer Maternal Grandmother         lung, dx 40    Cancer Paternal Grandmother         Social History     Socioeconomic History    Marital status: Single     Spouse name: Not on file    Number of children: Not on file    Years of education: Not on file    Highest education level: Not on file   Occupational History    Not on file   Tobacco Use    Smoking status: Never     Passive exposure: Never    Smokeless tobacco: Never   Substance and Sexual Activity    Alcohol use: No    Drug use: No    Sexual activity: Never   Other Topics Concern     Service Not  Asked    Blood Transfusions Not Asked    Caffeine Concern Not Asked    Occupational Exposure Not Asked    Hobby Hazards Not Asked    Sleep Concern Not Asked    Stress Concern Not Asked    Weight Concern Not Asked    Special Diet Not Asked    Back Care Not Asked    Exercise Not Asked    Bike Helmet Not Asked    Seat Belt Yes    Self-Exams Not Asked   Social History Narrative    Lives at home with MOM and DAD,  Is at  5 days a week with about 5 other kids     Social Determinants of Health     Financial Resource Strain: Not on file   Food Insecurity: Not on file   Transportation Needs: Not on file   Physical Activity: Not on file   Stress: Not on file   Interpersonal Safety: Not on file   Housing Stability: Unknown (6/6/2022)    Housing Stability Vital Sign     Unable to Pay for Housing in the Last Year: No     Number of Places Lived in the Last Year: Not on file     Unstable Housing in the Last Year: No        Review of Systems - Patient denies fever, chills, rash, wound, stiffness, limping, numbness, weakness, heart burn, blood in stool, chest pain with activity, calf pain when walking, shortness of breath with activity, chronic cough, easy bleeding/bruising, swelling of ankles, excessive thirst, fatigue, depression, anxiety.  Patient admits to painful first metatarsophalangeal joint left foot.      OBJECTIVE:  Appearance: alert, well appearing, and in no distress.    /74   Pulse 57   Resp 12   SpO2 99%      There is no height or weight on file to calculate BMI.     General appearance: Patient is alert and fully cooperative with history & exam.  No sign of distress is noted during the visit.  Psychiatric: Affect is pleasant & appropriate.  Patient appears motivated to improve health.  Respiratory: Breathing is regular & unlabored while sitting.  HEENT: Hearing is intact to spoken word.  Speech is clear.  No gross evidence of visual impairment that would impact ambulation.    Vascular: Dorsalis pedis  and posterior tibial pulses are palpable. There is pedal hair growth bilaterally.  CFT < 3 sec from anterior tibial surface to distal digits bilaterally. There is no appreciable edema noted.  Dermatologic: Turgor and texture are within normal limits. No coloration or temperature changes. No primary or secondary lesions noted.  Neurologic: All epicritic and proprioceptive sensations are grossly intact bilaterally.  Musculoskeletal: All active and passive ankle, subtalar, midtarsal, and 1st MPJ range of motion are grossly intact without pain or crepitus, with the exception of none. Manual muscle strength is within normal limits bilaterally. All dorsiflexors, plantarflexors, invertors, evertors are intact bilaterally. Tenderness present to the dorsal aspect of the first metatarsal phalangeal joint left foot on palpation. Tenderness to the dorsal aspect of the first metatarsal phalangeal joint left foot with range of motion. Calf is soft/non-tender without warmth/induration    Imaging:       No images are attached to the encounter or orders placed in the encounter.     No results found.   No results found.         Elliot Abrams DPM  [facility] Foot & Ankle Surgery/Podiatry         Again, thank you for allowing me to participate in the care of your patient.        Sincerely,        Elliot Interiano DPM   Resting tremor of BUE; finger to nose intact with tremor; 3/5 strength in LLE which pt states is chronic

## 2024-05-09 NOTE — ED ADULT TRIAGE NOTE - INTERNATIONAL TRAVEL
Encounter date: 4:56 AM 2024    Source of History   Patient    Chief Complaint   Pt presents with:   Dental Pain (Woke up w/ pain to the R lower jaw)      History Of Present Illness   Tricia Reynoso is a 28 y.o. female with history of eczema, who presents to the ED with facial pain beginning one day ago. She reports that her chin and neck have swollen and that it has become painful for her to swallow. She notes that she went to the orthodontist 4 days ago but did not receive any concerns. She denies any sore throat, fever, dysuria, chest pain, shortness of breath, cough, dysuria, abdominal pain or recent sick contacts.    This is the extent to the patients complaints today here in the emergency department.  Past History   Review of patient's allergies indicates:  No Known Allergies    No current facility-administered medications on file prior to encounter.     Current Outpatient Medications on File Prior to Encounter   Medication Sig Dispense Refill    boric acid 600 mg vaginal suppository Unwrap and insert 1(600 mg) suppository vaginally nightly for 14 days. Refrigerate and dispose after 180 days 14 suppository 0    ferrous sulfate 325 (65 FE) MG EC tablet Take 1 tablet (325 mg total) by mouth once daily. 30 tablet 4    norethindrone-ethinyl estradiol (JUNEL FE ) 1 mg-20 mcg (21)/75 mg (7) per tablet Take 1 tablet by mouth once daily. 28 tablet 12    sertraline (ZOLOFT) 50 MG tablet Take 1 tablet (50 mg total) by mouth once daily. 30 tablet 11       As per HPI and below:  Past Medical History:   Diagnosis Date     history     Anemia     Chlamydia     Eczema     Gonorrhea     Trichomoniasis      Past Surgical History:   Procedure Laterality Date     SECTION N/A 10/3/2023    Procedure:  SECTION;  Surgeon: Alyx Samuel MD;  Location: [facility] L&D;  Service: OB/GYN;  Laterality: N/A;    THERAPEUTIC   2021       Social History     Socioeconomic History    Marital  "status: Single   Tobacco Use    Smoking status: Former    Smokeless tobacco: Never   Substance and Sexual Activity    Alcohol use: Not Currently    Drug use: Not Currently     Types: Marijuana    Sexual activity: Yes     Partners: Male     Birth control/protection: None     Social Determinants of Health     Financial Resource Strain: Medium Risk (2/28/2024)    Received from [facility]    Overall Financial Resource Strain (CARDIA)     Difficulty of Paying Living Expenses: Somewhat hard   Food Insecurity: No Food Insecurity (2/28/2024)    Received from [facility]    Hunger Vital Sign     Worried About Running Out of Food in the Last Year: Never true     Ran Out of Food in the Last Year: Never true   Transportation Needs: Unmet Transportation Needs (2/28/2024)    Received from [facility]    PRAPARE - Transportation     Lack of Transportation (Medical): Yes     Lack of Transportation (Non-Medical): Yes   Physical Activity: Inactive (2/28/2024)    Received from [facility]    Exercise Vital Sign     Days of Exercise per Week: 0 days     Minutes of Exercise per Session: 0 min   Stress: Stress Concern Present (2/28/2024)    Received from [facility]    [employer] [place] [employer] - Occupational Stress Questionnaire     Feeling of Stress : Rather much   Housing Stability: Unknown (2/22/2023)    Housing Stability Vital Sign     Unable to Pay for Housing in the Last Year: No     Unstable Housing in the Last Year: No       Family History   Problem Relation Name Age of Onset    Breast cancer Neg Hx      Colon cancer Neg Hx      Ovarian cancer Neg Hx         Physical Exam     Vitals:    05/09/24 0452   BP: 134/83   BP Location: Left arm   Patient Position: Sitting   Pulse: (!) 118   Resp: 20   Temp: 99.1 °F (37.3 °C)   TempSrc: Oral   SpO2: 97%   Weight: 46.3 kg (102 lb)   Height: 5' 1" (1.549 m)     Physical Exam:   Nursing note and vitals reviewed.  Appearance:  Well-appearing, nontoxic female in no acute " respiratory distress.  Making purposeful movements.  Speaking in full sentences.  Skin: No obvious rashes seen.  Good turgor.  No abrasions.  No ecchymoses.  Eyes: No conjunctival injection. EOMI, PERRL.  Head: NC/AT  ENT:  Uvula midline, tolerating secretions, noted 3 finger trismus.  Tenderness to palpation under submandibular space.  No tonsillar exudates or erythema.  Chest: CTAB. No increased work of breathing, bilateral chest rise. No stridor.  Cardiovascular: Regular rate and rhythm.  Normal equal bilateral radial pulses.  Abdomen: Soft.  Not distended.  Nontender.  No guarding.  No rebound. No Masses  Musculoskeletal: No obvious deformities.   Neck supple, full range of motion, no obvious deformity.   No tenderness to palpation of cervical through lumbar spine.  No step-offs or deformities. Good range of motion all joints.  Neurologic: Moves all extremities.  Normal sensation.  No facial droop.  Normal speech.    Mental Status:  Alert and oriented x 3.  Appropriate, conversant.      Results and Medications    Procedures    Labs Reviewed   CBC W/ AUTO DIFFERENTIAL - Abnormal; Notable for the following components:       Result Value    Hemoglobin 11.1 (*)     Hematocrit 34.5 (*)     MCH 26.7 (*)     RDW 16.9 (*)     Immature Granulocytes 0.6 (*)     Gran # (ANC) 9.3 (*)     Immature Grans (Abs) 0.07 (*)     Gran % 77.7 (*)     Lymph % 13.7 (*)     All other components within normal limits   COMPREHENSIVE METABOLIC PANEL - Abnormal; Notable for the following components:    Potassium 3.3 (*)     CO2 21 (*)     All other components within normal limits   ISTAT CREATININE - Abnormal; Notable for the following components:    POC Creatinine 0.4 (*)     All other components within normal limits   GROUP A STREP, MOLECULAR   LACTIC ACID, PLASMA   SARS-COV-2 RDRP GENE   POCT INFLUENZA A/B MOLECULAR   POCT URINE PREGNANCY       Imaging Results              CT Soft Tissue Neck With Contrast (In process)  Result time  05/09/24 06:18:27                        Medications   sodium chloride 0.9% bolus 1,000 mL 1,000 mL (1,000 mLs Intravenous New Bag 5/9/24 0623)   LIDOcaine viscous HCl 2% oral solution 5 mL (5 mLs Oral Given 5/9/24 0542)   acetaminophen tablet 1,000 mg (1,000 mg Oral Given 5/9/24 0542)   dexAMETHasone injection 12 mg (12 mg Intravenous Given 5/9/24 0619)   ketorolac injection 15 mg (15 mg Intravenous Given 5/9/24 0621)   iohexoL (OMNIPAQUE 350) injection 75 mL (75 mLs Intravenous Given 5/9/24 0633)       MDM, Impression and Plan   Clinical Tests:   Lab Tests: Ordered and Reviewed  Radiological Study: Ordered and Reviewed  Medical Tests: Ordered and Reviewed    Differential diagnosis:  PTA verses RPA verses strep pharyngitis versus COVID versus flu versus Reuben angina    Initial Assessment & ED Management:    Urgent evaluation of 28 y.o. female with history as above who presents to the ED with R. Sided facial pain and difficulty swallowing beginning one day ago. Upon arrival to the ED, she presents hemodynamically stable and afebrile with no respiratory distress, slightly tachycardic.  Workup began with Tylenol, viscous lidocaine and a L of fluids.  She was noted to be strep COVID and flu negative.  Urine pregnancy negative.  CBC shows hemoglobin within normal limits and no leukocytosis.  CMP grossly unrevealing.  Signed out to oncoming ED attending pending CT soft tissue of the neck as well as lactic acid.  Please see their note for final disposition      Medical Decision Making  Amount and/or Complexity of Data Reviewed  Labs: ordered.  Radiology: ordered.    Risk  OTC drugs.  Prescription drug management.           Please see ED course for discussion of workup and dispo if not listed above.          Final diagnoses:  [J02.9] Sore throat (Primary)         IClaire, scribed for, and in the presence of, Perla Billingsley MD. I performed the scribed service and the documentation accurately describes the services I  performed. I attest to the accuracy of the note.      Physician Attestation for Scribe: I, Perla Billingsley MD , reviewed documentation as scribed in my presence, which is both accurate and complete.    ED Course as of 05/09/24 0638   Thu May 09, 2024   0627 Pt signed out to Dr. Hadley pending labs and ct soft tissue neck with contrast please see his note for final disposition.  [HM]      ED Course User Index  [HM] Perla Billingsley MD Heyward Mack, MD Mack, Heyward B, MD  05/09/24 0638     No

## 2024-06-07 NOTE — ED PROVIDER NOTE - PRINCIPAL DIAGNOSIS
Discharge instructions given and explained to patient and family with verbalization of understanding all instructions. Patients v/s stable, denies n/v and tolerating po, rates pain level tolerable, IV removed, and family at bedside for patient discharge home.    GIB (gastrointestinal bleeding)

## 2025-02-04 NOTE — ED PROVIDER NOTE - CARE PLAN
----- Message from Shelly BUSH RN sent at 2/4/2025  8:20 AM CST -----  Regarding: RE: CONSULT  Ok to schedule week of 2/17 or after  Shelly  ----- Message -----  From: Elena Padilla  Sent: 2/4/2025   7:57 AM CST  To: Shelly Serrano RN; #  Subject: CONSULT                                          New referral for Dr. Lindsey.  Please review and advise on scheduling.   Principal Discharge DX:	Fever  Secondary Diagnosis:	RASHAUN (acute kidney injury)   1 Principal Discharge DX:	Fever  Secondary Diagnosis:	Acute UTI

## 2025-06-26 NOTE — PROGRESS NOTE ADULT - SUBJECTIVE AND OBJECTIVE BOX
Cheif complaints and Diagnosis: sepsis / uti     Subjective: no complaints      REVIEW OF SYSTEMS:    CONSTITUTIONAL: No weakness, fevers or chills  EYES/ENT: No visual changes;  No vertigo or throat pain   NECK: No pain or stiffness  RESPIRATORY: No cough, wheezing, hemoptysis; No shortness of breath  CARDIOVASCULAR: No chest pain or palpitations  GASTROINTESTINAL: No abdominal or epigastric pain. No nausea, vomiting, or hematemesis; No diarrhea or constipation. No melena or hematochezia.  GENITOURINARY: No dysuria, frequency or hematuria  NEUROLOGICAL: No numbness or weakness  SKIN: No itching, burning, rashes, or lesions   All other review of systems is negative unless indicated above      Vital Signs Last 24 Hrs  T(C): 36.7 (05 Sep 2021 07:31), Max: 37.4 (04 Sep 2021 23:33)  T(F): 98.1 (05 Sep 2021 07:), Max: 99.4 (04 Sep 2021 23:33)  HR: 74 (05 Sep 2021 07:) (69 - 78)  BP: 135/53 (05 Sep 2021 07:) (108/34 - 142/61)  BP(mean): --  RR: 18 (05 Sep 2021 07:) (18 - 19)  SpO2: 98% (05 Sep 2021 07:) (97% - 100%)    HEENT:   pupils equal and reactive, EOMI, no oropharyngeal lesions, erythema, exudates, oral thrush    NECK:   supple, no carotid bruits, no palpable lymph nodes, no thyromegaly    CV:  +S1, +S2, regular, no murmurs or rubs    RESP:   lungs clear to auscultation bilaterally, no wheezing, rales, rhonchi, good air entry bilaterally    BREAST:  not examined    GI:  abdomen soft, non-tender, non-distended, normal BS, no bruits, no abdominal masses, no palpable masses    RECTAL:  not examined    :  not examined    MSK:   normal muscle tone, no atrophy, no rigidity, no contractions    EXT:   no clubbing, no cyanosis, no edema, no calf pain, swelling or erythema    VASCULAR:  pulses equal and symmetric in the upper and lower extremities    NEURO:  AAOX3, no focal neurological deficits, follows all commands, able to move extremities spontaneously    SKIN:  no ulcers, lesions or rashes    MEDICATIONS  (STANDING):  artificial  tears Solution 1 Drop(s) Both EYES three times a day  aspirin enteric coated 81 milliGRAM(s) Oral daily  atorvastatin 40 milliGRAM(s) Oral at bedtime  cefepime   IVPB 2000 milliGRAM(s) IV Intermittent every 24 hours  clopidogrel Tablet 75 milliGRAM(s) Oral daily  DULoxetine 60 milliGRAM(s) Oral daily  famotidine    Tablet 20 milliGRAM(s) Oral daily  ferrous    sulfate 325 milliGRAM(s) Oral daily  gabapentin 300 milliGRAM(s) Oral two times a day  metoprolol succinate ER 75 milliGRAM(s) Oral daily  pantoprazole    Tablet 40 milliGRAM(s) Oral two times a day  tamsulosin 0.4 milliGRAM(s) Oral at bedtime    MEDICATIONS  (PRN):  acetaminophen   Tablet .. 650 milliGRAM(s) Oral every 6 hours PRN Temp greater or equal to 38.5C (101.3F), Mild Pain (1 - 3)  aluminum hydroxide/magnesium hydroxide/simethicone Suspension 30 milliLiter(s) Oral every 4 hours PRN Dyspepsia  ondansetron Injectable 4 milliGRAM(s) IV Push every 8 hours PRN Nausea and/or Vomiting      Urinalysis Basic - ( 03 Sep 2021 11:15 )    Color: Yellow-Peach / Appearance: Clear / S.005 / pH: x  Gluc: x / Ketone: Negative  / Bili: Negative / Urobili: Negative mg/dL   Blood: x / Protein: 100 mg/dL / Nitrite: Negative   Leuk Esterase: Moderate / RBC: >50 /HPF / WBC >50   Sq Epi: x / Non Sq Epi: Occasional / Bacteria: Few    04 Sep 2021 08:01    144    |  111    |  36     ----------------------------<  98     3.8     |  30     |  2.12     Ca    8.6        04 Sep 2021 08:01    TPro  6.1    /  Alb  2.8    /  TBili  0.4    /  DBili  x      /  AST  12     /  ALT  12     /  AlkPhos  71     04 Sep 2021 08:01  LIVER FUNCTIONS - ( 04 Sep 2021 08:01 )  Alb: 2.8 g/dL / Pro: 6.1 gm/dL / ALK PHOS: 71 U/L / ALT: 12 U/L / AST: 12 U/L / GGT: x         PT/INR - ( 03 Sep 2021 11:15 )   PT: 12.9 sec;   INR: 1.12 ratio         PTT - ( 03 Sep 2021 11:15 )  PTT:29.4 secCBC Full  -  ( 04 Sep 2021 08:01 )  WBC Count : 7.90 K/uL  Hemoglobin : 8.1 g/dL  Hematocrit : 26.8 %  Platelet Count - Automated : 191 K/uL  Mean Cell Volume : 92.1 fl  Mean Cell Hemoglobin : 27.8 pg  Mean Cell Hemoglobin Concentration : 30.2 gm/dL          PT/INR - ( 03 Sep 2021 11:15 )   PT: 12.9 sec;   INR: 1.12 ratio         PTT - ( 03 Sep 2021 11:15 )  PTT:29.4 sec        Assessment and Plan:     89 y/o M PMHx significant for CAD, s/p PCI w/ stent placement, Hypertension, Hyperlipidemia, CKD stage 3, AAA, PAD, OA, BPH, BIBA from [place] for further evaluation and management of c/o subjective fevers (Tmax 103.1'F), chills, rigors, increased lethargy, and c/o generalized abdominal pain/chest pain.. As noted in prior documentation patient unable to give further hx.  Labs => WBC 14.58, Hgb/Hct 10.1/32.8, HCO3 32, BUN/Cr 39/2.36, UA (+). CT Chest/ABD/Pelvis => Interval development of mild hydronephrosis and moderate right-sided hydroureter. The ureter appears to implant in the lateral aspect of the bladder rather than posteriorly. Please correlate clinically. There is bladder wall thickening predominantly laterally and superiorly. This may be due to cystitis, muscular hypertrophy and or other etiologies. This is limited in evaluation without contrast and underlying lesion cannot be excluded. Further evaluation with bladder ultrasound is recommended if patient cannot receive iodinated contrast. Small right pelvic sidewall lymph nodes are not considered significant by size criteria although have mildly increased in size when compared with the prior examination. If there is a primary neoplasm, metastatic disease could not be excluded on the basis of this study.     US Kidney/Bladder => Bilateral renal cortical thinning compatible with chronic kidney disease. Mild right hydroureteronephrosis to the level of the UVJ where there is irregular bladder wall thickening. Consider CT urogram or cystoscopy for further evaluation.     #Sepsis due to Complicated UTI/Cystitis with right Hydronephrosis  -Right hydroureteronephrosis to the level of the UVJ   ~ Urology consult appreciated; reccomend bladder scan  -c/w cefepime  -blood cultuers negative  -urine cultrues growing contaminant  -ultrasound and CT with right hydro. and irregular bladd wall thickening  -patient is urinating great, no issues; continue with condom catheter, very good urine output      #RASHAUN on CKD3  ~will hold Furosemide (takes 40mg po bid)  ~strict I/Os  ~please reassess further in the am  ~daily weights    #CAD/Hypertension  ~cont. ASA 81mg po daily  ~cont. Clopidogrel 75mg po daily  ~cont. Metoprolol ER 50mg po daily    #Hyperlipidemia  ~cont. Atorvastatin 40mg po qhs    #BPH  ~cont. Tamsulosin 0.4mg po qhs    #Vte ppx  ~IMPROVE Vte Risk Score is 2  ~cont. Heparin sq    
Date of service: 21 @ 10:14    Lying in bed in NAD  Weak looking  Has right lower leg tenderness    ROS: no fever or chills; denies dizziness, no HA, no SOB or cough, no abdominal pain, no diarrhea or constipation; no dysuria    MEDICATIONS  (STANDING):  artificial  tears Solution 1 Drop(s) Both EYES three times a day  aspirin enteric coated 81 milliGRAM(s) Oral daily  atorvastatin 40 milliGRAM(s) Oral at bedtime  cefepime   IVPB 2000 milliGRAM(s) IV Intermittent every 24 hours  clopidogrel Tablet 75 milliGRAM(s) Oral daily  doxycycline hyclate Capsule 100 milliGRAM(s) Oral every 12 hours  DULoxetine 60 milliGRAM(s) Oral daily  famotidine    Tablet 20 milliGRAM(s) Oral daily  ferrous    sulfate 325 milliGRAM(s) Oral daily  furosemide    Tablet 40 milliGRAM(s) Oral two times a day  gabapentin 300 milliGRAM(s) Oral two times a day  heparin   Injectable 5000 Unit(s) SubCutaneous every 12 hours  metoprolol succinate ER 75 milliGRAM(s) Oral daily  pantoprazole    Tablet 40 milliGRAM(s) Oral two times a day  polyethylene glycol 3350 17 Gram(s) Oral daily  senna 2 Tablet(s) Oral at bedtime  tamsulosin 0.4 milliGRAM(s) Oral at bedtime    Vital Signs Last 24 Hrs  T(C): 36.6 (07 Sep 2021 08:04), Max: 36.9 (07 Sep 2021 00:10)  T(F): 97.8 (07 Sep 2021 08:04), Max: 98.4 (07 Sep 2021 00:10)  HR: 66 (07 Sep 2021 08:04) (63 - 69)  BP: 140/58 (07 Sep 2021 08:04) (120/42 - 149/59)  BP(mean): --  RR: 18 (07 Sep 2021 08:04) (18 - 18)  SpO2: 100% (07 Sep 2021 08:04) (99% - 100%)     Physical exam:    Constitutional:  No acute distress  HEENT: NC/AT, EOMI, PERRLA, conjunctivae clear; ears and nose atraumatic;  Neck: supple; thyroid not palpable  Back: no tenderness  Respiratory: respiratory effort normal; clear to auscultation  Cardiovascular: S1S2 regular, no murmurs  Abdomen: soft, not tender, not distended, positive BS  Genitourinary: no suprapubic tenderness  Lymphatic: no LN palpable  Musculoskeletal: no muscle tenderness, no joint swelling or tenderness  Extremities: 1+ pedal edema  right lateral , above right ankle area of edema and mild erythema - slightly improved  Neurological/ Psychiatric: alert, moving all extremities  Skin: no rashes; no palpable lesions    Labs: reviewed                        8.9    5.71  )-----------( 198      ( 06 Sep 2021 08:38 )             28.3     09-    142  |  108  |  32<H>  ----------------------------<  106<H>  3.8   |  31  |  1.97<H>    Ca    8.7      06 Sep 2021 08:38                                   8.1    7.90  )-----------( 191      ( 04 Sep 2021 08:01 )             26.8     09-04    144  |  111<H>  |  36<H>  ----------------------------<  98  3.8   |  30  |  2.12<H>    Ca    8.6      04 Sep 2021 08:01    TPro  6.1  /  Alb  2.8<L>  /  TBili  0.4  /  DBili  x   /  AST  12<L>  /  ALT  12  /  AlkPhos  71  09-04     LIVER FUNCTIONS - ( 04 Sep 2021 08:01 )  Alb: 2.8 g/dL / Pro: 6.1 gm/dL / ALK PHOS: 71 U/L / ALT: 12 U/L / AST: 12 U/L / GGT: x           Urinalysis Basic - ( 03 Sep 2021 11:15 )    Color: Yellow-Peach / Appearance: Clear / S.005 / pH: x  Gluc: x / Ketone: Negative  / Bili: Negative / Urobili: Negative mg/dL   Blood: x / Protein: 100 mg/dL / Nitrite: Negative   Leuk Esterase: Moderate / RBC: >50 /HPF / WBC >50   Sq Epi: x / Non Sq Epi: Occasional / Bacteria: Few    ( @ 11:15)  NotDetec      Culture - Urine (collected 03 Sep 2021 11:15)  Source: Clean Catch Clean Catch (Midstream)  Final Report (04 Sep 2021 10:50):    >=3 organisms. Probable collection contamination.    Culture - Blood (collected 03 Sep 2021 11:15)  Source: .Blood Blood-Peripheral  Preliminary Report (04 Sep 2021 15:05):    No growth to date.    Culture - Blood (collected 03 Sep 2021 11:15)  Source: .Blood Blood-Peripheral  Preliminary Report (04 Sep 2021 15:05):    No growth to date.    Radiology: all available radiological tests reviewed    < from: US Kidney and Bladder (21 @ 16:34) >  Bilateral renal cortical thinning compatible with chronic kidney disease.  Mild right hydroureteronephrosis to the level of the UVJ where there is irregular bladder wall thickening. Consider CT urogram or cystoscopy for further evaluation.  Septated cyst in the lower pole of the left kidney measuring 2.4 cm.  < end of copied text >    < from: CT Abdomen and Pelvis No Cont (21 @ 12:31) >  BLADDER: Wall thickening of the lateral aspect of the bladder bilaterally and superiorly. The bladder was collapsed on the prior examination which limits evaluation.  Interval development of mild hydronephrosis and moderate right-sided hydroureter. The ureter appears to implant in the lateral aspect of the bladder rather than posteriorly. Please correlate clinically. There is bladder wall thickening predominantly laterally and superiorly. This may be due to cystitis, muscular hypertrophy and or other etiologies. This is limited in evaluation without contrast and underlying lesion cannot be excluded. Further evaluation with bladder ultrasound is recommended if patient cannot receive iodinated contrast.  Small right pelvic sidewall lymph nodes are not considered significant by size criteria although have mildly increased in size when compared with the prior examination. If there is a primary neoplasm, metastatic disease could not be excluded on the basis of this study.  < end of copied text >    Advanced directives addressed: full resuscitation
HOSPITALIST PROGRESS NOTE:  SUBJECTIVE:  PCP:  Chief Complaint: Patient is a 89y old  Male who presents with a chief complaint of Fevers (07 Sep 2021 11:01)      HPI:  89 y/o M PMHx significant for CAD, s/p PCI w/ stent placement, Hypertension, Hyperlipidemia, CKD stage 3, AAA, PAD, OA, BPH, BIBA from [facility] for futher evaluation and management of c/o subjective fevers (Tmax 103.1'F), chills, rigors, increased lethargy, and c/o generalized abdominal pain/chest pain.. As noted in prior documentation patient unable to give further hx.  Labs => WBC 14.58, Hgb/Hct 10.1/32.8, HCO3 32, BUN/Cr 39/2.36, UA (+). CT Chest/ABD/Pelvis => Interval development of mild hydronephrosis and moderate right-sided hydroureter. The ureter appears to implant in the lateral aspect of the bladder rather than posteriorly. Please correlate clinically. There is bladder wall thickening predominantly laterally and superiorly. This may be due to cystitis, muscular hypertrophy and or other etiologies. This is limited in evaluation without contrast and underlying lesion cannot be excluded. Further evaluation with bladder ultrasound is recommended if patient cannot receive iodinated contrast. Small right pelvic sidewall lymph nodes are not considered significant by size criteria although have mildly increased in size when compared with the prior examination. If there is a primary neoplasm, metastatic disease could not be excluded on the basis of this study. US Kidney/Bladder => Bilateral renal cortical thinning compatible with chronic kidney disease. Mild right hydroureteronephrosis to the level of the UVJ where there is irregular bladder wall thickening. Consider CT urogram or cystoscopy for further evaluation. Septated cyst in the lower pole of the left kidney measuring 2.4 cm. In the ED the patient was given Acetaminophen 1g MN x 1, Cefepime 2g IVPB x 1, Vancomycin 1g IVPB x 1, and NaCl 2.3L x 1. (03 Sep 2021 18:12)    9/7: Discussed with son and does not want renal scan; patient has no compliaints other than arthritic pain       Allergies:  penicillin (Angioedema)    REVIEW OF SYSTEMS:  See HPI. All other review of systems is negative unless indicated above.     OBJECTIVE  Physical Exam:  Vital Signs:    Vital Signs Last 24 Hrs  T(C): 36.6 (07 Sep 2021 15:56), Max: 36.9 (07 Sep 2021 00:10)  T(F): 97.9 (07 Sep 2021 15:56), Max: 98.4 (07 Sep 2021 00:10)  HR: 69 (07 Sep 2021 15:56) (66 - 69)  BP: 103/46 (07 Sep 2021 15:56) (103/46 - 140/58)  BP(mean): --  RR: 18 (07 Sep 2021 15:56) (18 - 18)  SpO2: 97% (07 Sep 2021 15:56) (97% - 100%)  I&O's Summary    06 Sep 2021 07:01  -  07 Sep 2021 07:00  --------------------------------------------------------  IN: 0 mL / OUT: 700 mL / NET: -700 mL      Constitutional: NAD, awake and alert,   Neurological: no focal deficits  HEENT: PERRLA, EOMI, MMM  Neck: Soft and supple, No LAD, No JVD  Respiratory: Breath sounds are clear bilaterally, No wheezing, rales or rhonchi  Cardiovascular: S1 and S2, regular rate and rhythm; no Murmurs, gallops or rubs  Gastrointestinal: Bowel Sounds present, soft, nontender, nondistended, no guarding, no rebound tenderness  Back: No CVA tenderness   Extremities: No peripheral edema  Vascular: 2+ peripheral pulses  Musculoskeletal: 5/5 strength b/l upper and lower extremities  Skin: No rashes  Breast: Deferred  Rectal: Deferred    MEDICATIONS  (STANDING):  artificial  tears Solution 1 Drop(s) Both EYES three times a day  aspirin enteric coated 81 milliGRAM(s) Oral daily  atorvastatin 40 milliGRAM(s) Oral at bedtime  cefepime   IVPB 2000 milliGRAM(s) IV Intermittent every 24 hours  clopidogrel Tablet 75 milliGRAM(s) Oral daily  doxycycline hyclate Capsule 100 milliGRAM(s) Oral every 12 hours  DULoxetine 60 milliGRAM(s) Oral daily  famotidine    Tablet 20 milliGRAM(s) Oral daily  ferrous    sulfate 325 milliGRAM(s) Oral daily  furosemide    Tablet 40 milliGRAM(s) Oral two times a day  gabapentin 300 milliGRAM(s) Oral two times a day  heparin   Injectable 5000 Unit(s) SubCutaneous every 12 hours  metoprolol succinate ER 75 milliGRAM(s) Oral daily  pantoprazole    Tablet 40 milliGRAM(s) Oral two times a day  polyethylene glycol 3350 17 Gram(s) Oral daily  senna 2 Tablet(s) Oral at bedtime  tamsulosin 0.4 milliGRAM(s) Oral at bedtime      LABS: All Labs Reviewed:                        9.0    4.72  )-----------( 218      ( 07 Sep 2021 10:40 )             29.3     09-07    140  |  105  |  32<H>  ----------------------------<  130<H>  3.5   |  33<H>  |  1.99<H>    Ca    8.7      07 Sep 2021 10:40  Phos  2.7     09-07    TPro  x   /  Alb  2.9<L>  /  TBili  x   /  DBili  x   /  AST  x   /  ALT  x   /  AlkPhos  x   09-07    Blood Culture:   09-03 @ 11:15  Organism --  Gram Stain Blood -- Gram Stain --  Specimen Source .Blood Blood-Peripheral  Culture-Blood --        RADIOLOGY/EKG:    < from: Xray Chest 1 View- PORTABLE-Urgent (Xray Chest 1 View- PORTABLE-Urgent .) (03.31.21 @ 11:01) >    IMPRESSION: No acute finding or change.      < end of copied text >  < from: CT Abdomen and Pelvis No Cont (09.03.21 @ 12:31) >    IMPRESSION:  Interval development of mild hydronephrosis and moderate right-sided hydroureter. The ureter appears to implant in the lateral aspect of the bladder rather than posteriorly. Please correlate clinically. There is bladder wall thickening predominantly laterally and superiorly. This may be due to cystitis, muscular hypertrophy and or other etiologies. This is limited in evaluation without contrast and underlying lesion cannot be excluded. Further evaluation with bladder ultrasound is recommended if patient cannot receive iodinated contrast.    Small right pelvic sidewall lymph nodes are not considered significant by size criteria although have mildly increased in size when compared with the prior examination. If there is a primary neoplasm, metastatic disease could not be excluded on the basis of this study.    Hiatal hernia. Additional findings as above.      < end of copied text >      
Patient is a 89y Male who reports no complaints as new      MEDICATIONS  (STANDING):  artificial  tears Solution 1 Drop(s) Both EYES three times a day  aspirin enteric coated 81 milliGRAM(s) Oral daily  atorvastatin 40 milliGRAM(s) Oral at bedtime  cefepime   IVPB 2000 milliGRAM(s) IV Intermittent every 24 hours  clopidogrel Tablet 75 milliGRAM(s) Oral daily  doxycycline hyclate Capsule 100 milliGRAM(s) Oral every 12 hours  DULoxetine 60 milliGRAM(s) Oral daily  famotidine    Tablet 20 milliGRAM(s) Oral daily  ferrous    sulfate 325 milliGRAM(s) Oral daily  furosemide    Tablet 40 milliGRAM(s) Oral two times a day  gabapentin 300 milliGRAM(s) Oral two times a day  heparin   Injectable 5000 Unit(s) SubCutaneous every 12 hours  metoprolol succinate ER 75 milliGRAM(s) Oral daily  pantoprazole    Tablet 40 milliGRAM(s) Oral two times a day  polyethylene glycol 3350 17 Gram(s) Oral daily  senna 2 Tablet(s) Oral at bedtime  tamsulosin 0.4 milliGRAM(s) Oral at bedtime    MEDICATIONS  (PRN):  acetaminophen   Tablet .. 650 milliGRAM(s) Oral every 6 hours PRN Temp greater or equal to 38.5C (101.3F), Mild Pain (1 - 3)  aluminum hydroxide/magnesium hydroxide/simethicone Suspension 30 milliLiter(s) Oral every 4 hours PRN Dyspepsia  ondansetron Injectable 4 milliGRAM(s) IV Push every 8 hours PRN Nausea and/or Vomiting        PHYSICAL EXAM:    Constitutional: NAD, frail  HEENT:  MMM  dist  Cardiovascular: S1 and S2  Extremities:  peripheral edema  Neurological: A/O x 3  : No Flores  Skin: No rashes  Access: Not applicable        LABS:               141    |  104    |  43     ----------------------------<  135       09 Sep 2021 11:41  3.7     |  32     |  2.20     140    |  105    |  36     ----------------------------<  98        08 Sep 2021 09:10  4.0     |  34     |  2.01       Ca    8.8        09 Sep 2021 11:41    Phos  2.7       09 Sep 2021 11:41    Mg     2.3       09 Sep 2021 11:41    TPro  x      /  Alb  2.9    /  TBili  x      /        07 Sep 2021 10:40  DBili  x      /  AST  x      /  ALT  x      /  AlkPhos  x              Urine Studies:          RADIOLOGY & ADDITIONAL STUDIES:              
Patient seen at bedside, noted with munoz in place draining yellow urine. Patient denies any abd/flank pain, fevers or chills.    General: No distress, No anxiety  VITALS  T(C): 36.6 (09-07-21 @ 08:04), Max: 36.9 (09-07-21 @ 00:10)  HR: 66 (09-07-21 @ 08:04) (63 - 69)  BP: 140/58 (09-07-21 @ 08:04) (120/42 - 149/59)  RR: 18 (09-07-21 @ 08:04) (18 - 18)  SpO2: 100% (09-07-21 @ 08:04) (99% - 100%)            Skin     : No jaundice   HEENT: Normocephalic, no icterus , EOM full , No epistaxis  Lung    : No resp distress  Abdo:   : Soft, Non tender, No guarding, No distension   Back    : No CVAT b/l  Extremity: No calf tenderness   Genitalia Male: condom cath draining yellow urine              LABS               8.9    5.71  )-----------( 198      ( 06 Sep 2021 08:38 )             28.3   09-06    142  |  108  |  32<H>  ----------------------------<  106<H>  3.8   |  31  |  1.97<H>    Ca    8.7      06 Sep 2021 08:38    
  Assessment and Plan:   · Assessment	  89 y/o M PMHx significant for CAD, s/p PCI w/ stent placement, Hypertension, Hyperlipidemia, CKD stage 3, AAA, PAD, OA, BPH, BIBA from [place] for further evaluation and management of c/o subjective fevers (Tmax 103.1'F), chills, rigors, increased lethargy, and c/o generalized abdominal pain/chest pain.. As noted in prior documentation patient unable to give further hx.  Labs => WBC 14.58, Hgb/Hct 10.1/32.8, HCO3 32, BUN/Cr 39/2.36, UA (+). CT Chest/ABD/Pelvis => Interval development of mild hydronephrosis and moderate right-sided hydroureter. The ureter appears to implant in the lateral aspect of the bladder rather than posteriorly. Please correlate clinically. There is bladder wall thickening predominantly laterally and superiorly. This may be due to cystitis, muscular hypertrophy and or other etiologies. This is limited in evaluation without contrast and underlying lesion cannot be excluded. Further evaluation with bladder ultrasound is recommended if patient cannot receive iodinated contrast. Small right pelvic sidewall lymph nodes are not considered significant by size criteria although have mildly increased in size when compared with the prior examination. If there is a primary neoplasm, metastatic disease could not be excluded on the basis of   this study.     US Kidney/Bladder => Bilateral renal cortical thinning compatible with chronic kidney disease. Mild right hydroureteronephrosis to the level of the UVJ where there is irregular bladder wall thickening. Consider CT urogram or cystoscopy for further evaluation.     #Sepsis due to Complicated UTI/Cystitis with right Hydronephrosis  -Right hydroureteronephrosis to the level of the UVJ   ~ Urology consult appreciated; reccomend renal scan- family at this time refused; discussed with Urology and FU outpatient for Cystoscopy and repeat imaging   -c/w cefepime#6 and doxy#4 - FU with Dr julio vergara  -blood cultuers negative  -urine cultrues growing contaminant  -ultrasound and CT with right hydro. and irregular bladd wall thickening  -patient is urinating; continue with condom catheter,   -Bladder scan    #right lower extremity cellultiis  -s/p one dose of vancomycin  -continue with cefepime and doxy.    -recommend to observe patient 1-2 days to make sure cellulitis is improving.     #Dyspnea 2ndry to Chronic Diastolic CHF   -S/P IV lasix x 2 today  -Lasix 40mg pO Q12H  -monitor I&O, daily weights    #CKD3  -NO RASHAUN; baseline is 2.0; he is at baseline.   -Rise in Cr - FU jai     #CAD/Hypertension  ~cont. ASA 81mg po daily  ~cont. Clopidogrel 75mg po daily  ~cont. Metoprolol ER 50mg po daily    #Hyperlipidemia  ~cont. Atorvastatin 40mg po qhs    #BPH  ~cont. Tamsulosin 0.4mg po qhs    #Vte ppx  ~IMPROVE Vte Risk Score is 2  ~cont. Heparin sq        off serivce note:  patient here for "uti" however urine cultures are negative. He does have right leg cellultiis, that was worsening on cefepime. gave him one dose of vanco. d/w dr kim recco oral doxycycline.   urology recco renal scan for the hydronephrosis but family is deferring it as patient will require Bolus of 750 over an hour and patient has a hx of chf and required IV lasix during this admission;  patient is urinating fine, currently has condom cathter. Jump in cr please recheck jai and if stable can be discahrged to apex jai; also FU bladder scan  
Date of service: 09-10-21 @ 08:54    Lying in bed in NAD  Weak looking  Mild right lower leg edema and some erythema  Appetite is poor    ROS: no fever or chills; denies dizziness, no HA, no SOB or cough, no abdominal pain, no diarrhea or constipation; no dysuria    MEDICATIONS  (STANDING):  artificial  tears Solution 1 Drop(s) Both EYES three times a day  aspirin enteric coated 81 milliGRAM(s) Oral daily  atorvastatin 40 milliGRAM(s) Oral at bedtime  cefepime   IVPB 2000 milliGRAM(s) IV Intermittent every 24 hours  clopidogrel Tablet 75 milliGRAM(s) Oral daily  doxycycline hyclate Capsule 100 milliGRAM(s) Oral every 12 hours  DULoxetine 60 milliGRAM(s) Oral daily  famotidine    Tablet 20 milliGRAM(s) Oral daily  ferrous    sulfate 325 milliGRAM(s) Oral daily  furosemide    Tablet 40 milliGRAM(s) Oral two times a day  gabapentin 300 milliGRAM(s) Oral two times a day  heparin   Injectable 5000 Unit(s) SubCutaneous every 12 hours  lactobacillus acidophilus 1 Tablet(s) Oral two times a day  metoprolol succinate ER 75 milliGRAM(s) Oral daily  pantoprazole    Tablet 40 milliGRAM(s) Oral two times a day  polyethylene glycol 3350 17 Gram(s) Oral daily  senna 2 Tablet(s) Oral at bedtime  tamsulosin 0.4 milliGRAM(s) Oral at bedtime    Vital Signs Last 24 Hrs  T(C): 36.6 (10 Sep 2021 07:55), Max: 36.6 (10 Sep 2021 07:55)  T(F): 97.9 (10 Sep 2021 07:55), Max: 97.9 (10 Sep 2021 07:55)  HR: 68 (10 Sep 2021 07:55) (68 - 73)  BP: 129/57 (10 Sep 2021 07:55) (118/43 - 138/55)  BP(mean): 73 (10 Sep 2021 07:55) (73 - 73)  RR: 17 (10 Sep 2021 07:55) (17 - 18)  SpO2: 95% (10 Sep 2021 07:55) (95% - 99%)     Physical exam:    Constitutional:  No acute distress  HEENT: NC/AT, EOMI, PERRLA, conjunctivae clear; ears and nose atraumatic;  Neck: supple; thyroid not palpable  Back: no tenderness  Respiratory: respiratory effort normal; clear to auscultation  Cardiovascular: S1S2 regular, no murmurs  Abdomen: soft, not tender, not distended, positive BS  Genitourinary: no suprapubic tenderness  Lymphatic: no LN palpable  Musculoskeletal: no muscle tenderness, no joint swelling or tenderness  Extremities: 1+ pedal edema  right lateral , above right ankle area of edema and mild erythema - improved  Neurological/ Psychiatric: alert, moving all extremities  Skin: no rashes; no palpable lesions    Labs: reviewed    09-10    140  |  103  |  41<H>  ----------------------------<  97  3.6   |  32<H>  |  2.10<H>    Ca    8.9      10 Sep 2021 07:37  Phos  3.2     09-10  Mg     2.2     09-10    Ferritin, Serum: 73 ng/mL (21 @ 10:40)                        8.9    5.71  )-----------( 198      ( 06 Sep 2021 08:38 )             28.3         142  |  108  |  32<H>  ----------------------------<  106<H>  3.8   |  31  |  1.97<H>    Ca    8.7      06 Sep 2021 08:38                                   8.1    7.90  )-----------( 191      ( 04 Sep 2021 08:01 )             26.8     09-04    144  |  111<H>  |  36<H>  ----------------------------<  98  3.8   |  30  |  2.12<H>    Ca    8.6      04 Sep 2021 08:01    TPro  6.1  /  Alb  2.8<L>  /  TBili  0.4  /  DBili  x   /  AST  12<L>  /  ALT  12  /  AlkPhos  71  09-04     LIVER FUNCTIONS - ( 04 Sep 2021 08:01 )  Alb: 2.8 g/dL / Pro: 6.1 gm/dL / ALK PHOS: 71 U/L / ALT: 12 U/L / AST: 12 U/L / GGT: x           Urinalysis Basic - ( 03 Sep 2021 11:15 )    Color: Yellow-Peach / Appearance: Clear / S.005 / pH: x  Gluc: x / Ketone: Negative  / Bili: Negative / Urobili: Negative mg/dL   Blood: x / Protein: 100 mg/dL / Nitrite: Negative   Leuk Esterase: Moderate / RBC: >50 /HPF / WBC >50   Sq Epi: x / Non Sq Epi: Occasional / Bacteria: Few    ( @ 11:15)  NotDetec      Culture - Urine (collected 03 Sep 2021 11:15)  Source: Clean Catch Clean Catch (Midstream)  Final Report (04 Sep 2021 10:50):    >=3 organisms. Probable collection contamination.    Culture - Blood (collected 03 Sep 2021 11:15)  Source: .Blood Blood-Peripheral  Preliminary Report (04 Sep 2021 15:05):    No growth to date.    Culture - Blood (collected 03 Sep 2021 11:15)  Source: .Blood Blood-Peripheral  Preliminary Report (04 Sep 2021 15:05):    No growth to date.    Radiology: all available radiological tests reviewed    < from: US Kidney and Bladder (21 @ 16:34) >  Bilateral renal cortical thinning compatible with chronic kidney disease.  Mild right hydroureteronephrosis to the level of the UVJ where there is irregular bladder wall thickening. Consider CT urogram or cystoscopy for further evaluation.  Septated cyst in the lower pole of the left kidney measuring 2.4 cm.  < end of copied text >    < from: CT Abdomen and Pelvis No Cont (21 @ 12:31) >  BLADDER: Wall thickening of the lateral aspect of the bladder bilaterally and superiorly. The bladder was collapsed on the prior examination which limits evaluation.  Interval development of mild hydronephrosis and moderate right-sided hydroureter. The ureter appears to implant in the lateral aspect of the bladder rather than posteriorly. Please correlate clinically. There is bladder wall thickening predominantly laterally and superiorly. This may be due to cystitis, muscular hypertrophy and or other etiologies. This is limited in evaluation without contrast and underlying lesion cannot be excluded. Further evaluation with bladder ultrasound is recommended if patient cannot receive iodinated contrast.  Small right pelvic sidewall lymph nodes are not considered significant by size criteria although have mildly increased in size when compared with the prior examination. If there is a primary neoplasm, metastatic disease could not be excluded on the basis of this study.  < end of copied text >    Advanced directives addressed: full resuscitation
Date of service: 21 @ 09:49    Lying in bed in NAD  Weak looking  Has left shin mild erythema  Local tenderness    ROS: no fever or chills; denies dizziness, no HA, no SOB or cough, no abdominal pain, no diarrhea or constipation; no dysuria    MEDICATIONS  (STANDING):  artificial  tears Solution 1 Drop(s) Both EYES three times a day  aspirin enteric coated 81 milliGRAM(s) Oral daily  atorvastatin 40 milliGRAM(s) Oral at bedtime  cefepime   IVPB 2000 milliGRAM(s) IV Intermittent every 24 hours  clopidogrel Tablet 75 milliGRAM(s) Oral daily  doxycycline hyclate Capsule 100 milliGRAM(s) Oral every 12 hours  DULoxetine 60 milliGRAM(s) Oral daily  famotidine    Tablet 20 milliGRAM(s) Oral daily  ferrous    sulfate 325 milliGRAM(s) Oral daily  furosemide    Tablet 40 milliGRAM(s) Oral two times a day  gabapentin 300 milliGRAM(s) Oral two times a day  heparin   Injectable 5000 Unit(s) SubCutaneous every 12 hours  metoprolol succinate ER 75 milliGRAM(s) Oral daily  pantoprazole    Tablet 40 milliGRAM(s) Oral two times a day  polyethylene glycol 3350 17 Gram(s) Oral daily  senna 2 Tablet(s) Oral at bedtime  tamsulosin 0.4 milliGRAM(s) Oral at bedtime    Vital Signs Last 24 Hrs  T(C): 36.4 (09 Sep 2021 07:42), Max: 36.8 (08 Sep 2021 15:26)  T(F): 97.5 (09 Sep 2021 07:42), Max: 98.2 (08 Sep 2021 15:26)  HR: 75 (09 Sep 2021 07:42) (73 - 75)  BP: 142/57 (09 Sep 2021 07:42) (100/40 - 142/57)  BP(mean): --  RR: 18 (09 Sep 2021 07:42) (18 - 18)  SpO2: 96% (09 Sep 2021 07:42) (96% - 99%)     Physical exam:    Constitutional:  No acute distress  HEENT: NC/AT, EOMI, PERRLA, conjunctivae clear; ears and nose atraumatic;  Neck: supple; thyroid not palpable  Back: no tenderness  Respiratory: respiratory effort normal; clear to auscultation  Cardiovascular: S1S2 regular, no murmurs  Abdomen: soft, not tender, not distended, positive BS  Genitourinary: no suprapubic tenderness  Lymphatic: no LN palpable  Musculoskeletal: no muscle tenderness, no joint swelling or tenderness  Extremities: 1+ pedal edema  right lateral , above right ankle area of edema and mild erythema - improving  Neurological/ Psychiatric: alert, moving all extremities  Skin: no rashes; no palpable lesions    Labs: reviewed                        8.9    5.71  )-----------( 198      ( 06 Sep 2021 08:38 )             28.3     09-    142  |  108  |  32<H>  ----------------------------<  106<H>  3.8   |  31  |  1.97<H>    Ca    8.7      06 Sep 2021 08:38                                   8.1    7.90  )-----------( 191      ( 04 Sep 2021 08:01 )             26.8     09-    144  |  111<H>  |  36<H>  ----------------------------<  98  3.8   |  30  |  2.12<H>    Ca    8.6      04 Sep 2021 08:01    TPro  6.1  /  Alb  2.8<L>  /  TBili  0.4  /  DBili  x   /  AST  12<L>  /  ALT  12  /  AlkPhos  71  09-04     LIVER FUNCTIONS - ( 04 Sep 2021 08:01 )  Alb: 2.8 g/dL / Pro: 6.1 gm/dL / ALK PHOS: 71 U/L / ALT: 12 U/L / AST: 12 U/L / GGT: x           Urinalysis Basic - ( 03 Sep 2021 11:15 )    Color: Yellow-Peach / Appearance: Clear / S.005 / pH: x  Gluc: x / Ketone: Negative  / Bili: Negative / Urobili: Negative mg/dL   Blood: x / Protein: 100 mg/dL / Nitrite: Negative   Leuk Esterase: Moderate / RBC: >50 /HPF / WBC >50   Sq Epi: x / Non Sq Epi: Occasional / Bacteria: Few    ( @ 11:15)  NotDetec      Culture - Urine (collected 03 Sep 2021 11:15)  Source: Clean Catch Clean Catch (Midstream)  Final Report (04 Sep 2021 10:50):    >=3 organisms. Probable collection contamination.    Culture - Blood (collected 03 Sep 2021 11:15)  Source: .Blood Blood-Peripheral  Preliminary Report (04 Sep 2021 15:05):    No growth to date.    Culture - Blood (collected 03 Sep 2021 11:15)  Source: .Blood Blood-Peripheral  Preliminary Report (04 Sep 2021 15:05):    No growth to date.    Radiology: all available radiological tests reviewed    < from: US Kidney and Bladder (21 @ 16:34) >  Bilateral renal cortical thinning compatible with chronic kidney disease.  Mild right hydroureteronephrosis to the level of the UVJ where there is irregular bladder wall thickening. Consider CT urogram or cystoscopy for further evaluation.  Septated cyst in the lower pole of the left kidney measuring 2.4 cm.  < end of copied text >    < from: CT Abdomen and Pelvis No Cont (21 @ 12:31) >  BLADDER: Wall thickening of the lateral aspect of the bladder bilaterally and superiorly. The bladder was collapsed on the prior examination which limits evaluation.  Interval development of mild hydronephrosis and moderate right-sided hydroureter. The ureter appears to implant in the lateral aspect of the bladder rather than posteriorly. Please correlate clinically. There is bladder wall thickening predominantly laterally and superiorly. This may be due to cystitis, muscular hypertrophy and or other etiologies. This is limited in evaluation without contrast and underlying lesion cannot be excluded. Further evaluation with bladder ultrasound is recommended if patient cannot receive iodinated contrast.  Small right pelvic sidewall lymph nodes are not considered significant by size criteria although have mildly increased in size when compared with the prior examination. If there is a primary neoplasm, metastatic disease could not be excluded on the basis of this study.  < end of copied text >    Advanced directives addressed: full resuscitation
HOSPITALIST PROGRESS NOTE:  SUBJECTIVE:  PCP:  Chief Complaint: Patient is a 89y old  Male who presents with a chief complaint of Fevers (07 Sep 2021 11:01)      HPI:  89 y/o M PMHx significant for CAD, s/p PCI w/ stent placement, Hypertension, Hyperlipidemia, CKD stage 3, AAA, PAD, OA, BPH, BIBA from [facility] for futher evaluation and management of c/o subjective fevers (Tmax 103.1'F), chills, rigors, increased lethargy, and c/o generalized abdominal pain/chest pain.. As noted in prior documentation patient unable to give further hx.  Labs => WBC 14.58, Hgb/Hct 10.1/32.8, HCO3 32, BUN/Cr 39/2.36, UA (+). CT Chest/ABD/Pelvis => Interval development of mild hydronephrosis and moderate right-sided hydroureter. The ureter appears to implant in the lateral aspect of the bladder rather than posteriorly. Please correlate clinically. There is bladder wall thickening predominantly laterally and superiorly. This may be due to cystitis, muscular hypertrophy and or other etiologies. This is limited in evaluation without contrast and underlying lesion cannot be excluded. Further evaluation with bladder ultrasound is recommended if patient cannot receive iodinated contrast. Small right pelvic sidewall lymph nodes are not considered significant by size criteria although have mildly increased in size when compared with the prior examination. If there is a primary neoplasm, metastatic disease could not be excluded on the basis of this study. US Kidney/Bladder => Bilateral renal cortical thinning compatible with chronic kidney disease. Mild right hydroureteronephrosis to the level of the UVJ where there is irregular bladder wall thickening. Consider CT urogram or cystoscopy for further evaluation. Septated cyst in the lower pole of the left kidney measuring 2.4 cm. In the ED the patient was given Acetaminophen 1g NM x 1, Cefepime 2g IVPB x 1, Vancomycin 1g IVPB x 1, and NaCl 2.3L x 1. (03 Sep 2021 18:12)    9/7: Discussed with son and does not want renal scan; patient has no compliaints other than arthritic pain   9/8:  This morning patient was short of brath; lasix 20mg IV was odered by me;  Paient has no other complaints. Jump in cr this morning       Allergies:  penicillin (Angioedema)    REVIEW OF SYSTEMS:  See HPI. All other review of systems is negative unless indicated above.     OBJECTIVE  Physical Exam:  Vital Signs Last 24 Hrs  T(C): 36.6 (08 Sep 2021 07:41), Max: 37.1 (07 Sep 2021 23:19)  T(F): 97.8 (08 Sep 2021 07:41), Max: 98.7 (07 Sep 2021 23:19)  HR: 69 (08 Sep 2021 09:23) (67 - 71)  BP: 137/56 (08 Sep 2021 09:23) (103/46 - 146/57)  BP(mean): --  RR: 18 (08 Sep 2021 07:41) (18 - 18)  SpO2: 96% (08 Sep 2021 07:41) (96% - 97%)      Constitutional: NAD, awake and alert,   Neurological: no focal deficits  HEENT: PERRLA, EOMI, MMM  Neck: Soft and supple, No LAD, No JVD  Respiratory: Breath sounds are clear bilaterally, No wheezing, rales or rhonchi  Cardiovascular: S1 and S2, regular rate and rhythm; no Murmurs, gallops or rubs  Gastrointestinal: Bowel Sounds present, soft, nontender, nondistended, no guarding, no rebound tenderness  Back: No CVA tenderness   Extremities: No peripheral edema  Vascular: 2+ peripheral pulses  Musculoskeletal: 5/5 strength b/l upper and lower extremities  Skin: No rashes  Breast: Deferred  Rectal: Deferred    MEDICATIONS  (STANDING):  artificial  tears Solution 1 Drop(s) Both EYES three times a day  aspirin enteric coated 81 milliGRAM(s) Oral daily  atorvastatin 40 milliGRAM(s) Oral at bedtime  cefepime   IVPB 2000 milliGRAM(s) IV Intermittent every 24 hours  clopidogrel Tablet 75 milliGRAM(s) Oral daily  doxycycline hyclate Capsule 100 milliGRAM(s) Oral every 12 hours  DULoxetine 60 milliGRAM(s) Oral daily  famotidine    Tablet 20 milliGRAM(s) Oral daily  ferrous    sulfate 325 milliGRAM(s) Oral daily  furosemide    Tablet 40 milliGRAM(s) Oral two times a day  gabapentin 300 milliGRAM(s) Oral two times a day  heparin   Injectable 5000 Unit(s) SubCutaneous every 12 hours  metoprolol succinate ER 75 milliGRAM(s) Oral daily  pantoprazole    Tablet 40 milliGRAM(s) Oral two times a day  polyethylene glycol 3350 17 Gram(s) Oral daily  senna 2 Tablet(s) Oral at bedtime  tamsulosin 0.4 milliGRAM(s) Oral at bedtime      LABS: All Labs Reviewed:                        9.0    4.72  )-----------( 218      ( 07 Sep 2021 10:40 )             29.3     09-07    140  |  105  |  32<H>  ----------------------------<  130<H>  3.5   |  33<H>  |  1.99<H>    Ca    8.7      07 Sep 2021 10:40  Phos  2.7     09-07    TPro  x   /  Alb  2.9<L>  /  TBili  x   /  DBili  x   /  AST  x   /  ALT  x   /  AlkPhos  x   09-07    Blood Culture:   09-03 @ 11:15  Organism --  Gram Stain Blood -- Gram Stain --  Specimen Source .Blood Blood-Peripheral  Culture-Blood --        RADIOLOGY/EKG:    < from: Xray Chest 1 View- PORTABLE-Urgent (Xray Chest 1 View- PORTABLE-Urgent .) (03.31.21 @ 11:01) >    IMPRESSION: No acute finding or change.      < end of copied text >  < from: CT Abdomen and Pelvis No Cont (09.03.21 @ 12:31) >    IMPRESSION:  Interval development of mild hydronephrosis and moderate right-sided hydroureter. The ureter appears to implant in the lateral aspect of the bladder rather than posteriorly. Please correlate clinically. There is bladder wall thickening predominantly laterally and superiorly. This may be due to cystitis, muscular hypertrophy and or other etiologies. This is limited in evaluation without contrast and underlying lesion cannot be excluded. Further evaluation with bladder ultrasound is recommended if patient cannot receive iodinated contrast.    Small right pelvic sidewall lymph nodes are not considered significant by size criteria although have mildly increased in size when compared with the prior examination. If there is a primary neoplasm, metastatic disease could not be excluded on the basis of this study.    Hiatal hernia. Additional findings as above.      < end of copied text >      
Patient is a 89y Male who reports no complaints as new      MEDICATIONS  (STANDING):  artificial  tears Solution 1 Drop(s) Both EYES three times a day  aspirin enteric coated 81 milliGRAM(s) Oral daily  atorvastatin 40 milliGRAM(s) Oral at bedtime  cefepime   IVPB 2000 milliGRAM(s) IV Intermittent every 24 hours  clopidogrel Tablet 75 milliGRAM(s) Oral daily  doxycycline hyclate Capsule 100 milliGRAM(s) Oral every 12 hours  DULoxetine 60 milliGRAM(s) Oral daily  famotidine    Tablet 20 milliGRAM(s) Oral daily  ferrous    sulfate 325 milliGRAM(s) Oral daily  furosemide    Tablet 40 milliGRAM(s) Oral two times a day  gabapentin 300 milliGRAM(s) Oral two times a day  heparin   Injectable 5000 Unit(s) SubCutaneous every 12 hours  lactobacillus acidophilus 1 Tablet(s) Oral two times a day  metoprolol succinate ER 75 milliGRAM(s) Oral daily  pantoprazole    Tablet 40 milliGRAM(s) Oral two times a day  polyethylene glycol 3350 17 Gram(s) Oral daily  senna 2 Tablet(s) Oral at bedtime  tamsulosin 0.4 milliGRAM(s) Oral at bedtime    MEDICATIONS  (PRN):  acetaminophen   Tablet .. 650 milliGRAM(s) Oral every 6 hours PRN Temp greater or equal to 38.5C (101.3F), Mild Pain (1 - 3)  aluminum hydroxide/magnesium hydroxide/simethicone Suspension 30 milliLiter(s) Oral every 4 hours PRN Dyspepsia  ondansetron Injectable 4 milliGRAM(s) IV Push every 8 hours PRN Nausea and/or Vomiting      Vital Signs Last 24 Hrs  T(C): 36.6 (10 Sep 2021 15:39), Max: 36.6 (10 Sep 2021 07:55)  T(F): 97.9 (10 Sep 2021 15:39), Max: 97.9 (10 Sep 2021 07:55)  HR: 93 (10 Sep 2021 15:39) (68 - 93)  BP: 126/58 (10 Sep 2021 15:39) (118/52 - 138/55)  BP(mean): 73 (10 Sep 2021 07:55) (73 - 73)  RR: 16 (10 Sep 2021 15:39) (16 - 17)  SpO2: 98% (10 Sep 2021 15:39) (95% - 98%    PHYSICAL EXAM:    Constitutional: NAD, frail  HEENT:  MMM  dist  Cardiovascular: S1 and S2  Extremities:  peripheral edema  Neurological: A/O x 3  : No Sandra  )Skin: No rashes  Access: Not applicable        LABS:                   140    |  103    |  41     ----------------------------<  97        10 Sep 2021 07:37  3.6     |  32     |  2.10     141    |  104    |  43     ----------------------------<  135       09 Sep 2021 11:41  3.7     |  32     |  2.20     140    |  105    |  36     ----------------------------<  98        08 Sep 2021 09:10  4.0     |  34     |  2.01     Ca    8.9        10 Sep 2021 07:37  Ca    8.8        09 Sep 2021 11:41    Phos  3.2       10 Sep 2021 07:37  Phos  2.7       09 Sep 2021 11:41    Mg     2.2       10 Sep 2021 07:37  Mg     2.3       09 Sep 2021 11:41    TPro  x      /  Alb  2.9    /  TBili  x      /        07 Sep 2021 10:40  DBili  x      /  AST  x      /  ALT  x      /  AlkPhos  x              RADIOLOGY & ADDITIONAL STUDIES:              
Cheif complaints and Diagnosis: lower extremity cellulitis/ uti/ right hydronephrosis    Subjective: no complaints       REVIEW OF SYSTEMS:    CONSTITUTIONAL: No weakness, fevers or chills  EYES/ENT: No visual changes;  No vertigo or throat pain   NECK: No pain or stiffness  RESPIRATORY: No cough, wheezing, hemoptysis; No shortness of breath  CARDIOVASCULAR: No chest pain or palpitations  GASTROINTESTINAL: No abdominal or epigastric pain. No nausea, vomiting, or hematemesis; No diarrhea or constipation. No melena or hematochezia.  GENITOURINARY: No dysuria, frequency or hematuria  NEUROLOGICAL: No numbness or weakness  SKIN: No itching, burning, rashes, or lesions   All other review of systems is negative unless indicated above      Vital Signs Last 24 Hrs  T(C): 36.6 (06 Sep 2021 07:40), Max: 36.9 (06 Sep 2021 00:05)  T(F): 97.8 (06 Sep 2021 07:40), Max: 98.5 (06 Sep 2021 00:05)  HR: 74 (06 Sep 2021 07:40) (65 - 74)  BP: 143/51 (06 Sep 2021 07:40) (130/56 - 145/59)  BP(mean): --  RR: 18 (06 Sep 2021 07:40) (18 - 18)  SpO2: 94% (06 Sep 2021 07:40) (94% - 98%)    HEENT:   pupils equal and reactive, EOMI, no oropharyngeal lesions, erythema, exudates, oral thrush    NECK:   supple, no carotid bruits, no palpable lymph nodes, no thyromegaly    CV:  +S1, +S2, regular, no murmurs or rubs    RESP:   lungs clear to auscultation bilaterally, no wheezing, rales, rhonchi, good air entry bilaterally    BREAST:  not examined    GI:  abdomen soft, non-tender, non-distended, normal BS, no bruits, no abdominal masses, no palpable masses    RECTAL:  not examined    :  not examined    MSK:   normal muscle tone, no atrophy, no rigidity, no contractions    EXT:   no clubbing, no cyanosis, no edema, no calf pain, swelling or erythema    VASCULAR:  pulses equal and symmetric in the upper and lower extremities    NEURO:  AAOX3, no focal neurological deficits, follows all commands, able to move extremities spontaneously    SKIN:  no ulcers, lesions or rashes    MEDICATIONS  (STANDING):  artificial  tears Solution 1 Drop(s) Both EYES three times a day  aspirin enteric coated 81 milliGRAM(s) Oral daily  atorvastatin 40 milliGRAM(s) Oral at bedtime  cefepime   IVPB 2000 milliGRAM(s) IV Intermittent every 24 hours  clopidogrel Tablet 75 milliGRAM(s) Oral daily  doxycycline hyclate Capsule 100 milliGRAM(s) Oral every 12 hours  DULoxetine 60 milliGRAM(s) Oral daily  famotidine    Tablet 20 milliGRAM(s) Oral daily  ferrous    sulfate 325 milliGRAM(s) Oral daily  furosemide   Injectable 40 milliGRAM(s) IV Push two times a day  gabapentin 300 milliGRAM(s) Oral two times a day  heparin   Injectable 5000 Unit(s) SubCutaneous every 12 hours  metoprolol succinate ER 75 milliGRAM(s) Oral daily  pantoprazole    Tablet 40 milliGRAM(s) Oral two times a day  polyethylene glycol 3350 17 Gram(s) Oral daily  senna 2 Tablet(s) Oral at bedtime  tamsulosin 0.4 milliGRAM(s) Oral at bedtime    MEDICATIONS  (PRN):  acetaminophen   Tablet .. 650 milliGRAM(s) Oral every 6 hours PRN Temp greater or equal to 38.5C (101.3F), Mild Pain (1 - 3)  aluminum hydroxide/magnesium hydroxide/simethicone Suspension 30 milliLiter(s) Oral every 4 hours PRN Dyspepsia  ondansetron Injectable 4 milliGRAM(s) IV Push every 8 hours PRN Nausea and/or Vomiting      06 Sep 2021 08:38    142    |  108    |  32     ----------------------------<  106    3.8     |  31     |  1.97     Ca    8.7        06 Sep 2021 08:38    TPro  6.3    /  Alb  2.8    /  TBili  0.2    /  DBili  x      /  AST  16     /  ALT  14     /  AlkPhos  70     05 Sep 2021 08:50  LIVER FUNCTIONS - ( 05 Sep 2021 08:50 )  Alb: 2.8 g/dL / Pro: 6.3 gm/dL / ALK PHOS: 70 U/L / ALT: 14 U/L / AST: 16 U/L / GGT: x         CBC Full  -  ( 06 Sep 2021 08:38 )  WBC Count : 5.71 K/uL  Hemoglobin : 8.9 g/dL  Hematocrit : 28.3 %  Platelet Count - Automated : 198 K/uL  Mean Cell Volume : 89.8 fl  Mean Cell Hemoglobin : 28.3 pg  Mean Cell Hemoglobin Concentration : 31.4 gm/dL  Auto Neutrophil # : x  Auto Lymphocyte # : x  Auto Monocyte # : x  Auto Eosinophil # : x  Auto Basophil # : x  Auto Neutrophil % : x  Auto Lymphocyte % : x  Auto Monocyte % : x  Auto Eosinophil % : x  Auto Basophil % : x            Assessment and Plan:     89 y/o M PMHx significant for CAD, s/p PCI w/ stent placement, Hypertension, Hyperlipidemia, CKD stage 3, AAA, PAD, OA, BPH, BIBA from [facility] [place] for further evaluation and management of c/o subjective fevers (Tmax 103.1'F), chills, rigors, increased lethargy, and c/o generalized abdominal pain/chest pain.. As noted in prior documentation patient unable to give further hx.  Labs => WBC 14.58, Hgb/Hct 10.1/32.8, HCO3 32, BUN/Cr 39/2.36, UA (+). CT Chest/ABD/Pelvis => Interval development of mild hydronephrosis and moderate right-sided hydroureter. The ureter appears to implant in the lateral aspect of the bladder rather than posteriorly. Please correlate clinically. There is bladder wall thickening predominantly laterally and superiorly. This may be due to cystitis, muscular hypertrophy and or other etiologies. This is limited in evaluation without contrast and underlying lesion cannot be excluded. Further evaluation with bladder ultrasound is recommended if patient cannot receive iodinated contrast. Small right pelvic sidewall lymph nodes are not considered significant by size criteria although have mildly increased in size when compared with the prior examination. If there is a primary neoplasm, metastatic disease could not be excluded on the basis of   this study.     US Kidney/Bladder => Bilateral renal cortical thinning compatible with chronic kidney disease. Mild right hydroureteronephrosis to the level of the UVJ where there is irregular bladder wall thickening. Consider CT urogram or cystoscopy for further evaluation.     #Sepsis due to Complicated UTI/Cystitis with right Hydronephrosis  -Right hydroureteronephrosis to the level of the UVJ   ~ Urology consult appreciated; reccomend renal scan.   -c/w cefepime  -blood cultuers negative  -urine cultrues growing contaminant  -ultrasound and CT with right hydro. and irregular bladd wall thickening  -patient is urinating great, no issues; continue with condom catheter, very good urine output  -however will need munoz insertion immediately before the renal scan and then discontinued.   -renal consult pending to see if renal scan will be helpful      #right lower extremity cellultiis  -ss/p one dose of vancomycin  -d/w Dr. Kim, reccomend to start doxycycline.   -recoomend to observe patietn 1-2 days to make sure cellulitis is improving.     #CKD3  -NO RASHAUN; baseline is 2.0; he is at baseline.       #CAD/Hypertension  ~cont. ASA 81mg po daily  ~cont. Clopidogrel 75mg po daily  ~cont. Metoprolol ER 50mg po daily    #Hyperlipidemia  ~cont. Atorvastatin 40mg po qhs    #BPH  ~cont. Tamsulosin 0.4mg po qhs    #Vte ppx  ~IMPROVE Vte Risk Score is 2  ~cont. Heparin sq        off serivce note:  patient here for "uti" however urine cultures are negative. He does have lower extremity , right leg cellultiis, that was worsening on cefepime. gave him one dose of vanco. d/w dr kim recco oral doxycycline.   recco to observe him for 1-2 days and see if cellulitis is improving before discharge.   urology recco renal scan for the hydronephrosis? patient is urinating fine, no issues at all. ashlee has confom cathetr, and we know urine output is great.  for renal scan he will need munoz cathetr inserted before he goes down. f/u nephrolgy  
Cheif complaints and Diagnosis: sepsis/ UTI/ irregular wall thickening/ right hydronephrosis    Subjective: no complaints      REVIEW OF SYSTEMS:    CONSTITUTIONAL: No weakness, fevers or chills  EYES/ENT: No visual changes;  No vertigo or throat pain   NECK: No pain or stiffness  RESPIRATORY: No cough, wheezing, hemoptysis; No shortness of breath  CARDIOVASCULAR: No chest pain or palpitations  GASTROINTESTINAL: No abdominal or epigastric pain. No nausea, vomiting, or hematemesis; No diarrhea or constipation. No melena or hematochezia.  GENITOURINARY: No dysuria, frequency or hematuria  NEUROLOGICAL: No numbness or weakness  SKIN: No itching, burning, rashes, or lesions   All other review of systems is negative unless indicated above      Vital Signs Last 24 Hrs  T(C): 36.4 (04 Sep 2021 07:35), Max: 38.4 (03 Sep 2021 22:47)  T(F): 97.6 (04 Sep 2021 07:35), Max: 101.2 (03 Sep 2021 22:47)  HR: 76 (04 Sep 2021 09:54) (72 - 88)  BP: 108/34 (04 Sep 2021 09:54) (102/59 - 125/75)  BP(mean): --  RR: 19 (04 Sep 2021 07:35) (18 - 20)  SpO2: 96% (04 Sep 2021 07:35) (94% - 100%)    HEENT:   pupils equal and reactive, EOMI, no oropharyngeal lesions, erythema, exudates, oral thrush    NECK:   supple, no carotid bruits, no palpable lymph nodes, no thyromegaly    CV:  +S1, +S2, regular, no murmurs or rubs    RESP:   lungs clear to auscultation bilaterally, no wheezing, rales, rhonchi, good air entry bilaterally    BREAST:  not examined    GI:  abdomen soft, non-tender, non-distended, normal BS, no bruits, no abdominal masses, no palpable masses    RECTAL:  not examined    :  not examined    MSK:   normal muscle tone, no atrophy, no rigidity, no contractions    EXT:   no clubbing, no cyanosis, no edema, no calf pain, swelling or erythema    VASCULAR:  pulses equal and symmetric in the upper and lower extremities    NEURO:  AAOX3, no focal neurological deficits, follows all commands, able to move extremities spontaneously    SKIN:  no ulcers, lesions or rashes    MEDICATIONS  (STANDING):  aspirin enteric coated 81 milliGRAM(s) Oral daily  atorvastatin 40 milliGRAM(s) Oral at bedtime  cefepime   IVPB 2000 milliGRAM(s) IV Intermittent every 24 hours  clopidogrel Tablet 75 milliGRAM(s) Oral daily  DULoxetine 60 milliGRAM(s) Oral daily  famotidine    Tablet 20 milliGRAM(s) Oral daily  ferrous    sulfate 325 milliGRAM(s) Oral daily  gabapentin 300 milliGRAM(s) Oral two times a day  metoprolol succinate ER 75 milliGRAM(s) Oral daily  pantoprazole    Tablet 40 milliGRAM(s) Oral two times a day  tamsulosin 0.4 milliGRAM(s) Oral at bedtime    MEDICATIONS  (PRN):  acetaminophen   Tablet .. 650 milliGRAM(s) Oral every 6 hours PRN Temp greater or equal to 38.5C (101.3F), Mild Pain (1 - 3)  aluminum hydroxide/magnesium hydroxide/simethicone Suspension 30 milliLiter(s) Oral every 4 hours PRN Dyspepsia  ondansetron Injectable 4 milliGRAM(s) IV Push every 8 hours PRN Nausea and/or Vomiting      Urinalysis Basic - ( 03 Sep 2021 11:15 )    Color: Yellow-Peach / Appearance: Clear / S.005 / pH: x  Gluc: x / Ketone: Negative  / Bili: Negative / Urobili: Negative mg/dL   Blood: x / Protein: 100 mg/dL / Nitrite: Negative   Leuk Esterase: Moderate / RBC: >50 /HPF / WBC >50   Sq Epi: x / Non Sq Epi: Occasional / Bacteria: Few    04 Sep 2021 08:01    144    |  111    |  36     ----------------------------<  98     3.8     |  30     |  2.12     Ca    8.6        04 Sep 2021 08:01    TPro  6.1    /  Alb  2.8    /  TBili  0.4    /  DBili  x      /  AST  12     /  ALT  12     /  AlkPhos  71     04 Sep 2021 08:01  LIVER FUNCTIONS - ( 04 Sep 2021 08:01 )  Alb: 2.8 g/dL / Pro: 6.1 gm/dL / ALK PHOS: 71 U/L / ALT: 12 U/L / AST: 12 U/L / GGT: x         PT/INR - ( 03 Sep 2021 11:15 )   PT: 12.9 sec;   INR: 1.12 ratio         PTT - ( 03 Sep 2021 11:15 )  PTT:29.4 secCBC Full  -  ( 04 Sep 2021 08:01 )  WBC Count : 7.90 K/uL  Hemoglobin : 8.1 g/dL  Hematocrit : 26.8 %  Platelet Count - Automated : 191 K/uL  Mean Cell Volume : 92.1 fl  Mean Cell Hemoglobin : 27.8 pg  Mean Cell Hemoglobin Concentration : 30.2 gm/dL          PT/INR - ( 03 Sep 2021 11:15 )   PT: 12.9 sec;   INR: 1.12 ratio         PTT - ( 03 Sep 2021 11:15 )  PTT:29.4 sec          Assessment and Plan:     89 y/o M PMHx significant for CAD, s/p PCI w/ stent placement, Hypertension, Hyperlipidemia, CKD stage 3, AAA, PAD, OA, BPH, BIBA from [place] for further evaluation and management of c/o subjective fevers (Tmax 103.1'F), chills, rigors, increased lethargy, and c/o generalized abdominal pain/chest pain.. As noted in prior documentation patient unable to give further hx.  Labs => WBC 14.58, Hgb/Hct 10.1/32.8, HCO3 32, BUN/Cr 39/2.36, UA (+). CT Chest/ABD/Pelvis => Interval development of mild hydronephrosis and moderate right-sided hydroureter. The ureter appears to implant in the lateral aspect of the bladder rather than posteriorly. Please correlate clinically. There is bladder wall thickening predominantly laterally and superiorly. This may be due to cystitis, muscular hypertrophy and or other etiologies. This is limited in evaluation without contrast and underlying lesion cannot be excluded. Further evaluation with bladder ultrasound is recommended if patient cannot receive iodinated contrast. Small right pelvic sidewall lymph nodes are not considered significant by size criteria although have mildly increased in size when compared with the prior examination. If there is a primary neoplasm, metastatic disease could not be excluded on the basis of this study.     US Kidney/Bladder => Bilateral renal cortical thinning compatible with chronic kidney disease. Mild right hydroureteronephrosis to the level of the UVJ where there is irregular bladder wall thickening. Consider CT urogram or cystoscopy for further evaluation.     #Sepsis due to Complicated UTI/Cystitis with right Hydronephrosis  -Right hydroureteronephrosis to the level of the UVJ   ~ Urology consult pending   -c/w cefepime  -blood cultues negative  -urine cultrues growing contaminant  -ultrasound and CT with right hydro. and irregular bladd wall thickening      #RASHAUN on CKD3  ~will hold Furosemide (takes 40mg po bid)  ~strict I/Os  ~please reassess further in the am  ~daily weights    #CAD/Hypertension  ~cont. ASA 81mg po daily  ~cont. Clopidogrel 75mg po daily  ~cont. Metoprolol ER 50mg po daily    #Hyperlipidemia  ~cont. Atorvastatin 40mg po qhs    #BPH  ~cont. Tamsulosin 0.4mg po qhs    #Vte ppx  ~IMPROVE Vte Risk Score is 2  ~cont. Heparin sq  
Date of service: 21 @ 09:44    Lying in bed in NAD  Alert and verbal  No fever  Noted with mild right lateral ankle erythema and edema    ROS: no fever or chills; denies dizziness, no HA, no SOB or cough, no abdominal pain, no diarrhea or constipation; no dysuria    MEDICATIONS  (STANDING):  artificial  tears Solution 1 Drop(s) Both EYES three times a day  aspirin enteric coated 81 milliGRAM(s) Oral daily  atorvastatin 40 milliGRAM(s) Oral at bedtime  cefepime   IVPB 2000 milliGRAM(s) IV Intermittent every 24 hours  clopidogrel Tablet 75 milliGRAM(s) Oral daily  DULoxetine 60 milliGRAM(s) Oral daily  famotidine    Tablet 20 milliGRAM(s) Oral daily  ferrous    sulfate 325 milliGRAM(s) Oral daily  furosemide   Injectable 40 milliGRAM(s) IV Push two times a day  gabapentin 300 milliGRAM(s) Oral two times a day  heparin   Injectable 5000 Unit(s) SubCutaneous every 12 hours  metoprolol succinate ER 75 milliGRAM(s) Oral daily  pantoprazole    Tablet 40 milliGRAM(s) Oral two times a day  polyethylene glycol 3350 17 Gram(s) Oral daily  senna 2 Tablet(s) Oral at bedtime  tamsulosin 0.4 milliGRAM(s) Oral at bedtime    Vital Signs Last 24 Hrs  T(C): 36.6 (06 Sep 2021 07:40), Max: 36.9 (06 Sep 2021 00:05)  T(F): 97.8 (06 Sep 2021 07:40), Max: 98.5 (06 Sep 2021 00:05)  HR: 74 (06 Sep 2021 07:40) (65 - 74)  BP: 143/51 (06 Sep 2021 07:40) (130/56 - 145/59)  BP(mean): --  RR: 18 (06 Sep 2021 07:40) (18 - 18)  SpO2: 94% (06 Sep 2021 07:40) (94% - 98%)     Physical exam:    Constitutional:  No acute distress  HEENT: NC/AT, EOMI, PERRLA, conjunctivae clear; ears and nose atraumatic;  Neck: supple; thyroid not palpable  Back: no tenderness  Respiratory: respiratory effort normal; clear to auscultation  Cardiovascular: S1S2 regular, no murmurs  Abdomen: soft, not tender, not distended, positive BS  Genitourinary: no suprapubic tenderness  Lymphatic: no LN palpable  Musculoskeletal: no muscle tenderness, no joint swelling or tenderness  Extremities: 1+ pedal edema  right lateral , above right ankle area of edema and mild erythema  Neurological/ Psychiatric: alert, moving all extremities  Skin: no rashes; no palpable lesions    Labs: reviewed                        8.4    5.35  )-----------( 193      ( 05 Sep 2021 08:50 )             27.6     09-    142  |  111<H>  |  31<H>  ----------------------------<  108<H>  3.9   |  30  |  1.93<H>    Ca    8.6      05 Sep 2021 08:50    TPro  6.3  /  Alb  2.8<L>  /  TBili  0.2  /  DBili  x   /  AST  16  /  ALT  14  /  AlkPhos  70  09-05                        8.1    7.90  )-----------( 191      ( 04 Sep 2021 08:01 )             26.8     09-04    144  |  111<H>  |  36<H>  ----------------------------<  98  3.8   |  30  |  2.12<H>    Ca    8.6      04 Sep 2021 08:01    TPro  6.1  /  Alb  2.8<L>  /  TBili  0.4  /  DBili  x   /  AST  12<L>  /  ALT  12  /  AlkPhos  71  09-04     LIVER FUNCTIONS - ( 04 Sep 2021 08:01 )  Alb: 2.8 g/dL / Pro: 6.1 gm/dL / ALK PHOS: 71 U/L / ALT: 12 U/L / AST: 12 U/L / GGT: x           Urinalysis Basic - ( 03 Sep 2021 11:15 )    Color: Yellow-Peach / Appearance: Clear / S.005 / pH: x  Gluc: x / Ketone: Negative  / Bili: Negative / Urobili: Negative mg/dL   Blood: x / Protein: 100 mg/dL / Nitrite: Negative   Leuk Esterase: Moderate / RBC: >50 /HPF / WBC >50   Sq Epi: x / Non Sq Epi: Occasional / Bacteria: Few    ( @ 11:15)  NotDetec      Culture - Urine (collected 03 Sep 2021 11:15)  Source: Clean Catch Clean Catch (Midstream)  Final Report (04 Sep 2021 10:50):    >=3 organisms. Probable collection contamination.    Culture - Blood (collected 03 Sep 2021 11:15)  Source: .Blood Blood-Peripheral  Preliminary Report (04 Sep 2021 15:05):    No growth to date.    Culture - Blood (collected 03 Sep 2021 11:15)  Source: .Blood Blood-Peripheral  Preliminary Report (04 Sep 2021 15:05):    No growth to date.    Radiology: all available radiological tests reviewed    < from: US Kidney and Bladder (21 @ 16:34) >  Bilateral renal cortical thinning compatible with chronic kidney disease.  Mild right hydroureteronephrosis to the level of the UVJ where there is irregular bladder wall thickening. Consider CT urogram or cystoscopy for further evaluation.  Septated cyst in the lower pole of the left kidney measuring 2.4 cm.  < end of copied text >    < from: CT Abdomen and Pelvis No Cont (21 @ 12:31) >  BLADDER: Wall thickening of the lateral aspect of the bladder bilaterally and superiorly. The bladder was collapsed on the prior examination which limits evaluation.  Interval development of mild hydronephrosis and moderate right-sided hydroureter. The ureter appears to implant in the lateral aspect of the bladder rather than posteriorly. Please correlate clinically. There is bladder wall thickening predominantly laterally and superiorly. This may be due to cystitis, muscular hypertrophy and or other etiologies. This is limited in evaluation without contrast and underlying lesion cannot be excluded. Further evaluation with bladder ultrasound is recommended if patient cannot receive iodinated contrast.  Small right pelvic sidewall lymph nodes are not considered significant by size criteria although have mildly increased in size when compared with the prior examination. If there is a primary neoplasm, metastatic disease could not be excluded on the basis of this study.  < end of copied text >    Advanced directives addressed: full resuscitation
Date of service: 21 @ 09:51    Lying in bed in NAD  Weak looking  Alert and verbal  Has urinary frequency    ROS: no fever or chills; no HA, no SOB or cough, no abdominal pain, no diarrhea or constipation; no legs pain, no rashes    MEDICATIONS  (STANDING):  artificial  tears Solution 1 Drop(s) Both EYES three times a day  aspirin enteric coated 81 milliGRAM(s) Oral daily  atorvastatin 40 milliGRAM(s) Oral at bedtime  cefepime   IVPB 2000 milliGRAM(s) IV Intermittent every 24 hours  clopidogrel Tablet 75 milliGRAM(s) Oral daily  DULoxetine 60 milliGRAM(s) Oral daily  famotidine    Tablet 20 milliGRAM(s) Oral daily  ferrous    sulfate 325 milliGRAM(s) Oral daily  gabapentin 300 milliGRAM(s) Oral two times a day  metoprolol succinate ER 75 milliGRAM(s) Oral daily  pantoprazole    Tablet 40 milliGRAM(s) Oral two times a day  polyethylene glycol 3350 17 Gram(s) Oral daily  senna 2 Tablet(s) Oral at bedtime  tamsulosin 0.4 milliGRAM(s) Oral at bedtime    Vital Signs Last 24 Hrs  T(C): 36.7 (05 Sep 2021 07:31), Max: 37.4 (04 Sep 2021 23:33)  T(F): 98.1 (05 Sep 2021 07:31), Max: 99.4 (04 Sep 2021 23:33)  HR: 68 (05 Sep 2021 09:05) (68 - 78)  BP: 143/50 (05 Sep 2021 09:05) (108/34 - 143/50)  BP(mean): --  RR: 18 (05 Sep 2021 07:31) (18 - 19)  SpO2: 98% (05 Sep 2021 07:31) (97% - 100%)     Physical exam:    Constitutional:  No acute distress  HEENT: NC/AT, EOMI, PERRLA, conjunctivae clear; ears and nose atraumatic;  Neck: supple; thyroid not palpable  Back: no tenderness  Respiratory: respiratory effort normal; clear to auscultation  Cardiovascular: S1S2 regular, no murmurs  Abdomen: soft, not tender, not distended, positive BS  Genitourinary: no suprapubic tenderness  Lymphatic: no LN palpable  Musculoskeletal: no muscle tenderness, no joint swelling or tenderness  Extremities: no pedal edema  Neurological/ Psychiatric: alert, moving all extremities  Skin: no rashes; no palpable lesions    Labs: reviewed                        8.4    5.35  )-----------( 193      ( 05 Sep 2021 08:50 )             27.6     09-    142  |  111<H>  |  31<H>  ----------------------------<  108<H>  3.9   |  30  |  1.93<H>    Ca    8.6      05 Sep 2021 08:50    TPro  6.3  /  Alb  2.8<L>  /  TBili  0.2  /  DBili  x   /  AST  16  /  ALT  14  /  AlkPhos  70                          8.1    7.90  )-----------( 191      ( 04 Sep 2021 08:01 )             26.8     09-04    144  |  111<H>  |  36<H>  ----------------------------<  98  3.8   |  30  |  2.12<H>    Ca    8.6      04 Sep 2021 08:01    TPro  6.1  /  Alb  2.8<L>  /  TBili  0.4  /  DBili  x   /  AST  12<L>  /  ALT  12  /  AlkPhos  71  09-04     LIVER FUNCTIONS - ( 04 Sep 2021 08:01 )  Alb: 2.8 g/dL / Pro: 6.1 gm/dL / ALK PHOS: 71 U/L / ALT: 12 U/L / AST: 12 U/L / GGT: x           Urinalysis Basic - ( 03 Sep 2021 11:15 )    Color: Yellow-Peach / Appearance: Clear / S.005 / pH: x  Gluc: x / Ketone: Negative  / Bili: Negative / Urobili: Negative mg/dL   Blood: x / Protein: 100 mg/dL / Nitrite: Negative   Leuk Esterase: Moderate / RBC: >50 /HPF / WBC >50   Sq Epi: x / Non Sq Epi: Occasional / Bacteria: Few    ( @ 11:15)  NotDete      Culture - Urine (collected 03 Sep 2021 11:15)  Source: Clean Catch Clean Catch (Midstream)  Final Report (04 Sep 2021 10:50):    >=3 organisms. Probable collection contamination.    Culture - Blood (collected 03 Sep 2021 11:15)  Source: .Blood Blood-Peripheral  Preliminary Report (04 Sep 2021 15:05):    No growth to date.    Culture - Blood (collected 03 Sep 2021 11:15)  Source: .Blood Blood-Peripheral  Preliminary Report (04 Sep 2021 15:05):    No growth to date.    Radiology: all available radiological tests reviewed    < from: US Kidney and Bladder (21 @ 16:34) >  Bilateral renal cortical thinning compatible with chronic kidney disease.  Mild right hydroureteronephrosis to the level of the UVJ where there is irregular bladder wall thickening. Consider CT urogram or cystoscopy for further evaluation.  Septated cyst in the lower pole of the left kidney measuring 2.4 cm.  < end of copied text >    < from: CT Abdomen and Pelvis No Cont (21 @ 12:31) >  BLADDER: Wall thickening of the lateral aspect of the bladder bilaterally and superiorly. The bladder was collapsed on the prior examination which limits evaluation.  Interval development of mild hydronephrosis and moderate right-sided hydroureter. The ureter appears to implant in the lateral aspect of the bladder rather than posteriorly. Please correlate clinically. There is bladder wall thickening predominantly laterally and superiorly. This may be due to cystitis, muscular hypertrophy and or other etiologies. This is limited in evaluation without contrast and underlying lesion cannot be excluded. Further evaluation with bladder ultrasound is recommended if patient cannot receive iodinated contrast.  Small right pelvic sidewall lymph nodes are not considered significant by size criteria although have mildly increased in size when compared with the prior examination. If there is a primary neoplasm, metastatic disease could not be excluded on the basis of this study.  < end of copied text >    Advanced directives addressed: full resuscitation
Patient is a 89y Male who reports no complaints as new      MEDICATIONS  (STANDING):  artificial  tears Solution 1 Drop(s) Both EYES three times a day  aspirin enteric coated 81 milliGRAM(s) Oral daily  atorvastatin 40 milliGRAM(s) Oral at bedtime  cefepime   IVPB 2000 milliGRAM(s) IV Intermittent every 24 hours  clopidogrel Tablet 75 milliGRAM(s) Oral daily  doxycycline hyclate Capsule 100 milliGRAM(s) Oral every 12 hours  DULoxetine 60 milliGRAM(s) Oral daily  famotidine    Tablet 20 milliGRAM(s) Oral daily  ferrous    sulfate 325 milliGRAM(s) Oral daily  furosemide    Tablet 40 milliGRAM(s) Oral two times a day  gabapentin 300 milliGRAM(s) Oral two times a day  heparin   Injectable 5000 Unit(s) SubCutaneous every 12 hours  metoprolol succinate ER 75 milliGRAM(s) Oral daily  pantoprazole    Tablet 40 milliGRAM(s) Oral two times a day  polyethylene glycol 3350 17 Gram(s) Oral daily  senna 2 Tablet(s) Oral at bedtime  tamsulosin 0.4 milliGRAM(s) Oral at bedtime    MEDICATIONS  (PRN):  acetaminophen   Tablet .. 650 milliGRAM(s) Oral every 6 hours PRN Temp greater or equal to 38.5C (101.3F), Mild Pain (1 - 3)  aluminum hydroxide/magnesium hydroxide/simethicone Suspension 30 milliLiter(s) Oral every 4 hours PRN Dyspepsia  ondansetron Injectable 4 milliGRAM(s) IV Push every 8 hours PRN Nausea and/or Vomiting        T(C): , Max: 36.9 (09-07-21 @ 00:10)  T(F): , Max: 98.4 (09-07-21 @ 00:10)  HR: 69 (09-07-21 @ 15:56)  BP: 103/46 (09-07-21 @ 15:56)  BP(mean): --  RR: 18 (09-07-21 @ 15:56)  SpO2: 97% (09-07-21 @ 15:56)  Wt(kg): --    09-06 @ 07:01  -  09-07 @ 07:00  --------------------------------------------------------  IN: 0 mL / OUT: 700 mL / NET: -700 mL          PHYSICAL EXAM:    Constitutional: NAD, frail  HEENT:  MMM  dist  Cardiovascular: S1 and S2  Extremities:  peripheral edema  Neurological: A/O x 3  : No Flores  Skin: No rashes  Access: Not applicable        LABS:                        9.0    4.72  )-----------( 218      ( 07 Sep 2021 10:40 )             29.3     07 Sep 2021 10:40    140    |  105    |  32     ----------------------------<  130    3.5     |  33     |  1.99   06 Sep 2021 08:38    142    |  108    |  32     ----------------------------<  106    3.8     |  31     |  1.97   05 Sep 2021 08:50    142    |  111    |  31     ----------------------------<  108    3.9     |  30     |  1.93   04 Sep 2021 08:01    144    |  111    |  36     ----------------------------<  98     3.8     |  30     |  2.12     Ca    8.7        07 Sep 2021 10:40  Ca    8.7        06 Sep 2021 08:38  Ca    8.6        05 Sep 2021 08:50  Ca    8.6        04 Sep 2021 08:01  Phos  2.7       07 Sep 2021 10:40    TPro  x      /  Alb  2.9    /  TBili  x      /  DBili  x      /  AST  x      /  ALT  x      /  AlkPhos  x      07 Sep 2021 10:40  TPro  6.3    /  Alb  2.8    /  TBili  0.2    /  DBili  x      /  AST  16     /  ALT  14     /  AlkPhos  70     05 Sep 2021 08:50  TPro  6.1    /  Alb  2.8    /  TBili  0.4    /  DBili  x      /  AST  12     /  ALT  12     /  AlkPhos  71     04 Sep 2021 08:01          Urine Studies:          RADIOLOGY & ADDITIONAL STUDIES:              
HOSPITALIST PROGRESS NOTE:  SUBJECTIVE:  PCP:  Chief Complaint: Patient is a 89y old  Male who presents with a chief complaint of Fevers (07 Sep 2021 11:01)      HPI:  89 y/o M PMHx significant for CAD, s/p PCI w/ stent placement, Hypertension, Hyperlipidemia, CKD stage 3, AAA, PAD, OA, BPH, BIBA from [facility] for futher evaluation and management of c/o subjective fevers (Tmax 103.1'F), chills, rigors, increased lethargy, and c/o generalized abdominal pain/chest pain.. As noted in prior documentation patient unable to give further hx.  Labs => WBC 14.58, Hgb/Hct 10.1/32.8, HCO3 32, BUN/Cr 39/2.36, UA (+). CT Chest/ABD/Pelvis => Interval development of mild hydronephrosis and moderate right-sided hydroureter. The ureter appears to implant in the lateral aspect of the bladder rather than posteriorly. Please correlate clinically. There is bladder wall thickening predominantly laterally and superiorly. This may be due to cystitis, muscular hypertrophy and or other etiologies. This is limited in evaluation without contrast and underlying lesion cannot be excluded. Further evaluation with bladder ultrasound is recommended if patient cannot receive iodinated contrast. Small right pelvic sidewall lymph nodes are not considered significant by size criteria although have mildly increased in size when compared with the prior examination. If there is a primary neoplasm, metastatic disease could not be excluded on the basis of this study. US Kidney/Bladder => Bilateral renal cortical thinning compatible with chronic kidney disease. Mild right hydroureteronephrosis to the level of the UVJ where there is irregular bladder wall thickening. Consider CT urogram or cystoscopy for further evaluation. Septated cyst in the lower pole of the left kidney measuring 2.4 cm. In the ED the patient was given Acetaminophen 1g SD x 1, Cefepime 2g IVPB x 1, Vancomycin 1g IVPB x 1, and NaCl 2.3L x 1. (03 Sep 2021 18:12)    9/7: Discussed with son and does not want renal scan; patient has no compliaints other than arthritic pain   9/8:  This morning patient was short of brath; lasix 20mg IV was odered by me;  Paient has no other complaints. Jump in cr this morning   9/9: No further dyspnea; Seen by ID and continue IV abx for another day; patient has no other complaints     Allergies:  penicillin (Angioedema)    REVIEW OF SYSTEMS:  See HPI. All other review of systems is negative unless indicated above.     OBJECTIVE  Physical Exam:  Vital Signs Last 24 Hrs  T(C): 36.4 (09 Sep 2021 07:42), Max: 36.8 (08 Sep 2021 15:26)  T(F): 97.5 (09 Sep 2021 07:42), Max: 98.2 (08 Sep 2021 15:26)  HR: 73 (09 Sep 2021 10:40) (73 - 75)  BP: 118/43 (09 Sep 2021 10:40) (100/40 - 142/57)  BP(mean): --  RR: 18 (09 Sep 2021 07:42) (18 - 18)  SpO2: 96% (09 Sep 2021 07:42) (96% - 99%)      Constitutional: NAD, awake and alert,   Neurological: no focal deficits  HEENT: PERRLA, EOMI, MMM  Neck: Soft and supple, No LAD, No JVD  Respiratory: Breath sounds are clear bilaterally, No wheezing, rales or rhonchi  Cardiovascular: S1 and S2, regular rate and rhythm; no Murmurs, gallops or rubs  Gastrointestinal: Bowel Sounds present, soft, nontender, nondistended, no guarding, no rebound tenderness  Back: No CVA tenderness   Extremities: No peripheral edema  Vascular: 2+ peripheral pulses  Musculoskeletal: 5/5 strength b/l upper and lower extremities  Skin: No rashes  Breast: Deferred  Rectal: Deferred    MEDICATIONS  (STANDING):  artificial  tears Solution 1 Drop(s) Both EYES three times a day  aspirin enteric coated 81 milliGRAM(s) Oral daily  atorvastatin 40 milliGRAM(s) Oral at bedtime  cefepime   IVPB 2000 milliGRAM(s) IV Intermittent every 24 hours  clopidogrel Tablet 75 milliGRAM(s) Oral daily  doxycycline hyclate Capsule 100 milliGRAM(s) Oral every 12 hours  DULoxetine 60 milliGRAM(s) Oral daily  famotidine    Tablet 20 milliGRAM(s) Oral daily  ferrous    sulfate 325 milliGRAM(s) Oral daily  furosemide    Tablet 40 milliGRAM(s) Oral two times a day  gabapentin 300 milliGRAM(s) Oral two times a day  heparin   Injectable 5000 Unit(s) SubCutaneous every 12 hours  metoprolol succinate ER 75 milliGRAM(s) Oral daily  pantoprazole    Tablet 40 milliGRAM(s) Oral two times a day  polyethylene glycol 3350 17 Gram(s) Oral daily  senna 2 Tablet(s) Oral at bedtime  tamsulosin 0.4 milliGRAM(s) Oral at bedtime      LABS: All Labs Reviewed:                        9.0    4.72  )-----------( 218      ( 07 Sep 2021 10:40 )             29.3     09-07    140  |  105  |  32<H>  ----------------------------<  130<H>  3.5   |  33<H>  |  1.99<H>    Ca    8.7      07 Sep 2021 10:40  Phos  2.7     09-07    TPro  x   /  Alb  2.9<L>  /  TBili  x   /  DBili  x   /  AST  x   /  ALT  x   /  AlkPhos  x   09-07    Blood Culture:   09-03 @ 11:15  Organism --  Gram Stain Blood -- Gram Stain --  Specimen Source .Blood Blood-Peripheral  Culture-Blood --        RADIOLOGY/EKG:    < from: Xray Chest 1 View- PORTABLE-Urgent (Xray Chest 1 View- PORTABLE-Urgent .) (03.31.21 @ 11:01) >    IMPRESSION: No acute finding or change.      < end of copied text >  < from: CT Abdomen and Pelvis No Cont (09.03.21 @ 12:31) >    IMPRESSION:  Interval development of mild hydronephrosis and moderate right-sided hydroureter. The ureter appears to implant in the lateral aspect of the bladder rather than posteriorly. Please correlate clinically. There is bladder wall thickening predominantly laterally and superiorly. This may be due to cystitis, muscular hypertrophy and or other etiologies. This is limited in evaluation without contrast and underlying lesion cannot be excluded. Further evaluation with bladder ultrasound is recommended if patient cannot receive iodinated contrast.    Small right pelvic sidewall lymph nodes are not considered significant by size criteria although have mildly increased in size when compared with the prior examination. If there is a primary neoplasm, metastatic disease could not be excluded on the basis of this study.    Hiatal hernia. Additional findings as above.      < end of copied text >